# Patient Record
Sex: FEMALE | Race: WHITE
[De-identification: names, ages, dates, MRNs, and addresses within clinical notes are randomized per-mention and may not be internally consistent; named-entity substitution may affect disease eponyms.]

---

## 2017-09-23 ENCOUNTER — HOSPITAL ENCOUNTER (EMERGENCY)
Dept: HOSPITAL 83 - ED | Age: 42
Discharge: HOME | End: 2017-09-23
Payer: COMMERCIAL

## 2017-09-23 VITALS — WEIGHT: 250 LBS | BODY MASS INDEX: 40.18 KG/M2 | HEIGHT: 65.98 IN
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
## 2017-09-23 DIAGNOSIS — Z88.5: ICD-10-CM

## 2017-09-23 DIAGNOSIS — Z88.6: ICD-10-CM
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
## 2017-09-23 DIAGNOSIS — S91.115A: Primary | ICD-10-CM
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
## 2021-10-22 ENCOUNTER — HOSPITAL ENCOUNTER (INPATIENT)
Age: 46
LOS: 46 days | Discharge: HOME HEALTH | DRG: 058 | End: 2021-12-07
Attending: PHYSICAL MEDICINE & REHABILITATION | Admitting: PHYSICAL MEDICINE & REHABILITATION
Payer: MEDICAID

## 2021-10-22 PROBLEM — I63.9 ACUTE CEREBROVASCULAR ACCIDENT (CVA) (HCC): Status: ACTIVE | Noted: 2021-10-22

## 2021-10-22 PROCEDURE — 1280000000 HC REHAB R&B

## 2021-10-22 PROCEDURE — 6370000000 HC RX 637 (ALT 250 FOR IP): Performed by: PHYSICAL MEDICINE & REHABILITATION

## 2021-10-22 PROCEDURE — 6360000002 HC RX W HCPCS: Performed by: PHYSICAL MEDICINE & REHABILITATION

## 2021-10-22 RX ORDER — SENNA PLUS 8.6 MG/1
1 TABLET ORAL NIGHTLY
Status: DISCONTINUED | OUTPATIENT
Start: 2021-10-22 | End: 2021-10-28

## 2021-10-22 RX ORDER — BISACODYL 10 MG
10 SUPPOSITORY, RECTAL RECTAL DAILY PRN
Status: DISCONTINUED | OUTPATIENT
Start: 2021-10-22 | End: 2021-12-07 | Stop reason: HOSPADM

## 2021-10-22 RX ORDER — NICOTINE 21 MG/24HR
1 PATCH, TRANSDERMAL 24 HOURS TRANSDERMAL DAILY
Status: DISCONTINUED | OUTPATIENT
Start: 2021-10-22 | End: 2021-11-04

## 2021-10-22 RX ORDER — LANOLIN ALCOHOL/MO/W.PET/CERES
6 CREAM (GRAM) TOPICAL NIGHTLY
Status: DISCONTINUED | OUTPATIENT
Start: 2021-10-22 | End: 2021-12-07 | Stop reason: HOSPADM

## 2021-10-22 RX ORDER — ACETAMINOPHEN 325 MG/1
650 TABLET ORAL EVERY 4 HOURS PRN
Status: DISCONTINUED | OUTPATIENT
Start: 2021-10-22 | End: 2021-12-07 | Stop reason: HOSPADM

## 2021-10-22 RX ORDER — ASPIRIN 81 MG/1
81 TABLET, CHEWABLE ORAL DAILY
Status: DISCONTINUED | OUTPATIENT
Start: 2021-10-23 | End: 2021-12-07 | Stop reason: HOSPADM

## 2021-10-22 RX ORDER — ERGOCALCIFEROL 1.25 MG/1
50000 CAPSULE ORAL WEEKLY
Status: DISCONTINUED | OUTPATIENT
Start: 2021-10-23 | End: 2021-12-07 | Stop reason: HOSPADM

## 2021-10-22 RX ORDER — DOCUSATE SODIUM 100 MG/1
100 CAPSULE, LIQUID FILLED ORAL 2 TIMES DAILY
Status: DISCONTINUED | OUTPATIENT
Start: 2021-10-22 | End: 2021-12-07 | Stop reason: HOSPADM

## 2021-10-22 RX ORDER — HEPARIN SODIUM 10000 [USP'U]/ML
5000 INJECTION, SOLUTION INTRAVENOUS; SUBCUTANEOUS EVERY 12 HOURS
Status: DISCONTINUED | OUTPATIENT
Start: 2021-10-22 | End: 2021-11-17

## 2021-10-22 RX ORDER — CLOPIDOGREL BISULFATE 75 MG/1
75 TABLET ORAL DAILY
Status: DISCONTINUED | OUTPATIENT
Start: 2021-10-26 | End: 2021-12-07 | Stop reason: HOSPADM

## 2021-10-22 RX ORDER — ATORVASTATIN CALCIUM 40 MG/1
40 TABLET, FILM COATED ORAL NIGHTLY
Status: DISCONTINUED | OUTPATIENT
Start: 2021-10-22 | End: 2021-12-07 | Stop reason: HOSPADM

## 2021-10-22 RX ORDER — LIDOCAINE 4 G/G
1 PATCH TOPICAL DAILY
Status: DISCONTINUED | OUTPATIENT
Start: 2021-10-22 | End: 2021-10-26

## 2021-10-22 RX ORDER — CLOPIDOGREL BISULFATE 75 MG/1
75 TABLET ORAL DAILY
Status: DISCONTINUED | OUTPATIENT
Start: 2021-10-23 | End: 2021-10-22

## 2021-10-22 RX ADMIN — Medication 6 MG: at 20:49

## 2021-10-22 RX ADMIN — ATORVASTATIN CALCIUM 40 MG: 40 TABLET, FILM COATED ORAL at 20:49

## 2021-10-22 RX ADMIN — HEPARIN SODIUM 5000 UNITS: 10000 INJECTION INTRAVENOUS; SUBCUTANEOUS at 20:50

## 2021-10-22 NOTE — LETTER
PORTABLE PATIENT PROFILE  Ladan Damon  5505/5505-B    MEDICAL DIAGNOSIS/CONDITION:   Patient Active Problem List   Diagnosis    Acute cerebrovascular accident (CVA) ([address]    Status post craniectomy    Left spastic hemiplegia (HCC)    Tobacco abuse    Acute pain    Transient alteration of awareness       INSURANCE INFORMATION:  Payor: ANNA /  /  /     ADVANCED DIRECTIVES:      [unfilled]     EMERGENCY CONTACT:       RISK FACTORS:   Social History     Tobacco Use    Smoking status: Former Smoker     Packs/day: 1.00     Years: 20.00     Pack years: 20.00     Types: Cigarettes     Start date: 10/1/2020     Quit date: 10/12/2021     Years since quittin.1    Smokeless tobacco: Not on file   Substance Use Topics    Alcohol use: Not Currently       ALLERGIES:  Allergies   Allergen Reactions    Vicodin [Hydrocodone-Acetaminophen]      Gets severe hypotension       IMMUNIZATIONS:    There is no immunization history on file for this patient. SWALLOWING:   Difficulty Chewing or Swallowing Food: Yes (Comment) (recent CVA)    VISION AND HEARING:   Sensory Problems  Visual impairment: Glasses    PHYSICIANS INVOLVED WITH CARE:    Laura Chadwick MD  No ref. provider found  No primary care provider on file.   Laura Chadwick MD

## 2021-10-23 LAB
ALBUMIN SERPL-MCNC: 3.7 G/DL (ref 3.5–5.2)
ALP BLD-CCNC: 89 U/L (ref 35–104)
ALT SERPL-CCNC: 75 U/L (ref 0–32)
ANION GAP SERPL CALCULATED.3IONS-SCNC: 9 MMOL/L (ref 7–16)
AST SERPL-CCNC: 46 U/L (ref 0–31)
BILIRUB SERPL-MCNC: 0.4 MG/DL (ref 0–1.2)
BUN BLDV-MCNC: 19 MG/DL (ref 6–20)
CALCIUM SERPL-MCNC: 9.7 MG/DL (ref 8.6–10.2)
CHLORIDE BLD-SCNC: 105 MMOL/L (ref 98–107)
CO2: 25 MMOL/L (ref 22–29)
CREAT SERPL-MCNC: 0.7 MG/DL (ref 0.5–1)
GFR AFRICAN AMERICAN: >60
GFR NON-AFRICAN AMERICAN: >60 ML/MIN/1.73
GLUCOSE BLD-MCNC: 107 MG/DL (ref 74–99)
HCT VFR BLD CALC: 40.9 % (ref 34–48)
HEMOGLOBIN: 13.9 G/DL (ref 11.5–15.5)
MCH RBC QN AUTO: 31.1 PG (ref 26–35)
MCHC RBC AUTO-ENTMCNC: 34 % (ref 32–34.5)
MCV RBC AUTO: 91.5 FL (ref 80–99.9)
PDW BLD-RTO: 12.6 FL (ref 11.5–15)
PLATELET # BLD: 496 E9/L (ref 130–450)
PMV BLD AUTO: 10.5 FL (ref 7–12)
POTASSIUM SERPL-SCNC: 4.5 MMOL/L (ref 3.5–5)
RBC # BLD: 4.47 E12/L (ref 3.5–5.5)
SODIUM BLD-SCNC: 139 MMOL/L (ref 132–146)
TOTAL PROTEIN: 7.3 G/DL (ref 6.4–8.3)
WBC # BLD: 10.4 E9/L (ref 4.5–11.5)

## 2021-10-23 PROCEDURE — 99223 1ST HOSP IP/OBS HIGH 75: CPT | Performed by: PHYSICAL MEDICINE & REHABILITATION

## 2021-10-23 PROCEDURE — 6370000000 HC RX 637 (ALT 250 FOR IP): Performed by: PHYSICAL MEDICINE & REHABILITATION

## 2021-10-23 PROCEDURE — 85027 COMPLETE CBC AUTOMATED: CPT

## 2021-10-23 PROCEDURE — 97166 OT EVAL MOD COMPLEX 45 MIN: CPT

## 2021-10-23 PROCEDURE — 6360000002 HC RX W HCPCS: Performed by: PHYSICAL MEDICINE & REHABILITATION

## 2021-10-23 PROCEDURE — 36415 COLL VENOUS BLD VENIPUNCTURE: CPT

## 2021-10-23 PROCEDURE — 97129 THER IVNTJ 1ST 15 MIN: CPT

## 2021-10-23 PROCEDURE — 1280000000 HC REHAB R&B

## 2021-10-23 PROCEDURE — 97535 SELF CARE MNGMENT TRAINING: CPT

## 2021-10-23 PROCEDURE — 97530 THERAPEUTIC ACTIVITIES: CPT

## 2021-10-23 PROCEDURE — 92610 EVALUATE SWALLOWING FUNCTION: CPT

## 2021-10-23 PROCEDURE — 92523 SPEECH SOUND LANG COMPREHEN: CPT

## 2021-10-23 PROCEDURE — 51798 US URINE CAPACITY MEASURE: CPT

## 2021-10-23 PROCEDURE — 80053 COMPREHEN METABOLIC PANEL: CPT

## 2021-10-23 PROCEDURE — 97162 PT EVAL MOD COMPLEX 30 MIN: CPT

## 2021-10-23 RX ORDER — DICLOFENAC SODIUM 1 MG/ML
1 SOLUTION/ DROPS OPHTHALMIC 4 TIMES DAILY
Status: ON HOLD | COMMUNITY
End: 2021-12-07 | Stop reason: HOSPADM

## 2021-10-23 RX ORDER — CLOPIDOGREL BISULFATE 75 MG/1
75 TABLET ORAL DAILY
Status: ON HOLD | COMMUNITY
Start: 2021-10-27 | End: 2021-12-07 | Stop reason: SDUPTHER

## 2021-10-23 RX ORDER — NICOTINE 21 MG/24HR
1 PATCH, TRANSDERMAL 24 HOURS TRANSDERMAL EVERY 24 HOURS
Status: ON HOLD | COMMUNITY
Start: 2021-10-22 | End: 2021-12-07 | Stop reason: HOSPADM

## 2021-10-23 RX ORDER — TAMSULOSIN HYDROCHLORIDE 0.4 MG/1
0.4 CAPSULE ORAL DAILY
Status: DISCONTINUED | OUTPATIENT
Start: 2021-10-23 | End: 2021-12-07 | Stop reason: HOSPADM

## 2021-10-23 RX ORDER — ATORVASTATIN CALCIUM 40 MG/1
40 TABLET, FILM COATED ORAL DAILY
Status: ON HOLD | COMMUNITY
Start: 2021-10-22 | End: 2021-12-07 | Stop reason: SDUPTHER

## 2021-10-23 RX ORDER — POLYETHYLENE GLYCOL 3350 17 G/17G
17 POWDER, FOR SOLUTION ORAL 2 TIMES DAILY
COMMUNITY
Start: 2021-10-22 | End: 2022-07-07

## 2021-10-23 RX ORDER — ACETAMINOPHEN 500 MG
1000 TABLET ORAL EVERY 4 HOURS PRN
COMMUNITY
Start: 2021-10-22

## 2021-10-23 RX ORDER — ASPIRIN 81 MG/1
81 TABLET, CHEWABLE ORAL DAILY
Status: ON HOLD | COMMUNITY
Start: 2021-10-22 | End: 2021-12-07 | Stop reason: SDUPTHER

## 2021-10-23 RX ADMIN — Medication 6 MG: at 20:43

## 2021-10-23 RX ADMIN — HEPARIN SODIUM 5000 UNITS: 10000 INJECTION INTRAVENOUS; SUBCUTANEOUS at 16:53

## 2021-10-23 RX ADMIN — DICLOFENAC SODIUM TOPICAL GEL, 1%, 2 G: 10 GEL TOPICAL at 00:36

## 2021-10-23 RX ADMIN — ATORVASTATIN CALCIUM 40 MG: 40 TABLET, FILM COATED ORAL at 20:43

## 2021-10-23 RX ADMIN — HEPARIN SODIUM 5000 UNITS: 10000 INJECTION INTRAVENOUS; SUBCUTANEOUS at 07:37

## 2021-10-23 RX ADMIN — ACETAMINOPHEN 650 MG: 325 TABLET ORAL at 16:53

## 2021-10-23 RX ADMIN — ERGOCALCIFEROL 50000 UNITS: 1.25 CAPSULE ORAL at 08:15

## 2021-10-23 RX ADMIN — DOCUSATE SODIUM 100 MG: 100 CAPSULE, LIQUID FILLED ORAL at 08:15

## 2021-10-23 RX ADMIN — ACETAMINOPHEN 650 MG: 325 TABLET ORAL at 08:14

## 2021-10-23 RX ADMIN — ACETAMINOPHEN 650 MG: 325 TABLET ORAL at 00:31

## 2021-10-23 RX ADMIN — ASPIRIN 81 MG CHEWABLE TABLET 81 MG: 81 TABLET CHEWABLE at 08:14

## 2021-10-23 RX ADMIN — DICLOFENAC SODIUM TOPICAL GEL, 1%, 2 G: 10 GEL TOPICAL at 20:49

## 2021-10-23 RX ADMIN — TAMSULOSIN HYDROCHLORIDE 0.4 MG: 0.4 CAPSULE ORAL at 16:53

## 2021-10-23 SDOH — ECONOMIC STABILITY: TRANSPORTATION INSECURITY
IN THE PAST 12 MONTHS, HAS LACK OF TRANSPORTATION KEPT YOU FROM MEETINGS, WORK, OR FROM GETTING THINGS NEEDED FOR DAILY LIVING?: NO

## 2021-10-23 SDOH — ECONOMIC STABILITY: FOOD INSECURITY: WITHIN THE PAST 12 MONTHS, YOU WORRIED THAT YOUR FOOD WOULD RUN OUT BEFORE YOU GOT MONEY TO BUY MORE.: NEVER TRUE

## 2021-10-23 SDOH — ECONOMIC STABILITY: INCOME INSECURITY: IN THE LAST 12 MONTHS, WAS THERE A TIME WHEN YOU WERE NOT ABLE TO PAY THE MORTGAGE OR RENT ON TIME?: NO

## 2021-10-23 SDOH — HEALTH STABILITY: PHYSICAL HEALTH: ON AVERAGE, HOW MANY DAYS PER WEEK DO YOU ENGAGE IN MODERATE TO STRENUOUS EXERCISE (LIKE A BRISK WALK)?: 2 DAYS

## 2021-10-23 SDOH — ECONOMIC STABILITY: HOUSING INSECURITY
IN THE LAST 12 MONTHS, WAS THERE A TIME WHEN YOU DID NOT HAVE A STEADY PLACE TO SLEEP OR SLEPT IN A SHELTER (INCLUDING NOW)?: NO

## 2021-10-23 SDOH — HEALTH STABILITY: PHYSICAL HEALTH: ON AVERAGE, HOW MANY MINUTES DO YOU ENGAGE IN EXERCISE AT THIS LEVEL?: 30 MIN

## 2021-10-23 SDOH — ECONOMIC STABILITY: HOUSING INSECURITY: IN THE LAST 12 MONTHS, HOW MANY PLACES HAVE YOU LIVED?: 1

## 2021-10-23 SDOH — ECONOMIC STABILITY: FOOD INSECURITY: WITHIN THE PAST 12 MONTHS, THE FOOD YOU BOUGHT JUST DIDN'T LAST AND YOU DIDN'T HAVE MONEY TO GET MORE.: NEVER TRUE

## 2021-10-23 SDOH — ECONOMIC STABILITY: TRANSPORTATION INSECURITY
IN THE PAST 12 MONTHS, HAS THE LACK OF TRANSPORTATION KEPT YOU FROM MEDICAL APPOINTMENTS OR FROM GETTING MEDICATIONS?: NO

## 2021-10-23 ASSESSMENT — PAIN DESCRIPTION - DESCRIPTORS
DESCRIPTORS: NAGGING;ACHING;DISCOMFORT
DESCRIPTORS: ACHING

## 2021-10-23 ASSESSMENT — PAIN SCALES - GENERAL
PAINLEVEL_OUTOF10: 5
PAINLEVEL_OUTOF10: 5
PAINLEVEL_OUTOF10: 7
PAINLEVEL_OUTOF10: 7
PAINLEVEL_OUTOF10: 5

## 2021-10-23 ASSESSMENT — PAIN DESCRIPTION - PROGRESSION: CLINICAL_PROGRESSION: NOT CHANGED

## 2021-10-23 ASSESSMENT — SOCIAL DETERMINANTS OF HEALTH (SDOH)
WITHIN THE LAST YEAR, HAVE YOU BEEN HUMILIATED OR EMOTIONALLY ABUSED IN OTHER WAYS BY YOUR PARTNER OR EX-PARTNER?: NO
HOW OFTEN DO YOU GET TOGETHER WITH FRIENDS OR RELATIVES?: TWICE A WEEK
DO YOU BELONG TO ANY CLUBS OR ORGANIZATIONS SUCH AS CHURCH GROUPS UNIONS, FRATERNAL OR ATHLETIC GROUPS, OR SCHOOL GROUPS?: NO
HOW HARD IS IT FOR YOU TO PAY FOR THE VERY BASICS LIKE FOOD, HOUSING, MEDICAL CARE, AND HEATING?: NOT HARD AT ALL
WITHIN THE LAST YEAR, HAVE YOU BEEN KICKED, HIT, SLAPPED, OR OTHERWISE PHYSICALLY HURT BY YOUR PARTNER OR EX-PARTNER?: NO
WITHIN THE LAST YEAR, HAVE TO BEEN RAPED OR FORCED TO HAVE ANY KIND OF SEXUAL ACTIVITY BY YOUR PARTNER OR EX-PARTNER?: NO
HOW OFTEN DO YOU ATTEND CHURCH OR RELIGIOUS SERVICES?: NEVER
HOW OFTEN DO YOU ATTENT MEETINGS OF THE CLUB OR ORGANIZATION YOU BELONG TO?: NEVER
ARE YOU MARRIED, WIDOWED, DIVORCED, SEPARATED, NEVER MARRIED, OR LIVING WITH A PARTNER?: PATIENT DECLINED
WITHIN THE LAST YEAR, HAVE YOU BEEN AFRAID OF YOUR PARTNER OR EX-PARTNER?: NO
IN A TYPICAL WEEK, HOW MANY TIMES DO YOU TALK ON THE PHONE WITH FAMILY, FRIENDS, OR NEIGHBORS?: ONCE A WEEK

## 2021-10-23 ASSESSMENT — PATIENT HEALTH QUESTIONNAIRE - PHQ9: DEPRESSION UNABLE TO ASSESS: NO

## 2021-10-23 ASSESSMENT — PAIN - FUNCTIONAL ASSESSMENT: PAIN_FUNCTIONAL_ASSESSMENT: PREVENTS OR INTERFERES SOME ACTIVE ACTIVITIES AND ADLS

## 2021-10-23 ASSESSMENT — PAIN DESCRIPTION - ORIENTATION: ORIENTATION: RIGHT

## 2021-10-23 ASSESSMENT — PAIN DESCRIPTION - LOCATION
LOCATION: NECK
LOCATION: NECK

## 2021-10-23 ASSESSMENT — PAIN DESCRIPTION - ONSET: ONSET: ON-GOING

## 2021-10-23 ASSESSMENT — PAIN DESCRIPTION - PAIN TYPE
TYPE: CHRONIC PAIN
TYPE: CHRONIC PAIN

## 2021-10-23 ASSESSMENT — PAIN DESCRIPTION - FREQUENCY: FREQUENCY: INTERMITTENT

## 2021-10-23 ASSESSMENT — LIFESTYLE VARIABLES: HOW OFTEN DO YOU HAVE A DRINK CONTAINING ALCOHOL: NEVER

## 2021-10-23 NOTE — PROGRESS NOTES
SPEECH/LANGUAGE PATHOLOGY  SPEECH/LANGUAGE/COGNITIVE EVALUATION      PATIENT NAME:  Rose Lloyd  (female)     MRN:  [id number]    :  1975  (39 y.o.)  STATUS:  Inpatient: Room 5505/5505-B    TODAY'S DATE:  10/23/2021  REFERRING PROVIDER:    Dr. Andreas Yusuf: speech language pathology (SLP) eval and treat  Date of order:  10/22/2021  REASON FOR REFERRAL: CVA  EVALUATING THERAPIST:  Clive Altman M.A. [facility]-SLP  ADMITTING DIAGNOSIS: Acute cerebrovascular accident (CVA) [facility]) [I63.9]    VISIT DIAGNOSIS: CVA    SPEECH THERAPY  PLAN OF CARE   The speech therapy  POC is established based on physician order, speech pathology diagnosis and results of clinical assessment     SPEECH PATHOLOGY DIAGNOSIS:    Mild cognitive deficit with left neglect    Speech Pathology intervention is recommended 3-6 times per week for LOS or when goals are met with emphasis on the following:      Conditions Requiring Skilled Therapeutic Intervention for speech, language and/or cognition    Decreased attention with impulsivity  left-neglect    Specific Speech Therapy Interventions to Include: Therapeutic exercises  Address left neglect    Specific instructions for next treatment:      To initiate POC    SHORT/LONG TERM GOALS  Pt will improve orientation to spatial and temporal surroundings with use of external aids and verbal cues  Pt will attend to her left side with decreasing amount of physical/verbal cues  Pt will improve attention and decrease impulsivity    Patient stated goals: Agreed with above    Rehabilitation Potential/Prognosis: good     _______________________________________________________________________  ASSESSMENT:  MOTOR SPEECH       Oral Peripheral Examination   Left labiobuccal weakness    Parameters of Speech Production  Respiration:  Adequate for speech production  Articulation:  Within functional limits  Resonance:  Within functional limits  Quality:   Within functional limits  Pitch: Within functional limits  Intensity: Within functional limits  Fluency:  Intact  Prosody Intact    RECEPTIVE LANGUAGE    Comprehension of Yes/No Questions: Within functional limits    Process  Simple Verbal Commands:   Within functional limits  Process Intermediate Verbal Commands:   Within functional limits  Process Complex Verbal Commands:     Within functional limits    Comprehension of Conversation:      Within functional limits      EXPRESSIVE LANGUAGE     Serials: Functional    Imitation:  Words   Functional   Sentences Functional    Naming:  (Modality used:  Verbal)  Confrontation Naming  Functional  Functional Description  Functional  Response Naming: Functional    Conversation:      Conversation was within functional limits    COGNITION       Attention/Orientation  Attention: Easily Distracted and impulsivity was observed while following directions and answering questions    General  Orientation:   Person:  oriented      Place:  oriented      Situation:  accurately described    Temporal Orientation:  Temporal Orientation: Year: Correct      Temporal Orientation: Month: Accurate within 5 days      Temporal Orientation: Day: Correct    Memory   Long Term Recall:    Address:  recalled without cuing  Birthdate:  recalled without cuing  Age: recalled without cuing  Family: recalled without cuing    Immediate Recall: Repetition of Three Words (First Attempt): 3    Delayed Recall:   Able to recall \"sock: Yes, no cue required     Able to recall \"blue\": Yes, no cue required     Able to recall \"bed\": Yes, no cue required    Organization/Problem Solving/Reasoning   Sequencing:    Verbal: Functional      Motor:  Functional    Problem solving: Verbal: Functional      Motor:  Functional    Mathematics:  Simple:  Functional     Complex:  Functional      CLINICAL OBSERVATIONS NOTED DURING THE EVALUATION  Cueing was required to decrease impulsivity    FIMS SCORES      Swallowing    Current Status  5--Supervision    Short Term Goal 6--Modified Independent    Long Term Goal 7--Independent     Receptive    Current Status  7- Independent    Short Term Goal 7--Independent    Long Term Goal Met     Expressive    Current Status  7--Independent    Short Term Goal 7--Independent    Long Term Goal Met     Social Interaction    Current Status  4--Minimal Assistance    Short Term Goal 5--Supervision    Long Term Goal 6--Modified Independent         Problem Solving    Current Status  6--Modified Independent    Short Term Goal 7--Independent    Long Term Goal Met      Memory    Current Status  7--Independent    Short Term Goal 7--Independent    Long Term Goal Met       EDUCATION    Speech Pathologist (SLP) completed education with the patient and/or family regarding identified cognitive linguistic deficits and subsequent need for speech pathology intervention. Discussed deficit areas to be targeted by formal intervention and established short/long term goals. Reviewed compensatory strategies to improve functional outcome (as appropriate). Encouraged patient and/or family to engage SLP in structured Q&A session relative to identified deficit areas. Patient and/or family indicated understanding of all information provided via satisfactory verbal response. ? Prognosis for improvements is good  This plan will be re-evaluated and revised in 1 week  if warranted. Patient stated goals: Agreed with above  Treatment goals discussed with Patient   The Patient understand(s) the diagnosis, prognosis and plan of care     Shilpa Townsend M.A.  Speech Pathologist  10/23/2021     The admitting diagnosis and active problem list, as listed below have been reviewed prior to initiation of this evaluation.         ACTIVE PROBLEM LIST:   Patient Active Problem List   Diagnosis    Acute cerebrovascular accident (CVA) ([facility] [address]

## 2021-10-23 NOTE — PROGRESS NOTES
Speech Language Pathology  ACUTE REHABILITATION--DAILY PROGRESS NOTE          PATIENT NAME:  Shin Shirley  (female)     :  1975  (46 y.o.)  STATUS:  Inpatient: Room 5505/5505B    TODAY'S DATE:  10/23/2021  REFERRING PROVIDER:    Dr. Alberto Arriaza: speech language pathology (SLP) eval and treat  Date of order:  10/22/2021  REASON FOR REFERRAL: CVA  ADMITTING DIAGNOSIS: Acute cerebrovascular accident (CVA) ([address] [I63.9]    VISIT DIAGNOSIS: CVA    SPEECH THERAPY  PLAN OF CARE   The speech therapy  POC is established based on physician order, speech pathology diagnosis and results of clinical assessment     SPEECH PATHOLOGY DIAGNOSIS:    Mild cognitive deficit with left neglect    Speech Pathology intervention is recommended 3-6 times per week for LOS or when goals are met with emphasis on the following:      Conditions Requiring Skilled Therapeutic Intervention for speech, language and/or cognition    Decreased attention with impulsivity  left-neglect    Specific Speech Therapy Interventions to Include: Therapeutic exercises  Address left neglect    Specific instructions for next treatment: To initiate POC    SHORT/LONG TERM GOALS  Pt will improve orientation to spatial and temporal surroundings with use of external aids and verbal cues  Pt will attend to her left side with decreasing amount of physical/verbal cues  Pt will improve attention and decrease impulsivity    Patient stated goals: Agreed with above    Rehabilitation Potential/Prognosis: good     _______________________________________________________________________  ASSESSMENT:  MOTOR SPEECH       Oral Peripheral Examination   Left labiobuccal weakness    Parameters of Speech Production  Respiration:  Adequate for speech production  Articulation:  Within functional limits  Resonance:  Within functional limits  Quality:   Within functional limits  Pitch:     Within functional limits  Intensity: Within functional limits  Fluency:  Intact  Prosody Intact    RECEPTIVE LANGUAGE    Comprehension of Yes/No Questions: Within functional limits    Process  Simple Verbal Commands:   Within functional limits  Process Intermediate Verbal Commands:   Within functional limits  Process Complex Verbal Commands:     Within functional limits    Comprehension of Conversation:      Within functional limits      EXPRESSIVE LANGUAGE     Serials: Functional    Imitation:  Words   Functional   Sentences Functional    Naming:  (Modality used:  Verbal)  Confrontation Naming  Functional  Functional Description  Functional  Response Naming: Functional    Conversation:      Conversation was within functional limits    COGNITION       Attention/Orientation  Attention: Easily Distracted and impulsivity was observed while following directions and answering questions    General  Orientation:   Person:  oriented      Place:  oriented      Situation:  accurately described    Temporal Orientation:  Temporal Orientation: Year: Correct      Temporal Orientation: Month: Accurate within 5 days      Temporal Orientation: Day: Correct    Memory   Long Term Recall:    Address:  recalled without cuing  Birthdate:  recalled without cuing  Age: recalled without cuing  Family: recalled without cuing    Immediate Recall: Repetition of Three Words (First Attempt): 3    Delayed Recall:   Able to recall \"sock: Yes, no cue required     Able to recall \"blue\": Yes, no cue required     Able to recall \"bed\": Yes, no cue required    Organization/Problem Solving/Reasoning   Sequencing:    Verbal: Functional      Motor:  Functional    Problem solving: Verbal: Functional      Motor:  Functional    Mathematics:  Simple:  Functional     Complex:  Functional      CLINICAL OBSERVATIONS NOTED DURING THE EVALUATION  Cueing was required to decrease impulsivity    FIMS SCORES      Swallowing    Current Status  5--Supervision    Short Term Goal 6--Modified Independent    Long Term Goal 7--Independent     Receptive    Current Status  7- Independent    Short Term Goal 7--Independent    Long Term Goal Met     Expressive    Current Status  7--Independent    Short Term Goal 7--Independent    Long Term Goal Met     Social Interaction    Current Status  4--Minimal Assistance                            Short Term Goal 5--Supervision    Long Term Goal 6--Modified Independent         Problem Solving    Current Status  6--Modified Independent    Short Term Goal 7--Independent    Long Term Goal Met      Memory    Current Status  7--Independent    Short Term Goal 7--Independent    Long Term Goal Met     ? SWALLOWING:      Diet:  Regular consistency solids with thin liquids (IDDSI level 0)    LANGUAGE:     N/A this session     COGNITION:       Pt attended to reading tasks given moderate verbal cues to attend to her left side. She required mild verbal cues to decrease impulsivity as she answers questions very quickly. Safety:  Fair+    SPEECH:     Speech intelligibility was good during structured tasks and conversational speech. SP recommended after discharge:  yes  Supervision recommended at discharge: 24 hour    Will continue SP intervention as per previously established POC. EDUCATION: Speech Pathologist (SLP) completed education with the patient and/or family regarding identified cognitive linguistic deficits and subsequent need for speech pathology intervention. Discussed deficit areas to be targeted by formal intervention and established short/long term goals. Reviewed compensatory strategies to improve functional outcome (as appropriate). Encouraged patient and/or family to engage SLP in structured Q&A session relative to identified deficit areas. Patient and/or family indicated understanding of all information provided via satisfactory verbal response.     Minute Tracking:    Individual therapy:   15 minutes  Concurrent therapy:    0 minutes  Group therapy:     0 minutes  Co-treatment therapy:    0 minutes    Total minutes for 10/23/2021: 15 minutes        Ortiz Harris M.A., [address] [place] [address] Pathologist  10/23/2021     The admitting diagnosis and active problem list, as listed below have been reviewed prior to initiation of this evaluation.         ACTIVE PROBLEM LIST:   Patient Active Problem List   Diagnosis    Acute cerebrovascular accident (CVA) [address] [address]

## 2021-10-23 NOTE — FLOWSHEET NOTE
10/23/21 0031   Cardiac   Cardiac (WDL) WDL   Cardiac Regularity Regular   Cardiac Monitor   Cardiac Event Monitor On patient; Battery charged  (*[employer] cardiac monitor mid-chest,phone charging at bedside.)

## 2021-10-23 NOTE — PROGRESS NOTES
SPEECH/LANGUAGE PATHOLOGY  CLINICAL ASSESSMENT OF SWALLOWING FUNCTION   and PLAN OF CARE    PATIENT NAME:  Suzette Dyer  (female)     MRN:  [id number]    :  1975  (39 y.o.)  STATUS:  Inpatient: Room 5505/5505-B    TODAY'S DATE:  10/23/2021  REFERRING PROVIDER:   Dr. Yasmine Peter: SLP eval and treat Date of order:  10/22/21  REASON FOR REFERRAL: assess swallow function   EVALUATING THERAPIST: PATTI Corea                 RESULTS:    DYSPHAGIA DIAGNOSIS:   Clinical indicators of mild-moderate oral phase dysphagia       DIET RECOMMENDATIONS:  Regular consistency solids (IDDSI level 7) with  thin liquids (IDDSI level 0)     FEEDING RECOMMENDATIONS:     Assistance level:  Set-up is required for all oral intake, Supervision is needed during all oral intake due to L neglect       Compensatory strategies recommended: Small bites/sips and Alternate solids and liquids      Discussed recommendations with nursing and/or faxed report to referring provider: No secondary to no diet/liquid change recommended     SPEECH THERAPY  PLAN OF CARE   The dysphagia POC is established based on physician order, dysphagia diagnosis and results of clinical assessment     Skilled SLP intervention for dysphagia management on acute care 3-5 x per week until goals met, pt plateaus in function and/or discharged from hospital    Conditions Requiring Skilled Therapeutic Intervention for dysphagia:    Patient is performing below functional baseline d/t  current acute condition, Multiple diagnoses, multiple medications, and increased dependency upon caregivers.   Reduced oral control of bolus  Oral motor strength/coordination impairment  Left neglect    Specific dysphagia interventions to include:     Training in positioning for improved integrity of swallow  Compensatory strategy training   Therapeutic exercises    Specific instructions for next treatment:  initiate instruction of therapeutic exercises  and initiate instruction of compensatory strategies  Patient Treatment Goals:    Short Term Goals:  Pt will implement identified compensatory swallowing strategies on 90% of opportunities or greater to improve airway protection and swallow function. Pt will complete oral motor strength/ coordination exercises to improve bolus prep/ control and mastication with  minimal verbal prompts . Long Term Goals:   Pt will maintain adequate nutrition/hydration via PO intake of the least restrictive oral diet with implementation of safe swallow/ compensatory strategies and decrease signs/symptoms of aspiration to less than 1 x/day. Patient/family Goal:    Did not state. Will further assess during treatment. Plan of care discussed with Patient   The Patient understand(s) the diagnosis, prognosis and plan of care     Rehabilitation Potential/Prognosis: good                    ADMITTING DIAGNOSIS: Acute cerebrovascular accident (CVA) ([facility] [address] [I63.9]    VISIT DIAGNOSIS:      PATIENT REPORT/COMPLAINT: patient reported sometimes food/liquid falls out of her mouth due to weakness and body position    meal tray present during evaluation     PRIOR LEVEL OF SWALLOW FUNCTION:    PAST HISTORY OF DYSPHAGIA?: none reported    Diet during hospital admission: Regular consistency solids (IDDSI level 7) with thin liquids (IDDSI level 0)    PROCEDURE:  Consistencies Administered During the Evaluation   Liquids: thin liquid   Solids:  soft solid foods and solid foods      Method of Intake:   straw, spoon  Self fed      Position:   Seated, mostly upright. Patient exhibits a left lean with right head tilt    CLINICAL ASSESSMENT:  Oral Stage:       Inadequate labial seal resulting anterior labial spillage on the left side      Pharyngeal Stage:    No signs of aspiration were noted during this evaluation however, silent aspiration cannot be ruled out at bedside.   If silent aspiration is suspected, a Videofluoroscopic Study of Swallowing (MBS) is recommended and requires a physician order. Cognition:   Within functional limits for this exam    Oral Peripheral Examination   Generalized oral weakness- unable to determine lingual deviations due to limited movement    Current Respiratory Status    room air     Parameters of Speech Production  Respiration:  Adequate for speech production  Quality:   Within functional limits  Intensity: Within functional limits    Volitional Swallow: present     Volitional Cough:   present     Pain: No pain reported for swallowing, however patient reported neck pain    EDUCATION:   The Speech Language Pathologist (SLP) completed education regarding results of evaluation and that intervention is warranted at this time. Learner: Patient  Education: Reviewed results and recommendations of this evaluation  Evaluation of Education:  Osiris Abrams understanding    This plan may be re-evaluated and revised as warranted. Evaluation Time includes thorough review of current medical information, gathering information on past medical history/social history and prior level of function, completion of standardized testing/informal observation of tasks, assessment of data and education on plan of care and goals. [x]The admitting diagnosis and active problem list, have been reviewed prior to initiation of this evaluation. ACTIVE PROBLEM LIST:   Patient Active Problem List   Diagnosis    Acute cerebrovascular accident (CVA) [facility])         CPT code:  73708  bedside swallow eval Rickey Peek D'Amico M. A. Dyane Dollar [id number]  Speech Language Pathologist

## 2021-10-23 NOTE — PROGRESS NOTES
Occupational Therapy  OCCUPATIONAL THERAPY INITIAL EVALUATION      Date:10/23/2021  Patient Name: Tonny Askew  MRN: [id number]  : 1975  Room: [address] [place] [address]    Discharge Recommendations: home with consistent care/supervision and home health  Frequency / Duration: BID 5-7 tx/wk; 6 weeks  Recommended Adaptive Equipment: 3:1 BSC, AE for LB self-care, L UE sling  Referring Provider: Malcolm Kidd DO  Specific Provider Orders/Date: OT eval and treat 10/22/21    Diagnosis: Acute CVA  Surgery: R frontotemporal decompressive hemicraniectomy on 10/13/21  Past Medical History: COVID (9/15), tobacco use  Pt presented to [facility] as Level 2 stroke alert on 10/11/21. L side weakness, neglect. NIH-17  Found to have large R MCA involving R occipital    Precautions: Falls, L hemiplegia, L neglect, bed/chair alarm, helmet when OOB, impulsive, L PRAFO     Home Living: Pt lives daughter in a 2 floor home (+basement) with 3 steps to enter and 1 rail(s); bed/bath on 1st floor. Basement laundry - full flight, 2 handrails  Bathroom setup: tub/shower   Equipment owned: shower chair (does not utilize)    Prior Level of Function: independent with ADLs;  independent with IADLs. Independent, no AD for ambulation. Driving: yes  Occupation: works in [employer]" was taking computer classes to advance    Pain Level:   Reports 7/10 neck pain; reinforced optimal positioning of neck at rest and w/ activity. C-spine flexed/rotated to left    Cognition:   alert, oriented x3   Communication: Able to make needs known, answers simple questions appropriately. Delayed processing speed. Intermittent difficulty responding to complex tasks  Safety: Poor+  Comments: Impulsivity noted during ADL's and functional tasks. Cues provided to slow down for safety.  Problem solving: poor+    Vision/Perception  Hearing: WFL  Vision: L neglect - consistent cues required to cross midline during functional tasks; Glasses: yes [x] no [] reading []  Perception: impaired L UE/LE    UE Assessment:  Hand Dominance: right    Rt UE ROM: Within Functional Limits  Rt UE Strength: 4/5  Rt  strength: Good  2 trials dynamometer (40 lbs, 45 lbs)  Coordination: Fair+  Nine hole peg test: 2 trials (1st: 35.07 sec, 2nd: 27.52 sec) Noted L neglect during task    Lt UE PROM: WFL  No AROM  Lt UE Strength: 0/5   Lt  Strength: Absent  Coordination: Absent    Sensation: severely diminished L UE  Per pt, able to feel pain when passively moving L UE  Tone: Flaccid L UE/LE  Edema: none noted    Functional Assessment:  Bed mobility: Max A  Sit><stand: Max A  Stand pivot transfers: Max A x2  Tub / Shower Transfer: NT d/t safety concerns and s/p hemicraniectomy  Function Mobility: NT secondary to safety concerns    Sit Balance: Max A (heavy lateral lean to L)  Stand Balance: Max A x2  Cervical flexion to left at rest and during all functional tasks - assist/cues required to correct, thorough education provided on importance of optimal positioning. Neck pillow provided at room departure. Activities of Daily Living:  Feeding: Mod A  Grooming: Mod A  Bathing: dependent  UB Dressing: Max A  LB Dressing: dependent  Toileting: dependent  Homemaking: dependent    Endurance: fair- with minimal activity (noted fatigue w/ all functional tasks. Also limited by pain)    Patient / Family Goal:   To increase independence    Rehab potential:   good       OT for functional assessment of  ADL, Functional Transfer/Mobility Training, Equipment Needs, Energy Conservation Techniques, Pt/Family Education, Ther Ex., endurance and balance ex. OT role and POC reviewed. Patient demo fair understanding of education/training.     Long Term Goals: 6 weeks      Time Frame for Long term goals  6 weeks   Long term goal 1 Pt will complete UB self-care tasks w/ Supervision   Long term goal 2 Pt will complete LB self-care tasks w/ Supervision   Long term goal 3 Pt will complete toileting (all aspects) w/ Supervision Long term goal 4 Pt will safely complete toilet transfers w/ Supervision   Long term goal 5 Pt will safely complete tub/shower transfers w/ Supervision   Long term goals 6 Pt will complete basic homemaking task w/ SBA   Long term goal 7 Pt will complete grooming task w/ Supervision   Long term goal 8 Pt will complete feeding task w/ Supervision/setup         Assessment of current deficits   Functional mobility [x]  ROM [x] Strength [x]  Cognition [x]  ADLs [x]   IADLs [x] Safety Awareness [x] Endurance [x]  Fine Motor Coordination [x] Balance [x] Vision/perception [x] Sensation [x]   Gross Motor Coordination [x]      Plan of Care:   ADL retraining [x]   Equipment needs [x]   Neuromuscular re-education [x] Energy Conservation Techniques [x]  Functional Transfer training [x] Patient and/or Family Education [x]  Functional Mobility training [x]  Environmental Modifications [x]  Cognitive re-training [x]   Compensatory techniques for ADLs [x]  Splinting Needs []   Positioning/joint protection [x]  Other: []       Treatment/ Education Provided:   Facilitation of bed mobility (education/cues for body mechanics, protection of L UE/LE, sequencing. Assisted w/ donning helmet upon sitting EOB-reinforced importance), unsupported sitting balance (education/max cues for posture, weight shifting, safety to prep for ADL's. Heavy left lateral lean to L noted - pusher at times. Assist/cues to correct), functional transfers (various surfaces w/ education/cues for safety/hand placement, sequencing. Assist required for L UE/LE placement/positioning during transitions) and standing tolerance tasks (addressing posture, balance and activity tolerance while incorporating light functional reaching impacting ADLs and functional activity. L knee block during all standing tasks. Cues to attend to Left required). Therapist facilitated self-care retraining: UB/LB self-care tasks (donned pants, shoes and socks.  Doffed gown, donned gown and sweatshirt) and seated grooming tasks (including oral care, washed face w/ R UE) while educating/cuing pt on trent-techniques, posture, safety and energy conservation techniques. Due to L neglect, increased cues and assist provided during all functional tasks. Skilled monitoring of HR, O2 sats and pts response to treatment. Individual  Tx: 07:43 to 08:20   Concurrent Tx:   Co-Tx:  Group Tx:   Total Tx Time: 32 minutes   Time in Time out   Evaluation Session 07:18 07:42     Eval Complexity: 55 A. [facility], OTR/L #[id number]

## 2021-10-23 NOTE — FLOWSHEET NOTE
10/23/21 0107   Urine Assessment   Incontinence Yes   Bladder Scan Volume (mL) 41 mL   $ Bladder scan $ Yes

## 2021-10-23 NOTE — H&P
PM&R Admission History & Physical Examination  Patient: Stacey Salinas  Age/sex: 39 y.o. female  Medical Record #: [id number]    Consulting physician: Dr. Flor Lomas DO  Primary care provider: No primary care provider on file. Procedures performed on/related to this admission:  10/13/2021: R hemicraniectomy by Dr. Wiseman [facility])    Chief complaint:   Impairments and acitivities limitations in ADLs, mobility, functional communication, and cognition secondary to CVA    HPI:   Stacey Salinas is a 39 y.o. female with past medical history significant for recent COVID-19 infection (9/15/21) who presented to [facility] on 10/12/21 with acute onset left-sided weakness. Initial NIHSS 17.  Initial CT (OSH) showed R MCA hyperdensity; initial CTA (OSH) showed right ICA/M1 occlusion. On 10/13, her neuro exam worsened and repeat imaging indicated worsening edema, 1 cm of midline shift, effacement of the right lateral ventricle, and increased left lateral ventricle caliber concerning for entrapment. She underwent emergent R hemicraniectomy by Dr. Wiseman [facility]) on 10/13. After being deemed medically appropriate, she was transferred to [place] [address] on 10/22/2021. Present status: Currently has no complaints -- tired. Afebrile. Pain: controlled  PO intake: fair  BM: none since admission  Micturition: TOV   DVT prophylaxis with SQH.    Weight bearing status: AT      Past Medical History:   Diagnosis Date    Cerebral artery occlusion with cerebral infarction ([facility] [address]     Hx of blood clots        Past surgical history:       Family history:  Uncle - aneurysm   Grandmother - diabetes    Allergies   Allergen Reactions    Vicodin [Hydrocodone-Acetaminophen]      Gets severe hypotension       Scheduled Meds:  heparin (porcine), 5,000 Units, Q12H  aspirin, 81 mg, Daily  atorvastatin, 40 mg, Nightly  vitamin D, 50,000 Units, Weekly  docusate sodium, 100 mg, BID  senna, 1 tablet, Nightly  melatonin, 6 mg, Nightly  lidocaine, 1 patch, Daily  nicotine, 1 patch, Daily  [START ON 10/26/2021] clopidogrel, 75 mg, Daily        PRN Meds  acetaminophen, 650 mg, Q4H PRN  magnesium hydroxide, 30 mL, Daily PRN  bisacodyl, 10 mg, Daily PRN  diclofenac sodium, 2 g, 4x Daily PRN        Social History:  Tobacco/EtOh/Illicits:  + cigarette smoking, no, no  Working History:     Functional History:  Home DME: none   Premorbid ADL's: independent   Premorbid Mobility: good   Present: significant decrease in mobility and function and decreased cognition. Physical Therapy (10/23):    Initial Evaluation  Date: 10/23/21 AM     PM    Short Term Goals Long Term Goals    Was pt agreeable to Eval/treatment? yes           Does pt have pain? Pain on the top of her head once the helmet was placed on           Bed Mobility  Rolling: Max A  Supine to sit: Max A   Sit to supine: Max A   Scooting: Max A     Min A Supervision   Transfers Sit to stand: Max A   Stand to sit: Max A  Stand pivot:  Max A x2     5xSTS: Unable to complete     Min A Supervision  5xSTS: TBD   Ambulation    15 feet with R mackey rail with Max A + wc follow     10mWT: TBA  6mWT: TBA     50 feet with AAD with Min A          150 feet with AAD with Supervision     10mWT: TBD  6mWT: TBD   Walking 10 feet on uneven surface NT     10 feet with AAD with Min A 10 feet with AAD with Supervision   Wheel Chair Mobility NT           Car Transfers NT     Min A Supervision   Stair negotiation: ascended and descended NT     4 steps with 1 rail with Min A 12 steps with 1 rail with Supervision   Curb Step:   ascended and descended NT     4 inch step with AAD and Min A 4 inch step with AAD and Supervision   Picking up object off the floor NT     Will  an object with Min assist Will  an object with Supervision assist   BLE ROM WFL: LLE AROM limited by flaccidity            BLE Strength RLE 4+/5  LLE 0/5           Balance  Static Sitting: Mod/Max A     BBS: TBA  FGA: TBA             BBS: TBD  FGA: TBD   Date Family Teach Completed TBA           Is additional Family Teaching Needed? Y or N Y           Hindering Progress L hemiplegia, L neglect           PT recommended ELOS 5 weeks           Team's Discharge Plan             Therapist at Team Meeting               Occupational Therapy (10/23): Bed mobility: Max A  Sit><stand: Max A  Stand pivot transfers: Max A x2  Tub / Shower Transfer: NT d/t safety concerns and s/p hemicraniectomy  Function Mobility: NT secondary to safety concerns     Sit Balance: Max A (heavy lateral lean to L)  Stand Balance: Max A x2  Cervical flexion to left at rest and during all functional tasks - assist/cues required to correct, thorough education provided on importance of optimal positioning. Neck pillow provided at room departure. Review Of Systems:   Constitutional: No Fever, chills  Skin: No rash, ulcers, or other lesions  HEENT: No HA, blurry vision  Respiratory: No Cough, SOB  Cardiovascular: No CP  Gastrointestinal: No nausea, vomiting, diarrhea, constipation, abdominal discomfort; + continent   Genitourinary: No Dysuria, frequency, urgency; + continent   Musculoskeletal: + weakness; rest as per HPI  Neurologic: No numbness or tingling; rest as per HPI  Psychiatric: No depression, No anxiety    Physical Examination:  Vitals:   Vitals:    10/22/21 2054 10/23/21 0031 10/23/21 0130 10/23/21 0814   BP: 121/75  115/73 114/70   Pulse: 98  81 80   Resp: 16  16 18   Temp: 98.9 °F (37.2 °C)  98.8 °F (37.1 °C) 98 °F (36.7 °C)   TempSrc: Temporal  Temporal Temporal   SpO2:  94% 95%    Weight:       Height:           GEN: NAD, conversational   HEENT: R crani with staples, EOMI  RESP: CTAB, no R/R/W   CV: +S1 S2, RR   ABD: soft, NT, ND, normal BS   : no page   EXT: Normal ROM, No clubbing/cyanosis, 2+ pulses   SKIN: No erythema, warm.   R crani C/D/I  PSYCH: Flat affect    Neuro Exam:  Level of alertness: alert  Affect: appropriate  Perceives auditory stimuli: Yes Perceives visual stimuli: Yes  Phonation: intact  Orientation: intact    Sensory:    LT: intact    Motor: Tone was normal    Motor Findings  Strength: Right  Strength: Left    Deltoid  5/5  0/5    Biceps  5/5  0/5    Triceps  5/5  0/5    Wrist Extensors  5/5  0/5    FDP 5/5 0/5   Finger abductors 5/5 0/5   Iliospoas  5/5  0/5    Quadriceps  5/5  0/5    Tibialis anterior  5/5  0/5    EHL 5/5 0/5   Gastroc soleus  5/5  0/5      Balance/Gait:  Gait: NT    Laboratory:    Lab Results   Component Value Date    WBC 10.4 10/23/2021    HGB 13.9 10/23/2021    HCT 40.9 10/23/2021    MCV 91.5 10/23/2021     (H) 10/23/2021     Lab Results   Component Value Date     10/23/2021    K 4.5 10/23/2021     10/23/2021    CO2 25 10/23/2021    BUN 19 10/23/2021    CREATININE 0.7 10/23/2021    GLUCOSE 107 10/23/2021    CALCIUM 9.7 10/23/2021      Lab Results   Component Value Date    ALT 75 (H) 10/23/2021    AST 46 (H) 10/23/2021    ALKPHOS 89 10/23/2021    BILITOT 0.4 10/23/2021     No results found for: VOL, APPEARANCE, COLORU, LABSPEC, LABPH, LEUKBLD, NITRU, GLUCOSEU, KETUA, UROBILINOGEN, KETUA, UROBILINOGEN, BILIRUBINUR, OCBU  No results found for: LABA1C  No results found for: EAG    Microbiology:   None    Pertinent Imaging:  Reviewed reports from OSH    IMPRESSION:  Rose Lloyd is a 39 y.o. right-handed female with PMH significant for recent COVID-19 infection, who presented to [facility] on 10/12/21 for acute onset left-sided weakness. Imaging confirmed R MCA infarct and ICA occlusion. She underwent emergent R crani on 10/13. [facility]. Patient has impairments in:  Demonstrating impaired communication, impaired strength, impaired ROM, impaired balance, impaired safety awareness, decreased endurance.     Patient has activities limitations in self care, mobility, functional communication and cognition, and is admitted to [facility] for acute comprehensive rehabilitation. I. Rehabilitation Necessity/Diagnosis:   Medical impact on function as a result of impaired functional mobility, ambulation, bed mobility, transfers, self-care/ADL's, strength, endurance, range of motion/flexibility, coordination and impaired gait/balance. Treatment plan will include a comprehensive rehabilitation program with intense therapies for 3 hours/day, 5 days/week. 1. Physical therapy to address bed mobidility, car and mat transfer, progressive ambulation with use of least restrictive assistive device, optimization of gait biomechanics, truncal strengthening, lower extremity strengthening, coordination, range of motion/flexibility, wheelchair and cushion evaluation, durable medical equipment evaluation, patient and family instruction and endurance training. 2. Occupational therapy to address feeding, grooming, upper and lower body dressing and bathing, toileting, tub/toilet/bed transfers, durable medical equipment evaluation, upper extremity strengthening, range of motion/flexibility, dexterity, coordination and patient and family instruction. 3. Rehabilitation nursing 24 hours per day to monitor bowel and bladder function, work on bowel routine, voiding trials/page catheter management as per bladder management protocol, assess falls risk upon admission and periodically thereafter, implement and revise falls prevention strategies, maintain skin integrity through initial and daily pressure sore risk assessment (Gio scale), implement and revise pressure sore prevention strategies, educate patient and family members regarding medication administration, ADL's, transfers, and mobility and continue therapy carryover with ADL's, transfers, and mobility  4. Social work and case management consults for discharge planning/disposition issues, as well as coordination and communication of patient progress between family and providers.   5. Rehab psychology - as needed for adjustment, coping  6. Recreational therapy for community reintegration activities. This patient is sufficiently stable at this time of admission to IR[facility] to be able to participate in an intensive rehabilitation program described above and requires physician supervision by a rehabilitation physician as outlined below in Medical Management section. II. Medical Management:  # Neuro - R MCA CVA s/p R crani  - PT/OT/SLP  - secondary stroke prophylaxis with ASA 81 mg daily, Plavix 75 mg daily  - Stroke education  - Helmet AAT  - L PRAFO in bed. L sling. # GI/bowel/FEN -   - Diet: Reg, thin liquids. - Bowel program: Colace 100 BID, senna 1 tabs nightly, MoM PRN  - Given high prevalence of Vitamin D deficiency in [place]: supplement with ergocalciferol 50K units/week x8 weeks. - MVI daily. # Pain control - Tylenol PRN for mild pain    # DVT prophylaxis - SCDs and chemoprophylaxis with SQH    # Skin - R Crani with staples  - staples removal POD 14  - maintain skin integrity. - turns q2H. # Renal/urinary -   - Renal: No evidence of renal failure on last BMP on 10/23  - Bladder: check PVR's times three    # Disposition/social - likely discharge home, will discuss in team rounds. # Code status: Full Code was discussed with patient    III. Estimated length of stay: 5-6 weeks    IV. Goals - Modified independent for mobility and self care    V. Anticipated discharge setting: Home    VI. Prognosis: Good    CMS Required Post-Admission Physician Evaluation Elements  History and Physical, including medical history, functional history and active comorbidities as in above text. Preadmission Screen documentation of Dr. Megan Carey.     Post -Admission Physician Evaluation comparison:  Agree in entirety    Medication Reconciliation CMS Requirements:  Drug Regimen Review:  Upon admission, did a complete drug regimen review identify potential clinically significant medication issues requiring immediate attention by a physician? No    Medication Intervention:   If yes, was the physician notified by midnight of the next calendar day and were recommended actions in response to the issue completed by midnight of the next calendar day? N/A    Maurizio Guerrero DO  Board Certified Physical Medicine and Rehabilitation     Please note that the above documentation was prepared using voice recognition software. Every attempt was made to ensure accuracy but there may be spelling, grammatical, and contextual errors.

## 2021-10-23 NOTE — PROGRESS NOTES
Physical Therapy  Initial Evaluation  Evaluating Therapist: Malini Ca DPT [id number]    NAME: Nisha Galdamez  [facility]  DIAGNOSIS: Acute CVA  PRECAUTIONS: Falls, helmet OOB, L hemiplegia, L neglect, pusher, alarm, must bring phone to therapy (cardiac monitor)   HPI: Pt suffered a R MCA infarct. Did not receive any thrombolytic intervention. Underwent a R [facility]. Social:  Pt lives with daughter in a 2 stroy floor plan 3 steps and 1 rail. Prior to admission: Independent and working, no assistance needed. Initial Evaluation  Date: 10/23/21 AM     PM    Short Term Goals Long Term Goals    Was pt agreeable to Eval/treatment? yes       Does pt have pain? Pain on the top of her head once the helmet was placed on       Bed Mobility  Rolling: Max A  Supine to sit: Max A   Sit to supine: Max A   Scooting: Max A   Min A Supervision   Transfers Sit to stand: Max A   Stand to sit: Max A  Stand pivot:  Max A x2    5xSTS: Unable to complete   Min A Supervision  5xSTS: TBD   Ambulation    15 feet with R mackey rail with Max A + wc follow    10mWT: TBA  6mWT: TBA   48 feet with AAD with Min A       150 feet with AAD with Supervision    10mWT: TBD  6mWT: TBD   Walking 10 feet on uneven surface NT   10 feet with AAD with Min A 10 feet with AAD with Supervision   Wheel Chair Mobility NT       Car Transfers NT   Min A Supervision   Stair negotiation: ascended and descended NT   4 steps with 1 rail with Min A 12 steps with 1 rail with Supervision   Curb Step:   ascended and descended NT   4 inch step with AAD and Min A 4 inch step with AAD and Supervision   Picking up object off the floor NT   Will  an object with Min assist Will  an object with Supervision assist   BLE ROM WFL: LLE AROM limited by flaccidity        BLE Strength RLE 4+/5  LLE 0/5       Balance  Static Sitting: Mod/Max A    BBS: TBA  FGA: TBA        BBS: TBD  FGA: TBD   Date Family Teach Completed TBA Is additional Family Teaching Needed? Y or N Y       Hindering Progress L hemiplegia, L neglect       PT recommended ELOS 5 weeks       Team's Discharge Plan        Therapist at Team Meeting          Pt is alert and Oriented x 3  Sensation: Pt with difficulty follow instructions for formal sensation testing; grossly intact to the LLE  Edema: Unremarkable  Endurance: Fair     ASSESSMENT  Pt displays functional ability as noted in the objective portion of this evaluation. Comments:  Pt presenting with significant L neglect and this PT was unable to orient the pt to midline without her deviating to the L. Pt with inconsistencies following commands during testing and mobility activities. Pt required heavy cueing and assistance to complete bed mobility, transfers, and ambulation. Pt during transfers would lean hard to the L and required near dependent assistance to advance the the LE's. Pt would routinely state that she \"forgot how to step\". Pt required dependent advancement and knee blocking to the LLE with transfers and ambulation. Pt at the end of the eval was skillfully positioned in the chair to eliminate the L lateral lean/push that she was often displaying during the eval. Pt will require several weeks of intensive rehab to begin progression toward goals and improve her mobility/safety. Patient education  Pt educated on midline, role of PT, following cues, attention to the L side, gait sequencing    Patient response to education:   Pt verbalized understanding Pt demonstrated skill Pt requires further education in this area   partially minimally yes     Rehab potential is Good for reaching above PT goals. Pts/ family goals   1. Get better, move better, walk    Patient and or family understand(s) diagnosis, prognosis, and plan of care. yes    PLAN  PT care will be provided in accordance with the objectives noted above.   Whenever appropriate, clear delegation orders will be provided for nursing staff.  Exercises and functional mobility practice will be used as well as appropriate assistive devices or modalities to obtain goals. Patient and family education will also be administered as needed. Frequency of treatments will be 2x/day M-F and 1x/day Sat or Sun x 5 weeks.     Time in: 0745  Time out: [address] Flanagan DPT [id number]

## 2021-10-23 NOTE — FLOWSHEET NOTE
10/23/21 1434   Output (mL)   Urine 150 mL   Urine Assessment   Incontinence No   Urine Color Yellow/straw   Urine Appearance Clear   Urine Odor No odor   $ Bladder scan $ Yes   Post Void Cath Residual (mL) 335

## 2021-10-24 PROCEDURE — 99233 SBSQ HOSP IP/OBS HIGH 50: CPT | Performed by: PHYSICAL MEDICINE & REHABILITATION

## 2021-10-24 PROCEDURE — 6370000000 HC RX 637 (ALT 250 FOR IP): Performed by: PHYSICAL MEDICINE & REHABILITATION

## 2021-10-24 PROCEDURE — 51798 US URINE CAPACITY MEASURE: CPT

## 2021-10-24 PROCEDURE — 97535 SELF CARE MNGMENT TRAINING: CPT

## 2021-10-24 PROCEDURE — 97530 THERAPEUTIC ACTIVITIES: CPT

## 2021-10-24 PROCEDURE — 1280000000 HC REHAB R&B

## 2021-10-24 PROCEDURE — 6360000002 HC RX W HCPCS: Performed by: PHYSICAL MEDICINE & REHABILITATION

## 2021-10-24 RX ORDER — GABAPENTIN 100 MG/1
100 CAPSULE ORAL 3 TIMES DAILY
Status: DISCONTINUED | OUTPATIENT
Start: 2021-10-24 | End: 2021-12-07 | Stop reason: HOSPADM

## 2021-10-24 RX ADMIN — HEPARIN SODIUM 5000 UNITS: 10000 INJECTION INTRAVENOUS; SUBCUTANEOUS at 17:26

## 2021-10-24 RX ADMIN — GABAPENTIN 100 MG: 100 CAPSULE ORAL at 22:35

## 2021-10-24 RX ADMIN — DOCUSATE SODIUM 100 MG: 100 CAPSULE, LIQUID FILLED ORAL at 22:32

## 2021-10-24 RX ADMIN — DICLOFENAC SODIUM TOPICAL GEL, 1%, 2 G: 10 GEL TOPICAL at 17:25

## 2021-10-24 RX ADMIN — SENNOSIDES 8.6 MG: 8.6 TABLET, COATED ORAL at 22:32

## 2021-10-24 RX ADMIN — ATORVASTATIN CALCIUM 40 MG: 40 TABLET, FILM COATED ORAL at 22:32

## 2021-10-24 RX ADMIN — GABAPENTIN 100 MG: 100 CAPSULE ORAL at 14:14

## 2021-10-24 RX ADMIN — TAMSULOSIN HYDROCHLORIDE 0.4 MG: 0.4 CAPSULE ORAL at 09:05

## 2021-10-24 RX ADMIN — HEPARIN SODIUM 5000 UNITS: 10000 INJECTION INTRAVENOUS; SUBCUTANEOUS at 06:30

## 2021-10-24 RX ADMIN — ACETAMINOPHEN 650 MG: 325 TABLET ORAL at 14:17

## 2021-10-24 RX ADMIN — ACETAMINOPHEN 650 MG: 325 TABLET ORAL at 22:31

## 2021-10-24 RX ADMIN — ASPIRIN 81 MG CHEWABLE TABLET 81 MG: 81 TABLET CHEWABLE at 09:05

## 2021-10-24 RX ADMIN — ACETAMINOPHEN 650 MG: 325 TABLET ORAL at 09:05

## 2021-10-24 RX ADMIN — Medication 6 MG: at 22:32

## 2021-10-24 ASSESSMENT — PAIN DESCRIPTION - ORIENTATION
ORIENTATION: RIGHT

## 2021-10-24 ASSESSMENT — PAIN DESCRIPTION - DESCRIPTORS
DESCRIPTORS: ACHING;DISCOMFORT;SORE
DESCRIPTORS: ACHING;DISCOMFORT;SORE
DESCRIPTORS: ACHING;CONSTANT;DISCOMFORT
DESCRIPTORS: ACHING;CONSTANT;DISCOMFORT

## 2021-10-24 ASSESSMENT — PAIN DESCRIPTION - PAIN TYPE
TYPE: CHRONIC PAIN

## 2021-10-24 ASSESSMENT — PAIN DESCRIPTION - FREQUENCY
FREQUENCY: CONTINUOUS

## 2021-10-24 ASSESSMENT — PAIN SCALES - GENERAL
PAINLEVEL_OUTOF10: 4
PAINLEVEL_OUTOF10: 5
PAINLEVEL_OUTOF10: 7
PAINLEVEL_OUTOF10: 7
PAINLEVEL_OUTOF10: 5
PAINLEVEL_OUTOF10: 8
PAINLEVEL_OUTOF10: 0

## 2021-10-24 ASSESSMENT — PAIN DESCRIPTION - LOCATION
LOCATION: NECK;SHOULDER

## 2021-10-24 ASSESSMENT — PAIN DESCRIPTION - ONSET
ONSET: ON-GOING

## 2021-10-24 ASSESSMENT — PAIN DESCRIPTION - PROGRESSION
CLINICAL_PROGRESSION: NOT CHANGED

## 2021-10-24 NOTE — PROGRESS NOTES
OCCUPATIONAL THERAPY DAILY NOTE    Date:10/24/2021  Patient Name: Keyona Goss  MRN: [id number]  : 1975  Room: [address] [place], [place] [address]     Diagnosis:Diagnosis: Acute CVA  Surgery: R frontotemporal decompressive hemicraniectomy on 10/13/21  Past Medical History: COVID (9/15), tobacco use  Pt presented to [facility] as Level 2 stroke alert on 10/11/21. L side weakness, neglect. NIH-17  Found to have large R MCA involving R occipital     Precautions: Falls, L hemiplegia, L neglect, bed/chair alarm, helmet when OOB, impulsive, L PRAFO    Functional Assessment:   Date Status AE  Comments   Feeding 10/24/21 Melisa  Pt seated upright in w/c eating of breakfast meal, assistance to set up meal, apply toast to butter, cueing to completely chew of food, pt able to hold utensil, retrieve food and bring to mouth   Grooming 10/24/21 modA  Pt able to wash face, brush teeth seated upright in w/c at sink, assistance to jesus deodorant.  Attempted to shave with electric razor, with pt requiring assistance, with pt stating of being uncomfortable, requesting to stop, will ask family to bring her own razor   Bathing 10/23/21 maxA/dependent     UB Dressing 10/23/21 maxA     LB Dressing 10/23/21 dependent     Toileting 10/24/21 depdendent  Pt using of bed pan   Homemaking  TBS       Functional Transfers / Balance:   Date Status DME  Comments   Sit Balance 10/24/21 modA  Pt sat EOB and supported upright in w/c, having of heavy L lateral lean, applying of TAPS to assist with upright posture in w.c   Stand Balance 10/24/21 maxAx2  Pt stood during SPT, second person for safety   [] Tub  [] Shower   Transfer  TBA     Commode   Transfer  TBA     Functional   Mobility  TBA     Other:  Bed Mobility          EOB<>W/C   10/24/21          10/24/21   maxAx2          maxAx2    Supine<>EOB with maxAx2, with maxAX1 to roll side to side in bed      SPT to right side with hand held assist     Functional Exercises / Activity:       Sensory / Neuromuscular Re-Education:      Cognitive Skills:   Status Comments   Problem   Solving poor    Memory poor    Sequencing poor    Safety poor      Visual Perception:    Education:  Pt educated on importance of therapy, goals to be reached, OOB activity, and precautions to follow   [] Family teach completed on:    Pain Level: 0/10    Additional Notes:       Patient has made limited progress during treatment sessions toward set goals. Therapy emphasis to obtain goals:    Time Frame for Long term goals  6 weeks   Long term goal 1 Pt will complete UB self-care tasks w/ Supervision   Long term goal 2 Pt will complete LB self-care tasks w/ Supervision   Long term goal 3 Pt will complete toileting (all aspects) w/ Supervision   Long term goal 4 Pt will safely complete toilet transfers w/ Supervision   Long term goal 5 Pt will safely complete tub/shower transfers w/ Supervision   Long term goals 6 Pt will complete basic homemaking task w/ SBA   Long term goal 7 Pt will complete grooming task w/ Supervision   Long term goal 8 Pt will complete feeding task w/ Supervision/setup         [x] Continue with current OT Plan of care.   [] Prepare for Discharge     DISCHARGE RECOMMENDATIONS  Recommended DME:    Post Discharge Care:   []Home Independently  []Home with 24hr Care / Supervision []Home with Partial Supervision []Home with Home Health OT []Home with Out Pt OT []Other: ___   Comments:         Time in Time out Tx Time Breakdown  Variance:   First Session  8:30am 9:00am [x] Individual Tx- 30mins   [] Concurrent Tx -  [] Co-Tx -   [] Group Tx -   [] Time Missed -     Second Session   [] Individual Tx-   [] Concurrent Tx -  [] Co-Tx -   [] Group Tx -   [] Time Missed -     Third Session    [] Individual Tx-   [] Concurrent Tx -  [] Co-Tx -   [] Group Tx -   [] Time Missed -         Total Tx Time- 30mins         Trudy Tate GLOVER/L [id number]

## 2021-10-24 NOTE — PROGRESS NOTES
PM&R Weekend Progress Note  Patient: Kota Madrid  Age/sex: 39 y.o. female  Medical Record #: [id number]  Date: 10/24/2021    Covering for: Dr. Leticia Burkost: Patient seen and examined in her room this morning. No issues overnight. Pain in R leg at night - states it's is d/t \"vericose veins\". Denies abdominal pain, chest pain, shortness of breath. All pertinent labs reviewed. Past Medical History:   Diagnosis Date    Cerebral artery occlusion with cerebral infarction ([address]     Hx of blood clots        Surgical history:   Family history: reviewed from H&P    Objective:  Vitals:    10/23/21 0814 10/23/21 2120 10/24/21 0842 10/24/21 1000   BP: 114/70 117/67 (!) 101/55 (!) 108/54   Pulse: 80 89 113 94   Resp: 18 16 18 18   Temp: 98 °F (36.7 °C) 98.7 °F (37.1 °C) 99.3 °F (37.4 °C) 99.1 °F (37.3 °C)   TempSrc: Temporal Oral Temporal Temporal   SpO2:  96%     Weight:       Height:         10/23 0701 - 10/24 0700  In: -   Out: 1 [Urine:485]    GEN: NAD, conversational   HEENT: R crani, PERRLA, EOMI  RESP: unlabored, no cyanosis  CV: regular  ABD: soft, NT, ND  : no page   EXT: Normal ROM, No clubbing/cyanosis, 2+ pulses   SKIN: No erythema, incision with staples, c/d/i   NEURO: Alert, no new focal deficits. L flaccid    Labs reviewed  CBC:   Recent Labs     10/23/21  0644   WBC 10.4   HGB 13.9   *     BMP:    Recent Labs     10/23/21  0644      K 4.5      CO2 25   BUN 19   CREATININE 0.7   GLUCOSE 107*     Hepatic:   Recent Labs     10/23/21  0644   AST 46*   ALT 75*   BILITOT 0.4   ALKPHOS 89     BNP: No results for input(s): BNP in the last 72 hours. Lipids: No results for input(s): CHOL, HDL in the last 72 hours. Invalid input(s): LDLCALCU  INR: No results for input(s): INR in the last 72 hours. A/P  This is 39 y.o. female with CVA    Rehab: Continue extensive rehabilitation. Continue physical therapy, occupational therapy, and speech-language pathology.     Add gabapentin 100 mg TID for pain. Continue current management. Maurizio Guerrero DO, [facility]  Physical Medicine and Rehabilitation     Please note that the above documentation was prepared using voice recognition software. Every attempt was made to ensure accuracy but there may be spelling, grammatical, and contextual errors.

## 2021-10-24 NOTE — PROGRESS NOTES
Physical Therapy  Treatment Note  Evaluating Therapist: Kori Rivers DPT [id number]    NAME: Kvng Sneed  ROOM: [facility]5/5505B  DIAGNOSIS: Acute CVA  PRECAUTIONS: Falls, helmet OOB, L hemiplegia, L neglect, pusher, alarm, must bring phone to therapy (cardiac monitor)   HPI: Pt suffered a R MCA infarct. Did not receive any thrombolytic intervention. [facility]. Social:  Pt lives with daughter in a 2 stroy floor plan 3 steps and 1 rail. Prior to admission: Independent and working, no assistance needed. Initial Evaluation  Date: 10/23/21 AM         Short Term Goals Long Term Goals    Was pt agreeable to Eval/treatment? yes Yes      Does pt have pain? Pain on the top of her head once the helmet was placed on No c/o pain      Bed Mobility  Rolling: Max A  Supine to sit: Max A   Sit to supine: Max A   Scooting: Max A Rolling: Max A  Supine to sit: Max A  Scooting: Max A  Min A Supervision   Transfers Sit to stand: Max A   Stand to sit: Max A  Stand pivot: Max A x2    5xSTS: Unable to complete Sit<>stand: Max A  Stand pivot:  Max A with no AD  Min A Supervision  5xSTS: TBD   Ambulation    15 feet with R mackey rail with Max A + wc follow    10mWT: TBA  6mWT: TBA 20 feet with R hemicane + L KI + sock on L shoe + WC follow  50 feet with AAD with Min A       150 feet with AAD with Supervision    10mWT: TBD  6mWT: TBD   Walking 10 feet on uneven surface NT NT  10 feet with AAD with Min A 10 feet with AAD with Supervision   Wheel Chair Mobility NT NT      Car Transfers NT NT  Min A Supervision   Stair negotiation: ascended and descended NT NT  4 steps with 1 rail with Min A 12 steps with 1 rail with Supervision   Curb Step:   ascended and descended NT NT  4 inch step with AAD and Min A 4 inch step with AAD and Supervision   Picking up object off the floor NT NT  Will  an object with Min assist Will  an object with Supervision assist   BLE ROM WFL: LLE AROM limited by flaccidity  WFL: LLE AROM limited by flaccidity      BLE Strength RLE 4+/5  LLE 0/5 RLE 4+/5  LLE 0/5 with MMT      Balance  Static Sitting: Mod/Max A    BBS: TBA  FGA: TBA Sitting: Mod A  Standing: Max A       BBS: TBD  FGA: TBD   Date Family Teach Completed TBA Pt's mother present for observation 10/24      Is additional Family Teaching Needed? Y or N Y Yes      Hindering Progress L hemiplegia, L neglect L hemiplegia, L neglect      PT recommended ELOS 5 weeks       Team's Discharge Plan        Therapist at Team Meeting          Therapeutic Exercise:   AM:   Ambulation: 30 feet with R hallway rail + L KI, + sock on L shoe with Max A + WC follow    Additional Comments: The pt was able to take steps with a rail and progressed well to a hemicane. The pt was cued extensively on R lateral weight shifts and was able to advance the LLE slightly possibly due to hip flexor tightness with pelvic rotation during ambulation, required assistance to continue to advance the LLE with each step. The pt's HR was noted to be at 120 bpm after ambulating and further ambulation was held at this time, the pt reported fatigue but had no discomfort and was left eating lunch in common area, discussed pt's HR with nursing. Patient/family education  Pt/family educated on bed mobility with roll, transfer technique, sequencing with cane, weight shift toward the R side. Patient/family response to education:   Pt/family verbalized understanding Pt/family demonstrated skill Pt/family requires further education in this area   Yes Yes Reinforce      AM  Time in: 1100  Time out: 1130    Pt is making good progress toward established Physical Therapy goals. Continue with physical therapy current plan of care. Tatyana Pipes.  Tamara Pickens, [facility]  License Number: [id number]

## 2021-10-25 PROCEDURE — 97112 NEUROMUSCULAR REEDUCATION: CPT

## 2021-10-25 PROCEDURE — 6360000002 HC RX W HCPCS: Performed by: PHYSICAL MEDICINE & REHABILITATION

## 2021-10-25 PROCEDURE — 97530 THERAPEUTIC ACTIVITIES: CPT

## 2021-10-25 PROCEDURE — 99232 SBSQ HOSP IP/OBS MODERATE 35: CPT | Performed by: PHYSICAL MEDICINE & REHABILITATION

## 2021-10-25 PROCEDURE — 97110 THERAPEUTIC EXERCISES: CPT

## 2021-10-25 PROCEDURE — 1280000000 HC REHAB R&B

## 2021-10-25 PROCEDURE — 6370000000 HC RX 637 (ALT 250 FOR IP): Performed by: PHYSICAL MEDICINE & REHABILITATION

## 2021-10-25 PROCEDURE — 92526 ORAL FUNCTION THERAPY: CPT

## 2021-10-25 RX ADMIN — TAMSULOSIN HYDROCHLORIDE 0.4 MG: 0.4 CAPSULE ORAL at 08:55

## 2021-10-25 RX ADMIN — ACETAMINOPHEN 650 MG: 325 TABLET ORAL at 17:18

## 2021-10-25 RX ADMIN — DOCUSATE SODIUM 100 MG: 100 CAPSULE, LIQUID FILLED ORAL at 20:59

## 2021-10-25 RX ADMIN — GABAPENTIN 100 MG: 100 CAPSULE ORAL at 20:59

## 2021-10-25 RX ADMIN — ASPIRIN 81 MG CHEWABLE TABLET 81 MG: 81 TABLET CHEWABLE at 08:55

## 2021-10-25 RX ADMIN — Medication 6 MG: at 20:59

## 2021-10-25 RX ADMIN — DICLOFENAC SODIUM TOPICAL GEL, 1%, 2 G: 10 GEL TOPICAL at 20:59

## 2021-10-25 RX ADMIN — HEPARIN SODIUM 5000 UNITS: 10000 INJECTION INTRAVENOUS; SUBCUTANEOUS at 06:29

## 2021-10-25 RX ADMIN — ACETAMINOPHEN 650 MG: 325 TABLET ORAL at 10:40

## 2021-10-25 RX ADMIN — GABAPENTIN 100 MG: 100 CAPSULE ORAL at 13:38

## 2021-10-25 RX ADMIN — GABAPENTIN 100 MG: 100 CAPSULE ORAL at 08:55

## 2021-10-25 RX ADMIN — HEPARIN SODIUM 5000 UNITS: 10000 INJECTION INTRAVENOUS; SUBCUTANEOUS at 17:18

## 2021-10-25 RX ADMIN — ACETAMINOPHEN 650 MG: 325 TABLET ORAL at 06:37

## 2021-10-25 RX ADMIN — DOCUSATE SODIUM 100 MG: 100 CAPSULE, LIQUID FILLED ORAL at 08:55

## 2021-10-25 RX ADMIN — SENNOSIDES 8.6 MG: 8.6 TABLET, COATED ORAL at 20:59

## 2021-10-25 RX ADMIN — ATORVASTATIN CALCIUM 40 MG: 40 TABLET, FILM COATED ORAL at 20:59

## 2021-10-25 ASSESSMENT — PAIN DESCRIPTION - ORIENTATION
ORIENTATION: RIGHT
ORIENTATION: RIGHT

## 2021-10-25 ASSESSMENT — PAIN DESCRIPTION - PROGRESSION
CLINICAL_PROGRESSION: GRADUALLY IMPROVING
CLINICAL_PROGRESSION: NOT CHANGED

## 2021-10-25 ASSESSMENT — PAIN DESCRIPTION - DESCRIPTORS
DESCRIPTORS: ACHING;CONSTANT;DISCOMFORT
DESCRIPTORS: ACHING;CONSTANT;DISCOMFORT
DESCRIPTORS: ACHING

## 2021-10-25 ASSESSMENT — PAIN DESCRIPTION - ONSET: ONSET: ON-GOING

## 2021-10-25 ASSESSMENT — PAIN DESCRIPTION - LOCATION
LOCATION: NECK;SHOULDER
LOCATION: HEAD
LOCATION: NECK;SHOULDER

## 2021-10-25 ASSESSMENT — PAIN DESCRIPTION - PAIN TYPE
TYPE: CHRONIC PAIN
TYPE: CHRONIC PAIN

## 2021-10-25 ASSESSMENT — PAIN SCALES - GENERAL
PAINLEVEL_OUTOF10: 7
PAINLEVEL_OUTOF10: 8
PAINLEVEL_OUTOF10: 0
PAINLEVEL_OUTOF10: 0
PAINLEVEL_OUTOF10: 3
PAINLEVEL_OUTOF10: 8

## 2021-10-25 ASSESSMENT — PAIN DESCRIPTION - FREQUENCY
FREQUENCY: CONTINUOUS
FREQUENCY: INTERMITTENT

## 2021-10-25 NOTE — PROGRESS NOTES
Speech Language Pathology  ACUTE REHABILITATION--DAILY PROGRESS NOTE            ADMITTING DIAGNOSIS: Acute cerebrovascular accident (CVA) ([facility] [I63.9]    VISIT DIAGNOSIS: CVA                       SWALLOWING:      Diet:  Regular consistency solids with thin liquids (IDDSI level 0)    Patient actively participated in functionally-based mealtime assessment; required mod assistance to set up meal tray but was able to feed self independently given cues. Reviewed need for slow rate of intake and regulated bite size prior to initiation of PO intake. Oral prep/oral-     No oral containment issues were identified. Adequate oral prep and A-P propulsion of bolus were noted with good clearance of oral residuals. Pharyngeal-     Clear vocal quality was maintained throughout. No overt clinical indicators of aspiration were apparent. Assistance with tray set up and supervision by nursing staff is required with all intake d/t left neglect and impulsivity. SP recommended after discharge:  yes  Supervision recommended at discharge: 24 hour    Will continue SP intervention as per previously established POC. EDUCATION:     Speech Pathologist (SLP) completed education with the patient and/or family regarding identified cognitive linguistic deficits and subsequent need for speech pathology intervention. Discussed deficit areas to be targeted by formal intervention and established short/long term goals. Reviewed compensatory strategies to improve functional outcome (as appropriate). Encouraged patient and/or family to engage SLP in structured Q&A session relative to identified deficit areas. Patient and/or family indicated understanding of all information provided via satisfactory verbal response.       Minute Tracking:    Individual therapy:     0 minutes  Concurrent therapy:  15 minutes  Group therapy:     0 minutes  Co-treatment therapy:    0 minutes    Total minutes for 10/23/2021: 15 minutes    Variance -- Patient laid down following OT session and was unable to participate in scheduled speech session due to fatigue. SLP checked on patient ~45 minutes later; patient still sleeping soundly with 2 family members present. The admitting diagnosis and active problem list, as listed below have been reviewed prior to initiation of this evaluation.         ACTIVE PROBLEM LIST:   Patient Active Problem List   Diagnosis    Acute cerebrovascular accident (CVA) ([facility] [address]

## 2021-10-25 NOTE — PROGRESS NOTES
OCCUPATIONAL THERAPY DAILY NOTE    Date:10/25/2021  Patient Name: Pranav Samson  MRN: [id number]  : 1975  Room: [address] [place], [place] [address]     Diagnosis:Diagnosis: Acute CVA  Surgery: R frontotemporal decompressive hemicraniectomy on 10/13/21  Past Medical History: COVID (9/15), tobacco use  [facility] Level 2 stroke alert on 10/11/21. L side weakness, neglect. NIH-17  Found to have large R MCA involving R occipital     Precautions: Falls, L hemiplegia, L neglect, bed/chair alarm, helmet when OOB, impulsive, L PRAFO    Functional Assessment:   Date Status AE  Comments   Feeding 10/24/21 Melisa     Grooming 10/24/21 modA     Bathing 10/23/21 maxA/dependent     UB Dressing 10/23/21 maxA     LB Dressing 10/23/21 dependent     Toileting 10/24/21 depdendent     Homemaking  TBA       Functional Transfers / Balance:   Date Status DME  Comments   Sit Balance 10/24/21 modA     Stand Balance 10/24/21 maxAx2     [] Tub  [] Shower   Transfer  TBA     Commode   Transfer  TBA     Functional   Mobility  TBA       Functional Exercises / Activity:   Pt sitting in chair upon arrival to OT gym in AM. Therapist completed scapular mobilizations, 4x10 reps, to LUE to maintain joint integrity for ease with ADLs. Pt reported no discomfort. Therapist used massager on pt's LUE (bicep, tricep, forearm) to elicit neuromuscular response ~15 mins. Pt appeared to demonstrate F+ results with bicep and wrist in preparation for elbow flex and ext/flex of wrist. Pt reported no irritation or discomfort with massage. Therapist completed tapping technique, 3x40 reps, at pt's LUE (bicep/tricep/forearm) to elicit neuromuscular response and input. Pt demonstrated F+ results with tapping at bicep in preparation for elbow flex. Pt educated and completed SROM, 2x10 reps (elbow/shoulder/wrist/digit flex/ext) of LUE to increase ROM and maintain joint integrity. Max A to reposition pt to back of chair. Pt appeared to have tolerated session fairly.      Pt sitting in chair upon arrival to OT gym in PM. Participated in arm scait, 3x10 reps using LUE to increase ROM for ease with ADLs. Pt completed with hand over hand assist. Attempted use of furniture sliders on tabletop, but pt unable to complete this date. Engaged in therex of towel slides on inclined wedge to increase BUE ROM, 3x15 reps shoulder flex. Pt completed with hand over hand assist due to L hemiplegia. Required 2 person assist for w/c-bed transfer. Pt appeared to have tolerated session fairly but limted by fatigue. Sensory / Neuromuscular Re-Education:      Cognitive Skills:   Status Comments   Problem   Solving poor    Memory poor    Sequencing poor    Safety poor      Visual Perception:    Education:  Pt educated on importance of therapy, goals to be reached, OOB activity, and precautions to follow   10/25/21: pt educated on SROM of LUE and safe positioning of LUE    [] Family teach completed on:    Pain Level: 0/10    Additional Notes:     Patient has made fair progress during treatment sessions toward set goals. Therapy emphasis to obtain goals:    Time Frame for Long term goals  6 weeks   Long term goal 1 Pt will complete UB self-care tasks w/ Supervision   Long term goal 2 Pt will complete LB self-care tasks w/ Supervision   Long term goal 3 Pt will complete toileting (all aspects) w/ Supervision   Long term goal 4 Pt will safely complete toilet transfers w/ Supervision   Long term goal 5 Pt will safely complete tub/shower transfers w/ Supervision   Long term goals 6 Pt will complete basic homemaking task w/ SBA   Long term goal 7 Pt will complete grooming task w/ Supervision   Long term goal 8 Pt will complete feeding task w/ Supervision/setup     [x] Continue with current OT Plan of care.   [] Prepare for Discharge     DISCHARGE RECOMMENDATIONS  Recommended DME:    Post Discharge Care:   []Home Independently  []Home with 24hr Care / Supervision []Home with Partial Supervision []Home with Home Health OT []Home with Out Pt OT []Other: ___   Comments:         Time in Time out Tx Time Breakdown  Variance:   First Session  [id number] [x] Individual Tx- 45    [] Concurrent Tx -  [] Co-Tx -   [] Group Tx -   [] Time Missed -     Second Session [id number] [x] Individual Tx-45   [] Concurrent Tx -  [] Co-Tx -   [] Group Tx -   [] Time Missed -     Third Session    [] Individual Tx-   [] Concurrent Tx -  [] Co-Tx -   [] Group Tx -   [] Time Missed -         Total Tx Time- [address] [facility], OTR/L [id number]

## 2021-10-25 NOTE — PROGRESS NOTES
[id number] [facility] Physical Medicine and Rehabilitation  Comprehensive Progress Note    Subjective:      Tonny Askew is a 39 y.o. female admitted to inpatient rehabilitation for impairments and activities limitations in ADLs and mobility secondary to right MCA CVA. No acute events overnight. No cp, sob, n/v. Pain is well controlled, minimal pain complaints left shoulder and hip. Tolerating therapy evaluations. No issues reported by staff. The patient's medical records have been reviewed. Scheduled Meds:gabapentin, 100 mg, TID  tamsulosin, 0.4 mg, Daily  heparin (porcine), 5,000 Units, Q12H  aspirin, 81 mg, Daily  atorvastatin, 40 mg, Nightly  vitamin D, 50,000 Units, Weekly  docusate sodium, 100 mg, BID  senna, 1 tablet, Nightly  melatonin, 6 mg, Nightly  lidocaine, 1 patch, Daily  nicotine, 1 patch, Daily  [START ON 10/26/2021] clopidogrel, 75 mg, Daily      Continuous Infusions:  PRN Meds:acetaminophen, 650 mg, Q4H PRN  magnesium hydroxide, 30 mL, Daily PRN  bisacodyl, 10 mg, Daily PRN  diclofenac sodium, 2 g, 4x Daily PRN         Objective:      Vitals:    10/23/21 2120 10/24/21 0842 10/24/21 1000 10/25/21 0841   BP: 117/67 (!) 101/55 (!) 108/54 115/75   Pulse: 89 113 94 89   Resp: 16 18 18 18   Temp: 98.7 °F (37.1 °C) 99.3 °F (37.4 °C) 99.1 °F (37.3 °C) 97.9 °F (36.6 °C)   TempSrc: Oral Temporal Temporal Temporal   SpO2: 96%      Weight:       Height:         General appearance: alert, appears stated age and cooperative, NAD  Head: R crani with staples, c/d/i   Eyes: conjunctivae/corneas clear. PERRL, EOM's intact. Lungs: clear to auscultation bilaterally  Heart: regular rate and rhythm, S1, S2 normal  Abdomen: soft, non-tender, normal bowel sounds  Extremities: extremities normal, atraumatic, no cyanosis or edema  Skin: R crani c/d/i   Neurologic: Alert, oriented x4. Left neglect. Speech clear. No aphasia appreciated. Mild L facial droop. Spastic left hemiplegia. MAS 1 in LUE; MAS 2 in LLE.  Developing L plantar flexion contracture. Motor: Tone was normal     Motor Findings  Strength: Right  Strength: Left    Deltoid  5/5  0/5    Biceps  5/5  0/5    Triceps  5/5  0/5    Wrist Extensors  5/5  0/5    FDP 5/5 0/5   Finger abductors 5/5 0/5   Iliospoas  5/5  0/5    Quadriceps  5/5  0/5    Tibialis anterior  5/5  0/5    EHL 5/5 0/5   Gastroc soleus  5/5  0/5          Laboratory:    Lab Results   Component Value Date    WBC 10.4 10/23/2021    HGB 13.9 10/23/2021    HCT 40.9 10/23/2021    MCV 91.5 10/23/2021     (H) 10/23/2021     Lab Results   Component Value Date     10/23/2021    K 4.5 10/23/2021     10/23/2021    CO2 25 10/23/2021    BUN 19 10/23/2021    CREATININE 0.7 10/23/2021    GLUCOSE 107 10/23/2021    CALCIUM 9.7 10/23/2021      Lab Results   Component Value Date    ALT 75 (H) 10/23/2021    AST 46 (H) 10/23/2021    ALKPHOS 89 10/23/2021    BILITOT 0.4 10/23/2021       Functional Status:   Bed mobility: Max A  Transfers: Max A  Ambulation: 40 ft L ankle DF wrap, L knee immobilizer, Max A - Dep, w/c follow  Feeding: SBA  Grooming: Min A  UB dressing: Max A  LB dressing: Dependent       Assessment/Plan:       39 y.o. female admitted to inpatient rehabilitation for impairments and activities limitations in ADLs and mobility secondary to right MCA CVA. -Right MCA CVA: Complicated by cerebral edema requiring emergent right hemicraniectomy (10/13/2021). Dense left spastic hemiplegia, left neglect, mild cognitive impairment. Helmet when out of bed. L PRAFO in bed. LUE sling for gait. L w/c tray. Aspirin, Plavix, Lipitor for secondary prevention. Monitor neuro exam. Continue Acute Rehab program.   -Spasticity LUE/LLE: Monitor. Currently may be helpful to support leg for gait, however if begins to interfere can consider baclofen  -Pain control, MSK pain, neuropathic pain: Tylenol PRN, gabapentin, Lidoderm, Voltaren gel  -Urinary retention: On flomax, voiding well.  Monitor PVRs  -Tobacco abuse: Nicoderm patch  -DVT prophylaxis: heparin SQ, SCDs    Continue Acute Rehab evaluations  Monitor developing spasticity    Electronically signed by Jared Trent MD on 10/25/2021 at 2:28 PM

## 2021-10-25 NOTE — PROGRESS NOTES
Physical Therapy  Treatment Note  Supervising Therapist: Jana Ashby, PT, DPT [id number]    NAME: Debby Flight  [facility]  DIAGNOSIS: Acute CVA  PRECAUTIONS: Falls, helmet at all times when OOB, L hemiplegia, L neglect, pusher, alarm, must bring phone to therapy (cardiac monitor), L PRAFO, SPB <180  HPI: Pt is a 39 y.o. female with past medical history significant for recent COVID-19 infection (9/15/21) who [facility] with acute onset left-sided weakness. Initial NIHSS 17.  Initial CT (OSH) showed R MCA hyperdensity; initial CTA showed right ICA/M1 occlusion. On 10/13, her neuro exam worsened and repeat imaging indicated worsening edema, 1 cm of midline shift. She underwent emergent R hemicraniectomy by Dr. Raquel [facility]) on 10/13. After being deemed medically appropriate, she was transferred to [place] [address] on 10/22/2021    Social:  Pt lives with daughter (15 y/o), will be returning to parent's home upon discharge (1 floor plan, 2 HAIDER with 2 HR). Mother and father healthy, do not work. 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 Initial Evaluation  Date: 10/23/21 AM     PM    Short Term Goals Long Term Goals    Was pt agreeable to Eval/treatment? yes Yes Yes     Does pt have pain? Pain on the top of her head once the helmet was placed on No c/o pain No c/o pain     Bed Mobility  Rolling: Max A  Supine to sit: Max A   Sit to supine: Max A   Scooting: Max A NT NT Min A Supervision   Transfers Sit to stand: Max A   Stand to sit: Max A  Stand pivot: Max A x2    5xSTS: Unable to complete Sit<>stand: Max A  Stand pivot: NT    Sliding board transfer MaxA to R and L from WC<>mat table Sit<>stand:  Max A  Stand pivot: NT    Sliding board transfer MaxA to R and L from WC<>drop arm commode Min A Supervision  5xSTS: TBD   Ambulation    15 feet with R mackey rail with Max A + wc follow    10mWT: TBA  6mWT: TBA 40 feet x 1 rep and 20 feet x 4 reps with R hallway rail, L ankle DF wrap with MaxA   (WC follow, manual LLE advancement/knee block) 45 feet x 2 reps and 25 feet x 1 rep with R hallway rail L ankle DF wrap/knee immobilizer, with MaxA (WC follow) 50 feet with AAD with Min A       500 feet with AAD with Supervision    10mWT: TBD  6mWT: TBD   Walking 10 feet on uneven surface NT NT NT 10 feet with AAD with Min A 10 feet with AAD with Supervision   Wheel Chair Mobility NT NT NT     Car Transfers NT NT NT Min A Supervision   Stair negotiation: ascended and descended NT NT NT 4 steps with 1 rail with Min A 12 steps with 1 rail with Supervision   Curb Step:   ascended and descended NT NT NT 4 inch step with AAD and Min A 4 inch step with AAD and Supervision   Picking up object off the floor NT NT NT Will  an object with Min assist Will  an object with Supervision assist   BLE ROM WFL: LLE AROM limited by flaccidity  WFL: LLE AROM limited by spasticity 2+ on MAS (hamstrings, hip extensors)      BLE Strength RLE 4+/5  LLE 0/5 RLE 4+/5  LLE 0/5 with MMT      Balance  Static Sitting: Mod/Max A    BBS: TBA  FGA: TBA Sitting: Mod A  Standing: Max A       BBS: TBD  FGA: TBD   Date Family Teach Completed TBA Pt's mother present for observation 10/24      Is additional Family Teaching Needed?   Y or N Y Yes      Hindering Progress L hemiplegia, L neglect L hemiplegia, L neglect      PT recommended ELOS 5 weeks       Team's Discharge Plan        Therapist at Team Meeting          HRmax: 178 bpm Time spent at moderate intensity (60-70% HRmax) Time spent at high intensity (70-85% HRmax)    AM: NT AM: NT    PM: 0:15:30 PM: 0:11:44         Therapeutic Exercise:   AM: Static standing 30\" x 2 reps with R hemicane, L knee block at EOM with maxA  PM: NT    Patient education  Pt educated on sequencing and technique for sliding board transfers    Patient response to education:   Pt verbalized understanding Pt demonstrated skill Pt requires further education in this area   yes partial yes     Additional Comments: Pt seated in WC upon AM session room entry, pleasant and agreeable to activity. L trent-neglect present with all functional tasks. /62 following activity. HR monitored intermittently and increased to 140's with basic standing and walking activities, returned to 120's following seated rest break. Pt motivated to progress walking distance and had to be stopped at times to monitor HR. Cueing required to maintain neutral trunk alignment and proper foot placement. Pt is able to follow cues for sequencing and initiate LLE advancement however LLE  Pulls into flexor synergy with initial stand. Pt reports fatigue but denied significant complaints following AM session. RN notified of recommendation for sliding board transfer to pt's R until standing transfers improve. Pt missed initial 10 minutes of PM session, assisted RN staff with transfer to drop arm commode for pt to attempt BM. Knee immobilizer initiated during PM session which significantly reduced degrees of freedom while ambulating allowing pt to focus more on postural control and LLE advancement. Pt was able to attain high intensity HR ranges with basic walking tasks. Plan to progress walking activity with addition of mirror for visual feedback to address pushing behavior. Chair/bed alarm: armed following session    AM  Time in: 0915  Time out: 1000    PM  Time in: 1310  Time out: 1345    Pt is making good progress toward established Physical Therapy goals. Continue with physical therapy current plan of care.     Jaja Sher, PT, DPT  Board Certified Clinical Specialist in Neurologic Physical Therapy  [id number]

## 2021-10-25 NOTE — CARE COORDINATION
Social Work Assessment Summary    PCP: Dr. Sabra Buenrostro    Patient Resides: with her 15year old daughter Bonner Sever. Home Architecture : Mother's home:  Ranch 2 steps in 2 rails. Tub shower combo - curtain. Will you return to your own home? Mom's address  [place], [address]        Primary Caregiver: Mother Cameron Parks - 61 does not work, is healthy and drives, Father Pipe Madden 59 does not work, healthy and drives. Daughter Bonner Sever - 13 7th grader at [employer].       Level of Function PTA : Independent in all . 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 DME Pta  : None    Community Agency Involvement PTA : None    Do they have a medical profile: No    Family Teaching: To be scheduled. Strength: \"I am stubborn, hard headed\"    Preference: \"I want to regain my [place]\"      NAME RELATION HOME # WORK # CELL #   Cameron Parks mother   [phone]   Pipe Madden father   [phone]            Height: 5'6  Weight: 170    Patient mom will bring daughter Bonner Sever in to visit. She is old enough per visitation. Most likely 11:30-1:00 as Nic Busch states she doesn't drive too much at night. [employer] very understanding and will allow her to leave school as needed.     LUCIANO Rene

## 2021-10-25 NOTE — PROGRESS NOTES
Met with patient and mother in room. Disclosure statement signed by patient and copy given to patient and mother. Informed mother that someone from [employer] will be getting in touch with her regarding financial aide/Medicaid pending status. Both patient and her mom were notified that patient's 15 yr. old daughter is allowed to visit as long as it is during unit visiting hours and she wears a mask. Both verbalized understanding.

## 2021-10-26 PROCEDURE — 99232 SBSQ HOSP IP/OBS MODERATE 35: CPT | Performed by: PHYSICAL MEDICINE & REHABILITATION

## 2021-10-26 PROCEDURE — 97130 THER IVNTJ EA ADDL 15 MIN: CPT

## 2021-10-26 PROCEDURE — 6370000000 HC RX 637 (ALT 250 FOR IP): Performed by: PHYSICAL MEDICINE & REHABILITATION

## 2021-10-26 PROCEDURE — 6360000002 HC RX W HCPCS: Performed by: PHYSICAL MEDICINE & REHABILITATION

## 2021-10-26 PROCEDURE — 97530 THERAPEUTIC ACTIVITIES: CPT

## 2021-10-26 PROCEDURE — 97110 THERAPEUTIC EXERCISES: CPT

## 2021-10-26 PROCEDURE — 97535 SELF CARE MNGMENT TRAINING: CPT

## 2021-10-26 PROCEDURE — 97129 THER IVNTJ 1ST 15 MIN: CPT

## 2021-10-26 PROCEDURE — 97112 NEUROMUSCULAR REEDUCATION: CPT

## 2021-10-26 PROCEDURE — 1280000000 HC REHAB R&B

## 2021-10-26 RX ORDER — LIDOCAINE 4 G/G
2 PATCH TOPICAL DAILY
Status: DISCONTINUED | OUTPATIENT
Start: 2021-10-27 | End: 2021-11-10

## 2021-10-26 RX ORDER — TRAMADOL HYDROCHLORIDE 50 MG/1
25 TABLET ORAL EVERY 6 HOURS PRN
Status: DISCONTINUED | OUTPATIENT
Start: 2021-10-26 | End: 2021-10-27

## 2021-10-26 RX ADMIN — ACETAMINOPHEN 650 MG: 325 TABLET ORAL at 21:34

## 2021-10-26 RX ADMIN — HEPARIN SODIUM 5000 UNITS: 10000 INJECTION INTRAVENOUS; SUBCUTANEOUS at 18:52

## 2021-10-26 RX ADMIN — Medication 6 MG: at 21:34

## 2021-10-26 RX ADMIN — HEPARIN SODIUM 5000 UNITS: 10000 INJECTION INTRAVENOUS; SUBCUTANEOUS at 06:38

## 2021-10-26 RX ADMIN — ASPIRIN 81 MG CHEWABLE TABLET 81 MG: 81 TABLET CHEWABLE at 08:13

## 2021-10-26 RX ADMIN — ACETAMINOPHEN 650 MG: 325 TABLET ORAL at 14:25

## 2021-10-26 RX ADMIN — TRAMADOL HYDROCHLORIDE 25 MG: 50 TABLET ORAL at 18:56

## 2021-10-26 RX ADMIN — GABAPENTIN 100 MG: 100 CAPSULE ORAL at 21:33

## 2021-10-26 RX ADMIN — DICLOFENAC SODIUM TOPICAL GEL, 1%, 2 G: 10 GEL TOPICAL at 03:12

## 2021-10-26 RX ADMIN — SENNOSIDES 8.6 MG: 8.6 TABLET, COATED ORAL at 21:34

## 2021-10-26 RX ADMIN — GABAPENTIN 100 MG: 100 CAPSULE ORAL at 08:13

## 2021-10-26 RX ADMIN — ACETAMINOPHEN 650 MG: 325 TABLET ORAL at 03:10

## 2021-10-26 RX ADMIN — GABAPENTIN 100 MG: 100 CAPSULE ORAL at 14:24

## 2021-10-26 RX ADMIN — CLOPIDOGREL 75 MG: 75 TABLET, FILM COATED ORAL at 08:14

## 2021-10-26 RX ADMIN — ATORVASTATIN CALCIUM 40 MG: 40 TABLET, FILM COATED ORAL at 21:34

## 2021-10-26 RX ADMIN — DOCUSATE SODIUM 100 MG: 100 CAPSULE, LIQUID FILLED ORAL at 08:13

## 2021-10-26 RX ADMIN — DOCUSATE SODIUM 100 MG: 100 CAPSULE, LIQUID FILLED ORAL at 21:33

## 2021-10-26 RX ADMIN — TAMSULOSIN HYDROCHLORIDE 0.4 MG: 0.4 CAPSULE ORAL at 08:13

## 2021-10-26 RX ADMIN — ACETAMINOPHEN 650 MG: 325 TABLET ORAL at 08:18

## 2021-10-26 ASSESSMENT — PAIN DESCRIPTION - PROGRESSION
CLINICAL_PROGRESSION: GRADUALLY IMPROVING

## 2021-10-26 ASSESSMENT — PAIN DESCRIPTION - PAIN TYPE
TYPE: CHRONIC PAIN

## 2021-10-26 ASSESSMENT — PAIN DESCRIPTION - ONSET
ONSET: ON-GOING

## 2021-10-26 ASSESSMENT — PAIN DESCRIPTION - DESCRIPTORS
DESCRIPTORS: ACHING;CONSTANT;THROBBING;DISCOMFORT
DESCRIPTORS: ACHING;CONSTANT;DISCOMFORT
DESCRIPTORS: ACHING;CONSTANT;DISCOMFORT
DESCRIPTORS: ACHING;CONSTANT
DESCRIPTORS: ACHING;CONSTANT;DISCOMFORT

## 2021-10-26 ASSESSMENT — PAIN DESCRIPTION - FREQUENCY
FREQUENCY: CONTINUOUS
FREQUENCY: INTERMITTENT

## 2021-10-26 ASSESSMENT — PAIN - FUNCTIONAL ASSESSMENT
PAIN_FUNCTIONAL_ASSESSMENT: PREVENTS OR INTERFERES WITH ALL ACTIVE AND SOME PASSIVE ACTIVITIES
PAIN_FUNCTIONAL_ASSESSMENT: PREVENTS OR INTERFERES SOME ACTIVE ACTIVITIES AND ADLS

## 2021-10-26 ASSESSMENT — PAIN SCALES - GENERAL
PAINLEVEL_OUTOF10: 9
PAINLEVEL_OUTOF10: 9
PAINLEVEL_OUTOF10: 4
PAINLEVEL_OUTOF10: 9
PAINLEVEL_OUTOF10: 0
PAINLEVEL_OUTOF10: 7
PAINLEVEL_OUTOF10: 5
PAINLEVEL_OUTOF10: 0
PAINLEVEL_OUTOF10: 10
PAINLEVEL_OUTOF10: 9

## 2021-10-26 ASSESSMENT — PAIN DESCRIPTION - ORIENTATION
ORIENTATION: RIGHT;LEFT
ORIENTATION: RIGHT;LEFT;ANTERIOR;POSTERIOR
ORIENTATION: RIGHT;LEFT
ORIENTATION: RIGHT;LEFT

## 2021-10-26 ASSESSMENT — PAIN DESCRIPTION - LOCATION
LOCATION: HEAD;NECK;RIB CAGE
LOCATION: HEAD;NECK;RIB CAGE
LOCATION: NECK;SHOULDER
LOCATION: NECK;SHOULDER
LOCATION: HEAD;NECK;RIB CAGE

## 2021-10-26 NOTE — PROGRESS NOTES
Speech Language Pathology  ACUTE REHABILITATION--DAILY PROGRESS NOTE          PATIENT NAME:  Keyona Goss  (female)     :  1975  (46 y.o.)  STATUS:  Inpatient: Room 5505/5505-B    TODAY'S DATE:  10/26/2021  REFERRING PROVIDER:    Dr. Padmini Huston: speech language pathology (SLP) eval and treat  Date of order:  10/22/2021  REASON FOR REFERRAL: CVA  ADMITTING DIAGNOSIS: Acute cerebrovascular accident (CVA) ([facility] [address] [I63.9]    VISIT DIAGNOSIS: CVA    SPEECH THERAPY  PLAN OF CARE   The speech therapy  POC is established based on physician order, speech pathology diagnosis and results of clinical assessment     SPEECH PATHOLOGY DIAGNOSIS:    Mild cognitive deficit with left neglect    Speech Pathology intervention is recommended 3-6 times per week for LOS or when goals are met with emphasis on the following:      Conditions Requiring Skilled Therapeutic Intervention for speech, language and/or cognition    Decreased attention with impulsivity  left-neglect    Specific Speech Therapy Interventions to Include: Therapeutic exercises  Address left neglect    Specific instructions for next treatment:      To initiate POC    SHORT/LONG TERM GOALS  Pt will improve orientation to spatial and temporal surroundings with use of external aids and verbal cues  Pt will attend to her left side with decreasing amount of physical/verbal cues  Pt will improve attention and decrease impulsivity    Patient stated goals: Agreed with above    Rehabilitation Potential/Prognosis: good     _FIMS SCORES      Swallowing    Current Status  6--Modified Independent   Short Term Goal 6--Modified Independent    Long Term Goal 6--Modified Independent     Receptive    Current Status  t   6- Modified Independent    Short Term Goal t   6-ModifiedIndependent    Long Term Goal                  6-Modified Independent    Expressive    Current Status      6- Modified Independent    Short Term Goal     6-Modified Independent    Long Term Goal                  6- Modified Independent    Social Interaction    Current Status  4--Minimal Assistance                            Short Term Goal 5--Supervision    Long Term Goal 6--Modified Independent         Problem Solving    Current Status     2- Maximal       Assistance    Short Term Goal                 3- Moderate     Assistance    Long Term Goal          4-Minimal      Assistance      Memory    Current Status       4- Min A    Short Term Goal t      5-Supervision   Long Term Goal         5-Supervision  ? SWALLOWING:      Diet:  Regular consistency solids with thin liquids (IDDSI level 0)   Assistance with tray set up and supervision required with all po intake d/t  left neglect and impulsivity. LANGUAGE:     Patient was able to express her wants and needs without word finding  Difficulties. She displayed impulsivity when responding to questions. COGNITION:       SLP and all stimulus material positioned/placed on left side in order to cross midline and help increase attention and awareness to left. Pt actively engaged in discussion about safety modifications. Given mod to max v/c, patient identified actions depicted on photo cards that could pose a safety risk. She required max v/c to provide reasonable adaptations to environment/procedure to promote increased safety. reading tasks given moderate verbal cues to attend to her left side. She required moderate verbal cues to decrease impulsivity as she answers questions very quickly. Safety:  Poor    SPEECH:     Speech intelligibility was good during structured tasks and conversational speech. SP recommended after discharge:  yes  Supervision recommended at discharge: 24 hour    Will continue SP intervention as per previously established POC.     EDUCATION: Speech Pathologist (SLP) completed education with the patient and/or family regarding identified cognitive linguistic deficits and subsequent need for speech pathology intervention. Discussed deficit areas to be targeted by formal intervention and established short/long term goals. Reviewed compensatory strategies to improve functional outcome (as appropriate). Encouraged patient and/or family to engage SLP in structured Q&A session relative to identified deficit areas. Patient and/or family indicated understanding of all information provided via satisfactory verbal response. Aarti Ward, ELVIRA, CCC-SLP/L   Speech Language Pathologist  [phone]    Minute Tracking:    Individual therapy:   45 minutes  Concurrent therapy:    0 minutes  Group therapy:     0 minutes  Co-treatment therapy:    0 minutes    Total minutes for 10/23/2021: 45 minutes        Herbert Hernández M.A., [address] [place] [address] Pathologist  10/23/2021     The admitting diagnosis and active problem list, as listed below have been reviewed prior to initiation of this evaluation.         ACTIVE PROBLEM LIST:   Patient Active Problem List   Diagnosis    Acute cerebrovascular accident (CVA) ([facility] [address]

## 2021-10-26 NOTE — CARE COORDINATION
[address] [place] [address] working remotely. Requested patient sign Auth forms. sW had patient sign them and faxed back to [facility].     LUCIANO Olivares

## 2021-10-26 NOTE — PROGRESS NOTES
Physical Therapy  Weekly Team Note  Supervising Therapist: Lexi Quevedo, PT, DPT [id number]    NAME: Salbador Tadeo  [facility]  DIAGNOSIS: Acute CVA  PRECAUTIONS: Falls, helmet at all times when OOB, L hemiplegia, L neglect, contraversive pusher, alarm, must bring phone to therapy (cardiac monitor), L PRAFO, SPB <180  HPI: Pt is a 39 y.o. female with past medical history significant for recent COVID-19 infection (9/15/21) who [facility] with acute onset left-sided weakness. Initial NIHSS 17.  Initial CT (OSH) showed R MCA hyperdensity; initial CTA showed right ICA/M1 occlusion. On 10/13, her neuro exam worsened and repeat imaging indicated worsening edema, 1 cm of midline shift. She underwent emergent R hemicraniectomy by Dr. [facility]) on 10/13. After being deemed medically appropriate, she was transferred to [place] [address] on 10/22/2021    Social:  Pt lives with daughter (15 y/o), will be returning to parent's home upon discharge (1 floor plan, 2 HAIDER with 2 HR). Mother and father healthy, do not work. 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 Initial Evaluation  Date: 10/23/21 10/26/21     Comments Short Term Goals Long Term Goals    Was pt agreeable to Eval/treatment? yes Yes      Does pt have pain? Pain on the top of her head once the helmet was placed on Moderate R rib and L hip pain, limiting ambulation at times      Bed Mobility  Rolling: Max A  Supine to sit: Max A   Sit to supine: Max A   Scooting: Max A Rolling ModA  Supine<>Sit MaxA  Scooting MaxA Cues for sequencing and trunk assistance Min A Supervision   Transfers Sit to stand: Max A   Stand to sit: Max A  Stand pivot: Max A x2    5xSTS: Unable to complete Sit<>stand: Max A  Stand pivot: NT    Sliding board transfer MaxA to R and L from WC<>mat table Recommend nursing staff continue to use sliding board at this time, transferring toward R side.   Min A Supervision  5xSTS: TBD   Ambulation    15 feet with R mackey rail with Max A + wc follow    10mWT: TBA  6mWT: TBA 40 feet with R hallway rail, L ankle DF wrap , L knee immobilizer, inside out sock over L shoe with MaxA    WC follow required, manual assistance to advance LLE. Trunk and L knee collapse with WB due to poor stance control. Pushing behavior beginning to diminish 50 feet with AAD with Min A       500 feet with AAD with Supervision    10mWT: TBD  6mWT: TBD   Walking 10 feet on uneven surface NT NT  10 feet with AAD with Min A 10 feet with AAD with Supervision   Wheel Chair Mobility NT 10 feet MaxA with R extremities      Car Transfers NT NT  Min A Supervision   Stair negotiation: ascended and descended NT NT  4 steps with 1 rail with Min A 12 steps with 1 rail with Supervision   Curb Step:   ascended and descended NT NT  4 inch step with AAD and Min A 4 inch step with AAD and Supervision   Picking up object off the floor NT NT  Will  an object with Min assist Will  an object with Supervision assist   BLE ROM WFL: LLE AROM limited by flaccidity  WFL: LLE AROM limited by spasticity 2+ on MAS (hamstrings, hip extensors) L PF spasticity places pt at risk for contracture, please make sure PRAFO is donned when in bed     BLE Strength RLE 4+/5  LLE 0/5 RLE 4+/5  LLE 0/5 grossly, hip flexors/adductors 1/5      Balance  Static Sitting: Mod/Max A    BBS: TBA  FGA: TBA Sitting: Mod A  Standing: Max A       BBS: TBD  FGA: TBD   Date Family Teach Completed TBA Pt's mother present for observation 10/24      Is additional Family Teaching Needed?   Y or N Y Yes      Hindering Progress L hemiplegia, L neglect L hemiplegia, L neglect      PT recommended ELOS 6 weeks 6 weeks      Team's Discharge Plan  6 weeks      Therapist at Team Meeting  Kristin Miles PT, DPT [id number]          Date:  10/26/21  Supporting factors: Pt is motivated, has good social support  Barriers to discharge:  Dense

## 2021-10-26 NOTE — PROGRESS NOTES
Physical Therapy  Treatment Note  Supervising Therapist: Derian Duggan, PT, DPT [id number]    NAME: Robin García  [facility]  DIAGNOSIS: Acute CVA  PRECAUTIONS: Falls, helmet at all times when OOB, L hemiplegia, L neglect, pusher, alarm, must bring phone to therapy (cardiac monitor), L PRAFO, SPB <180  HPI: Pt is a 39 y.o. female with past medical history significant for recent COVID-19 infection (9/15/21) [facility] with acute onset left-sided weakness. Initial NIHSS 17.  Initial CT (OSH) showed R MCA hyperdensity; initial CTA showed right ICA/M1 occlusion. On 10/13, her neuro exam worsened and repeat imaging indicated worsening edema, 1 cm of midline shift. She underwent emergent R hemicraniectomy by Dr. Andrew [facility]) on 10/13. After being deemed medically appropriate, she was transferred to [place] [address] on 10/22/2021    Social:  Pt lives with daughter (15 y/o), will be returning to parent's home upon discharge (1 floor plan, 2 HAIDER with 2 HR). Mother and father healthy, do not work. 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 Initial Evaluation  Date: 10/23/21 AM     PM    Short Term Goals Long Term Goals    Was pt agreeable to Eval/treatment? yes Yes Yes     Does pt have pain? Pain on the top of her head once the helmet was placed on Moderate c/o L hip and R rib pain Moderate c/o L hip and R rib pain     Bed Mobility  Rolling: Max A  Supine to sit: Max A   Sit to supine: Max A   Scooting: Max A Rolling ModA  Supine<>Sit MaxA  Scooting MaxA NT Min A Supervision   Transfers Sit to stand: Max A   Stand to sit: Max A  Stand pivot: Max A x2    5xSTS: Unable to complete Sit<>stand: Max A  Stand pivot: NT    Sliding board transfer MaxA to R and L from WC<>mat table Sit<>stand:  Max A  Stand pivot: NT    Sliding board transfer MaxA to R and L from hospital bed>WC Min A Supervision  5xSTS: TBD   Ambulation    15 feet with R mackey rail with Max A + wc follow    10mWT: TBA  6mWT: TBA 40 feet x 1 rep, 25 feet x 1 rep, and 20 feet with R hallway rail, L ankle DF wrap , L knee immobilizer, inside out sock over L shoe with MaxA   (WC follow, manual LLE advancement) 40 feet x 3 reps and 25 feet x 1 rep with R hallway rail L ankle DF wrap/knee immobilizer, inside out sock over L shoe, with MaxA (WC follow) 50 feet with AAD with Min A       500 feet with AAD with Supervision    10mWT: TBD  6mWT: TBD   Walking 10 feet on uneven surface NT NT NT 10 feet with AAD with Min A 10 feet with AAD with Supervision   Wheel Chair Mobility NT NT 10 feet MaxA with R extremities     Car Transfers NT NT NT Min A Supervision   Stair negotiation: ascended and descended NT NT NT 4 steps with 1 rail with Min A 12 steps with 1 rail with Supervision   Curb Step:   ascended and descended NT NT NT 4 inch step with AAD and Min A 4 inch step with AAD and Supervision   Picking up object off the floor NT NT NT Will  an object with Min assist Will  an object with Supervision assist   BLE ROM WFL: LLE AROM limited by flaccidity  WFL: LLE AROM limited by spasticity 2+ on MAS (hamstrings, hip extensors)      BLE Strength RLE 4+/5  LLE 0/5 RLE 4+/5  LLE 0/5 with MMT      Balance  Static Sitting: Mod/Max A    BBS: TBA  FGA: TBA Sitting: Mod A  Standing: Max A       BBS: TBD  FGA: TBD   Date Family Teach Completed TBA Pt's mother present for observation 10/24      Is additional Family Teaching Needed?   Y or N Y Yes      Hindering Progress L hemiplegia, L neglect L hemiplegia, L neglect      PT recommended ELOS 5 weeks       Team's Discharge Plan        Therapist at Team Meeting          HRmax: 178 bpm Time spent at moderate intensity (60-70% HRmax) Time spent at high intensity (70-85% HRmax)    AM: 0:13:41 AM: 0:12:37    PM: 0:22:27 PM: 0:07:41       Therapeutic Exercise:   AM: NA  PM:     Patient education  Pt educated on R lateral weight shift implication on LLE

## 2021-10-26 NOTE — PROGRESS NOTES
OCCUPATIONAL THERAPY DAILY NOTE    Date:10/26/2021  Patient Name: Isaías Brenner  MRN: [id number]  : 1975  Room: [address] [place] [address]     Diagnosis:Diagnosis: Acute CVA  Surgery: R frontotemporal decompressive hemicraniectomy on 10/13/21  Past Medical History: COVID (9/15), tobacco use  Pt presented to [facility] as Level 2 stroke alert on 10/11/21. L side weakness, neglect. NIH-17  Found to have large R MCA involving R occipital     Precautions: Falls, L hemiplegia, L neglect, bed/chair alarm, helmet when OOB, impulsive, L PRAFO    Functional Assessment:   Date Status AE  Comments   Feeding 10/26/21 SBA  Pt self fed   Grooming 10/26/21 Min A     Bathing 10/23/21 dependent     UB Dressing 10/26/21 maxA  Decreased carryover over and understanding of trent dressing technique, pt able to thread RUE   LB Dressing 10/23/21 dependent     Toileting 10/24/21 depdendent     Homemaking  TBA       Functional Transfers / Balance:   Date Status DME  Comments   Sit Balance 10/24/21 modA     Stand Balance 10/24/21 maxAx2     [] Tub  [] Shower   Transfer  TBA     Commode   Transfer 10/26/21 Mod x 2 BSC, slideboard 2 person assist when going to L side   Functional   Mobility  TBA       Functional Exercises / Activity:  Pt sitting in chair upon arrival to OT gym in AM. Max A for slideboard w/c-mat. Pt engaged in core strengthing/posture/weightshifting/balance activity seated edge of mat. Pt required Mod A to go down onto R elbow and Max A for L elbow and work to weightbearing and pushing up from each side, 4x5 reps. Therapist completed AROM, shoulder flex of LUE while supine, 3x10 reps. Pt demonstrated no active assist. Pt also worked on unsupported sitting balance/tolerance. Pt continued to require Max A, progressing to Mod A. Pt able to sit 5x5 mins during session with some rest breaks supine. Max A for slideboard mat-w/c. Pt appeared to have tolerated session fairly but limited by rib pain this date.     Pt sitting in chair upon arrival to OT gym in PM. Required increased time for UB dressing and commode transfer. Engaged in therex, seated, using 3# DB, 4x10 reps (elbow/shoulder flex), to increase RUE strength for ease with functional transfers. 1 person assist for slideboard w/c-bed and 2 person to get back into bed. Sensory / Neuromuscular Re-Education:      Cognitive Skills:   Status Comments   Problem   Solving poor    Memory poor    Sequencing poor    Safety poor      Visual Perception:    Education:  Pt educated on importance of therapy, goals to be reached, OOB activity, and precautions to follow   10/25/21: pt educated on SROM of LUE and safe positioning of LUE    [] Family teach completed on:    Pain Level: 0/10    Additional Notes:     Patient has made fair progress during treatment sessions toward set goals. Therapy emphasis to obtain goals:    Time Frame for Long term goals  6 weeks   Long term goal 1 Pt will complete UB self-care tasks w/ Supervision   Long term goal 2 Pt will complete LB self-care tasks w/ Supervision   Long term goal 3 Pt will complete toileting (all aspects) w/ Supervision   Long term goal 4 Pt will safely complete toilet transfers w/ Supervision   Long term goal 5 Pt will safely complete tub/shower transfers w/ Supervision   Long term goals 6 Pt will complete basic homemaking task w/ SBA   Long term goal 7 Pt will complete grooming task w/ Supervision   Long term goal 8 Pt will complete feeding task w/ Supervision/setup     [x] Continue with current OT Plan of care.   [] Prepare for Discharge     DISCHARGE RECOMMENDATIONS  Recommended DME:    Post Discharge Care:   []Home Independently  []Home with 24hr Care / Supervision []Home with Partial Supervision []Home with Home Health OT []Home with Out Pt OT []Other: ___   Comments:         Time in Time out Tx Time Breakdown  Variance:   First Session  0915 1000 [x] Individual Tx- 45    [] Concurrent Tx -  [] Co-Tx -   [] Group Tx -   [] Time Missed -     Second Session [id number] [x] Individual Tx- 45  [] Concurrent Tx -  [] Co-Tx -   [] Group Tx -   [] Time Missed -     Third Session    [] Individual Tx-   [] Concurrent Tx -  [] Co-Tx -   [] Group Tx -   [] Time Missed -         Total Tx Time- [facility] [facility], OTR/L [id number]

## 2021-10-26 NOTE — PROGRESS NOTES
[facility]  Comprehensive Progress Note    Subjective:      Salbador Tadeo is a 39 y.o. female admitted to inpatient rehabilitation for impairments and activities limitations in ADLs and mobility secondary to right MCA CVA. No acute events overnight. No cp, sob, n/v. No cough. Patient reports right sided rib pain and tenderness that started after PT yesterday. No associated cp, palpitation, sob, n/v, radiation, diaphoresis. She reports the pain is made worse by lifting the right arm up and down / moving the right arm around. She reports the area is tender to touch. She denies any fall or injury to the area. VSS. No other complaints. The patient's medical records have been reviewed. Scheduled Meds:gabapentin, 100 mg, TID  tamsulosin, 0.4 mg, Daily  heparin (porcine), 5,000 Units, Q12H  aspirin, 81 mg, Daily  atorvastatin, 40 mg, Nightly  vitamin D, 50,000 Units, Weekly  docusate sodium, 100 mg, BID  senna, 1 tablet, Nightly  melatonin, 6 mg, Nightly  lidocaine, 1 patch, Daily  nicotine, 1 patch, Daily  clopidogrel, 75 mg, Daily      Continuous Infusions:  PRN Meds:acetaminophen, 650 mg, Q4H PRN  magnesium hydroxide, 30 mL, Daily PRN  bisacodyl, 10 mg, Daily PRN  diclofenac sodium, 2 g, 4x Daily PRN         Objective:      Vitals:    10/24/21 0842 10/24/21 1000 10/25/21 0841 10/26/21 0800   BP: (!) 101/55 (!) 108/54 115/75 112/69   Pulse: 113 94 89 107   Resp: 18 18 18 18   Temp: 99.3 °F (37.4 °C) 99.1 °F (37.3 °C) 97.9 °F (36.6 °C) 98.7 °F (37.1 °C)   TempSrc: Temporal Temporal Temporal Temporal   SpO2:    96%   Weight:       Height:         General appearance: alert, appears stated age and cooperative, NAD  Head: R crani with staples, c/d/i   Eyes: conjunctivae/corneas clear. PERRL, EOM's intact.   Lungs: clear to auscultation bilaterally  Heart: regular rate and rhythm, S1, S2 normal  Abdomen: soft, non-tender, normal bowel sounds  Extremities: extremities normal, atraumatic, no cyanosis or edema  MSK: R rib cage with mild tenderness to palpation reproducing symptoms, no deformity. Skin: R crani c/d/i   Neurologic: Alert, oriented x4. Left neglect. Speech clear. No aphasia appreciated. Mild L facial droop. Spastic left hemiplegia. MAS 1 in LUE; MAS 2 in LLE. Developing L plantar flexion contracture.      Motor:  Tone was normal     Motor Findings  Strength: Right  Strength: Left    Deltoid  5/5  0/5    Biceps  5/5  0/5    Triceps  5/5  0/5    Wrist Extensors  5/5  0/5    FDP 5/5 0/5   Finger abductors 5/5 0/5   Iliospoas  5/5  0/5    Quadriceps  5/5  0/5    Tibialis anterior  5/5  0/5    EHL 5/5 0/5   Gastroc soleus  5/5  0/5            Laboratory:    Lab Results   Component Value Date    WBC 10.4 10/23/2021    HGB 13.9 10/23/2021    HCT 40.9 10/23/2021    MCV 91.5 10/23/2021     (H) 10/23/2021     Lab Results   Component Value Date     10/23/2021    K 4.5 10/23/2021     10/23/2021    CO2 25 10/23/2021    BUN 19 10/23/2021    CREATININE 0.7 10/23/2021    GLUCOSE 107 10/23/2021    CALCIUM 9.7 10/23/2021      Lab Results   Component Value Date    ALT 75 (H) 10/23/2021    AST 46 (H) 10/23/2021    ALKPHOS 89 10/23/2021    BILITOT 0.4 10/23/2021         Functional Status:   Bed mobility: Max A  Transfers: Max A  Ambulation: 40 ft L ankle DF wrap, L knee immobilizer, Max A - Dep, w/c follow  Feeding: SBA  Grooming: Min A  UB dressing: Max A  LB dressing: Dependent       Assessment/Plan:       39 y.o. female admitted to inpatient rehabilitation for impairments and activities limitations in ADLs and mobility secondary to right MCA CVA.    -Right MCA CVA: Complicated by cerebral edema requiring emergent right hemicraniectomy (10/13/2021). Dense left spastic hemiplegia, left neglect, mild cognitive impairment. Helmet when out of bed. L PRAFO in bed. LUE sling for gait. L w/c tray. Aspirin, Plavix, Lipitor for secondary prevention.  Monitor neuro exam. Continue Acute Rehab program.   -Spasticity LUE/LLE: Monitor. Currently may be helpful to support leg for gait, however if begins to interfere can consider baclofen  -Pain control, MSK pain, neuropathic pain: Tylenol PRN, gabapentin, Lidoderm, Voltaren gel  -Urinary retention: On flomax, voiding well. Monitor PVRs  -Tobacco abuse: Nicoderm patch  -DVT prophylaxis: heparin SQ, SCDs    Right rib pain is mechanical, musculoskeletal in nature, started after activity in therapy, worse when she moves her arm up and down. Able to reproduce pain with palpation over the area. Will add Lidoderm for rib pain.      Team conference tomorrow       Electronically signed by Jovany Montelongo MD on 10/26/2021 at 10:39 AM

## 2021-10-27 ENCOUNTER — APPOINTMENT (OUTPATIENT)
Dept: GENERAL RADIOLOGY | Age: 46
DRG: 058 | End: 2021-10-27
Attending: PHYSICAL MEDICINE & REHABILITATION
Payer: MEDICAID

## 2021-10-27 PROBLEM — G81.14 LEFT SPASTIC HEMIPLEGIA (HCC): Status: ACTIVE | Noted: 2021-10-27

## 2021-10-27 PROBLEM — Z98.890 STATUS POST CRANIECTOMY: Status: ACTIVE | Noted: 2021-10-27

## 2021-10-27 PROBLEM — R52 ACUTE PAIN: Status: ACTIVE | Noted: 2021-10-27

## 2021-10-27 PROBLEM — Z72.0 TOBACCO ABUSE: Status: ACTIVE | Noted: 2021-10-27

## 2021-10-27 PROCEDURE — 97112 NEUROMUSCULAR REEDUCATION: CPT

## 2021-10-27 PROCEDURE — 97535 SELF CARE MNGMENT TRAINING: CPT

## 2021-10-27 PROCEDURE — 97110 THERAPEUTIC EXERCISES: CPT

## 2021-10-27 PROCEDURE — 97129 THER IVNTJ 1ST 15 MIN: CPT | Performed by: SPEECH-LANGUAGE PATHOLOGIST

## 2021-10-27 PROCEDURE — 1280000000 HC REHAB R&B

## 2021-10-27 PROCEDURE — 99232 SBSQ HOSP IP/OBS MODERATE 35: CPT | Performed by: PHYSICAL MEDICINE & REHABILITATION

## 2021-10-27 PROCEDURE — 6370000000 HC RX 637 (ALT 250 FOR IP): Performed by: PHYSICAL MEDICINE & REHABILITATION

## 2021-10-27 PROCEDURE — 51798 US URINE CAPACITY MEASURE: CPT

## 2021-10-27 PROCEDURE — 71100 X-RAY EXAM RIBS UNI 2 VIEWS: CPT

## 2021-10-27 PROCEDURE — 97530 THERAPEUTIC ACTIVITIES: CPT

## 2021-10-27 PROCEDURE — 6360000002 HC RX W HCPCS: Performed by: PHYSICAL MEDICINE & REHABILITATION

## 2021-10-27 PROCEDURE — 97130 THER IVNTJ EA ADDL 15 MIN: CPT | Performed by: SPEECH-LANGUAGE PATHOLOGIST

## 2021-10-27 RX ORDER — TRAMADOL HYDROCHLORIDE 50 MG/1
25 TABLET ORAL EVERY 6 HOURS PRN
Status: DISCONTINUED | OUTPATIENT
Start: 2021-10-27 | End: 2021-11-03

## 2021-10-27 RX ADMIN — SENNOSIDES 8.6 MG: 8.6 TABLET, COATED ORAL at 21:12

## 2021-10-27 RX ADMIN — TRAMADOL HYDROCHLORIDE 25 MG: 50 TABLET ORAL at 15:24

## 2021-10-27 RX ADMIN — ACETAMINOPHEN 650 MG: 325 TABLET ORAL at 21:10

## 2021-10-27 RX ADMIN — GABAPENTIN 100 MG: 100 CAPSULE ORAL at 15:24

## 2021-10-27 RX ADMIN — CLOPIDOGREL 75 MG: 75 TABLET, FILM COATED ORAL at 07:20

## 2021-10-27 RX ADMIN — Medication 6 MG: at 21:11

## 2021-10-27 RX ADMIN — GABAPENTIN 100 MG: 100 CAPSULE ORAL at 21:10

## 2021-10-27 RX ADMIN — ATORVASTATIN CALCIUM 40 MG: 40 TABLET, FILM COATED ORAL at 21:11

## 2021-10-27 RX ADMIN — ACETAMINOPHEN 650 MG: 325 TABLET ORAL at 17:24

## 2021-10-27 RX ADMIN — HEPARIN SODIUM 5000 UNITS: 10000 INJECTION INTRAVENOUS; SUBCUTANEOUS at 17:23

## 2021-10-27 RX ADMIN — DOCUSATE SODIUM 100 MG: 100 CAPSULE, LIQUID FILLED ORAL at 07:19

## 2021-10-27 RX ADMIN — TRAMADOL HYDROCHLORIDE 25 MG: 50 TABLET ORAL at 07:19

## 2021-10-27 RX ADMIN — HEPARIN SODIUM 5000 UNITS: 10000 INJECTION INTRAVENOUS; SUBCUTANEOUS at 06:35

## 2021-10-27 RX ADMIN — DICLOFENAC SODIUM TOPICAL GEL, 1%, 2 G: 10 GEL TOPICAL at 17:23

## 2021-10-27 RX ADMIN — DOCUSATE SODIUM 100 MG: 100 CAPSULE, LIQUID FILLED ORAL at 21:10

## 2021-10-27 RX ADMIN — ACETAMINOPHEN 650 MG: 325 TABLET ORAL at 12:22

## 2021-10-27 RX ADMIN — ASPIRIN 81 MG CHEWABLE TABLET 81 MG: 81 TABLET CHEWABLE at 07:19

## 2021-10-27 RX ADMIN — ACETAMINOPHEN 650 MG: 325 TABLET ORAL at 07:19

## 2021-10-27 RX ADMIN — TAMSULOSIN HYDROCHLORIDE 0.4 MG: 0.4 CAPSULE ORAL at 07:20

## 2021-10-27 RX ADMIN — GABAPENTIN 100 MG: 100 CAPSULE ORAL at 07:20

## 2021-10-27 ASSESSMENT — PAIN DESCRIPTION - ORIENTATION
ORIENTATION: RIGHT;LEFT

## 2021-10-27 ASSESSMENT — PAIN DESCRIPTION - LOCATION
LOCATION: NECK
LOCATION: NECK;SHOULDER;RIB CAGE
LOCATION: NECK
LOCATION: HEAD;NECK
LOCATION: NECK;RIB CAGE;SHOULDER
LOCATION: NECK

## 2021-10-27 ASSESSMENT — PAIN DESCRIPTION - PAIN TYPE
TYPE: CHRONIC PAIN

## 2021-10-27 ASSESSMENT — PAIN SCALES - GENERAL
PAINLEVEL_OUTOF10: 7
PAINLEVEL_OUTOF10: 7
PAINLEVEL_OUTOF10: 2
PAINLEVEL_OUTOF10: 4
PAINLEVEL_OUTOF10: 3
PAINLEVEL_OUTOF10: 9
PAINLEVEL_OUTOF10: 10
PAINLEVEL_OUTOF10: 10
PAINLEVEL_OUTOF10: 2

## 2021-10-27 ASSESSMENT — PAIN DESCRIPTION - ONSET
ONSET: ON-GOING

## 2021-10-27 ASSESSMENT — PAIN DESCRIPTION - PROGRESSION
CLINICAL_PROGRESSION: GRADUALLY IMPROVING

## 2021-10-27 ASSESSMENT — PAIN DESCRIPTION - DESCRIPTORS
DESCRIPTORS: ACHING;DISCOMFORT
DESCRIPTORS: ACHING;DISCOMFORT
DESCRIPTORS: ACHING;CONSTANT;DISCOMFORT
DESCRIPTORS: ACHING;DISCOMFORT
DESCRIPTORS: ACHING;CONSTANT;DISCOMFORT
DESCRIPTORS: ACHING;DISCOMFORT

## 2021-10-27 ASSESSMENT — PAIN DESCRIPTION - FREQUENCY
FREQUENCY: CONTINUOUS

## 2021-10-27 NOTE — FLOWSHEET NOTE
10/27/21 0149   Output (mL)   Urine 250 mL   Urine Assessment   Incontinence No   Urine Color Yellow/straw   Urine Appearance Clear   Urine Odor No odor   Bladder Scan Volume (mL) 0 mL   $ Bladder scan $ Yes   Unmeasured Output   Urine Occurrence 1

## 2021-10-27 NOTE — PATIENT CARE CONFERENCE
[facility] NOTE/PATIENT PLAN OF CARE    The physician was present and led this team conference    Date: 10/27/2021  Admission date: 10/22/2021  Patient Name: Nisha Galdamez        MRN: [id number]    : 1975  (39 y.o.)  Gender: female   Rehab diagnosis/surgery with date:  Right MCA stroke of 10/12/21/craniectomy 10/13/21  Impairment Group Code:  1.1      MEDICAL/FUNCTIONAL HISTORY/STATUS:  needs to wear helmet when out of bed, left hemiplegia, has cardiac monitor on from [facility], pain complaints, right side rib pain, neurontin and topical treatments ordered    Consultations/Labs/X-rays: 10/23/21 labs ok      MEDICATION UPDATE:  tramadol low dose      NURSING :    Bowel:   Always Continent  [x]   Occasionally incontinent  []   Frequently incontinent  []   Always incontinent  []   Not occurred  []     Bladder:   Always Continent  [x]    Incontinent less than daily[]   Incontinent  daily []   Always incontinent  []   No urine output    []   Indwelling catheter []       Toilet Hygiene:   Current level : dependent  Short term Toilet hygiene goal: max assist  Long term toilet hygiene goal:  standby assist    Skin integrity: intact  Pain: headache, ribs    NUTRITION    Diet  regular  Liquid consistency   thin    SOCIAL INFORMATION:  Lives with: dtr, 15 yr old  Prior community services:  none  Home Architecture:  going to mom's at discharge:  ranch with 2 entry, 2 rails  Prior Level of function:  independent  DME:  none    FAMILY / PATIENT EDUCATION:  safety and self care are ongoing with patient    PHYSICAL THERAPY    Bed mobility:   Current level: mod assist-max assist with cues  Short term bed mobility goal: min assist  Long term bed mobility goal: supervision    Chair/bed transfers:  Current level: max assist, with transfer board  is max assist to right and left  Short term Chair/bed transfers goal: min assist  Long term Chair/bed transfers goal: Current level: Modified independent  Short term comprehension goal: Modified independent  Long term comprehension goal: Modified independent    Expression:   Current level: Modified independent  Short term expression goal: Modified independent  Long term expression goal: Modified independent    Problem solving:   Current level: max assist  Short term problem solving goal: mod assist  Long term problem solving goal: min assist    Memory:  Current level: min assist  Short term memory goal: supervision  Long term memory goal: supervision    Social interaction:  min assist, goal Modified independent    Safety awareness: poor    Patient/family's personal goals: get back home  Factors supporting goal achievement:  prior level of independence  Factors hindering goal achievement:  left trent, left neglect      Discharge Plan   Estimated Length of Stay: 6 weeks            Destination: home  Services at Discharge: to be assessed  Equipment at Discharge: to be assessed      INTERDISCIPLINARY TEAM/PHYSICIAN [address]:  continue working towards goals      I approve the established interdisciplinary plan of care as documented within the medical record of Kvng Naren. Electronically signed by Desirae Desai RN on 10/27/2021 at 8:54 AM  The following interdisciplinary team members were present:  SJ Hall, LSW  Andrews Srivastava, DPT  [place], OTRAMIRO [place], SLP

## 2021-10-27 NOTE — PROGRESS NOTES
Physical Therapy  Treatment Note  Supervising Therapist: Caroline Barajas, PT, DPT [id number]    NAME: Rolo Vyas  [facility]  DIAGNOSIS: Acute CVA  PRECAUTIONS: Falls, helmet at all times when OOB, L hemiplegia, L neglect, pusher, alarm, must bring phone to therapy (cardiac monitor), L PRAFO, SPB <180  HPI: Pt is a 39 y.o. female with past medical history significant for recent COVID-19 infection (9/15/21) who [facility] with acute onset left-sided weakness. Initial NIHSS 17.  Initial CT ([facility]) showed R MCA hyperdensity; initial CTA showed right ICA/M1 occlusion. On 10/13, her neuro exam worsened and repeat imaging indicated worsening edema, 1 cm of midline shift. She underwent emergent R hemicraniectomy by Dr. Amalia Davidson [facility]) on 10/13. After being deemed medically appropriate, she was transferred to [place] [address] on 10/22/2021    Social:  Pt lives with daughter (15 y/o), will be returning to parent's home upon discharge (1 floor plan, 2 HAIDER with 2 HR). Mother and father healthy, do not work. 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 Initial Evaluation  Date: 10/23/21 AM     PM    Short Term Goals Long Term Goals    Was pt agreeable to Eval/treatment? yes Yes Yes     Does pt have pain? Pain on the top of her head once the helmet was placed on Moderate c/o L hip and R rib pain Moderate c/o L hip and R rib pain     Bed Mobility  Rolling: Max A  Supine to sit: Max A   Sit to supine: Max A   Scooting: Max A NT Supine>Sit ModA (hospital bed with rails Min A Supervision   Transfers Sit to stand: Max A   Stand to sit: Max A  Stand pivot: Max A x2    5xSTS: Unable to complete Sit<>stand: Max A  Stand pivot: NT    NT Sit<>stand:  Max A  Stand pivot: NT    Sliding board transfer MaxA to R and L from hospital bed>WC and WC<>mat table Min A Supervision  5xSTS: TBD   Ambulation    15 feet with R mackey rail with Max A + wc follow    10mWT: TBA  6mWT: TBA 40 feet x 2 reps, 25 feet x 2 reps with R hallway rail, L ankle PLS AFO , L knee immobilizer, inside out sock over L shoe with MaxA   (WC follow, trunk assist as well as assistance with manual LLE advancement) NT, see comments 50 feet with AAD with Min A       500 feet with AAD with Supervision    10mWT: TBD  6mWT: TBD   Walking 10 feet on uneven surface NT NT NT 10 feet with AAD with Min A 10 feet with AAD with Supervision   Wheel Chair Mobility NT NT NT     Car Transfers NT NT NT Min A Supervision   Stair negotiation: ascended and descended NT NT NT 4 steps with 1 rail with Min A 12 steps with 1 rail with Supervision   Curb Step:   ascended and descended NT NT NT 4 inch step with AAD and Min A 4 inch step with AAD and Supervision   Picking up object off the floor NT NT NT Will  an object with Min assist Will  an object with Supervision assist   BLE ROM WFL: LLE AROM limited by flaccidity  WFL: LLE AROM limited by spasticity 2+ on MAS (hamstrings, hip extensors)      BLE Strength RLE 4+/5  LLE 0/5 RLE 4+/5  LLE 0/5 with MMT      Balance  Static Sitting: Mod/Max A    BBS: TBA  FGA: TBA Sitting: Mod A  Standing: Max A       BBS: TBD  FGA: TBD   Date Family Teach Completed TBA Pt's mother present for observation 10/24      Is additional Family Teaching Needed?   Y or N Y Yes      Hindering Progress L hemiplegia, L neglect L hemiplegia, L neglect      PT recommended ELOS 5 weeks       Team's Discharge Plan        Therapist at Team Meeting          HRmax: 178 bpm Time spent at moderate intensity (60-70% HRmax) Time spent at high intensity (70-85% HRmax)    AM: 0:21:43 AM: 0:18:17    PM: NA PM: NA       Therapeutic Exercise:   AM: Ambulation with R hemicane, 5' x 1 rep with MaxA (WC follow, L AFO/knee immobilizer, inside out sock over shoe  PM: Sliding board transfers x 3 reps   Seated lateral trunk lean x 10 reps to R (touching elbow to mat), x 10 reps to L touching therapist hand 4\" away, and return to neutral  Diagonal reaching for cone in random planes on R, x 10 reps with RUE, stacking on R side of mat table  Diagonal reaching for cone in random planes on L, x 10 reps with RUE , stacking on L side of mat table  Seated hip adduction 2 x 10 reps BLE    Patient education  Pt educated on timing of weight shifting for optimal LLE advancement     Patient response to education:   Pt verbalized understanding Pt demonstrated skill Pt requires further education in this area   yes partial yes     Additional Comments: Pt reports pain is under better control today, BP monitored before and after AM sesssion activity, 120/73 prior, 121/72 following. HR monitored continuously, pt attains high intensity ranges with short distance basic walking tasks. Pt reports RPE at 15-18 indicating HR response is appropriately correlated with intensity. Pt exhibits improving pushing behavior and consistency of successful RLE advancement. Pt is improving R weight shift to facilitate LLE clearance. Pt still would not be able to advance LLE without reduced friction provided by sock over inside of shoe. Mirror utilized for visual feedback. Pt intermittently exhibits excessive anterior lean which she is able to identify. Initiated ambulation with hemicane however increased cognitive and balance requirements of this device lead to breakdown of technique. Pt supine in bed upon room entry for PM session, initially declining participation due to fatigue. Pt reports pain is better controlled. Pt eventually agreeable to participate following therapist encouragement however she declined ambulation during PM session due to reports of fatigue. Pt educated on and verbalized understanding of continued task specific training to promote neuroplasticity. Seated activities at [facility] completed to address L lateral lean and L neglect. Pt exhibits significantly improved pushing behavior. Plan to resume gait training next session. Chair/bed alarm: armed following session    AM  Time in: 0830  Time out: 0915    PM  Time in: 1300  Time out: 1345    Pt is making good progress toward established Physical Therapy goals. Continue with physical therapy current plan of care.     Varinder Brothers, PT, DPT  Board Certified Clinical Specialist in Neurologic Physical Therapy  [id number]

## 2021-10-27 NOTE — PROGRESS NOTES
[id number] [facility] Physical Medicine and Rehabilitation  Comprehensive Progress Note    Subjective:      Stacey Salinas is a 39 y.o. female admitted to inpatient rehabilitation for impairments and activities limitations in ADLs and mobility secondary to right MCA CVA. No acute events overnight. No cp, sob, n/v. Still having mechanical rib/muscular pain on the right. No other complaints. Tolerating therapy. The patient's medical records have been reviewed. Scheduled Meds:lidocaine, 2 patch, Daily  gabapentin, 100 mg, TID  tamsulosin, 0.4 mg, Daily  heparin (porcine), 5,000 Units, Q12H  aspirin, 81 mg, Daily  atorvastatin, 40 mg, Nightly  vitamin D, 50,000 Units, Weekly  docusate sodium, 100 mg, BID  senna, 1 tablet, Nightly  melatonin, 6 mg, Nightly  nicotine, 1 patch, Daily  clopidogrel, 75 mg, Daily      Continuous Infusions:  PRN Meds:traMADol, 25 mg, Q6H PRN  acetaminophen, 650 mg, Q4H PRN  magnesium hydroxide, 30 mL, Daily PRN  bisacodyl, 10 mg, Daily PRN  diclofenac sodium, 2 g, 4x Daily PRN         Objective:      Vitals:    10/24/21 1000 10/25/21 0841 10/26/21 0800 10/27/21 0700   BP: (!) 108/54 115/75 112/69 122/65   Pulse: 94 89 107 105   Resp: 18 18 18 18   Temp: 99.1 °F (37.3 °C) 97.9 °F (36.6 °C) 98.7 °F (37.1 °C) 98.7 °F (37.1 °C)   TempSrc: Temporal Temporal Temporal Temporal   SpO2:   96% 96%   Weight:       Height:         General appearance: alert, appears stated age and cooperative, NAD  Head: R crani with staples, c/d/i   Eyes: conjunctivae/corneas clear. PERRL, EOM's intact. Lungs: clear to auscultation bilaterally  Heart: regular rate and rhythm, S1, S2 normal  Abdomen: soft, non-tender, normal bowel sounds  Extremities: extremities normal, atraumatic, no cyanosis or edema  MSK: R rib cage and periscapular musculature with mild tenderness to palpation reproducing symptoms, no deformity. Skin: R crani c/d/i   Neurologic: Alert, oriented x4. Left neglect. Speech clear.  No aphasia appreciated. Mild L facial droop. Spastic left hemiplegia. MAS 1 in LUE; MAS 2 in LLE. Developing L plantar flexion contracture.      Motor:  Tone was normal     Motor Findings  Strength: Right  Strength: Left    Deltoid  5/5  0/5    Biceps  5/5  0/5    Triceps  5/5  0/5    Wrist Extensors  5/5  0/5    FDP 5/5 0/5   Finger abductors 5/5 0/5   Iliospoas  5/5  0/5    Quadriceps  5/5  0/5    Tibialis anterior  5/5  0/5    EHL 5/5 0/5   Gastroc soleus  5/5  0/5            Laboratory:    Lab Results   Component Value Date    WBC 10.4 10/23/2021    HGB 13.9 10/23/2021    HCT 40.9 10/23/2021    MCV 91.5 10/23/2021     (H) 10/23/2021     Lab Results   Component Value Date     10/23/2021    K 4.5 10/23/2021     10/23/2021    CO2 25 10/23/2021    BUN 19 10/23/2021    CREATININE 0.7 10/23/2021    GLUCOSE 107 10/23/2021    CALCIUM 9.7 10/23/2021      Lab Results   Component Value Date    ALT 75 (H) 10/23/2021    AST 46 (H) 10/23/2021    ALKPHOS 89 10/23/2021    BILITOT 0.4 10/23/2021         Functional Status:   Bed mobility: Max A  Transfers: Max A  Ambulation: 40 ft L ankle DF wrap, L knee immobilizer, Max A - Dep, w/c follow  Feeding: SBA  Grooming: Min A  UB dressing: Max A  LB dressing: Dependent       Assessment/Plan:       39 y.o. female admitted to inpatient rehabilitation for impairments and activities limitations in ADLs and mobility secondary to right MCA CVA.    -Right MCA CVA: Complicated by cerebral edema requiring emergent right hemicraniectomy (10/13/2021). Dense left spastic hemiplegia, left neglect, mild cognitive impairment. Helmet when out of bed. L PRAFO in bed. LUE sling for gait. L w/c tray. Aspirin, Plavix, Lipitor for secondary prevention. Monitor neuro exam. Continue Acute Rehab program.   -Spasticity LUE/LLE: Monitor.  Currently may be helpful to support leg for gait, however if begins to interfere can consider baclofen  -Pain control, MSK pain, neuropathic pain: Tylenol PRN, Tramadol PRN, gabapentin, Lidoderm, Voltaren gel  -Urinary retention: On flomax, voiding well. Monitor PVRs  -Tobacco abuse: Nicoderm patch  -DVT prophylaxis: heparin SQ, SCDs    Add low dose tramadol PRN for pain   Patient is concerned she may have a rib fracture, no trauma history to indicate fracture. Will check xray to rule out rib fracture, however I feel her pain presents as muscular, likely due to increased activity this week with initiation of acute level therapy in addition to R side having to compensate for hemiparetic left side.        Electronically signed by Edelmira Madera MD on 10/27/2021 at 12:36 PM

## 2021-10-27 NOTE — PROGRESS NOTES
OCCUPATIONAL THERAPY DAILY NOTE    Date:10/27/2021  Patient Name: Pranav Samson  MRN: [id number]  : 1975  Room: [address] [place], [place] [address]     Diagnosis:Diagnosis: Acute CVA  Surgery: R frontotemporal decompressive hemicraniectomy on 10/13/21  Past Medical History: COVID (9/15), tobacco use  Pt presented to [facility] as Level 2 stroke alert on 10/11/21. L side weakness, neglect. NIH-17  Found to have large R MCA involving R occipital     Precautions: Falls, L hemiplegia, L neglect, bed/chair alarm, helmet when OOB, impulsive, L PRAFO    Functional Assessment:   Date Status AE  Comments   Feeding 10/26/21 SBA     Grooming 10/27/21 Min A  Seated for oral hygiene in AM, assist holding toothbrush while pt donned toothpaste   Bathing 10/23/21 dependent     UB Dressing 10/26/21 maxA     LB Dressing 10/23/21 dependent     Toileting 10/27/21 Mod x 2  2 person assist with malvin hygiene and pants management in AM   Homemaking  TBA       Functional Transfers / Balance:   Date Status DME  Comments   Sit Balance 10/27/21 modA     Stand Balance 10/27/21 Max A     [] Tub  [] Shower   Transfer  TBA     Commode   Transfer 10/27/21 Max A BSC, slideboard Mod A to R side and Max A back to L side using slideboard to drop arm in AM   Functional   Mobility  TBA       Functional Exercises / Activity:  Pt sitting in chair upon arrival to  in AM. Completed commode transfer and toileting task with increased time. In OT gym, Participated in armbike exercise, seated using RUE, 2x5 mins, to increase endurance for ease with ADLs. Therapist placed 8# weight on pt's L forearm to increase proprioceptive input. Pt sitting in chair upon arrival to OT gym in PM. Pt engaged in core strengthing/posture/weightshifting/balance activity seated edge of mat. Pt worked on modified sit-ups on inclined wedge to be able to go from supine-sitting, 3x10 reps with Min A for L side. Pt also worked on unsupported sitting balance/tolerance.  Pt able to sit ~35 mins at [facility] during session with some rest breaks supine. Mod A for slideboard w/c-mat and mat-w/c. Pt appeared to have tolerated session well. Therapist completed scapular mobilizations, 4x10 reps, to LUE to maintain joint integrity for ease with ADLs. Pt reported no discomfort. Sensory / Neuromuscular Re-Education:      Cognitive Skills:   Status Comments   Problem   Solving fair    Memory fair    Sequencing fair    Safety fair      Visual Perception:    Education:  Pt educated on importance of therapy, goals to be reached, OOB activity, and precautions to follow   10/25/21: pt educated on SROM of LUE and safe positioning of LUE    [] Family teach completed on:    Pain Level: 0/10    Additional Notes:     Patient has made fair progress during treatment sessions toward set goals. Therapy emphasis to obtain goals:    Time Frame for Long term goals  6 weeks   Long term goal 1 Pt will complete UB self-care tasks w/ Supervision   Long term goal 2 Pt will complete LB self-care tasks w/ Supervision   Long term goal 3 Pt will complete toileting (all aspects) w/ Supervision   Long term goal 4 Pt will safely complete toilet transfers w/ Supervision   Long term goal 5 Pt will safely complete tub/shower transfers w/ Supervision   Long term goals 6 Pt will complete basic homemaking task w/ SBA   Long term goal 7 Pt will complete grooming task w/ Supervision   Long term goal 8 Pt will complete feeding task w/ Supervision/setup     [x] Continue with current OT Plan of care. [] Prepare for Discharge    10/27/21 OT plan of care updated on this date.  Tentative length of stay is 6 weeks Felix Keller OTR/L [id number]     DISCHARGE RECOMMENDATIONS  Recommended DME:    Post Discharge Care:   []Home Independently  []Home with 24hr Care / Supervision []Home with Partial Supervision []Home with Home Health OT []Home with Out Pt OT []Other: ___   Comments:         Time in Time out Tx Time Breakdown  Variance:   First Session  0915 1000 [x] Individual Tx-45     [] Concurrent Tx -  [] Co-Tx -   [] Group Tx -   [] Time Missed -     Second Session [id number] [x] Individual Tx-45   [] Concurrent Tx -  [] Co-Tx -   [] Group Tx -   [] Time Missed -     Third Session    [] Individual Tx-   [] Concurrent Tx -  [] Co-Tx -   [] Group Tx -   [] Time Missed -         Total Tx Time- [address], OTR/L [id number]

## 2021-10-27 NOTE — FLOWSHEET NOTE
10/27/21 8736   Urine Assessment   Incontinence Yes  (lg amt)   Urine Odor No odor   Bladder Scan Volume (mL) 0 mL   $ Bladder scan $ Yes   Unmeasured Output   Urine Occurrence 1

## 2021-10-27 NOTE — PROGRESS NOTES
Removed staples from right head incision site, pt tolerated it well.  No bleeding assessed post removal

## 2021-10-27 NOTE — CARE COORDINATION
Per Team: Pt to be re-evaluated in (6 weeks). Pt was updated as well as Pt's mother. LTG: Supervision to MOD I. Pt is not on Insulin, not on IV,ABX, and not on O2. Pt is displaying poor safety and is a high fall risk. Pt has a left side neglect. DME:Will discuss closer to discharge. Aftercare: Will discuss closer to discharge.      Family Teachin [facility] intern  Archana Farah, [place]

## 2021-10-28 LAB
ANION GAP SERPL CALCULATED.3IONS-SCNC: 11 MMOL/L (ref 7–16)
BASOPHILS ABSOLUTE: 0.13 E9/L (ref 0–0.2)
BASOPHILS RELATIVE PERCENT: 1 % (ref 0–2)
BUN BLDV-MCNC: 11 MG/DL (ref 6–20)
CALCIUM SERPL-MCNC: 9.2 MG/DL (ref 8.6–10.2)
CHLORIDE BLD-SCNC: 101 MMOL/L (ref 98–107)
CO2: 24 MMOL/L (ref 22–29)
CREAT SERPL-MCNC: 0.5 MG/DL (ref 0.5–1)
EOSINOPHILS ABSOLUTE: 0.35 E9/L (ref 0.05–0.5)
EOSINOPHILS RELATIVE PERCENT: 2.7 % (ref 0–6)
GFR AFRICAN AMERICAN: >60
GFR NON-AFRICAN AMERICAN: >60 ML/MIN/1.73
GLUCOSE BLD-MCNC: 105 MG/DL (ref 74–99)
HCT VFR BLD CALC: 34.3 % (ref 34–48)
HEMOGLOBIN: 11.4 G/DL (ref 11.5–15.5)
IMMATURE GRANULOCYTES #: 0.05 E9/L
IMMATURE GRANULOCYTES %: 0.4 % (ref 0–5)
LYMPHOCYTES ABSOLUTE: 1.51 E9/L (ref 1.5–4)
LYMPHOCYTES RELATIVE PERCENT: 11.5 % (ref 20–42)
MCH RBC QN AUTO: 31.4 PG (ref 26–35)
MCHC RBC AUTO-ENTMCNC: 33.2 % (ref 32–34.5)
MCV RBC AUTO: 94.5 FL (ref 80–99.9)
MONOCYTES ABSOLUTE: 0.94 E9/L (ref 0.1–0.95)
MONOCYTES RELATIVE PERCENT: 7.1 % (ref 2–12)
NEUTROPHILS ABSOLUTE: 10.19 E9/L (ref 1.8–7.3)
NEUTROPHILS RELATIVE PERCENT: 77.3 % (ref 43–80)
PDW BLD-RTO: 12.7 FL (ref 11.5–15)
PLATELET # BLD: 478 E9/L (ref 130–450)
PMV BLD AUTO: 10.3 FL (ref 7–12)
POTASSIUM REFLEX MAGNESIUM: 3.8 MMOL/L (ref 3.5–5)
RBC # BLD: 3.63 E12/L (ref 3.5–5.5)
SODIUM BLD-SCNC: 136 MMOL/L (ref 132–146)
WBC # BLD: 13.2 E9/L (ref 4.5–11.5)

## 2021-10-28 PROCEDURE — 97130 THER IVNTJ EA ADDL 15 MIN: CPT

## 2021-10-28 PROCEDURE — 97530 THERAPEUTIC ACTIVITIES: CPT

## 2021-10-28 PROCEDURE — 36415 COLL VENOUS BLD VENIPUNCTURE: CPT

## 2021-10-28 PROCEDURE — 97535 SELF CARE MNGMENT TRAINING: CPT

## 2021-10-28 PROCEDURE — 99232 SBSQ HOSP IP/OBS MODERATE 35: CPT | Performed by: PHYSICAL MEDICINE & REHABILITATION

## 2021-10-28 PROCEDURE — 97110 THERAPEUTIC EXERCISES: CPT

## 2021-10-28 PROCEDURE — 85025 COMPLETE CBC W/AUTO DIFF WBC: CPT

## 2021-10-28 PROCEDURE — 97112 NEUROMUSCULAR REEDUCATION: CPT

## 2021-10-28 PROCEDURE — 6360000002 HC RX W HCPCS: Performed by: PHYSICAL MEDICINE & REHABILITATION

## 2021-10-28 PROCEDURE — 97129 THER IVNTJ 1ST 15 MIN: CPT

## 2021-10-28 PROCEDURE — 6370000000 HC RX 637 (ALT 250 FOR IP): Performed by: PHYSICAL MEDICINE & REHABILITATION

## 2021-10-28 PROCEDURE — 80048 BASIC METABOLIC PNL TOTAL CA: CPT

## 2021-10-28 PROCEDURE — 1280000000 HC REHAB R&B

## 2021-10-28 RX ORDER — LACTULOSE 10 G/15ML
20 SOLUTION ORAL 2 TIMES DAILY
Status: DISCONTINUED | OUTPATIENT
Start: 2021-10-28 | End: 2021-10-29

## 2021-10-28 RX ORDER — GUAIFENESIN 100 MG/5ML
200 SOLUTION ORAL 2 TIMES DAILY PRN
Status: DISCONTINUED | OUTPATIENT
Start: 2021-10-28 | End: 2021-12-07 | Stop reason: HOSPADM

## 2021-10-28 RX ORDER — SENNA PLUS 8.6 MG/1
2 TABLET ORAL NIGHTLY
Status: DISCONTINUED | OUTPATIENT
Start: 2021-10-29 | End: 2021-11-02

## 2021-10-28 RX ORDER — ACETAMINOPHEN 500 MG
1000 TABLET ORAL EVERY 8 HOURS SCHEDULED
Status: DISCONTINUED | OUTPATIENT
Start: 2021-10-28 | End: 2021-11-17

## 2021-10-28 RX ADMIN — TAMSULOSIN HYDROCHLORIDE 0.4 MG: 0.4 CAPSULE ORAL at 10:24

## 2021-10-28 RX ADMIN — DOCUSATE SODIUM 100 MG: 100 CAPSULE, LIQUID FILLED ORAL at 20:53

## 2021-10-28 RX ADMIN — CLOPIDOGREL 75 MG: 75 TABLET, FILM COATED ORAL at 07:26

## 2021-10-28 RX ADMIN — TRAMADOL HYDROCHLORIDE 25 MG: 50 TABLET ORAL at 07:25

## 2021-10-28 RX ADMIN — DOCUSATE SODIUM 100 MG: 100 CAPSULE, LIQUID FILLED ORAL at 07:26

## 2021-10-28 RX ADMIN — Medication 6 MG: at 20:53

## 2021-10-28 RX ADMIN — SENNOSIDES 8.6 MG: 8.6 TABLET, COATED ORAL at 20:53

## 2021-10-28 RX ADMIN — MAGNESIUM HYDROXIDE 30 ML: 400 SUSPENSION ORAL at 06:27

## 2021-10-28 RX ADMIN — HEPARIN SODIUM 5000 UNITS: 10000 INJECTION INTRAVENOUS; SUBCUTANEOUS at 19:17

## 2021-10-28 RX ADMIN — DICLOFENAC SODIUM TOPICAL GEL, 1%, 2 G: 10 GEL TOPICAL at 19:17

## 2021-10-28 RX ADMIN — GABAPENTIN 100 MG: 100 CAPSULE ORAL at 07:25

## 2021-10-28 RX ADMIN — ACETAMINOPHEN 650 MG: 325 TABLET ORAL at 11:04

## 2021-10-28 RX ADMIN — GABAPENTIN 100 MG: 100 CAPSULE ORAL at 14:46

## 2021-10-28 RX ADMIN — ACETAMINOPHEN 1000 MG: 325 TABLET ORAL at 19:16

## 2021-10-28 RX ADMIN — TRAMADOL HYDROCHLORIDE 25 MG: 50 TABLET ORAL at 14:46

## 2021-10-28 RX ADMIN — ASPIRIN 81 MG CHEWABLE TABLET 81 MG: 81 TABLET CHEWABLE at 07:25

## 2021-10-28 RX ADMIN — GABAPENTIN 100 MG: 100 CAPSULE ORAL at 20:53

## 2021-10-28 RX ADMIN — ATORVASTATIN CALCIUM 40 MG: 40 TABLET, FILM COATED ORAL at 20:53

## 2021-10-28 RX ADMIN — ACETAMINOPHEN 650 MG: 325 TABLET ORAL at 14:45

## 2021-10-28 RX ADMIN — HEPARIN SODIUM 5000 UNITS: 10000 INJECTION INTRAVENOUS; SUBCUTANEOUS at 06:09

## 2021-10-28 ASSESSMENT — PAIN DESCRIPTION - LOCATION
LOCATION: NECK

## 2021-10-28 ASSESSMENT — PAIN SCALES - GENERAL
PAINLEVEL_OUTOF10: 4
PAINLEVEL_OUTOF10: 9
PAINLEVEL_OUTOF10: 3
PAINLEVEL_OUTOF10: 7
PAINLEVEL_OUTOF10: 7
PAINLEVEL_OUTOF10: 0
PAINLEVEL_OUTOF10: 7
PAINLEVEL_OUTOF10: 7
PAINLEVEL_OUTOF10: 3
PAINLEVEL_OUTOF10: 0
PAINLEVEL_OUTOF10: 0

## 2021-10-28 ASSESSMENT — PAIN - FUNCTIONAL ASSESSMENT
PAIN_FUNCTIONAL_ASSESSMENT: PREVENTS OR INTERFERES SOME ACTIVE ACTIVITIES AND ADLS
PAIN_FUNCTIONAL_ASSESSMENT: PREVENTS OR INTERFERES SOME ACTIVE ACTIVITIES AND ADLS

## 2021-10-28 ASSESSMENT — PAIN DESCRIPTION - DESCRIPTORS
DESCRIPTORS: ACHING;DISCOMFORT
DESCRIPTORS: ACHING;DISCOMFORT
DESCRIPTORS: ACHING;CONSTANT;DISCOMFORT

## 2021-10-28 ASSESSMENT — PAIN DESCRIPTION - PAIN TYPE
TYPE: CHRONIC PAIN

## 2021-10-28 ASSESSMENT — PAIN DESCRIPTION - PROGRESSION
CLINICAL_PROGRESSION: GRADUALLY IMPROVING
CLINICAL_PROGRESSION: GRADUALLY IMPROVING
CLINICAL_PROGRESSION: NOT CHANGED

## 2021-10-28 ASSESSMENT — PAIN DESCRIPTION - ORIENTATION
ORIENTATION: RIGHT;LEFT

## 2021-10-28 ASSESSMENT — PAIN DESCRIPTION - ONSET
ONSET: ON-GOING

## 2021-10-28 ASSESSMENT — PAIN DESCRIPTION - FREQUENCY
FREQUENCY: CONTINUOUS

## 2021-10-28 NOTE — PROGRESS NOTES
OCCUPATIONAL THERAPY DAILY NOTE    Date:10/28/2021  Patient Name: Rolo Vyas  MRN: [id number]  : 1975  Room: [address] [place], [place] [address]     Diagnosis:Acute CVA( Pt presented to [facility] as Level 2 stroke alert on 10/11/21. L side weakness, neglect. NIH-17  Found to have large R MCA involving R occipital)    Surgery: R frontotemporal decompressive hemicraniectomy on 10/13/21  Past Medical History: COVID (9/15), tobacco use     Precautions: Falls, L hemiplegia, L neglect, bed/chair alarm, helmet when OOB, impulsive, L PRAFO    Functional Assessment:   Date Status AE  Comments   Feeding 10/26/21 SBA     Grooming 10/28/21 Min A  Seated for hand washing at sink in PM   Bathing 10/23/21 dependent     UB Dressing 10/26/21 maxA     LB Dressing 10/23/21 dependent     Toileting 10/28/21 Max A  Assist with pants management in malvin hygiene in PM   Homemaking  TBA       Functional Transfers / Balance:   Date Status DME  Comments   Sit Balance 10/28/21 Min/modA w/c AM: demo'd while  supported up in w/c needing cues to correct Left leaning to neutral positioning using RUE to assist.   Stand Balance 10/28/21 Max A     [] Tub  [] Shower   Transfer  TBA     Commode   Transfer 10/28/21 Mod A BSC, slideboard To/from [facility] in PM   Functional   Mobility  TBA       Functional Exercises / Activity:  AM:  LUE SROM ex's using RUE to assist while wearing 1# wt to increase LUE ROM for shoulder, scapula, elbow, wrist along with RUE strengthening for functional tasks and transfers. Min cues required for proper technique and follow thru with arm exercises & trunk control up in w/c. LUE muscle tapping/facilitation techs to promote muscle input for active use including resistive techs for shld, scapula, elbow and wrist.  BUE ROM ex's conducted during pumpkin decorating task with paint needing assistance with LUE holding onto pumpkin stem to promote ROM & gripping skills.      Pt sitting in chair upon arrival to OT gym in PM. Engaged in standing at table top, with LUE propped on inclined wedge to work to weightbearing through LUE to increase input. Pt able to stand at [facility] A 3x4 mins. Therapist used massager on pt's LUE to elicit neuromuscular response ~15 mins. Pt appeared to demonstrate slight response at bicep  And supinated forearm. Pt reported no irritation or discomfort with massage. Completed commode transfer and toileting task in bathroom. Pt slideboarded from w/c-bed. Sensory / Neuromuscular Re-Education:      Cognitive Skills:   Status Comments   Problem   Solving fair    Memory fair    Sequencing fair    Safety fair      Visual Perception:    Education:  Pt educated about LUE positioning when up in w/c to promote ROM/wt bearing along with joint integrity to increase awareness. Pt educated importance with LUE SROM ex's to increase ROM/muscle input for active use. 10/25/21: pt educated on SROM of LUE and safe positioning of LUE    [] Family teach completed on:    Pain Level: 7/10 neck     Additional Notes:     Patient has made fair progress during treatment sessions toward set goals. Therapy emphasis to obtain goals:    Time Frame for Long term goals  6 weeks   Long term goal 1 Pt will complete UB self-care tasks w/ Supervision   Long term goal 2 Pt will complete LB self-care tasks w/ Supervision   Long term goal 3 Pt will complete toileting (all aspects) w/ Supervision   Long term goal 4 Pt will safely complete toilet transfers w/ Supervision   Long term goal 5 Pt will safely complete tub/shower transfers w/ Supervision   Long term goals 6 Pt will complete basic homemaking task w/ SBA   Long term goal 7 Pt will complete grooming task w/ Supervision   Long term goal 8 Pt will complete feeding task w/ Supervision/setup     [x] Continue with current OT Plan of care. [] Prepare for Discharge    10/27/21 OT plan of care updated on this date.  Tentative length of stay is 6 weeks Richie Mcpherson OTR/L [id number]     DISCHARGE RECOMMENDATIONS  Recommended DME:    Post Discharge Care:   []Home Independently  []Home with 24hr Care / Supervision []Home with Partial Supervision []Home with Home Health OT []Home with Out Pt OT []Other: ___   Comments:         Time in Time out Tx Time Breakdown  Variance:   First Session  0915am 1000am [x] Individual Tx-45     [] Concurrent Tx -  [] Co-Tx -   [] Group Tx -   [] Time Missed -     Second Session [id number] [x] Individual Tx- 45  [] Concurrent Tx -  [] Co-Tx -   [] Group Tx -   [] Time Missed -     Third Session    [] Individual Tx-   [] Concurrent Tx -  [] Co-Tx -   [] Group Tx -   [] Time Missed -         Total Tx Time- 90 mins   Arabella GLOVER/KEITH [facility], [address] [place] [address], OTR/L [id number]

## 2021-10-28 NOTE — PROGRESS NOTES
[address] [facility] Physical Medicine and Rehabilitation  Comprehensive Progress Note    Subjective:      Salbador Tadeo is a 39 y.o. female admitted to inpatient rehabilitation for impairments and activities limitations in ADLs and mobility secondary to right MCA CVA. No acute events overnight. No cp, sob, n/v.  Lidoderm is helping with muscular and rib pain, voltaren gel also beneficial. Xray negative for rib fracture. Tolerating therapy. She is constipated, no BM x 5-6 days. The patient's medical records have been reviewed. Scheduled Meds:lidocaine, 2 patch, Daily  gabapentin, 100 mg, TID  tamsulosin, 0.4 mg, Daily  heparin (porcine), 5,000 Units, Q12H  aspirin, 81 mg, Daily  atorvastatin, 40 mg, Nightly  vitamin D, 50,000 Units, Weekly  docusate sodium, 100 mg, BID  senna, 1 tablet, Nightly  melatonin, 6 mg, Nightly  nicotine, 1 patch, Daily  clopidogrel, 75 mg, Daily      Continuous Infusions:  PRN Meds:guaiFENesin, 200 mg, BID PRN  traMADol, 25 mg, Q6H PRN  acetaminophen, 650 mg, Q4H PRN  magnesium hydroxide, 30 mL, Daily PRN  bisacodyl, 10 mg, Daily PRN  diclofenac sodium, 2 g, 4x Daily PRN         Objective:      Vitals:    10/25/21 0841 10/26/21 0800 10/27/21 0700 10/28/21 0715   BP: 115/75 112/69 122/65 116/70   Pulse: 89 107 105 88   Resp: 18 18 18 18   Temp: 97.9 °F (36.6 °C) 98.7 °F (37.1 °C) 98.7 °F (37.1 °C) 98.7 °F (37.1 °C)   TempSrc: Temporal Temporal Temporal Tympanic   SpO2:  96% 96% 96%   Weight:       Height:         General appearance: alert, appears stated age and cooperative, NAD  Head: R crani site c/d/i   Eyes: conjunctivae/corneas clear. PERRL, EOM's intact. Lungs: clear to auscultation bilaterally  Heart: regular rate and rhythm, S1, S2 normal  Abdomen: soft, non-tender, normal bowel sounds  Extremities: extremities normal, atraumatic, no cyanosis or edema  MSK: no cyanosis, clubbing, edema  Skin: R crani c/d/i   Neurologic: Alert, oriented x4. Left neglect. Speech clear.  No aphasia appreciated. Mild L facial droop. Spastic left hemiplegia. MAS 1 in LUE; MAS 1+-2 in LLE.      Motor:       Motor Findings  Strength: Right  Strength: Left    Deltoid  5/5  0/5    Biceps  5/5  0/5    Triceps  5/5  0/5    Wrist Extensors  5/5  0/5    FDP 5/5 0/5   Finger abductors 5/5 0/5   Iliospoas  5/5  0/5    Quadriceps  5/5  0/5    Tibialis anterior  5/5  0/5    EHL 5/5 0/5   Gastroc soleus  5/5  0/5            Laboratory:    Lab Results   Component Value Date    WBC 13.2 (H) 10/28/2021    HGB 11.4 (L) 10/28/2021    HCT 34.3 10/28/2021    MCV 94.5 10/28/2021     (H) 10/28/2021     Lab Results   Component Value Date     10/28/2021    K 3.8 10/28/2021     10/28/2021    CO2 24 10/28/2021    BUN 11 10/28/2021    CREATININE 0.5 10/28/2021    GLUCOSE 105 10/28/2021    CALCIUM 9.2 10/28/2021      Lab Results   Component Value Date    ALT 75 (H) 10/23/2021    AST 46 (H) 10/23/2021    ALKPHOS 89 10/23/2021    BILITOT 0.4 10/23/2021     Xray right ribs     Impression   No displaced rib fractures or complications related to rib fractures. Atherosclerotic disease. Functional Status:   Bed mobility: Max A  Transfers: Max A  Ambulation: 40 ft L ankle DF wrap, L knee immobilizer, Max A - Dep, w/c follow  Feeding: SBA  Grooming: Min A  UB dressing: Max A  LB dressing: Dependent       Assessment/Plan:       39 y.o. female admitted to inpatient rehabilitation for impairments and activities limitations in ADLs and mobility secondary to right MCA CVA.    -Right MCA CVA: Complicated by cerebral edema requiring emergent right hemicraniectomy (10/13/2021). Dense left spastic hemiplegia, left neglect, mild cognitive impairment. Helmet when out of bed. L PRAFO in bed. LUE sling for gait. L w/c tray. Aspirin, Plavix, Lipitor for secondary prevention. Monitor neuro exam. Continue Acute Rehab program.   -Spasticity LUE/LLE: Monitor.  Tone currently helpful to compensate for weakness with attempted gait in PT. If it worsens and becomes problematic can begin antispasmodic.   -Pain control, MSK pain, neuropathic pain: Tylenol PRN, Tramadol PRN, gabapentin, Lidoderm, Voltaren gel  -Urinary retention: On flomax, now voiding well. Monitor PVRs  -Tobacco abuse: Nicoderm patch  -DVT prophylaxis: heparin SQ, SCDs    Xray negative for rib fracture  Mild elevation of WBC, no other signs/symptoms of infection, afebrile, denies urinary symptoms.  Will recheck labs in am.     Add lactulose for bowels      Electronically signed by Pilo Abraham MD on 10/28/2021 at 2:19 PM

## 2021-10-28 NOTE — PROGRESS NOTES
Independent    Long Term Goal                  6- Modified Independent    Social Interaction    Current Status  4--Minimal Assistance    Short Term Goal 5--Supervision    Long Term Goal 6--Modified Independent         Problem Solving    Current Status     2- Maximal  Assistance    Short Term Goal                 3- Moderate Assistance    Long Term Goal          4-Minimal  Assistance      Memory    Current Status       4- Minimal Assistance    Short Term Goal t      5-Supervision   Long Term Goal         5-Supervision  ? SWALLOWING:      Diet:  Regular consistency solids with thin liquids (IDDSI level 0)       Assistance with tray set up and supervision required with all po intake d/t left neglect and impulsivity. No immediate dysphagia needs at this time. LANGUAGE:     Patient was able to express her wants and needs without word finding difficulties. She displayed impulsivity when responding to questions, requiring repetition and directions to think about answer. COGNITION:    Upon beginning therapy, patient stated she did not wish to participate because her speech is fine and she does not need speech therapy. Discussed with patient that we are addressing cognition and impulsivity. With max encouragement, patient participated in therapy. Fair+ outcome with problem solving task in which patient was  prompted to generate a problem that would result in the given solution. Patient required verbal cues and probing for detailed responses. Patient was able to generate 1-2 problems for each solution. Responses were very quick. Good outcome with verbal reasoning task. Patient was prompted to generate 1-2 deciding factors when making an important decision (ex: buying a car, buying a plane ticket, lending money, etc). Patient benefited from min v/c and was able to generate 3+ factors per decision.          Safety:  Poor    SPEECH:     Speech intelligibility was good during structured tasks and conversational speech. SP recommended after discharge:  yes  Supervision recommended at discharge: 24 hour    Will continue SP intervention as per previously established POC. EDUCATION: Speech Pathologist (SLP) completed education with the patient and/or family regarding identified cognitive linguistic deficits and subsequent need for speech pathology intervention. Discussed deficit areas to be targeted by formal intervention and established short/long term goals. Reviewed compensatory strategies to improve functional outcome (as appropriate). Encouraged patient and/or family to engage SLP in structured Q&A session relative to identified deficit areas. Patient and/or family indicated understanding of all information provided via satisfactory verbal response. Minute Tracking:    Individual therapy:     0 minutes  Concurrent therapy:   45 minutes  Group therapy:     0 minutes  Co-treatment therapy:    0 minutes    Total minutes :45 minutes    The admitting diagnosis and active problem list, as listed below have been reviewed prior to initiation of this evaluation.         ACTIVE PROBLEM LIST:   Patient Active Problem List   Diagnosis    Acute cerebrovascular accident (CVA) ([facility] [address]    Status post craniectomy    Left spastic hemiplegia (HCC)    Tobacco abuse    Acute pain

## 2021-10-29 LAB
BASOPHILS ABSOLUTE: 0.13 E9/L (ref 0–0.2)
BASOPHILS RELATIVE PERCENT: 1.2 % (ref 0–2)
EOSINOPHILS ABSOLUTE: 0.3 E9/L (ref 0.05–0.5)
EOSINOPHILS RELATIVE PERCENT: 2.7 % (ref 0–6)
HCT VFR BLD CALC: 38.6 % (ref 34–48)
HEMOGLOBIN: 12.3 G/DL (ref 11.5–15.5)
IMMATURE GRANULOCYTES #: 0.06 E9/L
IMMATURE GRANULOCYTES %: 0.5 % (ref 0–5)
LYMPHOCYTES ABSOLUTE: 1.58 E9/L (ref 1.5–4)
LYMPHOCYTES RELATIVE PERCENT: 14.4 % (ref 20–42)
MCH RBC QN AUTO: 30.4 PG (ref 26–35)
MCHC RBC AUTO-ENTMCNC: 31.9 % (ref 32–34.5)
MCV RBC AUTO: 95.5 FL (ref 80–99.9)
MONOCYTES ABSOLUTE: 0.58 E9/L (ref 0.1–0.95)
MONOCYTES RELATIVE PERCENT: 5.3 % (ref 2–12)
NEUTROPHILS ABSOLUTE: 8.36 E9/L (ref 1.8–7.3)
NEUTROPHILS RELATIVE PERCENT: 75.9 % (ref 43–80)
PDW BLD-RTO: 12.7 FL (ref 11.5–15)
PLATELET # BLD: 552 E9/L (ref 130–450)
PMV BLD AUTO: 10.5 FL (ref 7–12)
RBC # BLD: 4.04 E12/L (ref 3.5–5.5)
WBC # BLD: 11 E9/L (ref 4.5–11.5)

## 2021-10-29 PROCEDURE — 85025 COMPLETE CBC W/AUTO DIFF WBC: CPT

## 2021-10-29 PROCEDURE — 97129 THER IVNTJ 1ST 15 MIN: CPT | Performed by: SPEECH-LANGUAGE PATHOLOGIST

## 2021-10-29 PROCEDURE — 1280000000 HC REHAB R&B

## 2021-10-29 PROCEDURE — 97130 THER IVNTJ EA ADDL 15 MIN: CPT | Performed by: SPEECH-LANGUAGE PATHOLOGIST

## 2021-10-29 PROCEDURE — 6360000002 HC RX W HCPCS: Performed by: PHYSICAL MEDICINE & REHABILITATION

## 2021-10-29 PROCEDURE — 97116 GAIT TRAINING THERAPY: CPT

## 2021-10-29 PROCEDURE — 97535 SELF CARE MNGMENT TRAINING: CPT

## 2021-10-29 PROCEDURE — 97112 NEUROMUSCULAR REEDUCATION: CPT

## 2021-10-29 PROCEDURE — 36415 COLL VENOUS BLD VENIPUNCTURE: CPT

## 2021-10-29 PROCEDURE — 97530 THERAPEUTIC ACTIVITIES: CPT

## 2021-10-29 PROCEDURE — 6370000000 HC RX 637 (ALT 250 FOR IP): Performed by: PHYSICAL MEDICINE & REHABILITATION

## 2021-10-29 PROCEDURE — 99232 SBSQ HOSP IP/OBS MODERATE 35: CPT | Performed by: PHYSICAL MEDICINE & REHABILITATION

## 2021-10-29 RX ORDER — LACTULOSE 10 G/15ML
20 SOLUTION ORAL 2 TIMES DAILY PRN
Status: DISCONTINUED | OUTPATIENT
Start: 2021-10-29 | End: 2021-12-07 | Stop reason: HOSPADM

## 2021-10-29 RX ADMIN — ATORVASTATIN CALCIUM 40 MG: 40 TABLET, FILM COATED ORAL at 22:27

## 2021-10-29 RX ADMIN — ACETAMINOPHEN 1000 MG: 325 TABLET ORAL at 13:29

## 2021-10-29 RX ADMIN — DICLOFENAC SODIUM TOPICAL GEL, 1%, 2 G: 10 GEL TOPICAL at 08:55

## 2021-10-29 RX ADMIN — ACETAMINOPHEN 1000 MG: 325 TABLET ORAL at 06:20

## 2021-10-29 RX ADMIN — GUAIFENESIN 200 MG: 200 SOLUTION ORAL at 08:55

## 2021-10-29 RX ADMIN — HEPARIN SODIUM 5000 UNITS: 10000 INJECTION INTRAVENOUS; SUBCUTANEOUS at 06:20

## 2021-10-29 RX ADMIN — SENNOSIDES 17.2 MG: 8.6 TABLET, COATED ORAL at 22:28

## 2021-10-29 RX ADMIN — CLOPIDOGREL 75 MG: 75 TABLET, FILM COATED ORAL at 08:56

## 2021-10-29 RX ADMIN — TRAMADOL HYDROCHLORIDE 25 MG: 50 TABLET ORAL at 08:55

## 2021-10-29 RX ADMIN — ACETAMINOPHEN 650 MG: 325 TABLET ORAL at 12:07

## 2021-10-29 RX ADMIN — DOCUSATE SODIUM 100 MG: 100 CAPSULE, LIQUID FILLED ORAL at 22:28

## 2021-10-29 RX ADMIN — GABAPENTIN 100 MG: 100 CAPSULE ORAL at 13:30

## 2021-10-29 RX ADMIN — DOCUSATE SODIUM 100 MG: 100 CAPSULE, LIQUID FILLED ORAL at 08:55

## 2021-10-29 RX ADMIN — TAMSULOSIN HYDROCHLORIDE 0.4 MG: 0.4 CAPSULE ORAL at 08:55

## 2021-10-29 RX ADMIN — HEPARIN SODIUM 5000 UNITS: 10000 INJECTION INTRAVENOUS; SUBCUTANEOUS at 17:49

## 2021-10-29 RX ADMIN — ACETAMINOPHEN 1000 MG: 325 TABLET ORAL at 22:27

## 2021-10-29 RX ADMIN — ASPIRIN 81 MG CHEWABLE TABLET 81 MG: 81 TABLET CHEWABLE at 08:55

## 2021-10-29 RX ADMIN — GABAPENTIN 100 MG: 100 CAPSULE ORAL at 22:28

## 2021-10-29 RX ADMIN — Medication 6 MG: at 22:28

## 2021-10-29 ASSESSMENT — PAIN DESCRIPTION - PROGRESSION
CLINICAL_PROGRESSION: GRADUALLY IMPROVING
CLINICAL_PROGRESSION: NOT CHANGED
CLINICAL_PROGRESSION: GRADUALLY IMPROVING

## 2021-10-29 ASSESSMENT — PAIN DESCRIPTION - LOCATION
LOCATION: NECK
LOCATION: NECK;SHOULDER
LOCATION: NECK
LOCATION: NECK
LOCATION: NECK;SHOULDER
LOCATION: SHOULDER;NECK
LOCATION: NECK

## 2021-10-29 ASSESSMENT — PAIN SCALES - GENERAL
PAINLEVEL_OUTOF10: 2
PAINLEVEL_OUTOF10: 0
PAINLEVEL_OUTOF10: 6
PAINLEVEL_OUTOF10: 8
PAINLEVEL_OUTOF10: 0
PAINLEVEL_OUTOF10: 10
PAINLEVEL_OUTOF10: 6
PAINLEVEL_OUTOF10: 4
PAINLEVEL_OUTOF10: 5
PAINLEVEL_OUTOF10: 8
PAINLEVEL_OUTOF10: 3

## 2021-10-29 ASSESSMENT — PAIN DESCRIPTION - PAIN TYPE
TYPE: CHRONIC PAIN

## 2021-10-29 ASSESSMENT — PAIN DESCRIPTION - ONSET
ONSET: ON-GOING

## 2021-10-29 ASSESSMENT — PAIN - FUNCTIONAL ASSESSMENT
PAIN_FUNCTIONAL_ASSESSMENT: PREVENTS OR INTERFERES SOME ACTIVE ACTIVITIES AND ADLS

## 2021-10-29 ASSESSMENT — PAIN DESCRIPTION - ORIENTATION
ORIENTATION: RIGHT;LEFT
ORIENTATION: RIGHT;LEFT
ORIENTATION: RIGHT
ORIENTATION: RIGHT;LEFT
ORIENTATION: RIGHT;LEFT

## 2021-10-29 ASSESSMENT — PAIN DESCRIPTION - DESCRIPTORS
DESCRIPTORS: ACHING;CONSTANT;DISCOMFORT
DESCRIPTORS: ACHING;DISCOMFORT
DESCRIPTORS: ACHING;DISCOMFORT
DESCRIPTORS: ACHING;CONSTANT;DISCOMFORT
DESCRIPTORS: ACHING;DISCOMFORT
DESCRIPTORS: ACHING;DISCOMFORT

## 2021-10-29 ASSESSMENT — PAIN DESCRIPTION - FREQUENCY
FREQUENCY: CONTINUOUS

## 2021-10-29 NOTE — PROGRESS NOTES
[facility]  Comprehensive Progress Note    Subjective:      Aaliyah Abbott is a 39 y.o. female admitted to inpatient rehabilitation for impairments and activities limitations in ADLs and mobility secondary to right MCA CVA. No acute events overnight. No cp, sob, n/v. Pain improved, now reporting pain is mild. Tolerating therapy, progressing. +BM. No other complaints. VSS. The patient's medical records have been reviewed. Scheduled Meds:lactulose, 20 g, BID  acetaminophen, 1,000 mg, 3 times per day  senna, 2 tablet, Nightly  lidocaine, 2 patch, Daily  gabapentin, 100 mg, TID  tamsulosin, 0.4 mg, Daily  heparin (porcine), 5,000 Units, Q12H  aspirin, 81 mg, Daily  atorvastatin, 40 mg, Nightly  vitamin D, 50,000 Units, Weekly  docusate sodium, 100 mg, BID  melatonin, 6 mg, Nightly  nicotine, 1 patch, Daily  clopidogrel, 75 mg, Daily      Continuous Infusions:  PRN Meds:guaiFENesin, 200 mg, BID PRN  traMADol, 25 mg, Q6H PRN  acetaminophen, 650 mg, Q4H PRN  magnesium hydroxide, 30 mL, Daily PRN  bisacodyl, 10 mg, Daily PRN  diclofenac sodium, 2 g, 4x Daily PRN         Objective:      Vitals:    10/27/21 0700 10/28/21 0715 10/29/21 0730 10/29/21 1147   BP: 122/65 116/70 113/65    Pulse: 105 88 86    Resp: 18 18 18    Temp: 98.7 °F (37.1 °C) 98.7 °F (37.1 °C) 98.6 °F (37 °C)    TempSrc: Temporal Tympanic Tympanic    SpO2: 96% 96% 96%    Weight:       Height:    5' 6\" (1.676 m)     General appearance: alert, appears stated age and cooperative, NAD  Head: R crani site c/d/i   Eyes: conjunctivae/corneas clear. PERRL, EOM's intact. Lungs: clear to auscultation bilaterally  Heart: regular rate and rhythm, S1, S2 normal  Abdomen: soft, non-tender, normal bowel sounds  Extremities: extremities normal, atraumatic, no cyanosis or edema  MSK: no cyanosis, clubbing, edema  Skin: R crani c/d/i   Neurologic: Alert, oriented x4. Left neglect. Speech clear. No aphasia appreciated.  Mild L facial droop. Spastic left hemiplegia. MAS 1 in LUE; MAS 1+-2 in LLE.      Motor:       Motor Findings  Strength: Right  Strength: Left    Deltoid  5/5  0/5    Biceps  5/5  0/5    Triceps  5/5  0/5    Wrist Extensors  5/5  0/5    FDP 5/5 0/5   Finger abductors 5/5 0/5   Iliospoas  5/5  0/5    Quadriceps  5/5  0/5    Tibialis anterior  5/5  0/5    EHL 5/5 0/5   Gastroc soleus  5/5  0/5            Laboratory:    Lab Results   Component Value Date    WBC 11.0 10/29/2021    HGB 12.3 10/29/2021    HCT 38.6 10/29/2021    MCV 95.5 10/29/2021     (H) 10/29/2021     Lab Results   Component Value Date     10/28/2021    K 3.8 10/28/2021     10/28/2021    CO2 24 10/28/2021    BUN 11 10/28/2021    CREATININE 0.5 10/28/2021    GLUCOSE 105 10/28/2021    CALCIUM 9.2 10/28/2021      Lab Results   Component Value Date    ALT 75 (H) 10/23/2021    AST 46 (H) 10/23/2021    ALKPHOS 89 10/23/2021    BILITOT 0.4 10/23/2021       Functional Status:   Bed mobility: Mod A  Transfers: Mod - Max A  Ambulation: 35 ft L ankle PLS AFO, L knee immobilizer, Max A, w/c follow   Feeding: SBA  Grooming: Min A  UB dressing: Max A  LB dressing: Max A       Assessment/Plan:       39 y.o. female admitted to inpatient rehabilitation for impairments and activities limitations in ADLs and mobility secondary to right MCA CVA.    -Right MCA CVA: Complicated by cerebral edema requiring emergent right hemicraniectomy (10/13/2021). Dense left spastic hemiplegia, left neglect, mild cognitive impairment. Helmet when out of bed. L PRAFO in bed. LUE sling for gait. L w/c tray. Aspirin, Plavix, Lipitor for secondary prevention. Monitor neuro exam. Continue Acute Rehab program.   -Spasticity LUE/LLE: Monitor. Tone currently helpful to compensate for weakness with attempted gait in PT.  If it worsens and becomes problematic can begin antispasmodic.   -Pain control, MSK pain, neuropathic pain: Tylenol PRN, Tramadol PRN, gabapentin, Lidoderm, Voltaren gel  -Urinary retention: On flomax, now voiding well, low PVRs  -Tobacco abuse: Nicoderm patch  -DVT prophylaxis: heparin SQ, SCDs      Continue Acute Rehab program. Encourage ambulation.       Electronically signed by Jada Patel MD on 10/29/2021 at 2:54 PM

## 2021-10-29 NOTE — PROGRESS NOTES
Nutrition Assessment     Type and Reason for Visit: Initial    Nutrition Recommendations/Plan: Continue current diet    Nutrition Assessment:  Pt admit to ARU from [facility] s/p CVA s/p crani. Pt passed swallow eval w/ noted good PO intakes on regular diet. Will continue to monitor. Malnutrition Assessment:  Malnutrition Status: No malnutrition    Estimated Daily Nutrient Needs:  Energy (kcal): MSJ 1443 x 1.1 SF = 7974-5900; Weight Used for Energy Requirements:  Current     Protein (g): 75-90; Weight Used for Protein Requirements:  Ideal (1.3-1.5)        Fluid (ml/day): 2779-5435; Weight Used for Fluid Requirements:  1 ml/kcal      Nutrition Related Findings: A&Ox4, hypoactive BS, constipation, no edema, +I/Os      Current Nutrition Therapies:    ADULT DIET; Regular    Anthropometric Measures:  · Height: 5' 6\" (167.6 cm)  · Current Body Wt: 170 lb (77.1 kg) (10/22 no method)   · BMI: 27.5    Nutrition Diagnosis:   No nutrition diagnosis at this time     Nutrition Interventions:   Food and/or Nutrient Delivery:  Continue Current Diet  Nutrition Education/Counseling:  Education not indicated   Coordination of Nutrition Care:  Continue to monitor while inpatient    Goals:  pt to consume >75% meals       Nutrition Monitoring and Evaluation:   Food/Nutrient Intake Outcomes:  Food and Nutrient Intake  Physical Signs/Symptoms Outcomes:  Biochemical Data, Constipation, GI Status, Fluid Status or Edema, Nutrition Focused Physical Findings, Skin, Weight     Discharge Planning:     Too soon to determine     Electronically signed by Marjan Godfrey MS, RD, LD on 10/29/21 at 12:12 PM EDT    Contact: [phone]

## 2021-10-29 NOTE — PROGRESS NOTES
OCCUPATIONAL THERAPY DAILY NOTE    Date:10/29/2021  Patient Name: Guillaume Hernández  MRN: [id number]  : 1975  Room: [address] [place], [place] [address]     Diagnosis:Acute CVA( Pt presented to [address] [place] [address] as Level 2 stroke alert on 10/11/21. L side weakness, neglect. NIH-17  Found to have large R MCA involving R occipital)    Surgery: R frontotemporal decompressive hemicraniectomy on 10/13/21  Past Medical History: COVID (9/15), tobacco use     Precautions: Falls, L hemiplegia, L neglect, L sublux, bed/chair alarm, helmet when OOB, impulsive, L PRAFO    Functional Assessment:   Date Status AE  Comments   Feeding 10/29/21 Stand by Assist   Assist with packages, pt self fed   Grooming 10/29/21 Minimal Assist   Seated for shaving task         Oral Care 10/28/21 Minimal Assist      Bathing 10/29/21 Maximal Assist   Sponge bath seated/standing at sink, assist with RUE, below LLE knee, B feet, and standing balance with buttocks   UB Dressing 10/29/21 Maximal Assist   Max vc's for trent dressing technique, pt appeared to follow commands better compared to previous session, pt continues to demonstrate L sided lean   LB Dressing 10/29/21 Maximal Assist   Assist to thread LLE into brief/shorts, pt able to thread RLE, required assist with standing balance to pull over hips   Footwear 10/29/21 Moderate Assist   Educated on 1 handed dressing technique to jesus R sock/shoe. Required assist with L socks/shoe due to hemiplegia   Toileting 10/29/21 Maximal Assist   Assist with balance for pants and hygiene   Homemaking         Functional Transfers / Balance:   Date Status DME  Comments   Sit Balance 10/29/21 Minimal Assist   Seated in chair due to L sided lean   Stand Balance 10/29/21 Maximal Assist   standing at sink   [] Tub  [] Shower   Transfer       Commode   Transfer 10/29/21 Moderate Assist  BSC, slideboard To/from [facility]   Functional   Mobility        Other:         Functional Exercises / Activity:  Pt supine in bed upon arrival to  during 1st session. Completed supine-sit EOB and slideboard bed-w/c. Completed ADLs seated at sink, see above for assessment. Pt appeared to have tolerated session well but continues to be limited by cognition to increase independence with ADLs. Pt sitting in chair upon arrival to  during 2nd session. Completed commode transfer, toileting task, and shaving task with increased time. In OT gym, participated in arm scait, 3x10 reps using LUE to increase ROM for ease with ADLs. Pt completed with assist from [address]. Therapist completed AROM, 3x15 reps (elbow/shoulder/wrist/digit flex/ext) of LUE to increase ROM and maintain joint integrity. Pt appeared to demonstrated increased sublux and tone with elbow flex. Pt appeared to have tolerated session fairly. Sensory / Neuromuscular Re-Education:      Cognitive Skills:   Status Comments   Problem   Solving fair    Memory fair    Sequencing fair    Safety fair      Visual Perception:    Education:  Pt educated about LUE positioning when up in w/c to promote ROM/wt bearing along with joint integrity to increase awareness. Pt educated importance with LUE SROM ex's to increase ROM/muscle input for active use. 10/25/21: pt educated on SROM of LUE and safe positioning of LUE  10/29/21: educated on 1 handed dressing technique for socks/shoes    [] Family teach completed on:    Pain Level: 6/10 neck    Additional Notes:     Patient has made fair progress during treatment sessions toward set goals.    Therapy emphasis to obtain goals:    Time Frame for Long term goals  6 weeks   Long term goal 1 Pt will complete UB self-care tasks w/ Supervision   Long term goal 2 Pt will complete LB self-care tasks w/ Supervision   Long term goal 3 Pt will complete toileting (all aspects) w/ Supervision   Long term goal 4 Pt will safely complete toilet transfers w/ Supervision   Long term goal 5 Pt will safely complete tub/shower transfers w/ Supervision   Long term goals 6 Pt will complete basic homemaking task w/ SBA   Long term goal 7 Pt will complete grooming task w/ Supervision   Long term goal 8 Pt will complete feeding task w/ Supervision/setup     [x] Continue with current OT Plan of care. [] Prepare for Discharge    10/27/21 OT plan of care updated on this date.  Tentative length of stay is 6 weeks Maria Fernanda Dial OTR/L [id number]     DISCHARGE RECOMMENDATIONS  Recommended DME: drop arm BSC    Post Discharge Care:   []Home Independently  []Home with 24hr Care / Supervision []Home with Partial Supervision []Home with Home Health OT []Home with Out Pt OT []Other: ___   Comments:         Time in Time out Tx Time Breakdown  Variance:   First Session  0730 0815 [x] Individual Tx-  45   [] Concurrent Tx -  [] Co-Tx -   [] Group Tx -   [] Time Missed -     Second Session 0915 1000 [x] Individual Tx- 45  [] Concurrent Tx -  [] Co-Tx -   [] Group Tx -   [] Time Missed -     Third Session    [] Individual Tx-   [] Concurrent Tx -  [] Co-Tx -   [] Group Tx -   [] Time Missed -         Total Tx Time- [address] [facility], OTR/L [id number]

## 2021-10-29 NOTE — PROGRESS NOTES
Physical Therapy  Treatment Note  Supervising Therapist: Mera Dowd, PT, DPT [id number]    NAME: Aaliyah Abbott  [facility]  DIAGNOSIS: Acute CVA  PRECAUTIONS: Falls, helmet at all times when OOB, L hemiplegia, L neglect, pusher, alarm, must bring phone to therapy (cardiac monitor), L PRAFO, SPB <180  HPI: Pt is a 39 y.o. female with past medical history significant for recent COVID-19 infection (9/15/21) who [facility] with acute onset left-sided weakness. Initial NIHSS 17.  Initial CT ([facility]) showed R MCA hyperdensity; initial CTA showed right ICA/M1 occlusion. On 10/13, her neuro exam worsened and repeat imaging indicated worsening edema, 1 cm of midline shift. She underwent emergent R hemicraniectomy by Dr. [facility]) on 10/13. After being deemed medically appropriate, she was transferred to [place] [id number] on 10/22/2021    Social:  Pt lives with daughter (15 y/o), will be returning to parent's home upon discharge (1 floor plan, 2 HAIDER with 2 HR). Mother and father healthy, do not work. 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 Initial Evaluation  Date: 10/23/21 AM     PM    Short Term Goals Long Term Goals    Was pt agreeable to Eval/treatment? yes Yes Yes     Does pt have pain? Pain on the top of her head once the helmet was placed on Mild c/o L hip and R rib pain Mild c/o L hip and R rib pain     Bed Mobility  Rolling: Max A  Supine to sit: Max A   Sit to supine: Max A   Scooting: Max A NT Supine>Sit ModA (hospital bed with rails) Min A Supervision   Transfers Sit to stand: Max A   Stand to sit: Max A  Stand pivot: Max A x2    5xSTS: Unable to complete Sit<>stand: Max A  Stand pivot: NT    NT Sit<>stand:  Max A  Stand pivot: NT    Sliding board transfer [address] to L from hospital bed>WC and  Min A Supervision  5xSTS: TBD   Ambulation    15 feet with R mackey rail with Max A + wc follow    10mWT: TBA  6mWT: TBA 45 feet x 3 reps with R hallway rail with ModA    10 feet x 1 rep and 20 feet x 1 rep feet with R hemicane with MaxA   (L ankle PLS AFO , L knee immobilizer, inside out sock over L shoe, mirror for visual feedback)  (WC follow) 35 feet x 2 reps and 20 feet x 2 reps with R hemicane with MaxA     (L ankle PLS AFO , L knee immobilizer, inside out sock over L shoe, mirror for visual feedback)  (WC follow) 50 feet with AAD with Min A       500 feet with AAD with Supervision    10mWT: TBD  6mWT: TBD   Walking 10 feet on uneven surface NT NT NT 10 feet with AAD with Min A 10 feet with AAD with Supervision   Wheel Chair Mobility NT NT NT     Car Transfers NT NT NT Min A Supervision   Stair negotiation: ascended and descended NT NT NT 4 steps with 1 rail with Min A 12 steps with 1 rail with Supervision   Curb Step:   ascended and descended NT NT NT 4 inch step with AAD and Min A 4 inch step with AAD and Supervision   Picking up object off the floor NT NT NT Will  an object with Min assist Will  an object with Supervision assist   BLE ROM WFL: LLE AROM limited by flaccidity  WFL: LLE AROM limited by spasticity 2+ on MAS (hamstrings, hip extensors)      BLE Strength RLE 4+/5  LLE 0/5 RLE 4+/5  LLE 0/5 with MMT      Balance  Static Sitting: Mod/Max A    BBS: TBA  FGA: TBA Sitting: Mod A  Standing: Max A       BBS: TBD  FGA: TBD   Date Family Teach Completed TBA Pt's mother present for observation 10/24      Is additional Family Teaching Needed?   Y or N Y Yes      Hindering Progress L hemiplegia, L neglect L hemiplegia, L neglect      PT recommended ELOS 5 weeks       Team's Discharge Plan        Therapist at Team Meeting          HRmax: 178 bpm Time spent at moderate intensity (60-70% HRmax) Time spent at high intensity (70-85% HRmax)    AM: 0:20:07 AM: 0:02:52    PM: 0:15:05 PM: 0:03:00       Therapeutic Exercise:   AM: Seated gastroc/soleus stretch LLE 30\" x 2 reps   PM: Seated gastroc/soleus stretch LLE 30\" x 3 reps

## 2021-10-29 NOTE — PROGRESS NOTES
Speech Language Pathology  ACUTE REHABILITATION--DAILY PROGRESS NOTE          PATIENT NAME:  Brenda Knight  (female)     :  1975  (46 y.o.)  STATUS:  Inpatient: Room 5505/5505-B    TODAY'S DATE:  10/29/2021  REFERRING PROVIDER:    Dr. Viki Kilpatrick: speech language pathology (SLP) eval and treat  Date of order:  10/22/2021  REASON FOR REFERRAL: CVA  ADMITTING DIAGNOSIS: Acute cerebrovascular accident (CVA) ([facility] [address] [I63.9]    VISIT DIAGNOSIS: CVA    SPEECH THERAPY  PLAN OF CARE   The speech therapy  POC is established based on physician order, speech pathology diagnosis and results of clinical assessment     SPEECH PATHOLOGY DIAGNOSIS:    Mild cognitive deficit with left neglect    Speech Pathology intervention is recommended 3-6 times per week for LOS or when goals are met with emphasis on the following:      Conditions Requiring Skilled Therapeutic Intervention for speech, language and/or cognition    Decreased attention with impulsivity  left-neglect    Specific Speech Therapy Interventions to Include: Therapeutic exercises  Address left neglect    Specific instructions for next treatment:      To initiate POC    SHORT/LONG TERM GOALS  Pt will improve orientation to spatial and temporal surroundings with use of external aids and verbal cues  Pt will attend to her left side with decreasing amount of physical/verbal cues  Pt will improve attention and decrease impulsivity    Patient stated goals: Agreed with above    Rehabilitation Potential/Prognosis: good     _FIMS SCORES      Swallowing    Current Status  6--Modified Independent   Short Term Goal 6--Modified Independent    Long Term Goal 6--Modified Independent     Receptive    Current Status  t   6- Modified Independent    Short Term Goal t   6-ModifiedIndependent    Long Term Goal                  6-Modified Independent    Expressive    Current Status      6- Modified Independent    Short Term Goal     6-Modified Independent    Long Term Goal                  6- Modified Independent    Social Interaction    Current Status  4--Minimal Assistance    Short Term Goal 5--Supervision    Long Term Goal 6--Modified Independent         Problem Solving    Current Status     2- Maximal  Assistance    Short Term Goal                 3- Moderate Assistance    Long Term Goal          4-Minimal  Assistance      Memory    Current Status       4- Minimal Assistance    Short Term Goal t      5-Supervision   Long Term Goal         5-Supervision  ? SWALLOWING:      Diet:  Regular consistency solids with thin liquids (IDDSI level 0)       Assistance with tray set up and supervision required with all po intake d/t left neglect and impulsivity. No immediate dysphagia needs at this time. LANGUAGE:     Patient was able to express her wants and needs without word finding difficulties. She displayed impulsivity when responding to questions, requiring repetition and directions to think about answer. COGNITION:    10/28: Upon beginning therapy, patient stated she did not wish to participate because her speech is fine and she does not need speech therapy. Discussed with patient that we are addressing cognition and impulsivity. With max encouragement, patient participated in therapy. Pt has also voiced this to other disciplines, however impulsivity, attention, problem solving/ safety/ insight remain impaired. SLP assessed Pt's ability to self monitor and ambulate during PT session this date. Divided attention is impaired, and Pt demonstrates decrease functional mobility/ increased fall risk secondary to easily distracted during ambulation tasks. Pt does benefit from short, simple cues provided by herself to appropriately sequence the steps she needs. Safety:  Poor    SPEECH:     Speech intelligibility was good during structured tasks and conversational speech.     SP recommended after discharge:  yes  Supervision recommended at discharge: 24 hour    Will continue SP intervention as per previously established POC. EDUCATION: Speech Pathologist (SLP) completed education with the patient and/or family regarding identified cognitive linguistic deficits and subsequent need for speech pathology intervention. Discussed deficit areas to be targeted by formal intervention and established short/long term goals. Reviewed compensatory strategies to improve functional outcome (as appropriate). Encouraged patient and/or family to engage SLP in structured Q&A session relative to identified deficit areas. Patient and/or family indicated understanding of all information provided via satisfactory verbal response. Minute Tracking:    Individual therapy:     0 minutes  Concurrent therapy:    0 minutes  Group therapy:     0 minutes  Co-treatment therapy:    45 minutes    Total minutes :45 minutes    The admitting diagnosis and active problem list, as listed below have been reviewed prior to initiation of this evaluation.         ACTIVE PROBLEM LIST:   Patient Active Problem List   Diagnosis    [facility] ([facility]) ([facility] [address]    Status post craniectomy    Left spastic hemiplegia (HCC)    Tobacco abuse    Acute pain

## 2021-10-30 PROCEDURE — 6370000000 HC RX 637 (ALT 250 FOR IP): Performed by: PHYSICAL MEDICINE & REHABILITATION

## 2021-10-30 PROCEDURE — 6360000002 HC RX W HCPCS: Performed by: PHYSICAL MEDICINE & REHABILITATION

## 2021-10-30 PROCEDURE — 1280000000 HC REHAB R&B

## 2021-10-30 PROCEDURE — 97535 SELF CARE MNGMENT TRAINING: CPT

## 2021-10-30 PROCEDURE — 97530 THERAPEUTIC ACTIVITIES: CPT

## 2021-10-30 PROCEDURE — 97110 THERAPEUTIC EXERCISES: CPT

## 2021-10-30 RX ADMIN — ACETAMINOPHEN 650 MG: 325 TABLET ORAL at 18:38

## 2021-10-30 RX ADMIN — HEPARIN SODIUM 5000 UNITS: 10000 INJECTION INTRAVENOUS; SUBCUTANEOUS at 06:28

## 2021-10-30 RX ADMIN — ATORVASTATIN CALCIUM 40 MG: 40 TABLET, FILM COATED ORAL at 21:06

## 2021-10-30 RX ADMIN — ASPIRIN 81 MG CHEWABLE TABLET 81 MG: 81 TABLET CHEWABLE at 09:30

## 2021-10-30 RX ADMIN — DOCUSATE SODIUM 100 MG: 100 CAPSULE, LIQUID FILLED ORAL at 09:29

## 2021-10-30 RX ADMIN — CLOPIDOGREL 75 MG: 75 TABLET, FILM COATED ORAL at 09:31

## 2021-10-30 RX ADMIN — TRAMADOL HYDROCHLORIDE 25 MG: 50 TABLET ORAL at 09:28

## 2021-10-30 RX ADMIN — TAMSULOSIN HYDROCHLORIDE 0.4 MG: 0.4 CAPSULE ORAL at 09:30

## 2021-10-30 RX ADMIN — GABAPENTIN 100 MG: 100 CAPSULE ORAL at 09:30

## 2021-10-30 RX ADMIN — GABAPENTIN 100 MG: 100 CAPSULE ORAL at 14:00

## 2021-10-30 RX ADMIN — ERGOCALCIFEROL 50000 UNITS: 1.25 CAPSULE ORAL at 09:31

## 2021-10-30 RX ADMIN — HEPARIN SODIUM 5000 UNITS: 10000 INJECTION INTRAVENOUS; SUBCUTANEOUS at 18:35

## 2021-10-30 RX ADMIN — Medication 6 MG: at 21:06

## 2021-10-30 RX ADMIN — ACETAMINOPHEN 1000 MG: 325 TABLET ORAL at 06:28

## 2021-10-30 RX ADMIN — GABAPENTIN 100 MG: 100 CAPSULE ORAL at 21:06

## 2021-10-30 RX ADMIN — ACETAMINOPHEN 1000 MG: 325 TABLET ORAL at 14:00

## 2021-10-30 ASSESSMENT — PAIN DESCRIPTION - FREQUENCY
FREQUENCY: CONTINUOUS
FREQUENCY: CONTINUOUS

## 2021-10-30 ASSESSMENT — PAIN DESCRIPTION - PAIN TYPE
TYPE: CHRONIC PAIN
TYPE: CHRONIC PAIN

## 2021-10-30 ASSESSMENT — PAIN SCALES - GENERAL
PAINLEVEL_OUTOF10: 7
PAINLEVEL_OUTOF10: 0
PAINLEVEL_OUTOF10: 0
PAINLEVEL_OUTOF10: 6

## 2021-10-30 ASSESSMENT — PAIN DESCRIPTION - LOCATION
LOCATION: NECK;RIB CAGE;SHOULDER
LOCATION: SHOULDER;NECK

## 2021-10-30 ASSESSMENT — PAIN DESCRIPTION - DESCRIPTORS
DESCRIPTORS: ACHING;CONSTANT;DISCOMFORT
DESCRIPTORS: ACHING;CONSTANT;DISCOMFORT

## 2021-10-30 ASSESSMENT — PAIN SCALES - WONG BAKER
WONGBAKER_NUMERICALRESPONSE: 0

## 2021-10-30 ASSESSMENT — PAIN DESCRIPTION - PROGRESSION
CLINICAL_PROGRESSION: NOT CHANGED

## 2021-10-30 ASSESSMENT — PAIN DESCRIPTION - ORIENTATION
ORIENTATION: RIGHT
ORIENTATION: RIGHT

## 2021-10-30 ASSESSMENT — PAIN DESCRIPTION - ONSET
ONSET: ON-GOING
ONSET: ON-GOING

## 2021-10-30 ASSESSMENT — PAIN - FUNCTIONAL ASSESSMENT: PAIN_FUNCTIONAL_ASSESSMENT: PREVENTS OR INTERFERES SOME ACTIVE ACTIVITIES AND ADLS

## 2021-10-30 NOTE — PLAN OF CARE
Problem: Pain:  Goal: Pain level will decrease  Description: Pain level will decrease  Outcome: Met This Shift  Goal: Control of acute pain  Description: Control of acute pain  Outcome: Met This Shift  Goal: Control of chronic pain  Description: Control of chronic pain  Outcome: Met This Shift  Goal: Patient's pain/discomfort is manageable  Description: Patient's pain/discomfort is manageable  Outcome: Met This Shift     Problem: Falls - Risk of:  Goal: Will remain free from falls  Description: Will remain free from falls  Outcome: Met This Shift  Goal: Absence of physical injury  Description: Absence of physical injury  Outcome: Met This Shift     Problem: Skin Integrity:  Goal: Will show no infection signs and symptoms  Description: Will show no infection signs and symptoms  Outcome: Met This Shift  Goal: Absence of new skin breakdown  Description: Absence of new skin breakdown  Outcome: Met This Shift     Problem: ABCDS Injury Assessment  Goal: Absence of physical injury  Outcome: Met This Shift     Problem: HEMODYNAMIC STATUS  Goal: Patient has stable vital signs and fluid balance  Outcome: Met This Shift     Problem: ACTIVITY INTOLERANCE/IMPAIRED MOBILITY  Goal: Mobility/activity is maintained at optimum level for patient  Outcome: Met This Shift     Problem: COMMUNICATION IMPAIRMENT  Goal: Ability to express needs and understand communication  Outcome: Met This Shift     Problem: Infection:  Goal: Will remain free from infection  Description: Will remain free from infection  Outcome: Met This Shift     Problem: Safety:  Goal: Free from accidental physical injury  Description: Free from accidental physical injury  Outcome: Met This Shift  Goal: Free from intentional harm  Description: Free from intentional harm  Outcome: Met This Shift     Problem: Daily Care:  Goal: Daily care needs are met  Description: Daily care needs are met  Outcome: Met This Shift     Problem: Skin Integrity:  Goal: Skin integrity will stabilize  Description: Skin integrity will stabilize  Outcome: Met This Shift     Problem: Discharge Planning:  Goal: Patients continuum of care needs are met  Description: Patients continuum of care needs are met  Outcome: Met This Shift

## 2021-10-30 NOTE — PROGRESS NOTES
Speech Language Pathology  ACUTE REHABILITATION--DAILY PROGRESS NOTE          PATIENT NAME:  Brandon Luis  (female)     :  1975  (46 y.o.)  STATUS:  Inpatient: Room 5505/5505-B    TODAY'S DATE:  10/30/2021  REFERRING PROVIDER:    Dr. Parsons Failing: speech language pathology (SLP) eval and treat  Date of order:  10/22/2021  REASON FOR REFERRAL: CVA  ADMITTING DIAGNOSIS: Acute cerebrovascular accident (CVA) ([facility] [address] [I63.9]    VISIT DIAGNOSIS: CVA    SPEECH THERAPY  PLAN OF CARE   The speech therapy  POC is established based on physician order, speech pathology diagnosis and results of clinical assessment     SPEECH PATHOLOGY DIAGNOSIS:    Mild cognitive deficit with left neglect    Speech Pathology intervention is recommended 3-6 times per week for LOS or when goals are met with emphasis on the following:      Conditions Requiring Skilled Therapeutic Intervention for speech, language and/or cognition    Decreased attention with impulsivity  left-neglect    Specific Speech Therapy Interventions to Include: Therapeutic exercises  Address left neglect    Specific instructions for next treatment:      To initiate POC    SHORT/LONG TERM GOALS  Pt will improve orientation to spatial and temporal surroundings with use of external aids and verbal cues  Pt will attend to her left side with decreasing amount of physical/verbal cues  Pt will improve attention and decrease impulsivity    Patient stated goals: Agreed with above    Rehabilitation Potential/Prognosis: good     _FIMS SCORES      Swallowing    Current Status  6--Modified Independent   Short Term Goal 6--Modified Independent    Long Term Goal 6--Modified Independent     Receptive    Current Status  t   6- Modified Independent    Short Term Goal t   6-ModifiedIndependent    Long Term Goal                  6-Modified Independent    Expressive    Current Status      6- Modified Independent    Short Term Goal     6-Modified Independent    Long Term Goal                  6- Modified Independent    Social Interaction    Current Status  4--Minimal Assistance    Short Term Goal 5--Supervision    Long Term Goal 6--Modified Independent         Problem Solving    Current Status     2- Maximal  Assistance    Short Term Goal                 3- Moderate Assistance    Long Term Goal          4-Minimal  Assistance      Memory    Current Status       4- Minimal Assistance    Short Term Goal t      5-Supervision   Long Term Goal         5-Supervision  ? SWALLOWING:      Diet:  Regular consistency solids with thin liquids (IDDSI level 0)     Not addressed in this session. LANGUAGE:    Not addressed in this session. COGNITION:    Patient refused therapy today. She became slightly agitated when encouraged to participate. She then refused saying she wanted to return to her room and go to the bathroom. Safety:  Poor    SPEECH:    Not addressed in this session. SP recommended after discharge:  yes  Supervision recommended at discharge: 24 hour    Will continue SP intervention as per previously established POC. EDUCATION: Speech Pathologist (SLP) completed education with the patient regarding identified cognitive linguistic deficits and subsequent need for speech pathology intervention. Discussed deficit areas to be targeted by formal intervention and established short/long term goals. Reviewed compensatory strategies to improve functional outcome (as appropriate). Encouraged patient to engage SLP in structured Q&A session relative to identified deficit areas. Patient indicated understanding of all information provided via satisfactory verbal response.       Minute Tracking:    Individual therapy:     0 minutes  Concurrent therapy:    0 minutes  Group therapy:     0 minutes  Co-treatment therapy:    0 minutes    Total minutes :0 minutes    The admitting diagnosis and active problem list, as listed below have been reviewed prior to initiation of this evaluation. ACTIVE PROBLEM LIST:   Patient Active Problem List   Diagnosis    Acute [facility] ([facility]A) ([facility] [address]    Status post craniectomy    Left spastic hemiplegia (HCC)    Tobacco abuse    Acute pain       Mellisa Villafuerte M.S., CCC-SLP/L  Speech-Language Pathologist

## 2021-10-30 NOTE — PROGRESS NOTES
OCCUPATIONAL THERAPY DAILY NOTE    Date:10/30/2021  Patient Name: Abdifatah Phipps  MRN: [id number]  : 1975  Room: [address] [place] [address]     Diagnosis:Acute CVA( Pt presented to [facility] as Level 2 stroke alert on 10/11/21. L side weakness, neglect. NIH-17  Found to have large R MCA involving R occipital)  Surgery: R frontotemporal decompressive hemicraniectomy on 10/13/21  Past Medical History: COVID (9/15), tobacco use  Precautions: Falls, L hemiplegia, L neglect, L sublux, bed/chair alarm, helmet when OOB, impulsive, L PRAFO    Functional Assessment:   Date Status AE  Comments   Feeding 10/29/21 Stand by Assist      Grooming 10/29/21 Minimal Assist            Oral Care 10/28/21 Minimal Assist      Bathing 10/29/21 Maximal Assist      UB Dressing 10/29/21 Maximal Assist      LB Dressing 10/29/21 Maximal Assist      Footwear 10/29/21 Moderate Assist      Toileting 10/30/21 Maximal Assist  Grab bar Assist with balance for pants and hygiene for safety due to Left side weakness while standing using grab bar with RUE. Homemaking         Functional Transfers / Balance:   Date Status DME  Comments   Sit Balance 10/30/21 Minimal Assist   Demo'd up in w/c, on commode and on EOB due to  L sided lean   Stand Balance 10/30/21 Mod A x2 Grab bar  Bed rail  demo'd during w/c<>commode using SPT method along with w/c> EOB using bed rail. [] Tub  [] Shower   Transfer       Commode   Transfer 10/30/21 Moderate Assist x2 for safety 3:1 commode over preexisting toilet SPT method with w/c<>3:1 commode transfers using grab bar for balance using RUE/RLE. LUE in sling for balance and joint protection. Functional   Mobility        Other:         Functional Exercises / Activity:  Pt completed w/c<>3:1 commode transfers using SPT method onto toilet using RUE on grab bar requiring Mod A x2 for safety and Max A for clothing mgmt due to weakness in LUE/LLE and balance deficits.   Pt completed w/c>EOB using bed rail needing cues for safety and balance using SPT and bed going in on Right side due to LUE/LLE weakness. Pt needed assistance for LLE mgmt up into bed from EOB to supine at Min/mod A for safety. BUE SROM ex's with LUE on  Skate/figure 8 board then using furniture slider  while  wearing 1# wt on RUE for strengthening to increase ADL';s and transfers. Pt tolerated 3-5 reps of 10 ea. LUE muscle facilitation techs for triceps, biceps and forearm to promote muscle input for active movement with fair tolerance. Pt requested to go back to bed due to having contracture pain in LLE and pain in Left hip. Pt taken back and transferred w/c>EOB using SPT and bed rail needing x2 assist for balance and safety. LUE in sling for joint protection/integrity. Sensory / Neuromuscular Re-Education:      Cognitive Skills:   Status Comments   Problem   Solving fair    Memory fair    Sequencing fair    Safety fair      Visual Perception:    Education:  Pt educated about LUE positioning when up in w/c to promote ROM/wt bearing along with joint integrity. Pt educated with safety during SPT transfers from w/c<>3:1 commode using grab bar then w/c> EOB using bed rail to increase awareness. 10/25/21: pt educated on SROM of LUE and safe positioning of LUE  10/29/21: educated on 1 handed dressing technique for socks/shoes    [] Family teach completed on:    Pain Level: 7/10 LLE/Left hip and nurse made aware. Additional Notes:     Patient has made fair + progress during treatment sessions toward set goals.    Therapy emphasis to obtain goals:    Time Frame for Long term goals  6 weeks   Long term goal 1 Pt will complete UB self-care tasks w/ Supervision   Long term goal 2 Pt will complete LB self-care tasks w/ Supervision   Long term goal 3 Pt will complete toileting (all aspects) w/ Supervision   Long term goal 4 Pt will safely complete toilet transfers w/ Supervision   Long term goal 5 Pt will safely complete tub/shower transfers w/ Supervision   Long term goals 6 Pt will complete basic homemaking task w/ SBA   Long term goal 7 Pt will complete grooming task w/ Supervision   Long term goal 8 Pt will complete feeding task w/ Supervision/setup     [x] Continue with current OT Plan of care. [] Prepare for Discharge    10/27/21 OT plan of care updated on this date.  Tentative length of stay is 6 weeks Princemustapha Ruiz OTR/L [id number]     DISCHARGE RECOMMENDATIONS  Recommended DME: drop arm BSC    Post Discharge Care:   []Home Independently  []Home with 24hr Care / Supervision []Home with Partial Supervision []Home with Home Health OT []Home with Out Pt OT []Other: ___   Comments:         Time in Time out Tx Time Breakdown  Variance:   First Session  9:40am 10:25am [x] Individual Tx-  45   [] Concurrent Tx -  [] Co-Tx -   [] Group Tx -   [] Time Missed -     Second Session   [] Individual Tx-   [] Concurrent Tx -  [] Co-Tx -   [] Group Tx -   [] Time Missed -     Third Session    [] Individual Tx-   [] Concurrent Tx -  [] Co-Tx -   [] Group Tx -   [] Time Missed -         Total [facility]x Time- [address] [facility]    I have read & agree with the above status  Sheree Rosario OTR/L [id number]

## 2021-10-30 NOTE — PROGRESS NOTES
Progress Note  Date:10/30/2021       Room:5505/5505-B  Patient Name:Ladan Damon     YOB: 1975     Age:45 y.o. Subjective    Subjective:  Symptoms:  Stable. She reports weakness. Diet:  Adequate intake. Activity level: Impaired due to weakness. Pain:  She reports no pain. Review of Systems   Neurological: Positive for weakness. Objective         Vitals Last 24 Hours:  TEMPERATURE:  Temp  Av.6 °F (36.4 °C)  Min: 97.5 °F (36.4 °C)  Max: 97.6 °F (36.4 °C)  RESPIRATIONS RANGE: Resp  Av  Min: 18  Max: 18  PULSE OXIMETRY RANGE: SpO2  Av %  Min: 96 %  Max: 96 %  PULSE RANGE: Pulse  Av.5  Min: 77  Max: 104  BLOOD PRESSURE RANGE: Systolic (24XIJ), FZH:267 , Min:116 , AIY:534   ; Diastolic (17PEZ), CNA:41, Min:59, Max:71    I/O (24Hr): Intake/Output Summary (Last 24 hours) at 10/30/2021 1045  Last data filed at 10/29/2021 2045  Gross per 24 hour   Intake 120 ml   Output 150 ml   Net -30 ml     Objective:  General Appearance: In no acute distress. Vital signs: (most recent): Blood pressure 116/71, pulse 104, temperature 97.5 °F (36.4 °C), temperature source Tympanic, resp. rate 18, height 5' 6\" (1.676 m), weight 170 lb (77.1 kg), SpO2 96 %. Vital signs are normal.    Output: Producing urine and producing stool. Lungs:  Normal effort and normal respiratory rate. Breath sounds clear to auscultation. Heart: Normal rate. Regular rhythm. S1 normal and S2 normal.    Abdomen: Abdomen is soft. Bowel sounds are normal.   There is no abdominal tenderness. Extremities: Normal range of motion. Neurological: Patient is alert. (Left hemiparesis).       Labs/Imaging/Diagnostics    Labs:  CBC:  Recent Labs     10/28/21  0651 10/29/21  0811   WBC 13.2* 11.0   RBC 3.63 4.04   HGB 11.4* 12.3   HCT 34.3 38.6   MCV 94.5 95.5   RDW 12.7 12.7   * 552*     CHEMISTRIES:  Recent Labs     10/28/21  0651      K 3.8      CO2 24   BUN 11   CREATININE 0.5   GLUCOSE 105*     PT/INR:No results for input(s): PROTIME, INR in the last 72 hours. APTT:No results for input(s): APTT in the last 72 hours. LIVER PROFILE:No results for input(s): AST, ALT, BILIDIR, BILITOT, ALKPHOS in the last 72 hours. Imaging Last 24 Hours:  No results found. Assessment//Plan           Hospital Problems         Last Modified POA    * (Principal) Acute cerebrovascular accident (CVA) ([facility] [address] 10/27/2021 Yes    Status post craniectomy 10/27/2021 Yes    Left spastic hemiplegia ([facility] [address] 10/27/2021 Yes    Tobacco abuse 10/27/2021 Yes    Acute pain 10/27/2021 Yes        Assessment:    Condition: In stable condition. Improving.   (CVA). Plan:   Encourage ambulation. (Tolerating therapy well  Blood pressure and labs are stable  Working on strength and balance).        Electronically signed by Constantino Gaxiola MD on 10/30/21 at 10:45 AM EDT

## 2021-10-31 PROCEDURE — 6360000002 HC RX W HCPCS: Performed by: PHYSICAL MEDICINE & REHABILITATION

## 2021-10-31 PROCEDURE — 6370000000 HC RX 637 (ALT 250 FOR IP): Performed by: PHYSICAL MEDICINE & REHABILITATION

## 2021-10-31 PROCEDURE — 1280000000 HC REHAB R&B

## 2021-10-31 PROCEDURE — 97530 THERAPEUTIC ACTIVITIES: CPT

## 2021-10-31 RX ADMIN — ACETAMINOPHEN 1000 MG: 325 TABLET ORAL at 22:13

## 2021-10-31 RX ADMIN — ACETAMINOPHEN 1000 MG: 325 TABLET ORAL at 13:58

## 2021-10-31 RX ADMIN — GABAPENTIN 100 MG: 100 CAPSULE ORAL at 13:58

## 2021-10-31 RX ADMIN — TAMSULOSIN HYDROCHLORIDE 0.4 MG: 0.4 CAPSULE ORAL at 08:06

## 2021-10-31 RX ADMIN — GABAPENTIN 100 MG: 100 CAPSULE ORAL at 22:13

## 2021-10-31 RX ADMIN — CLOPIDOGREL 75 MG: 75 TABLET, FILM COATED ORAL at 08:06

## 2021-10-31 RX ADMIN — Medication 6 MG: at 22:12

## 2021-10-31 RX ADMIN — HEPARIN SODIUM 5000 UNITS: 10000 INJECTION INTRAVENOUS; SUBCUTANEOUS at 06:37

## 2021-10-31 RX ADMIN — ACETAMINOPHEN 1000 MG: 325 TABLET ORAL at 06:37

## 2021-10-31 RX ADMIN — GABAPENTIN 100 MG: 100 CAPSULE ORAL at 08:06

## 2021-10-31 RX ADMIN — HEPARIN SODIUM 5000 UNITS: 10000 INJECTION INTRAVENOUS; SUBCUTANEOUS at 17:14

## 2021-10-31 RX ADMIN — ATORVASTATIN CALCIUM 40 MG: 40 TABLET, FILM COATED ORAL at 22:12

## 2021-10-31 RX ADMIN — DOCUSATE SODIUM 100 MG: 100 CAPSULE, LIQUID FILLED ORAL at 22:12

## 2021-10-31 RX ADMIN — ASPIRIN 81 MG CHEWABLE TABLET 81 MG: 81 TABLET CHEWABLE at 08:06

## 2021-10-31 ASSESSMENT — PAIN SCALES - GENERAL
PAINLEVEL_OUTOF10: 7
PAINLEVEL_OUTOF10: 8
PAINLEVEL_OUTOF10: 0
PAINLEVEL_OUTOF10: 7

## 2021-10-31 NOTE — PROGRESS NOTES
Physical Therapy  Treatment Note  Supervising Therapist: Mary Ansari, PT, DPT [id number]    NAME: Miguel Reyna  [facility]  DIAGNOSIS: Acute CVA  PRECAUTIONS: Falls, helmet at all times when OOB, L hemiplegia, L neglect, pusher, alarm, must bring phone to therapy (cardiac monitor), L PRAFO, SPB <180  HPI: Pt is a 39 y.o. female with past medical history significant for recent COVID-19 infection (9/15/21) who [facility]21 with acute onset left-sided weakness. Initial NIHSS 17.  Initial CT ([facility]) showed R MCA hyperdensity; initial CTA showed right ICA/M1 occlusion. On 10/13, her neuro exam worsened and repeat imaging indicated worsening edema, 1 cm of midline shift. She underwent emergent R hemicraniectomy by Dr. Phuc Gonzalez [facility]) on 10/13. After being deemed medically appropriate, she was transferred to [place] [id number] on 10/22/2021    Social:  Pt lives with daughter (15 y/o), will be returning to parent's home upon discharge (1 floor plan, 2 HAIDER with 2 HR). Mother and father healthy, do not work. 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 Initial Evaluation  Date: 10/23/21 AM     Short Term Goals Long Term Goals    Was pt agreeable to Eval/treatment? yes Yes     Does pt have pain? Pain on the top of her head once the helmet was placed on Mild c/o L hip and R rib pain     Bed Mobility  Rolling: Max A  Supine to sit: Max A   Sit to supine: Max A   Scooting: Max A Rolling: Noemy  Supine to sit: Noemy   Sit to supine: Mod A   Scooting: Min A    Min A Supervision   Transfers Sit to stand: Max A   Stand to sit: Max A  Stand pivot:  Max A x2    5xSTS: Unable to complete Sit<>stand: ModA  Stand pivot: maxA  Slideboard: modA     Min A Supervision  5xSTS: TBD   Ambulation    15 feet with R mackey rail with Max A + wc follow    10mWT: TBA  6mWT: TBA     30'  with R hemicane with MaxA   (L ankle PLS AFO , L knee immobilizer,L shoe)   50 feet with AAD with Min A       500 feet with AAD with Supervision    10mWT: TBD  6mWT: TBD   Walking 10 feet on uneven surface NT NT 10 feet with AAD with Min A 10 feet with AAD with Supervision   Wheel Chair Mobility NT NT     Car Transfers NT NT Min A Supervision   Stair negotiation: ascended and descended NT NT 4 steps with 1 rail with Min A 12 steps with 1 rail with Supervision   Curb Step:   ascended and descended NT NT 4 inch step with AAD and Min A 4 inch step with AAD and Supervision   Picking up object off the floor NT NT Will  an object with Min assist Will  an object with Supervision assist   BLE ROM WFL: LLE AROM limited by flaccidity  WFL: LLE AROM limited by spasticity 2+ on MAS (hamstrings, hip extensors)     BLE Strength RLE 4+/5  LLE 0/5 RLE 4+/5  LLE 0/5 with MMT     Balance  Static Sitting: Mod/Max A    BBS: TBA  FGA: TBA Sitting: Mod A  Standing: Max A      BBS: TBD  FGA: TBD   Date Family Teach Completed TBA Pt's mother present for observation 10/24     Is additional Family Teaching Needed? Y or N Y Yes     Hindering Progress L hemiplegia, L neglect L hemiplegia, L neglect     PT recommended ELOS 5 weeks      Team's Discharge Plan       Therapist at Team Meeting             Therapeutic Exercise:   AM:  1. Seated gastroc/soleus stretch LLE 30\" x 2 reps  2. AAROM LAQ on LLE- x10 reps, tapping technqiue provided to muscle belly of quadriceps    Patient education  Pt educated on balance, tx, gait, current status and POC, d/c planning    Patient response to education:   Pt verbalized understanding Pt demonstrated skill Pt requires further education in this area   yes partial yes     Additional Comments:   AM: Pt agreeable to therapy session. Completed slideboard tx from bed to w/c, educated pt on head/hips relationship with tx, pt tending to lean retropulsively, requiring verbal and tactile cues to lean anteriorly prior to scooting, completes at Gloria Milch.  Pt agreeable to amb trial, completed per grid above. Pt pushing from R to L and with poor weight shift onto RLE when attempting to advance LLE, so provided weight shift assist and verbal cueing to help with weight shift, pt then able to independently advance LLE for ~ 75% of steps, Melisa for remaining 25% of steps. Due to pt pushing onto LLE, pt's L foot sliding forward often, so sock removed from overtop of L shoe this session. Pt completed amb bout with 2 turns and return to chair, no chair follow provided this session. Pt reporting fatigue and L hip pain at end of bout. Pt assisted back to bed by completing stand pivot with maxAx1, L knee block provided and assist to weight shift also provided. Pt able to advance LLE bwd to step to bed. Pt supine in bed with L PRAFO donned, BLE elevated, LUE elevated on pillow, bed alarm on, 4 SR up, and call light in reach at end of session. Chair/bed alarm: armed following session    AM  Time in: 0900  Time out: 0940    Pt is making good progress toward established Physical Therapy goals. Continue with physical therapy current plan of care.     Estela Tsang., PT, DPT  [id number]

## 2021-11-01 PROCEDURE — 6360000002 HC RX W HCPCS: Performed by: PHYSICAL MEDICINE & REHABILITATION

## 2021-11-01 PROCEDURE — 1280000000 HC REHAB R&B

## 2021-11-01 PROCEDURE — 97129 THER IVNTJ 1ST 15 MIN: CPT | Performed by: SPEECH-LANGUAGE PATHOLOGIST

## 2021-11-01 PROCEDURE — 97535 SELF CARE MNGMENT TRAINING: CPT

## 2021-11-01 PROCEDURE — 97110 THERAPEUTIC EXERCISES: CPT

## 2021-11-01 PROCEDURE — 6370000000 HC RX 637 (ALT 250 FOR IP): Performed by: PHYSICAL MEDICINE & REHABILITATION

## 2021-11-01 PROCEDURE — 99254 IP/OBS CNSLTJ NEW/EST MOD 60: CPT | Performed by: INTERNAL MEDICINE

## 2021-11-01 PROCEDURE — 99233 SBSQ HOSP IP/OBS HIGH 50: CPT | Performed by: PHYSICAL MEDICINE & REHABILITATION

## 2021-11-01 PROCEDURE — 97530 THERAPEUTIC ACTIVITIES: CPT

## 2021-11-01 PROCEDURE — 93005 ELECTROCARDIOGRAM TRACING: CPT | Performed by: PHYSICAL MEDICINE & REHABILITATION

## 2021-11-01 PROCEDURE — 97130 THER IVNTJ EA ADDL 15 MIN: CPT | Performed by: SPEECH-LANGUAGE PATHOLOGIST

## 2021-11-01 PROCEDURE — 6370000000 HC RX 637 (ALT 250 FOR IP): Performed by: PHYSICIAN ASSISTANT

## 2021-11-01 PROCEDURE — 97112 NEUROMUSCULAR REEDUCATION: CPT

## 2021-11-01 PROCEDURE — APPSS60 APP SPLIT SHARED TIME 46-60 MINUTES: Performed by: PHYSICIAN ASSISTANT

## 2021-11-01 RX ADMIN — DOCUSATE SODIUM 100 MG: 100 CAPSULE, LIQUID FILLED ORAL at 08:21

## 2021-11-01 RX ADMIN — ASPIRIN 81 MG CHEWABLE TABLET 81 MG: 81 TABLET CHEWABLE at 08:21

## 2021-11-01 RX ADMIN — GABAPENTIN 100 MG: 100 CAPSULE ORAL at 14:46

## 2021-11-01 RX ADMIN — ACETAMINOPHEN 1000 MG: 325 TABLET ORAL at 14:09

## 2021-11-01 RX ADMIN — ATORVASTATIN CALCIUM 40 MG: 40 TABLET, FILM COATED ORAL at 21:22

## 2021-11-01 RX ADMIN — ACETAMINOPHEN 1000 MG: 325 TABLET ORAL at 21:23

## 2021-11-01 RX ADMIN — HEPARIN SODIUM 5000 UNITS: 10000 INJECTION INTRAVENOUS; SUBCUTANEOUS at 17:30

## 2021-11-01 RX ADMIN — DOCUSATE SODIUM 100 MG: 100 CAPSULE, LIQUID FILLED ORAL at 21:22

## 2021-11-01 RX ADMIN — HEPARIN SODIUM 5000 UNITS: 10000 INJECTION INTRAVENOUS; SUBCUTANEOUS at 06:49

## 2021-11-01 RX ADMIN — GABAPENTIN 100 MG: 100 CAPSULE ORAL at 21:23

## 2021-11-01 RX ADMIN — TRAMADOL HYDROCHLORIDE 25 MG: 50 TABLET ORAL at 17:47

## 2021-11-01 RX ADMIN — ACETAMINOPHEN 1000 MG: 325 TABLET ORAL at 06:49

## 2021-11-01 RX ADMIN — METOPROLOL TARTRATE 12.5 MG: 25 TABLET, FILM COATED ORAL at 14:47

## 2021-11-01 RX ADMIN — METOPROLOL TARTRATE 12.5 MG: 25 TABLET, FILM COATED ORAL at 21:23

## 2021-11-01 RX ADMIN — GABAPENTIN 100 MG: 100 CAPSULE ORAL at 08:21

## 2021-11-01 RX ADMIN — CLOPIDOGREL 75 MG: 75 TABLET, FILM COATED ORAL at 08:21

## 2021-11-01 RX ADMIN — Medication 6 MG: at 21:23

## 2021-11-01 RX ADMIN — TAMSULOSIN HYDROCHLORIDE 0.4 MG: 0.4 CAPSULE ORAL at 08:21

## 2021-11-01 ASSESSMENT — PAIN DESCRIPTION - PROGRESSION
CLINICAL_PROGRESSION: NOT CHANGED
CLINICAL_PROGRESSION: NOT CHANGED

## 2021-11-01 ASSESSMENT — PAIN DESCRIPTION - PAIN TYPE
TYPE: ACUTE PAIN
TYPE: ACUTE PAIN

## 2021-11-01 ASSESSMENT — PAIN DESCRIPTION - ONSET
ONSET: ON-GOING
ONSET: ON-GOING

## 2021-11-01 ASSESSMENT — PAIN DESCRIPTION - ORIENTATION: ORIENTATION: RIGHT

## 2021-11-01 ASSESSMENT — PAIN DESCRIPTION - LOCATION
LOCATION: HEAD
LOCATION: HEAD

## 2021-11-01 ASSESSMENT — PAIN DESCRIPTION - FREQUENCY
FREQUENCY: INTERMITTENT
FREQUENCY: INTERMITTENT

## 2021-11-01 ASSESSMENT — PAIN SCALES - GENERAL
PAINLEVEL_OUTOF10: 7
PAINLEVEL_OUTOF10: 7
PAINLEVEL_OUTOF10: 4
PAINLEVEL_OUTOF10: 6

## 2021-11-01 ASSESSMENT — PAIN - FUNCTIONAL ASSESSMENT: PAIN_FUNCTIONAL_ASSESSMENT: ACTIVITIES ARE NOT PREVENTED

## 2021-11-01 ASSESSMENT — PAIN DESCRIPTION - DESCRIPTORS
DESCRIPTORS: THROBBING;DISCOMFORT
DESCRIPTORS: THROBBING

## 2021-11-01 NOTE — PROGRESS NOTES
OCCUPATIONAL THERAPY DAILY NOTE    Date:2021  Patient Name: Parul Elizalde  MRN: [id number]  : 1975  Room: [address] [place], [place] [address]     Diagnosis:Acute CVA( Pt presented to [facility] as Level 2 stroke alert on 10/11/21. L side weakness, neglect. NIH-17  Found to have large R MCA involving R occipital)  Surgery: R frontotemporal decompressive hemicraniectomy on 10/13/21  Past Medical History: COVID (9/15), tobacco use  Precautions: Falls, L hemiplegia, L neglect, L sublux, bed/chair alarm, helmet when OOB, impulsive, L PRAFO    Functional Assessment:   Date Status AE  Comments   Feeding 10/29/21 Stand by Assist      Grooming  Minimal Assist   Seated for hand washing in PM         Oral Care 10/28/21 Minimal Assist      Bathing 10/29/21 Maximal Assist      UB Dressing 10/29/21 Maximal Assist      LB Dressing 10/29/21 Maximal Assist      Footwear 21 Moderate Assist   Pt able to jesus R sock/shoe. Shoe lace adapted for ease with donning shoe in AM   Toileting 21 Maximal Assist  Grab bar Assist with hygiene and pants management, pt unable to assist with pants on R side due to decreased standing balance in PM   Homemaking         Functional Transfers / Balance:   Date Status DME  Comments   Sit Balance 21 CGA     Stand Balance 21 Max A Grab bar  Bed rail     [] Tub  [] Shower   Transfer       Commode   Transfer 21 Mod A slideboard, drop arm commode To/from drop arm commode in PM   Functional   Mobility        Other:         Functional Exercises / Activity:  Pt supine in bed upon arrival to  in AM. Completed supine-sit EOB and slideboard to w/c. Pt completed LB dressing. In OT gym,   Pt engaged in core strengthing/posture/weightshifting/balance activity seated edge of mat. Pt worked on modified sit-ups on inclined wedge to be able to go from supine-sitting, 3x5 reps with SBA. Pt also worked on unsupported sitting balance/tolerance. Pt continued to require CGA impulsivity.  Pt able to sit ~30 mins during session with some rest breaks supine. Therpaist completed AROM, 3x10 reps (elbow/shoulder/wrist/digit flex/ext) of LUE while supine to increase ROM and maintain joint integrity. Pt demo'ing tone with elbow flex and shoulder flex. Completed slideboard transfers to and from mat. Pt appeared to have tolerated session well. HR was 115 at rest and increased to 125 during activity during session. Pt sitting in chair upon arrival to OT gym in PM. Participated in armbike exercise, seated, 2x5 mins using RUE, to increase endurance for ease with ADLs. Attempted small strips of kineosio tape on pt's hand to assess if pt can tolerate on her shoulder due to report of allergy (cleared by ). Pt completed commode transfer and toileting task in bathroom. Completed slidbeoard w/c-bed. Pt appeared to have tolerated session fairly but continues to be limited by headaches and activity tolerance. Sensory / Neuromuscular Re-Education:      Cognitive Skills:   Status Comments   Problem   Solving fair    Memory fair    Sequencing fair    Safety fair      Visual Perception:    Education:  Pt educated about LUE positioning when up in w/c to promote ROM/wt bearing along with joint integrity. Pt educated with safety during SPT transfers from w/c<>3:1 commode using grab bar then w/c> EOB using bed rail to increase awareness. 10/25/21: pt educated on SROM of LUE and safe positioning of LUE  10/29/21: educated on 1 handed dressing technique for socks/shoes  11/1/21: shoe lace adapted for ease with donning shoe  11/1/21: attempted small strips of kineosio tape on pt's hand to assess if pt can tolerate on her shoulder due to report of allergy (cleared by )    [] Family teach completed on:    Pain Level: at hip    Additional Notes:     Patient has made fair + progress during treatment sessions toward set goals.    Therapy emphasis to obtain goals:    Time Frame for Long term goals  6 weeks   Long term goal 1 Pt will complete UB self-care tasks w/ Supervision   Long term goal 2 Pt will complete LB self-care tasks w/ Supervision   Long term goal 3 Pt will complete toileting (all aspects) w/ Supervision   Long term goal 4 Pt will safely complete toilet transfers w/ Supervision   Long term goal 5 Pt will safely complete tub/shower transfers w/ Supervision   Long term goals 6 Pt will complete basic homemaking task w/ SBA   Long term goal 7 Pt will complete grooming task w/ Supervision   Long term goal 8 Pt will complete feeding task w/ Supervision/setup     [x] Continue with current OT Plan of care. [] Prepare for Discharge    10/27/21 OT plan of care updated on this date.  Tentative length of stay is 6 weeks Richie Mcpherson OTR/L [id number]     DISCHARGE RECOMMENDATIONS  Recommended DME: drop arm BSC    Post Discharge Care:   []Home Independently  []Home with 24hr Care / Supervision []Home with Partial Supervision []Home with Home Health OT []Home with Out Pt OT []Other: ___   Comments:         Time in Time out Tx Time Breakdown  Variance:   First Session  0915 1000 [x] Individual Tx- 45   [] Concurrent Tx -  [] Co-Tx -   [] Group Tx -   [] Time Missed -     Second Session 1816 [id number] [x] Individual Tx- 45  [] Concurrent Tx -  [] Co-Tx -   [] Group Tx -   [] Time Missed -     Third Session    [] Individual Tx-   [] Concurrent Tx -  [] Co-Tx -   [] Group Tx -   [] Time Missed -         Total Tx Time- [address], OTR/L [id number]

## 2021-11-01 NOTE — PROGRESS NOTES
Physical Therapy  Treatment Note  Supervising Therapist: Genny Padilla, PT, DPT [id number]    NAME: Len Willard  [facility]OM: [facility]  DIAGNOSIS: Acute CVA  PRECAUTIONS: Falls, helmet at all times when OOB, L hemiplegia, L neglect, pusher, alarm, must bring phone to therapy (cardiac monitor), L PRAFO, SPB <180  HPI: Pt is a 39 y.o. female with past medical history significant for recent COVID-19 infection (9/15/21) who presented to [address] [place] [address] on 10/12/21 with acute onset left-sided weakness. Initial NIHSS 17.  Initial CT (OSH) showed R MCA hyperdensity; initial CTA showed right ICA/M1 occlusion. On 10/13, her neuro exam worsened and repeat imaging indicated worsening edema, 1 cm of midline shift. She underwent emergent R hemicraniectomy by Dr. Mehta [facility]) on 10/13. After being deemed medically appropriate, she was transferred to [place] [address] on 10/22/2021    Social:  Pt lives with daughter (15 y/o), will be returning to parent's home upon discharge (1 floor plan, 2 HAIDER with 2 HR). Mother and father healthy, do not work. 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 Initial Evaluation  Date: 10/23/21 AM     PM    Short Term Goals Long Term Goals    Was pt agreeable to Eval/treatment? yes Yes Yes     Does pt have pain? Pain on the top of her head once the helmet was placed on Mild c/o L hip and R rib pain Mild c/o L hip and R rib pain     Bed Mobility  Rolling: Max A  Supine to sit: Max A   Sit to supine: Max A   Scooting: Max A Sit>Supine ModA  Scooting MaxA   Supine>Sit ModA (hospital bed with rails) Min A Supervision   Transfers Sit to stand: Max A   Stand to sit: Max A  Stand pivot: Max A x2    5xSTS: Unable to complete Sit<>stand: Max A  Stand pivot: NT    Sliding board transfer [facility] from WC<>commode and WC>Hosptial bed (cues for anterior lean) Sit<>stand:  Max A  Stand pivot: NT    Sliding board transfer [facility] to  from hospital bed>WC and  Min A Supervision  5xSTS: TBD   Ambulation    15 feet with R mackey rail with Max A + wc follow    10mWT: TBA  6mWT: TBA 10 feet x 2 reps with R hemicane with MaxA, see comments   (L ankle PLS AFO , L knee immobilizer, inside out sock over L shoe, mirror for visual feedback)  (WC follow) 30 feet x 2 reps and 25 feet x 2 reps with R hemicane with MaxA     (L ankle PLS AFO , L knee immobilizer, inside out sock over L shoe, mirror for visual feedback)  (WC follow) 50 feet with AAD with Min A       500 feet with AAD with Supervision    10mWT: TBD  6mWT: TBD   Walking 10 feet on uneven surface NT NT NT 10 feet with AAD with Min A 10 feet with AAD with Supervision   Wheel Chair Mobility NT NT NT     Car Transfers NT NT NT Min A Supervision   Stair negotiation: ascended and descended NT NT NT 4 steps with 1 rail with Min A 12 steps with 1 rail with Supervision   Curb Step:   ascended and descended NT NT NT 4 inch step with AAD and Min A 4 inch step with AAD and Supervision   Picking up object off the floor NT NT NT Will  an object with Min assist Will  an object with Supervision assist   BLE ROM WFL: LLE AROM limited by flaccidity  WFL: LLE AROM limited by spasticity 2+ on MAS (hamstrings, hip extensors)      BLE Strength RLE 4+/5  LLE 0/5 RLE 4+/5  LLE 0/5 with MMT      Balance  Static Sitting: Mod/Max A    BBS: TBA  FGA: TBA Sitting: Mod A  Standing: Max A       BBS: TBD  FGA: TBD   Date Family Teach Completed TBA Pt's mother present for observation 10/24      Is additional Family Teaching Needed? Y or N Y Yes      Hindering Progress L hemiplegia, L neglect L hemiplegia, L neglect      PT recommended ELOS 5 weeks       Team's Discharge Plan        Therapist at Team Meeting          HRmax: 178 bpm Time spent at moderate intensity (60-70% HRmax) Time spent at high intensity (70-85% HRmax)   AM: Resting  bpm, increased to 159-162 bpm with standing and very short distance ambulation.  Ambulation stopped due to atypical HR response for this patient. RPE reported as 12, HR does not correlate with visual appraisal or percieved exertion, Physician and RN notified. AM: 0:03:37 AM: 0:08:31    PM: 0:20:41 PM: 0:11:09       Therapeutic Exercise:   AM: seated gastroc soleus stretch 40\" x 2 reps, knee extended  PM: seated gastroc soleus stretch 40\" x 1 rep, knee extended    Patient education  Pt educated on R weight shift implications on LLE clearance while ambulating    Patient response to education:   Pt verbalized understanding Pt demonstrated skill Pt requires further education in this area   yes partial yes     Additional Comments: Pt seated in WC upon room entry, requesting to use restroom prior to session. Assisted RN and patient with sliding board transfer to/from drop arm commode seat. Upon arrival to rehab gym, HR monitor donned and resting HR found to be more elevated than previous sessions (103 bpm). Upon standing and ambulating 5-10 feet pt exhibits increased HR response, increasing to 159-162 bpm (90% HRmax). This is an atypical HR response to activity for this patient when compared to prior sessions. Pt is asymptomatic and appears in no distress, ambulation stopped per therapist judgement. Also, pts RPE usually correlates accurately with HR, but this session pt reporting 12/20 (low difficulty). RN and physician notified of findings, pt returned to room assisted to bed per her request and remained asymptomatic. Further activity deferred this AM. Pt cleared by RN to participate in PM session, HR response more normal with activity and correlates more closely with RPE. Pt able to attain high intensity HR ranges however only following significant ambulation distance and exertion. Physician present and notified of more normal HR during PM session. Gait training progressed however pt continues to exhibit faulty R weight shift leading to difficulty advancing LLE.  LLE adduction during swing phase interferes with continuous gait as well. Plan to continue gait training with rich. Chair/bed alarm: armed following session    AM  Time in: 0830  Time out: 0900    PM  Time in: 1300  Time out: 1345    Pt is making good progress toward established Physical Therapy goals. Continue with physical therapy current plan of care.     Cabrera Maher, PT, DPT  Board Certified Clinical Specialist in Neurologic Physical Therapy  [id number]

## 2021-11-01 NOTE — PROGRESS NOTES
Speech Language Pathology  ACUTE REHABILITATION--DAILY PROGRESS NOTE          PATIENT NAME:  Brandon Luis  (female)     :  1975  (46 y.o.)  STATUS:  Inpatient: Room 5505/5505-B    TODAY'S DATE:  2021  REFERRING PROVIDER:    Dr. Parsons Failing: speech language pathology (SLP) eval and treat  Date of order:  10/22/2021  REASON FOR REFERRAL: CVA  ADMITTING DIAGNOSIS: Acute cerebrovascular accident (CVA) ([facility] [address] [I63.9]    VISIT DIAGNOSIS: CVA    SPEECH THERAPY  PLAN OF CARE   The speech therapy  POC is established based on physician order, speech pathology diagnosis and results of clinical assessment     SPEECH PATHOLOGY DIAGNOSIS:    Mild cognitive deficit with left neglect    Speech Pathology intervention is recommended 3-6 times per week for LOS or when goals are met with emphasis on the following:      Conditions Requiring Skilled Therapeutic Intervention for speech, language and/or cognition    Decreased attention with impulsivity  left-neglect    Specific Speech Therapy Interventions to Include: Therapeutic exercises  Address left neglect    Specific instructions for next treatment:      To initiate POC    SHORT/LONG TERM GOALS  Pt will improve orientation to spatial and temporal surroundings with use of external aids and verbal cues  Pt will attend to her left side with decreasing amount of physical/verbal cues  Pt will improve attention and decrease impulsivity    Patient stated goals: Agreed with above    Rehabilitation Potential/Prognosis: good     _FIMS SCORES      Swallowing    Current Status  6--Modified Independent   Short Term Goal 6--Modified Independent    Long Term Goal 6--Modified Independent     Receptive    Current Status  t   6- Modified Independent    Short Term Goal t   6-ModifiedIndependent    Long Term Goal                  6-Modified Independent    Expressive    Current Status      6- Modified Independent    Short Term Goal     6-Modified Independent    Long Term Goal                  6- Modified Independent    Social Interaction    Current Status  4--Minimal Assistance    Short Term Goal 5--Supervision    Long Term Goal 6--Modified Independent         Problem Solving    Current Status     2- Maximal  Assistance    Short Term Goal                 3- Moderate Assistance    Long Term Goal          4-Minimal  Assistance      Memory    Current Status       4- Minimal Assistance    Short Term Goal t      5-Supervision   Long Term Goal         5-Supervision  ? SWALLOWING:      Diet:  Regular consistency solids with thin liquids (IDDSI level 0)     Not addressed in this session. LANGUAGE:    Not addressed in this session. COGNITION:      Patient seen in her room today due to just going back to bed and refusing to get back up. She agreed to therapy. Patient was fatigued throughout the session and needed mod cues to stay focused on tasks. She engaged easily into conversation with good topic maintenance with min cues when awake/focused. Orientation questions were completed with good performance with min cues. Safety:  Poor    SPEECH:    Not addressed in this session. SP recommended after discharge:  yes  Supervision recommended at discharge: 24 hour    Will continue SP intervention as per previously established POC. EDUCATION: Speech Pathologist (SLP) completed education with the patient regarding identified cognitive linguistic deficits and subsequent need for speech pathology intervention. Discussed deficit areas to be targeted by formal intervention and established short/long term goals. Reviewed compensatory strategies to improve functional outcome (as appropriate). Encouraged patient to engage SLP in structured Q&A session relative to identified deficit areas. Patient indicated understanding of all information provided via satisfactory verbal response.       Minute Tracking:    Individual therapy:     45

## 2021-11-01 NOTE — CONSULTS
Inpatient [address] [place] [address] Cardiology Consultation      Reason for Consult: Tachycardia    Consulting Physician: Dr. Michel Richard    Requesting Physician: Dr. Zee Dave    Date of Consultation: 11/1/2021    HISTORY OF PRESENT ILLNESS:   Patient is a 39year old WF not previously known to [address] [place] [address] Cardiology. She states she has been evaluated by a cardiologist in the past near [facility] for tachycardia in the past.  She denies any prior history of cardiac arrhythmia however. She denies any personal history of coronary artery disease, myocardial infarction, heart failure, valvular heart disease. She is being seen in consultation this hospital admission by Dr. Michel Richard for tachycardia on exertion. Patient has a known past medical history of hyperlipidemia, carotid disease. Former tobacco abuse, remote COVID-19 in September 2021 and acute CVA September 2021 with cerebral edema and entrapment s/p emergent right decompressive hemicraniotomy at [facility] 10/13 with residual left sided weakness. Patient was then discharged on October 22, 2021 to acute rehab floor at [facility]. Since admission, patient has been noted by nursing staff and physical therapy staff becoming tachycardic with minimal exertion. Supposedly yesterday, patient's heart rate went into the 160s with minimal exertion. This was noted by pulse ox monitor. She is not currently on telemetry. She is currently wearing an event monitor placed by [facility] for further evaluation of her tachycardia and CVA etiology. Patient denies any complaints of chest discomfort at rest or on exertion, shortness of breath at rest or on exertion, nausea, emesis, diaphoresis, dizziness, near-syncope or syncope. She denies orthopnea or peripheral edema. Her only complaint at this time is residual left-sided weakness. Family and social history as noted below. No EKG or telemetry strips currently available for review. Unable to access patient's event monitor at this time.          Please note: past medical records were reviewed per electronic medical record (EMR) - see detailed reports under Past Medical/ Surgical History. PAST MEDICAL HISTORY:    1. Hyperlipidemia, on statin therapy. 2. Remote COVID-19 infection September 2021.   3. Admission [facility] 9/2021 with acute left sided weakness --> acute, large right-sided MCA CVA. Repeat imaging indicated worsening edema, 1 cm of midline shift, effacement of the right lateral ventricle, and increased left lateral ventricle caliber concerning for entrapment --> s/p emergent right decompressive hemicraniotomy 10/13. Transferred to [facility] acute rehab floor on 10/22 when she was deemed medically stable. 4. Former tobacco abuse. 5. Carotid artery disease. CARDIAC TESTING    MONALISA [facility], 10/2021)  Normal left ventricular size. The left ventricle has normal systolic function. The estimated left ventricular ejection fraction is 60-65%.  Normal right   ventricle size.  The right ventricle has normal function.  Normal saline   contrast injection without evidence of right to left intracardiac shunt.  No   intracardiac thrombus identified.  As compared to 10/13/2021: The prior study   was a transthoracic echocardiogram. There is no significant change.      CTA Neck (10/2021)  CTA Neck:   1. Thrombus at the right carotid bifurcation. The extracranial   circulation is otherwise patent. CTA Head:   1. Marked narrowing of the right ICA just proximal to the bifurcation   with occlusion of the right MCA arcade. Some narrowing of the right   CHIVO A1 segment noted. The right PCA is a fetal type PCA but is patent   proximally. PAST SURGICAL HISTORY:    No past surgical history on file. HOME MEDICATIONS:  Prior to Admission medications    Medication Sig Start Date End Date Taking?  Authorizing Provider   acetaminophen (TYLENOL) 325 MG tablet Take 650 mg by mouth every 4 hours as needed for Pain 10/22/21  Yes Historical Provider, MD   aspirin 81 MG chewable tablet Take 81 mg by mouth daily 10/22/21  Yes Historical Provider, MD   atorvastatin (LIPITOR) 40 MG tablet Take 40 mg by mouth daily 10/22/21  Yes Historical Provider, MD   diclofenac (VOLTAREN) 0.1 % ophthalmic solution 1 drop 4 times daily   Yes Historical Provider, MD   nicotine (NICODERM CQ) 21 MG/24HR Place 1 patch onto the skin every 24 hours 10/22/21  Yes Historical Provider, MD   polyethylene glycol (GLYCOLAX) 17 g packet Take 17 g by mouth 2 times daily As needed 10/22/21  Yes Historical Provider, MD   clopidogrel (PLAVIX) 75 MG tablet Take 75 mg by mouth daily 10/27/21   Historical Provider, MD       CURRENT MEDICATIONS:      Current Facility-Administered Medications:     lactulose (CHRONULAC) 10 GM/15ML solution 20 g, 20 g, Oral, BID PRN, Litzy Echols MD    guaiFENesin (ROBITUSSIN) 100 MG/5ML oral solution 200 mg, 200 mg, Oral, BID PRN, Litzy Echols MD, 200 mg at 10/29/21 0855    acetaminophen (TYLENOL) tablet 1,000 mg, 1,000 mg, Oral, 3 times per day, Litzy Echols MD, 1,000 mg at 11/01/21 0649    senna (SENOKOT) tablet 17.2 mg, 2 tablet, Oral, Nightly, Litzy Echols MD, 17.2 mg at 10/29/21 2228    traMADol (ULTRAM) tablet 25 mg, 25 mg, Oral, Q6H PRN, Litzy Echols MD, 25 mg at 10/30/21 0928    lidocaine 4 % external patch 2 patch, 2 patch, TransDERmal, Daily, Litzy Echols MD, 2 patch at 11/01/21 0820    gabapentin (NEURONTIN) capsule 100 mg, 100 mg, Oral, TID, [place] L Belcik, DO, 100 mg at 11/01/21 0821    tamsulosin (FLOMAX) capsule 0.4 mg, 0.4 mg, Oral, Daily, [place] L Belcik, DO, 0.4 mg at 11/01/21 0821    acetaminophen (TYLENOL) tablet 650 mg, 650 mg, Oral, Q4H PRN, [place] L Belcik, DO, 650 mg at 10/30/21 1838    heparin (porcine) injection 5,000 Units, 5,000 Units, SubCUTAneous, Q12H, Maurizio Guerrero DO, 5,000 Units at 11/01/21 0649    aspirin chewable tablet 81 mg, 81 mg, Oral, Daily, Maurizio Guerrero DO, 81 mg at 11/01/21 0821    atorvastatin pain, nausea/vomiting, diarrhea and constipation, black/bloody, and tarry stools. · Genitourinary: Denies dysuria and hematuria. · Hematologic: Denies excessive bruising or bleeding. · Endocrine: Denies excessive thirst. Denies intolerance to hot and cold. PHYSICAL EXAM:   /75   Pulse 97   Temp 97.9 °F (36.6 °C) (Oral)   Resp 16   Ht 5' 6\" (1.676 m)   Wt 170 lb (77.1 kg)   SpO2 98%   BMI 27.44 kg/m²   CONST:  Well developed, well nourished WF who appears stated age. Awake, alert, cooperative, no apparent distress. HEENT:   Head- Normocephalic, atraumatic. Eyes- Conjunctivae pink, anicteric. Neck-  No stridor, trachea midline, no apparent jugular venous distention. CHEST: Chest symmetrical and non-tender to palpation. No accessory muscle use or intercostal retractions. RESPIRATORY: Lung sounds - clear throughout fields. No wheezing, rales or rhonchi. CARDIOVASCULAR:     No noted carotid bruit. Heart Ausculation- Regular rate and rhythm, no apparent murmur. PV: No lower extremity edema. No varicosities. Pedal pulses palpable, no clubbing or cyanosis. ABDOMEN: Soft, non-tender to light palpation. Bowel sounds present. MS: Good muscle strength and tone. No atrophy or abnormal movements. SKIN: Warm and dry. NEURO / PSYCH: Oriented to person, place and time. Speech clear and appropriate. Follows all commands. Pleasant affect. +residual left sided weakness. DATA:    Telemetry: Not currently on telemetry    Diagnostic:  All diagnostic testing and lab work thus far this admission reviewed in detail. Right Rip XRAY 10/27/2021:  Impression:  No displaced rib fractures or complications related to rib fractures. Atherosclerotic disease. No intake or output data in the 24 hours ending 11/01/21 1237      ASSESSMENT:  1. Reported tachycardia with exertion. No telemetry strips available for review during these events.   Heart rate obtained through pulse oximeter or arm heart rate monitor. Currently wearing [employer] event monitor placed by [facility]. Denies any symptoms with the episodes of tachycardia. 2. Acute CVA s/p decompressive craniotomy 10/13/2021 at [facility] with residual left sided weakness. 3. Carotid artery disease. 4. Hyperlipidemia, on statin therapy. 5. Tobacco abuse. RECOMMENDATIONS:  1. Unable to obtain remote telemetry per floor staff. Will have office staff attempt to contact [employer] to obtain monitor results. 2. Start Lopressor 12.5 mg BID. 3. Will review EKG. 4. MONALISA reviewed. 5. Further recommendations to follow. The above case and recommendations have been discussed and made in collaboration with Dr. Michel Richard. Further recommendations to follow as per him. [facility], [address] [place] [address], [place] [address] Cardiology    Electronically signed by Slade Squires PA-C on 11/1/2021 at 12:37 PM     Addendum:  Yvette Woodard MD     Reason for consult: Tachycardia     Patient previously not known to [facility] Cardiology.        History of Present illness: Patient is 39 yr old with medical history of hyperlipidemia, carotid disease. Former tobacco abuse, remote COVID-19 in September 2021 and acute  CVA September 2021 with cerebral edema and entrapment s/p emergent right decompressive hemicraniotomy at [facility] 10/13 with residual left sided weakness. Patient was  then discharged on October 22, 2021 to acute rehab floor at [facility]. Since admission, patient has been noted by nursing staff and physical therapy staff becoming tachycardic  with minimal exertion. Supposedly yesterday, patient's heart rate went into the 160s with minimal exertion. This was noted by pulse ox monitor. She is not currently on  telemetry. She is currently wearing an event monitor placed by [facility] for further evaluation of her tachycardia and CVA etiology.   Patient denies any complaints of chest  discomfort at rest or on exertion, shortness of breath at rest or on exertion, nausea, emesis, diaphoresis, dizziness, near-syncope or syncope. She denies orthopnea or  peripheral edema. Her only complaint at this time is residual left-sided weakness. Family and social history as noted above. She states she has been evaluated by a  cardiologist in the past near [place], [place] for tachycardia in the past.  She denies any prior history of cardiac arrhythmia however. She denies any personal history of coronary  artery disease, myocardial infarction, heart failure, valvular heart disease. No EKG or telemetry strips currently available for review. Unable to access patient's event monitor at  this time. She is compliant with her medications.        Review of systems:  Review of 10 systems negative except as mentioned in the HPI     Medical History: Reviewed     Surgical history: Reviewed     FamilyHistory: Reviewed     Allergies:  Reviewed     Social Hx: Reviewed.     Medications: reviewed.     Labs/imaging studies: Reviewed.     MONALISA 10/2021 - Report reviewed     Above SUNNY exam, assessment reviewed and reflect my work. I personally saw, examined, and evaluated the patient today.     I personally reviewed the medications, rhythm strips, pertinent labs and test reports. I directly participated in the medical-decision making, ordering pertinent tests and medication adjustment.     Physical exam:          Vitals:     10/31/21 0806   BP: 121/75   Pulse: 97   Resp: 16   Temp: 97.9 °F (36.6 °C)   SpO2: 98%         In general, this is a well developed, well nourished who appears stated age. awake, alert, cooperative, no apparent distress     HEENT: eyes -conjunctivae pink, Right cranial deformity  Neck-  no stridor, no carotid bruit. no jugular venous distention   RESPIRATORY: Chest symmetrical and non-tender to palpation. No accessory muscles use.    Lung auscultation - few rhonchi  CARDIOVASCULAR:     Heart Palpation - no palpable thrills  Heart Ausculation - Regular rate and rhythm, 1/6 systolic murmur, No s3 or rub. No lower extremity edema, Distal pulses palpable, no cyanosis   ABDOMEN: Soft, nontender,  Bowel sounds present. MS: n/a. : Deferred  Rectal Exam: Deferred  SKIN: warm and dry  NEURO / PSYCH: Left sided hemiparesis           Impression/Recommendations:     Tachycardia-  - Start low dose beta blockers, monitor BP and HR, Continue her Event monitor - Remote tele monitoring can not be done on her floor. Try to get any events from [employer]. She has been evaluated by a Cardiologist in the past for \"tachycardia\". Avoid caffeine.       Acute Right CVA s/p decompressive craniotomy 10/13/2021 at [address] with residual left sided weakness. - Wearing Event monitor. Based on Event monitor findings, may need  Loop recorder implant to r/o PAF. Carotid artery disease. Hyperlipidemia - On statin therapy. Tobacco abuse - Counseled to quit smoking.              Above d/w her and questions answered.   No family at bed side.     Thank you for the consult.           Jahaira Meneses MD  11/1/2021  2:11 PM  [facility]) Cardiology

## 2021-11-01 NOTE — PROGRESS NOTES
[facility]  Comprehensive Progress Note    Subjective:      Salbador Tadeo is a 39 y.o. female admitted to inpatient rehabilitation for impairments and activities limitations in ADLs and mobility secondary to right MCA CVA. No acute events overnight. No cp, sob, n/v. Her HR was increasing to 160 with minimal activity this am in therapy, she was asymptomatic. Last week her HR was only increasing to 120-130s with moderate activity and quickly normalizing with rest, so 160 with very minimal activity was an abnormal response to activity for her. She does have a cardiac monitor on that was placed at outside hospital. EKG showed NSR. She is otherwise progressing well. The patient's medical records have been reviewed. Scheduled Meds:metoprolol tartrate, 12.5 mg, BID  acetaminophen, 1,000 mg, 3 times per day  senna, 2 tablet, Nightly  lidocaine, 2 patch, Daily  gabapentin, 100 mg, TID  tamsulosin, 0.4 mg, Daily  heparin (porcine), 5,000 Units, Q12H  aspirin, 81 mg, Daily  atorvastatin, 40 mg, Nightly  vitamin D, 50,000 Units, Weekly  docusate sodium, 100 mg, BID  melatonin, 6 mg, Nightly  nicotine, 1 patch, Daily  clopidogrel, 75 mg, Daily      Continuous Infusions:  PRN Meds:lactulose, 20 g, BID PRN  guaiFENesin, 200 mg, BID PRN  traMADol, 25 mg, Q6H PRN  acetaminophen, 650 mg, Q4H PRN  magnesium hydroxide, 30 mL, Daily PRN  bisacodyl, 10 mg, Daily PRN  diclofenac sodium, 2 g, 4x Daily PRN         Objective:      Vitals:    10/30/21 0915 10/30/21 2145 10/31/21 0806 11/01/21 1447   BP: 116/71  121/75 122/68   Pulse: 104 101 97 98   Resp: 18  16 17   Temp: 97.5 °F (36.4 °C)  97.9 °F (36.6 °C)    TempSrc: Tympanic  Oral    SpO2: 96% 95% 98%    Weight:       Height:         General appearance: alert, appears stated age and cooperative, NAD  Head: R crani site c/d/i   Eyes: conjunctivae/corneas clear. PERRL, EOM's intact.   Lungs: clear to auscultation bilaterally  Heart: regular rate and rhythm, S1, S2 normal  Abdomen: soft, non-tender, normal bowel sounds  Extremities: extremities normal, atraumatic, no cyanosis or edema  MSK: no cyanosis, clubbing, edema  Skin: R crani c/d/i   Neurologic: Alert, oriented x4. Left neglect. Speech clear. No aphasia appreciated. Mild L facial droop. Spastic left hemiplegia. MAS 1+ in LUE; MAS 2 in LLE.      Motor:       Motor Findings  Strength: Right  Strength: Left    Deltoid  5/5  0/5    Biceps  5/5  0/5    Triceps  5/5  0/5    Wrist Extensors  5/5  0/5    FDP 5/5 0/5   Finger abductors 5/5 0/5   Iliospoas  5/5  0/5    Quadriceps  5/5  0/5    Tibialis anterior  5/5  0/5    EHL 5/5 0/5   Gastroc soleus  5/5  0/5            Laboratory:    Lab Results   Component Value Date    WBC 11.0 10/29/2021    HGB 12.3 10/29/2021    HCT 38.6 10/29/2021    MCV 95.5 10/29/2021     (H) 10/29/2021     Lab Results   Component Value Date     10/28/2021    K 3.8 10/28/2021     10/28/2021    CO2 24 10/28/2021    BUN 11 10/28/2021    CREATININE 0.5 10/28/2021    GLUCOSE 105 10/28/2021    CALCIUM 9.2 10/28/2021      Lab Results   Component Value Date    ALT 75 (H) 10/23/2021    AST 46 (H) 10/23/2021    ALKPHOS 89 10/23/2021    BILITOT 0.4 10/23/2021       Functional Status:   Bed mobility: Min -Mod A  Transfers: Mod - Max A  Ambulation: 30 ft L ankle PLS AFO, L knee immobilizer, R hemicane Max A  Feeding: SBA  Grooming: Min A  UB dressing: Max A  LB dressing: Max A       Assessment/Plan:       39 y.o. female admitted to inpatient rehabilitation for impairments and activities limitations in ADLs and mobility secondary to right MCA CVA.    -Right MCA CVA: Complicated by cerebral edema requiring emergent right hemicraniectomy (10/13/2021). Dense left spastic hemiplegia, left neglect, mild cognitive impairment. Helmet when out of bed. L PRAFO in bed. LUE sling for gait. L w/c tray. Aspirin, Plavix, Lipitor for secondary prevention.  Monitor neuro exam. Continue Acute Rehab program.   -Spasticity LUE/LLE: Monitor. Tone currently helpful to compensate for weakness with attempted gait in PT. If it worsens and becomes problematic can begin antispasmodic.   -Pain control, MSK pain, neuropathic pain: Tylenol PRN, Tramadol PRN, gabapentin, Lidoderm, Voltaren gel  -Urinary retention: On flomax, now voiding well, low PVRs  -Tobacco abuse: Nicoderm patch  -DVT prophylaxis: heparin SQ, SCDs    Significant tachycardia (160s) with very minimal activity in PT this am, which is an abnormal response to activity for her. HR also taking longer than usual to return to normal limits after rest (compared to last week). She was asymptomatic. EKG obtained and showed NSR. Consulted Cardiology for opinion/recommendations. Spasticity on her affected side is mildly increased as compared to last week. Continue to monitor. Will start an antispasmodic if spasticity continues to worsen.        Electronically signed by Karol Chacon MD on 11/1/2021 at 2:51 PM

## 2021-11-02 LAB
EKG ATRIAL RATE: 83 BPM
EKG P AXIS: 59 DEGREES
EKG P-R INTERVAL: 178 MS
EKG Q-T INTERVAL: 356 MS
EKG QRS DURATION: 84 MS
EKG QTC CALCULATION (BAZETT): 418 MS
EKG R AXIS: 39 DEGREES
EKG T AXIS: 51 DEGREES
EKG VENTRICULAR RATE: 83 BPM

## 2021-11-02 PROCEDURE — 97116 GAIT TRAINING THERAPY: CPT

## 2021-11-02 PROCEDURE — 6360000002 HC RX W HCPCS: Performed by: PHYSICAL MEDICINE & REHABILITATION

## 2021-11-02 PROCEDURE — 6370000000 HC RX 637 (ALT 250 FOR IP): Performed by: PHYSICIAN ASSISTANT

## 2021-11-02 PROCEDURE — 97129 THER IVNTJ 1ST 15 MIN: CPT

## 2021-11-02 PROCEDURE — 97112 NEUROMUSCULAR REEDUCATION: CPT

## 2021-11-02 PROCEDURE — 1280000000 HC REHAB R&B

## 2021-11-02 PROCEDURE — 6370000000 HC RX 637 (ALT 250 FOR IP): Performed by: PHYSICAL MEDICINE & REHABILITATION

## 2021-11-02 PROCEDURE — 97530 THERAPEUTIC ACTIVITIES: CPT

## 2021-11-02 PROCEDURE — 97535 SELF CARE MNGMENT TRAINING: CPT

## 2021-11-02 PROCEDURE — 97110 THERAPEUTIC EXERCISES: CPT

## 2021-11-02 PROCEDURE — 99232 SBSQ HOSP IP/OBS MODERATE 35: CPT | Performed by: PHYSICAL MEDICINE & REHABILITATION

## 2021-11-02 PROCEDURE — 99232 SBSQ HOSP IP/OBS MODERATE 35: CPT | Performed by: INTERNAL MEDICINE

## 2021-11-02 RX ORDER — ONDANSETRON 4 MG/1
4 TABLET, ORALLY DISINTEGRATING ORAL EVERY 6 HOURS PRN
Status: DISCONTINUED | OUTPATIENT
Start: 2021-11-02 | End: 2021-12-07 | Stop reason: HOSPADM

## 2021-11-02 RX ORDER — SENNA PLUS 8.6 MG/1
2 TABLET ORAL NIGHTLY PRN
Status: DISCONTINUED | OUTPATIENT
Start: 2021-11-02 | End: 2021-11-03

## 2021-11-02 RX ADMIN — DOCUSATE SODIUM 100 MG: 100 CAPSULE, LIQUID FILLED ORAL at 21:06

## 2021-11-02 RX ADMIN — HEPARIN SODIUM 5000 UNITS: 10000 INJECTION INTRAVENOUS; SUBCUTANEOUS at 18:15

## 2021-11-02 RX ADMIN — METOPROLOL TARTRATE 12.5 MG: 25 TABLET, FILM COATED ORAL at 21:05

## 2021-11-02 RX ADMIN — ONDANSETRON 4 MG: 4 TABLET, ORALLY DISINTEGRATING ORAL at 18:13

## 2021-11-02 RX ADMIN — ACETAMINOPHEN 1000 MG: 325 TABLET ORAL at 21:06

## 2021-11-02 RX ADMIN — ASPIRIN 81 MG CHEWABLE TABLET 81 MG: 81 TABLET CHEWABLE at 07:57

## 2021-11-02 RX ADMIN — ATORVASTATIN CALCIUM 40 MG: 40 TABLET, FILM COATED ORAL at 21:05

## 2021-11-02 RX ADMIN — ONDANSETRON 4 MG: 4 TABLET, ORALLY DISINTEGRATING ORAL at 11:52

## 2021-11-02 RX ADMIN — GABAPENTIN 100 MG: 100 CAPSULE ORAL at 14:05

## 2021-11-02 RX ADMIN — METOPROLOL TARTRATE 12.5 MG: 25 TABLET, FILM COATED ORAL at 07:57

## 2021-11-02 RX ADMIN — Medication 6 MG: at 21:06

## 2021-11-02 RX ADMIN — HEPARIN SODIUM 5000 UNITS: 10000 INJECTION INTRAVENOUS; SUBCUTANEOUS at 05:55

## 2021-11-02 RX ADMIN — GABAPENTIN 100 MG: 100 CAPSULE ORAL at 07:57

## 2021-11-02 RX ADMIN — CLOPIDOGREL 75 MG: 75 TABLET, FILM COATED ORAL at 07:58

## 2021-11-02 RX ADMIN — ACETAMINOPHEN 1000 MG: 325 TABLET ORAL at 14:05

## 2021-11-02 RX ADMIN — DOCUSATE SODIUM 100 MG: 100 CAPSULE, LIQUID FILLED ORAL at 11:52

## 2021-11-02 RX ADMIN — TAMSULOSIN HYDROCHLORIDE 0.4 MG: 0.4 CAPSULE ORAL at 07:57

## 2021-11-02 RX ADMIN — GABAPENTIN 100 MG: 100 CAPSULE ORAL at 21:05

## 2021-11-02 RX ADMIN — ACETAMINOPHEN 1000 MG: 325 TABLET ORAL at 05:55

## 2021-11-02 ASSESSMENT — PAIN SCALES - GENERAL
PAINLEVEL_OUTOF10: 6
PAINLEVEL_OUTOF10: 5
PAINLEVEL_OUTOF10: 0
PAINLEVEL_OUTOF10: 2

## 2021-11-02 ASSESSMENT — PAIN DESCRIPTION - PROGRESSION
CLINICAL_PROGRESSION: NOT CHANGED

## 2021-11-02 ASSESSMENT — PAIN DESCRIPTION - DESCRIPTORS
DESCRIPTORS: THROBBING;DISCOMFORT

## 2021-11-02 ASSESSMENT — PAIN DESCRIPTION - LOCATION
LOCATION: HEAD

## 2021-11-02 ASSESSMENT — PAIN DESCRIPTION - PAIN TYPE
TYPE: ACUTE PAIN

## 2021-11-02 ASSESSMENT — PAIN DESCRIPTION - ONSET
ONSET: ON-GOING
ONSET: ON-GOING

## 2021-11-02 ASSESSMENT — PAIN SCALES - WONG BAKER
WONGBAKER_NUMERICALRESPONSE: 0
WONGBAKER_NUMERICALRESPONSE: 0

## 2021-11-02 ASSESSMENT — PAIN DESCRIPTION - FREQUENCY
FREQUENCY: INTERMITTENT
FREQUENCY: INTERMITTENT

## 2021-11-02 ASSESSMENT — PAIN DESCRIPTION - ORIENTATION
ORIENTATION: RIGHT

## 2021-11-02 NOTE — PROGRESS NOTES
[facility]) Physicians        CARDIOLOGY                 INPATIENT PROGRESS NOTE          PATIENT SEEN IN FOLLOW UP FOR: Tachycardia    Hospital Day: 24[address] Alina Rodas is a 39year old patient not known to [address] [facility] Cardiology. SUBJECTIVE: Denies any CP or SOb, NO palpitations. Had some nausea. ROS: Review of rest of 8 systems negative except as mentioned above    OBJECTIVE: No acute distress. See Assessment     Diagnostics:         Intake/Output Summary (Last 24 hours) at 11/2/2021 1609  Last data filed at 11/2/2021 1403  Gross per 24 hour   Intake 600 ml   Output --   Net 600 ml       Labs:   CBC: No results for input(s): WBC, HGB, HCT, PLT in the last 72 hours. BMP: No results for input(s): NA, K, CO2, BUN, CREATININE, LABGLOM, CALCIUM in the last 72 hours. Invalid input(s): GLU  Mag: No results for input(s): MG in the last 72 hours. Phos: No results for input(s): PHOS in the last 72 hours. TSH: No results for input(s): TSH in the last 72 hours. HgA1c:     BNP: No results for input(s): BNP in the last 72 hours. PT/INR: No results for input(s): PROTIME, INR in the last 72 hours. APTT:No results for input(s): APTT in the last 72 hours. CARDIAC ENZYMES:No results for input(s): CKTOTAL, CKMB, CKMBINDEX, TROPONINI in the last 72 hours. FASTING LIPID PANEL:No results found for: CHOL, HDL, LDLDIRECT, LDLCALC, TRIG  LIVER PROFILE:No results for input(s): AST, ALT, LABALBU in the last 72 hours.     Current Inpatient Medications:   metoprolol tartrate  12.5 mg Oral BID    acetaminophen  1,000 mg Oral 3 times per day    lidocaine  2 patch TransDERmal Daily    gabapentin  100 mg Oral TID    tamsulosin  0.4 mg Oral Daily    heparin (porcine)  5,000 Units SubCUTAneous Q12H    aspirin  81 mg Oral Daily    atorvastatin  40 mg Oral Nightly    vitamin D  50,000 Units Oral Weekly    docusate sodium  100 mg Oral BID    melatonin  6 mg Oral Nightly    nicotine  1 patch TransDERmal Daily    clopidogrel  75 mg Oral Daily       IV Infusions (if any):        PHYSICAL EXAM:     CONSTITUTIONAL:   /76   Pulse 96   Temp 97.6 °F (36.4 °C) (Oral)   Resp 17   Ht 5' 6\" (1.676 m)   Wt 170 lb (77.1 kg)   SpO2 96%   BMI 27.44 kg/m²   Pulse  Av.5  Min: 87  Max: 009  Systolic (86WES), KOJ:366 , Min:92 , ZBW:919    Diastolic (90RSX), MGF:58, Min:54, Max:76      In general, this is a well developed, well nourished who appears stated age. awake, alert, cooperative, no apparent distress     HEENT: eyes - conjunctivae pink, Right cranial deformity  Neck-  no stridor, no carotid bruit. no jugular venous distention   RESPIRATORY: Chest symmetrical. No accessory muscles use.   Lung auscultation - few rhonchi  CARDIOVASCULAR:     Heart Palpation - no palpable thrills  Heart Ausculation - Regular rate and rhythm, 1/6 systolic murmur, No s3 or rub. No lower extremity edema, Distal pulses palpable, no cyanosis   ABDOMEN: Soft, nontender,  Bowel sounds present. MS: n/a. : Deferred  Rectal Exam: Deferred  SKIN: warm and dry  NEURO / PSYCH: Left sided hemiparesis           Impression/Recommendations:     Tachycardia -  Tolerating low dose beta blockers; Monitor BP and HR. Continue her Event monitor - Remote tele monitoring can not be done on her floor. Try to get any events from [employer]. She has been evaluated by a Cardiologist in the past for \"tachycardia\". Avoid caffeine.                  Acute Right CVA s/p decompressive [facility],  with residual left sided weakness. Wearing Event monitor. Based on Event monitor findings, may need              Loop recorder implant to r/o PAF. Carotid artery disease. Hyperlipidemia - On statin therapy. Tobacco abuse - Counseled to quit smoking.               Advised to her think about where she will going for her Cardiology f/u after discharge. Above d/w her and questions answered.   No family at bed side.       Electronically signed by Stephen Arreguin MD on 11/2/2021 at 4:09 PM  [facility]) Cardiology

## 2021-11-02 NOTE — PLAN OF CARE
stabilize  Description: Skin integrity will stabilize  Outcome: Met This Shift     Problem: Discharge Planning:  Goal: Patients continuum of care needs are met  Description: Patients continuum of care needs are met  Outcome: Met This Shift

## 2021-11-02 NOTE — PROGRESS NOTES
Physical Therapy  Treatment Note  Supervising Therapist: Reena Tompkins, PT, DPT [id number]    NAME: Zachery Monterroso  [facility]  DIAGNOSIS: Acute CVA  PRECAUTIONS: Falls, helmet at all times when OOB, L hemiplegia, L neglect, pusher, alarm, must bring phone to therapy (cardiac monitor), L PRAFO, SPB <180  HPI: Pt is a 39 y.o. female with past medical history significant for recent COVID-19 infection (9/15/21) who [facility] with acute onset left-sided weakness. Initial NIHSS 17.  Initial CT ([facility]) showed R MCA hyperdensity; initial CTA showed right ICA/M1 occlusion. On 10/13, her neuro exam worsened and repeat imaging indicated worsening edema, 1 cm of midline shift. She underwent emergent R hemicraniectomy by Dr. Douglas Mooney [facility]) on 10/13. After being deemed medically appropriate, she was transferred to [place] [id number] on 10/22/2021    Social:  Pt lives with daughter (15 y/o), will be returning to parent's home upon discharge (1 floor plan, 2 HAIDER with 2 HR). Mother and father healthy, do not work. 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 Initial Evaluation  Date: 10/23/21 AM     PM    Short Term Goals Long Term Goals    Was pt agreeable to Eval/treatment? yes Yes Yes     Does pt have pain? Pain on the top of her head once the helmet was placed on Mild c/o L hip and R rib pain Mild c/o L hip and R rib pain     Bed Mobility  Rolling: Max A  Supine to sit: Max A   Sit to supine: Max A   Scooting: Max A NT   Supine<>Sit ModA  Scooting/Rolling ModA Min A Supervision   Transfers Sit to stand: Max A   Stand to sit: Max A  Stand pivot: Max A x2    5xSTS: Unable to complete Sit<>stand:  Max A  Stand pivot: NT    Sliding board transfer [address] from WC<>commode (cues for anterior lean) Sit<>stand: ModA (MaxA with L knee immobilizer)  Stand pivot: MaxA with L hemicane    Sliding board transfer [address] to L from bed<>WC and WC<>commode Min A SBA  5xSTS: TBD   Ambulation    15 feet with R mackey rail with Max A + wc follow    10mWT: TBA  6mWT: TBA 60 feet x 1 rep and 30 feet x 2 reps with R hemicane with MaxA  (L ankle PLS AFO , L knee immobilizer, inside out sock over L shoe, mirror for visual feedback) 70 feet x 1 rep and 60 feet x 1 rep with R hemicane with ModA/MaxA     (L ankle PLS AFO , L knee immobilizer, inside out sock over L shoe, mirror for visual feedback)  (WC follow) 50 feet with AAD with Min A       500 feet with AAD with SBA    10mWT: TBD  6mWT: TBD   Walking 10 feet on uneven surface NT NT NT 10 feet with AAD with Min A 10 feet with AAD with SBA   Wheel Chair Mobility NT NT 20 feet with R extremities with MaxA  150 feet with R extremities with SBA   Car Transfers NT NT NT Min A SBA   Stair negotiation: ascended and descended NT NT NT 4 steps with 1 rail with Min A 12 steps with 1 rail with SBA   Curb Step:   ascended and descended NT NT NT 4 inch step with AAD and Min A 4 inch step with AAD and SBA   Picking up object off the floor NT NT NT Will  an object with Min assist Will  an object with SBA   BLE ROM WFL: LLE AROM limited by flaccidity  WFL: LLE AROM limited by spasticity 2+ on MAS (hamstrings, hip extensors)      BLE Strength RLE 4+/5  LLE 0/5 RLE 4+/5  LLE 0/5 with MMT      Balance  Static Sitting: Mod/Max A    BBS: TBA  FGA: TBA Sitting: Mod A  Standing: Max A       BBS: TBD  FGA: TBD   Date Family Teach Completed TBA Pt's mother present for observation 10/24, 11/2      Is additional Family Teaching Needed?   Y or N Y Yes      Hindering Progress L hemiplegia, L neglect L hemiplegia, L neglect      PT recommended ELOS 5 weeks       Team's Discharge Plan        Therapist at Team Meeting          HRmax: (178, -10  bpm for beta blocker) 168 bpm Time spent at moderate intensity (60-70% HRmax) Time spent at high intensity (70-85% HRmax)    AM: 0:08:52 AM: 0:00:00    PM: 0:08:15 PM: 0:15:41       Therapeutic Exercise:   AM: Seated gastroc/soleus stretch with knee extended 40\" x 1 rep  PM: Seated gastroc/soleus stretch with knee extended 40\" x 1 rep    Patient education  Pt educated on upright posture and implications on LLE advancement    Patient response to education:   Pt verbalized understanding Pt demonstrated skill Pt requires further education in this area   yes no yes     Additional Comments: Pt remains impulsive but pleasant and motivated during sessions. Ambulation distance improved however pt still requires assistance for R weight shift to clear LLE. HR response blunted likely due to recent beta-blocker prescription, however pt consistently reports RPE as 15/20 (Hard). LLE advancement remains inconsistent. Pt missed last 15 minutes of AM session due to requested restroom use. Pt's mother present for PM session observation. Educated mother on gastroc/soleus stretch technique as well as anticipated functional prognosis. Pt able to ambulate further distances with improved consistency of LLE advancement, still requies assistance to weight shift and prevent excessive LLE adduction. Pt once again requested to use restroom by end of session but was unable to move bowels as anticipated. Plan to remove inside out sock over shoe next session. Chair/bed alarm: armed following session    AM  Time in: 0830  Time out: 0900    PM  Time in: 1300  Time out: 1340    Pt is making good progress toward established Physical Therapy goals. Continue with physical therapy current plan of care.     Cabrera Maher, PT, DPT  Board Certified Clinical Specialist in Neurologic Physical Therapy  [id number]

## 2021-11-02 NOTE — PROGRESS NOTES
[facility]  Comprehensive Progress Note    Subjective:      Tasha Loya is a 39 y.o. female admitted to inpatient rehabilitation for impairments and activities limitations in ADLs and mobility secondary to right MCA CVA. No acute events overnight. No cp, sob, n/v. Tolerating therapy. No complaints today. The patient's medical records have been reviewed. Scheduled Meds:metoprolol tartrate, 12.5 mg, BID  acetaminophen, 1,000 mg, 3 times per day  senna, 2 tablet, Nightly  lidocaine, 2 patch, Daily  gabapentin, 100 mg, TID  tamsulosin, 0.4 mg, Daily  heparin (porcine), 5,000 Units, Q12H  aspirin, 81 mg, Daily  atorvastatin, 40 mg, Nightly  vitamin D, 50,000 Units, Weekly  docusate sodium, 100 mg, BID  melatonin, 6 mg, Nightly  nicotine, 1 patch, Daily  clopidogrel, 75 mg, Daily      Continuous Infusions:  PRN Meds:lactulose, 20 g, BID PRN  guaiFENesin, 200 mg, BID PRN  traMADol, 25 mg, Q6H PRN  acetaminophen, 650 mg, Q4H PRN  magnesium hydroxide, 30 mL, Daily PRN  bisacodyl, 10 mg, Daily PRN  diclofenac sodium, 2 g, 4x Daily PRN         Objective:      Vitals:    10/31/21 0806 11/01/21 1447 11/01/21 1945 11/02/21 0745   BP: 121/75 122/68 (!) 92/54 116/76   Pulse: 97 98 87 116   Resp: 16 17  17   Temp: 97.9 °F (36.6 °C)   97.6 °F (36.4 °C)   TempSrc: Oral   Oral   SpO2: 98%   96%   Weight:       Height:         General appearance: alert, appears stated age and cooperative, NAD  Head: R crani site c/d/i   Eyes: conjunctivae/corneas clear. PERRL, EOM's intact. Lungs: clear to auscultation bilaterally  Heart: regular rate and rhythm, S1, S2 normal  Abdomen: soft, non-tender, normal bowel sounds  Extremities: extremities normal, atraumatic, no cyanosis or edema  MSK: no cyanosis, clubbing, edema  Skin: R crani c/d/i   Neurologic: Alert, oriented x4. Left neglect. Speech clear. No aphasia appreciated. Mild L facial droop. Spastic left hemiplegia.  MAS 1+ in LUE; MAS 2 in LLE.      Motor:       Motor Findings  Strength: Right  Strength: Left    Deltoid  5/5  0/5    Biceps  5/5  0/5    Triceps  5/5  0/5    Wrist Extensors  5/5  0/5    FDP 5/5 0/5   Finger abductors 5/5 0/5   Iliospoas  5/5  0/5    Quadriceps  5/5  0/5    Tibialis anterior  5/5  0/5    EHL 5/5 0/5   Gastroc soleus  5/5  0/5            Laboratory:    Lab Results   Component Value Date    WBC 11.0 10/29/2021    HGB 12.3 10/29/2021    HCT 38.6 10/29/2021    MCV 95.5 10/29/2021     (H) 10/29/2021     Lab Results   Component Value Date     10/28/2021    K 3.8 10/28/2021     10/28/2021    CO2 24 10/28/2021    BUN 11 10/28/2021    CREATININE 0.5 10/28/2021    GLUCOSE 105 10/28/2021    CALCIUM 9.2 10/28/2021      Lab Results   Component Value Date    ALT 75 (H) 10/23/2021    AST 46 (H) 10/23/2021    ALKPHOS 89 10/23/2021    BILITOT 0.4 10/23/2021       Functional Status:   Bed mobility: Min -Mod A  Transfers: Mod - Max A  Ambulation: 30 ft L ankle PLS AFO, L knee immobilizer, R hemicane Max A  Feeding: SBA  Grooming: Min A  UB dressing: Max A  LB dressing: Max A       Assessment/Plan:       39 y.o. female admitted to inpatient rehabilitation for impairments and activities limitations in ADLs and mobility secondary to right MCA CVA.    -Right MCA CVA: Complicated by cerebral edema requiring emergent right hemicraniectomy (10/13/2021). Dense left spastic hemiplegia, left neglect, mild cognitive impairment. Helmet when out of bed. L PRAFO in bed. LUE sling for gait. L w/c tray. Aspirin, Plavix, Lipitor for secondary prevention. Monitor neuro exam. Continue Acute Rehab program.   -Spasticity LUE/LLE: Monitor. Tone currently helpful to compensate for weakness with attempted gait in PT. If it worsens and becomes problematic can begin antispasmodic.   -Pain control, MSK pain, neuropathic pain: Tylenol PRN, Tramadol PRN, gabapentin, Lidoderm, Voltaren gel  -Urinary retention:  On flomax, now voiding well, low PVRs  -Tachycardia: intermittent. Cardiology consulted. She was started on Lopressor.  Cardiology attempting to access data from her heart monitor   -Tobacco abuse: Nicoderm patch  -DVT prophylaxis: heparin SQ, SCDs    Appreciate cardiology recommendations  Monitor spasticity    Electronically signed by Elisa Kraft MD on 11/2/2021 at 11:03 AM

## 2021-11-02 NOTE — PROGRESS NOTES
Speech Language Pathology  ACUTE REHABILITATION--DAILY PROGRESS NOTE          ADMITTING DIAGNOSIS: Acute cerebrovascular accident (CVA) ([facility] [address] [I63.9]    VISIT DIAGNOSIS: CVA    SPEECH THERAPY  PLAN OF CARE   The speech therapy  POC is established based on physician order, speech pathology diagnosis and results of clinical assessment     SPEECH PATHOLOGY DIAGNOSIS:    Mild cognitive deficit with left neglect    Speech Pathology intervention is recommended 3-6 times per week for LOS or when goals are met with emphasis on the following:      Conditions Requiring Skilled Therapeutic Intervention for speech, language and/or cognition    Decreased attention with impulsivity  left-neglect    Specific Speech Therapy Interventions to Include: Therapeutic exercises  Address left neglect    Specific instructions for next treatment:      To initiate POC    SHORT/LONG TERM GOALS  Pt will improve orientation to spatial and temporal surroundings with use of external aids and verbal cues  Pt will attend to her left side with decreasing amount of physical/verbal cues  Pt will improve attention and decrease impulsivity    Patient stated goals: Agreed with above    Rehabilitation Potential/Prognosis: good     _FIMS SCORES      Swallowing    Current Status  6--Modified Independent   Short Term Goal 6--Modified Independent    Long Term Goal 6--Modified Independent     Receptive    Current Status  t   6--Modified Independent    Short Term Goal t   6--ModifiedIndependent    Long Term Goal                  6--Modified Independent    Expressive    Current Status      6--Modified Independent    Short Term Goal     6--Modified Independent    Long Term Goal                  6--Modified Independent    Social Interaction    Current Status  4--Minimal Assistance    Short Term Goal 5--Supervision    Long Term Goal 6--Modified Independent         Problem Solving    Current Status    3--Moderate Assistance / 2--Maximal  Assistance   Short Term Goal 3--Moderate Assistance     Long Term Goal 4--Minimal Assistance      Memory    Current Status       4--Minimal Assistance    Short Term Goal t     5--Supervision   Long Term Goal        5--Supervision  ? SWALLOWING:      Diet:  Regular consistency solids with thin liquids (IDDSI level 0)     Assistance with tray set up and supervision by nursing staff is required with all intake d/t left neglect and impulsivity. LANGUAGE:    Appropriate responses during discussion with SLP. COGNITION:      Patient seen in her room today due to refusal to be seen in speech therapy room -- reported fatigue. Patient able to recall events of her day without difficulty. Per staff report, patient continues to require cues for sequencing, functional problem solving and safety during all motor tasks. Safety:  Poor. Continued impulsivity. Patient demonstrates poor insight into her deficits and does not seem to realize potential consequences of her poor decisions. SPEECH:    Speech intelligibility was good during structured tasks and conversational speech. SP recommended after discharge:  yes  Supervision recommended at discharge: 24 hour    Will continue SP intervention as per previously established POC. EDUCATION: Speech Pathologist (SLP) completed education with the patient regarding identified cognitive linguistic deficits and subsequent need for speech pathology intervention. Discussed deficit areas to be targeted by formal intervention and established short/long term goals. Reviewed compensatory strategies to improve functional outcome (as appropriate). Encouraged patient to engage SLP in structured Q&A session relative to identified deficit areas. Patient indicated understanding of all information provided via satisfactory verbal response.       Minute Tracking:    Individual therapy:   15 minutes  Concurrent therapy:    0 minutes  Group therapy:     0 minutes  Co-treatment therapy:    0 minutes    Total minutes: 15 minutes

## 2021-11-02 NOTE — FLOWSHEET NOTE
11/02/21 0320   Cardiac   Cardiac (WDL) X  (*Biotel cardiac monitor [email or web address] )   Cardiac Monitor   Cardiac Event Monitor On patient  (ALL Electrodes changed to Bi[employer]el heart monitor.)

## 2021-11-02 NOTE — PROGRESS NOTES
Physical Therapy  Weekly Team Note  Supervising Therapist: Lalo Jolley, PT, DPT [id number]    NAME: Floresita Penfield  ROOM: [facility]5/5505-B  DIAGNOSIS: Acute CVA  PRECAUTIONS: Falls, helmet at all times when OOB, L hemiplegia, L neglect, contraversive pusher, alarm, must bring phone to therapy (cardiac monitor), L PRAFO, SPB <180  HPI: Pt is a 39 y.o. female with past medical history significant for recent COVID-19 infection (9/15/21) who [facility] with acute onset left-sided weakness. Initial NIHSS 17.  Initial CT ([facility]) showed R MCA hyperdensity; initial CTA showed right ICA/M1 occlusion. On 10/13, her neuro exam worsened and repeat imaging indicated worsening edema, 1 cm of midline shift. She underwent emergent R hemicraniectomy by Dr. Charleen Lara [facility]) on 10/13. After being deemed medically appropriate, she was transferred to [place] [address] on 10/22/2021    Social:  Pt lives with daughter (15 y/o), will be returning to parent's home upon discharge (1 floor plan, 2 HAIDER with 2 HR). Mother and father healthy, do not work. 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 Initial Evaluation  Date: 10/23/21 10/26/21     11/2/21 Comments Short Term Goals Long Term Goals    Was pt agreeable to Eval/treatment? yes Yes Yes      Does pt have pain? Pain on the top of her head once the helmet was placed on Moderate R rib and L hip pain, limiting ambulation at times Mild R rib and L hip pain No longer limits ambulation     Bed Mobility  Rolling: Max A  Supine to sit: Max A   Sit to supine: Max A   Scooting: Max A Rolling ModA  Supine<>Sit MaxA  Scooting MaxA Supine<>Sit ModA  Scooting/Rolling ModA Cues for sequencing and trunk assistance Min A Supervision   Transfers Sit to stand: Max A   Stand to sit: Max A  Stand pivot: Max A x2    5xSTS: Unable to complete Sit<>stand:  Max A  Stand pivot: NT    Sliding board transfer MaxA to R and L from WC<>mat table Sit<>stand: ModA (MaxA with L knee immobilizer)  Stand pivot: MaxA with L hemicane     Sliding board transfer [address] to L and R Recommend nursing staff continue to use sliding board at this time, transferring toward R side. Pt remains impulsive and is a high fall risk during stand-pivot transfers, is very unsafe during therapy sessions Min A SBA  5xSTS: TBD   Ambulation    15 feet with R mackey rail with Max A + wc follow    10mWT: TBA  6mWT: TBA 40 feet with R hallway rail, L ankle DF wrap , L knee immobilizer, inside out sock over L shoe with MaxA    60 feet x 1 rep with R hemicane with ModA/MaxA      (L ankle PLS AFO , L knee immobilizer, inside out sock over L shoe, mirror for visual feedback)  (WC follow) Ambulation is progressing well despite time missed for restroom use. Pt still requires manual assistance for R weight shift, and to avoid LLE adduction during advancement.  Will remove inside out sock over shoe as consistency of LLE advancement   50 feet with AAD with Min A       500 feet with AAD with SBA    10mWT: TBD  6mWT: TBD   Walking 10 feet on uneven surface NT NT NT  10 feet with AAD with Min A 10 feet with AAD with SBA   Wheel Chair Mobility NT 10 feet MaxA with R extremities 20 feet with R extremities with MaxA   150 feet with R extremities with Supervision   Car Transfers NT NT NT  Min A SBA   Stair negotiation: ascended and descended NT NT NT  4 steps with 1 rail with Min A 12 steps with 1 rail with SBA   Curb Step:   ascended and descended NT NT NT  4 inch step with AAD and Min A 4 inch step with AAD and SBA   Picking up object off the floor NT NT NT  Will  an object with Min assist Will  an object with SBAn assist   BLE ROM WFL: LLE AROM limited by flaccidity  WFL: LLE AROM limited by spasticity 2+ on MAS (hamstrings, hip extensors) WFL: LLE AROM limited by spasticity 2+ on MAS (hamstrings, hip extensors, plantar flexors) L PF spasticity places pt at risk for contracture, please make sure PRAFO is donned when in bed     BLE Strength RLE 4+/5  LLE 0/5 RLE 4+/5  LLE 0/5 grossly, hip flexors/adductors 1/5 RLE 4+/5  LLE 0/5 grossly, hip flexors/adductors 2-/5      Balance  Static Sitting: Mod/Max A    BBS: TBA  FGA: TBA Sitting: Mod A  Standing: Max A Sitting: Min A  Standing: Max A       BBS: TBD  FGA: TBD   Date Family Teach Completed TBA Pt's mother present for observation 10/24 Pt's mother present for observation 10/24 and 11/2      Is additional Family Teaching Needed? Y or N Y Yes Y      Hindering Progress L hemiplegia, L neglect L hemiplegia, L neglect L hemiplegia, L neglect, impulsivity, poor safety      PT recommended ELOS 6 weeks 6 weeks 5 weeks      Team's Discharge Plan  6 weeks 5 weeks      Therapist at [facility] MASOOD, PT, DPT [id number]   Hannah Berman, PT, DPT [id number]          Date:  11/2/21  Supporting factors: Brianna Kallie age, improving activity tolerance and physical function, good recall of sequencing and technique (pt can verbalize sequence and her deficits)  Barriers to discharge:  Impulsivity, poor safety, and dense hemiplegia. Spasticity is not yet a significant problem during gait however pt remains at risk for PF contracture  Additional comments:  Pt has good social support, mother is considering installing ramp at home entrance (pt's father to build). Pt is consistently improving function. DME:  TBD  After Care:  TBD      Date:  10/26/21  Supporting factors: Pt is motivated, has good social support  Barriers to discharge:  Dense hemiplegia/trent-neglect on L. Spasticity may become a problem if worsened. Poor safety and pushing behavior result in high fall risk  Additional comments:  Pain limiting ambulation at times. Pt has excellent potential for continued progress.    DME:  TBD  After Care:  TBD    Joseph Lopez, PT, DPT  Board Certified Clinical Specialist in Neurologic Physical Therapy  [phone]

## 2021-11-02 NOTE — PROGRESS NOTES
OCCUPATIONAL THERAPY DAILY NOTE    Date:2021  Patient Name: Rolo Vyas  MRN: [id number]  : 1975  Room: [address] [place], [place] [address]     Diagnosis:Acute CVA( [facility] Level 2 stroke alert on 10/11/21. L side weakness, neglect. NIH-17  Found to have large R MCA involving R occipital)  Surgery: R frontotemporal decompressive hemicraniectomy on 10/13/21  Past Medical History: COVID (9/15), tobacco use  Precautions: Falls, L hemiplegia, L neglect, L sublux, bed/chair alarm, helmet when OOB, impulsive, L PRAFO    Functional Assessment:   Date Status AE  Comments   Feeding 21 Stand by Assist      Grooming 21 SBA    completed shaving task seated at sink in AM         Oral Care 21 SBA  Assist to stabilize electric toothbrush while pt donned toothpaste and brushed teeth in AM   Bathing 10/29/21 Maximal Assist      UB Dressing 21 Mod A  Pt demo'ing improved carryover of trent dressing technique with Mod vc's for L side. Pt demonstrates difficulty with doffing shirt in PM   LB Dressing 21 Maximal Assist  reacher Pt educated on adaptive technique to thread LLE into shorts, required increased time for RLE as well.  Pt required assist with standing balance to pull over L side of hip in PM   Footwear 21 Moderate Assist   Pt doffed/jesus R sock/shoes, required assist with L sock/shoe/lace in PM   Toileting 21 Maximal Assist  Grab bar Pt able to complete malvin hygiene and assist with pants management this date but required max A for standing balance in AM   Homemaking         Functional Transfers / Balance:   Date Status DME  Comments   Sit Balance 21 CGA     Stand Balance 21 Max A Grab bar  Bed rail     [] Tub  [] Shower   Transfer       Commode   Transfer 21 Min A slideboard, drop arm commode Min A with slideboard, Mod A with SPT to R side using grab bar in AM   Functional   Mobility        Other:         Functional Exercises / Activity:  Pt sitting in chair upon arrival to rm in AM. Completed commode transfer, toileting task, and grooming task seated at sink. Therapist applied kineosiotape to pt's LUE to stabilize sublux. Will continue to assess issues that arise with adhesive and progression of sublux. Pt sitting in chair upon arrival to OT gym in PM. Completed UB and LB dressing with increased time. Pt completed SROM, 3x10 reps (elbow/shoulder/wrist/digit flex/ext) of LUE to increase ROM and maintain joint integrity. Sensory / Neuromuscular Re-Education:      Cognitive Skills:   Status Comments   Problem   Solving fair    Memory fair    Sequencing fair    Safety fair      Visual Perception:    Education:  Pt educated about LUE positioning when up in w/c to promote ROM/wt bearing along with joint integrity. Pt educated with safety during SPT transfers from w/c<>3:1 commode using grab bar then w/c> EOB using bed rail to increase awareness. 10/25/21: pt educated on SROM of LUE and safe positioning of LUE  10/29/21: educated on 1 handed dressing technique for socks/shoes  11/1/21: shoe lace adapted for ease with donning shoe  11/1/21: attempted small strips of kineosio tape on pt's hand to assess if pt can tolerate on her shoulder due to report of allergy (cleared by )  11/2/21:  Therapist applied kineosiotape to pt's LUE to stabilize sublux. Will continue to assess issues that arise with adhesive and progression of sublux  11/2/21: pt educated on AE for ease with LB dressing    [] Family teach completed on:    Pain Level: at hip    Additional Notes:     Patient has made fair + progress during treatment sessions toward set goals.    Therapy emphasis to obtain goals:    Time Frame for Long term goals  6 weeks   Long term goal 1 Pt will complete UB self-care tasks w/ Supervision   Long term goal 2 Pt will complete LB self-care tasks w/ Supervision   Long term goal 3 Pt will complete toileting (all aspects) w/ Supervision   Long term goal 4 Pt will safely complete toilet transfers w/ Supervision   Long term goal 5 Pt will safely complete tub/shower transfers w/ Supervision   Long term goals 6 Pt will complete basic homemaking task w/ SBA   Long term goal 7 Pt will complete grooming task w/ Supervision   Long term goal 8 Pt will complete feeding task w/ Supervision/setup     [x] Continue with current OT Plan of care. [] Prepare for Discharge    10/27/21 OT plan of care updated on this date.  Tentative length of stay is 6 weeks Sesarshayancarmelatessa Janel OTR/L [id number]     DISCHARGE RECOMMENDATIONS  Recommended DME: drop arm BSC    Post Discharge Care:   []Home Independently  []Home with 24hr Care / Supervision []Home with Partial Supervision []Home with Home Health OT []Home with Out Pt OT []Other: ___   Comments:         Time in Time out Tx Time Breakdown  Variance:   First Session  0915 1000 [x] Individual Tx- 45   [] Concurrent Tx -  [] Co-Tx -   [] Group Tx -   [] Time Missed -     Second Session 0599 [id number] [x] Individual Tx- 45  [] Concurrent Tx -  [] Co-Tx -   [] Group Tx -   [] Time Missed -     Third Session    [] Individual Tx-   [] Concurrent Tx -  [] Co-Tx -   [] Group Tx -   [] Time Missed -         Total Tx Time- [facility] 42, [address] [facility], OTR/L [id number]

## 2021-11-03 PROCEDURE — 97535 SELF CARE MNGMENT TRAINING: CPT

## 2021-11-03 PROCEDURE — 6370000000 HC RX 637 (ALT 250 FOR IP): Performed by: PHYSICAL MEDICINE & REHABILITATION

## 2021-11-03 PROCEDURE — 6360000002 HC RX W HCPCS: Performed by: PHYSICAL MEDICINE & REHABILITATION

## 2021-11-03 PROCEDURE — 1280000000 HC REHAB R&B

## 2021-11-03 PROCEDURE — 97530 THERAPEUTIC ACTIVITIES: CPT

## 2021-11-03 PROCEDURE — 97110 THERAPEUTIC EXERCISES: CPT

## 2021-11-03 PROCEDURE — 6370000000 HC RX 637 (ALT 250 FOR IP): Performed by: PHYSICIAN ASSISTANT

## 2021-11-03 PROCEDURE — 97129 THER IVNTJ 1ST 15 MIN: CPT

## 2021-11-03 PROCEDURE — 97130 THER IVNTJ EA ADDL 15 MIN: CPT

## 2021-11-03 PROCEDURE — 99232 SBSQ HOSP IP/OBS MODERATE 35: CPT | Performed by: PHYSICAL MEDICINE & REHABILITATION

## 2021-11-03 RX ORDER — SENNA PLUS 8.6 MG/1
2 TABLET ORAL NIGHTLY
Status: DISCONTINUED | OUTPATIENT
Start: 2021-11-03 | End: 2021-12-07 | Stop reason: HOSPADM

## 2021-11-03 RX ORDER — TRAMADOL HYDROCHLORIDE 50 MG/1
25 TABLET ORAL 2 TIMES DAILY PRN
Status: DISCONTINUED | OUTPATIENT
Start: 2021-11-03 | End: 2021-11-17

## 2021-11-03 RX ORDER — POLYETHYLENE GLYCOL 3350 17 G/17G
17 POWDER, FOR SOLUTION ORAL DAILY
Status: DISCONTINUED | OUTPATIENT
Start: 2021-11-03 | End: 2021-11-12

## 2021-11-03 RX ADMIN — METOPROLOL TARTRATE 12.5 MG: 25 TABLET, FILM COATED ORAL at 07:52

## 2021-11-03 RX ADMIN — ATORVASTATIN CALCIUM 40 MG: 40 TABLET, FILM COATED ORAL at 21:47

## 2021-11-03 RX ADMIN — HEPARIN SODIUM 5000 UNITS: 10000 INJECTION INTRAVENOUS; SUBCUTANEOUS at 06:11

## 2021-11-03 RX ADMIN — Medication 6 MG: at 21:47

## 2021-11-03 RX ADMIN — ACETAMINOPHEN 1000 MG: 325 TABLET ORAL at 21:48

## 2021-11-03 RX ADMIN — GABAPENTIN 100 MG: 100 CAPSULE ORAL at 14:33

## 2021-11-03 RX ADMIN — ONDANSETRON 4 MG: 4 TABLET, ORALLY DISINTEGRATING ORAL at 06:07

## 2021-11-03 RX ADMIN — TAMSULOSIN HYDROCHLORIDE 0.4 MG: 0.4 CAPSULE ORAL at 07:52

## 2021-11-03 RX ADMIN — ONDANSETRON 4 MG: 4 TABLET, ORALLY DISINTEGRATING ORAL at 11:11

## 2021-11-03 RX ADMIN — CLOPIDOGREL 75 MG: 75 TABLET, FILM COATED ORAL at 07:53

## 2021-11-03 RX ADMIN — DOCUSATE SODIUM 100 MG: 100 CAPSULE, LIQUID FILLED ORAL at 07:59

## 2021-11-03 RX ADMIN — POLYETHYLENE GLYCOL 3350 17 G: 17 POWDER, FOR SOLUTION ORAL at 14:37

## 2021-11-03 RX ADMIN — SENNOSIDES 17.2 MG: 8.6 TABLET, COATED ORAL at 21:47

## 2021-11-03 RX ADMIN — GABAPENTIN 100 MG: 100 CAPSULE ORAL at 07:51

## 2021-11-03 RX ADMIN — GABAPENTIN 100 MG: 100 CAPSULE ORAL at 21:47

## 2021-11-03 RX ADMIN — ACETAMINOPHEN 1000 MG: 325 TABLET ORAL at 14:34

## 2021-11-03 RX ADMIN — ASPIRIN 81 MG CHEWABLE TABLET 81 MG: 81 TABLET CHEWABLE at 07:51

## 2021-11-03 RX ADMIN — METOPROLOL TARTRATE 12.5 MG: 25 TABLET, FILM COATED ORAL at 21:48

## 2021-11-03 RX ADMIN — DOCUSATE SODIUM 100 MG: 100 CAPSULE, LIQUID FILLED ORAL at 21:47

## 2021-11-03 RX ADMIN — LACTULOSE 20 G: 20 SOLUTION ORAL at 21:47

## 2021-11-03 RX ADMIN — HEPARIN SODIUM 5000 UNITS: 10000 INJECTION INTRAVENOUS; SUBCUTANEOUS at 17:34

## 2021-11-03 RX ADMIN — ACETAMINOPHEN 1000 MG: 325 TABLET ORAL at 06:07

## 2021-11-03 ASSESSMENT — PAIN DESCRIPTION - PROGRESSION
CLINICAL_PROGRESSION: NOT CHANGED
CLINICAL_PROGRESSION: NOT CHANGED

## 2021-11-03 ASSESSMENT — PAIN SCALES - GENERAL
PAINLEVEL_OUTOF10: 7
PAINLEVEL_OUTOF10: 7
PAINLEVEL_OUTOF10: 0
PAINLEVEL_OUTOF10: 2
PAINLEVEL_OUTOF10: 5

## 2021-11-03 ASSESSMENT — PAIN DESCRIPTION - ORIENTATION
ORIENTATION: RIGHT
ORIENTATION: RIGHT

## 2021-11-03 ASSESSMENT — PAIN DESCRIPTION - LOCATION
LOCATION: HEAD
LOCATION: HEAD

## 2021-11-03 ASSESSMENT — PAIN DESCRIPTION - FREQUENCY: FREQUENCY: INTERMITTENT

## 2021-11-03 ASSESSMENT — PAIN DESCRIPTION - ONSET: ONSET: ON-GOING

## 2021-11-03 ASSESSMENT — PAIN SCALES - WONG BAKER
WONGBAKER_NUMERICALRESPONSE: 0
WONGBAKER_NUMERICALRESPONSE: 0

## 2021-11-03 ASSESSMENT — PAIN DESCRIPTION - PAIN TYPE
TYPE: ACUTE PAIN
TYPE: ACUTE PAIN

## 2021-11-03 ASSESSMENT — PAIN DESCRIPTION - DESCRIPTORS
DESCRIPTORS: THROBBING;DISCOMFORT
DESCRIPTORS: THROBBING;DISCOMFORT

## 2021-11-03 NOTE — PROGRESS NOTES
Physical Therapy  Treatment Note  Supervising Therapist: Mary Harkins, PT, DPT [id number]    NAME: Sy Hadley  [facility]  DIAGNOSIS: Acute CVA  PRECAUTIONS: Falls, helmet at all times when OOB, L hemiplegia, L neglect, pusher, alarm, must bring phone to therapy (cardiac monitor), L PRAFO, SPB <180  HPI: Pt is a 39 y.o. female with past medical history significant for recent COVID-19 infection (9/15/21) who [facility] with acute onset left-sided weakness. Initial NIHSS 17.  Initial CT ([facility]) showed R MCA hyperdensity; initial CTA showed right ICA/M1 occlusion. On 10/13, her neuro exam worsened and repeat imaging indicated worsening edema, 1 cm of midline shift. She underwent emergent R hemicraniectomy by Dr. Jamila Reyna [facility]) on 10/13. After being deemed medically appropriate, she was transferred to [place] [address] on 10/22/2021    Social:  Pt lives with daughter (15 y/o), will be returning to parent's home upon discharge (1 floor plan, 2 HAIDER with 2 HR). Mother and father healthy, do not work. 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 Initial Evaluation  Date: 10/23/21 AM     PM    Short Term Goals Long Term Goals    Was pt agreeable to Eval/treatment? yes Yes Yes     Does pt have pain? Pain on the top of her head once the helmet was placed on Mild c/o L hip and R rib pain Mild c/o L hip and R rib pain     Bed Mobility  Rolling: Max A  Supine to sit: Max A   Sit to supine: Max A   Scooting: Max A NT   Supine<>Sit ModA  Scooting/Rolling ModA Min A Supervision   Transfers Sit to stand: Max A   Stand to sit: Max A  Stand pivot:  Max A x2    5xSTS: Unable to complete Sit<>stand: ModA (MaxA with L knee immobilizer)  Stand pivot: MaxA with L hemicane    Sliding board transfer [address] from commode>WC Sit<>stand: ModA (MaxA with L knee immobilizer)  Stand pivot: MaxA with L hemicane    Sliding board transfer [address] to L from bed<>WC and WC<>commode Min A SBA  5xSTS: TBD   Ambulation    15 feet with R mackey rail with Max A + wc follow    10mWT: TBA  6mWT: TBA 80 feet x 2 reps and 40 feet x 1 rep with R hemicane with ModA  (L ankle PLS AFO , L knee immobilizer) 60 feet x 2 reps and 50 feet x 1 rep with R hemicane with ModA   (L ankle PLS AFO , L knee immobilizer) 50 feet with AAD with Min A       500 feet with AAD with SBA    10mWT: TBD  6mWT: TBD   Walking 10 feet on uneven surface NT NT NT 10 feet with AAD with Min A 10 feet with AAD with SBA   Wheel Chair Mobility NT NT NT  150 feet with R extremities with SBA   Car Transfers NT NT NT Min A SBA   Stair negotiation: ascended and descended NT NT 4 steps with R HR with MaxA (ascending with RLE, descending with LLE non-reciprocal) 4 steps with 1 rail with Min A 12 steps with 1 rail with SBA   Curb Step:   ascended and descended NT NT NT 4 inch step with AAD and Min A 4 inch step with AAD and SBA   Picking up object off the floor NT NT NT Will  an object with Min assist Will  an object with SBA   BLE ROM WFL: LLE AROM limited by flaccidity  WFL: LLE AROM limited by spasticity 2+ on MAS (hamstrings, hip extensors)      BLE Strength RLE 4+/5  LLE 0/5 RLE 4+/5  LLE 0/5 with MMT      Balance  Static Sitting: Mod/Max A    BBS: TBA  FGA: TBA Sitting: Mod A  Standing: Max A       BBS: TBD  FGA: TBD   Date Family Teach Completed TBA Pt's mother present for observation 10/24, 11/2, 11/3      Is additional Family Teaching Needed?   Y or N Y Yes      Hindering Progress L hemiplegia, L neglect L hemiplegia, L neglect      PT recommended ELOS 5 weeks       Team's Discharge Plan        Therapist at Team Meeting          HRmax: (178, -10  bpm for beta blocker) 168 bpm Time spent at moderate intensity (60-70% HRmax) Time spent at high intensity (70-85% HRmax)    AM: 0:07:56 AM: 0:19:31    PM: 0:14:51 PM: 0:12:16       Therapeutic Exercise:   AM:  Seated gastroc stretch 40\" x 1 rep LLE  PM: NA    Patient education  Pt educated on strategies to improve L step width    Patient response to education:   Pt verbalized understanding Pt demonstrated skill Pt requires further education in this area   yes partial yes     Additional Comments: Pt remains pleasant and motivated during sessions with good recall of sequencing. Pt advancing LLE more consistently, inside out sock removed over L foot. Pt has more difficulty advancing LLE however she is able with cues/manual assistance for R weight shift. Pt cues to step laterally with LLE which results in FW LLE advancement with improved TY. /74 before PM session. Mother present to observe PM session. Stair training initiated with pt exhibiting similar safety impairments as with overground ambulation. Plan to progress gait training intensity next session. Chair/bed alarm: armed following session    AM  Time in: 0830  Time out: 0915    PM  Time in: 1300  Time out: 1345    Pt is making good progress toward established Physical Therapy goals. Continue with physical therapy current plan of care.     Lalo Jolley, PT, DPT  Board Certified Clinical Specialist in Neurologic Physical Therapy  [id number]

## 2021-11-03 NOTE — PROGRESS NOTES
OCCUPATIONAL THERAPY DAILY NOTE    Date:11/3/2021  Patient Name: Abdifatah Phipps  MRN: [id number]  : 1975  Room: [address] [place], [place] [address]     Diagnosis:Acute CVA( [facility] Level 2 stroke [facility] on 10/11/21. L side weakness, neglect. NIH-17  Found to have large R MCA involving R occipital)  Surgery: R frontotemporal decompressive hemicraniectomy on 10/13/21  Past Medical History: COVID (9/15), tobacco use  Precautions: Falls, L hemiplegia, L neglect, L sublux, bed/chair alarm, helmet when OOB, impulsive, L PRAFO    Functional Assessment:   Date Status AE  Comments   Feeding 21 Stand by Assist      Grooming 21 SBA            Oral Care 21 SBA     Bathing 10/29/21 Maximal Assist      UB Dressing 21 Mod A     LB Dressing 21 Maximal Assist  reacher    Footwear 21 Moderate Assist      Toileting 11/3/21 Maximal Assist  Grab bar Pt completed toileting task on raised commode, pt requiring maxA to stand, with second person assist for safety, to complete of hygiene to buttocks and manage of clothes   Homemaking         Functional Transfers / Balance:   Date Status DME  Comments   Sit Balance 11/3/21 CGA  Pt sat supported upright in w/c and on 3in1 bedside commode   Stand Balance 11/3/21 Max A Grab bar  Bed rail  Attempted to stand pt at table top x2, with pt tolerating ~2/3seconds, plopping down in chair stating \"I cant stand. \" Pt then stood for third time with second person assit for safety to reposition pt in chair ~2/3seconds   [] Tub  [] Shower   Transfer 11/3/21 TBA     Commode   Transfer 11/3/21 modA slideboard, drop arm commode Assistance to place/remove slide board, and guide pt   Functional   Mobility 11/3/21 TBA     Other:    W/C<>EOB     11/3/21     [address] to place/remove slide board and guide pt, completing transfer to R side     Functional Exercises / Activity:  -RUE exercises with 2lb weight for 15reps x5 planes, with rest breaks, focusing on increasing of activity treatment sessions toward set goals. Therapy emphasis to obtain goals:    Time Frame for Long term goals  6 weeks   Long term goal 1 Pt will complete UB self-care tasks w/ Supervision   Long term goal 2 Pt will complete LB self-care tasks w/ Supervision   Long term goal 3 Pt will complete toileting (all aspects) w/ Supervision   Long term goal 4 Pt will safely complete toilet transfers w/ Supervision   Long term goal 5 Pt will safely complete tub/shower transfers w/ Supervision   Long term goals 6 Pt will complete basic homemaking task w/ SBA   Long term goal 7 Pt will complete grooming task w/ Supervision   Long term goal 8 Pt will complete feeding task w/ Supervision/setup     [x] Continue with current OT Plan of care. [] Prepare for Discharge    10/27/21 OT plan of care updated on this date. Tentative length of stay is 6 weeks Willis Mala OTR/L [id number]  11/3/21- Pt POC updated on this date.  Pt tentative length of day is 5 weeks to address above problem areas - Essie Cook OTR/L [id number]     DISCHARGE RECOMMENDATIONS  Recommended DME: drop arm BSC    Post Discharge Care:   []Home Independently  []Home with 24hr Care / Supervision []Home with Partial Supervision []Home with Home Health OT []Home with Out Pt OT []Other: ___   Comments:         Time in Time out Tx Time Breakdown  Variance:   First Session  0915 1000 [x] Individual Tx-45mins   [] Concurrent Tx -  [] Co-Tx -   [] Group Tx -   [] Time Missed -     Second Session [id number] [x] Individual Tx- 45mins  [] Concurrent Tx -  [] Co-Tx -   [] Group Tx -   [] Time Missed -     Third Session    [] Individual Tx-   [] Concurrent Tx -  [] Co-Tx -   [] Group Tx -   [] Time Missed -         Total Tx Time-90mins      Trudy Tate GLOVER/L [id number]    I have read & agree with the above status  Essie Cook OTR/L [id number]

## 2021-11-03 NOTE — CARE COORDINATION
Per Team:  Plan re eval (5 wks). Updated the pt. And her momAndrés Lucero. Pt. Will require 24hr supervision post dc. Olegario Szymanski committed to caring for Ladan post dc. Ladan requires cues for trunk control and sequencing. She is impulsive,has dense trent-plegia and is a high fall risk. She has poor judgement. Wrist xray (-). Pt's dad to build RAMP for entry. FT- 11/4, 11/9 & 11/11 AM with Olegario Szymanski.     Roge Ontiveros, LSW  11/3/2021

## 2021-11-03 NOTE — PROGRESS NOTES
Speech Language Pathology  ACUTE REHABILITATION--DAILY PROGRESS NOTE          ADMITTING DIAGNOSIS: Acute cerebrovascular accident (CVA) ([facility] [address] [I63.9]    VISIT DIAGNOSIS: CVA    SPEECH THERAPY  PLAN OF CARE   The speech therapy  POC is established based on physician order, speech pathology diagnosis and results of clinical assessment     SPEECH PATHOLOGY DIAGNOSIS:    Mild cognitive deficit with left neglect    Speech Pathology intervention is recommended 3-6 times per week for LOS or when goals are met with emphasis on the following:      Conditions Requiring Skilled Therapeutic Intervention for speech, language and/or cognition    Decreased attention with impulsivity  left-neglect    Specific Speech Therapy Interventions to Include: Therapeutic exercises  Address left neglect    Specific instructions for next treatment:      To initiate POC    SHORT/LONG TERM GOALS  Pt will improve orientation to spatial and temporal surroundings with use of external aids and verbal cues  Pt will attend to her left side with decreasing amount of physical/verbal cues  Pt will improve attention and decrease impulsivity    Patient stated goals: Agreed with above    Rehabilitation Potential/Prognosis: good     FIMS SCORES      Swallowing    Current Status  6--Modified Independent   Short Term Goal 6--Modified Independent    Long Term Goal 6--Modified Independent     Receptive    Current Status  t   6--Modified Independent    Short Term Goal t   6--ModifiedIndependent    Long Term Goal                  6--Modified Independent    Expressive    Current Status      6--Modified Independent    Short Term Goal     6--Modified Independent    Long Term Goal                  6--Modified Independent    Social Interaction    Current Status  4--Minimal Assistance    Short Term Goal 5--Supervision    Long Term Goal 6--Modified Independent         Problem Solving    Current Status    4--Minimal Assistance / 3--Moderate Assistance (good for verbal but consistent difficulty with execution of motor tasks)  Short Term Goal   4--Minimal Assistance    Long Term Goal    5--Supervision     Memory    Current Status    5--Supervision / 4--Minimal Assistance  Short Term Goal t     5--Supervision   Long Term Goal        5--Supervision  ? SWALLOWING:      Diet:  Regular consistency solids with thin liquids (IDDSI level 0)     Assistance with tray set up and supervision by nursing staff is required with all intake d/t left neglect and impulsivity. LANGUAGE:    Appropriate responses during discussion with SLP. COGNITION:      Patient completes structured verbal cognitive tasks with good ability. Good recall of recent events relevant to self. Stimulus questions were obtained from the RIPA-2  There are 30 possible points for each subtest.   Severity ratings for each subtest were determined as follows:     RAW SCORE  SEVERITY    28-30  Within functional limits    25-27  Mild deficit    22-24  Moderate deficit    19-21  Marked deficit    16-18 Severe deficit   Below 16 Profound deficit       Subtest  Raw Score /Severity    Immediate Memory  30/ Within functional limits   Recent Memory  30/ Within functional limits   Temporal Orientation   (Recent Memory)  30/ Within functional limits   Temporal Orientation   (Remote Memory)  30/ Within functional limits   Spatial Orientation  30/ Within functional limits   Orientation to Environment  30/ Within functional limits   Recall of General Information  28/ Within functional limits   Problem Solving & Abstract Reasoning  30/ Within functional limits   Organization  30/ Within functional limits   Auditory Processing   & Retention  30/ Within functional limits       SLUMS ASSESSMENT:  Assesses cognitive ability in areas of orientation, immediate/ STM recall, divergent naming, functional calculation, abstraction, mental manipulation, clock draw, and story comprehension.  Patient received a score of 28/30, which is indicative of functional cognitive linguistic skills for verbal tasks; however, patient continues to demonstrate poor safety and judgment during execution of motor tasks. Safety:  Poor. Continued impulsivity. Patient demonstrates poor insight into her deficits and does not seem to realize potential consequences of her poor decisions. SLP spoke with patient's physician; will plan to see patient as a daily co-tx with OT to assist patient with ability to identify physical/cognitive/visuospatial impairments, verbalize impact each have on overall level of functional independence, and demonstrate appropriate insight/safety precautions during execution of motor tasks given these limitations with >90% of trials. SPEECH:    Speech intelligibility was good during structured tasks and conversational speech. SP recommended after discharge:  yes  Supervision recommended at discharge: 24 hour    Will continue SP intervention as per previously established POC. EDUCATION: Speech Pathologist (SLP) completed education with the patient regarding identified cognitive linguistic deficits and subsequent need for speech pathology intervention. Discussed deficit areas to be targeted by formal intervention and established short/long term goals. Reviewed compensatory strategies to improve functional outcome (as appropriate). Encouraged patient to engage SLP in structured Q&A session relative to identified deficit areas. Patient indicated understanding of all information provided via satisfactory verbal response.       Minute Tracking:    Individual therapy:   45 minutes  Concurrent therapy:    0 minutes  Group therapy:     0 minutes  Co-treatment therapy:    0 minutes    Total minutes: 45 minutes

## 2021-11-03 NOTE — PROGRESS NOTES
facial droop. Spastic left hemiplegia. MAS 1+ in LUE; MAS 2 in LLE.      Motor:       Motor Findings  Strength: Right  Strength: Left    Deltoid  5/5  0/5    Biceps  5/5  0/5    Triceps  5/5  0/5    Wrist Extensors  5/5  0/5    FDP 5/5 0/5   Finger abductors 5/5 0/5   Iliospoas  5/5  0/5    Quadriceps  5/5  0/5    Tibialis anterior  5/5  0/5    EHL 5/5 0/5   Gastroc soleus  5/5  0/5            Laboratory:    Lab Results   Component Value Date    WBC 11.0 10/29/2021    HGB 12.3 10/29/2021    HCT 38.6 10/29/2021    MCV 95.5 10/29/2021     (H) 10/29/2021     Lab Results   Component Value Date     10/28/2021    K 3.8 10/28/2021     10/28/2021    CO2 24 10/28/2021    BUN 11 10/28/2021    CREATININE 0.5 10/28/2021    GLUCOSE 105 10/28/2021    CALCIUM 9.2 10/28/2021      Lab Results   Component Value Date    ALT 75 (H) 10/23/2021    AST 46 (H) 10/23/2021    ALKPHOS 89 10/23/2021    BILITOT 0.4 10/23/2021       Functional Status:   Bed mobility: Min -Mod A  Transfers: Mod - Max A  Ambulation: 60 ft L ankle PLS AFO, L knee immobilizer, R hemicane Mod A  Feeding: SBA  Grooming: SBA  UB dressing: Mod A  LB dressing: Max A       Assessment/Plan:       39 y.o. female admitted to inpatient rehabilitation for impairments and activities limitations in ADLs and mobility secondary to right MCA CVA.    -Right MCA CVA: Complicated by cerebral edema requiring emergent right hemicraniectomy (10/13/2021). Dense left spastic hemiplegia, left neglect, mild cognitive impairment. Helmet when out of bed. L PRAFO in bed. LUE sling for gait. L w/c tray. Aspirin, Plavix, Lipitor for secondary prevention. Monitor neuro exam. Continue Acute Rehab program.   -Spasticity LUE/LLE: Monitor. Tone currently helpful to compensate for weakness with attempted gait in PT.  If it worsens and becomes problematic can begin antispasmodic.   -Pain control, MSK pain, neuropathic pain: Tylenol PRN, Tramadol PRN, gabapentin, Lidoderm, Voltaren gel  -Urinary retention: On flomax, now voiding well, low PVRs  -Tachycardia: intermittent. Cardiology consulted. She was started on Lopressor. Cardiology attempting to access data from her heart monitor   -Tobacco abuse: Nicoderm patch  -DVT prophylaxis: heparin SQ, SCDs      Schedule senna, add glycolax. PRNs available for bowels.    Team conference today    Electronically signed by Jada Patel MD on 11/3/2021 at 1:07 PM

## 2021-11-03 NOTE — PATIENT CARE CONFERENCE
[address] NOTE/PATIENT PLAN OF CARE    The physician was present and led this team conference    Date: 11/3/2021  Admission date: 10/22/2021  Patient Name: Suzette Dyer        MRN: [id number]    : 1975  (39 y.o.)  Gender: female   Rehab diagnosis/surgery with date:  CVA-right MCA of 10/12/21 with [facility] on 10/13/21  Impairment Group Code:  1.1      MEDICAL/FUNCTIONAL HISTORY/STATUS:  several episodes of tachycardia this week, cardiology saw, started on low dose lopressor    Consultations/Labs/X-rays: cardiology consult done 21 per Dr. Danika Nelson:  Lopressor 12.5 mg twice daily      NURSING :    Bowel:   Always Continent  [x]   Occasionally incontinent  []   Frequently incontinent  []   Always incontinent  []   Not occurred  []     Bladder:   Always Continent  [x]    Incontinent less than daily[]   Incontinent  daily []   Always incontinent  []   No urine output    []   Indwelling catheter []       Toilet Hygiene:   Current level : max assist  Short term Toilet hygiene goal: mod assist  Long term toilet hygiene goal:  standby assist    Skin integrity: intact  Pain: right head and rib pain, on ultram    NUTRITION    Diet  regular  Liquid consistency   thin    SOCIAL INFORMATION:  Lives with: dtr, 15 yr old  Prior community services:  none  Home Architecture:  going to mom's at discharge:  ranch with 2 entry, 2 rails  Prior Level of function:  independent  DME:  none    FAMILY / PATIENT EDUCATION:  safety and self care are ongoing, mom has been in to observe    PHYSICAL THERAPY    Bed mobility:   Current level: mod assist  Short term bed mobility goal: min assist  Long term bed mobility goal: supervision    Chair/bed transfers:  Current level: mod assist, pivots are max assist with a trent cane, with transfer board  is mod assist, remains impulsive  Short term Chair/bed transfers goal: min assist  Long term Chair/bed transfers goal: standby assist      Ambulation:   Current level: 60 ft with trent cane at  mod assist-max assist with wheelchair  follow, trial a left immobilizer and left AFO, doing mirror therapy, dense hemiplegia hinders  Short term ambulation goal: 50 ft at min assist  Long term ambulation goal: 500 ft at standby assist    Wheelchair Mobility:  Current level: 20 ft using right side at max assist  Short term wheelchair goal: met  Long term wheelchair goal: 150 ft at supervision      Car transfers:   Current level: to be assessed    Stairs:   Current level : to be assessed    Lower Extremity Strength Issues:  right lower 4+/5, left mostly a 0/5, hip is about 2-    Other comments: mom here to observe times 2, making progress      OCCUPATIONAL THERAPY:      Tub/shower:   Current level: to be assessed      Feeding:  Current level: standby assist  Short term feeding goal: supervision  Long term feeding goal: supervision    Grooming:   Current level: standby assist-min assist  Short term grooming goal: supervision  Long term grooming goal: supervision    Bathing:  Current level: max assist, ADL today  Short term bathing goal: mod assist  Long term bathing goal: standby assist    Homemaking:   Current level: to be assessed    Upper body dressing:  Current level: mod assist with verbal cues, does not recognize deficits  Short term upper body dressing goal: .min assist  Long term upper body dressing goal: supervision    Lower body dressing:                                                                          Current level: max assist with a reacher             Short term lower body dressing goal: mod assist          Long term lower body dressing goal: standby assist            Footwear  Current level: mod assist  Short term goal: min assist  Long term goal:standby assist      Toilet transfer:   Current level: min assist  Short term toilet transfer goal: Contact guard assist  Long term toilet transfer goal: standby assist      SPEECH THERAPY  Swallowing:  Current level:  Modified independent  Short term swallowing goal: Modified independent  Long term swallowing Goal: Modified independent    Comprehension:   Current level: Modified independent  Short term comprehension goal: Modified independent  Long term comprehension goal: independent    Expression:   Current level: Modified independent  Short term expression goal: Modified independent  Long term expression goal: independent    Problem solving:   Current level: mod assist-max assist  Short term problem solving goal: mod assist  Long term problem solving goal: min assist    Memory:  Current level: min assist  Short term memory goal: supervision  Long term memory goal: supervision    Social interaction:  min assist-mod assist    Safety awareness: poor    Comments: refusing speech therapy alot    Patient/family's personal goals: get back to normal  Factors supporting goal achievement:  prior level of independence  Factors hindering goal achievement:  left hemiplegia, left neglect, impulsive, poor safety      Discharge Plan   Estimated Length of Stay: 5 weeks            Destination: home  Services at Discharge: to be assessed  Equipment at Discharge: to be assessed      INTERDISCIPLINARY TEAM/PHYSICIAN [address]:  continue working towards goals, monitor tachycardia      I approve the established interdisciplinary plan of care as documented within the medical record of Sy Hadley. Electronically signed by Yudy Box RN  on 11/3/2021 at 8:48 AM  The following interdisciplinary team members were present:  SJ James LSW Richard S. Brinton Shi, DPT Lorriane Gammon, OTR Tammi Husband, MA CCC-SLP

## 2021-11-04 PROCEDURE — 97130 THER IVNTJ EA ADDL 15 MIN: CPT

## 2021-11-04 PROCEDURE — 6370000000 HC RX 637 (ALT 250 FOR IP): Performed by: PHYSICAL MEDICINE & REHABILITATION

## 2021-11-04 PROCEDURE — 97129 THER IVNTJ 1ST 15 MIN: CPT

## 2021-11-04 PROCEDURE — 6360000002 HC RX W HCPCS: Performed by: PHYSICAL MEDICINE & REHABILITATION

## 2021-11-04 PROCEDURE — 6370000000 HC RX 637 (ALT 250 FOR IP): Performed by: PHYSICIAN ASSISTANT

## 2021-11-04 PROCEDURE — 97530 THERAPEUTIC ACTIVITIES: CPT

## 2021-11-04 PROCEDURE — 97535 SELF CARE MNGMENT TRAINING: CPT

## 2021-11-04 PROCEDURE — 99232 SBSQ HOSP IP/OBS MODERATE 35: CPT | Performed by: PHYSICAL MEDICINE & REHABILITATION

## 2021-11-04 PROCEDURE — 1280000000 HC REHAB R&B

## 2021-11-04 RX ORDER — NICOTINE 21 MG/24HR
1 PATCH, TRANSDERMAL 24 HOURS TRANSDERMAL DAILY
Status: DISCONTINUED | OUTPATIENT
Start: 2021-11-04 | End: 2021-12-04

## 2021-11-04 RX ADMIN — Medication 6 MG: at 22:12

## 2021-11-04 RX ADMIN — DOCUSATE SODIUM 100 MG: 100 CAPSULE, LIQUID FILLED ORAL at 22:11

## 2021-11-04 RX ADMIN — HEPARIN SODIUM 5000 UNITS: 10000 INJECTION INTRAVENOUS; SUBCUTANEOUS at 17:00

## 2021-11-04 RX ADMIN — LACTULOSE 20 G: 20 SOLUTION ORAL at 09:07

## 2021-11-04 RX ADMIN — GABAPENTIN 100 MG: 100 CAPSULE ORAL at 07:48

## 2021-11-04 RX ADMIN — CLOPIDOGREL 75 MG: 75 TABLET, FILM COATED ORAL at 07:48

## 2021-11-04 RX ADMIN — HEPARIN SODIUM 5000 UNITS: 10000 INJECTION INTRAVENOUS; SUBCUTANEOUS at 05:51

## 2021-11-04 RX ADMIN — DOCUSATE SODIUM 100 MG: 100 CAPSULE, LIQUID FILLED ORAL at 08:00

## 2021-11-04 RX ADMIN — GABAPENTIN 100 MG: 100 CAPSULE ORAL at 15:27

## 2021-11-04 RX ADMIN — ATORVASTATIN CALCIUM 40 MG: 40 TABLET, FILM COATED ORAL at 22:12

## 2021-11-04 RX ADMIN — ONDANSETRON 4 MG: 4 TABLET, ORALLY DISINTEGRATING ORAL at 07:46

## 2021-11-04 RX ADMIN — ACETAMINOPHEN 1000 MG: 325 TABLET ORAL at 22:08

## 2021-11-04 RX ADMIN — TRAMADOL HYDROCHLORIDE 25 MG: 50 TABLET, COATED ORAL at 15:29

## 2021-11-04 RX ADMIN — TAMSULOSIN HYDROCHLORIDE 0.4 MG: 0.4 CAPSULE ORAL at 07:49

## 2021-11-04 RX ADMIN — ASPIRIN 81 MG CHEWABLE TABLET 81 MG: 81 TABLET CHEWABLE at 07:49

## 2021-11-04 RX ADMIN — ACETAMINOPHEN 650 MG: 325 TABLET ORAL at 10:45

## 2021-11-04 RX ADMIN — GABAPENTIN 100 MG: 100 CAPSULE ORAL at 22:07

## 2021-11-04 RX ADMIN — ACETAMINOPHEN 1000 MG: 325 TABLET ORAL at 15:26

## 2021-11-04 RX ADMIN — ACETAMINOPHEN 1000 MG: 325 TABLET ORAL at 05:51

## 2021-11-04 RX ADMIN — METOPROLOL TARTRATE 12.5 MG: 25 TABLET, FILM COATED ORAL at 07:48

## 2021-11-04 RX ADMIN — POLYETHYLENE GLYCOL 3350 17 G: 17 POWDER, FOR SOLUTION ORAL at 08:00

## 2021-11-04 RX ADMIN — SENNOSIDES 17.2 MG: 8.6 TABLET, COATED ORAL at 22:10

## 2021-11-04 ASSESSMENT — PAIN DESCRIPTION - LOCATION
LOCATION: HEAD
LOCATION: HEAD

## 2021-11-04 ASSESSMENT — PAIN DESCRIPTION - ONSET: ONSET: ON-GOING

## 2021-11-04 ASSESSMENT — PAIN SCALES - GENERAL
PAINLEVEL_OUTOF10: 2
PAINLEVEL_OUTOF10: 2
PAINLEVEL_OUTOF10: 10
PAINLEVEL_OUTOF10: 6
PAINLEVEL_OUTOF10: 0
PAINLEVEL_OUTOF10: 2
PAINLEVEL_OUTOF10: 3
PAINLEVEL_OUTOF10: 8
PAINLEVEL_OUTOF10: 6

## 2021-11-04 ASSESSMENT — PAIN DESCRIPTION - DESCRIPTORS
DESCRIPTORS: THROBBING;ACHING
DESCRIPTORS: THROBBING;ACHING

## 2021-11-04 ASSESSMENT — PAIN DESCRIPTION - PAIN TYPE
TYPE: ACUTE PAIN
TYPE: ACUTE PAIN

## 2021-11-04 ASSESSMENT — PAIN DESCRIPTION - PROGRESSION
CLINICAL_PROGRESSION: NOT CHANGED
CLINICAL_PROGRESSION: NOT CHANGED

## 2021-11-04 ASSESSMENT — PAIN SCALES - WONG BAKER: WONGBAKER_NUMERICALRESPONSE: 0

## 2021-11-04 ASSESSMENT — PAIN DESCRIPTION - ORIENTATION
ORIENTATION: RIGHT
ORIENTATION: RIGHT

## 2021-11-04 ASSESSMENT — PAIN DESCRIPTION - FREQUENCY: FREQUENCY: INTERMITTENT

## 2021-11-04 NOTE — PROGRESS NOTES
[id number] [facility] Physical Medicine and Rehabilitation  Comprehensive Progress Note    Subjective:      Sapphire Dia is a 39 y.o. female admitted to inpatient rehabilitation for impairments and activities limitations in ADLs and mobility secondary to right MCA CVA. No acute events overnight. No cp, sob, n/v. +Constipation. Tolerating therapy. No other complaints. The patient's medical records have been reviewed. Scheduled Meds:senna, 2 tablet, Nightly  polyethylene glycol, 17 g, Daily  metoprolol tartrate, 12.5 mg, BID  acetaminophen, 1,000 mg, 3 times per day  lidocaine, 2 patch, Daily  gabapentin, 100 mg, TID  tamsulosin, 0.4 mg, Daily  heparin (porcine), 5,000 Units, Q12H  aspirin, 81 mg, Daily  atorvastatin, 40 mg, Nightly  vitamin D, 50,000 Units, Weekly  docusate sodium, 100 mg, BID  melatonin, 6 mg, Nightly  nicotine, 1 patch, Daily  clopidogrel, 75 mg, Daily      Continuous Infusions:  PRN Meds:traMADol, 25 mg, BID PRN  ondansetron, 4 mg, Q6H PRN  lactulose, 20 g, BID PRN  guaiFENesin, 200 mg, BID PRN  acetaminophen, 650 mg, Q4H PRN  magnesium hydroxide, 30 mL, Daily PRN  bisacodyl, 10 mg, Daily PRN  diclofenac sodium, 2 g, 4x Daily PRN         Objective:      Vitals:    11/02/21 2105 11/03/21 0700 11/03/21 2148 11/04/21 0730   BP: 112/68 (!) 97/58 112/60 124/78   Pulse: 77 78 80 101   Resp:  17  18   Temp:  97.6 °F (36.4 °C)  97.4 °F (36.3 °C)   TempSrc:  Temporal  Tympanic   SpO2:  96%  94%   Weight:       Height:         General appearance: alert, NAD, seen in OT  Head: R crani site c/d/i   Eyes: conjunctivae/corneas clear. PERRL, EOM's intact. Lungs: clear to auscultation bilaterally  Heart: regular rate and rhythm, S1, S2 normal  Abdomen: soft, non-tender, normal bowel sounds  Extremities: extremities normal, atraumatic, no cyanosis or edema  MSK: no cyanosis, clubbing, edema  Skin: R crani c/d/i   Neurologic: Alert, oriented x4. Left neglect. Speech clear. No aphasia appreciated.  Mild L facial droop. Spastic left hemiplegia. MAS 1+ in LUE; MAS 1+-2 in LLE.      Motor:       Motor Findings  Strength: Right  Strength: Left    Deltoid  5/5  0/5    Biceps  5/5  0/5    Triceps  5/5  0/5    Wrist Extensors  5/5  0/5    FDP 5/5 0/5   Finger abductors 5/5 0/5   Iliospoas  5/5  0/5    Quadriceps  5/5  0/5    Tibialis anterior  5/5  0/5    EHL 5/5 0/5   Gastroc soleus  5/5  0/5            Laboratory:    Lab Results   Component Value Date    WBC 11.0 10/29/2021    HGB 12.3 10/29/2021    HCT 38.6 10/29/2021    MCV 95.5 10/29/2021     (H) 10/29/2021     Lab Results   Component Value Date     10/28/2021    K 3.8 10/28/2021     10/28/2021    CO2 24 10/28/2021    BUN 11 10/28/2021    CREATININE 0.5 10/28/2021    GLUCOSE 105 10/28/2021    CALCIUM 9.2 10/28/2021      Lab Results   Component Value Date    ALT 75 (H) 10/23/2021    AST 46 (H) 10/23/2021    ALKPHOS 89 10/23/2021    BILITOT 0.4 10/23/2021       Functional Status:   Bed mobility: Mod A  Transfers: Mod - Max A  Ambulation: 75 ft L ankle PLS AFO, L knee immobilizer, R hemicane Mod A  Feeding: SBA  Grooming: SBA  UB dressing: Mod A  LB dressing: Max A       Assessment/Plan:       39 y.o. female admitted to inpatient rehabilitation for impairments and activities limitations in ADLs and mobility secondary to right MCA CVA.    -Right MCA CVA: Complicated by cerebral edema requiring emergent right hemicraniectomy (10/13/2021). Dense left spastic hemiplegia, left neglect, mild cognitive impairment. Helmet when out of bed. L PRAFO in bed. LUE sling for gait. L w/c tray. Aspirin, Plavix, Lipitor for secondary prevention. Monitor neuro exam. Continue Acute Rehab program.   -Spasticity LUE/LLE: Monitor. Tone currently helpful to compensate for weakness with attempted gait in PT.  If it worsens and becomes problematic can begin antispasmodic.   -Pain control, MSK pain, neuropathic pain: Tylenol PRN, Tramadol PRN, gabapentin, Lidoderm, Voltaren gel  -Urinary retention: On flomax, now voiding well, low PVRs  -Tachycardia: intermittent. Cardiology consulted. She was started on Lopressor. Cardiology attempting to access data from her heart monitor.   -Constipation: colace, senna, glycolax.  PRNs available.   -Tobacco abuse: Nicoderm patch  -DVT prophylaxis: heparin SQ, SCDs    Colace, senna, glycolax , PRNs for bowels   Decrease nicoderm to the 14mg patch      Electronically signed by Devyn Sherwood MD on 11/4/2021 at 12:18 PM

## 2021-11-04 NOTE — PROGRESS NOTES
Speech Language Pathology  ACUTE REHABILITATION--DAILY PROGRESS NOTE          ADMITTING DIAGNOSIS: Acute cerebrovascular accident (CVA) ([facility] [address] [I63.9]    VISIT DIAGNOSIS: CVA    SPEECH THERAPY  PLAN OF CARE   The speech therapy  POC is established based on physician order, speech pathology diagnosis and results of clinical assessment     SPEECH PATHOLOGY DIAGNOSIS:    Mild cognitive deficit with left neglect    Speech Pathology intervention is recommended 3-6 times per week for LOS or when goals are met with emphasis on the following:      Conditions Requiring Skilled Therapeutic Intervention for speech, language and/or cognition    Decreased attention with impulsivity  left-neglect    Specific Speech Therapy Interventions to Include: Therapeutic exercises  Address left neglect    Specific instructions for next treatment:      To initiate POC    SHORT/LONG TERM GOALS  Pt will improve orientation to spatial and temporal surroundings with use of external aids and verbal cues  Pt will attend to her left side with decreasing amount of physical/verbal cues  Pt will improve attention and decrease impulsivity    Patient stated goals: Agreed with above    Rehabilitation Potential/Prognosis: good     FIMS SCORES      Swallowing    Current Status  6--Modified Independent   Short Term Goal 6--Modified Independent    Long Term Goal 6--Modified Independent     Receptive    Current Status  t   6--Modified Independent    Short Term Goal t   6--ModifiedIndependent    Long Term Goal                  6--Modified Independent    Expressive    Current Status      6--Modified Independent    Short Term Goal     6--Modified Independent    Long Term Goal                  6--Modified Independent    Social Interaction    Current Status  4--Minimal Assistance    Short Term Goal 5--Supervision    Long Term Goal 6--Modified Independent         Problem Solving    Current Status    4--Minimal Assistance / 3--Moderate Assistance (good for verbal but consistent difficulty with execution of motor tasks)  Short Term Goal   4--Minimal Assistance    Long Term Goal    5--Supervision     Memory    Current Status    5--Supervision / 4--Minimal Assistance  Short Term Goal t     5--Supervision   Long Term Goal        5--Supervision  ? SWALLOWING:      Diet:  Regular consistency solids with thin liquids (IDDSI level 0)     Assistance with tray set up and supervision by nursing staff is required with all intake d/t left neglect and impulsivity. LANGUAGE:    Appropriate responses during discussion with SLP. COGNITION:      Patient's mother, Penelope Schafer, present for a family teach this date, 11/4. Patient completes structured verbal cognitive tasks with good ability. Good recall of recent events relevant to self. Patient requires min-mod v/c for safe procedure (hand placement, wait for partner, communicate intentions) during execution of familiar transfers.     Testing from 11/3  Stimulus questions were obtained from the RIPA-2  There are 30 possible points for each subtest.   Severity ratings for each subtest were determined as follows:     RAW SCORE  SEVERITY    28-30  Within functional limits    25-27  Mild deficit    22-24  Moderate deficit    19-21  Marked deficit    16-18 Severe deficit   Below 16 Profound deficit       Subtest  Raw Score /Severity    Immediate Memory  30/ Within functional limits   Recent Memory  30/ Within functional limits   Temporal Orientation   (Recent Memory)  30/ Within functional limits   Temporal Orientation   (Remote Memory)  30/ Within functional limits   Spatial Orientation  30/ Within functional limits   Orientation to Environment  30/ Within functional limits   Recall of General Information  28/ Within functional limits   Problem Solving & Abstract Reasoning  30/ Within functional limits   Organization  30/ Within functional limits   Auditory Processing   & Retention  30/ Within functional limits Testing from 11/3:  SLUMS ASSESSMENT:  Assesses cognitive ability in areas of orientation, immediate/ STM recall, divergent naming, functional calculation, abstraction, mental manipulation, clock draw, and story comprehension. Patient received a score of 28/30, which is indicative of functional cognitive linguistic skills for verbal tasks; however, patient continues to demonstrate poor safety and judgment during execution of motor tasks. Safety:  Poor. Continued impulsivity. Patient demonstrates poor insight into her deficits and does not seem to realize potential consequences of her poor decisions. SLP spoke with patient's physician; will plan to see patient as a daily co-tx with OT to assist patient with ability to identify physical/cognitive/visuospatial impairments, verbalize impact each have on overall level of functional independence, and demonstrate appropriate insight/safety precautions during execution of motor tasks given these limitations with >90% of trials. SPEECH:    Speech intelligibility was good during structured tasks and conversational speech. SP recommended after discharge:  yes  Supervision recommended at discharge: 24 hour    Will continue SP intervention as per previously established POC. EDUCATION: Speech Pathologist (SLP) completed education with the patient regarding identified cognitive linguistic deficits and subsequent need for speech pathology intervention. Discussed deficit areas to be targeted by formal intervention and established short/long term goals. Reviewed compensatory strategies to improve functional outcome (as appropriate). Encouraged patient to engage SLP in structured Q&A session relative to identified deficit areas. Patient indicated understanding of all information provided via satisfactory verbal response.       Minute Tracking:    Individual therapy:     0 minutes  Concurrent therapy:    0 minutes  Group therapy:     0 minutes  Co-treatment therapy:  45 minutes    Total minutes: 45 minutes

## 2021-11-04 NOTE — PROGRESS NOTES
OCCUPATIONAL THERAPY DAILY NOTE    Date:2021  Patient Name: Melodie Duff  MRN: [id number]  : 1975  Room: [address] [place] [address]     Diagnosis:Acute CVA( Pt presented to [facility] as Level 2 stroke alert on 10/11/21. L side weakness, neglect. NIH-17  Found to have large R MCA involving R occipital)  Surgery: R frontotemporal decompressive hemicraniectomy on 10/13/21  Past Medical History: COVID (9/15), tobacco use  Precautions: Falls, L hemiplegia, L neglect, L sublux, bed/chair alarm, helmet when OOB, impulsive, L PRAFO    Functional Assessment:   Date Status AE  Comments   Feeding 21 Stand by Assist   Pt able to self feed   Grooming 21 SBA            Oral Care 21 SBA     Bathing 10/4/21 Maximal Assist  Shower chair with wheels and opening Full bathing task seated in shower, assist with LLE, RUE, and balance with malvin areas while seated   UB Dressing 21 Mod A  Assist to doff shirt overhead, pt thread BUEs and over head with min vc's for proper technique   LB Dressing 21 Maximal Assist  reacher Pt thread RLE and increased time for LLE, required assist with standing balance and to pull over L side   Footwear 21 Moderate Assist   Pt donned R sock/shoe, required assist with L   Toileting 21 Maximal Assist  Grab bar Pt is able to complete malvin hygiene with Min A, but requires assist with standing balance to pull pants over hips   Homemaking         Functional Transfers / Balance:   Date Status DME  Comments   Sit Balance 21 CGA     Stand Balance 21 Max A Grab bar  Bed rail     [] Tub  [x] Shower   Transfer 21 Mod A Roll in shower chair, grab bar SPT in/out of shower using grab bar   Commode   Transfer 21 Min A slideboard, drop arm commode To/from commode with pt's mom   Functional   Mobility  TBA       Functional Exercises / Activity:  Pt supine in bed upon arrival to . Completed supine-sit EOB. Completed ADLs, see above for assessment.  Pt appeared to have tolerated session well and has increased independence with ADLs. Pt continues to be limited by safety and L hemiplegia. Completed family teach during 2nd session, see below. Sensory / Neuromuscular Re-Education:      Cognitive Skills:   Status Comments   Problem   Solving fair    Memory fair    Sequencing fair    Safety fair      Visual Perception:    Education:  Pt educated about LUE positioning when up in w/c to promote ROM/wt bearing along with joint integrity. Pt educated with safety during SPT transfers from w/c<>3:1 commode using grab bar then w/c> EOB using bed rail to increase awareness. 10/25/21: pt educated on SROM of LUE and safe positioning of LUE  10/29/21: educated on 1 handed dressing technique for socks/shoes  11/1/21: shoe lace adapted for ease with donning shoe  11/1/21: attempted small strips of kineosio tape on pt's hand to assess if pt can tolerate on her shoulder due to report of allergy (cleared by )  11/2/21:  Therapist applied kineosiotape to pt's LUE to stabilize sublux. Will continue to assess issues that arise with adhesive and progression of sublux  11/2/21: pt educated on AE for ease with LB dressing  11/3/21: Pt very fatigued in AM and PM sessions, requiring max cueing to alert pt back to task and engage in session, with pt asking multiple times to return to room/return to bed. Extended time to complete all tasks  11/4/21: home ROM exercise program placed in pt's folder and pt's mother notified of it    [x] Family teach completed on:  10/4/21: Completed family teach this session. Pt's mother Ranjith Choi present. Educated Ranjith Choi on pt's status with ADLs and vc's for sequencing/initiation of tasks/hand placement with functional transfers. Ranjith Choi able to provide hands on assist with was educated for body mechanics/hand placement/vc's to increase safety with only commode transfer with slideboard and toileting task.  Ranjith Choi educated on PROM (elbow/shoulder/wrist/digit flex/ext) of LUE to increase ROM, maintain joint integrity, and safe management of sublux. Bisi Aguilar educated on how to doff/jesus gait belt and LUE sling. Pt's limiting factor appears to be her impulsivity and safety with ADLs/transfer. Bisi Aguilar demo'ing F understanding/safety of education. Bisi Aguilar appeared motivated and willing to learn. Continue to educate. Pain Level: 5/10 head    Additional Notes:     Patient has made fair + progress during treatment sessions toward set goals. Therapy emphasis to obtain goals:    Time Frame for Long term goals  6 weeks   Long term goal 1 Pt will complete UB self-care tasks w/ Supervision   Long term goal 2 Pt will complete LB self-care tasks w/ Supervision   Long term goal 3 Pt will complete toileting (all aspects) w/ Supervision   Long term goal 4 Pt will safely complete toilet transfers w/ Supervision   Long term goal 5 Pt will safely complete tub/shower transfers w/ Supervision   Long term goals 6 Pt will complete basic homemaking task w/ SBA   Long term goal 7 Pt will complete grooming task w/ Supervision   Long term goal 8 Pt will complete feeding task w/ Supervision/setup     [x] Continue with current OT Plan of care. [] Prepare for Discharge    10/27/21 OT plan of care updated on this date. Tentative length of stay is 6 weeks Anabela Galdamez OTR/L [id number]  11/3/21- Pt POC updated on this date.  Pt tentative length of day is 5 weeks to address above problem areas - Jim Chatterjee OTR/L [id number]     DISCHARGE RECOMMENDATIONS  Recommended DME: drop arm BSC    Post Discharge Care:   []Home Independently  []Home with 24hr Care / Supervision []Home with Partial Supervision []Home with Home Health OT []Home with Out Pt OT []Other: ___   Comments:         Time in Time out Tx Time Breakdown  Variance:   First Session  0645 0745 [x] Individual Tx-60  [] Concurrent Tx -  [] Co-Tx -   [] Group Tx -   [] Time Missed -     Second Session 1000 1030 [x] Individual Tx-30   [] Concurrent Tx -  [] Co-Tx - [] Group Tx -   [] Time Missed -     Third Session    [] Individual Tx-   [] Concurrent Tx -  [] Co-Tx -   [] Group Tx -   [] Time Missed -         Total Tx Time- Cassius [address], J CARLOS, OTR/L [id number]

## 2021-11-04 NOTE — PROGRESS NOTES
Physical Therapy  Treatment Note  Supervising Therapist: Debra Trejo PT, DPT [id number]    NAME: True Mock  ROOM: [facility]/5505-B  DIAGNOSIS: Acute CVA  PRECAUTIONS: Falls, helmet at all times when OOB, L hemiplegia, L neglect, pusher, alarm, must bring phone to therapy (cardiac monitor), L PRAFO, SPB <180  HPI: Pt is a 39 y.o. female with past medical history significant for recent COVID-19 infection (9/15/21) who [facility] with acute onset left-sided weakness. Initial NIHSS 17.  Initial CT ([facility]) showed R MCA hyperdensity; initial CTA showed right ICA/M1 occlusion. On 10/13, her neuro exam worsened and repeat imaging indicated worsening edema, 1 cm of midline shift. She underwent emergent R hemicraniectomy by Dr. Aud[facility]) on 10/13. After being deemed medically appropriate, she was transferred to [place] [id number] on 10/22/2021    Social:  Pt lives with daughter (15 y/o), will be returning to parent's home upon discharge (1 floor plan, 2 HAIDER with 2 HR). Mother and father healthy, do not work. 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 Initial Evaluation  Date: 10/23/21 AM     PM    Short Term Goals Long Term Goals    Was pt agreeable to Eval/treatment? yes Yes Yes     Does pt have pain? Pain on the top of her head once the helmet was placed on Mild c/o L hip and R rib pain Mild c/o L hip and R rib pain     Bed Mobility  Rolling: Max A  Supine to sit: Max A   Sit to supine: Max A   Scooting: Max A NT   Supine>Sit ModA  Scooting/Rolling ModA Min A Supervision   Transfers Sit to stand: Max A   Stand to sit: Max A  Stand pivot:  Max A x2    5xSTS: Unable to complete Sit<>stand: ModA (MaxA with L knee immobilizer)  Stand pivot: ModA/MaxA with L hemicane Sit<>stand: ModA (MaxA with L knee immobilizer)  Stand pivot: ModA/MaxA with L hemicane    Sliding board transfer [address] [place] to  from bed<>WC Min A SBA  5xSTS: TBD   Ambulation    15 feet with R mackey rail with Max A + wc follow    10mWT: TBA  6mWT: TBA 70 feet x 2 reps with R hemicane with ModA  (L ankle PLS AFO , L knee immobilizer) 75 feet x 1 rep with R hemicane with ModA   (L ankle PLS AFO , L knee immobilizer) 50 feet with AAD with Min A       500 feet with AAD with SBA    10mWT: TBD  6mWT: TBD   Walking 10 feet on uneven surface NT NT NT 10 feet with AAD with Min A 10 feet with AAD with SBA   Wheel Chair Mobility NT NT NT  150 feet with R extremities with SBA   Car Transfers NT NT NT Min A SBA   Stair negotiation: ascended and descended NT NT 4 steps with R HR with MaxA x 2 reps (ascending with RLE, descending with LLE non-reciprocal) 4 steps with 1 rail with Min A 12 steps with 1 rail with SBA   Curb Step:   ascended and descended NT NT NT 4 inch step with AAD and Min A 4 inch step with AAD and SBA   Picking up object off the floor NT NT NT Will  an object with Min assist Will  an object with SBA   BLE ROM WFL: LLE AROM limited by flaccidity  WFL: LLE AROM limited by spasticity 2+ on MAS (hamstrings, hip extensors)      BLE Strength RLE 4+/5  LLE 0/5 RLE 4+/5  LLE 0/5 with MMT      Balance  Static Sitting: Mod/Max A    BBS: TBA  FGA: TBA Sitting: Mod A  Standing: Max A       BBS: TBD  FGA: TBD   Date Family Teach Completed TBA Pt's mother present for observation 10/24, 11/2, 11/3, 114      Is additional Family Teaching Needed?   Y or N Y Yes      Hindering Progress L hemiplegia, L neglect L hemiplegia, L neglect      PT recommended ELOS 5 weeks       Team's Discharge Plan        Therapist at Team Meeting          HRmax: (178, -10  bpm for beta blocker) 168 bpm Time spent at moderate intensity (60-70% HRmax) Time spent at high intensity (70-85% HRmax)    AM: 0:31:26 AM: 0:00:30    PM: 0:12:35 PM: 0:07:37     Therapeutic Exercise:   AM: Ambulation with R hemicane, 3lb ankle weight LLE, 5lb ankle weight to posterior gait belt, 70 feet x 2 reps with ModA  Weaving between 3 standing quad canes with R hemicane, 3lb ankle weight LLE, 5lb ankle weight to posterior gait belt x 2 reps with ModA  PM: Ambulation with 3lb ankle weight LLE and R hemicane (L KI and L PLS AFO) 125 feet x 1 rep with Moda    Patient education  Pt educated on proper sequencing with turning    Patient response to education:   Pt verbalized understanding Pt demonstrated skill Pt requires further education in this area   yes partial yes     Additional Comments: Pt remains pleasant and motivated during sessions, is impulsive but can recall and implement sequencing for successful ambulation relatively well. Pt's mother present for FT, observed, will initiate hands on training once pt requires less assistance and cueing as discharge is not yet soon anticipated. Pt exhibits improving consistency of LLE advancement, stance control and limb swing challenged with added resistance as noted above. HR response to activity has been blunted since initiation of beta blockers however pt reports RPE of 13-17/20 consistently with activity. /72 prior to AM session, 114/75 after. PM session included progression of ambulation distance with ankle weight challenging L limb advancement. HR monitored continuously however malfunctioning sensor resulted in likely underestimated time spent at high intensity ranges. Pt consistently reported RPE of 16-17 throughout session. Plan to challenge propulsion next session. Chair/bed alarm: armed following session    AM  Time in: 0915  Time out: 1000    PM  Time in: 1300  Time out: 1345    Pt is making good progress toward established Physical Therapy goals. Continue with physical therapy current plan of care.     Jaja Sher, PT, DPT  Board Certified Clinical Specialist in Neurologic Physical Therapy  [id number]

## 2021-11-05 PROCEDURE — 6370000000 HC RX 637 (ALT 250 FOR IP): Performed by: PHYSICAL MEDICINE & REHABILITATION

## 2021-11-05 PROCEDURE — 97130 THER IVNTJ EA ADDL 15 MIN: CPT

## 2021-11-05 PROCEDURE — 97112 NEUROMUSCULAR REEDUCATION: CPT

## 2021-11-05 PROCEDURE — 97129 THER IVNTJ 1ST 15 MIN: CPT

## 2021-11-05 PROCEDURE — 1280000000 HC REHAB R&B

## 2021-11-05 PROCEDURE — 97110 THERAPEUTIC EXERCISES: CPT

## 2021-11-05 PROCEDURE — 6360000002 HC RX W HCPCS: Performed by: PHYSICAL MEDICINE & REHABILITATION

## 2021-11-05 PROCEDURE — 6370000000 HC RX 637 (ALT 250 FOR IP): Performed by: PHYSICIAN ASSISTANT

## 2021-11-05 PROCEDURE — 99232 SBSQ HOSP IP/OBS MODERATE 35: CPT | Performed by: PHYSICAL MEDICINE & REHABILITATION

## 2021-11-05 PROCEDURE — 97530 THERAPEUTIC ACTIVITIES: CPT

## 2021-11-05 RX ADMIN — GABAPENTIN 100 MG: 100 CAPSULE ORAL at 15:57

## 2021-11-05 RX ADMIN — MAGNESIUM HYDROXIDE 30 ML: 400 SUSPENSION ORAL at 22:47

## 2021-11-05 RX ADMIN — ASPIRIN 81 MG CHEWABLE TABLET 81 MG: 81 TABLET CHEWABLE at 09:31

## 2021-11-05 RX ADMIN — CLOPIDOGREL 75 MG: 75 TABLET, FILM COATED ORAL at 09:30

## 2021-11-05 RX ADMIN — HEPARIN SODIUM 5000 UNITS: 10000 INJECTION INTRAVENOUS; SUBCUTANEOUS at 19:10

## 2021-11-05 RX ADMIN — DOCUSATE SODIUM 100 MG: 100 CAPSULE, LIQUID FILLED ORAL at 09:32

## 2021-11-05 RX ADMIN — SENNOSIDES 17.2 MG: 8.6 TABLET, COATED ORAL at 22:43

## 2021-11-05 RX ADMIN — ACETAMINOPHEN 650 MG: 325 TABLET ORAL at 11:35

## 2021-11-05 RX ADMIN — TAMSULOSIN HYDROCHLORIDE 0.4 MG: 0.4 CAPSULE ORAL at 09:31

## 2021-11-05 RX ADMIN — HEPARIN SODIUM 5000 UNITS: 10000 INJECTION INTRAVENOUS; SUBCUTANEOUS at 06:45

## 2021-11-05 RX ADMIN — ACETAMINOPHEN 1000 MG: 325 TABLET ORAL at 15:57

## 2021-11-05 RX ADMIN — DOCUSATE SODIUM 100 MG: 100 CAPSULE, LIQUID FILLED ORAL at 22:42

## 2021-11-05 RX ADMIN — GABAPENTIN 100 MG: 100 CAPSULE ORAL at 09:32

## 2021-11-05 RX ADMIN — ATORVASTATIN CALCIUM 40 MG: 40 TABLET, FILM COATED ORAL at 22:42

## 2021-11-05 RX ADMIN — ACETAMINOPHEN 1000 MG: 325 TABLET ORAL at 06:45

## 2021-11-05 RX ADMIN — GABAPENTIN 100 MG: 100 CAPSULE ORAL at 22:42

## 2021-11-05 RX ADMIN — ONDANSETRON 4 MG: 4 TABLET, ORALLY DISINTEGRATING ORAL at 06:45

## 2021-11-05 RX ADMIN — Medication 6 MG: at 22:42

## 2021-11-05 RX ADMIN — METOPROLOL TARTRATE 12.5 MG: 25 TABLET, FILM COATED ORAL at 09:31

## 2021-11-05 RX ADMIN — POLYETHYLENE GLYCOL 3350 17 G: 17 POWDER, FOR SOLUTION ORAL at 09:30

## 2021-11-05 ASSESSMENT — PAIN SCALES - GENERAL
PAINLEVEL_OUTOF10: 5
PAINLEVEL_OUTOF10: 1
PAINLEVEL_OUTOF10: 6
PAINLEVEL_OUTOF10: 0
PAINLEVEL_OUTOF10: 5

## 2021-11-05 ASSESSMENT — PAIN - FUNCTIONAL ASSESSMENT: PAIN_FUNCTIONAL_ASSESSMENT: ACTIVITIES ARE NOT PREVENTED

## 2021-11-05 ASSESSMENT — PAIN DESCRIPTION - PAIN TYPE: TYPE: CHRONIC PAIN

## 2021-11-05 ASSESSMENT — PAIN DESCRIPTION - LOCATION: LOCATION: NECK

## 2021-11-05 NOTE — PROGRESS NOTES
Speech Language Pathology  ACUTE REHABILITATION--DAILY PROGRESS NOTE          ADMITTING DIAGNOSIS: Acute cerebrovascular accident (CVA) ([facility], [address] [I63.9]    VISIT DIAGNOSIS: CVA    SPEECH THERAPY  PLAN OF CARE   The speech therapy  POC is established based on physician order, speech pathology diagnosis and results of clinical assessment     SPEECH PATHOLOGY DIAGNOSIS:    Mild cognitive deficit with left neglect    Speech Pathology intervention is recommended 3-6 times per week for LOS or when goals are met with emphasis on the following:      Conditions Requiring Skilled Therapeutic Intervention for speech, language and/or cognition    Decreased attention with impulsivity  left-neglect    Specific Speech Therapy Interventions to Include: Therapeutic exercises  Address left neglect    Specific instructions for next treatment:      To initiate POC    SHORT/LONG TERM GOALS  Pt will improve orientation to spatial and temporal surroundings with use of external aids and verbal cues  Pt will attend to her left side with decreasing amount of physical/verbal cues  Pt will improve attention and decrease impulsivity    Patient stated goals: Agreed with above    Rehabilitation Potential/Prognosis: good     FIMS SCORES      Swallowing    Current Status  6--Modified Independent   Short Term Goal 6--Modified Independent    Long Term Goal 6--Modified Independent     Receptive    Current Status  t   6--Modified Independent    Short Term Goal t   6--ModifiedIndependent    Long Term Goal                  6--Modified Independent    Expressive    Current Status      6--Modified Independent    Short Term Goal     6--Modified Independent    Long Term Goal                  6--Modified Independent    Social Interaction    Current Status  4--Minimal Assistance    Short Term Goal 5--Supervision    Long Term Goal 6--Modified Independent         Problem Solving    Current Status    4--Minimal Assistance (good for verbal but difficulty observed with execution of motor tasks)  Short Term Goal   4--Minimal Assistance    Long Term Goal    5--Supervision     Memory    Current Status    6--Modified Independent / 5--Supervision   Short Term Goal   6--Modified Independent   Long Term Goal   6--Modified Independent  ? SWALLOWING:      Diet:  Regular consistency solids with thin liquids (IDDSI level 0)     Assistance with tray set up and supervision by nursing staff is required with all intake d/t left neglect and impulsivity. LANGUAGE:    Appropriate responses during discussion with SLP. COGNITION:      Patient requires min-mod v/c for safe procedure during execution of familiar transfers (hand placement, proximity of self to wheelchair prior to sitting, wait for partner / communicate intentions). Patient benefited from cues to place entire foot on step, space feet appropriately, and advance arm on handrail.     Testing from 11/3  Stimulus questions were obtained from the RIPA-2  There are 30 possible points for each subtest.   Severity ratings for each subtest were determined as follows:     RAW SCORE  SEVERITY    28-30  Within functional limits    25-27  Mild deficit    22-24  Moderate deficit    19-21  Marked deficit    16-18 Severe deficit   Below 16 Profound deficit       Subtest  Raw Score /Severity    Immediate Memory  30/ Within functional limits   Recent Memory  30/ Within functional limits   Temporal Orientation   (Recent Memory)  30/ Within functional limits   Temporal Orientation   (Remote Memory)  30/ Within functional limits   Spatial Orientation  30/ Within functional limits   Orientation to Environment  30/ Within functional limits   Recall of General Information  28/ Within functional limits   Problem Solving & Abstract Reasoning  30/ Within functional limits   Organization  30/ Within functional limits   Auditory Processing   & Retention  30/ Within functional limits       Testing from 11/3:  SLUMS ASSESSMENT:  Assesses cognitive ability in areas of orientation, immediate/ STM recall, divergent naming, functional calculation, abstraction, mental manipulation, clock draw, and story comprehension. Patient received a score of 28/30, which is indicative of functional cognitive linguistic skills for verbal tasks; however, patient continues to demonstrate poor safety and judgment during execution of motor tasks. Safety:  Fair-/poor given continued (but improving) impulsivity. Patient demonstrates decreased insight into her deficits and does not seem to understand the gravity of her poor decisions. 11/13: SLP spoke with patient's physician; will plan to see patient as a daily co-tx with OT to assist patient with ability to identify physical/cognitive/visuospatial impairments, verbalize impact each have on overall level of functional independence, and demonstrate appropriate insight/safety precautions during execution of motor tasks given these limitations with >90% of trials. SPEECH:    Speech intelligibility was good during structured tasks and conversational speech. SP recommended after discharge:  yes  Supervision recommended at discharge: 24 hour    Will continue SP intervention as per previously established POC. EDUCATION: Speech Pathologist (SLP) completed education with the patient regarding identified cognitive linguistic deficits and subsequent need for speech pathology intervention. Discussed deficit areas to be targeted by formal intervention and established short/long term goals. Reviewed compensatory strategies to improve functional outcome (as appropriate). Encouraged patient to engage SLP in structured Q&A session relative to identified deficit areas. Patient indicated understanding of all information provided via satisfactory verbal response.       Minute Tracking:    Individual therapy:     0 minutes  Concurrent therapy:    0 minutes  Group therapy:     0 minutes  Co-treatment therapy:  45 minutes    Total minutes: 45 minutes

## 2021-11-05 NOTE — PROGRESS NOTES
Physical Therapy  Treatment Note  Supervising Therapist: Jr Garibay, PT, DPT [id number]    NAME: Kota Madrid  [facility]OM: [facility]  DIAGNOSIS: Acute CVA  PRECAUTIONS: Falls, helmet at all times when OOB, L hemiplegia, L homonymous hemianopsia L neglect, alarm, must bring phone to therapy (cardiac monitor), L PRAFO, SPB <180  HPI: Pt is a 39 y.o. female with past medical history significant for recent COVID-19 infection (9/15/21) who [facility] with acute onset left-sided weakness. Initial NIHSS 17.  Initial CT ([facility]) showed R MCA hyperdensity; initial CTA showed right ICA/M1 occlusion. On 10/13, her neuro exam worsened and repeat imaging indicated worsening edema, 1 cm of midline shift. She underwent emergent R hemicraniectomy by Dr. [facility]) on 10/13. After being deemed medically appropriate, she was transferred to [place] [address] on 10/22/2021    Social:  Pt lives with daughter (15 y/o), will be returning to parent's home upon discharge (1 floor plan, 2 HAIDER with 2 HR). Mother and father healthy, do not work. 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 Initial Evaluation  Date: 10/23/21 AM     PM    Short Term Goals Long Term Goals    Was pt agreeable to Eval/treatment? yes Yes Yes     Does pt have pain? Pain on the top of her head once the helmet was placed on Mild c/o L hip and R rib pain Mild c/o L hip and R rib pain     Bed Mobility  Rolling: Max A  Supine to sit: Max A   Sit to supine: Max A   Scooting: Max A NT   Supine>Sit ModA  Scooting/Rolling ModA Min A Supervision   Transfers Sit to stand: Max A   Stand to sit: Max A  Stand pivot:  Max A x2    5xSTS: Unable to complete Sit<>stand: ModA (MaxA with L knee immobilizer)  Stand pivot: ModA with L hemicane Sit<>stand: ModA (MaxA with L knee immobilizer)  Stand pivot: ModA with L hemicane    Sliding board transfer [address] [place] to L from bed<>WC Min A SBA  5xSTS: TBD   Ambulation 15 feet with R mackey rail with Max A + wc follow    10mWT: TBA  6mWT: TBA 90 feet x 2 reps and 70 feet x 2 reps with R hemicane with ModA  (L ankle PLS AFO , L knee immobilizer) 100 feet x 3 reps with R hemicane with ModA   (L ankle PLS AFO , L knee immobilizer)  10mWT: 0.10 m/s  6mWT: 33 m  (R hemicane, L knee immobilizer/AFO, ModA 11/5) 50 feet with AAD with Min A       500 feet with AAD with SBA    10mWT: >0.4 m/s  6mWT: >205 m   Walking 10 feet on uneven surface NT NT NT 10 feet with AAD with Min A 10 feet with AAD with SBA   Wheel Chair Mobility NT NT NT  150 feet with R extremities with SBA   Car Transfers NT NT NT Min A SBA   Stair negotiation: ascended and descended NT NT 4 steps with R HR with MaxA x 2 reps (ascending with RLE, descending with LLE non-reciprocal) 4 steps with 1 rail with Min A 12 steps with 1 rail with SBA   Curb Step:   ascended and descended NT NT NT 4 inch step with AAD and Min A 4 inch step with AAD and SBA   Picking up object off the floor NT NT NT Will  an object with Min assist Will  an object with SBA   BLE ROM WFL: LLE AROM limited by flaccidity  WFL: LLE AROM limited by spasticity 2+ on MAS (hamstrings, hip extensors)      BLE Strength RLE 4+/5  LLE 0/5 RLE 5/5  LLE 0/5 with MMT with exception of hip extensors/adductors 1/5      Balance  Static Sitting: Mod/Max A    BBS: TBA  FGA: TBA Sitting: Mod A  Standing: Max A BBS: 4/56  (11/5/21)      BBS: TBD  FGA: TBD   Date Family Teach Completed TBA Pt's mother present for observation 10/24, 11/2, 11/3, 114      Is additional Family Teaching Needed?   Y or N Y Yes      Hindering Progress L hemiplegia, L neglect L hemiplegia, L neglect      PT recommended ELOS 5 weeks       Team's Discharge Plan        Therapist at Team Meeting          HRmax: (178, -10  bpm for beta blocker) 168 bpm Time spent at moderate intensity (60-70% HRmax) Time spent at high intensity (70-85% HRmax)    AM: 0:07:27 AM: 0:27:38    PM: 0:17:28 PM: 0:00:00     Therapeutic Exercise:   AM: weaving between 4 standing quad canes with R hemicane, L KI/AFO with ModA x 2 reps   FW/BW/lateral stepping around small tile square clockwise with R hemicane, L KI/AFO with ModA x 2 reps   PM: ambulation with R LBQC, L KI/AFO with ModA x 1 rep    Patient education  Pt educated on [employer] strategy for visual scanning due to L visual field deficits    Patient response to education:   Pt verbalized understanding Pt demonstrated skill Pt requires further education in this area   yes partial yes     Additional Comments: Pt remains motivated during session however is impulsive with poor attention to L hemibody and space. Ambulation speed and distance significantly improving with significantly improved consistency of LLE advancement. Pt requires cueing to avoid obstacle collision on L, educated on lighthouse strategy. Poor safety noted with novel tasks due to impulsivity as noted with multidirectional stepping TE. /74 following AM session activity, pt denied complaints. Walking and balance outcome measures completed today due to improved ability for pt to complete these outcomes. BBS, 10mwt, and 6mwt scores significantly low. Pt able to progress to [facility] toward end of session with which she was similarly stable as with hemicane. Plan to progress activity with [facility] next session. Skin was inspected: grossly intact  Chair/bed alarm: armed following session    AM  Time in: 0830  Time out: 0915    PM  Time in: 1300  Time out: 1982    Pt is making good progress toward established Physical Therapy goals. Continue with physical therapy current plan of care.     Raj Lugo, PT, DPT  Board Certified Clinical Specialist in Neurologic Physical Therapy  [id number]

## 2021-11-05 NOTE — PROGRESS NOTES
[facility]  Comprehensive Progress Note    Subjective:      Rose Lloyd is a 39 y.o. female admitted to inpatient rehabilitation for impairments and activities limitations in ADLs and mobility secondary to right MCA CVA. No acute events overnight. No cp, sob, n/v. +BM, patient reports feeling better. Tolerating therapy. No complaints today      The patient's medical records have been reviewed. Scheduled Meds:nicotine, 1 patch, Daily  senna, 2 tablet, Nightly  polyethylene glycol, 17 g, Daily  metoprolol tartrate, 12.5 mg, BID  acetaminophen, 1,000 mg, 3 times per day  lidocaine, 2 patch, Daily  gabapentin, 100 mg, TID  tamsulosin, 0.4 mg, Daily  heparin (porcine), 5,000 Units, Q12H  aspirin, 81 mg, Daily  atorvastatin, 40 mg, Nightly  vitamin D, 50,000 Units, Weekly  docusate sodium, 100 mg, BID  melatonin, 6 mg, Nightly  clopidogrel, 75 mg, Daily      Continuous Infusions:  PRN Meds:traMADol, 25 mg, BID PRN  ondansetron, 4 mg, Q6H PRN  lactulose, 20 g, BID PRN  guaiFENesin, 200 mg, BID PRN  acetaminophen, 650 mg, Q4H PRN  magnesium hydroxide, 30 mL, Daily PRN  bisacodyl, 10 mg, Daily PRN  diclofenac sodium, 2 g, 4x Daily PRN         Objective:      Vitals:    11/03/21 2148 11/04/21 0730 11/04/21 2200 11/05/21 0931   BP: 112/60 124/78 (!) 100/56 105/64   Pulse: 80 101 78    Resp:  18 17    Temp:  97.4 °F (36.3 °C) 98.1 °F (36.7 °C)    TempSrc:  Tympanic Temporal    SpO2:  94% 96%    Weight:       Height:         General appearance: alert, NAD, seen in OT  Head: R crani site c/d/i   Eyes: conjunctivae/corneas clear. PERRL, EOM's intact. Lungs: clear to auscultation bilaterally  Heart: regular rate and rhythm, S1, S2 normal  Abdomen: soft, non-tender, normal bowel sounds  Extremities: extremities normal, atraumatic, no cyanosis or edema  MSK: no cyanosis, clubbing, edema  Skin: R crani c/d/i   Neurologic: Alert, oriented x4. Left neglect. Speech clear.  No aphasia appreciated. Mild L facial droop. Spastic left hemiplegia. MAS 1+ in LUE; MAS 1+-2 in LLE.      Motor:       Motor Findings  Strength: Right  Strength: Left    Deltoid  5/5  0/5    Biceps  5/5  0/5    Triceps  5/5  0/5    Wrist Extensors  5/5  0/5    FDP 5/5 0/5   Finger abductors 5/5 0/5   Iliospoas  5/5  0/5    Quadriceps  5/5  0/5    Tibialis anterior  5/5  0/5    EHL 5/5 0/5   Gastroc soleus  5/5  0/5            Laboratory:    Lab Results   Component Value Date    WBC 11.0 10/29/2021    HGB 12.3 10/29/2021    HCT 38.6 10/29/2021    MCV 95.5 10/29/2021     (H) 10/29/2021     Lab Results   Component Value Date     10/28/2021    K 3.8 10/28/2021     10/28/2021    CO2 24 10/28/2021    BUN 11 10/28/2021    CREATININE 0.5 10/28/2021    GLUCOSE 105 10/28/2021    CALCIUM 9.2 10/28/2021      Lab Results   Component Value Date    ALT 75 (H) 10/23/2021    AST 46 (H) 10/23/2021    ALKPHOS 89 10/23/2021    BILITOT 0.4 10/23/2021       Functional Status:   Bed mobility: Mod A  Transfers: Mod - Max A  Ambulation: 75 ft L ankle PLS AFO, L knee immobilizer, R hemicane Mod A  Feeding: SBA  Grooming: SBA  UB dressing: Mod A  LB dressing: Max A       Assessment/Plan:       39 y.o. female admitted to inpatient rehabilitation for impairments and activities limitations in ADLs and mobility secondary to right MCA CVA.    -Right MCA CVA: Complicated by cerebral edema requiring emergent right hemicraniectomy (10/13/2021). Dense left spastic hemiplegia, left neglect, mild cognitive impairment. Helmet when out of bed. L PRAFO in bed. LUE sling for gait. L w/c tray. Aspirin, Plavix, Lipitor for secondary prevention. Monitor neuro exam. Continue Acute Rehab program.   -Spasticity LUE/LLE: Monitor. Tone currently helpful to compensate for weakness with attempted gait in PT.  If it worsens and becomes problematic can begin antispasmodic.   -Pain control, MSK pain, neuropathic pain: Tylenol PRN, Tramadol PRN, gabapentin, Lidoderm, Voltaren gel  -Urinary retention: On flomax, now voiding well, low PVRs  -Tachycardia: intermittent. Cardiology consulted. She was started on Lopressor. Cardiology attempting to access data from her heart monitor.   -Constipation: colace, senna, glycolax. PRNs available.   -Tobacco abuse: Nicoderm patch  -DVT prophylaxis: heparin SQ, SCDs    Continue current medications. On Lopressor per Cardiology due to intermittent marked tachycardia. HR improved on lopressor. Monitor BP. Continue Acute Rehab program. Encourage ambulation.        Electronically signed by Chelsea Huang MD on 11/5/2021 at 1:18 PM

## 2021-11-05 NOTE — PROGRESS NOTES
OCCUPATIONAL THERAPY DAILY NOTE    Date:2021  Patient Name: Floresita Bradley  MRN: [id number]  : 1975  Room: [address] [place] [address]     Diagnosis:Acute CVA( Pt presented to [facility] as Level 2 stroke alert on 10/11/21. L side weakness, neglect. NIH-17  Found to have large R MCA involving R occipital)  Surgery: R frontotemporal decompressive hemicraniectomy on 10/13/21  Past Medical History: COVID (9/15), tobacco use  Precautions: Falls, L hemiplegia, L neglect, L sublux, bed/chair alarm, helmet when OOB, impulsive, L PRAFO    Functional Assessment:   Date Status AE  Comments   Feeding 21 Stand by Assist      Grooming 21 SBA            Oral Care 21 SBA     Bathing 10/4/21 Maximal Assist  Shower chair with wheels and opening    UB Dressing 21 Mod A     LB Dressing 21 Maximal Assist  reacher    Footwear 21 Moderate Assist      Toileting 21 Maximal Assist  Grab bar    Homemaking         Functional Transfers / Balance:   Date Status DME  Comments   Sit Balance 21 CGA     Stand Balance 21 Max A Grab bar  Bed rail     [] Tub  [x] Shower   Transfer 21 Mod A Roll in shower chair, grab bar    Commode   Transfer 21 Min A slideboard, drop arm commode    Functional   Mobility  TBA       Functional Exercises / Activity:  Pt sitting in chair upon arrival to OT gym in AM. Therapist applied kinesiotape to pt's L shoulder for sublux. Therapist completed scapular mobilizations, 4x10 reps, to LUE to maintain joint integrity for ease with ADLs. Pt reported no discomfort. Engaged in use of furniture sliders on inclined wedge, 5x5 reps, shoulder abduction and elbow flex of LUE. Pt required stabilization of body and appeared to demonstrate slight movement of LUE. Pt sitting in chair upon arrival to OT gym in PM. Completed SPT to mat table. Participated in armbike exercise, seated, 2x6 mins, to increase endurance for ease with ADLs. Pt requried intermittent rest breaks supine.  Therapist completed PROM, 3x15 reps (elbow/shoulder flex/ext) of LUE while supine with gravity eliminated to increase ROM and maintain joint integrity. Pt did not appear to demonstrate any active assist movement. Sensory / Neuromuscular Re-Education:      Cognitive Skills:   Status Comments   Problem   Solving fair    Memory fair    Sequencing fair    Safety fair      Visual Perception:    Education:  Pt educated about LUE positioning when up in w/c to promote ROM/wt bearing along with joint integrity. Pt educated with safety during SPT transfers from w/c<>3:1 commode using grab bar then w/c> EOB using bed rail to increase awareness. 10/25/21: pt educated on SROM of LUE and safe positioning of LUE  10/29/21: educated on 1 handed dressing technique for socks/shoes  11/1/21: shoe lace adapted for ease with donning shoe  11/1/21: attempted small strips of kineosio tape on pt's hand to assess if pt can tolerate on her shoulder due to report of allergy (cleared by )  11/2/21:  Therapist applied kineosiotape to pt's LUE to stabilize sublux. Will continue to assess issues that arise with adhesive and progression of sublux  11/2/21: pt educated on AE for ease with LB dressing  11/3/21: Pt very fatigued in AM and PM sessions, requiring max cueing to alert pt back to task and engage in session, with pt asking multiple times to return to room/return to bed. Extended time to complete all tasks  11/4/21: home ROM exercise program placed in pt's folder and pt's mother notified of it    [x] Family teach completed on:  10/4/21: Completed family teach this session. Pt's mother Nic Busch present. Educated Nic Busch on pt's status with ADLs and vc's for sequencing/initiation of tasks/hand placement with functional transfers. Nic Busch able to provide hands on assist with was educated for body mechanics/hand placement/vc's to increase safety with only commode transfer with slideboard and toileting task.  Nic Busch educated on PROM (elbow/shoulder/wrist/digit flex/ext) of LUE to increase ROM, maintain joint integrity, and safe management of sublux. Julian Mota educated on how to doff/jesus gait belt and LUE sling. Pt's limiting factor appears to be her impulsivity and safety with ADLs/transfer. Julian Mota demo'ing F understanding/safety of education. Julian Mota appeared motivated and willing to learn. Continue to educate. Pain Level: 5/10 head    Additional Notes:     Patient has made fair + progress during treatment sessions toward set goals. Therapy emphasis to obtain goals:    Time Frame for Long term goals  6 weeks   Long term goal 1 Pt will complete UB self-care tasks w/ Supervision   Long term goal 2 Pt will complete LB self-care tasks w/ Supervision   Long term goal 3 Pt will complete toileting (all aspects) w/ Supervision   Long term goal 4 Pt will safely complete toilet transfers w/ Supervision   Long term goal 5 Pt will safely complete tub/shower transfers w/ Supervision   Long term goals 6 Pt will complete basic homemaking task w/ SBA   Long term goal 7 Pt will complete grooming task w/ Supervision   Long term goal 8 Pt will complete feeding task w/ Supervision/setup     [x] Continue with current OT Plan of care. [] Prepare for Discharge    10/27/21 OT plan of care updated on this date. Tentative length of stay is 6 weeks Bell Gustafson OTR/L [id number]  11/3/21- Pt POC updated on this date.  Pt tentative length of day is 5 weeks to address above problem areas - Darrian Anderson OTR/L [id number]     DISCHARGE RECOMMENDATIONS  Recommended DME: drop arm BSC    Post Discharge Care:   []Home Independently  []Home with 24hr Care / Supervision []Home with Partial Supervision []Home with Home Health OT []Home with Out Pt OT []Other: ___   Comments:         Time in Time out Tx Time Breakdown  Variance:   First Session  [id number] [x] Individual Tx-30  [x] Concurrent Tx -15  [] Co-Tx -   [] Group Tx -   [] Time Missed -     Second Session [id number] [x]

## 2021-11-06 PROCEDURE — 6370000000 HC RX 637 (ALT 250 FOR IP): Performed by: PHYSICAL MEDICINE & REHABILITATION

## 2021-11-06 PROCEDURE — 1280000000 HC REHAB R&B

## 2021-11-06 PROCEDURE — 6370000000 HC RX 637 (ALT 250 FOR IP): Performed by: PHYSICIAN ASSISTANT

## 2021-11-06 PROCEDURE — 97530 THERAPEUTIC ACTIVITIES: CPT

## 2021-11-06 PROCEDURE — 97130 THER IVNTJ EA ADDL 15 MIN: CPT

## 2021-11-06 PROCEDURE — 6360000002 HC RX W HCPCS: Performed by: PHYSICAL MEDICINE & REHABILITATION

## 2021-11-06 PROCEDURE — 97129 THER IVNTJ 1ST 15 MIN: CPT

## 2021-11-06 RX ORDER — BACLOFEN 10 MG/1
5 TABLET ORAL NIGHTLY
Status: DISCONTINUED | OUTPATIENT
Start: 2021-11-06 | End: 2021-11-26

## 2021-11-06 RX ADMIN — SENNOSIDES 17.2 MG: 8.6 TABLET, COATED ORAL at 20:49

## 2021-11-06 RX ADMIN — BACLOFEN 5 MG: 10 TABLET ORAL at 20:49

## 2021-11-06 RX ADMIN — ERGOCALCIFEROL 50000 UNITS: 1.25 CAPSULE ORAL at 08:46

## 2021-11-06 RX ADMIN — ACETAMINOPHEN 975 MG: 325 TABLET ORAL at 07:17

## 2021-11-06 RX ADMIN — METOPROLOL TARTRATE 12.5 MG: 25 TABLET, FILM COATED ORAL at 20:49

## 2021-11-06 RX ADMIN — HEPARIN SODIUM 5000 UNITS: 10000 INJECTION INTRAVENOUS; SUBCUTANEOUS at 06:30

## 2021-11-06 RX ADMIN — CLOPIDOGREL 75 MG: 75 TABLET, FILM COATED ORAL at 08:46

## 2021-11-06 RX ADMIN — POLYETHYLENE GLYCOL 3350 17 G: 17 POWDER, FOR SOLUTION ORAL at 08:46

## 2021-11-06 RX ADMIN — ASPIRIN 81 MG CHEWABLE TABLET 81 MG: 81 TABLET CHEWABLE at 08:45

## 2021-11-06 RX ADMIN — GABAPENTIN 100 MG: 100 CAPSULE ORAL at 13:30

## 2021-11-06 RX ADMIN — DOCUSATE SODIUM 100 MG: 100 CAPSULE, LIQUID FILLED ORAL at 08:46

## 2021-11-06 RX ADMIN — ACETAMINOPHEN 1000 MG: 325 TABLET ORAL at 20:50

## 2021-11-06 RX ADMIN — ACETAMINOPHEN 1000 MG: 325 TABLET ORAL at 13:30

## 2021-11-06 RX ADMIN — TAMSULOSIN HYDROCHLORIDE 0.4 MG: 0.4 CAPSULE ORAL at 08:46

## 2021-11-06 RX ADMIN — GABAPENTIN 100 MG: 100 CAPSULE ORAL at 20:49

## 2021-11-06 RX ADMIN — ATORVASTATIN CALCIUM 40 MG: 40 TABLET, FILM COATED ORAL at 20:49

## 2021-11-06 RX ADMIN — HEPARIN SODIUM 5000 UNITS: 10000 INJECTION INTRAVENOUS; SUBCUTANEOUS at 17:43

## 2021-11-06 RX ADMIN — DOCUSATE SODIUM 100 MG: 100 CAPSULE, LIQUID FILLED ORAL at 20:49

## 2021-11-06 RX ADMIN — METOPROLOL TARTRATE 12.5 MG: 25 TABLET, FILM COATED ORAL at 08:46

## 2021-11-06 RX ADMIN — Medication 6 MG: at 20:49

## 2021-11-06 RX ADMIN — GABAPENTIN 100 MG: 100 CAPSULE ORAL at 08:46

## 2021-11-06 RX ADMIN — TRAMADOL HYDROCHLORIDE 25 MG: 50 TABLET, COATED ORAL at 08:45

## 2021-11-06 ASSESSMENT — PAIN DESCRIPTION - DESCRIPTORS: DESCRIPTORS: ACHING;DISCOMFORT;DULL;CONSTANT

## 2021-11-06 ASSESSMENT — PAIN DESCRIPTION - ORIENTATION: ORIENTATION: RIGHT

## 2021-11-06 ASSESSMENT — PAIN DESCRIPTION - PAIN TYPE
TYPE: CHRONIC PAIN
TYPE: CHRONIC PAIN

## 2021-11-06 ASSESSMENT — PAIN SCALES - GENERAL
PAINLEVEL_OUTOF10: 5
PAINLEVEL_OUTOF10: 4
PAINLEVEL_OUTOF10: 5
PAINLEVEL_OUTOF10: 2
PAINLEVEL_OUTOF10: 6
PAINLEVEL_OUTOF10: 4

## 2021-11-06 ASSESSMENT — PAIN DESCRIPTION - FREQUENCY: FREQUENCY: CONTINUOUS

## 2021-11-06 ASSESSMENT — PAIN DESCRIPTION - PROGRESSION: CLINICAL_PROGRESSION: NOT CHANGED

## 2021-11-06 ASSESSMENT — PAIN DESCRIPTION - LOCATION
LOCATION: NECK
LOCATION: BACK;NECK

## 2021-11-06 ASSESSMENT — PAIN DESCRIPTION - ONSET: ONSET: ON-GOING

## 2021-11-06 NOTE — PROGRESS NOTES
Physical Therapy  Treatment Note  Supervising Therapist: Gonzalo Coley, JAYME, DPT [id number]    NAME: Parul Elizalde  ROOM: [facility]  DIAGNOSIS: Acute CVA  PRECAUTIONS: Falls, helmet at all times when OOB, L hemiplegia, L homonymous hemianopsia L neglect, alarm, must bring phone to therapy (cardiac monitor), L PRAFO, SPB <180  HPI: Pt is a 39 y.o. female with past medical history significant for recent COVID-19 infection (9/15/21) who [facility] with acute onset left-sided weakness. Initial NIHSS 17.  Initial CT ([facility]) showed R MCA hyperdensity; initial CTA showed right ICA/M1 occlusion. On 10/13, her neuro exam worsened and repeat imaging indicated worsening edema, 1 cm of midline shift. She underwent emergent R hemicraniectomy by Dr. Anuel Snow [facility]) on 10/13. After being deemed medically appropriate, she was transferred to [place] [id number] on 10/22/2021    Social:  Pt lives with daughter (15 y/o), will be returning to parent's home upon discharge (1 floor plan, 2 HAIDER with 2 HR). Mother and father healthy, do not work. 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 Initial Evaluation  Date: 10/23/21 AM     Short Term Goals Long Term Goals    Was pt agreeable to Eval/treatment? yes Yes     Does pt have pain? Pain on the top of her head once the helmet was placed on Mild c/o L hip and R rib pain     Bed Mobility  Rolling: Max A  Supine to sit: Max A   Sit to supine: Max A   Scooting: Max A Sit to supine: ModA   Min A Supervision   Transfers Sit to stand: Max A   Stand to sit: Max A  Stand pivot:  Max A x2    5xSTS: Unable to complete Sit<>stand: ModA (MaxA with L knee immobilizer)  Stand pivot: ModA with LBQC Min A SBA  5xSTS: TBD   Ambulation    15 feet with R mackey rail with Max A + wc follow    10mWT: TBA  6mWT: TBA 90', 40',  with R LBQC with ModA  (L ankle PLS AFO , L knee immobilizer) 50 feet with AAD with Min A       500 feet with AAD with SBA    10mWT: >0.4 m/s  6mWT: >205 m   Walking 10 feet on uneven surface NT NT 10 feet with AAD with Min A 10 feet with AAD with SBA   Wheel Chair Mobility NT NT  150 feet with R extremities with SBA   Car Transfers NT NT Min A SBA   Stair negotiation: ascended and descended NT NT 4 steps with 1 rail with Min A 12 steps with 1 rail with SBA   Curb Step:   ascended and descended NT NT 4 inch step with AAD and Min A 4 inch step with AAD and SBA   Picking up object off the floor NT NT Will  an object with Min assist Will  an object with SBA   BLE ROM WFL: LLE AROM limited by flaccidity  WFL: LLE AROM limited by spasticity 2+ on MAS (hamstrings, hip extensors)     BLE Strength RLE 4+/5  LLE 0/5 RLE 5/5  LLE 0/5 with MMT with exception of hip extensors/adductors 1/5     Balance  Static Sitting: Mod/Max A    BBS: TBA  FGA: TBA Sitting: Mod A  Standing: Max A      BBS: TBD  FGA: TBD   Date Family Teach Completed TBA Pt's mother present for observation 10/24, 11/2, 11/3, 114     Is additional Family Teaching Needed? Y or N Y Yes     Hindering Progress L hemiplegia, L neglect L hemiplegia, L neglect     PT recommended ELOS 5 weeks      Team's Discharge Plan       Therapist at Team Meeting           Therapeutic Exercise:   AM: Functional mobility      Patient education  Pt educated on lighthouse strategy for visual scanning due to L visual field deficits    Patient response to education:   Pt verbalized understanding Pt demonstrated skill Pt requires further education in this area   yes partial yes     Additional Comments: Pt agreeable to PT. Pt performing functional mobility as above. Performed ambulation with LBQC. Pt demonstrated difficulty occasionally with weight shifting to RLE and advancing LLE. Pt with major LOB when approaching chair, due to poor safety/judgement. Continue with mobilization training to maximize overall independence and return patient to PLOF. Pt highly motivated.       Skin was inspected: grossly intact  Chair/bed alarm: armed following session    AM  Time in: 0951  Time out: 1021      Pt is making good progress toward established Physical Therapy goals. Continue with physical therapy current plan of care.     Maxim Perez, PT, DPT  [id number]

## 2021-11-06 NOTE — PROGRESS NOTES
Speech Language Pathology  ACUTE REHABILITATION--DAILY PROGRESS NOTE          ADMITTING DIAGNOSIS: Acute cerebrovascular accident (CVA) ([facility] [address] [I63.9]    VISIT DIAGNOSIS: CVA    SPEECH THERAPY  PLAN OF CARE   The speech therapy  POC is established based on physician order, speech pathology diagnosis and results of clinical assessment     SPEECH PATHOLOGY DIAGNOSIS:    Mild cognitive deficit with left neglect    Speech Pathology intervention is recommended 3-6 times per week for LOS or when goals are met with emphasis on the following:      Conditions Requiring Skilled Therapeutic Intervention for speech, language and/or cognition    Decreased attention with impulsivity  left-neglect    Specific Speech Therapy Interventions to Include: Therapeutic exercises  Address left neglect    Specific instructions for next treatment:      To initiate POC    SHORT/LONG TERM GOALS  Pt will improve orientation to spatial and temporal surroundings with use of external aids and verbal cues  Pt will attend to her left side with decreasing amount of physical/verbal cues  Pt will improve attention and decrease impulsivity    Patient stated goals: Agreed with above    Rehabilitation Potential/Prognosis: good     FIMS SCORES      Swallowing    Current Status  6--Modified Independent   Short Term Goal 6--Modified Independent    Long Term Goal 6--Modified Independent     Receptive    Current Status  t   6--Modified Independent    Short Term Goal t   6--ModifiedIndependent    Long Term Goal                  6--Modified Independent    Expressive    Current Status      6--Modified Independent    Short Term Goal     6--Modified Independent    Long Term Goal                  6--Modified Independent    Social Interaction    Current Status  4--Minimal Assistance    Short Term Goal 5--Supervision    Long Term Goal 6--Modified Independent         Problem Solving    Current Status    4--Minimal Assistance (good for verbal but difficulty observed ability in areas of orientation, immediate/ STM recall, divergent naming, functional calculation, abstraction, mental manipulation, clock draw, and story comprehension. Patient received a score of 28/30, which is indicative of functional cognitive linguistic skills for verbal tasks; however, patient continues to demonstrate poor safety and judgment during execution of motor tasks. Safety:  Patient exhibited good insight into deficits this date w/ min verbal cues required. Patient stated \" I'm just used to doing things myself and I don't like waiting for help. \" ST educated patient on importance of waiting for assistance until she gets physically stronger and can demonstrate good decision making skills during structured therapy tasks w/ PT/OT and speech therapies. Patient verbalized understanding and agreement. SPEECH:    Speech intelligibility was good during structured tasks and conversational speech. SP recommended after discharge:  yes  Supervision recommended at discharge: 24 hour    Will continue SP intervention as per previously established POC. EDUCATION: Speech Pathologist (SLP) completed education with the patient regarding identified cognitive linguistic deficits and subsequent need for speech pathology intervention. Discussed deficit areas to be targeted by formal intervention and established short/long term goals. Reviewed compensatory strategies to improve functional outcome (as appropriate). Encouraged patient to engage SLP in structured Q&A session relative to identified deficit areas. Patient indicated understanding of all information provided via satisfactory verbal response.       Minute Tracking:    Individual therapy:     30 minutes  Concurrent therapy:    0 minutes  Group therapy:     0 minutes  Co-treatment therapy:   0minutes    Total minutes: 30 minutes

## 2021-11-06 NOTE — PLAN OF CARE
Problem: Pain:  Goal: Pain level will decrease  Description: Pain level will decrease  Outcome: Ongoing  Goal: Control of acute pain  Description: Control of acute pain  Outcome: Ongoing  Goal: Control of chronic pain  Description: Control of chronic pain  Outcome: Ongoing  Goal: Patient's pain/discomfort is manageable  Description: Patient's pain/discomfort is manageable  Outcome: Ongoing     Problem: Falls - Risk of:  Goal: Will remain free from falls  Description: Will remain free from falls  Outcome: Ongoing  Goal: Absence of physical injury  Description: Absence of physical injury  Outcome: Ongoing     Problem: Skin Integrity:  Goal: Will show no infection signs and symptoms  Description: Will show no infection signs and symptoms  Outcome: Ongoing  Goal: Absence of new skin breakdown  Description: Absence of new skin breakdown  Outcome: Ongoing     Problem: ABCDS Injury Assessment  Goal: Absence of physical injury  Outcome: Ongoing     Problem: HEMODYNAMIC STATUS  Goal: Patient has stable vital signs and fluid balance  Outcome: Ongoing     Problem: ACTIVITY INTOLERANCE/IMPAIRED MOBILITY  Goal: Mobility/activity is maintained at optimum level for patient  Outcome: Ongoing     Problem: COMMUNICATION IMPAIRMENT  Goal: Ability to express needs and understand communication  Outcome: Ongoing     Problem: Infection:  Goal: Will remain free from infection  Description: Will remain free from infection  Outcome: Ongoing     Problem: Safety:  Goal: Free from accidental physical injury  Description: Free from accidental physical injury  Outcome: Ongoing  Goal: Free from intentional harm  Description: Free from intentional harm  Outcome: Ongoing     Problem: Daily Care:  Goal: Daily care needs are met  Description: Daily care needs are met  Outcome: Ongoing     Problem: Skin Integrity:  Goal: Skin integrity will stabilize  Description: Skin integrity will stabilize  Outcome: Ongoing     Problem: Discharge Planning:  Goal: Patients continuum of care needs are met  Description: Patients continuum of care needs are met  Outcome: Ongoing

## 2021-11-06 NOTE — PROGRESS NOTES
Progress Note  Date:2021       Room:5505/5505-B  Patient Name:Ladan Damon     YOB: 1975     Age:45 y.o. Subjective    Subjective:  Symptoms:  Stable. She reports weakness. Diet:  Adequate intake. Activity level: Impaired due to weakness. Pain:  She complains of pain that is moderate. Review of Systems   Neurological: Positive for weakness. Objective         Vitals Last 24 Hours:  TEMPERATURE:  Temp  Av.7 °F (35.9 °C)  Min: 96.7 °F (35.9 °C)  Max: 96.7 °F (35.9 °C)  RESPIRATIONS RANGE: Resp  Av  Min: 18  Max: 18  PULSE OXIMETRY RANGE: SpO2  Av %  Min: 94 %  Max: 94 %  PULSE RANGE: Pulse  Av  Min: 72  Max: 72  BLOOD PRESSURE RANGE: Systolic (97TUV), CLL:367 , Min:105 , S   ; Diastolic (67AIX), WFQ:44, Min:64, Max:64    I/O (24Hr): Intake/Output Summary (Last 24 hours) at 2021 0821  Last data filed at 2021 0603  Gross per 24 hour   Intake 660 ml   Output 300 ml   Net 360 ml     Objective:  General Appearance: In no acute distress. Vital signs: (most recent): Blood pressure 105/64, pulse 72, temperature 96.7 °F (35.9 °C), temperature source Temporal, resp. rate 18, height 5' 6\" (1.676 m), weight 170 lb (77.1 kg), SpO2 94 %. Vital signs are normal.    Output: Producing urine and producing stool. Lungs:  Normal effort and normal respiratory rate. Breath sounds clear to auscultation. Heart: Normal rate. Regular rhythm. S1 normal and S2 normal.    Abdomen: Abdomen is soft. Bowel sounds are normal.   There is no abdominal tenderness. Extremities: Normal range of motion. Neurological: Patient is alert. (1/5 left side). Labs/Imaging/Diagnostics    Labs:  CBC:No results for input(s): WBC, RBC, HGB, HCT, MCV, RDW, PLT in the last 72 hours. CHEMISTRIES:No results for input(s): NA, K, CL, CO2, BUN, CREATININE, GLUCOSE, PHOS, MG in the last 72 hours.     Invalid input(s): CA  PT/INR:No results for input(s): PROTIME, INR in the last 72 hours. APTT:No results for input(s): APTT in the last 72 hours. LIVER PROFILE:No results for input(s): AST, ALT, BILIDIR, BILITOT, ALKPHOS in the last 72 hours. Imaging Last 24 Hours:  No results found. Assessment//Plan           Hospital Problems           Last Modified POA    * (Principal) Acute cerebrovascular accident (CVA) ([facility] [address] 10/27/2021 Yes    Status post craniectomy 10/27/2021 Yes    Left spastic hemiplegia ([facility] [address] 10/27/2021 Yes    Tobacco abuse 10/27/2021 Yes    Acute pain 10/27/2021 Yes        Assessment:    Condition: In stable condition.   (CVA). Plan:   Encourage ambulation. (Patient is complaining that the ankle brace is uncomfortable at night and keeps her from sleeping  I think she is having a little bit of spasm but it is positioning her ankle well  I would have put her on a small dose of nighttime baclofen).        Electronically signed by Kelsi Jim MD on 11/6/21 at 8:21 AM EDT

## 2021-11-06 NOTE — PLAN OF CARE
Problem: Pain:  Goal: Pain level will decrease  Description: Pain level will decrease  11/6/2021 0611 by Leonard Tong RN  Outcome: Ongoing  11/6/2021 0549 by Leonard Tong RN  Outcome: Ongoing  Goal: Control of acute pain  Description: Control of acute pain  11/6/2021 0611 by Leonard Tong RN  Outcome: Ongoing  11/6/2021 0549 by Leonard Tong RN  Outcome: Ongoing  Goal: Control of chronic pain  Description: Control of chronic pain  11/6/2021 0611 by Leonard Tong RN  Outcome: Ongoing  11/6/2021 0549 by Leonard Tong RN  Outcome: Ongoing  Goal: Patient's pain/discomfort is manageable  Description: Patient's pain/discomfort is manageable  11/6/2021 0611 by Leonard Tong RN  Outcome: Ongoing  11/6/2021 0549 by Leonard Tong RN  Outcome: Ongoing     Problem: Falls - Risk of:  Goal: Will remain free from falls  Description: Will remain free from falls  11/6/2021 0611 by Leonard Tong RN  Outcome: Ongoing  11/6/2021 0549 by Leonard Tong RN  Outcome: Ongoing  Goal: Absence of physical injury  Description: Absence of physical injury  11/6/2021 0611 by Leonard Tong RN  Outcome: Ongoing  11/6/2021 0549 by Leonard Tong RN  Outcome: Ongoing     Problem: Skin Integrity:  Goal: Will show no infection signs and symptoms  Description: Will show no infection signs and symptoms  11/6/2021 0611 by Leonard Tong RN  Outcome: Ongoing  11/6/2021 0549 by Leonard Tong RN  Outcome: Ongoing  Goal: Absence of new skin breakdown  Description: Absence of new skin breakdown  11/6/2021 0611 by Leonard Tong RN  Outcome: Ongoing  11/6/2021 0549 by Leonard Tong RN  Outcome: Ongoing     Problem: ABCDS Injury Assessment  Goal: Absence of physical injury  11/6/2021 0611 by Leonard Tong RN  Outcome: Ongoing  11/6/2021 0549 by Leonard Tong RN  Outcome: Ongoing     Problem: HEMODYNAMIC STATUS  Goal: Patient has stable vital signs and fluid balance  11/6/2021 0611 by Leonard Tong RN  Outcome: Ongoing  11/6/2021 0549 by Luís Obregon RN  Outcome: Ongoing     Problem: ACTIVITY INTOLERANCE/IMPAIRED MOBILITY  Goal: Mobility/activity is maintained at optimum level for patient  11/6/2021 0611 by Luís Obregon RN  Outcome: Ongoing  11/6/2021 0549 by Luís Obregon RN  Outcome: Ongoing     Problem: COMMUNICATION IMPAIRMENT  Goal: Ability to express needs and understand communication  11/6/2021 0611 by Luís Obregon RN  Outcome: Ongoing  11/6/2021 0549 by Luís Obregon RN  Outcome: Ongoing     Problem: Infection:  Goal: Will remain free from infection  Description: Will remain free from infection  11/6/2021 0611 by Luís Obregon RN  Outcome: Ongoing  11/6/2021 0549 by Luís Obregon RN  Outcome: Ongoing     Problem: Safety:  Goal: Free from accidental physical injury  Description: Free from accidental physical injury  11/6/2021 0611 by Luís Obregon RN  Outcome: Ongoing  11/6/2021 0549 by Luís Obregon RN  Outcome: Ongoing  Goal: Free from intentional harm  Description: Free from intentional harm  11/6/2021 0611 by Luís Obregon RN  Outcome: Ongoing  11/6/2021 0549 by Luís Obregon RN  Outcome: Ongoing     Problem: Daily Care:  Goal: Daily care needs are met  Description: Daily care needs are met  11/6/2021 0611 by Luís Obregon RN  Outcome: Ongoing  11/6/2021 0549 by Luís Obregon RN  Outcome: Ongoing     Problem: Skin Integrity:  Goal: Skin integrity will stabilize  Description: Skin integrity will stabilize  11/6/2021 0611 by Luís Obregon RN  Outcome: Ongoing  11/6/2021 0549 by Luís Obregon RN  Outcome: Ongoing     Problem: Discharge Planning:  Goal: Patients continuum of care needs are met  Description: Patients continuum of care needs are met  11/6/2021 0611 by Luís Obregon RN  Outcome: Ongoing  11/6/2021 0549 by Luís Obregon RN  Outcome: Ongoing

## 2021-11-07 PROCEDURE — 6360000002 HC RX W HCPCS: Performed by: PHYSICAL MEDICINE & REHABILITATION

## 2021-11-07 PROCEDURE — 97530 THERAPEUTIC ACTIVITIES: CPT

## 2021-11-07 PROCEDURE — 6370000000 HC RX 637 (ALT 250 FOR IP): Performed by: PHYSICAL MEDICINE & REHABILITATION

## 2021-11-07 PROCEDURE — 1280000000 HC REHAB R&B

## 2021-11-07 PROCEDURE — 97110 THERAPEUTIC EXERCISES: CPT

## 2021-11-07 PROCEDURE — 6370000000 HC RX 637 (ALT 250 FOR IP): Performed by: PHYSICIAN ASSISTANT

## 2021-11-07 PROCEDURE — 97535 SELF CARE MNGMENT TRAINING: CPT

## 2021-11-07 RX ADMIN — ATORVASTATIN CALCIUM 40 MG: 40 TABLET, FILM COATED ORAL at 21:58

## 2021-11-07 RX ADMIN — GABAPENTIN 100 MG: 100 CAPSULE ORAL at 09:06

## 2021-11-07 RX ADMIN — BACLOFEN 5 MG: 10 TABLET ORAL at 22:01

## 2021-11-07 RX ADMIN — Medication 6 MG: at 21:58

## 2021-11-07 RX ADMIN — ACETAMINOPHEN 1000 MG: 325 TABLET ORAL at 13:56

## 2021-11-07 RX ADMIN — SENNOSIDES 17.2 MG: 8.6 TABLET, COATED ORAL at 21:58

## 2021-11-07 RX ADMIN — HEPARIN SODIUM 5000 UNITS: 10000 INJECTION INTRAVENOUS; SUBCUTANEOUS at 17:27

## 2021-11-07 RX ADMIN — HEPARIN SODIUM 5000 UNITS: 10000 INJECTION INTRAVENOUS; SUBCUTANEOUS at 07:10

## 2021-11-07 RX ADMIN — TAMSULOSIN HYDROCHLORIDE 0.4 MG: 0.4 CAPSULE ORAL at 09:06

## 2021-11-07 RX ADMIN — TRAMADOL HYDROCHLORIDE 25 MG: 50 TABLET, COATED ORAL at 07:09

## 2021-11-07 RX ADMIN — GABAPENTIN 100 MG: 100 CAPSULE ORAL at 21:58

## 2021-11-07 RX ADMIN — METOPROLOL TARTRATE 12.5 MG: 25 TABLET, FILM COATED ORAL at 09:07

## 2021-11-07 RX ADMIN — METOPROLOL TARTRATE 12.5 MG: 25 TABLET, FILM COATED ORAL at 21:58

## 2021-11-07 RX ADMIN — DOCUSATE SODIUM 100 MG: 100 CAPSULE, LIQUID FILLED ORAL at 21:58

## 2021-11-07 RX ADMIN — ASPIRIN 81 MG CHEWABLE TABLET 81 MG: 81 TABLET CHEWABLE at 09:07

## 2021-11-07 RX ADMIN — ACETAMINOPHEN 1000 MG: 325 TABLET ORAL at 07:08

## 2021-11-07 RX ADMIN — DOCUSATE SODIUM 100 MG: 100 CAPSULE, LIQUID FILLED ORAL at 09:07

## 2021-11-07 RX ADMIN — ACETAMINOPHEN 1000 MG: 325 TABLET ORAL at 21:58

## 2021-11-07 RX ADMIN — CLOPIDOGREL 75 MG: 75 TABLET, FILM COATED ORAL at 09:07

## 2021-11-07 RX ADMIN — GABAPENTIN 100 MG: 100 CAPSULE ORAL at 13:56

## 2021-11-07 ASSESSMENT — PAIN SCALES - GENERAL
PAINLEVEL_OUTOF10: 3
PAINLEVEL_OUTOF10: 0
PAINLEVEL_OUTOF10: 1
PAINLEVEL_OUTOF10: 4
PAINLEVEL_OUTOF10: 5
PAINLEVEL_OUTOF10: 0
PAINLEVEL_OUTOF10: 3

## 2021-11-07 ASSESSMENT — PAIN DESCRIPTION - ONSET: ONSET: ON-GOING

## 2021-11-07 ASSESSMENT — PAIN DESCRIPTION - PAIN TYPE: TYPE: CHRONIC PAIN

## 2021-11-07 ASSESSMENT — PAIN DESCRIPTION - LOCATION: LOCATION: NECK

## 2021-11-07 ASSESSMENT — PAIN - FUNCTIONAL ASSESSMENT: PAIN_FUNCTIONAL_ASSESSMENT: ACTIVITIES ARE NOT PREVENTED

## 2021-11-07 NOTE — PLAN OF CARE
Problem: Pain:  Goal: Pain level will decrease  Description: Pain level will decrease  11/7/2021 0148 by Kiran Puentes RN  Outcome: Ongoing  11/6/2021 1938 by Lucian Oneil RN  Outcome: Met This Shift  Goal: Control of acute pain  Description: Control of acute pain  11/7/2021 0148 by Kiran Puentes RN  Outcome: Ongoing  11/6/2021 1938 by Lucian Oneil RN  Outcome: Met This Shift  Goal: Control of chronic pain  Description: Control of chronic pain  11/7/2021 0148 by Kiran Puentes RN  Outcome: Ongoing  11/6/2021 1938 by Lucian Oneil RN  Outcome: Met This Shift  Goal: Patient's pain/discomfort is manageable  Description: Patient's pain/discomfort is manageable  11/7/2021 0148 by Kiran Puentes RN  Outcome: Ongoing  11/6/2021 1938 by Lucian Oneil RN  Outcome: Met This Shift     Problem: Falls - Risk of:  Goal: Will remain free from falls  Description: Will remain free from falls  11/7/2021 0148 by Kiran Puentes RN  Outcome: Ongoing  11/6/2021 1938 by Lucian Oneil RN  Outcome: Met This Shift  Goal: Absence of physical injury  Description: Absence of physical injury  11/7/2021 0148 by Kiran Puentes RN  Outcome: Ongoing  11/6/2021 1938 by Lucian Oneil RN  Outcome: Met This Shift     Problem: Skin Integrity:  Goal: Will show no infection signs and symptoms  Description: Will show no infection signs and symptoms  11/7/2021 0148 by Kiran Puentes RN  Outcome: Ongoing  11/6/2021 1938 by Lucian Oneil RN  Outcome: Met This Shift  Goal: Absence of new skin breakdown  Description: Absence of new skin breakdown  11/7/2021 0148 by Kiran Puentes RN  Outcome: Ongoing  11/6/2021 1938 by Lucian Oneil RN  Outcome: Met This Shift     Problem: ABCDS Injury Assessment  Goal: Absence of physical injury  11/7/2021 0148 by Krian Puentes RN  Outcome: Ongoing  11/6/2021 1938 by Lucian Oneil RN  Outcome: Met This Shift     Problem: HEMODYNAMIC STATUS  Goal: Patient has stable vital signs and fluid balance  11/7/2021 0148 by Kiran Puentes, RN  Outcome: Ongoing  11/6/2021 1938 by Sergio Carmona RN  Outcome: Met This Shift     Problem: ACTIVITY INTOLERANCE/IMPAIRED MOBILITY  Goal: Mobility/activity is maintained at optimum level for patient  11/7/2021 0148 by Angelica Walter RN  Outcome: Ongoing  11/6/2021 1938 by Sergio Carmona RN  Outcome: Met This Shift     Problem: COMMUNICATION IMPAIRMENT  Goal: Ability to express needs and understand communication  11/7/2021 0148 by Angelica Walter RN  Outcome: Ongoing  11/6/2021 1938 by Sergio Carmona RN  Outcome: Met This Shift     Problem: Infection:  Goal: Will remain free from infection  Description: Will remain free from infection  11/7/2021 0148 by Angelica Walter RN  Outcome: Ongoing  11/6/2021 1938 by Sergio Carmona RN  Outcome: Met This Shift     Problem: Safety:  Goal: Free from accidental physical injury  Description: Free from accidental physical injury  11/7/2021 0148 by Angelica Walter RN  Outcome: Ongoing  11/6/2021 1938 by Sergio Carmona RN  Outcome: Met This Shift  Goal: Free from intentional harm  Description: Free from intentional harm  11/7/2021 0148 by Angelica Walter RN  Outcome: Ongoing  11/6/2021 1938 by Sergio Carmona RN  Outcome: Met This Shift     Problem: Daily Care:  Goal: Daily care needs are met  Description: Daily care needs are met  11/7/2021 0148 by Angelica Walter RN  Outcome: Ongoing  11/6/2021 1938 by Sergio Carmona RN  Outcome: Met This Shift     Problem: Skin Integrity:  Goal: Skin integrity will stabilize  Description: Skin integrity will stabilize  11/7/2021 0148 by Angelica Walter RN  Outcome: Ongoing  11/6/2021 1938 by Sergio Carmona RN  Outcome: Met This Shift     Problem: Discharge Planning:  Goal: Patients continuum of care needs are met  Description: Patients continuum of care needs are met  11/7/2021 0148 by Angelica Walter RN  Outcome: Ongoing  11/6/2021 1938 by Sergio Carmona RN  Outcome: Met This Shift

## 2021-11-07 NOTE — PROGRESS NOTES
11/07/21 1413   Attendance   Activity Cards   Participation Active participation   Therapeutic Recreation   Leisure Education Demonstrates knowledge of benefits of leisure involvement  (Ladan identified fun and entertaining as  benefits.)   Leisure Attitude/Participation Participates in 1:1 structured activity   Time Spent With Patient   Minutes 60

## 2021-11-07 NOTE — PROGRESS NOTES
OCCUPATIONAL THERAPY DAILY NOTE    Date:2021  Patient Name: Stacey Salinas  MRN: [id number]  : 1975  Room: [address] [place], [place] [address]     Diagnosis:Acute CVA( Pt presented to [facility] as Level 2 stroke alert on 10/11/21. L side weakness, neglect. NIH-17  Found to have large R MCA involving R occipital)  Surgery: R frontotemporal decompressive hemicraniectomy on 10/13/21  Past Medical History: COVID (9/15), tobacco use  Precautions: Falls, L hemiplegia, L neglect, L sublux, bed/chair alarm, helmet when OOB, impulsive, L PRAFO    Functional Assessment:   Date Status AE  Comments   Feeding 21 Stand by Assist      Grooming 21 SBA  w/c Pt completed simulated face and L hand washing while up in w/c. Discussed placing washcloth on table/sink in order to wash R hand. Oral Care 21 SBA     Bathing 21 Maximal Assist  Shower chair with wheels  Pt demo'd simulated UB bathing using washcloth with LB only to thighs/shin while up in w/c.    UB Dressing 21 Mod A     LB Dressing 21 Maximal Assist  reacher    Footwear 21 Moderate Assist      Toileting 21 Maximal Assist  Grab bar    Homemaking         Functional Transfers / Balance:   Date Status DME  Comments   Sit Balance 21 CGA  Demo up in w/c during arm ex's, simulated bathing and on EOB to increase trunk control/core strength for functional tasks. Stand Balance 21 Max A Grab bar  Bed rail     [] Tub  [x] Shower   Transfer 21 Mod A Roll in shower chair, grab bar    Commode   Transfer 21 Min A slideboard, drop arm commode    Functional   Mobility    W/c to EOB   EOB to supine      21 TBD      Mod A           Sliding board 21 Deferred to PT therapy since using L knee immobilizer, L ankle PLS AFO per 21 note for balance and safety with LBQC    Pt needed cues for hand placement on sliding board during w/c to EOB transfer with LUE in sling for positioning for balance.  Pt unable to advance LLE needing assistance for balance and cues for safety. Functional Exercises / Activity:  BUE SROM ex's while wearing 1# wt on RUE while assisting LUE on furniture slider up/down & circles on incline wedge along with towel glides on table top to increase RUE strength & LUE ROM for scapula, shld, elbow, wrist and hand. Pt tolerated 3 reps of 10-15 ea needing assistance for proper technique and follow thru. Pt demo simulated grooming for face and hands then UB bathing using washcloth and partial LB thighs to shin to increase ADLls. Pt used one handed skills due to LUE flaccid at this time. LUE muscle tapping/facilitation techs for forearm, biceps/triceps and wrist/hand  to increase muscular input for active movement along with tolerating PROM at all joints shoulder and scapula to maintain integrity. Sensory / Neuromuscular Re-Education:      Cognitive Skills:   Status Comments   Problem   Solving fair    Memory fair    Sequencing fair    Safety fair      Visual Perception:    Education:  Pt educated about LUE positioning when up in w/c to promote ROM/wt bearing along with joint integrity. Pt educated with safety during w/c to EOB using sliding board to increase awareness. 10/25/21: pt educated on SROM of LUE and safe positioning of LUE  10/29/21: educated on 1 handed dressing technique for socks/shoes  11/1/21: shoe lace adapted for ease with donning shoe  11/1/21: attempted small strips of kineosio tape on pt's hand to assess if pt can tolerate on her shoulder due to report of allergy (cleared by )  11/2/21:  Therapist applied kineosiotape to pt's LUE to stabilize sublux. Will continue to assess issues that arise with adhesive and progression of sublux  11/2/21: pt educated on AE for ease with LB dressing  11/3/21: Pt very fatigued in AM and PM sessions, requiring max cueing to alert pt back to task and engage in session, with pt asking multiple times to return to room/return to bed.  Extended time to complete all tasks  11/4/21: home ROM exercise program placed in pt's folder and pt's mother notified of it  11/7/21 Pt educated about LUE positioning both up in w/c and in bed to maintain joint integrity. [x] Family teach completed on:  10/4/21: Completed family teach this session. Pt's mother Mony Skinner present. Educated Mony Skinner on pt's status with ADLs and vc's for sequencing/initiation of tasks/hand placement with functional transfers. Mony Skinner able to provide hands on assist with was educated for body mechanics/hand placement/vc's to increase safety with only commode transfer with slideboard and toileting task. Mony Skinner educated on PROM (elbow/shoulder/wrist/digit flex/ext) of LUE to increase ROM, maintain joint integrity, and safe management of sublux. Mony Skinner educated on how to doff/jesus gait belt and LUE sling. Pt's limiting factor appears to be her impulsivity and safety with ADLs/transfer. Mony Skinner demo'ing F understanding/safety of education. Mony Skinner appeared motivated and willing to learn. Continue to educate. Pain Level: 4/10 head    Additional Notes:     Patient has made fair + progress during treatment sessions toward set goals. Therapy emphasis to obtain goals:    Time Frame for Long term goals  6 weeks   Long term goal 1 Pt will complete UB self-care tasks w/ Supervision   Long term goal 2 Pt will complete LB self-care tasks w/ Supervision   Long term goal 3 Pt will complete toileting (all aspects) w/ Supervision   Long term goal 4 Pt will safely complete toilet transfers w/ Supervision   Long term goal 5 Pt will safely complete tub/shower transfers w/ Supervision   Long term goals 6 Pt will complete basic homemaking task w/ SBA   Long term goal 7 Pt will complete grooming task w/ Supervision   Long term goal 8 Pt will complete feeding task w/ Supervision/setup     [x] Continue with current OT Plan of care. [] Prepare for Discharge    10/27/21 OT plan of care updated on this date.  Tentative length of stay is 6 weeks Felix Keller OTR/L [id number]  11/3/21- Pt POC updated on this date.  Pt tentative length of day is 5 weeks to address above problem areas - Jessica Luong OTR/L [id number]     DISCHARGE RECOMMENDATIONS  Recommended DME: drop arm BSC    Post Discharge Care:   []Home Independently  []Home with 24hr Care / Supervision []Home with Partial Supervision []Home with Home Health OT []Home with Out Pt OT []Other: ___   Comments:         Time in Time out Tx Time Breakdown  Variance:   First Session  9:45am 10:30am [x] Individual Tx-45  [] Concurrent Tx -  [] Co-Tx -   [] Group Tx -   [] Time Missed -     Second Session   [] Individual Tx-   [] Concurrent Tx -  [] Co-Tx -   [] Group Tx -   [] Time Missed -     Third Session    [] Individual Tx-   [] Concurrent Tx -  [] Co-Tx -   [] Group Tx -   [] Time Missed -         Total Tx Time- [address] [facility] [place]/L [id number]

## 2021-11-08 LAB
ANION GAP SERPL CALCULATED.3IONS-SCNC: 11 MMOL/L (ref 7–16)
BASOPHILS ABSOLUTE: 0.15 E9/L (ref 0–0.2)
BASOPHILS RELATIVE PERCENT: 1.4 % (ref 0–2)
BUN BLDV-MCNC: 17 MG/DL (ref 6–20)
CALCIUM SERPL-MCNC: 9.2 MG/DL (ref 8.6–10.2)
CHLORIDE BLD-SCNC: 102 MMOL/L (ref 98–107)
CO2: 24 MMOL/L (ref 22–29)
CREAT SERPL-MCNC: 0.8 MG/DL (ref 0.5–1)
EOSINOPHILS ABSOLUTE: 0.29 E9/L (ref 0.05–0.5)
EOSINOPHILS RELATIVE PERCENT: 2.8 % (ref 0–6)
GFR AFRICAN AMERICAN: >60
GFR NON-AFRICAN AMERICAN: >60 ML/MIN/1.73
GLUCOSE BLD-MCNC: 140 MG/DL (ref 74–99)
HCT VFR BLD CALC: 35.6 % (ref 34–48)
HEMOGLOBIN: 11.5 G/DL (ref 11.5–15.5)
IMMATURE GRANULOCYTES #: 0.05 E9/L
IMMATURE GRANULOCYTES %: 0.5 % (ref 0–5)
LYMPHOCYTES ABSOLUTE: 2.21 E9/L (ref 1.5–4)
LYMPHOCYTES RELATIVE PERCENT: 21.1 % (ref 20–42)
MCH RBC QN AUTO: 30.4 PG (ref 26–35)
MCHC RBC AUTO-ENTMCNC: 32.3 % (ref 32–34.5)
MCV RBC AUTO: 94.2 FL (ref 80–99.9)
METER GLUCOSE: 114 MG/DL (ref 74–99)
MONOCYTES ABSOLUTE: 0.36 E9/L (ref 0.1–0.95)
MONOCYTES RELATIVE PERCENT: 3.4 % (ref 2–12)
NEUTROPHILS ABSOLUTE: 7.4 E9/L (ref 1.8–7.3)
NEUTROPHILS RELATIVE PERCENT: 70.8 % (ref 43–80)
PDW BLD-RTO: 13.4 FL (ref 11.5–15)
PLATELET # BLD: 524 E9/L (ref 130–450)
PMV BLD AUTO: 9.6 FL (ref 7–12)
POTASSIUM REFLEX MAGNESIUM: 4.1 MMOL/L (ref 3.5–5)
RBC # BLD: 3.78 E12/L (ref 3.5–5.5)
SODIUM BLD-SCNC: 137 MMOL/L (ref 132–146)
WBC # BLD: 10.5 E9/L (ref 4.5–11.5)

## 2021-11-08 PROCEDURE — APPSS60 APP SPLIT SHARED TIME 46-60 MINUTES: Performed by: PHYSICIAN ASSISTANT

## 2021-11-08 PROCEDURE — 6370000000 HC RX 637 (ALT 250 FOR IP): Performed by: PHYSICAL MEDICINE & REHABILITATION

## 2021-11-08 PROCEDURE — 6360000002 HC RX W HCPCS: Performed by: PHYSICAL MEDICINE & REHABILITATION

## 2021-11-08 PROCEDURE — 99233 SBSQ HOSP IP/OBS HIGH 50: CPT | Performed by: PHYSICAL MEDICINE & REHABILITATION

## 2021-11-08 PROCEDURE — 97129 THER IVNTJ 1ST 15 MIN: CPT

## 2021-11-08 PROCEDURE — 97530 THERAPEUTIC ACTIVITIES: CPT

## 2021-11-08 PROCEDURE — 80048 BASIC METABOLIC PNL TOTAL CA: CPT

## 2021-11-08 PROCEDURE — 93005 ELECTROCARDIOGRAM TRACING: CPT | Performed by: PHYSICAL MEDICINE & REHABILITATION

## 2021-11-08 PROCEDURE — 97130 THER IVNTJ EA ADDL 15 MIN: CPT

## 2021-11-08 PROCEDURE — 97110 THERAPEUTIC EXERCISES: CPT

## 2021-11-08 PROCEDURE — 82962 GLUCOSE BLOOD TEST: CPT

## 2021-11-08 PROCEDURE — 36415 COLL VENOUS BLD VENIPUNCTURE: CPT

## 2021-11-08 PROCEDURE — 97535 SELF CARE MNGMENT TRAINING: CPT

## 2021-11-08 PROCEDURE — 85025 COMPLETE CBC W/AUTO DIFF WBC: CPT

## 2021-11-08 PROCEDURE — 1280000000 HC REHAB R&B

## 2021-11-08 PROCEDURE — 6370000000 HC RX 637 (ALT 250 FOR IP): Performed by: PHYSICIAN ASSISTANT

## 2021-11-08 RX ADMIN — HEPARIN SODIUM 5000 UNITS: 10000 INJECTION INTRAVENOUS; SUBCUTANEOUS at 17:28

## 2021-11-08 RX ADMIN — METOPROLOL TARTRATE 12.5 MG: 25 TABLET, FILM COATED ORAL at 07:44

## 2021-11-08 RX ADMIN — HEPARIN SODIUM 5000 UNITS: 10000 INJECTION INTRAVENOUS; SUBCUTANEOUS at 06:06

## 2021-11-08 RX ADMIN — CLOPIDOGREL 75 MG: 75 TABLET, FILM COATED ORAL at 07:46

## 2021-11-08 RX ADMIN — BACLOFEN 5 MG: 10 TABLET ORAL at 22:02

## 2021-11-08 RX ADMIN — DOCUSATE SODIUM 100 MG: 100 CAPSULE, LIQUID FILLED ORAL at 07:44

## 2021-11-08 RX ADMIN — SENNOSIDES 17.2 MG: 8.6 TABLET, COATED ORAL at 22:14

## 2021-11-08 RX ADMIN — ASPIRIN 81 MG CHEWABLE TABLET 81 MG: 81 TABLET CHEWABLE at 07:44

## 2021-11-08 RX ADMIN — ATORVASTATIN CALCIUM 40 MG: 40 TABLET, FILM COATED ORAL at 22:02

## 2021-11-08 RX ADMIN — Medication 6 MG: at 22:01

## 2021-11-08 RX ADMIN — METOPROLOL TARTRATE 12.5 MG: 25 TABLET, FILM COATED ORAL at 22:02

## 2021-11-08 RX ADMIN — ACETAMINOPHEN 1000 MG: 325 TABLET ORAL at 22:09

## 2021-11-08 RX ADMIN — ACETAMINOPHEN 1000 MG: 325 TABLET ORAL at 06:06

## 2021-11-08 RX ADMIN — DOCUSATE SODIUM 100 MG: 100 CAPSULE, LIQUID FILLED ORAL at 22:02

## 2021-11-08 RX ADMIN — GABAPENTIN 100 MG: 100 CAPSULE ORAL at 13:52

## 2021-11-08 RX ADMIN — TRAMADOL HYDROCHLORIDE 25 MG: 50 TABLET, COATED ORAL at 07:44

## 2021-11-08 RX ADMIN — GABAPENTIN 100 MG: 100 CAPSULE ORAL at 07:44

## 2021-11-08 RX ADMIN — ONDANSETRON 4 MG: 4 TABLET, ORALLY DISINTEGRATING ORAL at 16:03

## 2021-11-08 RX ADMIN — GABAPENTIN 100 MG: 100 CAPSULE ORAL at 22:14

## 2021-11-08 RX ADMIN — ACETAMINOPHEN 1000 MG: 325 TABLET ORAL at 13:52

## 2021-11-08 RX ADMIN — TAMSULOSIN HYDROCHLORIDE 0.4 MG: 0.4 CAPSULE ORAL at 07:44

## 2021-11-08 ASSESSMENT — PAIN DESCRIPTION - LOCATION
LOCATION: GENERALIZED
LOCATION: NECK;SHOULDER
LOCATION: NECK
LOCATION: NECK;SHOULDER
LOCATION: NECK

## 2021-11-08 ASSESSMENT — PAIN SCALES - GENERAL
PAINLEVEL_OUTOF10: 0
PAINLEVEL_OUTOF10: 4
PAINLEVEL_OUTOF10: 6
PAINLEVEL_OUTOF10: 8
PAINLEVEL_OUTOF10: 7
PAINLEVEL_OUTOF10: 0
PAINLEVEL_OUTOF10: 3
PAINLEVEL_OUTOF10: 0
PAINLEVEL_OUTOF10: 4
PAINLEVEL_OUTOF10: 0

## 2021-11-08 ASSESSMENT — PAIN DESCRIPTION - PROGRESSION
CLINICAL_PROGRESSION: NOT CHANGED

## 2021-11-08 ASSESSMENT — PAIN DESCRIPTION - FREQUENCY
FREQUENCY: CONTINUOUS

## 2021-11-08 ASSESSMENT — PAIN DESCRIPTION - ORIENTATION
ORIENTATION: RIGHT

## 2021-11-08 ASSESSMENT — PAIN DESCRIPTION - DESCRIPTORS
DESCRIPTORS: ACHING;CONSTANT;DISCOMFORT
DESCRIPTORS: ACHING;CONSTANT;DISCOMFORT
DESCRIPTORS: ACHING;DISCOMFORT
DESCRIPTORS: ACHING;CONSTANT;DISCOMFORT
DESCRIPTORS: ACHING;DISCOMFORT;CONSTANT

## 2021-11-08 ASSESSMENT — PAIN - FUNCTIONAL ASSESSMENT: PAIN_FUNCTIONAL_ASSESSMENT: ACTIVITIES ARE NOT PREVENTED

## 2021-11-08 ASSESSMENT — PAIN DESCRIPTION - ONSET
ONSET: ON-GOING

## 2021-11-08 ASSESSMENT — PAIN DESCRIPTION - PAIN TYPE
TYPE: CHRONIC PAIN

## 2021-11-08 NOTE — PROGRESS NOTES
[id number] [facility] Physical Medicine and Rehabilitation  Comprehensive Progress Note    Subjective:      Sapphire Dia is a 39 y.o. female admitted to inpatient rehabilitation for impairments and activities limitations in ADLs and mobility secondary to right MCA CVA. No acute events overnight. No cp, sob, n/v. Minutes prior to my seeing patient she had an RRT called. She reportedly had a brief episode of altered mental status that was witness by family who were in the room, but not by staff. Her nurse reported that patient complained of nausea and requested zofran. The episode occurred while nurse was out of the room getting zofran. Per family patient's eyes rolled back, she became less responsive, she kicked her leg out straight and turned toward the left side. This lasted less than a minute. She then returned to baseline for a minute or so and then per family she \"passed out\". This again, lasted 30 seconds or less and she returned to baseline. When staff arrived to the room she was reportedly alert, oriented, and at her baseline. No tongue bite or reported incontinence. Upon my evaluation she is alert and oriented, answering questions appropriately, and appears at her baseline. No new focal neurological deficits. Her blood pressures ranged from /60-91. HR 80. . EKG during the RRT showed normal sinus rhythm. Discussed with RRT team who felt likely vasovagal / orthostatic episode. She states that she is tired from therapy, but otherwise is feeling fine now. She was recently started on lopressor due to episodes of tachycardia and had been tolerating it. 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 She states that she was taken off of antiepileptic medication when she was a teenager and has not had a seizure in over 30 yrs. She completed Keppra prophylaxis at [facility] after her craniectomy prior to transferring to [facility] ([facility].        The patient's medical records have been reviewed. Scheduled Meds:baclofen, 5 mg, Nightly  nicotine, 1 patch, Daily  senna, 2 tablet, Nightly  polyethylene glycol, 17 g, Daily  metoprolol tartrate, 12.5 mg, BID  acetaminophen, 1,000 mg, 3 times per day  lidocaine, 2 patch, Daily  gabapentin, 100 mg, TID  tamsulosin, 0.4 mg, Daily  heparin (porcine), 5,000 Units, Q12H  aspirin, 81 mg, Daily  atorvastatin, 40 mg, Nightly  vitamin D, 50,000 Units, Weekly  docusate sodium, 100 mg, BID  melatonin, 6 mg, Nightly  clopidogrel, 75 mg, Daily      Continuous Infusions:  PRN Meds:traMADol, 25 mg, BID PRN  ondansetron, 4 mg, Q6H PRN  lactulose, 20 g, BID PRN  guaiFENesin, 200 mg, BID PRN  acetaminophen, 650 mg, Q4H PRN  magnesium hydroxide, 30 mL, Daily PRN  bisacodyl, 10 mg, Daily PRN  diclofenac sodium, 2 g, 4x Daily PRN         Objective:      Vitals:    11/06/21 0836 11/07/21 0900 11/07/21 2158 11/08/21 0744   BP: 102/67 (!) 119/56 96/60 (!) 109/59   Pulse: 91 106 78 79   Resp: 18   18   Temp: 98.6 °F (37 °C) 98.6 °F (37 °C)  98 °F (36.7 °C)   TempSrc: Temporal Temporal  Temporal   SpO2:       Weight:       Height:         General appearance: alert, NAD, resting in bed  Head: R crani site c/d/i   Eyes: conjunctivae/corneas clear. PERRL, EOM's intact. Lungs: clear to auscultation bilaterally  Heart: regular rate and rhythm, S1, S2 normal  Abdomen: soft, non-tender, normal bowel sounds  Extremities: extremities normal, atraumatic, no cyanosis or edema  MSK: no cyanosis, clubbing, edema  Skin: R crani c/d/i   Neurologic: Alert, oriented x4. Answering all questions appropriately. Left neglect. Speech clear. No aphasia appreciated. Mild L facial droop. Spastic left hemiplegia. MAS 1+ in LUE; MAS 1+-2 in LLE.  Motor 5/5 RUE and RLE; 0-1/5 LUE and LLE.      Exam stable      Laboratory:    Lab Results   Component Value Date    WBC 11.0 10/29/2021    HGB 12.3 10/29/2021    HCT 38.6 10/29/2021    MCV 95.5 10/29/2021     (H) 10/29/2021 Lab Results   Component Value Date     10/28/2021    K 3.8 10/28/2021     10/28/2021    CO2 24 10/28/2021    BUN 11 10/28/2021    CREATININE 0.5 10/28/2021    GLUCOSE 105 10/28/2021    CALCIUM 9.2 10/28/2021      Lab Results   Component Value Date    ALT 75 (H) 10/23/2021    AST 46 (H) 10/23/2021    ALKPHOS 89 10/23/2021    BILITOT 0.4 10/23/2021       Functional Status:   Bed mobility: Mod A  Transfers: Mod - Max A  Ambulation: 110 ft L ankle PLS AFO, L knee immobilizer, R LBQC Mod A  Feeding: SBA  Grooming: SBA  UB dressing: Min A  LB dressing: Max A       Assessment/Plan:       39 y.o. female admitted to inpatient rehabilitation for impairments and activities limitations in ADLs and mobility secondary to right MCA CVA.    -Right MCA CVA: Complicated by cerebral edema requiring emergent right hemicraniectomy (10/13/2021). Dense left spastic hemiplegia, left neglect, mild cognitive impairment. Helmet when out of bed. L PRAFO in bed. LUE sling for gait. L w/c tray. Aspirin, Plavix, Lipitor for secondary prevention. Monitor neuro exam. Continue Acute Rehab program.   -Spasticity LUE/LLE: Monitor. Tone currently helpful to compensate for weakness with attempted gait in PT. Started on low dose baclofen at bedtime due to occasional painful spasms in the leg at night only. Monitor.   -Pain control, MSK pain, neuropathic pain: Tylenol PRN, Tramadol PRN, gabapentin, Lidoderm, Voltaren gel  -Urinary retention: On flomax, now voiding well, low PVRs  -Tachycardia: intermittent. Cardiology consulted. She was started on Lopressor. Cardiology attempted to access data from her heart monitor unsuccessfully. She will follow up with Cardiology in office to review heart monitor data once available. -Constipation: colace, senna, glycolax. PRNs available.   -Tobacco abuse: Nicoderm patch  -DVT prophylaxis: heparin SQ, SCDs    RRT today, brief episode of altered mental status as above.  Orthostatic episode vs ? seizure given family description of leg kicking out and turning to the left, in the setting of recent stroke and craniectomy, and pt with history of childhood seizures. She is currently back to baseline. Check orthostatic BPs. Check basic labs. Discuss with Cardiology. Neuro checks. Will also consult Neurology for opinion given concern for possible seizure.       Electronically signed by Jose Luis Mast MD on 11/8/2021 at 2:24 PM

## 2021-11-08 NOTE — PLAN OF CARE
Problem: Pain:  Goal: Pain level will decrease  Description: Pain level will decrease  11/8/2021 0216 by Leonard Tong RN  Outcome: Ongoing  11/7/2021 1840 by Ivette Burkett RN  Outcome: Met This Shift  Goal: Control of acute pain  Description: Control of acute pain  11/8/2021 0216 by Leonard Tong RN  Outcome: Ongoing  11/7/2021 1840 by Ivette Burkett RN  Outcome: Met This Shift  Goal: Control of chronic pain  Description: Control of chronic pain  11/8/2021 0216 by Leonard Tong RN  Outcome: Ongoing  11/7/2021 1840 by Ivette Burkett RN  Outcome: Met This Shift  Goal: Patient's pain/discomfort is manageable  Description: Patient's pain/discomfort is manageable  11/8/2021 0216 by Leonard Tong RN  Outcome: Ongoing  11/7/2021 1840 by Ivette Burkett RN  Outcome: Met This Shift     Problem: Falls - Risk of:  Goal: Will remain free from falls  Description: Will remain free from falls  11/8/2021 0216 by Leonard Tong RN  Outcome: Ongoing  11/7/2021 1840 by Ivette Burkett RN  Outcome: Met This Shift  Goal: Absence of physical injury  Description: Absence of physical injury  11/8/2021 0216 by Leonard Tong RN  Outcome: Ongoing  11/7/2021 1840 by Ivette Burkett RN  Outcome: Met This Shift     Problem: Skin Integrity:  Goal: Will show no infection signs and symptoms  Description: Will show no infection signs and symptoms  11/8/2021 0216 by Leonard Tong RN  Outcome: Ongoing  11/7/2021 1840 by Ivette Burkett RN  Outcome: Met This Shift  Goal: Absence of new skin breakdown  Description: Absence of new skin breakdown  11/8/2021 0216 by Leonard Tong RN  Outcome: Ongoing  11/7/2021 1840 by Ivette Burkett RN  Outcome: Met This Shift     Problem: ABCDS Injury Assessment  Goal: Absence of physical injury  11/8/2021 0216 by Leonard Tong RN  Outcome: Ongoing  11/7/2021 1840 by Ivette Burkett RN  Outcome: Met This Shift     Problem: HEMODYNAMIC STATUS  Goal: Patient has stable vital signs and fluid balance  11/8/2021 0216 by Leonard Tong RN  Outcome: Ongoing  11/7/2021 1840 by Laura Jensen RN  Outcome: Met This Shift     Problem: ACTIVITY INTOLERANCE/IMPAIRED MOBILITY  Goal: Mobility/activity is maintained at optimum level for patient  11/8/2021 0216 by Sin Joiner RN  Outcome: Ongoing  11/7/2021 1840 by Laura Jensen RN  Outcome: Met This Shift     Problem: COMMUNICATION IMPAIRMENT  Goal: Ability to express needs and understand communication  11/8/2021 0216 by Sin Joiner RN  Outcome: Ongoing  11/7/2021 1840 by Laura Jensen RN  Outcome: Met This Shift     Problem: Infection:  Goal: Will remain free from infection  Description: Will remain free from infection  11/8/2021 0216 by Sin Joiner RN  Outcome: Ongoing  11/7/2021 1840 by Laura Jensen RN  Outcome: Met This Shift     Problem: Safety:  Goal: Free from accidental physical injury  Description: Free from accidental physical injury  11/8/2021 0216 by Sin Joiner RN  Outcome: Ongoing  11/7/2021 1840 by Laura Jensen RN  Outcome: Met This Shift  Goal: Free from intentional harm  Description: Free from intentional harm  11/8/2021 0216 by Sin Joiner RN  Outcome: Ongoing  11/7/2021 1840 by Laura Jensen RN  Outcome: Met This Shift     Problem: Daily Care:  Goal: Daily care needs are met  Description: Daily care needs are met  11/8/2021 0216 by Sin Joiner RN  Outcome: Ongoing  11/7/2021 1840 by Laura Jensen RN  Outcome: Met This Shift     Problem: Skin Integrity:  Goal: Skin integrity will stabilize  Description: Skin integrity will stabilize  11/8/2021 0216 by Sin Joiner RN  Outcome: Ongoing  11/7/2021 1840 by Laura Jensen RN  Outcome: Met This Shift     Problem: Discharge Planning:  Goal: Patients continuum of care needs are met  Description: Patients continuum of care needs are met  11/8/2021 0216 by Sin Joiner RN  Outcome: Ongoing  11/7/2021 1840 by Laura Jensen RN  Outcome: Met This Shift

## 2021-11-08 NOTE — PROGRESS NOTES
OCCUPATIONAL THERAPY DAILY NOTE    Date:2021  Patient Name: Parul Elizalde  MRN: [id number]  : 1975  Room: [address] [place], [place] [address]     Diagnosis:Acute CVA( Pt presented to [facility] as Level 2 stroke alert on 10/11/21. L side weakness, neglect. NIH-17  Found to have large R MCA involving R occipital)  Surgery: R frontotemporal decompressive hemicraniectomy on 10/13/21  Past Medical History: COVID (9/15), tobacco use  Precautions: Falls, L hemiplegia, L neglect, L sublux, bed/chair alarm, helmet when OOB, impulsive, L PRAFO    Functional Assessment:   Date Status AE  Comments   Feeding 21 Stand by Assist      Grooming 21 SBA  w/c          Oral Care 21 SBA     Bathing 21 Maximal Assist  Shower chair with wheels     UB Dressing 21 MIN A   Pt seated to jesus/doff pull over shirt 2x requiring assist to pull down around back, v/c's for trent technique    LB Dressing 21 Maximal Assist  reacher    Footwear 21 Moderate Assist      Toileting 21 Maximal Assist  Grab bar    Homemaking         Functional Transfers / Balance:   Date Status DME  Comments   Sit Balance 21 CGA     Stand Balance 21 Max A Grab bar  Bed rail     [] Tub  [x] Shower   Transfer 21 Mod A Roll in shower chair, grab bar    Commode   Transfer 21 Min A slideboard, drop arm commode    Functional   Mobility  TBD       Other:     W/c to EOB   EOB to supine        Sit<>stand      21     Mod A          MAX A     Sliding   Board        High/low table                Pt requiring assist to push from surface, control during descent to chair, blocking L LE knee, positioning L LE before standing, v/c's for technique and hand placement.       Functional Exercises / Activity:  Pt engaged in PROM L UE 3 x 10 reps in all planes; SROM 1 x 10 reps shoulder flexion/extension, wrist flexion/extension, digit flexion/extension; ROM ex's completed focusing on AROM, joint protection to increase independence with ADL tasks; Pt engaged in R UE ex's focusing on UE strength/endurance to increase independence with transfers/mobility and ADL tasks;   2# dumb bell 30 reps in all planes; Power gripper 2 x 25 reps; Sensory / Neuromuscular Re-Education:  Pt engaged in standing at high/low table 3 x completed weight bearing through L UE requiring MAX A to maintain standing balance to block L LE knee, position L LE, with pt completing forward flexion at waist 2 x 5 reps 3 x during standing focusing on neuromuscular re-education for L UE to increase independence with transfers/mobility and ADL tasks;     Cognitive Skills:   Status Comments   Problem   Solving fair    Memory fair    Sequencing fair    Safety fair      Visual Perception:    Education:  Pt educated about LUE positioning when up in w/c to promote ROM/wt bearing along with joint integrity. Pt educated with safety during w/c to EOB using sliding board to increase awareness. 10/25/21: pt educated on SROM of LUE and safe positioning of LUE  10/29/21: educated on 1 handed dressing technique for socks/shoes  11/1/21: shoe lace adapted for ease with donning shoe  11/1/21: attempted small strips of kineosio tape on pt's hand to assess if pt can tolerate on her shoulder due to report of allergy (cleared by )  11/2/21:  Therapist applied kineosiotape to pt's LUE to stabilize sublux. Will continue to assess issues that arise with adhesive and progression of sublux  11/2/21: pt educated on AE for ease with LB dressing  11/3/21: Pt very fatigued in AM and PM sessions, requiring max cueing to alert pt back to task and engage in session, with pt asking multiple times to return to room/return to bed. Extended time to complete all tasks  11/4/21: home ROM exercise program placed in pt's folder and pt's mother notified of it  11/7/21 Pt educated about LUE positioning both up in w/c and in bed to maintain joint integrity.      [x] Family teach completed on:  10/4/21: Completed family teach this session. Pt's mother Vola Scale present. Educated Vola Scale on pt's status with ADLs and vc's for sequencing/initiation of tasks/hand placement with functional transfers. Vola Scale able to provide hands on assist with was educated for body mechanics/hand placement/vc's to increase safety with only commode transfer with slideboard and toileting task. Vola Scale educated on PROM (elbow/shoulder/wrist/digit flex/ext) of LUE to increase ROM, maintain joint integrity, and safe management of sublux. Vola Scale educated on how to doff/jesus gait belt and LUE sling. Pt's limiting factor appears to be her impulsivity and safety with ADLs/transfer. Vola Scale demo'ing F understanding/safety of education. Vola Scale appeared motivated and willing to learn. Continue to educate. Pain Level: 4/10 head    Additional Notes:     Patient has made fair + progress during treatment sessions toward set goals. Therapy emphasis to obtain goals:    Time Frame for Long term goals  6 weeks   Long term goal 1 Pt will complete UB self-care tasks w/ Supervision   Long term goal 2 Pt will complete LB self-care tasks w/ Supervision   Long term goal 3 Pt will complete toileting (all aspects) w/ Supervision   Long term goal 4 Pt will safely complete toilet transfers w/ Supervision   Long term goal 5 Pt will safely complete tub/shower transfers w/ Supervision   Long term goals 6 Pt will complete basic homemaking task w/ SBA   Long term goal 7 Pt will complete grooming task w/ Supervision   Long term goal 8 Pt will complete feeding task w/ Supervision/setup     [x] Continue with current OT Plan of care. [] Prepare for Discharge    10/27/21 OT plan of care updated on this date. Tentative length of stay is 6 weeks Geo Carpio OTR/L [id number]  11/3/21- Pt POC updated on this date.  Pt tentative length of day is 5 weeks to address above problem areas Taj Campo OTR/L [id number]     DISCHARGE RECOMMENDATIONS  Recommended DME: drop arm BSC    Post Discharge Care:   []Home Independently  []Home with 24hr Care / Supervision []Home with Partial Supervision []Home with Home Health OT []Home with Out Pt OT []Other: ___   Comments:         Time in Time out Tx Time Breakdown  Variance:   First Session  3092 2054 [x] Individual Tx-30  [] Concurrent Tx -  [] Co-Tx -   [] Group Tx -   [x] Time Missed - 15 Pt late to therapy due to bathroom needs    Second Session 1345 1430 [x] Individual Tx-45 min   [] Concurrent Tx -  [] Co-Tx -   [] Group Tx -   [] Time Missed -     Third Session    [] Individual Tx-   [] Concurrent Tx -  [] Co-Tx -   [] Group Tx -   [] Time Missed -         Total Tx Time- [address] [facility] [address] [address]

## 2021-11-08 NOTE — SIGNIFICANT EVENT
Rapid Response Team Note  Date of event: 11/8/2021   Time of event: 86 [address] Telles 39y.o. year old female   YOB: 1975   Admit date:  10/22/2021   Location: 5505/5505-B   Witnessed? : [x]Yes  [] No  Monitored? : [x]Yes  [] No  Code status: [x] Full  [] DNR-CCA  []DNR-CC  ______________________________________________________________________  Reason for RRT:    [] RR < 8     [] RR > 28   [] SpO2 <90%   [] HR < 40 bpm   [] HR > 130 bpm  [] SBP < 90 mmHg    [] SpO2 <90%   [x] LOC   [] Seizures    [] Significant Bleeding Event    [] Other:     Subjective:   CTSP regarding the above event, patient became unresponsive while sitting. She had a collar on her neck at that time. Patient stated she felt warm and sweaty before she passed out. She became responsive immediately after being moved to the bed from the wheelchair. She was AO X 4 as soon as she woke up. Per family, patient leaned to the left, eyes rolled back and she kicked out her left leg. Objective:   Vital signs: BP: 100/64/RR: 20/HR: 56 /SpO2: 94% on room air.      Initial Condition:  Conscious   [x] Yes  [] No     Breathing [x] Yes  [] No     Pulse  [x] Yes  [] No    Airway:   [x] Open/ Clear     Intervention: [x] None  [] Pooled secretions     [] Suctioned  [] Stridor      [] Intubation    Lungs:   [x] Symmetrical chest rise/ CTABL Intervention: [x] None  [] Use of accessory muscles    [] NIV (CPAP/BiPAP)  [] Cyanosis      [] Nasal Oxygen/Mask  [] Wheezing       [] ABG             [] CXR  [] Other:     Circulation:   Rhythm:  [x] Sinus [] Other:    Intervention: [] None            [] IV Access  [] Peripheral              [] Central            [x] EKG            [] Cardioversion            [] Defibrillation     Capillary Refill:  [] > 2 seconds [x] < 2 seconds    Neurologic:      [x] Pupillary Response:  PERRL   Response to pain:   [x] Yes  [] No  Follow commands:  [x] Yes  [] No  Diagnostic Test:  Blood Sugar:  114 mg/dL  EKG: NSR        Teams Assessment and Plan:  1. Vasovagal syncope 2/2 possible collar in-situ vs Less likely seizure  · Orthostatic (lying and sitting) negative. · EKG showed NSR?  · AO X 4 immediately post episode. No post-ictal phase  · Self aborted   ? ? Disposition:  [x] No transfer   [] Transfer to monitor floor  [] Transfer to: [] MICU [] NICU [] CCU [] SICU    Patients family updated: [x] Yes  [] No   Discussed with:  [] Critical Care Intensivist:         [] Primary Care Provider: No primary care provider on file.       [x] Other: Dr. Kari Tuttle MD?    Jameson Duarte MD, PGY- 3  11/8/2021 6:09 PM  Attending Physician: Rony Escobar MD

## 2021-11-08 NOTE — PROGRESS NOTES
Speech Language Pathology  ACUTE REHABILITATION--DAILY PROGRESS NOTE          ADMITTING DIAGNOSIS: Acute cerebrovascular accident (CVA) ([facility] [address] [I63.9]    VISIT DIAGNOSIS: CVA    SPEECH THERAPY  PLAN OF CARE   The speech therapy  POC is established based on physician order, speech pathology diagnosis and results of clinical assessment     SPEECH PATHOLOGY DIAGNOSIS:    Mild cognitive deficit with left neglect    Speech Pathology intervention is recommended 3-6 times per week for LOS or when goals are met with emphasis on the following:      Conditions Requiring Skilled Therapeutic Intervention for speech, language and/or cognition    Decreased attention with impulsivity  left-neglect    Specific Speech Therapy Interventions to Include: Therapeutic exercises  Address left neglect    Specific instructions for next treatment:      To initiate POC    SHORT/LONG TERM GOALS  Pt will improve orientation to spatial and temporal surroundings with use of external aids and verbal cues  Pt will attend to her left side with decreasing amount of physical/verbal cues  Pt will improve attention and decrease impulsivity    Patient stated goals: Agreed with above    Rehabilitation Potential/Prognosis: good     FIMS SCORES      Swallowing    Current Status  6--Modified Independent   Short Term Goal 6--Modified Independent    Long Term Goal 6--Modified Independent     Receptive    Current Status  t   6--Modified Independent    Short Term Goal t   6--ModifiedIndependent    Long Term Goal                  6--Modified Independent    Expressive    Current Status      6--Modified Independent    Short Term Goal     6--Modified Independent    Long Term Goal                  6--Modified Independent    Social Interaction    Current Status  4--Minimal Assistance    Short Term Goal 5--Supervision    Long Term Goal 6--Modified Independent         Problem Solving    Current Status    4--Minimal Assistance (good for verbal but difficulty observed with execution of motor tasks)  Short Term Goal   4--Minimal Assistance    Long Term Goal    5--Supervision     Memory    Current Status    6--Modified Independent / 5--Supervision   Short Term Goal   6--Modified Independent   Long Term Goal   6--Modified Independent  ? SWALLOWING:      Diet:  Regular consistency solids with thin liquids (IDDSI level 0)     Assistance with tray set up and supervision by nursing staff is required with all intake d/t left neglect and impulsivity. LANGUAGE:    Appropriate responses during discussion with SLP. COGNITION:      Patient completes structured verbal cognitive tasks with good ability. Good recall of education provided during standing task. Good recall of recent events relevant to self. Patient requires min-mod v/c for safe procedure (hand placement, wait for partner, communicate intentions) during execution standing for balance.     Testing from 11/3  Stimulus questions were obtained from the RIPA-2  There are 30 possible points for each subtest.   Severity ratings for each subtest were determined as follows:     RAW SCORE  SEVERITY    28-30  Within functional limits    25-27  Mild deficit    22-24  Moderate deficit    19-21  Marked deficit    16-18 Severe deficit   Below 16 Profound deficit       Subtest  Raw Score /Severity    Immediate Memory  30/ Within functional limits   Recent Memory  30/ Within functional limits   Temporal Orientation   (Recent Memory)  30/ Within functional limits   Temporal Orientation   (Remote Memory)  30/ Within functional limits   Spatial Orientation  30/ Within functional limits   Orientation to Environment  30/ Within functional limits   Recall of General Information  28/ Within functional limits   Problem Solving & Abstract Reasoning  30/ Within functional limits   Organization  30/ Within functional limits   Auditory Processing   & Retention  30/ Within functional limits       Testing from 11/3:  SLUMS ASSESSMENT:  Assesses cognitive ability in areas of orientation, immediate/ STM recall, divergent naming, functional calculation, abstraction, mental manipulation, clock draw, and story comprehension. Patient received a score of 28/30, which is indicative of functional cognitive linguistic skills for verbal tasks; however, patient continues to demonstrate poor safety and judgment during execution of motor tasks. Safety:  Fair- given continued (but improving) impulsivity. Patient demonstrates decreased insight into her deficits and does not seem to understand the gravity of her poor decisions. Fair insight when to wear a sling and why she needs a sling for her arm. Fair insight for hand placement and leg/foot placement when standing for balance. Patient reported she needs to be more aware of her safety. 11/13: SLP spoke with patient's physician; will plan to see patient as a daily co-tx with OT to assist patient with ability to identify physical/cognitive/visuospatial impairments, verbalize impact each have on overall level of functional independence, and demonstrate appropriate insight/safety precautions during execution of motor tasks given these limitations with >90% of trials. SPEECH:    Speech intelligibility was good during structured tasks and conversational speech. SP recommended after discharge:  yes  Supervision recommended at discharge: 24 hour    Will continue SP intervention as per previously established POC. EDUCATION: Speech Pathologist (SLP) completed education with the patient regarding identified cognitive linguistic deficits and subsequent need for speech pathology intervention. Discussed deficit areas to be targeted by formal intervention and established short/long term goals. Reviewed compensatory strategies to improve functional outcome (as appropriate). Encouraged patient to engage SLP in structured Q&A session relative to identified deficit areas.  Patient indicated understanding of all information provided via satisfactory verbal response.       Minute Tracking:    Individual therapy:     0 minutes  Concurrent therapy:    0 minutes  Group therapy:     0 minutes  Co-treatment therapy:  30 minutes    Total minutes: 30 minutes

## 2021-11-08 NOTE — PROGRESS NOTES
Physical Therapy  Treatment Note  Supervising Therapist: Lalo Jolley, PT, DPT [id number]    NAME: Floresita Bradley  ROOM: [facility]5/5505-B  DIAGNOSIS: Acute CVA  PRECAUTIONS: Falls, helmet at all times when OOB, L hemiplegia, L homonymous hemianopsia L neglect, alarm, must bring phone to therapy (cardiac monitor), L PRAFO, SBP <180  HPI: Pt is a 39 y.o. female with past medical history significant for recent COVID-19 infection (9/15/21) who [facility] with acute onset left-sided weakness. Initial NIHSS 17.  Initial CT (OSH) showed R MCA hyperdensity; initial CTA showed right ICA/M1 occlusion. On 10/13, her neuro exam worsened and repeat imaging indicated worsening edema, 1 cm of midline shift. She underwent emergent R hemicraniectomy by Dr. Charleen Lara [facility]) on 10/13. After being deemed medically appropriate, she was transferred to [place] [address] on 10/22/2021    Social:  Pt lives with daughter (15 y/o), will be returning to parent's home upon discharge (1 floor plan, 2 HAIDER with 2 HR). Mother and father healthy, do not work. 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 Initial Evaluation  Date: 10/23/21 AM     PM    Short Term Goals Long Term Goals    Was pt agreeable to Eval/treatment? yes Yes Yes     Does pt have pain? Pain on the top of her head once the helmet was placed on Mild c/o L hip and R rib pain Mild c/o L hip and R rib pain     Bed Mobility  Rolling: Max A  Supine to sit: Max A   Sit to supine: Max A   Scooting: Max A NT   NT Min A Supervision   Transfers Sit to stand: Max A   Stand to sit: Max A  Stand pivot:  Max A x2    5xSTS: Unable to complete Sit<>stand: ModA (MaxA with L knee immobilizer)  Stand pivot: ModA with R LBQC Sit<>stand: ModA   Stand pivot: ModA with R LBQC   Min A SBA  5xSTS: TBD   Ambulation    15 feet with R mackey rail with Max A + wc follow    10mWT: TBA  6mWT:  feet x 1 rep, 75 feet x 1 rep with R LBQC and ModA  (L ankle PLS AFO , L knee immobilizer)    10mWT: 0.10 m/s  6mWT: 33 m  (R hemicane, L knee immobilizer/AFO, ModA 11/5) 110 feet x 1 rep with L PLS AFO, L knee immobilizer, R LBQC with ModA  75 feet x 3 reps with R LBQC with ModA   (L articulating AFO with DF/PF stop)   50 feet with AAD with Min A       500 feet with AAD with SBA    10mWT: >0.4 m/s  6mWT: >205 m   Walking 10 feet on uneven surface NT NT NT 10 feet with AAD with Min A 10 feet with AAD with SBA   Wheel Chair Mobility NT NT NT  150 feet with R extremities with SBA   Car Transfers NT NT NT Min A SBA   Stair negotiation: ascended and descended NT 4 steps with R HR with ModA x 2 reps (ascending with RLE, descending with LLE non-reciprocal)  (L knee immobilizer/PLS AFO) NT 4 steps with 1 rail with Min A 12 steps with 1 rail with SBA   Curb Step:   ascended and descended NT NT NT 4 inch step with AAD and Min A 4 inch step with AAD and SBA   Picking up object off the floor NT NT NT Will  an object with Min assist Will  an object with SBA   BLE ROM WFL: LLE AROM limited by flaccidity  WFL: LLE AROM limited by spasticity 2+ on MAS (hamstrings, hip extensors)      BLE Strength RLE 4+/5  LLE 0/5 RLE 5/5  LLE 0/5 with MMT with exception of hip extensors/adductors 1/5      Balance  Static Sitting: Mod/Max A    BBS: TBA  FGA: TBA Sitting: Min A  Standing: Mod A  BBS: 4/56  (11/5/21)       BBS: TBD  FGA: TBD   Date Family Teach Completed TBA Pt's mother present for observation 10/24, 11/2, 11/3, 114      Is additional Family Teaching Needed?   Y or N Y Yes      Hindering Progress L hemiplegia, L neglect L hemiplegia, L neglect      PT recommended ELOS 5 weeks       Team's Discharge Plan        Therapist at Team Meeting          HRmax: (178, -10  bpm for beta blocker) 168 bpm Time spent at moderate intensity (60-70% HRmax) Time spent at high intensity (70-85% HRmax)    AM: 0:10:19 AM: 0:20:34    PM: 0:15:19 PM: 0:26:51     Therapeutic Exercise:   AM: ambulation with R LBQC, 4lb ankle weight LLE, 7.5 lb ankle weight attached to posterior gait belt, 75 feet x 1 rep with ModA  Weaving between 3 standing quad canes with R LBQC, 4lb ankle weight LLE, 7.5 lb ankle weight attached to posterior gait belt, x 2 reps with ModA  PM: Ambulation with R LBQC, L articulating AFO, cone retrieval based on color and bringing to corresponding color across mackey, promoting visual scanning, x 4 rps with [address]    Patient education  Pt educated on nancy strategy for visual scanning and functional implications to compensate for visual deficits    Patient response to education:   Pt verbalized understanding Pt demonstrated skill Pt requires further education in this area   yes partial yes     Additional Comments: Pt remains pleasant and motivated during sessions. /74 prior to AM session activity, 116/69 after. Pt continues to improve speed and efficiency of walking tasks with fewer LOB, however posterior LOB occasionally occur when cued to scan environment. Pt has transitioned to [facility] without difficulty however L knee immobilizer remains necessary due to significant strength deficits remaining to L quadriceps. Pt compensates well for weakness with brace however. Recurring cueing is required for visual scanning to L in order to avoid obstacle collision and to identify target destination. PM session included trial of articulating AFO. DF/PF stops successful in stabilizing L knee during stance and allowing for more normal amount of knee flexion while ambulating. Plan to continue activity progression with this trial AFO, will determine need for personal AFO as pt continues to progress. Chair/bed alarm: armed following sessions    AM  Time in: 0830  Time out: 0915    PM  Time in: 1300  Time out: 1345    Pt is making good progress toward established Physical Therapy goals. Continue with physical therapy current plan of care.     Raj Lugo, PT, DPT  Board Certified

## 2021-11-08 NOTE — PROGRESS NOTES
· Patient seen and examined briefly today. No cardiac complaints of chest pain, SOB, palpitations, dizziness. No new cardiac complaints. · Unable to obtain events from [address] [place] [address] as of today  -- discussed with [facility] Office Staff. Discussed with patient. Still wearing 30-Day monitor at this time. · No new cardiac symptoms since time of last evaluation per staff. · Patient lives near [facility] and wishes to follow with [facility] [employer] [facility]iology there. APPOINTMENT MADE FOR PATIENT:  December 9, 2021 AT 2:30 PM WITH DR. Riya Soni      _________________________________________________________________________________    CARDIAC TESTING     MONALISA [facility], 10/2021)  Normal left ventricular size. The left ventricle has normal systolic function. The estimated left ventricular ejection fraction is 60-65%.  Normal right   ventricle size.  The right ventricle has normal function.  Normal saline   contrast injection without evidence of right to left intracardiac shunt.  No   intracardiac thrombus identified.  As compared to 10/13/2021: The prior study   was a transthoracic echocardiogram. There is no significant change.       CTA Neck (10/2021)  CTA Neck:   1. Thrombus at the right carotid bifurcation. The extracranial   circulation is otherwise patent. CTA Head:   1. Marked narrowing of the right ICA just proximal to the bifurcation   with occlusion of the right MCA arcade. Some narrowing of the right   CHIVO A1 segment noted. The right PCA is a fetal type PCA but is patent   proximally. Impression/Recommendations:     1. Tachycardia with exertion -  Tolerating low dose beta blockers. Asymptomatic. Patient not on telemetry floor / not able to place on remote telemetry during her admission here at [facility] on her current floor. Unable to obtain event strips (if present) from [address] [place] [address].   [facility] has attempted multiple times to obtain results of Event Monitor thus far without success during her inpatient stay. Advised close follow up outpatient with cardiology to obtain records once event monitor has been completed. Avoid caffeine. 2. Acute Right CVA s/p decompressive right craniotomy 10/13/2021 at [facility] with residual left sided weakness.  Wearing Event monitor. Based on Event monitor findings, may need Loop recorder implant to r/o PAF. 3. Carotid artery disease. 4. Hyperlipidemia - On statin therapy.   5. Tobacco abuse - Counseled to quit smoking. 6. Remote COVID-19 infection September 2021.        · Follow up after discharge with first available appointment at [facility] Cardiology office as noted above. · Continue current cardiac medications the same at this time, including beta blocker therapy. · Cardiology will sign off at this time. Please call with any questions or concerns.    · Further recommendations to follow as per Dr. Annie Christianson, [address] [place] [address] Cardiology      Electronically signed by Malena Sanchez PA-C on 11/8/2021 at 1:26 PM

## 2021-11-09 LAB
ANION GAP SERPL CALCULATED.3IONS-SCNC: 12 MMOL/L (ref 7–16)
BUN BLDV-MCNC: 13 MG/DL (ref 6–20)
CALCIUM SERPL-MCNC: 9.2 MG/DL (ref 8.6–10.2)
CHLORIDE BLD-SCNC: 103 MMOL/L (ref 98–107)
CO2: 23 MMOL/L (ref 22–29)
CREAT SERPL-MCNC: 0.7 MG/DL (ref 0.5–1)
GFR AFRICAN AMERICAN: >60
GFR NON-AFRICAN AMERICAN: >60 ML/MIN/1.73
GLUCOSE BLD-MCNC: 94 MG/DL (ref 74–99)
HCT VFR BLD CALC: 37.4 % (ref 34–48)
HEMOGLOBIN: 12.1 G/DL (ref 11.5–15.5)
MCH RBC QN AUTO: 30.7 PG (ref 26–35)
MCHC RBC AUTO-ENTMCNC: 32.4 % (ref 32–34.5)
MCV RBC AUTO: 94.9 FL (ref 80–99.9)
PDW BLD-RTO: 13.3 FL (ref 11.5–15)
PLATELET # BLD: 542 E9/L (ref 130–450)
PMV BLD AUTO: 9.6 FL (ref 7–12)
POTASSIUM SERPL-SCNC: 4.3 MMOL/L (ref 3.5–5)
RBC # BLD: 3.94 E12/L (ref 3.5–5.5)
SODIUM BLD-SCNC: 138 MMOL/L (ref 132–146)
WBC # BLD: 8.3 E9/L (ref 4.5–11.5)

## 2021-11-09 PROCEDURE — 97535 SELF CARE MNGMENT TRAINING: CPT

## 2021-11-09 PROCEDURE — 97530 THERAPEUTIC ACTIVITIES: CPT

## 2021-11-09 PROCEDURE — 97129 THER IVNTJ 1ST 15 MIN: CPT | Performed by: SPEECH-LANGUAGE PATHOLOGIST

## 2021-11-09 PROCEDURE — 6360000002 HC RX W HCPCS: Performed by: PHYSICAL MEDICINE & REHABILITATION

## 2021-11-09 PROCEDURE — 80048 BASIC METABOLIC PNL TOTAL CA: CPT

## 2021-11-09 PROCEDURE — 99222 1ST HOSP IP/OBS MODERATE 55: CPT | Performed by: PSYCHIATRY & NEUROLOGY

## 2021-11-09 PROCEDURE — 36415 COLL VENOUS BLD VENIPUNCTURE: CPT

## 2021-11-09 PROCEDURE — 6370000000 HC RX 637 (ALT 250 FOR IP): Performed by: PHYSICAL MEDICINE & REHABILITATION

## 2021-11-09 PROCEDURE — 85027 COMPLETE CBC AUTOMATED: CPT

## 2021-11-09 PROCEDURE — 99232 SBSQ HOSP IP/OBS MODERATE 35: CPT | Performed by: PHYSICAL MEDICINE & REHABILITATION

## 2021-11-09 PROCEDURE — 97130 THER IVNTJ EA ADDL 15 MIN: CPT | Performed by: SPEECH-LANGUAGE PATHOLOGIST

## 2021-11-09 PROCEDURE — 1280000000 HC REHAB R&B

## 2021-11-09 PROCEDURE — 6370000000 HC RX 637 (ALT 250 FOR IP): Performed by: PHYSICIAN ASSISTANT

## 2021-11-09 RX ADMIN — ONDANSETRON 4 MG: 4 TABLET, ORALLY DISINTEGRATING ORAL at 16:13

## 2021-11-09 RX ADMIN — TAMSULOSIN HYDROCHLORIDE 0.4 MG: 0.4 CAPSULE ORAL at 08:07

## 2021-11-09 RX ADMIN — TRAMADOL HYDROCHLORIDE 25 MG: 50 TABLET, COATED ORAL at 17:59

## 2021-11-09 RX ADMIN — METOPROLOL TARTRATE 12.5 MG: 25 TABLET, FILM COATED ORAL at 08:09

## 2021-11-09 RX ADMIN — BACLOFEN 5 MG: 10 TABLET ORAL at 20:21

## 2021-11-09 RX ADMIN — CLOPIDOGREL 75 MG: 75 TABLET, FILM COATED ORAL at 08:08

## 2021-11-09 RX ADMIN — TRAMADOL HYDROCHLORIDE 25 MG: 50 TABLET, COATED ORAL at 06:38

## 2021-11-09 RX ADMIN — ONDANSETRON 4 MG: 4 TABLET, ORALLY DISINTEGRATING ORAL at 10:35

## 2021-11-09 RX ADMIN — ACETAMINOPHEN 1000 MG: 325 TABLET ORAL at 20:21

## 2021-11-09 RX ADMIN — METOPROLOL TARTRATE 12.5 MG: 25 TABLET, FILM COATED ORAL at 20:20

## 2021-11-09 RX ADMIN — GABAPENTIN 100 MG: 100 CAPSULE ORAL at 14:00

## 2021-11-09 RX ADMIN — ASPIRIN 81 MG CHEWABLE TABLET 81 MG: 81 TABLET CHEWABLE at 08:08

## 2021-11-09 RX ADMIN — ACETAMINOPHEN 1000 MG: 325 TABLET ORAL at 13:59

## 2021-11-09 RX ADMIN — HEPARIN SODIUM 5000 UNITS: 10000 INJECTION INTRAVENOUS; SUBCUTANEOUS at 06:41

## 2021-11-09 RX ADMIN — GABAPENTIN 100 MG: 100 CAPSULE ORAL at 08:09

## 2021-11-09 RX ADMIN — ATORVASTATIN CALCIUM 40 MG: 40 TABLET, FILM COATED ORAL at 20:21

## 2021-11-09 RX ADMIN — ACETAMINOPHEN 1000 MG: 325 TABLET ORAL at 06:35

## 2021-11-09 RX ADMIN — DOCUSATE SODIUM 100 MG: 100 CAPSULE, LIQUID FILLED ORAL at 20:21

## 2021-11-09 RX ADMIN — HEPARIN SODIUM 5000 UNITS: 10000 INJECTION INTRAVENOUS; SUBCUTANEOUS at 17:57

## 2021-11-09 RX ADMIN — GABAPENTIN 100 MG: 100 CAPSULE ORAL at 20:21

## 2021-11-09 RX ADMIN — Medication 6 MG: at 20:21

## 2021-11-09 RX ADMIN — DOCUSATE SODIUM 100 MG: 100 CAPSULE, LIQUID FILLED ORAL at 08:08

## 2021-11-09 RX ADMIN — SENNOSIDES 17.2 MG: 8.6 TABLET, COATED ORAL at 20:21

## 2021-11-09 ASSESSMENT — PAIN DESCRIPTION - ONSET
ONSET: ON-GOING

## 2021-11-09 ASSESSMENT — PAIN SCALES - GENERAL
PAINLEVEL_OUTOF10: 5
PAINLEVEL_OUTOF10: 3
PAINLEVEL_OUTOF10: 7
PAINLEVEL_OUTOF10: 3
PAINLEVEL_OUTOF10: 0
PAINLEVEL_OUTOF10: 7

## 2021-11-09 ASSESSMENT — PAIN DESCRIPTION - PAIN TYPE
TYPE: CHRONIC PAIN

## 2021-11-09 ASSESSMENT — PAIN DESCRIPTION - DESCRIPTORS
DESCRIPTORS: ACHING;DISCOMFORT;SORE;NAGGING
DESCRIPTORS: ACHING;DISCOMFORT;SORE;TENDER
DESCRIPTORS: ACHING;DISCOMFORT;SORE;NAGGING

## 2021-11-09 ASSESSMENT — PAIN DESCRIPTION - PROGRESSION
CLINICAL_PROGRESSION: NOT CHANGED

## 2021-11-09 ASSESSMENT — PAIN DESCRIPTION - ORIENTATION
ORIENTATION: ANTERIOR
ORIENTATION: ANTERIOR
ORIENTATION: RIGHT

## 2021-11-09 ASSESSMENT — PAIN DESCRIPTION - FREQUENCY
FREQUENCY: INTERMITTENT

## 2021-11-09 ASSESSMENT — PAIN - FUNCTIONAL ASSESSMENT
PAIN_FUNCTIONAL_ASSESSMENT: ACTIVITIES ARE NOT PREVENTED

## 2021-11-09 ASSESSMENT — PAIN DESCRIPTION - LOCATION
LOCATION: HEAD
LOCATION: NECK
LOCATION: HEAD

## 2021-11-09 NOTE — PROGRESS NOTES
Physical Therapy  Weekly Team Note  Supervising Therapist: Raj Lugo, PT, DPT [id number]    NAME: Lisa Lowe  ROOM: [facility]5/[facility]B  DIAGNOSIS: Acute CVA  PRECAUTIONS: Falls, helmet at all times when OOB, L hemiplegia, L neglect, contraversive pusher, alarm, must bring phone to therapy (cardiac monitor), L PRAFO, SPB <180  HPI: Pt is a 39 y.o. female with past medical history significant for recent COVID-19 infection (9/15/21) who [facility] with acute onset left-sided weakness. Initial NIHSS 17.  Initial CT (OSH) showed R MCA hyperdensity; initial CTA showed right ICA/M1 occlusion. On 10/13, her neuro exam worsened and repeat imaging indicated worsening edema, 1 cm of midline shift. She underwent emergent R hemicraniectomy by Dr. Graciela Blanc [facility]) on 10/13. After being deemed medically appropriate, she was transferred to [place] [address] on 10/22/2021    Social:  Pt lives with daughter (15 y/o), will be returning to parent's home upon discharge (1 floor plan, 2 HAIDER with 2 HR). Mother and father healthy, do not work. 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 Initial Evaluation  Date: 10/23/21 10/26/21     11/2/21 119/21 Comments Short Term Goals Long Term Goals    Was pt agreeable to Eval/treatment? yes Yes Yes Yes      Does pt have pain? Pain on the top of her head once the helmet was placed on Moderate R rib and L hip pain, limiting ambulation at times Mild R rib and L hip pain No c/o pain      Bed Mobility  Rolling: Max A  Supine to sit: Max A   Sit to supine: Max A   Scooting: Max A Rolling ModA  Supine<>Sit MaxA  Scooting MaxA Supine<>Sit ModA  Scooting/Rolling ModA Supine<>sit Noemy  Scooting/Rolling Noemy (mat table) Cues for sequencing and trunk assistance Min A Supervision   Transfers Sit to stand: Max A   Stand to sit: Max A  Stand pivot: Max A x2    5xSTS: Unable to complete Sit<>stand:  Max A  Stand pivot: NT    Sliding board transfer MaxA to R and L from WC<>mat table Sit<>stand: ModA (MaxA with L knee immobilizer)  Stand pivot: MaxA with L hemicane     Sliding board transfer [address] to L and R Sit<>stand: ModA   Stand pivot: ModA with R LBQC   (trial L articulating AFO with DF/PF stop)    Sliding board transfer Noemy to R/L    Recommend nursing staff continue to use sliding board at this time, transferring toward R side. Pt's knee is very unstable without AFO.  Also, pt is impulsive Min A SBA  5xSTS: TBD   Ambulation    15 feet with R mackey rail with Max A + wc follow    10mWT: TBA  6mWT: TBA 40 feet with R hallway rail, L ankle DF wrap , L knee immobilizer, inside out sock over L shoe with MaxA    60 feet x 1 rep with R hemicane with ModA/MaxA      (L ankle PLS AFO , L knee immobilizer, inside out sock over L shoe, mirror for visual feedback)  (WC follow) 115 feet x 1 rep with R LBQC and ModA  (trial L articulating AFO with DF/PF stop)    10mWT: 0.10 m/s  6mWT: 33 m  (R hemicane, L knee immobilizer/AFO, ModA 11/5) Gait training progressed from hemicane and from knee immobilizer, pt still has weak quad but DF stop adequately stabilizes knee during stance   50 feet with AAD with Min A       500 feet with AAD with SBA    10mWT: >0.4 m/s  6mWT: >270 m   Wheel Chair Mobility NT 10 feet MaxA with R extremities 20 feet with R extremities with MaxA 50 feet with R extremities with [place] to avoid obstacle collision  150 feet with R extremities with Supervision   Car Transfers NT NT NT ModA  Min A SBA   Stair negotiation: ascended and descended NT NT NT 4 steps with 1 HR with ModA (RUE support, non-reciprocal) Assistance to place LLE 4 steps with 1 rail with Min A 12 steps with 1 rail with SBA   BLE ROM WFL: LLE AROM limited by flaccidity  WFL: LLE AROM limited by spasticity 2+ on MAS (hamstrings, hip extensors) WFL: LLE AROM limited by spasticity 2+ on MAS (hamstrings, hip extensors, plantar flexors) WFL: LLE AROM limited by and dense hemiplegia. Spasticity is not yet a significant problem during gait however pt remains at risk for PF contracture  Additional comments:  Pt has good social support, mother is considering installing ramp at home entrance (pt's father to build). Pt is consistently improving function. DME:  TBD  After Care:  TBD      Date:  10/26/21  Supporting factors: Pt is motivated, has good social support  Barriers to discharge:  Dense hemiplegia/trent-neglect on L. Spasticity may become a problem if worsened. Poor safety and pushing behavior result in high fall risk  Additional comments:  Pain limiting ambulation at times. Pt has excellent potential for continued progress.    DME:  TBD  After Care:  TBD    Brittany Fermin, PT, DPT  Board Certified Clinical Specialist in Neurologic Physical Therapy  [phone]

## 2021-11-09 NOTE — PROGRESS NOTES
[facility]  Comprehensive Progress Note    Subjective:      Jeannette Quinn is a 39 y.o. female admitted to inpatient rehabilitation for impairments and activities limitations in ADLs and mobility secondary to right MCA CVA. No acute events overnight. No cp, sob, n/v. No further episodes of LOC. Doing well in therapy today. No complaints. The patient's medical records have been reviewed. Scheduled Meds:baclofen, 5 mg, Nightly  nicotine, 1 patch, Daily  senna, 2 tablet, Nightly  polyethylene glycol, 17 g, Daily  metoprolol tartrate, 12.5 mg, BID  acetaminophen, 1,000 mg, 3 times per day  lidocaine, 2 patch, Daily  gabapentin, 100 mg, TID  tamsulosin, 0.4 mg, Daily  heparin (porcine), 5,000 Units, Q12H  aspirin, 81 mg, Daily  atorvastatin, 40 mg, Nightly  vitamin D, 50,000 Units, Weekly  docusate sodium, 100 mg, BID  melatonin, 6 mg, Nightly  clopidogrel, 75 mg, Daily      Continuous Infusions:  PRN Meds:traMADol, 25 mg, BID PRN  ondansetron, 4 mg, Q6H PRN  lactulose, 20 g, BID PRN  guaiFENesin, 200 mg, BID PRN  acetaminophen, 650 mg, Q4H PRN  magnesium hydroxide, 30 mL, Daily PRN  bisacodyl, 10 mg, Daily PRN  diclofenac sodium, 2 g, 4x Daily PRN         Objective:      Vitals:    11/08/21 0744 11/08/21 2145 11/09/21 0024 11/09/21 0809   BP: (!) 109/59 (!) 105/55  107/69   Pulse: 79 88  80   Resp: 18 18  16   Temp: 98 °F (36.7 °C) 98 °F (36.7 °C)  98.7 °F (37.1 °C)   TempSrc: Temporal Temporal  Temporal   SpO2:  94% 95% 97%   Weight:       Height:         General appearance: alert, NAD, resting in bed  Head: R crani site c/d/i   Eyes: conjunctivae/corneas clear. PERRL, EOM's intact.   Lungs: clear to auscultation bilaterally  Heart: regular rate and rhythm, S1, S2 normal  Abdomen: soft, non-tender, normal bowel sounds  Extremities: extremities normal, atraumatic, no cyanosis or edema  MSK: no cyanosis, clubbing, edema  Skin: R crani c/d/i   Neurologic: Alert, oriented x4. Answering all questions appropriately. Left neglect. Speech clear. No aphasia appreciated. Mild L facial droop. Spastic left hemiplegia. MAS 1+ in LUE; MAS 1+-2 in LLE. Motor 5/5 RUE and RLE; 0-1/5 LUE and LLE.      Exam stable      Laboratory:    Lab Results   Component Value Date    WBC 8.3 11/09/2021    HGB 12.1 11/09/2021    HCT 37.4 11/09/2021    MCV 94.9 11/09/2021     (H) 11/09/2021     Lab Results   Component Value Date     11/09/2021    K 4.3 11/09/2021    K 4.1 11/08/2021     11/09/2021    CO2 23 11/09/2021    BUN 13 11/09/2021    CREATININE 0.7 11/09/2021    GLUCOSE 94 11/09/2021    CALCIUM 9.2 11/09/2021      Lab Results   Component Value Date    ALT 75 (H) 10/23/2021    AST 46 (H) 10/23/2021    ALKPHOS 89 10/23/2021    BILITOT 0.4 10/23/2021       Functional Status:   Bed mobility: Mod A  Transfers: Mod - Max A  Ambulation: 110 ft L ankle PLS AFO, L knee immobilizer, R LBQC Mod A  Feeding: SBA  Grooming: SBA  UB dressing: Min A  LB dressing: Max A       Assessment/Plan:       39 y.o. female admitted to inpatient rehabilitation for impairments and activities limitations in ADLs and mobility secondary to right MCA CVA.    -Right MCA CVA: Complicated by cerebral edema requiring emergent right hemicraniectomy (10/13/2021). Dense left spastic hemiplegia, left neglect, mild cognitive impairment. Helmet when out of bed. L PRAFO in bed. LUE sling for gait. L w/c tray. Aspirin, Plavix, Lipitor for secondary prevention. Monitor neuro exam. Continue Acute Rehab program.   -Spasticity LUE/LLE: Monitor. Tone currently helpful to compensate for weakness with attempted gait in PT. Started on low dose baclofen at bedtime due to occasional painful spasms in the leg at night only. Monitor.   -Transient LOC 11/8: Probable vasovagal syncope, vs seizure.    -Pain control, MSK pain, neuropathic pain: Tylenol PRN, Tramadol PRN, gabapentin, Lidoderm, Voltaren gel  -Urinary retention:  On flomax, now voiding well, low PVRs  -Tachycardia: intermittent. Cardiology consulted. She was started on Lopressor. Cardiology attempted to access data from her heart monitor unsuccessfully. She will follow up with Cardiology in office to review heart monitor data once available. -Constipation: colace, senna, glycolax. PRNs available.   -Tobacco abuse: Nicoderm patch  -DVT prophylaxis: heparin SQ, SCDs    Transient LOC yesterday. Likely vasovagal syncope vs r/o seizure. Neurology consult pending. No further episodes. Continue rehab.  Team conference tomorrow       Electronically signed by Jada Patel MD on 11/9/2021 at 4:43 PM

## 2021-11-09 NOTE — PROGRESS NOTES
Speech Language Pathology  ACUTE REHABILITATION--DAILY PROGRESS NOTE          ADMITTING DIAGNOSIS: Acute cerebrovascular accident (CVA) ([facility] [address] [I63.9]    VISIT DIAGNOSIS: CVA    SPEECH THERAPY  PLAN OF CARE   The speech therapy  POC is established based on physician order, speech pathology diagnosis and results of clinical assessment     SPEECH PATHOLOGY DIAGNOSIS:    Mild cognitive deficit with left neglect    Speech Pathology intervention is recommended 3-6 times per week for LOS or when goals are met with emphasis on the following:      Conditions Requiring Skilled Therapeutic Intervention for speech, language and/or cognition    Decreased attention with impulsivity  left-neglect    Specific Speech Therapy Interventions to Include: Therapeutic exercises  Address left neglect    Specific instructions for next treatment:      To initiate POC    SHORT/LONG TERM GOALS  Pt will improve orientation to spatial and temporal surroundings with use of external aids and verbal cues  Pt will attend to her left side with decreasing amount of physical/verbal cues  Pt will improve attention and decrease impulsivity    Patient stated goals: Agreed with above    Rehabilitation Potential/Prognosis: good     FIMS SCORES      Swallowing    Current Status  6--Modified Independent   Short Term Goal 6--Modified Independent    Long Term Goal 6--Modified Independent     Receptive    Current Status  t   6--Modified Independent    Short Term Goal t   6--ModifiedIndependent    Long Term Goal                  6--Modified Independent    Expressive    Current Status      6--Modified Independent    Short Term Goal     6--Modified Independent    Long Term Goal                  6--Modified Independent    Social Interaction    Current Status  4--Minimal Assistance    Short Term Goal 5--Supervision    Long Term Goal 6--Modified Independent         Problem Solving    Current Status    4--Minimal Assistance (good for verbal but difficulty observed with execution of motor tasks)  Short Term Goal   4--Minimal Assistance    Long Term Goal    5--Supervision     Memory    Current Status    6--Modified Independent / 5--Supervision   Short Term Goal   6--Modified Independent   Long Term Goal   6--Modified Independent  ? SWALLOWING:      Diet:  Regular consistency solids with thin liquids (IDDSI level 0)     Assistance with tray set up and supervision by nursing staff is required with all intake d/t left neglect and impulsivity. LANGUAGE:    Appropriate responses during discussion with SLP. COGNITION:      Patient completes structured verbal cognitive tasks with good ability. Good recall of education provided during standing task. Good recall of recent events relevant to self. Ongoing intervention completed in setting of functional transfers and ADL tasks to facilitate increased safety/ insight/ judgement and reduced impulsivity. Pt's mother present for session. During transfers, Pt was observed to be verbally sequencing tasks, which she reported was not only for her benefit but her mother's benefit. Additionally, offered good insight/ safety adaption ability during transfer when she reported to her mother that she would in fact require a gait belt at home for shower safety. Overall, safety is improving and impulsivity is declining. Will cont.      Testing from 11/3  Stimulus questions were obtained from the RIPA-2  There are 30 possible points for each subtest.   Severity ratings for each subtest were determined as follows:     RAW SCORE  SEVERITY    28-30  Within functional limits    25-27  Mild deficit    22-24  Moderate deficit    19-21  Marked deficit    16-18 Severe deficit   Below 16 Profound deficit       Subtest  Raw Score /Severity    Immediate Memory  30/ Within functional limits   Recent Memory  30/ Within functional limits   Temporal Orientation   (Recent Memory)  30/ Within functional limits   Temporal Orientation (Remote Memory)  30/ Within functional limits   Spatial Orientation  30/ Within functional limits   Orientation to Environment  30/ Within functional limits   Recall of General Information  28/ Within functional limits   Problem Solving & Abstract Reasoning  30/ Within functional limits   Organization  30/ Within functional limits   Auditory Processing   & Retention  30/ Within functional limits       Testing from 11/3:  SLUMS ASSESSMENT:  Assesses cognitive ability in areas of orientation, immediate/ STM recall, divergent naming, functional calculation, abstraction, mental manipulation, clock draw, and story comprehension. Patient received a score of 28/30, which is indicative of functional cognitive linguistic skills for verbal tasks; however, patient continues to demonstrate poor safety and judgment during execution of motor tasks. Safety:  Fair- given continued (but improving) impulsivity. Patient demonstrates decreased insight into her deficits and does not seem to understand the gravity of her poor decisions. Fair insight when to wear a sling and why she needs a sling for her arm. Fair insight for hand placement and leg/foot placement when standing for balance. Patient reported she needs to be more aware of her safety. 11/13: SLP spoke with patient's physician; will plan to see patient as a daily co-tx with OT to assist patient with ability to identify physical/cognitive/visuospatial impairments, verbalize impact each have on overall level of functional independence, and demonstrate appropriate insight/safety precautions during execution of motor tasks given these limitations with >90% of trials. SPEECH:    Speech intelligibility was good during structured tasks and conversational speech. SP recommended after discharge:  yes  Supervision recommended at discharge: 24 hour    Will continue SP intervention as per previously established POC.     EDUCATION: Speech Pathologist (SLP) completed education

## 2021-11-09 NOTE — PROGRESS NOTES
OCCUPATIONAL THERAPY DAILY NOTE    Date:2021  Patient Name: Len Willard  MRN: [id number]  : 1975  Room: [address] [place], [place] [address]     Diagnosis:Acute CVA( Pt presented to [facility] as Level 2 stroke alert on 10/11/21. L side weakness, neglect. NIH-17  Found to have large R MCA involving R occipital)  Surgery: R frontotemporal decompressive hemicraniectomy on 10/13/21  Past Medical History: COVID (9/15), tobacco use  Precautions: Falls, L hemiplegia, L neglect, L sublux, bed/chair alarm, helmet when OOB, impulsive, L PRAFO    Functional Assessment:   Date Status AE  Comments   Feeding 21 Set-up  Assist with opening difficult packages/bottles, pt able to self feed   Grooming 21 SBA  w/c Seated for shaving task         Oral Care 21 Set-up  Seated for oral care   Bathing 21 Mod A Shower chair with wheels  Pt declined full shower, completed sponge bath seated/standing at sink, assist with standing balance while pt able to complete malvin areas, assist with RUE   UB Dressing 21 MIN A   Pt doffed pullover shirt, min vc's for trent dressing and slight assist over head Due to not having helmet on   LB Dressing 21 Mod reacher Pt able to thread BLEs into pants/brief, required assist with standing balance while pt was able to pull over hips   Footwear 21 Min A  Pt able to jesus B socks and R shoe using 1 handed dressing technique.  Required assist with L shoe due to decreased strength to pull into shoe   Toileting 21 Mod A Grab bar Assist with balance while pt able to complete malvin hygiene and pants management   Homemaking 21 Mod A  See below     Functional Transfers / Balance:   Date Status DME  Comments   Sit Balance 21 SBA     Stand Balance 21 Mod A Grab bar  Bed rail     [] Tub  [x] Shower   Transfer 21 Mod A Roll in shower chair, grab bar, LBQC    Commode   Transfer 21 Min A , drop arm commode, LBQC SPT   Functional   Mobility  TBD         Functional Exercises / Activity:  Pt supine in bed upon arrival to . Completed supine-sit EOB. Completed ADLs, see above for assessment. Pt appeared to have tolerated session well and has increased independence with ADLs. Pt has increased standing balance/tolerance. Pt continues to require min vc's for safety, hand placement, body mechanics. Completed family teach during 2nd session, see below. In OT gym, Engaged in light homemaking task at [facility]. Pt able to retrieve items from fridge, place bread into toaster, spread jam onto bread, and wash dishes at sink at w/c level using 1 handed techniques. Pt required physical assist with difficult packages and steadying items. Pt required Min  vc's for sequencing 1 step commands. Sensory / Neuromuscular Re-Education:      Cognitive Skills:   Status Comments   Problem   Solving fair    Memory fair    Sequencing fair    Safety fair      Visual Perception:    Education:  Pt educated about LUE positioning when up in w/c to promote ROM/wt bearing along with joint integrity. Pt educated with safety during w/c to EOB using sliding board to increase awareness. 10/25/21: pt educated on SROM of LUE and safe positioning of LUE  10/29/21: educated on 1 handed dressing technique for socks/shoes  11/1/21: shoe lace adapted for ease with donning shoe  11/1/21: attempted small strips of kineosio tape on pt's hand to assess if pt can tolerate on her shoulder due to report of allergy (cleared by )  11/2/21:  Therapist applied kineosiotape to pt's LUE to stabilize sublux. Will continue to assess issues that arise with adhesive and progression of sublux  11/2/21: pt educated on AE for ease with LB dressing  11/3/21: Pt very fatigued in AM and PM sessions, requiring max cueing to alert pt back to task and engage in session, with pt asking multiple times to return to room/return to bed.  Extended time to complete all tasks  11/4/21: home ROM exercise program placed in pt's folder and pt's mother notified of it  11/7/21 Pt educated about LUE positioning both up in w/c and in bed to maintain joint integrity. [x] Family teach completed on:  11/4/21: Completed family teach this session. Pt's mother Olegario Szymanski present. Educated Olegario Szymanski on pt's status with ADLs and vc's for sequencing/initiation of tasks/hand placement with functional transfers. Olegario Szymanski able to provide hands on assist with was educated for body mechanics/hand placement/vc's to increase safety with only commode transfer with slideboard and toileting task. Olegario Szymanski educated on PROM (elbow/shoulder/wrist/digit flex/ext) of LUE to increase ROM, maintain joint integrity, and safe management of sublux. Olegario Szymanski educated on how to doff/jesus gait belt and LUE sling. Pt's limiting factor appears to be her impulsivity and safety with ADLs/transfer. Olegario Szymanski demo'ing F understanding/safety of education. Olegario Holtrio appeared motivated and willing to learn. Continue to educate. 11/9/21: Completed family teach this session. Pt's mother Olegario Szymanski present. Educated Olegario Holtrio on pt's increased independence with ADLs/functional transfers. Olegario Szymanski able to jesus pt's sling and gait belt. Olegario Szymanski completed hands on assist with SPT to [facility]. Olegario Szymanski and pt demo'ing improved communication with each other for safe transfer/completion of ADLs. Olegario Szymanski demo'ing F understanding/safety of education. Continue to educate. Pain Level: 4/10 head    Additional Notes:     Patient has made fair + progress during treatment sessions toward set goals.    Therapy emphasis to obtain goals:    Time Frame for Long term goals  6 weeks   Long term goal 1 Pt will complete UB self-care tasks w/ Supervision   Long term goal 2 Pt will complete LB self-care tasks w/ Supervision   Long term goal 3 Pt will complete toileting (all aspects) w/ Supervision   Long term goal 4 Pt will safely complete toilet transfers w/ Supervision   Long term goal 5 Pt will safely complete tub/shower transfers w/ Supervision   Long term goals 6 Pt will complete basic homemaking task w/ SBA   Long term goal 7 Pt will complete grooming task w/ Supervision   Long term goal 8 Pt will complete feeding task w/ Supervision/setup     [x] Continue with current OT Plan of care. [] Prepare for Discharge    10/27/21 OT plan of care updated on this date. Tentative length of stay is 6 weeks Richie Mcpherson OTR/L [id number]  11/3/21- Pt POC updated on this date.  Pt tentative length of day is 5 weeks to address above problem areas - Christiano Victor OTR/L [id number]     DISCHARGE RECOMMENDATIONS  Recommended DME: drop arm BSC    Post Discharge Care:   []Home Independently  []Home with 24hr Care / Supervision []Home with Partial Supervision []Home with Home Health OT []Home with Out Pt OT []Other: ___   Comments:         Time in Time out Tx Time Breakdown  Variance:   First Session  [id number] [x] Individual Tx-45  [] Concurrent Tx -  [] Co-Tx -   [] Group Tx -   [] Time Missed -     Second Session [id number] [x] Individual Tx-45  [] Concurrent Tx -  [] Co-Tx -   [] Group Tx -   [] Time Missed -     Third Session    [] Individual Tx-   [] Concurrent Tx -  [] Co-Tx -   [] Group Tx -   [] Time Missed -         Total Tx Time- [address], OTR/L [id number]

## 2021-11-09 NOTE — CONSULTS
Adel[facility]  Neurology Consult    Date:  11/9/2021  Patient Name:  Kamla Padgett  YOB: 1975  MRN: [id number]     PCP:  No primary care provider on file. Referring:  No ref. provider found      Chief Complaint: Transient loss of consciousness    History obtained from: EMR, the patient    Assessment  Kamla Padgett is a 39 y.o. female with PMH of HLD on statin, COVID-19 infection 9/2021, MCA CVA with cerebral edema s/p right decompressive hemicraniotomy 10/13, admitted to acute rehab. We have been consulted for transient loss of consciousness yesterday for which she was RRT'd.    1.  Vasovagal syncope r/o seizure      Plan  · CTA head, CTA neck  · EEG        History of Present Illness:  Kamla Padgett is a 39 y.o. right handed female presenting for evaluation of transient loss of consciousness. Patient has significant recent history of acute ischemic stroke, MCA territory, with left-sided weakness of upper and lower extremity. She presented to [facility] on 10/12 with the symptoms. Repeat imaging indicated worsening cerebral edema, midline shift, effacement of right lateral ventricle and increased left lateral ventricle caliber concerning for entrapment, hence she underwent emergent right decompressive hemicraniotomy on 10/13/2021. Stroke work-up at [facility] has included: CTA head and neck: Thrombus of the right carotid bifurcation, marked narrowing of the right ICA just proximal to the bifurcation with occlusion of right MCA arcade. Patient has been put on an event monitor. MONALISA done at [facility] did not show any intracardiac shunt or intracardiac thrombus. She is not on any anticoagulation. She was transferred to [facility] acute rehab unit on 10/22/2021. Yesterday as the patient was sitting on the wheelchair, she mentions feeling warm dizzy nauseated, sweating profusely, after which her eyes rolled backwards per onlooker, and she lost consciousness. She does not recall the entire event. She does not recall biting her tongue or losing bowel or bladder function. As she was coming out of the episode, she recalls her head repeatedly banging on the headrest.  She did have a helmet on. She was moved from the wheelchair to the bed after which he regained consciousness completely. She was evaluated by the medical resident during RRT, and she was found to be AAO x4. She does not recall feeling confused. Currently the left-sided weakness is persistent in upper and lower extremity, however she is slowly regaining some motor function in the lower extremity. Currently denies any other complaints. Review of Systems:  Constitutional  · Weight loss: no  · Fever: no    Eyes  · Double Vision: no  · Visions loss: yes - Left sided, temporal fields    Ears, Nose, Mouth, and Throat  · Difficulty swallowing: no    Cardiovascular  · Chest Pain: no    Respiratory  · Shortness of Breath: no    Gastrointestinal  · Abdominal Pain: no    Genitourinary  · Difficulty with Urination: no    Integumentary  · Rash: no    Musculoskeletal  · Back Pain: no    Neurological  · Headaches: no  · Weakness: yes -left-sided upper extremity, lower extremity  · Numbness: no  · Seizures: no  · Difficulty with Memory: no    Psychiatric  · Anxiety: no  · Depression: no  · Other mental health issues: no      Medical History:   Past Medical History:   Diagnosis Date    Cerebral artery occlusion with cerebral infarction ([facility] [address]     Hx of blood clots         Surgical History:   No past surgical history on file. Family History:   No family history on file.       Social History:  Social History     Tobacco Use    Smoking status: Former Smoker     Packs/day: 1.00     Years: 20.00     Pack years: 20.00     Types: Cigarettes     Start date: 10/1/2020     Quit date: 10/12/2021     Years since quittin.0    Smokeless tobacco: Not on file   Vaping Use    Vaping Use: Never used   Substance Use Topics    Alcohol use: Not Currently    Drug use: Never        Current Medications:      Current Facility-Administered Medications   Medication Dose Route Frequency Provider Last Rate Last Admin    baclofen (LIORESAL) tablet 5 mg  5 mg Oral Nightly Philip Ortiz MD   5 mg at 11/08/21 2202    nicotine (NICODERM CQ) 14 MG/24HR 1 patch  1 patch TransDERmal Daily Rony Escobar MD   1 patch at 11/09/21 0810    senna (SENOKOT) tablet 17.2 mg  2 tablet Oral Nightly Rony Escobar MD   17.2 mg at 11/08/21 2214    polyethylene glycol (GLYCOLAX) packet 17 g  17 g Oral Daily Rony Escobar MD   17 g at 11/06/21 0846    traMADol (ULTRAM) tablet 25 mg  25 mg Oral BID PRN Rony Escobar MD   25 mg at 11/09/21 4502    ondansetron (ZOFRAN-ODT) disintegrating tablet 4 mg  4 mg Oral Q6H PRN Rony Escobar MD   4 mg at 11/09/21 1035    metoprolol tartrate (LOPRESSOR) tablet 12.5 mg  12.5 mg Oral BID [facility]   12.5 mg at 11/09/21 0809    lactulose (CHRONULAC) 10 GM/15ML solution 20 g  20 g Oral BID PRN Rony Escobar MD   20 g at 11/04/21 0907    guaiFENesin (ROBITUSSIN) 100 MG/5ML oral solution 200 mg  200 mg Oral BID PRN Rony Escobar MD   200 mg at 10/29/21 0855    acetaminophen (TYLENOL) tablet 1,000 mg  1,000 mg Oral 3 times per day Rony Escobar MD   1,000 mg at 11/09/21 1359    lidocaine 4 % external patch 2 patch  2 patch TransDERmal Daily Rony Escobar MD   1 patch at 11/09/21 0808    gabapentin (NEURONTIN) capsule 100 mg  100 mg Oral TID Cuttyhunk L Belcik, DO   100 mg at 11/09/21 1400    tamsulosin (FLOMAX) capsule 0.4 mg  0.4 mg Oral Daily Cuttyhunk L Belcik, DO   0.4 mg at 11/09/21 0807    acetaminophen (TYLENOL) tablet 650 mg  650 mg Oral Q4H PRN Cuttyhunk L Belcik, DO   650 mg at 11/05/21 1135    heparin (porcine) injection 5,000 Units  5,000 Units SubCUTAneous Q12H Maurizio Guerrero DO   5,000 Units at 11/09/21 0641    aspirin chewable tablet 81 mg  81 mg Oral Daily Maurizio Guerrero DO   81 mg at 11/09/21 0808    atorvastatin (LIPITOR) tablet 40 mg  40 mg Oral Nightly Denver L Belcik, DO   40 mg at 11/08/21 2202    vitamin D (ERGOCALCIFEROL) capsule 50,000 Units  50,000 Units Oral Weekly Denver L Belcik, DO   50,000 Units at 11/06/21 0846    docusate sodium (COLACE) capsule 100 mg  100 mg Oral BID Denver L Belcik, DO   100 mg at 11/09/21 0808    magnesium hydroxide (MILK OF MAGNESIA) 400 MG/5ML suspension 30 mL  30 mL Oral Daily PRN Denver L Belcik, DO   30 mL at 11/05/21 2247    bisacodyl (DULCOLAX) suppository 10 mg  10 mg Rectal Daily PRN Denver L Belcik, DO        melatonin tablet 6 mg  6 mg Oral Nightly Denver L Belcik, DO   6 mg at 11/08/21 2201    diclofenac sodium (VOLTAREN) 1 % gel 2 g  2 g Topical 4x Daily PRN Denver L Belcik, DO   2 g at 10/29/21 0855    clopidogrel (PLAVIX) tablet 75 mg  75 mg Oral Daily Denver L Belcik, DO   75 mg at 11/09/21 8645        Allergies: Allergies   Allergen Reactions    Vicodin [Hydrocodone-Acetaminophen]      Gets severe hypotension        Physical Examination  Vitals   Vitals:    11/08/21 0744 11/08/21 2145 11/09/21 0024 11/09/21 0809   BP: (!) 109/59 (!) 105/55  107/69   Pulse: 79 88  80   Resp: 18 18  16   Temp: 98 °F (36.7 °C) 98 °F (36.7 °C)  98.7 °F (37.1 °C)   TempSrc: Temporal Temporal  Temporal   SpO2:  94% 95% 97%   Weight:       Height:            General: Patient appears in no acute distress  HEENT: Normocephalic, atraumatic  Chest: Clear to auscultation bilaterally  Heart: Regular rate and rhythm, no murmurs appreciated  Extremities: No edema or cyanosis noted    Neurologic Examination    Mental Status  Alert, and oriented to person, place and time with normal speech and language. No evidence of aphasia during conversation. No evidence of memory impairment. Attention and concentration appeared normal.     Cranial Nerves  II. Visual fields: Defects in the left temporal visual field.   III, IV, VI: Pupils equally round and reactive to light, 3 to 2 mm bilaterally. EOMs: full, no nystagmus. V. Facial sensation intact to light touch bilaterally  VII: Facial movements symmetric and strong  VIII: Hearing intact to voice  IX,X: Palate elevates symmetrically. No dysarthria  XI: Sternocleidomastoid and trapezius 5/5 bilaterally   XII: Tongue is midline    Motor     Right Left   Right Left   Deltoid 5 0  Hip Flexion 5 1   Biceps      5  0  Knee Extension 5 1   Triceps 5 0  Knee Flexion 5 1   Handgrip 5 0  Ankle Dorsiflexion 5 0       Ankle Plantarflexion 5 0     Tone: Normal in all four limbs    Bulk: Normal in all four limbs with no evidence of atrophy    Sensation  · Light Touch: Intact distally in all four limbs      Reflexes     Right Left   Biceps 2 3   Brachioradialis 2 3   Triceps 2 3   Patellar 2 3   Achilles 2 3   ankle clonus none none     Toes down going bilaterally. Coordination  Rapid alternating movements normal in bilateral upper extremities  Finger to nose testing normal bilaterally  Heel to shin testing normal bilaterally    Gait  Normal heel, toe on the right. Unable to evaluate left       Labs  BMP: Na: 138, K: 4.3, CHL: 103, bicarb: 23, BUN: 13, creatinine: 0.7  CBC: WBC: 8.3, hemoglobin: 12.1, platelet: 560    Imaging  Pending CTA head and neck    [facility] from [facility]:    Ultrasound carotid duplex: 10/20/2021  Impression:   Carotid duplex ultrasound performed and shows mobile mass of mixed echogenicity within the right carotid bulb. This may represent thrombus. There is no significant calcification noted at the carotid bulb. The ICA/CCA ratio is < 1 which is suggestive of a   < 50% stenosis. Vertebral artery flow is antegrade. MRI brain without contrast 10/14/2021  1.  Postoperative changes from right decompressive hemicraniectomy. 2.  Evolving acute to subacute right MCA infarction with stable mass   effect and midline shift. Blood products within the basal ganglia.    3.  Mild low positioning of the cerebellar tonsils, partially empty   sella, and prominence of the CSF spaces significant nerve sheaths are   nonspecific findings that can be seen in the setting of increased   intracranial pressure for which clinical correlation is recommended. CT head without contrast 10/14/2021  1.  Postoperative changes related to right decompressive   hemicraniectomy. Blood products along the craniectomy defect are   slightly more conspicuous. Attention on follow-up. 2.  Evolving large acute to subacute right MCA infarction with stable   mass effect and blood products. CT angio head with contrast  Head CT:   1. Large right MCA infarct. No hemorrhagic conversion, aspect 0. There is 2 mm right-to-left midline shift. CTA Neck:   1. Thrombus at the right carotid bifurcation. The extracranial   circulation is otherwise patent. CTA Head:   1. Marked narrowing of the right ICA just proximal to the bifurcation   with occlusion of the right MCA arcade. Some narrowing of the right   CHIVO A1 segment noted. The right PCA is a fetal type PCA but is patent   proximally. CT Perfusion:   1. Marked large perfusion defect involving the right MCA which   demonstrates decreased cerebral blood volume, decreased cerebral   blood flow and predominantly elevation in mean transit time. Some   extension to involve the right PCA distribution/right occipital lobe   is also noted. MR will better delineate any area of right PCA   distribution infarct.             Electronically signed by: Yolanda Lizama MD, 11/9/2021 3:18 PM

## 2021-11-09 NOTE — PROGRESS NOTES
Physical Therapy  Treatment Note  Supervising Therapist: Jana Ashby, PT, DPT [id number]    NAME: Debby Flight  ROOM: [facility]  DIAGNOSIS: Acute CVA  PRECAUTIONS: Falls, helmet at all times when OOB, L hemiplegia, L homonymous hemianopsia L neglect, alarm, must bring phone to therapy (cardiac monitor), L PRAFO, SBP <180  HPI: Pt is a 39 y.o. female with past medical history significant for recent COVID-19 infection (9/15/21) who presented to [facility] on [facility]21 with acute onset left-sided weakness. Initial NIHSS 17.  Initial CT ([facility]) showed R MCA hyperdensity; initial CTA showed right ICA/M1 occlusion. On 10/13, her neuro exam worsened and repeat imaging indicated worsening edema, 1 cm of midline shift. She underwent emergent R hemicraniectomy by Dr. Raquel Larose [facility]) on 10/13. After being deemed medically appropriate, she was transferred to [place] [id number] on 10/22/2021    Social:  Pt lives with daughter (15 y/o), will be returning to parent's home upon discharge (1 floor plan, 2 HAIDER with 2 HR). Mother and father healthy, do not work. 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 Initial Evaluation  Date: 10/23/21 AM     PM    Short Term Goals Long Term Goals    Was pt agreeable to Eval/treatment? yes Yes Yes     Does pt have pain? Pain on the top of her head once the helmet was placed on Mild c/o L hip and R rib pain Mild c/o L hip and R rib pain     Bed Mobility  Rolling: Max A  Supine to sit: Max A   Sit to supine: Max A   Scooting: Max A Supine<>Sit Noemy  Scooting/Rolling Noemy (mat table)   NT Min A Supervision   Transfers Sit to stand: Max A   Stand to sit: Max A  Stand pivot:  Max A x2    5xSTS: Unable to complete Sit<>stand: ModA  Stand pivot: ModA with R LBQC Sit<>stand: ModA   Stand pivot: ModA with R LBQC   Min A SBA  5xSTS: TBD   Ambulation    15 feet with R mackey rail with Max A + wc follow    10mWT: TBA  6mWT: TBA 75 feet x 2 reps and 50 feet x 1 rep with R LBQC and ModA  (trial L articulating AFO with DF/PF stop)    10mWT: 0.10 m/s  6mWT: 33 m  (R hemicane, L knee immobilizer/AFO, ModA 11/5) 115 feet x 1 rep with R LBQC and ModA  (trial L articulating AFO with DF/PF stop)   50 feet with AAD with Min A       500 feet with AAD with SBA    10mWT: >0.4 m/s  6mWT: >205 m   Walking 10 feet on uneven surface NT NT NT 10 feet with AAD with Min A 10 feet with AAD with SBA   Wheel Chair Mobility NT NT 50 feet with R extremities with ModA  150 feet with R extremities with SBA   Car Transfers NT NT ModA Min A SBA   Stair negotiation: ascended and descended NT 4 steps with R HR with ModA x 1 rep (ascending with RLE, descending with LLE non-reciprocal)  (L articulating AFO with DF/PF stop) 8 steps with 1 HR (RUE) with ModA (non-reciprocal) 4 steps with 1 rail with Min A 12 steps with 1 rail with SBA   Curb Step:   ascended and descended NT NT NT 4 inch step with AAD and Min A 4 inch step with AAD and SBA   Picking up object off the floor NT NT NT Will  an object with Min assist Will  an object with SBA   BLE ROM WFL: LLE AROM limited by flaccidity  WFL: LLE AROM limited by spasticity 2+ on MAS (hamstrings, hip extensors)      BLE Strength RLE 4+/5  LLE 0/5 RLE 5/5  LLE hip flexion 2+/5  Knee extension 2-/5  Ankle DF/PF 0/5      Balance  Static Sitting: Mod/Max A    BBS: TBA  FGA: TBA Sitting: Min A  Standing: Mod A  BBS: 4/56  (11/5/21)       BBS: TBD  FGA: TBD   Date Family Teach Completed TBA Pt's mother present for observation 10/24, 11/2, 11/3, 114, hands on 11/9      Is additional Family Teaching Needed?   Y or N Y Yes      Hindering Progress L hemiplegia, L neglect L hemiplegia, L neglect      PT recommended ELOS 5 weeks       Team's Discharge Plan        Therapist at Team Meeting          HRmax: (178, -10  bpm for beta blocker) 168 bpm Time spent at moderate intensity (60-70% HRmax) Time spent at high intensity (70-85% HRmax)    AM: NA (FT) AM: NA (FT)    PM: 0:16:14 PM: 0:02:26     Therapeutic Exercise:   AM: Stand-pivot transfer x 6 reps to R from WC<>mat table    Patient education  Pt educated on optimal sequencing for safe transfer when turning to sit in chair. Patient response to education:   Pt verbalized understanding Pt demonstrated skill Pt requires further education in this area   yes partial yes     Additional Comments: Pt seated in WC upon room entry, alert and pleasant during session. Pt exhibits improved motor control of LLE with MMT, scores updated as listed above. Pt's mother present for AM session FT, hands on training initiated. Began with review of deficits and donning/doffing of gait belt, sling, helmet, and trial AFO. Reviewed stand-pivot transfers initially with emphasis on safe guarding technique. Mother was initially anxious but became more comfortable with repetition. Pt requires frequent cueing to attend to L side, particularly to ensure safe technique when turning to sit in chair. Recommend further hands on FT with pt's mother. Pt was 15 minutes late to AM session due to restroom use, /65 before, 110/30 after, low diastolic BP following activity relayed to nursing staff. PM session characterized by multiple turns toward hemiparetic side with approach to destination. Stepwise cueing required with turning to maintain safety. Plan to continue to challenge L limb advancement with gait training next session. Chair/bed alarm: armed following session    AM  Time in: 0830  Time out: 0915    PM  Time in: 1315  Time out: 1345    Pt is making good progress toward established Physical Therapy goals. Continue with physical therapy current plan of care.     Joseph Lopez, PT, DPT  Board Certified Clinical Specialist in Neurologic Physical Therapy  [id number]

## 2021-11-10 ENCOUNTER — APPOINTMENT (OUTPATIENT)
Dept: CT IMAGING | Age: 46
DRG: 058 | End: 2021-11-10
Attending: PHYSICAL MEDICINE & REHABILITATION
Payer: MEDICAID

## 2021-11-10 LAB
EKG ATRIAL RATE: 80 BPM
EKG P AXIS: 44 DEGREES
EKG P-R INTERVAL: 184 MS
EKG Q-T INTERVAL: 380 MS
EKG QRS DURATION: 76 MS
EKG QTC CALCULATION (BAZETT): 438 MS
EKG R AXIS: 20 DEGREES
EKG T AXIS: 45 DEGREES
EKG VENTRICULAR RATE: 80 BPM

## 2021-11-10 PROCEDURE — 95816 EEG AWAKE AND DROWSY: CPT

## 2021-11-10 PROCEDURE — 99232 SBSQ HOSP IP/OBS MODERATE 35: CPT | Performed by: PHYSICAL MEDICINE & REHABILITATION

## 2021-11-10 PROCEDURE — 6360000002 HC RX W HCPCS: Performed by: PHYSICAL MEDICINE & REHABILITATION

## 2021-11-10 PROCEDURE — 1280000000 HC REHAB R&B

## 2021-11-10 PROCEDURE — 70496 CT ANGIOGRAPHY HEAD: CPT

## 2021-11-10 PROCEDURE — 6370000000 HC RX 637 (ALT 250 FOR IP): Performed by: PHYSICIAN ASSISTANT

## 2021-11-10 PROCEDURE — 93010 ELECTROCARDIOGRAM REPORT: CPT | Performed by: INTERNAL MEDICINE

## 2021-11-10 PROCEDURE — 6370000000 HC RX 637 (ALT 250 FOR IP): Performed by: PHYSICAL MEDICINE & REHABILITATION

## 2021-11-10 PROCEDURE — 97112 NEUROMUSCULAR REEDUCATION: CPT

## 2021-11-10 PROCEDURE — 2580000003 HC RX 258: Performed by: RADIOLOGY

## 2021-11-10 PROCEDURE — 6360000004 HC RX CONTRAST MEDICATION: Performed by: RADIOLOGY

## 2021-11-10 PROCEDURE — 70450 CT HEAD/BRAIN W/O DYE: CPT

## 2021-11-10 PROCEDURE — 95816 EEG AWAKE AND DROWSY: CPT | Performed by: PSYCHIATRY & NEUROLOGY

## 2021-11-10 PROCEDURE — 70498 CT ANGIOGRAPHY NECK: CPT

## 2021-11-10 PROCEDURE — 97110 THERAPEUTIC EXERCISES: CPT

## 2021-11-10 PROCEDURE — 97530 THERAPEUTIC ACTIVITIES: CPT

## 2021-11-10 RX ORDER — LIDOCAINE 4 G/G
1 PATCH TOPICAL DAILY
Status: DISCONTINUED | OUTPATIENT
Start: 2021-11-11 | End: 2021-12-07 | Stop reason: HOSPADM

## 2021-11-10 RX ORDER — SODIUM CHLORIDE 0.9 % (FLUSH) 0.9 %
10 SYRINGE (ML) INJECTION ONCE
Status: COMPLETED | OUTPATIENT
Start: 2021-11-10 | End: 2021-11-10

## 2021-11-10 RX ADMIN — GABAPENTIN 100 MG: 100 CAPSULE ORAL at 07:36

## 2021-11-10 RX ADMIN — Medication 6 MG: at 20:45

## 2021-11-10 RX ADMIN — DOCUSATE SODIUM 100 MG: 100 CAPSULE, LIQUID FILLED ORAL at 20:45

## 2021-11-10 RX ADMIN — METOPROLOL TARTRATE 12.5 MG: 25 TABLET, FILM COATED ORAL at 07:35

## 2021-11-10 RX ADMIN — ACETAMINOPHEN 1000 MG: 325 TABLET ORAL at 20:45

## 2021-11-10 RX ADMIN — ACETAMINOPHEN 1000 MG: 325 TABLET ORAL at 06:27

## 2021-11-10 RX ADMIN — TAMSULOSIN HYDROCHLORIDE 0.4 MG: 0.4 CAPSULE ORAL at 07:38

## 2021-11-10 RX ADMIN — ATORVASTATIN CALCIUM 40 MG: 40 TABLET, FILM COATED ORAL at 20:45

## 2021-11-10 RX ADMIN — BACLOFEN 5 MG: 10 TABLET ORAL at 20:45

## 2021-11-10 RX ADMIN — ASPIRIN 81 MG CHEWABLE TABLET 81 MG: 81 TABLET CHEWABLE at 07:36

## 2021-11-10 RX ADMIN — TRAMADOL HYDROCHLORIDE 25 MG: 50 TABLET, COATED ORAL at 16:35

## 2021-11-10 RX ADMIN — TRAMADOL HYDROCHLORIDE 25 MG: 50 TABLET, COATED ORAL at 06:30

## 2021-11-10 RX ADMIN — METOPROLOL TARTRATE 12.5 MG: 25 TABLET, FILM COATED ORAL at 20:45

## 2021-11-10 RX ADMIN — HEPARIN SODIUM 5000 UNITS: 10000 INJECTION INTRAVENOUS; SUBCUTANEOUS at 18:20

## 2021-11-10 RX ADMIN — SENNOSIDES 17.2 MG: 8.6 TABLET, COATED ORAL at 20:44

## 2021-11-10 RX ADMIN — ACETAMINOPHEN 1000 MG: 325 TABLET ORAL at 13:30

## 2021-11-10 RX ADMIN — Medication 10 ML: at 14:06

## 2021-11-10 RX ADMIN — CLOPIDOGREL 75 MG: 75 TABLET, FILM COATED ORAL at 07:36

## 2021-11-10 RX ADMIN — DOCUSATE SODIUM 100 MG: 100 CAPSULE, LIQUID FILLED ORAL at 07:35

## 2021-11-10 RX ADMIN — POLYETHYLENE GLYCOL 3350 17 G: 17 POWDER, FOR SOLUTION ORAL at 07:38

## 2021-11-10 RX ADMIN — IOPAMIDOL 65 ML: 755 INJECTION, SOLUTION INTRAVENOUS at 13:01

## 2021-11-10 RX ADMIN — GABAPENTIN 100 MG: 100 CAPSULE ORAL at 20:45

## 2021-11-10 RX ADMIN — HEPARIN SODIUM 5000 UNITS: 10000 INJECTION INTRAVENOUS; SUBCUTANEOUS at 06:27

## 2021-11-10 RX ADMIN — GABAPENTIN 100 MG: 100 CAPSULE ORAL at 13:30

## 2021-11-10 ASSESSMENT — PAIN DESCRIPTION - PROGRESSION
CLINICAL_PROGRESSION: GRADUALLY IMPROVING
CLINICAL_PROGRESSION: NOT CHANGED

## 2021-11-10 ASSESSMENT — PAIN DESCRIPTION - DESCRIPTORS
DESCRIPTORS: ACHING;DISCOMFORT;HEADACHE
DESCRIPTORS: ACHING;CONSTANT;DISCOMFORT
DESCRIPTORS: ACHING;DISCOMFORT;SORE;HEADACHE
DESCRIPTORS: ACHING;CONSTANT;DISCOMFORT

## 2021-11-10 ASSESSMENT — PAIN DESCRIPTION - PAIN TYPE
TYPE: CHRONIC PAIN

## 2021-11-10 ASSESSMENT — PAIN DESCRIPTION - ORIENTATION
ORIENTATION: ANTERIOR

## 2021-11-10 ASSESSMENT — PAIN DESCRIPTION - LOCATION
LOCATION: HEAD

## 2021-11-10 ASSESSMENT — PAIN SCALES - GENERAL
PAINLEVEL_OUTOF10: 0
PAINLEVEL_OUTOF10: 8
PAINLEVEL_OUTOF10: 8
PAINLEVEL_OUTOF10: 6
PAINLEVEL_OUTOF10: 4
PAINLEVEL_OUTOF10: 0
PAINLEVEL_OUTOF10: 0
PAINLEVEL_OUTOF10: 4
PAINLEVEL_OUTOF10: 8

## 2021-11-10 ASSESSMENT — PAIN - FUNCTIONAL ASSESSMENT
PAIN_FUNCTIONAL_ASSESSMENT: ACTIVITIES ARE NOT PREVENTED

## 2021-11-10 ASSESSMENT — PAIN DESCRIPTION - FREQUENCY
FREQUENCY: INTERMITTENT

## 2021-11-10 ASSESSMENT — PAIN DESCRIPTION - ONSET
ONSET: ON-GOING

## 2021-11-10 NOTE — PROGRESS NOTES
OCCUPATIONAL THERAPY DAILY NOTE    Date:11/10/2021  Patient Name: Sy Hadley  MRN: [id number]  : 1975  Room: [address] [place] [address]     Diagnosis:Acute CVA( Pt presented to [facility] as Level 2 stroke alert on 10/11/21. L side weakness, neglect. NIH-17  Found to have large R MCA involving R occipital)  Surgery: R frontotemporal decompressive hemicraniectomy on 10/13/21  Past Medical History: COVID (9/15), tobacco use  Precautions: Falls, L hemiplegia, L neglect, L sublux, bed/chair alarm, helmet when OOB, impulsive, L PRAFO    Functional Assessment:   Date Status AE  Comments   Feeding 21 Set-up     Grooming 21 SBA  w/c          Oral Care 21 Set-up     Bathing 21 Mod A Shower chair with wheels     UB Dressing 21 MIN A      LB Dressing 21 Mod reacher    Footwear 21 Min A     Toileting 21 Mod A Grab bar    Homemaking 21 Mod A       Functional Transfers / Balance:   Date Status DME  Comments   Sit Balance 11/10/21 SBA     Stand Balance 11/10/21 Min A Grab bar  Bed rail     [] Tub  [x] Shower   Transfer 21 Mod A Roll in shower chair, grab bar, LBQC    Commode   Transfer 21 Min A , drop arm commode, LBQC SPT   Functional   Mobility 11/10/21 Min A LBQC   <household distance in PM     Functional Exercises / Activity:    Pt sitting in  chair upon arrival to OT gym in AM. Participated in arm scait, 3x10 reps using LUE to increase ROM for ease with ADLs. Pt appeared to slightly able to move without assistance but continues to require hand over hand assist to fully complete task. Therapist completed PROM, 3x15 reps (elbow/shoulder/wrist/digit flex/ext) of LUE to increase ROM and maintain joint integrity. Pt appeared to demonstrate slight tone at digits. Engaged in standing at table and weightbearing through LUE to icnrease proprioceptive input and strength. Pt able to stand at [address], 3x5 mins.       Pt sitting in chair upon arrival to  in PM. Pt taken outside for therapy session. Therapist completed scapular mobilizations, 4x10 reps, to LUE to maintain joint integrity for ease with ADLs. Pt reported no discomfort. Engaged in therex, seated, using 3# DB, 3x15 reps (elbow/shoulder flex), to increase RUE strength for ease with functional transfers. Pt appeared to be able to hold up L shoulder with mobility, despite not being able to demonstrate active movement. Pt appeared to have tolerated session well. Sensory / Neuromuscular Re-Education:      Cognitive Skills:   Status Comments   Problem   Solving fair    Memory fair    Sequencing fair    Safety fair      Visual Perception:    Education:  Pt educated about LUE positioning when up in w/c to promote ROM/wt bearing along with joint integrity. Pt educated with safety during w/c to EOB using sliding board to increase awareness. 10/25/21: pt educated on SROM of LUE and safe positioning of LUE  10/29/21: educated on 1 handed dressing technique for socks/shoes  11/1/21: shoe lace adapted for ease with donning shoe  11/1/21: attempted small strips of kineosio tape on pt's hand to assess if pt can tolerate on her shoulder due to report of allergy (cleared by )  11/2/21:  Therapist applied kineosiotape to pt's LUE to stabilize sublux. Will continue to assess issues that arise with adhesive and progression of sublux  11/2/21: pt educated on AE for ease with LB dressing  11/3/21: Pt very fatigued in AM and PM sessions, requiring max cueing to alert pt back to task and engage in session, with pt asking multiple times to return to room/return to bed. Extended time to complete all tasks  11/4/21: home ROM exercise program placed in pt's folder and pt's mother notified of it  11/7/21 Pt educated about LUE positioning both up in w/c and in bed to maintain joint integrity. [x] Family teach completed on:  11/4/21: Completed family teach this session. Pt's mother Ayesha Felipe present.  Educated Ayesha Felipe on pt's status with ADLs and vc's for sequencing/initiation of tasks/hand placement with functional transfers. Bisi Aguilar able to provide hands on assist with was educated for body mechanics/hand placement/vc's to increase safety with only commode transfer with slideboard and toileting task. Bisi Aguilar educated on PROM (elbow/shoulder/wrist/digit flex/ext) of LUE to increase ROM, maintain joint integrity, and safe management of sublux. Bisi Aguilar educated on how to doff/jesus gait belt and LUE sling. Pt's limiting factor appears to be her impulsivity and safety with ADLs/transfer. Bisi Aguilar demo'ing F understanding/safety of education. Bisi Aguilar appeared motivated and willing to learn. Continue to educate. 11/9/21: Completed family teach this session. Pt's mother Bisi Aguilar present. Educated Bisi Aguilar on pt's increased independence with ADLs/functional transfers. Bisi Aguilar able to jesus pt's sling and gait belt. Bisi Aguilar completed hands on assist with SPT to [facility]. Bisi Aguilar and pt demo'ing improved communication with each other for safe transfer/completion of ADLs. Bisi Aguilar demo'ing F understanding/safety of education. Continue to educate. Pain Level:0/10    Additional Notes:     Patient has made fair + progress during treatment sessions toward set goals. Therapy emphasis to obtain goals:    Time Frame for Long term goals  6 weeks   Long term goal 1 Pt will complete UB self-care tasks w/ Supervision   Long term goal 2 Pt will complete LB self-care tasks w/ Supervision   Long term goal 3 Pt will complete toileting (all aspects) w/ Supervision   Long term goal 4 Pt will safely complete toilet transfers w/ Supervision   Long term goal 5 Pt will safely complete tub/shower transfers w/ Supervision   Long term goals 6 Pt will complete basic homemaking task w/ SBA   Long term goal 7 Pt will complete grooming task w/ Supervision   Long term goal 8 Pt will complete feeding task w/ Supervision/setup     [x] Continue with current OT Plan of care.   [] Prepare for Discharge    10/27/21 OT plan of care updated on this date. Tentative length of stay is 6 weeks Slime Montoya OTR/L [id number]  11/3/21- Pt POC updated on this date. Pt tentative length of day is 5 weeks to address above problem areas Geraldo Willard OTR/L [id number]  11/10/21- Pt POC updated on this date.  Pt tentative length of day is 4 weeks to address above problem areas -Slime Montoya OTR/L [id number]   DISCHARGE RECOMMENDATIONS  Recommended DME: drop arm BSC    Post Discharge Care:   []Home Independently  []Home with 24hr Care / Supervision []Home with Partial Supervision []Home with Home Health OT []Home with Out Pt OT []Other: ___   Comments:         Time in Time out Tx Time Breakdown  Variance:   First Session  [id number] [x] Individual Tx-45  [] Concurrent Tx -  [] Co-Tx -   [] Group Tx -   [] Time Missed -     Second Session [id number] [x] Individual Tx-45  [] Concurrent Tx -  [] Co-Tx -   [] Group Tx -   [] Time Missed -     Third Session    [] Individual Tx-   [] Concurrent Tx -  [] Co-Tx -   [] Group Tx -   [] Time Missed -         Total Tx Time- Cassius [address], [facility], OTR/L [id number]

## 2021-11-10 NOTE — PROGRESS NOTES
Physical Therapy  Treatment Note  Supervising Therapist: Mera Dowd, PT, DPT [id number]    NAME: Aaliyah Abbott  ROOM: [address] [place] [address]  DIAGNOSIS: Acute CVA  PRECAUTIONS: Falls, helmet at all times when OOB, L hemiplegia, L homonymous hemianopsia L neglect, alarm, must bring phone to therapy (cardiac monitor), L PRAFO, SBP <180  HPI: Pt is a 39 y.o. female with past medical history significant for recent COVID-19 infection (9/15/21) who [facility] on 10/12/21 with acute onset left-sided weakness. Initial NIHSS 17.  Initial CT ([facility]) showed R MCA hyperdensity; initial CTA showed right ICA/M1 occlusion. On 10/13, her neuro exam worsened and repeat imaging indicated worsening edema, 1 cm of midline shift. She underwent emergent R hemicraniectomy by Dr. [facility]) on 10/13. After being deemed medically appropriate, she was transferred to [place] [address] on 10/22/2021    Social:  Pt lives with daughter (15 y/o), will be returning to parent's home upon discharge (1 floor plan, 2 HAIDER with 2 HR). Mother and father healthy, do not work. 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 Initial Evaluation  Date: 10/23/21 AM     PM    Short Term Goals Long Term Goals    Was pt agreeable to Eval/treatment? yes Yes Yes     Does pt have pain? Pain on the top of her head once the helmet was placed on Mild c/o L hip and R rib pain Mild c/o L hip and R rib pain     Bed Mobility  Rolling: Max A  Supine to sit: Max A   Sit to supine: Max A   Scooting: Max A NT   NT Min A Supervision   Transfers Sit to stand: Max A   Stand to sit: Max A  Stand pivot:  Max A x2    5xSTS: Unable to complete Sit<>stand: ModA  Stand pivot: ModA with R LBQC Sit<>stand: ModA   Stand pivot: ModA with R LBQC   Min A SBA  5xSTS: TBD   Ambulation    15 feet with R mackey rail with Max A + wc follow    10mWT: TBA  6mWT:  feet x 1 rep with R LBQC and ModA  (trial L articulating AFO with DF/PF 0:00:00    PM: 0:09:12 PM: 0:15:29     Therapeutic Exercise:   AM: Ambulation with R LBQC, L trial AFO, 5lb ankle weight attached to LLE, 7.5lb ankle weight attached to posterior gait belt, 100 feet x 2 reps with ModA  FW non-reciprocal stepping over 4 SPC on floor with R LBQC, L trial AFO, 5lb ankle weight attached to LLE, 7.5lb ankle weight attached to posterior gait belt, x 2 reps with ModA  PM: FW non-reciprocal stepping over 4 SPC on floor with R LBQC, L trial AFO with Noemy x 2 reps    Patient education  Pt educated on TY relation to stability when turning to sit in chair    Patient response to education:   Pt verbalized understanding Pt demonstrated skill Pt requires further education in this area   yes partial yes     Additional Comments: Pt remains pleasant and motivated during session, /78 before, 108/68 after. HR continuously monitored, pt exhibits blunted HR response compared to previous sessions, HR did not reflect RPE of 15-18. Ankle weight added to LLE and posterior gait belt to challenge limb advancement and stance control. Pt encouraged to increase walking speed during ambulation trials. Cueing is still required for L visual field scanning, optimal sit<>stand transfer technique, and safe turning technique with approach to chair. Pt remains impulsive and attempts to sit early with approach to chair. HR response more typical of patients previous sessions during PM. /73 after PM activity. Plan to progress intensity of gait training addressing L stance control next session. AM  Time in: 0830  Time out: 0915    PM  Time in: 1430  Time out: 1515    Pt is making good progress toward established Physical Therapy goals. Continue with physical therapy current plan of care.     Gonzalo Coley, PT, DPT  Board Certified Clinical Specialist in Neurologic Physical Therapy  [id number]

## 2021-11-10 NOTE — PLAN OF CARE
Attempted to see patient. Off floor for testing at this time. Waiting for EEG, CT head, CTA head/neck to be completed. Will follow up after testing.

## 2021-11-10 NOTE — PLAN OF CARE
Problem: Pain:  Goal: Pain level will decrease  Description: Pain level will decrease  Outcome: Met This Shift  Goal: Control of acute pain  Description: Control of acute pain  Outcome: Met This Shift  Goal: Control of chronic pain  Description: Control of chronic pain  Outcome: Met This Shift  Goal: Patient's pain/discomfort is manageable  Description: Patient's pain/discomfort is manageable  Outcome: Met This Shift     Problem: Falls - Risk of:  Goal: Will remain free from falls  Description: Will remain free from falls  Outcome: Met This Shift  Goal: Absence of physical injury  Description: Absence of physical injury  Outcome: Met This Shift     Problem: Skin Integrity:  Goal: Will show no infection signs and symptoms  Description: Will show no infection signs and symptoms  Outcome: Met This Shift  Goal: Absence of new skin breakdown  Description: Absence of new skin breakdown  Outcome: Met This Shift     Problem: ABCDS Injury Assessment  Goal: Absence of physical injury  Outcome: Met This Shift     Problem: HEMODYNAMIC STATUS  Goal: Patient has stable vital signs and fluid balance  Outcome: Met This Shift     Problem: ACTIVITY INTOLERANCE/IMPAIRED MOBILITY  Goal: Mobility/activity is maintained at optimum level for patient  Outcome: Met This Shift     Problem: COMMUNICATION IMPAIRMENT  Goal: Ability to express needs and understand communication  Outcome: Met This Shift     Problem: Infection:  Goal: Will remain free from infection  Description: Will remain free from infection  Outcome: Met This Shift     Problem: Safety:  Goal: Free from accidental physical injury  Description: Free from accidental physical injury  Outcome: Met This Shift  Goal: Free from intentional harm  Description: Free from intentional harm  Outcome: Met This Shift     Problem: Daily Care:  Goal: Daily care needs are met  Description: Daily care needs are met  Outcome: Met This Shift     Problem: Skin Integrity:  Goal: Skin integrity will

## 2021-11-10 NOTE — PROGRESS NOTES
Received call from Dr. Collins Regan nurse, Veda Smyth. Updated her on patient status and estimated length of stay around 4 weeks. Patient has appointment for imaging on 12/21/21 at [facility]. Will notify Veda Smyth of discharge date when available. Patient's dad in room, spoke with him and informed him and patient that Dr. Collins Regan office has been updated.

## 2021-11-10 NOTE — PATIENT CARE CONFERENCE
[address] NOTE/PATIENT PLAN OF CARE    The physician was present and led this team conference    Date: 11/10/2021  Admission date: 10/22/2021  Patient Name: Isaías Brenner        MRN: [id number]    : 1975  (39 y.o.)  Gender: female   Rehab diagnosis/surgery with date:  CVA-right MCA of 10/12/21  Craniectomy  10/13/21 at [facility] Code:  1.1      MEDICAL/FUNCTIONAL HISTORY/STATUS:  RRT on Monday, probable vaso-vagal episode vs seizure,  cardiology following for tachycardia, has a cardiac monitor on. Neurology consulted.    continues to wear helmet, will need surgery after discharge for flap to be put back    Consultations/Labs/X-rays: 21 labs ok      MEDICATION UPDATE:  low dose baclofen at bedtime started for muscle spasms      NURSING :    Bowel:   Always Continent  [x]   Occasionally incontinent  []   Frequently incontinent  []   Always incontinent  []   Not occurred  []     Bladder:   Always Continent  []    Incontinent less than daily[x]   Incontinent  daily []   Always incontinent  []   No urine output    []   Indwelling catheter []       Toilet Hygiene:   Current level : mod assist  Short term Toilet hygiene goal: min assist  Long term toilet hygiene goal:  standby assist    Skin integrity: intact, mepalex prophylactic to sacrum  Pain: headache    NUTRITION    Diet  regular  Liquid consistency   thin    SOCIAL INFORMATION:  Lives with: dtr, 15 yr old  Prior community services:  none  Home Architecture:  going to mom's at discharge:  ranch with 2 entry, 2 rails  Prior Level of function:  independent  DME:  none    FAMILY / PATIENT EDUCATION:  mom coming in twice weekly for hands on education    PHYSICAL THERAPY    Bed mobility:   Current level: min assist  Short term bed mobility goal: min assist  Long term bed mobility goal: supervision    Chair/bed transfers:  Current level: pivots are mod assist with large based quad cane, trial a left AFO, nursing doing transfer board transfers, is min assist, knee is unstable and she is impulsive  Short term Chair/bed transfers goal: min assist  Long term Chair/bed transfers goal: standby assist      Ambulation:   Current level: 115 ft with large based quad cane  at mod assist, trial left AFO, took knee immobilizer off and using trial AFO  Short term ambulation goal: 50 ft at min assist  Long term ambulation goal: 500 ft at standby assist    Wheelchair Mobility:  Current level: 50 ft at mod assist, help to keep from crashing into objects  Short term wheelchair goal: met  Long term wheelchair goal: 150 ft at supervision      Car transfers:   Current level: to be assessed    Stairs:   Current level : 4 with 1 rail  at mod assist  Short term stairs goal: 4 at min assist  Long term stairs goal: 12 at standby assist    Lower Extremity Strength Issues:    RLE 5/5  LLE hip flexion 2+/5  Knee extension 2-/5  Ankle DF/PF 0/5    OCCUPATIONAL THERAPY:      Tub/shower:   Current level: mod assist with transfer board to extended tub bench  Short term tub/shower goal: min assist  Long term tub/shower goal: supervision      Feeding:  Current level: supervision  Short term feeding goal: supervision  Long term feeding goal: supervision    Grooming:   Current level: supervision  Short term grooming goal: supervision  Long term grooming goal: supervision    Bathing:  Current level: mod assist  Short term bathing goal: min assist  Long term bathing goal: standby assist    Homemaking:   Current level: wheelchair  level, mod assist  Short term homemaking goal: min assist  Long term homemaking goal: standby assist    Upper body dressing:  Current level: min assist  Short term upper body dressing goal: supervision  Long term upper body dressing goal: supervision    Lower body dressing:                                                                          Current level: mod assist with adaptive equipment Short term lower body dressing goal: min assist          Long term lower body dressing goal: supervision            Footwear  Current level: min assist-mod assist  Short term goal: min assist  Long term goal:supervision      Toilet transfer:   Current level: with transfer board  is min assist, stand pivots are mod assist  Short term toilet transfer goal: min assist  Long term toilet transfer goal: supervision      SPEECH THERAPY  Swallowing:  Current level:  Modified independent  Short term swallowing goal: Modified independent  Long term swallowing Goal: Modified independent    Comprehension:   Current level: independent  Short term comprehension goal: independent  Long term comprehension goal: independent    Expression:   Current level: independent  Short term expression goal: independent  Long term expression goal: independent    Problem solving:   Current level: supervision-min assist  Short term problem solving goal: supervision  Long term problem solving goal: supervision    Memory:  Current level: Modified independent-supervision  Short term memory goal: Modified independent  Long term memory goal: Modified independent    Social interaction:  supervision    Safety awareness: fair    Patient/family's personal goals: \"get back home\"  Factors supporting goal achievement:  motivated, continues to make progress  Factors hindering goal achievement: left hemiplegia and neglect      Discharge Plan   Estimated Length of Stay: 4 weeks            Destination: home  Services at Discharge: to be assessed  Equipment at Discharge: to be assessed      INTERDISCIPLINARY TEAM/PHYSICIAN RECOMMENDATION AND/OR REVISIONS OF PLAN OF CARE:  continue twice weekly education with mom      I approve the established interdisciplinary plan of care as documented within the medical record of Rose Lloyd.       Electronically signed by Miguel Angel Crews RN  on 11/10/2021 at 8:42 AM  The following interdisciplinary team members were present:  Paul Huntley, SJ Sepulveda, LSW  Andrews Hernandez, DPT  [employer], OTR  Maribel Drew , [place] CCC-SLP

## 2021-11-10 NOTE — PROGRESS NOTES
Wound care: Consulted to assess sacrum for potential breakdown. Patient assessed from pivoting from toilet to W/C. Mepilex lifted and skin assessed. No redness, no pressure injuries. Patient has comfort glide to bed. Patient will need continued preventative care. Will follow as needed.  Kaylyn Dillard RN

## 2021-11-10 NOTE — CARE COORDINATION
Per Team:Re-eval (ELOS: 4 weeks). Will re-evaluate next team meeting. LTG: Stand-By Assistance to MOD I/Independent. Recommended Supervision: 24 HR. Pt and Mother Ranjith Choi have been updated. Experiencing Uncomfortable spasms in her leg at night. Pt is making consistent gains. No O2, IV-ABX or insulin use. Mom stated she is able to provide 24HR supervision. PT is having pt trial a left AFO. Ambulating with a large quad cane. Is able to propel wheel chair 50 but requires assistance to not run into things. Able to do 4 steps with 1 rail. Pt needs assistance with Left lower extremity when ambulating. Ramp may not be needed at home.      DME: TBD    AfterCare: TBD    FT: 11/11 AM- and every T & Th (11/16, 11/18, 11/23 & 11/26)    LUCIANO Fox  11/10/2021

## 2021-11-11 PROCEDURE — 6370000000 HC RX 637 (ALT 250 FOR IP): Performed by: PHYSICIAN ASSISTANT

## 2021-11-11 PROCEDURE — 6370000000 HC RX 637 (ALT 250 FOR IP): Performed by: PHYSICAL MEDICINE & REHABILITATION

## 2021-11-11 PROCEDURE — 97110 THERAPEUTIC EXERCISES: CPT

## 2021-11-11 PROCEDURE — 97530 THERAPEUTIC ACTIVITIES: CPT

## 2021-11-11 PROCEDURE — 97535 SELF CARE MNGMENT TRAINING: CPT

## 2021-11-11 PROCEDURE — 97129 THER IVNTJ 1ST 15 MIN: CPT

## 2021-11-11 PROCEDURE — 97130 THER IVNTJ EA ADDL 15 MIN: CPT

## 2021-11-11 PROCEDURE — 99232 SBSQ HOSP IP/OBS MODERATE 35: CPT | Performed by: PHYSICAL MEDICINE & REHABILITATION

## 2021-11-11 PROCEDURE — 1280000000 HC REHAB R&B

## 2021-11-11 PROCEDURE — 99231 SBSQ HOSP IP/OBS SF/LOW 25: CPT | Performed by: PHYSICIAN ASSISTANT

## 2021-11-11 PROCEDURE — 6360000002 HC RX W HCPCS: Performed by: PHYSICAL MEDICINE & REHABILITATION

## 2021-11-11 RX ORDER — LAMOTRIGINE 25 MG/1
25 TABLET ORAL DAILY
Status: DISCONTINUED | OUTPATIENT
Start: 2021-11-11 | End: 2021-11-24

## 2021-11-11 RX ADMIN — GABAPENTIN 100 MG: 100 CAPSULE ORAL at 21:12

## 2021-11-11 RX ADMIN — POLYETHYLENE GLYCOL 3350 17 G: 17 POWDER, FOR SOLUTION ORAL at 08:08

## 2021-11-11 RX ADMIN — GABAPENTIN 100 MG: 100 CAPSULE ORAL at 14:48

## 2021-11-11 RX ADMIN — ACETAMINOPHEN 1000 MG: 325 TABLET ORAL at 14:48

## 2021-11-11 RX ADMIN — LAMOTRIGINE 25 MG: 25 TABLET ORAL at 14:48

## 2021-11-11 RX ADMIN — HEPARIN SODIUM 5000 UNITS: 10000 INJECTION INTRAVENOUS; SUBCUTANEOUS at 18:13

## 2021-11-11 RX ADMIN — ACETAMINOPHEN 1000 MG: 325 TABLET ORAL at 06:37

## 2021-11-11 RX ADMIN — CLOPIDOGREL 75 MG: 75 TABLET, FILM COATED ORAL at 08:09

## 2021-11-11 RX ADMIN — ASPIRIN 81 MG CHEWABLE TABLET 81 MG: 81 TABLET CHEWABLE at 08:07

## 2021-11-11 RX ADMIN — GABAPENTIN 100 MG: 100 CAPSULE ORAL at 08:07

## 2021-11-11 RX ADMIN — ACETAMINOPHEN 1000 MG: 325 TABLET ORAL at 21:12

## 2021-11-11 RX ADMIN — MAGNESIUM HYDROXIDE 30 ML: 400 SUSPENSION ORAL at 18:13

## 2021-11-11 RX ADMIN — TAMSULOSIN HYDROCHLORIDE 0.4 MG: 0.4 CAPSULE ORAL at 08:07

## 2021-11-11 RX ADMIN — SENNOSIDES 17.2 MG: 8.6 TABLET, COATED ORAL at 21:12

## 2021-11-11 RX ADMIN — DOCUSATE SODIUM 100 MG: 100 CAPSULE, LIQUID FILLED ORAL at 21:12

## 2021-11-11 RX ADMIN — TRAMADOL HYDROCHLORIDE 25 MG: 50 TABLET, COATED ORAL at 06:51

## 2021-11-11 RX ADMIN — ATORVASTATIN CALCIUM 40 MG: 40 TABLET, FILM COATED ORAL at 21:12

## 2021-11-11 RX ADMIN — Medication 6 MG: at 21:12

## 2021-11-11 RX ADMIN — HEPARIN SODIUM 5000 UNITS: 10000 INJECTION INTRAVENOUS; SUBCUTANEOUS at 06:37

## 2021-11-11 RX ADMIN — BACLOFEN 5 MG: 10 TABLET ORAL at 21:12

## 2021-11-11 RX ADMIN — METOPROLOL TARTRATE 12.5 MG: 25 TABLET, FILM COATED ORAL at 21:12

## 2021-11-11 RX ADMIN — DOCUSATE SODIUM 100 MG: 100 CAPSULE, LIQUID FILLED ORAL at 08:07

## 2021-11-11 ASSESSMENT — PAIN DESCRIPTION - ORIENTATION
ORIENTATION: ANTERIOR

## 2021-11-11 ASSESSMENT — PAIN DESCRIPTION - DESCRIPTORS
DESCRIPTORS: ACHING;CONSTANT;DISCOMFORT
DESCRIPTORS: ACHING;DISCOMFORT
DESCRIPTORS: ACHING;CONSTANT;DISCOMFORT
DESCRIPTORS: ACHING;CONSTANT;DISCOMFORT

## 2021-11-11 ASSESSMENT — PAIN DESCRIPTION - PROGRESSION: CLINICAL_PROGRESSION: NOT CHANGED

## 2021-11-11 ASSESSMENT — PAIN DESCRIPTION - LOCATION
LOCATION: HEAD

## 2021-11-11 ASSESSMENT — PAIN DESCRIPTION - PAIN TYPE
TYPE: CHRONIC PAIN

## 2021-11-11 ASSESSMENT — PAIN SCALES - GENERAL
PAINLEVEL_OUTOF10: 0
PAINLEVEL_OUTOF10: 3
PAINLEVEL_OUTOF10: 5
PAINLEVEL_OUTOF10: 7
PAINLEVEL_OUTOF10: 8
PAINLEVEL_OUTOF10: 8

## 2021-11-11 ASSESSMENT — PAIN DESCRIPTION - ONSET
ONSET: ON-GOING
ONSET: ON-GOING

## 2021-11-11 ASSESSMENT — PAIN DESCRIPTION - FREQUENCY
FREQUENCY: INTERMITTENT
FREQUENCY: INTERMITTENT

## 2021-11-11 ASSESSMENT — PAIN - FUNCTIONAL ASSESSMENT: PAIN_FUNCTIONAL_ASSESSMENT: ACTIVITIES ARE NOT PREVENTED

## 2021-11-11 NOTE — PROCEDURES
[address] Report    MRN: [id number]   PATIENT NAME: Aaliyah Abbott   DATE OF REPORT: 11/10/2021   DATE OF SERVICE: 11/10/2021    PHYSICIAN NAME: Magdy Prado DO  Referring Physician: Magdy Prado DO       Patient's : 1975   Patient's Age: 39 y.o. Gender: female     PROCEDURE: Routine EEG with video      Clinical Interpretation: This abnormal study showed evidence of:    1. An increased potential for focal seizures from the right frontotemporal region  2. Moderate to severe nonspecific cerebral dysfunction of the right frontotemporal region    Structural abnormalities should be considered for the findings above and appropriate imaging obtained if clinically indicated. No seizures were noted during this study. ____________________________  Electronically signed by: Magdy Prado DO, 11/10/2021 7:13 PM      Patient Clinical Information   Reason for Study: Patient undergoing evaluation for spell of altered consciousness  Patient State: Awake  Primary neurological diagnosis: Spell of uncertain etiology   Primary indication for monitoring: Characterization of spells    Pertinent Medications and Treatments    tramadol     Gabapentin    Sedatives administered: No  Intubated: No  Pharmacological paralytic: No    Reporting Period  Start of Study: 1154, 11/10/2021   End of Study:  1219, 11/10/2021       EEG Description  Digital video and scalp EEG monitoring was performed using the standard protocol for this laboratory. Scalp electrodes were applied in the international 10/20 system. Multiple digital montage arrangements were utilized for evaluation. EKG and video were recorded.      Background:      Occipital rhythm (posterior dominant rhythm or PDR): Present more prominently over left hemisphere than right  Frequency: 10 Hz  Voltage: Medium   Organization: fair   Reactivity to eye opening/closure: fair    Drowsiness: Present - normal  Sleep: Absent    Technical and Activation Procedures:  Hyperventilation: Not done        Photic stimulation: Not done        Reactivity to stimulation: Yes    Abnormalities:    I. Seizures? No    II. Rhythmic or Periodic Patterns? Rare lateralized rhythmic delta activity noted at F8, T4>Fp2, F4 at a rate of 1-1.5 Hz with no significant evolution noted    III. Other Abnormalities?         Frequent irregular delta activity noted at F8, T4>F4, Fp2, T6

## 2021-11-11 NOTE — PROGRESS NOTES
OCCUPATIONAL THERAPY DAILY NOTE    Date:2021  Patient Name: Floresita Bradley  MRN: [id number]  : 1975  Room: [address] [place] [address]     Diagnosis:Acute CVA( Pt presented to [facility] as Level 2 stroke alert on 10/11/21. L side weakness, neglect. NIH-17  Found to have large R MCA involving R occipital)  Surgery: R frontotemporal decompressive hemicraniectomy on 10/13/21  Past Medical History: COVID (9/15), tobacco use  Precautions: Falls, L hemiplegia, L neglect, L sublux, bed/chair alarm, helmet when OOB, impulsive, L PRAFO    Functional Assessment:   Date Status AE  Comments   Feeding 21 Set-up     Grooming 21 SBA  w/c          Oral Care 21 Set-up     Bathing 21 Mod A Shower chair with wheels     UB Dressing 21 MIN A      LB Dressing 21 Mod reacher    Footwear 21 Min A  Dep- L AFO Lh shoe horn Dep  To jesus LLE  AFO and shoe using LH shoe horn needing assistance and cues for follow thru due to tight fit with current shoe. Pt stated \"gettting new shoes tomorrow. \"   Toileting 21 Mod A Grab bar   Pt required x2 reps for toilet needs am and x1 rep in pm from w/c<>commode using grab bar with one handed skills. LUE in sling for joint protection and balance. Pt completed malvin care while seated on commode needing assist for clothing mgmt for balance and safety. Homemaking 21 Mod A       Functional Transfers / Balance:   Date Status DME  Comments   Sit Balance 21 SBA  Demo'd up in w/c, on commode and ext tub bench to increase overall strength and endurance for functional tasks & ADL's. Stand Balance 21 Min A Grab bar  Quad cane  Standing balance performed during SPT from w/c <>commode, ext tub bench and w/c<>quad cane needing cues for safety and balance.     [x] Tub  [x] Shower   Transfer 21 Mod A      Mod A Roll in shower chair, grab bar, LBQC Pt engaged in ext tub bench transfers at quad cane level needing cues for hand/trunk placement for safety and balance. Pt required assist with BLE mgmt in/out tub with parent present to observe skills. Commode   Transfer 11/11/21 Min A , drop arm commode, LBQC SPT from wc<>commode using grab bar needing cues for safety and balance. Pt improved during pm session with transfer wearing LLE AFO   Functional   Mobility 11/10/21 Min A LBQC        Functional Exercises / Activity:  BUE ex's in pm using furniture slider on table top then up on incline wedge with LUE using RUE to assist wearing 1# wt to increase strength and endurance and LUE ROM at all joints. Pt tolerated 5-6 reps of 10 ea while up in w/c. LUE PROM ex;s conducted at all joints to improve active movement & ROM  Tolerating 3-4 reps of 10 ea. Sensory / Neuromuscular Re-Education:      Cognitive Skills:   Status Comments   Problem   Solving fair    Memory fair    Sequencing fair    Safety fair      Visual Perception:    Education:  Pt educated about LUE positioning when up in w/c to promote ROM/wt bearing along with joint integrity. Pt/Mom educated with safety during w/c <>commode transfers using grab bar and SPT method. Pt /mom educated with safety during ext tub bench at quad cane level due to hand/trunk placement for balance. 10/25/21: pt educated on SROM of LUE and safe positioning of LUE  10/29/21: educated on 1 handed dressing technique for socks/shoes  11/1/21: shoe lace adapted for ease with donning shoe  11/1/21: attempted small strips of kineosio tape on pt's hand to assess if pt can tolerate on her shoulder due to report of allergy (cleared by )  11/2/21:  Therapist applied kineosiotape to pt's LUE to stabilize sublux.  Will continue to assess issues that arise with adhesive and progression of sublux  11/2/21: pt educated on AE for ease with LB dressing  11/3/21: Pt very fatigued in AM and PM sessions, requiring max cueing to alert pt back to task and engage in session, with pt asking multiple times to return to room/return to bed. Extended time to complete all tasks  11/4/21: home ROM exercise program placed in pt's folder and pt's mother notified of it  11/7/21 Pt educated about LUE positioning both up in w/c and in bed to maintain joint integrity. [x] Family teach completed on:  11/4/21: Completed family teach this session. Pt's mother Olegario Szymanski present. Educated Olegario Szymanski on pt's status with ADLs and vc's for sequencing/initiation of tasks/hand placement with functional transfers. Olegario Szymanski able to provide hands on assist with was educated for body mechanics/hand placement/vc's to increase safety with only commode transfer with slideboard and toileting task. Olegario Nessa educated on PROM (elbow/shoulder/wrist/digit flex/ext) of LUE to increase ROM, maintain joint integrity, and safe management of sublux. Olegario Szymanski educated on how to doff/jesus gait belt and LUE sling. Pt's limiting factor appears to be her impulsivity and safety with ADLs/transfer. Olegario Szymanski demo'ing F understanding/safety of education. Olegario Szymanski appeared motivated and willing to learn. Continue to educate. 11/9/21: Completed family teach this session. Pt's mother Olegariosusie Szymanski present. Educated Olegario Holtrio on pt's increased independence with ADLs/functional transfers. Olegario Szymanski able to jesus pt's sling and gait belt. Olegario Szymanski completed hands on assist with SPT to [facility]. Olegario Szymanski and pt demo'ing improved communication with each other for safe transfer/completion of ADLs. Olegario Szymanski demo'ing F understanding/safety of education. Continue to educate. 11/11/21- Mother present and educated along with assisting with w/c<>commode transfers using grab bar for balance and one handed skills. LUE placed in sling for balance and joint protection. Pt/mom educated with ext tub bench transfers to simulate for home environment with parent participating with transfer. Pt needed cues for hand/trunk placement on bench along with proper follow thru for safety.   Pt/mom educated SPT using quad cane from w/c <>ext tub bench with patient needing cues for technique with cane & taking few steps. Pain Level:0/10    Additional Notes:     Patient has made fair + progress during treatment sessions toward set goals. Therapy emphasis to obtain goals:    Time Frame for Long term goals  6 weeks   Long term goal 1 Pt will complete UB self-care tasks w/ Supervision   Long term goal 2 Pt will complete LB self-care tasks w/ Supervision   Long term goal 3 Pt will complete toileting (all aspects) w/ Supervision   Long term goal 4 Pt will safely complete toilet transfers w/ Supervision   Long term goal 5 Pt will safely complete tub/shower transfers w/ Supervision   Long term goals 6 Pt will complete basic homemaking task w/ SBA   Long term goal 7 Pt will complete grooming task w/ Supervision   Long term goal 8 Pt will complete feeding task w/ Supervision/setup     [x] Continue with current OT Plan of care. [] Prepare for Discharge    10/27/21 OT plan of care updated on this date. Tentative length of stay is 6 weeks Indio Velez OTR/L [id number]  11/3/21- Pt POC updated on this date. Pt tentative length of day is 5 weeks to address above problem areas Nazabimael Mosss OTR/L [id number]  11/10/21- Pt POC updated on this date.  Pt tentative length of day is 4 weeks to address above problem areas -Indio Velez OTR/L [id number]   DISCHARGE RECOMMENDATIONS  Recommended DME: drop arm BSC    Post Discharge Care:   []Home Independently  []Home with 24hr Care / Supervision []Home with Partial Supervision []Home with Home Health OT []Home with Out Pt OT []Other: ___   Comments:         Time in Time out Tx Time Breakdown  Variance:   First Session  [id number] [x] Individual Tx-45  [] Concurrent Tx -  [] Co-Tx -   [] Group Tx -   [] Time Missed -     Second Session [id number] [x] Individual Tx-45  [] Concurrent Tx -  [] Co-Tx -   [] Group Tx -   [] Time Missed -     Third Session    [] Individual Tx-   [] Concurrent Tx -  [] Co-Tx -   [] Group Tx -   [] Time Missed -

## 2021-11-11 NOTE — PROGRESS NOTES
Spoke to patient and patient's mother at bedside. Patient eating lunch at this time. Feeling well and has no complaints. She does tell me she has a history of seizures as a child and was previously on Depakote. Discussed CT head, CTA results as well as EEG. 1. Large subacute to old right MCA infarct.  Extensive adjacent craniectomy  changes are identified in the right side. 2. Moderate diffuse smooth narrowing involving the distal M1 segment of the  right MCA.  There is diffuse mildly limited perfusion of the right MCA  compared to the contralateral side. 3. Unremarkable CT angiogram of the neck. EEG  This abnormal study showed evidence of:     1. An increased potential for focal seizures from the right frontotemporal region  2. Moderate to severe nonspecific cerebral dysfunction of the right frontotemporal region     Structural abnormalities should be considered for the findings above and appropriate imaging obtained if clinically indicated. No seizures were noted during this study.      Plan  We will start lamotrigine 25 mg daily x 2 weeks, then 50 mg daily x 2 weeks, then 100 mg daily x 2 weeks, then 50 mg qAM and 100 mg qHS x 2 weeks, then 100 mg BID for maintenance     She will f/u with [facility] as planned in regards to her stroke     Neuro will sign off, please call with questions or concerns     Kandace Richards PA-C

## 2021-11-11 NOTE — PROGRESS NOTES
[facility] Physical Medicine and Rehabilitation  Comprehensive Progress Note    Subjective:      Parul Elizalde is a 39 y.o. female admitted to inpatient rehabilitation for impairments and activities limitations in ADLs and mobility secondary to right MCA CVA. No acute events overnight. No cp, sob, n/v. Tolerating therapy. No complaints. The patient's medical records have been reviewed. Scheduled Meds:[START ON 11/11/2021] lidocaine, 1 patch, Daily  baclofen, 5 mg, Nightly  nicotine, 1 patch, Daily  senna, 2 tablet, Nightly  polyethylene glycol, 17 g, Daily  metoprolol tartrate, 12.5 mg, BID  acetaminophen, 1,000 mg, 3 times per day  gabapentin, 100 mg, TID  tamsulosin, 0.4 mg, Daily  heparin (porcine), 5,000 Units, Q12H  aspirin, 81 mg, Daily  atorvastatin, 40 mg, Nightly  vitamin D, 50,000 Units, Weekly  docusate sodium, 100 mg, BID  melatonin, 6 mg, Nightly  clopidogrel, 75 mg, Daily      Continuous Infusions:  PRN Meds:traMADol, 25 mg, BID PRN  ondansetron, 4 mg, Q6H PRN  lactulose, 20 g, BID PRN  guaiFENesin, 200 mg, BID PRN  acetaminophen, 650 mg, Q4H PRN  magnesium hydroxide, 30 mL, Daily PRN  bisacodyl, 10 mg, Daily PRN  diclofenac sodium, 2 g, 4x Daily PRN         Objective:      Vitals:    11/09/21 0809 11/09/21 2020 11/10/21 0735 11/10/21 2045   BP: 107/69 (!) 99/56 116/60 (!) 97/51   Pulse: 80 73 79 91   Resp: 16  16    Temp: 98.7 °F (37.1 °C)  98.9 °F (37.2 °C)    TempSrc: Temporal  Temporal    SpO2: 97%  100%    Weight:       Height:         General appearance: alert, NAD, resting in bed  Head: R crani site c/d/i   Eyes: conjunctivae/corneas clear. PERRL, EOM's intact. Lungs: clear to auscultation bilaterally  Heart: regular rate and rhythm, S1, S2 normal  Abdomen: soft, non-tender, normal bowel sounds  Extremities: extremities normal, atraumatic, no cyanosis or edema  MSK: no cyanosis, clubbing, edema  Skin: R crani c/d/i   Neurologic: Alert, oriented x4.  Answering all questions appropriately. Left neglect. Speech clear. No aphasia appreciated. Mild L facial droop. Spastic left hemiplegia. MAS 1+ in LUE; MAS 1+-2 in LLE. Motor 5/5 RUE and RLE; 0-1/5 LUE and LLE.      Exam stable      Laboratory:    Lab Results   Component Value Date    WBC 8.3 11/09/2021    HGB 12.1 11/09/2021    HCT 37.4 11/09/2021    MCV 94.9 11/09/2021     (H) 11/09/2021     Lab Results   Component Value Date     11/09/2021    K 4.3 11/09/2021    K 4.1 11/08/2021     11/09/2021    CO2 23 11/09/2021    BUN 13 11/09/2021    CREATININE 0.7 11/09/2021    GLUCOSE 94 11/09/2021    CALCIUM 9.2 11/09/2021      Lab Results   Component Value Date    ALT 75 (H) 10/23/2021    AST 46 (H) 10/23/2021    ALKPHOS 89 10/23/2021    BILITOT 0.4 10/23/2021       Functional Status:   Bed mobility: Mod A  Transfers: Mod A  Ambulation: 125 ft with trial L articulating AFO and R LBQC Mod A  Feeding: Set up  Grooming: SBA  UB dressing: Min A  LB dressing: Mod A       Assessment/Plan:       39 y.o. female admitted to inpatient rehabilitation for impairments and activities limitations in ADLs and mobility secondary to right MCA CVA.    -Right MCA CVA: Complicated by cerebral edema requiring emergent right hemicraniectomy (10/13/2021). Dense left spastic hemiplegia, left neglect, mild cognitive impairment. Helmet when out of bed. L PRAFO in bed. LUE sling for gait. L w/c tray. Aspirin, Plavix, Lipitor for secondary prevention. Monitor neuro exam. Continue Acute Rehab program.   -Spasticity LUE/LLE: Monitor. Tone currently helpful to compensate for weakness with attempted gait in PT. Started on low dose baclofen at bedtime due to occasional painful spasms in the leg at night only. Monitor.   -Transient LOC 11/8: Probable vasovagal syncope, vs seizure. Neurology consulted.  EEG and CTA head/neck pending.   -Pain control, MSK pain, neuropathic pain: Tylenol PRN, Tramadol PRN, gabapentin, Lidoderm, Voltaren gel  -Urinary retention: On flomax, now voiding well, low PVRs  -Tachycardia: intermittent. Cardiology consulted. She was started on Lopressor. Cardiology attempted to access data from her heart monitor unsuccessfully. She will follow up with Cardiology in office to review heart monitor data once available. -Constipation: colace, senna, glycolax. PRNs available.   -Tobacco abuse: Nicoderm patch  -DVT prophylaxis: heparin SQ, SCDs    Transient LOC Monday. Likely vasovagal syncope vs r/o seizure. Appreciate Neurology recommendations. EEG, head CT, CTA head/neck pending. Team conference today.

## 2021-11-11 NOTE — PROGRESS NOTES
Speech Language Pathology  ACUTE REHABILITATION--DAILY PROGRESS NOTE          ADMITTING DIAGNOSIS: Acute cerebrovascular accident (CVA) ([facility] [address] [I63.9]    VISIT DIAGNOSIS: CVA    SPEECH THERAPY  PLAN OF CARE   The speech therapy  POC is established based on physician order, speech pathology diagnosis and results of clinical assessment     SPEECH PATHOLOGY DIAGNOSIS:    Mild cognitive deficit with left neglect    Speech Pathology intervention is recommended 3-6 times per week for LOS or when goals are met with emphasis on the following:      Conditions Requiring Skilled Therapeutic Intervention for speech, language and/or cognition    Decreased attention with impulsivity  left-neglect    Specific Speech Therapy Interventions to Include: Therapeutic exercises  Address left neglect    Specific instructions for next treatment:      To initiate POC    SHORT/LONG TERM GOALS  Pt will improve orientation to spatial and temporal surroundings with use of external aids and verbal cues  Pt will attend to her left side with decreasing amount of physical/verbal cues  Pt will improve attention and decrease impulsivity    Patient stated goals: Agreed with above    Rehabilitation Potential/Prognosis: good     FIMS SCORES      Swallowing    Current Status  6--Modified Independent   Short Term Goal 6--Modified Independent    Long Term Goal 6--Modified Independent     Receptive    Current Status    7--Independent    Short Term Goal   7--Independent    Long Term Goal   7--Independent    Expressive    Current Status    7--Independent    Short Term Goal   7--Independent  Long Term Goal   7--Independent    Social Interaction    Current Status     5--Supervision    Short Term Goal   6--Modified Independent   Long Term Goal 6--Modified Independent         Problem Solving    Current Status   5--Supervision  Short Term Goal    5--Supervision   Long Term Goal    5--Supervision     Memory    Current Status    6--Modified Independent / 5--Supervision   Short Term Goal   6--Modified Independent   Long Term Goal   6--Modified Independent  ? SWALLOWING:      Diet:  Regular consistency solids with thin liquids (IDDSI level 0)     Assistance with tray set up and supervision by nursing staff is required with all intake d/t left neglect and impulsivity. LANGUAGE:    Appropriate responses during discussion with SLP. COGNITION:      Patient seen for co tx with OT this date. Patient's mother present for a family teach session. Patient benefited from occasional cues for safety, specifically regarding  placement of feet / body prior to standing. Patient verbalizing good insight into deficits and is more readily communicating intentions with assisting partner.     Testing from 11/3  Stimulus questions were obtained from the RIPA-2  There are 30 possible points for each subtest.   Severity ratings for each subtest were determined as follows:     RAW SCORE  SEVERITY    28-30  Within functional limits    25-27  Mild deficit    22-24  Moderate deficit    19-21  Marked deficit    16-18 Severe deficit   Below 16 Profound deficit       Subtest  Raw Score /Severity    Immediate Memory  30/ Within functional limits   Recent Memory  30/ Within functional limits   Temporal Orientation   (Recent Memory)  30/ Within functional limits   Temporal Orientation   (Remote Memory)  30/ Within functional limits   Spatial Orientation  30/ Within functional limits   Orientation to Environment  30/ Within functional limits   Recall of General Information  28/ Within functional limits   Problem Solving & Abstract Reasoning  30/ Within functional limits   Organization  30/ Within functional limits   Auditory Processing   & Retention  30/ Within functional limits       Testing from 11/3:  SLUMS ASSESSMENT:  Assesses cognitive ability in areas of orientation, immediate/ STM recall, divergent naming, functional calculation, abstraction, mental manipulation, clock draw, and story comprehension. Patient received a score of 28/30, which is indicative of functional cognitive linguistic skills for verbal tasks; however, patient continues to demonstrate poor safety and judgment during execution of motor tasks. Safety:  Fair given continued (but improving) impulsivity. Patient demonstrates improved insight into her deficits    11/13: SLP spoke with patient's physician; will plan to see patient as a daily co-tx with OT to assist patient with ability to identify physical/cognitive/visuospatial impairments, verbalize impact each have on overall level of functional independence, and demonstrate appropriate insight/safety precautions during execution of motor tasks given these limitations with >90% of trials. SPEECH:    Speech intelligibility was good during structured tasks and conversational speech. EDUCATION:    Speech Pathologist (SLP) completed education with the patient regarding identified cognitive linguistic deficits and subsequent need for speech pathology intervention. Discussed deficit areas to be targeted by formal intervention and established short/long term goals. Reviewed compensatory strategies to improve functional outcome (as appropriate). Encouraged patient to engage SLP in structured Q&A session relative to identified deficit areas. Patient indicated understanding of all information provided via satisfactory verbal response.       Minute Tracking:    Individual therapy:                0 minutes  Concurrent therapy:    0 minutes  Group therapy:     0 minutes  Co-treatment therapy:  45 minutes    Total minutes:  45 minutes    SP recommended after discharge:  no  Supervision recommended at discharge: 24 hour given safety / physical needs

## 2021-11-11 NOTE — PROGRESS NOTES
Speech Language Pathology  ACUTE REHABILITATION--DAILY PROGRESS NOTE          ADMITTING DIAGNOSIS: Acute cerebrovascular accident (CVA) ([facility] [address] [I63.9]    VISIT DIAGNOSIS: CVA    SPEECH THERAPY  PLAN OF CARE   The speech therapy  POC is established based on physician order, speech pathology diagnosis and results of clinical assessment     SPEECH PATHOLOGY DIAGNOSIS:    Mild cognitive deficit with left neglect    Speech Pathology intervention is recommended 3-6 times per week for LOS or when goals are met with emphasis on the following:      Conditions Requiring Skilled Therapeutic Intervention for speech, language and/or cognition    Decreased attention with impulsivity  left-neglect    Specific Speech Therapy Interventions to Include: Therapeutic exercises  Address left neglect    Specific instructions for next treatment:      To initiate POC    SHORT/LONG TERM GOALS  Pt will improve orientation to spatial and temporal surroundings with use of external aids and verbal cues  Pt will attend to her left side with decreasing amount of physical/verbal cues  Pt will improve attention and decrease impulsivity    Patient stated goals: Agreed with above    Rehabilitation Potential/Prognosis: good     FIMS SCORES      Swallowing    Current Status  6--Modified Independent   Short Term Goal 6--Modified Independent    Long Term Goal 6--Modified Independent     Receptive    Current Status    7--Independent    Short Term Goal   7--Independent    Long Term Goal   7--Independent    Expressive    Current Status    7--Independent    Short Term Goal   7--Independent  Long Term Goal   7--Independent    Social Interaction    Current Status     5--Supervision    Short Term Goal   6--Modified Independent   Long Term Goal 6--Modified Independent         Problem Solving    Current Status    5--Supervision/ 4--Minimal Assistance (good for verbal but some difficulty observed with execution of motor tasks)  Short Term Goal 5--Supervision   Long Term Goal    5--Supervision     Memory    Current Status    6--Modified Independent / 5--Supervision   Short Term Goal   6--Modified Independent   Long Term Goal   6--Modified Independent  ? SWALLOWING:      Diet:  Regular consistency solids with thin liquids (IDDSI level 0)     Assistance with tray set up and supervision by nursing staff is required with all intake d/t left neglect and impulsivity. LANGUAGE:    Appropriate responses during discussion with SLP. COGNITION:      Per staff report, patient's safety has been consistently improving. Patient reported this date that she is more mindful of impulsive tendencies and has been making better choices. SLP planned to see patient as a co-tx with PT in afternoon; however, patient was still off unit for testing.     Testing from 11/3  Stimulus questions were obtained from the RIPA-2  There are 30 possible points for each subtest.   Severity ratings for each subtest were determined as follows:     RAW SCORE  SEVERITY    28-30  Within functional limits    25-27  Mild deficit    22-24  Moderate deficit    19-21  Marked deficit    16-18 Severe deficit   Below 16 Profound deficit       Subtest  Raw Score /Severity    Immediate Memory  30/ Within functional limits   Recent Memory  30/ Within functional limits   Temporal Orientation   (Recent Memory)  30/ Within functional limits   Temporal Orientation   (Remote Memory)  30/ Within functional limits   Spatial Orientation  30/ Within functional limits   Orientation to Environment  30/ Within functional limits   Recall of General Information  28/ Within functional limits   Problem Solving & Abstract Reasoning  30/ Within functional limits   Organization  30/ Within functional limits   Auditory Processing   & Retention  30/ Within functional limits       Testing from 11/3:  SLUMS ASSESSMENT:  Assesses cognitive ability in areas of orientation, immediate/ STM recall, divergent naming, functional calculation, abstraction, mental manipulation, clock draw, and story comprehension. Patient received a score of 28/30, which is indicative of functional cognitive linguistic skills for verbal tasks; however, patient continues to demonstrate poor safety and judgment during execution of motor tasks. Safety:  Fair given continued (but improving) impulsivity. Patient demonstrates improved insight into her deficits    11/13: SLP spoke with patient's physician; will plan to see patient as a daily co-tx with OT to assist patient with ability to identify physical/cognitive/visuospatial impairments, verbalize impact each have on overall level of functional independence, and demonstrate appropriate insight/safety precautions during execution of motor tasks given these limitations with >90% of trials. SPEECH:    Speech intelligibility was good during structured tasks and conversational speech. SP recommended after discharge:  yes  Supervision recommended at discharge: 24 hour    Will continue SP intervention as per previously established POC. EDUCATION: Speech Pathologist (SLP) completed education with the patient regarding identified cognitive linguistic deficits and subsequent need for speech pathology intervention. Discussed deficit areas to be targeted by formal intervention and established short/long term goals. Reviewed compensatory strategies to improve functional outcome (as appropriate). Encouraged patient to engage SLP in structured Q&A session relative to identified deficit areas. Patient indicated understanding of all information provided via satisfactory verbal response.       Minute Tracking:    Individual therapy:                ~5 minutes, no charge  Concurrent therapy:    0 minutes  Group therapy:     0 minutes  Co-treatment therapy:    0 minutes    Total minutes:  ~5 minutes, no charge  Variance: Patient testing

## 2021-11-11 NOTE — PROGRESS NOTES
Physical Therapy  Treatment Note  Supervising Therapist: Maria M Bartlett, PT, DPT [id number]    NAME: Pranav Samson  [facility]  DIAGNOSIS: Acute CVA  PRECAUTIONS: Falls, helmet at all times when OOB, L hemiplegia, L homonymous hemianopsia L neglect, alarm, must bring phone to therapy (cardiac monitor), L PRAFO, SBP <180  HPI: Pt is a 39 y.o. female with past medical history significant for recent COVID-19 infection (9/15/21) who [facility] with acute onset left-sided weakness. Initial NIHSS 17.  Initial CT ([facility]) showed R MCA hyperdensity; initial CTA showed right ICA/M1 occlusion. On 10/13, her neuro exam worsened and repeat imaging indicated worsening edema, 1 cm of midline shift. She underwent emergent R hemicraniectomy by Dr. Reinier Kingsley [facility]) on 10/13. After being deemed medically appropriate, she was transferred to [place] [address] on 10/22/2021    Social:  Pt lives with daughter (15 y/o), will be returning to parent's home upon discharge (1 floor plan, 2 HAIDER with 2 HR). Mother and father healthy, do not work. 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 Initial Evaluation  Date: 10/23/21 AM     PM    Short Term Goals Long Term Goals    Was pt agreeable to Eval/treatment? yes Yes Yes     Does pt have pain? Pain on the top of her head once the helmet was placed on Mild c/o L hip and R rib pain Mild c/o L hip and R rib pain     Bed Mobility  Rolling: Max A  Supine to sit: Max A   Sit to supine: Max A   Scooting: Max A Supine<>Sit Noemy  Scooting/Rolling Noemy (twin bed) NT Min A Supervision   Transfers Sit to stand: Max A   Stand to sit: Max A  Stand pivot:  Max A x2    5xSTS: Unable to complete Sit<>stand: ModA  Stand pivot: ModA with R LBQC Sit<>stand: ModA   Stand pivot: ModA with R LBQC   Min A SBA  5xSTS: TBD   Ambulation    15 feet with R mackey rail with Max A + wc follow    10mWT: TBA  6mWT: TBA 75 feet x 4 reps with R LBQC and Noemy/ModA  (trial L articulating AFO with DF/PF stop)    10mWT: 0.10 m/s  6mWT: 33 m  (R hemicane, L knee immobilizer/AFO, ModA 11/5) 75 feet x 1 rep with R LBQC and Noemy (5lb ankle weight LLE)    50 feet x 3 reps with R LBQC with Noemy  (trial L articulating AFO with DF/PF stop)   50 feet with AAD with Min A       500 feet with AAD with SBA    10mWT: >0.4 m/s  6mWT: >205 m   Walking 10 feet on uneven surface NT NT NT 10 feet with AAD with Min A 10 feet with AAD with SBA   Wheel Chair Mobility NT NT NT  150 feet with R extremities with SBA   Car Transfers NT ModA NT Min A SBA   Stair negotiation: ascended and descended NT 4 steps with R HR with ModA x 2 reps (ascending with RLE, descending with LLE non-reciprocal)  (L articulating AFO with DF/PF stop) 8 steps with 1 HR (RUE) x 3 reps with ModA (non-reciprocal, ascending/descending leading with LLE) 4 steps with 1 rail with Min A 12 steps with 1 rail with SBA   Curb Step:   ascended and descended NT NT NT 4 inch step with AAD and Min A 4 inch step with AAD and SBA   Picking up object off the floor NT NT NT Will  an object with Min assist Will  an object with SBA   BLE ROM WFL: LLE AROM limited by flaccidity  WFL: LLE AROM limited by spasticity 2+ on MAS (hamstrings, hip extensors)      BLE Strength RLE 4+/5  LLE 0/5 RLE 5/5  LLE hip flexion 2+/5  Knee extension 2-/5  Ankle DF/PF 0/5      Balance  Static Sitting: Mod/Max A    BBS: TBA  FGA: TBA Sitting: Min A  Standing: Mod A  BBS: 4/56  (11/5/21)       BBS: TBD  FGA: TBD   Date Family Teach Completed TBA Pt's mother present for observation 10/24, 11/2, 11/3, 114, hands on 11/9, 11/11      Is additional Family Teaching Needed?   Y or N Y Yes      Hindering Progress L hemiplegia, L neglect L hemiplegia, L neglect      PT recommended ELOS 5 weeks       Team's Discharge Plan        Therapist at Team Meeting          HRmax: (178, -10  bpm for beta blocker) 168 bpm Time spent at moderate intensity (60-70% HRmax) Time spent at high intensity (70-85% HRmax)    AM: NA due to family training AM: NA due to family training    PM: 0:04:07 PM: 0:24:31     Therapeutic Exercise:   AM: Sit<>Stand transfer x 5 reps from [facility], emphasis on safe technique  PM: NA    Patient education  Pt educated on adequate anterior lean for successful sit<>Stand transfer    Patient response to education:   Pt verbalized understanding Pt demonstrated skill Pt requires further education in this area   yes partial yes     Additional Comments: Pt's mother present for AM session FT. Hands on training resumed beginning with donning of AFO, gait belt, and sling. Reviewed need for helmet at all times when OOB. Mother is more comfortable and exhibits improved proficiency assisting patient with basic transfer and ambulation tasks. Pt requires frequent cueing for safe transfer technique including attending to LLE and for adequate anterior lean. Mother completed hands on training assisting patient with more complex tasks such as stair negotiation and car transfer. Pt remains impulsive and requires frequent cueing for safe transfer technique with approach to destination. Recommend mother continues to attend bi-weekly FT. Mother to bring in larger pair of shoes. PM session included progression of stair training with ascent using LLE. Pt able to place LLE on step ~25% of time without assistance. Pt continues to require directional cueing for optimal path due to L neglect and visual field deficits. Plan to challenge propulsion tomorrow. Chair/bed alarm: armed following sessions    AM  Time in: 0845  Time out: 0930    PM  Time in: 1300  Time out: 1345    Pt is making good progress toward established Physical Therapy goals. Continue with physical therapy current plan of care.     Clarence Candelaria, PT, DPT  Board Certified Clinical Specialist in Neurologic Physical Therapy  [phone]

## 2021-11-11 NOTE — PROGRESS NOTES
[facility] Physical Medicine and Rehabilitation  Comprehensive Progress Note    Subjective:      Jeannette Quinn is a 39 y.o. female admitted to inpatient rehabilitation for impairments and activities limitations in ADLs and mobility secondary to right MCA CVA. No acute events overnight. No cp, sob, n/v. Tolerating therapy. No complaints. The patient's medical records have been reviewed. Scheduled Meds:lamoTRIgine, 25 mg, Daily  lidocaine, 1 patch, Daily  baclofen, 5 mg, Nightly  nicotine, 1 patch, Daily  senna, 2 tablet, Nightly  polyethylene glycol, 17 g, Daily  metoprolol tartrate, 12.5 mg, BID  acetaminophen, 1,000 mg, 3 times per day  gabapentin, 100 mg, TID  tamsulosin, 0.4 mg, Daily  heparin (porcine), 5,000 Units, Q12H  aspirin, 81 mg, Daily  atorvastatin, 40 mg, Nightly  vitamin D, 50,000 Units, Weekly  docusate sodium, 100 mg, BID  melatonin, 6 mg, Nightly  clopidogrel, 75 mg, Daily      Continuous Infusions:  PRN Meds:traMADol, 25 mg, BID PRN  ondansetron, 4 mg, Q6H PRN  lactulose, 20 g, BID PRN  guaiFENesin, 200 mg, BID PRN  acetaminophen, 650 mg, Q4H PRN  magnesium hydroxide, 30 mL, Daily PRN  bisacodyl, 10 mg, Daily PRN  diclofenac sodium, 2 g, 4x Daily PRN         Objective:      Vitals:    11/09/21 2020 11/10/21 0735 11/10/21 2045 11/11/21 0700   BP: (!) 99/56 116/60 (!) 97/51 (!) 101/50   Pulse: 73 79 91 73   Resp:  16  16   Temp:  98.9 °F (37.2 °C)  98.8 °F (37.1 °C)   TempSrc:  Temporal  Tympanic   SpO2:  100%  100%   Weight:       Height:         General appearance: alert, NAD, resting in bed  Head: R crani site c/d/i   Eyes: conjunctivae/corneas clear. PERRL, EOM's intact. Lungs: clear to auscultation bilaterally  Heart: regular rate and rhythm, S1, S2 normal  Abdomen: soft, non-tender, normal bowel sounds  Extremities: extremities normal, atraumatic, no cyanosis or edema  MSK: no cyanosis, clubbing, edema  Skin: R crani c/d/i   Neurologic: Alert, oriented x4.  Answering all questions appropriately. Left neglect. Speech clear. No aphasia appreciated. Mild L facial droop. Spastic left hemiplegia. MAS 1+ in LUE; MAS 1+-2 in LLE. Motor 5/5 RUE and RLE; 0-1/5 LUE and LLE.      Exam stable      Laboratory:    Lab Results   Component Value Date    WBC 8.3 11/09/2021    HGB 12.1 11/09/2021    HCT 37.4 11/09/2021    MCV 94.9 11/09/2021     (H) 11/09/2021     Lab Results   Component Value Date     11/09/2021    K 4.3 11/09/2021    K 4.1 11/08/2021     11/09/2021    CO2 23 11/09/2021    BUN 13 11/09/2021    CREATININE 0.7 11/09/2021    GLUCOSE 94 11/09/2021    CALCIUM 9.2 11/09/2021      Lab Results   Component Value Date    ALT 75 (H) 10/23/2021    AST 46 (H) 10/23/2021    ALKPHOS 89 10/23/2021    BILITOT 0.4 10/23/2021       Functional Status:   Bed mobility: Mod A  Transfers: Mod A  Ambulation: 125 ft with trial L articulating AFO and R LBQC Mod A  Feeding: Set up  Grooming: SBA  UB dressing: Min A  LB dressing: Mod A       Assessment/Plan:       39 y.o. female admitted to inpatient rehabilitation for impairments and activities limitations in ADLs and mobility secondary to right MCA CVA.    -Right MCA CVA: Complicated by cerebral edema requiring emergent right hemicraniectomy (10/13/2021). Dense left spastic hemiplegia, left neglect, mild cognitive impairment. Helmet when out of bed. L PRAFO in bed. LUE sling for gait. L w/c tray. Aspirin, Plavix, Lipitor for secondary prevention. Monitor neuro exam. Continue Acute Rehab program.   -Spasticity LUE/LLE: Monitor. Tone currently helpful to compensate for weakness with attempted gait in PT. Started on low dose baclofen at bedtime due to occasional painful spasms in the leg at night only. Monitor.   -Transient LOC 11/8: Probable vasovagal syncope, vs seizure. Neurology consulted.  EEG and CTA head/neck pending.   -Pain control, MSK pain, neuropathic pain: Tylenol PRN, Tramadol PRN, gabapentin, Lidoderm, Voltaren gel  -Urinary retention: On flomax, now voiding well, low PVRs  -Tachycardia: intermittent. Cardiology consulted. She was started on Lopressor. Cardiology attempted to access data from her heart monitor unsuccessfully. She will follow up with Cardiology in office to review heart monitor data once available. -Constipation: colace, senna, glycolax. PRNs available.   -Tobacco abuse: Nicoderm patch  -DVT prophylaxis: heparin SQ, SCDs      CT head, CTA head/neck, EEG reviewed. Started on Lamictal per Neurology recommendation - 25 mg daily x 2 weeks, then 50 mg daily x 2 weeks, then 100 mg daily x 2 weeks, then 50 mg qAM and 100 mg qHS x 2 weeks, then 100 mg BID for maintenance. Appreciate Neurology recommendations.        Electronically signed by Karol Chacon MD on 11/11/2021 at 12:23 PM

## 2021-11-12 PROCEDURE — 97530 THERAPEUTIC ACTIVITIES: CPT

## 2021-11-12 PROCEDURE — 97110 THERAPEUTIC EXERCISES: CPT

## 2021-11-12 PROCEDURE — 6370000000 HC RX 637 (ALT 250 FOR IP): Performed by: PHYSICAL MEDICINE & REHABILITATION

## 2021-11-12 PROCEDURE — 6360000002 HC RX W HCPCS: Performed by: PHYSICAL MEDICINE & REHABILITATION

## 2021-11-12 PROCEDURE — 6370000000 HC RX 637 (ALT 250 FOR IP): Performed by: PHYSICIAN ASSISTANT

## 2021-11-12 PROCEDURE — 97116 GAIT TRAINING THERAPY: CPT

## 2021-11-12 PROCEDURE — 1280000000 HC REHAB R&B

## 2021-11-12 PROCEDURE — 99232 SBSQ HOSP IP/OBS MODERATE 35: CPT | Performed by: PHYSICAL MEDICINE & REHABILITATION

## 2021-11-12 RX ORDER — POLYETHYLENE GLYCOL 3350 17 G/17G
17 POWDER, FOR SOLUTION ORAL 2 TIMES DAILY
Status: DISCONTINUED | OUTPATIENT
Start: 2021-11-12 | End: 2021-12-07 | Stop reason: HOSPADM

## 2021-11-12 RX ADMIN — BACLOFEN 5 MG: 10 TABLET ORAL at 23:42

## 2021-11-12 RX ADMIN — METOPROLOL TARTRATE 12.5 MG: 25 TABLET, FILM COATED ORAL at 23:43

## 2021-11-12 RX ADMIN — DOCUSATE SODIUM 100 MG: 100 CAPSULE, LIQUID FILLED ORAL at 08:18

## 2021-11-12 RX ADMIN — CLOPIDOGREL 75 MG: 75 TABLET, FILM COATED ORAL at 08:19

## 2021-11-12 RX ADMIN — HEPARIN SODIUM 5000 UNITS: 10000 INJECTION INTRAVENOUS; SUBCUTANEOUS at 17:29

## 2021-11-12 RX ADMIN — GABAPENTIN 100 MG: 100 CAPSULE ORAL at 14:07

## 2021-11-12 RX ADMIN — ACETAMINOPHEN 1000 MG: 325 TABLET ORAL at 06:17

## 2021-11-12 RX ADMIN — Medication 6 MG: at 23:43

## 2021-11-12 RX ADMIN — GABAPENTIN 100 MG: 100 CAPSULE ORAL at 08:18

## 2021-11-12 RX ADMIN — ASPIRIN 81 MG CHEWABLE TABLET 81 MG: 81 TABLET CHEWABLE at 08:18

## 2021-11-12 RX ADMIN — TRAMADOL HYDROCHLORIDE 25 MG: 50 TABLET, COATED ORAL at 01:40

## 2021-11-12 RX ADMIN — GABAPENTIN 100 MG: 100 CAPSULE ORAL at 23:44

## 2021-11-12 RX ADMIN — ACETAMINOPHEN 1000 MG: 325 TABLET ORAL at 23:44

## 2021-11-12 RX ADMIN — SENNOSIDES 17.2 MG: 8.6 TABLET, COATED ORAL at 23:44

## 2021-11-12 RX ADMIN — HEPARIN SODIUM 5000 UNITS: 10000 INJECTION INTRAVENOUS; SUBCUTANEOUS at 06:17

## 2021-11-12 RX ADMIN — METOPROLOL TARTRATE 12.5 MG: 25 TABLET, FILM COATED ORAL at 08:18

## 2021-11-12 RX ADMIN — TRAMADOL HYDROCHLORIDE 25 MG: 50 TABLET, COATED ORAL at 17:29

## 2021-11-12 RX ADMIN — ATORVASTATIN CALCIUM 40 MG: 40 TABLET, FILM COATED ORAL at 23:42

## 2021-11-12 RX ADMIN — DOCUSATE SODIUM 100 MG: 100 CAPSULE, LIQUID FILLED ORAL at 23:43

## 2021-11-12 RX ADMIN — TRAMADOL HYDROCHLORIDE 25 MG: 50 TABLET, COATED ORAL at 23:55

## 2021-11-12 RX ADMIN — ACETAMINOPHEN 1000 MG: 325 TABLET ORAL at 14:07

## 2021-11-12 RX ADMIN — TAMSULOSIN HYDROCHLORIDE 0.4 MG: 0.4 CAPSULE ORAL at 08:18

## 2021-11-12 RX ADMIN — LAMOTRIGINE 25 MG: 25 TABLET ORAL at 08:19

## 2021-11-12 ASSESSMENT — PAIN DESCRIPTION - ONSET
ONSET: ON-GOING

## 2021-11-12 ASSESSMENT — PAIN DESCRIPTION - ORIENTATION
ORIENTATION: ANTERIOR

## 2021-11-12 ASSESSMENT — PAIN DESCRIPTION - DESCRIPTORS
DESCRIPTORS: ACHING;CONSTANT;DISCOMFORT

## 2021-11-12 ASSESSMENT — PAIN SCALES - GENERAL
PAINLEVEL_OUTOF10: 2
PAINLEVEL_OUTOF10: 0
PAINLEVEL_OUTOF10: 3
PAINLEVEL_OUTOF10: 8
PAINLEVEL_OUTOF10: 7
PAINLEVEL_OUTOF10: 3
PAINLEVEL_OUTOF10: 6
PAINLEVEL_OUTOF10: 9
PAINLEVEL_OUTOF10: 1

## 2021-11-12 ASSESSMENT — PAIN DESCRIPTION - PAIN TYPE
TYPE: CHRONIC PAIN

## 2021-11-12 ASSESSMENT — PAIN DESCRIPTION - PROGRESSION
CLINICAL_PROGRESSION: GRADUALLY IMPROVING
CLINICAL_PROGRESSION: NOT CHANGED

## 2021-11-12 ASSESSMENT — PAIN DESCRIPTION - FREQUENCY
FREQUENCY: INTERMITTENT

## 2021-11-12 ASSESSMENT — PAIN - FUNCTIONAL ASSESSMENT: PAIN_FUNCTIONAL_ASSESSMENT: ACTIVITIES ARE NOT PREVENTED

## 2021-11-12 ASSESSMENT — PAIN DESCRIPTION - LOCATION
LOCATION: HEAD
LOCATION: HEAD;BACK

## 2021-11-12 NOTE — PROGRESS NOTES
Speech Language Pathology  ACUTE REHABILITATION  DISCHARGE SUMMARY          ADMITTING DIAGNOSIS: Acute cerebrovascular accident (CVA) ([facility] [address] [I63.9]    FIMS SCORES      Swallowing    Current Status  6--Modified Independent   Short Term Goal 6--Modified Independent    Long Term Goal 6--Modified Independent     Receptive    Current Status  7--Independent    Short Term Goal 7--Independent    Long Term Goal 7--Independent    Expressive    Current Status  7--Independent    Short Term Goal 7--Independent    Long Term Goal 7--Independent    Social Interaction    Current Status  6--Modified Independent   Short Term Goal 6--Modified Independent    Long Term Goal 6--Modified Independent         Problem Solving    Current Status  5--Supervision  Short Term Goal 5--Supervision   Long Term Goal 5--Supervision  *Supervision given left neglect and continued minimal cues for safety during execution of motor tasks. Patient's problem solving with verbal tasks is within functional limits.      Memory    Current Status  6--Modified Independent   Short Term Goal 6--Modified Independent    Long Term Goal 6--Modified Independent                       Testing from 11/3  Stimulus questions were obtained from the RIPA-2  There are 30 possible points for each subtest.   Severity ratings for each subtest were determined as follows:     RAW SCORE  SEVERITY    28-30  Within functional limits    25-27  Mild deficit    22-24  Moderate deficit    19-21  Marked deficit    16-18 Severe deficit   Below 16 Profound deficit       Subtest  Raw Score /Severity    Immediate Memory  30/ Within functional limits   Recent Memory  30/ Within functional limits   Temporal Orientation   (Recent Memory)  30/ Within functional limits   Temporal Orientation   (Remote Memory)  30/ Within functional limits   Spatial Orientation  30/ Within functional limits   Orientation to Environment  30/ Within functional limits   Recall of General Information  28/ Within functional limits   Problem Solving & Abstract Reasoning  30/ Within functional limits   Organization  30/ Within functional limits   Auditory Processing   & Retention  30/ Within functional limits       Testing from 11/3:  SLUMS ASSESSMENT:  Assesses cognitive ability in areas of orientation, immediate/ STM recall, divergent naming, functional calculation, abstraction, mental manipulation, clock draw, and story comprehension. Patient received a score of 28/30, which is indicative of functional cognitive linguistic skills for verbal tasks; however, SLP continued to see patient for additional therapy with focus on improving patient's ability to identify physical/cognitive/visuospatial impairments, verbalize impact each have on overall level of functional independence, and demonstrate appropriate insight/safety precautions during execution of motor tasks given these limitations with >90% of trials. EDUCATION:    Speech Pathologist (SLP) completed education with the patient and her mother. Reviewed compensatory strategies to improve functional outcome. Encouraged patient and her mother to engage SLP in structured Q&A session relative to identified deficit areas. Patient and her mother indicated understanding of all information provided via satisfactory verbal response. OUTCOME:    Per staff report, patient's safety has been consistently improving. Patient reported that she is more mindful of impulsive tendencies and has been making better choices. SLP recommends PT/OT continue to reinforce safety strategies with functionally based motor tasks as needed within their respective venues. Additional SP intervention is not recommended. Patient and patient's mother in agreement.        SP recommended after discharge:  No  Supervision recommended at discharge: 24 hour given safety / physical needs

## 2021-11-12 NOTE — PROGRESS NOTES
OCCUPATIONAL THERAPY DAILY NOTE    Date:2021  Patient Name: Keyona Goss  MRN: [id number]  : 1975  Room: [address] [place], [place] [address]     Diagnosis:Acute CVA( Pt presented to [facility] as Level 2 stroke alert on 10/11/21. L side weakness, neglect.  NIH-17  Found to have large R MCA involving R occipital)  Surgery: R frontotemporal decompressive hemicraniectomy on 10/13/21  Past Medical History: COVID (9/15), tobacco use  Precautions: Falls, L hemiplegia, L neglect, L sublux, bed/chair alarm, helmet when OOB, impulsive, L PRAFO    Functional Assessment:   Date Status AE  Comments   Feeding 21 Set-up     Grooming 21 SBA  w/c          Oral Care 21 Set-up     Bathing 21 Mod A Shower chair with wheels     UB Dressing 21 MIN A      LB Dressing 21 Mod reacher    Footwear 21 Min A  Dep- L AFO Lh shoe horn     Toileting 21 Mod A Grab bar      Homemaking 21 Mod A       Functional Transfers / Balance:   Date Status DME  Comments   Sit Balance 21 SBA   Demo'd sitting EOB, tub bench    Stand Balance 21 Min A SBQC demo'd during transfers/mobility    [x] Tub        [x] Shower   Transfer 21 MIN A         Mod A Extended tub bench, SBQC      Roll in shower chair, grab bar, LBQC Pt completed requiring assist to bring L LE in/out of tub v/c's for technique and hand placement    Commode   Transfer 21 Min A , drop arm commode, SBQC Pt requiring v/c's for hand placement and safety    Functional   Mobility 21 Min A SBQC Short house hold distances v/c's for technique during turns with foot placement      Other:     W/c>EOB       Sit>supine      21     MIN A       MIN A      HHA      V/c's for hand placement and safety       Pt requiring assist to bring L LE into bed v/c's for technique      Functional Exercises / Activity:  Pt engaged in L UE PROM 2 x 10 reps elbow flexion/extension, shoulder flexion/extension, wrist/digits flexion/extension;   Pt engaged in towel glides with L UE at table level 2 x 25 reps forward/backward, side/side; inclined towel glides 2 x 25 reps;  PROM ex's completed focusing on joint protection/AROM, to increase independence with ADL tasks; Pt completed R UE ex's focusing on UE strength/endurance to increase independence with transfers/mobility and ADL tasks;   2# dub bell in all planes 2 x 10 reps; Power gripper 2 x 25 reps; Sensory / Neuromuscular Re-Education:      Cognitive Skills:   Status Comments   Problem   Solving fair    Memory fair    Sequencing fair    Safety fair      Visual Perception:    Education:  Pt educated about LUE positioning when up in w/c to promote ROM/wt bearing along with joint integrity. Pt/Mom educated with safety during w/c <>commode transfers using grab bar and SPT method. Pt /mom educated with safety during ext tub bench at quad cane level due to hand/trunk placement for balance. 10/25/21: pt educated on SROM of LUE and safe positioning of LUE  10/29/21: educated on 1 handed dressing technique for socks/shoes  11/1/21: shoe lace adapted for ease with donning shoe  11/1/21: attempted small strips of kineosio tape on pt's hand to assess if pt can tolerate on her shoulder due to report of allergy (cleared by )  11/2/21:  Therapist applied kineosiotape to pt's LUE to stabilize sublux. Will continue to assess issues that arise with adhesive and progression of sublux  11/2/21: pt educated on AE for ease with LB dressing  11/3/21: Pt very fatigued in AM and PM sessions, requiring max cueing to alert pt back to task and engage in session, with pt asking multiple times to return to room/return to bed. Extended time to complete all tasks  11/4/21: home ROM exercise program placed in pt's folder and pt's mother notified of it  11/7/21 Pt educated about LUE positioning both up in w/c and in bed to maintain joint integrity.      [x] Family teach completed on:  11/4/21: Completed family teach this session. Pt's mother Kathleen Wei present. Educated Kathleen Gomezheribertoshayan on pt's status with ADLs and vc's for sequencing/initiation of tasks/hand placement with functional transfers. Kathleen Wei able to provide hands on assist with was educated for body mechanics/hand placement/vc's to increase safety with only commode transfer with slideboard and toileting task. Kathleen Phoenixshayan educated on PROM (elbow/shoulder/wrist/digit flex/ext) of LUE to increase ROM, maintain joint integrity, and safe management of sublux. Kathleen Phoenixa educated on how to doff/jesus gait belt and LUE sling. Pt's limiting factor appears to be her impulsivity and safety with ADLs/transfer. Kathleen Wie demo'ing F understanding/safety of education. Kathleen Wei appeared motivated and willing to learn. Continue to educate. 11/9/21: Completed family teach this session. Pt's mother Coldfoot Piheribertoshayan present. Educated Kathleen Wei on pt's increased independence with ADLs/functional transfers. [place] Piheribertoshayan able to jesus pt's sling and gait belt. Kathleen Jasonkika completed hands on assist with SPT to [facility]. Kathleen Wei and pt demo'ing improved communication with each other for safe transfer/completion of ADLs. Kathleen Wei demo'ing F understanding/safety of education. Continue to educate. 11/11/21- Mother present and educated along with assisting with w/c<>commode transfers using grab bar for balance and one handed skills. LUE placed in sling for balance and joint protection. Pt/mom educated with ext tub bench transfers to simulate for home environment with parent participating with transfer. Pt needed cues for hand/trunk placement on bench along with proper follow thru for safety. Pt/mom educated SPT using quad cane from w/c <>ext tub bench with patient needing cues for technique with cane & taking few steps. Pain Level:0/10    Additional Notes:     Patient has made fair + progress during treatment sessions toward set goals.    Therapy emphasis to obtain goals:    Time Frame for Long term goals  6 weeks   Long term goal 1 Pt will complete UB self-care tasks w/ Supervision   Long term goal 2 Pt will complete LB self-care tasks w/ Supervision   Long term goal 3 Pt will complete toileting (all aspects) w/ Supervision   Long term goal 4 Pt will safely complete toilet transfers w/ Supervision   Long term goal 5 Pt will safely complete tub/shower transfers w/ Supervision   Long term goals 6 Pt will complete basic homemaking task w/ SBA   Long term goal 7 Pt will complete grooming task w/ Supervision   Long term goal 8 Pt will complete feeding task w/ Supervision/setup     [x] Continue with current OT Plan of care. [] Prepare for Discharge    10/27/21 OT plan of care updated on this date. Tentative length of stay is 6 weeks Roselyn Mortimer OTR/L [id number]  11/3/21- Pt POC updated on this date. Pt tentative length of day is 5 weeks to address above problem areas Rojas Barthwell OTR/L [id number]  11/10/21- Pt POC updated on this date.  Pt tentative length of day is 4 weeks to address above problem areas -Roselyn Mortimer OTR/L [id number]   DISCHARGE RECOMMENDATIONS  Recommended DME: drop arm BSC    Post Discharge Care:   []Home Independently  []Home with 24hr Care / Supervision []Home with Partial Supervision []Home with Home Health OT []Home with Out Pt OT []Other: ___   Comments:         Time in Time out Tx Time Breakdown  Variance:   First Session  [id number] [x] Individual Tx-45  [] Concurrent Tx -  [] Co-Tx -   [] Group Tx -   [] Time Missed -     Second Session [id number] [] Individual Tx-   [x] Concurrent Tx -45 min  [] Co-Tx -   [] Group Tx -   [] Time Missed -     Third Session    [] Individual Tx-   [] Concurrent Tx -  [] Co-Tx -   [] Group Tx -   [] Time Missed -         Total Tx Time- Albert [phone]

## 2021-11-12 NOTE — PROGRESS NOTES
Physical Therapy  Treatment Note  Supervising Therapist: Jana Ashby, PT, DPT [id number]    NAME: Debby Flight  ROOM: [address] [place] [address]  DIAGNOSIS: Acute CVA  PRECAUTIONS: Falls, helmet at all times when OOB, L hemiplegia, L homonymous hemianopsia L neglect, alarm, must bring phone to therapy (cardiac monitor), L PRAFO, SBP <180  HPI: Pt is a 39 y.o. female with past medical history significant for recent COVID-19 infection (9/15/21) who [facility] with acute onset left-sided weakness. Initial NIHSS 17.  Initial CT ([facility]) showed R MCA hyperdensity; initial CTA showed right ICA/M1 occlusion. On 10/13, her neuro exam worsened and repeat imaging indicated worsening edema, 1 cm of midline shift. She underwent emergent R hemicraniectomy by Dr. Raquel Larose [facility]) on 10/13. After being deemed medically appropriate, she was transferred to [place] [id number] on 10/22/2021    Social:  Pt lives with daughter (15 y/o), will be returning to parent's home upon discharge (1 floor plan, 2 HAIDER with 2 HR). Mother and father healthy, do not work. 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 Initial Evaluation  Date: 10/23/21 AM     PM    Short Term Goals Long Term Goals    Was pt agreeable to Eval/treatment? yes Yes Yes     Does pt have pain? Pain on the top of her head once the helmet was placed on Mild c/o L hip and R rib pain Mild c/o L hip and R rib pain     Bed Mobility  Rolling: Max A  Supine to sit: Max A   Sit to supine: Max A   Scooting: Max A Supine<>Sit Noemy  Scooting/Rolling Noemy (twin bed) NT Min A Supervision   Transfers Sit to stand: Max A   Stand to sit: Max A  Stand pivot:  Max A x2    5xSTS: Unable to complete Sit<>stand: Noemy  Stand pivot: Noemy/ModA with R SBQC Sit<>stand: Noemy  Stand pivot: Noemy/ModA with R SBQC   Min A SBA  5xSTS: TBD   Ambulation    15 feet with R mackey rail with Max A + wc follow    10mWT: TBA  6mWT:  feet x 1 rep with R SBQC with Noemy  (trial L articulating AFO with DF/PF stop)    10mWT: 0.10 m/s  6mWT: 33 m  (R hemicane, L knee immobilizer/AFO, ModA 11/5) 140 feet x 1 rep with R SBQC and Noemy   (trial L articulating AFO with DF/PF stop)   50 feet with AAD with Min A       500 feet with AAD with SBA    10mWT: >0.4 m/s  6mWT: >205 m   Walking 10 feet on uneven surface NT NT NT 10 feet with AAD with Min A 10 feet with AAD with SBA   Wheel Chair Mobility NT NT NT  150 feet with R extremities with SBA   Car Transfers NT NT NT Min A SBA   Stair negotiation: ascended and descended NT NT 8 steps x 1 rep and 4 steps x 1 rep with 1 HR (RUE)  with ModA (non-reciprocal, ascending/descending leading with LLE) 4 steps with 1 rail with Min A 12 steps with 1 rail with SBA   Curb Step:   ascended and descended NT 2\" step x 4 reps and 4\" step x 4 reps with R SBQC, L trial AFO with Noemy (cues to lead with LLE) NT 4 inch step with AAD and Min A 4 inch step with AAD and SBA   Picking up object off the floor NT NT NT Will  an object with Min assist Will  an object with SBA   BLE ROM WFL: LLE AROM limited by flaccidity  WFL: LLE AROM limited by spasticity 2+ on MAS (hamstrings, hip extensors)      BLE Strength RLE 4+/5  LLE 0/5 RLE 5/5  LLE hip flexion 2+/5  Knee extension 2-/5  Ankle DF/PF 0/5      Balance  Static Sitting: Mod/Max A    BBS: TBA  FGA: TBA Sitting: Min A  Standing: Mod A  BBS: 4/56  (11/5/21)       BBS: TBD  FGA: TBD   Date Family Teach Completed TBA Pt's mother present for observation 10/24, 11/2, 11/3, 114, hands on 11/9, 11/11      Is additional Family Teaching Needed?   Y or N Y Yes      Hindering Progress L hemiplegia, L neglect L hemiplegia, L neglect      PT recommended ELOS 5 weeks       Team's Discharge Plan        Therapist at Team Meeting          HRmax: (178, -10  bpm for beta blocker) 168 bpm Time spent at moderate intensity (60-70% HRmax) Time spent at high intensity (70-85% HRmax)    AM: 0:20:42 AM: 0:28:08    PM: 0:26:13 PM: 0:02:13     Therapeutic Exercise:   AM: Ambulation with R LBQC, L trial  feet x 1 rep with Noemy  Short distance ambulation with L toe off AFO trial (R LBQC), 30 feet x 1 rep with ModA  Clockwise box step around small tile square with R SBQC, L trial AFO x 2 reps with ModA (5lb ankle weight LLE)  BW walking with R SBQC, L articulating AFO, 40 feet x 1 rep with Noemy  PM: Reciprocal ambulation with R HHA, L articulating AFO, 250 feet x 3 reps with [employer]    Patient education  Pt educated on visual scanning to promote optimal path selection    Patient response to education:   Pt verbalized understanding Pt demonstrated skill Pt requires further education in this area   yes partial yes     Additional Comments: Pt remains pleasant and motivated during session. /70 prior to activity. L toe off AFO trial completed, pt exhibits L knee hyperextension with this brace which disrupts gait pattern. Pt acknowledges deficit, articulating AFO remains most appropriate at this time however pt has potential for toe off AFO with continued quad strength improvement. Pt able to progress to [employer] during AM session without significant detriment to stability, will continue mobility training with this device henceforth. L visual field deficits result in frequent directional requirements during ambulation repetitions. PM session included progression of gait training to reciprocal pattern to improve speed and eventually progress toward this technique as a primary method of ambulation. Pt remains impulsive with approach to chair, particularly without device. Plan to continue reciprocal gait training next session. AM  Time in: 0830  Time out: 0915    PM  Time in: 1300  Time out: 6940    Pt is making good progress toward established Physical Therapy goals. Continue with physical therapy current plan of care.     Malinda Pizarro, PT, DPT  Board Certified Clinical Specialist in Neurologic Physical Therapy  [phone]

## 2021-11-12 NOTE — PROGRESS NOTES
[facility] Physical Medicine and Rehabilitation  Comprehensive Progress Note    Subjective:      Nora Bloom is a 39 y.o. female admitted to inpatient rehabilitation for impairments and activities limitations in ADLs and mobility secondary to right MCA CVA. No acute events overnight. No cp, sob, n/v. Tolerating therapy, progressing. Patient complains of constipation. No other complaints. The patient's medical records have been reviewed. Scheduled Meds:lamoTRIgine, 25 mg, Daily  lidocaine, 1 patch, Daily  baclofen, 5 mg, Nightly  nicotine, 1 patch, Daily  senna, 2 tablet, Nightly  polyethylene glycol, 17 g, Daily  metoprolol tartrate, 12.5 mg, BID  acetaminophen, 1,000 mg, 3 times per day  gabapentin, 100 mg, TID  tamsulosin, 0.4 mg, Daily  heparin (porcine), 5,000 Units, Q12H  aspirin, 81 mg, Daily  atorvastatin, 40 mg, Nightly  vitamin D, 50,000 Units, Weekly  docusate sodium, 100 mg, BID  melatonin, 6 mg, Nightly  clopidogrel, 75 mg, Daily      Continuous Infusions:  PRN Meds:traMADol, 25 mg, BID PRN  ondansetron, 4 mg, Q6H PRN  lactulose, 20 g, BID PRN  guaiFENesin, 200 mg, BID PRN  acetaminophen, 650 mg, Q4H PRN  magnesium hydroxide, 30 mL, Daily PRN  bisacodyl, 10 mg, Daily PRN  diclofenac sodium, 2 g, 4x Daily PRN         Objective:      Vitals:    11/11/21 0700 11/11/21 1930 11/12/21 0043 11/12/21 0715   BP: (!) 101/50 (!) 90/52  108/70   Pulse: 73 94  83   Resp: 16   18   Temp: 98.8 °F (37.1 °C)   98.9 °F (37.2 °C)   TempSrc: Tympanic   Temporal   SpO2: 100%  98%    Weight:       Height:         General appearance: alert, NAD, up in chair   Head: R crani site c/d/i   Eyes: conjunctivae/corneas clear. PERRL, EOM's intact.   Lungs: clear to auscultation bilaterally  Heart: regular rate and rhythm, S1, S2 normal  Abdomen: soft, non-tender, normal bowel sounds  Extremities: extremities normal, atraumatic, no cyanosis or edema  MSK: no cyanosis, clubbing, edema  Skin: R crani c/d/i   Neurologic: Alert, oriented x4. Answering all questions appropriately. Left neglect. Speech clear. No aphasia appreciated. Mild L facial droop. Spastic left hemiplegia. MAS 1+ in LUE; MAS 1+-2 in LLE. Motor 5/5 RUE and RLE; 0/5 LUE and LLE.      Exam stable      Laboratory:    Lab Results   Component Value Date    WBC 8.3 11/09/2021    HGB 12.1 11/09/2021    HCT 37.4 11/09/2021    MCV 94.9 11/09/2021     (H) 11/09/2021     Lab Results   Component Value Date     11/09/2021    K 4.3 11/09/2021    K 4.1 11/08/2021     11/09/2021    CO2 23 11/09/2021    BUN 13 11/09/2021    CREATININE 0.7 11/09/2021    GLUCOSE 94 11/09/2021    CALCIUM 9.2 11/09/2021      Lab Results   Component Value Date    ALT 75 (H) 10/23/2021    AST 46 (H) 10/23/2021    ALKPHOS 89 10/23/2021    BILITOT 0.4 10/23/2021       Functional Status:   Bed mobility: Min A  Transfers: Min-Mod A  Ambulation: 125 ft with trial L articulating AFO and R SBQC Min A  Feeding: Set up  Grooming: SBA  UB dressing: Min A  LB dressing: Mod A       Assessment/Plan:       39 y.o. female admitted to inpatient rehabilitation for impairments and activities limitations in ADLs and mobility secondary to right MCA CVA.    -Right MCA CVA: Complicated by cerebral edema requiring emergent right hemicraniectomy (10/13/2021). Dense left spastic hemiplegia, left neglect, mild cognitive impairment. Helmet when out of bed. L PRAFO in bed. LUE sling for gait. L w/c tray. Aspirin, Plavix, Lipitor for secondary prevention. Monitor neuro exam. Continue Acute Rehab program.   -Spasticity LUE/LLE: Monitor. Tone currently helpful to compensate for weakness with attempted gait in PT. Started on low dose baclofen at bedtime due to occasional painful spasms in the leg at night only. Monitor.   -Possible seizure:  EEG with no seizure activity, but showed an increased potential for focal seizures from the right frontotemporal region. Evaluated by Neurology.  Started on Lamictal - 25 mg daily x 2 weeks (start11/11), then 50 mg daily x 2 weeks (start 11/25), then 100 mg daily x 2 weeks (start 12/9), then 50 mg qAM and 100 mg qHS x 2 weeks (start 12/23), then 100 mg BID for maintenance (start1/6/22). -Pain control, MSK pain, neuropathic pain: Tylenol PRN, Tramadol PRN, gabapentin, Lidoderm, Voltaren gel  -Urinary retention: On flomax, now voiding well, low PVRs  -Tachycardia: intermittent. Cardiology consulted. She was started on Lopressor. Cardiology attempted to access data from her heart monitor unsuccessfully. She will follow up with Cardiology in office to review heart monitor data once available. -Constipation: colace, senna, glycolax. PRNs available.   -Tobacco abuse: Nicoderm patch  -DVT prophylaxis: heparin SQ, SCDs      BP on the low side at times, asymptomatic. On lopressor for episodes of marked tachycardia, no other BP medications.      Continue Lamictal titration per Neurology instructions as above    Increase glycolax to BID    Continue Acute Rehab program      Electronically signed by Chelsea Huang MD on 11/12/2021 at 11:57 AM

## 2021-11-13 PROCEDURE — 1280000000 HC REHAB R&B

## 2021-11-13 PROCEDURE — 6370000000 HC RX 637 (ALT 250 FOR IP): Performed by: PHYSICAL MEDICINE & REHABILITATION

## 2021-11-13 PROCEDURE — 6360000002 HC RX W HCPCS: Performed by: PHYSICAL MEDICINE & REHABILITATION

## 2021-11-13 PROCEDURE — 6370000000 HC RX 637 (ALT 250 FOR IP): Performed by: PHYSICIAN ASSISTANT

## 2021-11-13 PROCEDURE — 99232 SBSQ HOSP IP/OBS MODERATE 35: CPT | Performed by: PHYSICAL MEDICINE & REHABILITATION

## 2021-11-13 PROCEDURE — 97530 THERAPEUTIC ACTIVITIES: CPT

## 2021-11-13 RX ORDER — DIPHENHYDRAMINE HCL 25 MG
25 TABLET ORAL EVERY 6 HOURS PRN
Status: DISCONTINUED | OUTPATIENT
Start: 2021-11-13 | End: 2021-11-17

## 2021-11-13 RX ADMIN — METOPROLOL TARTRATE 12.5 MG: 25 TABLET, FILM COATED ORAL at 20:11

## 2021-11-13 RX ADMIN — TAMSULOSIN HYDROCHLORIDE 0.4 MG: 0.4 CAPSULE ORAL at 09:30

## 2021-11-13 RX ADMIN — METOPROLOL TARTRATE 12.5 MG: 25 TABLET, FILM COATED ORAL at 09:30

## 2021-11-13 RX ADMIN — ATORVASTATIN CALCIUM 40 MG: 40 TABLET, FILM COATED ORAL at 20:10

## 2021-11-13 RX ADMIN — GABAPENTIN 100 MG: 100 CAPSULE ORAL at 14:30

## 2021-11-13 RX ADMIN — ASPIRIN 81 MG CHEWABLE TABLET 81 MG: 81 TABLET CHEWABLE at 09:30

## 2021-11-13 RX ADMIN — GABAPENTIN 100 MG: 100 CAPSULE ORAL at 20:10

## 2021-11-13 RX ADMIN — DIPHENHYDRAMINE HYDROCHLORIDE AND ZINC ACETATE: 10; 1 CREAM TOPICAL at 21:21

## 2021-11-13 RX ADMIN — DOCUSATE SODIUM 100 MG: 100 CAPSULE, LIQUID FILLED ORAL at 09:30

## 2021-11-13 RX ADMIN — ERGOCALCIFEROL 50000 UNITS: 1.25 CAPSULE ORAL at 09:30

## 2021-11-13 RX ADMIN — HEPARIN SODIUM 5000 UNITS: 10000 INJECTION INTRAVENOUS; SUBCUTANEOUS at 06:09

## 2021-11-13 RX ADMIN — POLYETHYLENE GLYCOL 3350 17 G: 17 POWDER, FOR SOLUTION ORAL at 09:30

## 2021-11-13 RX ADMIN — HEPARIN SODIUM 5000 UNITS: 10000 INJECTION INTRAVENOUS; SUBCUTANEOUS at 18:18

## 2021-11-13 RX ADMIN — SENNOSIDES 17.2 MG: 8.6 TABLET, COATED ORAL at 20:11

## 2021-11-13 RX ADMIN — ACETAMINOPHEN 1000 MG: 325 TABLET ORAL at 06:08

## 2021-11-13 RX ADMIN — POLYETHYLENE GLYCOL 3350 17 G: 17 POWDER, FOR SOLUTION ORAL at 20:11

## 2021-11-13 RX ADMIN — ACETAMINOPHEN 1000 MG: 325 TABLET ORAL at 20:10

## 2021-11-13 RX ADMIN — LAMOTRIGINE 25 MG: 25 TABLET ORAL at 09:30

## 2021-11-13 RX ADMIN — CLOPIDOGREL 75 MG: 75 TABLET, FILM COATED ORAL at 09:30

## 2021-11-13 RX ADMIN — Medication 6 MG: at 20:10

## 2021-11-13 RX ADMIN — BACLOFEN 5 MG: 10 TABLET ORAL at 21:21

## 2021-11-13 RX ADMIN — ACETAMINOPHEN 1000 MG: 325 TABLET ORAL at 14:30

## 2021-11-13 RX ADMIN — DOCUSATE SODIUM 100 MG: 100 CAPSULE, LIQUID FILLED ORAL at 20:11

## 2021-11-13 RX ADMIN — GABAPENTIN 100 MG: 100 CAPSULE ORAL at 09:30

## 2021-11-13 ASSESSMENT — PAIN DESCRIPTION - PROGRESSION
CLINICAL_PROGRESSION: GRADUALLY IMPROVING
CLINICAL_PROGRESSION: GRADUALLY IMPROVING

## 2021-11-13 ASSESSMENT — PAIN SCALES - GENERAL
PAINLEVEL_OUTOF10: 6
PAINLEVEL_OUTOF10: 6
PAINLEVEL_OUTOF10: 0
PAINLEVEL_OUTOF10: 0

## 2021-11-13 ASSESSMENT — PAIN SCALES - WONG BAKER
WONGBAKER_NUMERICALRESPONSE: 0

## 2021-11-13 NOTE — PROGRESS NOTES
[facility]  Comprehensive Progress Note    Subjective:      Kota Madrid is a 39 y.o. female admitted to inpatient rehabilitation for impairments and activities limitations in ADLs and mobility secondary to right MCA CVA. No acute events overnight. No cp, sob, n/v. Tolerating therapy, progressing. No complaints. The patient's medical records have been reviewed. Scheduled Meds:polyethylene glycol, 17 g, BID  lamoTRIgine, 25 mg, Daily  lidocaine, 1 patch, Daily  baclofen, 5 mg, Nightly  nicotine, 1 patch, Daily  senna, 2 tablet, Nightly  metoprolol tartrate, 12.5 mg, BID  acetaminophen, 1,000 mg, 3 times per day  gabapentin, 100 mg, TID  tamsulosin, 0.4 mg, Daily  heparin (porcine), 5,000 Units, Q12H  aspirin, 81 mg, Daily  atorvastatin, 40 mg, Nightly  vitamin D, 50,000 Units, Weekly  docusate sodium, 100 mg, BID  melatonin, 6 mg, Nightly  clopidogrel, 75 mg, Daily      Continuous Infusions:  PRN Meds:traMADol, 25 mg, BID PRN  ondansetron, 4 mg, Q6H PRN  lactulose, 20 g, BID PRN  guaiFENesin, 200 mg, BID PRN  acetaminophen, 650 mg, Q4H PRN  magnesium hydroxide, 30 mL, Daily PRN  bisacodyl, 10 mg, Daily PRN  diclofenac sodium, 2 g, 4x Daily PRN         Objective:      Vitals:    11/11/21 1930 11/12/21 0043 11/12/21 0715 11/13/21 0930   BP: (!) 90/52  108/70 (!) 100/59   Pulse: 94  83 76   Resp:   18 18   Temp:   98.9 °F (37.2 °C) 98 °F (36.7 °C)   TempSrc:   Temporal Oral   SpO2:  98%  98%   Weight:       Height:         General appearance: alert, NAD, up in chair   Head: R crani site c/d/i   Eyes: conjunctivae/corneas clear. PERRL, EOM's intact. Lungs: clear to auscultation bilaterally  Heart: regular rate and rhythm, S1, S2 normal  Abdomen: soft, non-tender, normal bowel sounds  Extremities: extremities normal, atraumatic, no cyanosis or edema  MSK: no cyanosis, clubbing, edema  Skin: R crani c/d/i   Neurologic: Alert, oriented x4.  Answering all questions appropriately. Left neglect. Speech clear. No aphasia appreciated. Mild L facial droop. Spastic left hemiplegia. MAS 1+ in LUE; MAS 1+-2 in LLE. Motor 5/5 RUE and RLE; 0/5 LUE and LLE.          Laboratory:    Lab Results   Component Value Date    WBC 8.3 11/09/2021    HGB 12.1 11/09/2021    HCT 37.4 11/09/2021    MCV 94.9 11/09/2021     (H) 11/09/2021     Lab Results   Component Value Date     11/09/2021    K 4.3 11/09/2021    K 4.1 11/08/2021     11/09/2021    CO2 23 11/09/2021    BUN 13 11/09/2021    CREATININE 0.7 11/09/2021    GLUCOSE 94 11/09/2021    CALCIUM 9.2 11/09/2021      Lab Results   Component Value Date    ALT 75 (H) 10/23/2021    AST 46 (H) 10/23/2021    ALKPHOS 89 10/23/2021    BILITOT 0.4 10/23/2021       Functional Status:   Bed mobility: Min A  Transfers: Min-Mod A  Ambulation: 125 ft with trial L articulating AFO and R SBQC Min A  Feeding: Set up  Grooming: SBA  UB dressing: Min A  LB dressing: Mod A       Assessment/Plan:       39 y.o. female admitted to inpatient rehabilitation for impairments and activities limitations in ADLs and mobility secondary to right MCA CVA.    -Right MCA CVA: Complicated by cerebral edema requiring emergent right hemicraniectomy (10/13/2021). Dense left spastic hemiplegia, left neglect, mild cognitive impairment. Helmet when out of bed. L PRAFO in bed. LUE sling for gait. L w/c tray. Aspirin, Plavix, Lipitor for secondary prevention. Monitor neuro exam. Continue Acute Rehab program.   -Spasticity LUE/LLE: Monitor. Tone currently helpful to compensate for weakness with attempted gait in PT. Started on low dose baclofen at bedtime due to occasional painful spasms in the leg at night only. Monitor.   -Possible seizure:  EEG with no seizure activity, but showed an increased potential for focal seizures from the right frontotemporal region. Evaluated by Neurology.  Started on Lamictal - 25 mg daily x 2 weeks (start11/11), then 50 mg daily x 2 weeks

## 2021-11-13 NOTE — PROGRESS NOTES
Physical Therapy  Treatment Note  Supervising Therapist: Paz Ealr, PT, DPT [id number]    NAME: Tasha Loya  [facility]  DIAGNOSIS: Acute CVA  PRECAUTIONS: Falls, helmet at all times when OOB, L hemiplegia, L homonymous hemianopsia L neglect, alarm, must bring phone to therapy (cardiac monitor), L PRAFO, SBP <180  HPI: Pt is a 39 y.o. female with past medical history significant for recent COVID-19 infection (9/15/21) who [facility] with acute onset left-sided weakness. Initial NIHSS 17.  Initial CT (OSH) showed R MCA hyperdensity; initial CTA showed right ICA/M1 occlusion. On 10/13, her neuro exam worsened and repeat imaging indicated worsening edema, 1 cm of midline shift. She underwent emergent R hemicraniectomy by Dr. Jersey Leal [facility]) on 10/13. After being deemed medically appropriate, she was transferred to [place] [id number] on 10/22/2021    Social:  Pt lives with daughter (15 y/o), will be returning to parent's home upon discharge (1 floor plan, 2 HAIDER with 2 HR). Mother and father healthy, do not work. 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 Initial Evaluation  Date: 10/23/21 AM         Short Term Goals Long Term Goals    Was pt agreeable to Eval/treatment? yes Yes      Does pt have pain? Pain on the top of her head once the helmet was placed on No c/o pain      Bed Mobility  Rolling: Max A  Supine to sit: Max A   Sit to supine: Max A   Scooting: Max A Rolling: Noemy  Supine to sit: Noemy  Sit to supine: Noemy  Scooting: SBA  Min A Supervision   Transfers Sit to stand: Max A   Stand to sit: Max A  Stand pivot:  Max A x2    5xSTS: Unable to complete Sit<>stand: Noemy  Stand pivot: Noemy/ModA with R SBQC  Min A SBA  5xSTS: TBD   Ambulation    15 feet with R mackey rail with Max A + wc follow    10mWT: TBA  6mWT:  feet with R SBQC with Noemy  (trial L articulating AFO with DF/PF stop)  10mWT: 0.10 m/s  6mWT: 33 m  50 response to education:   Pt/family verbalized understanding Pt/family demonstrated skill Pt/family requires further education in this area   Yes Yes Reinforce     AM  Time in: 0830  Time out: 0900    Pt is making good progress toward established Physical Therapy goals. Continue with physical therapy current plan of care. Tatyana Hancock.  Tamara Pickens, [address] [place] [address]  License Number: [id number]

## 2021-11-14 PROCEDURE — 6370000000 HC RX 637 (ALT 250 FOR IP): Performed by: PHYSICAL MEDICINE & REHABILITATION

## 2021-11-14 PROCEDURE — 97530 THERAPEUTIC ACTIVITIES: CPT

## 2021-11-14 PROCEDURE — 6360000002 HC RX W HCPCS: Performed by: PHYSICAL MEDICINE & REHABILITATION

## 2021-11-14 PROCEDURE — 6370000000 HC RX 637 (ALT 250 FOR IP): Performed by: PHYSICIAN ASSISTANT

## 2021-11-14 PROCEDURE — 1280000000 HC REHAB R&B

## 2021-11-14 PROCEDURE — 97535 SELF CARE MNGMENT TRAINING: CPT

## 2021-11-14 RX ADMIN — SENNOSIDES 17.2 MG: 8.6 TABLET, COATED ORAL at 20:56

## 2021-11-14 RX ADMIN — GABAPENTIN 100 MG: 100 CAPSULE ORAL at 07:46

## 2021-11-14 RX ADMIN — DOCUSATE SODIUM 100 MG: 100 CAPSULE, LIQUID FILLED ORAL at 07:45

## 2021-11-14 RX ADMIN — BACLOFEN 5 MG: 10 TABLET ORAL at 20:56

## 2021-11-14 RX ADMIN — CLOPIDOGREL 75 MG: 75 TABLET, FILM COATED ORAL at 07:47

## 2021-11-14 RX ADMIN — ASPIRIN 81 MG CHEWABLE TABLET 81 MG: 81 TABLET CHEWABLE at 07:45

## 2021-11-14 RX ADMIN — DIPHENHYDRAMINE HYDROCHLORIDE AND ZINC ACETATE: 10; 1 CREAM TOPICAL at 04:21

## 2021-11-14 RX ADMIN — ACETAMINOPHEN 1000 MG: 325 TABLET ORAL at 20:56

## 2021-11-14 RX ADMIN — ACETAMINOPHEN 1000 MG: 325 TABLET ORAL at 06:38

## 2021-11-14 RX ADMIN — LAMOTRIGINE 25 MG: 25 TABLET ORAL at 07:46

## 2021-11-14 RX ADMIN — HEPARIN SODIUM 5000 UNITS: 10000 INJECTION INTRAVENOUS; SUBCUTANEOUS at 06:38

## 2021-11-14 RX ADMIN — ATORVASTATIN CALCIUM 40 MG: 40 TABLET, FILM COATED ORAL at 20:56

## 2021-11-14 RX ADMIN — GABAPENTIN 100 MG: 100 CAPSULE ORAL at 20:56

## 2021-11-14 RX ADMIN — Medication 6 MG: at 20:56

## 2021-11-14 RX ADMIN — GABAPENTIN 100 MG: 100 CAPSULE ORAL at 13:42

## 2021-11-14 RX ADMIN — METOPROLOL TARTRATE 12.5 MG: 25 TABLET, FILM COATED ORAL at 20:56

## 2021-11-14 RX ADMIN — DOCUSATE SODIUM 100 MG: 100 CAPSULE, LIQUID FILLED ORAL at 20:55

## 2021-11-14 RX ADMIN — ACETAMINOPHEN 1000 MG: 325 TABLET ORAL at 13:42

## 2021-11-14 RX ADMIN — POLYETHYLENE GLYCOL 3350 17 G: 17 POWDER, FOR SOLUTION ORAL at 07:59

## 2021-11-14 RX ADMIN — TRAMADOL HYDROCHLORIDE 25 MG: 50 TABLET, COATED ORAL at 07:45

## 2021-11-14 RX ADMIN — TAMSULOSIN HYDROCHLORIDE 0.4 MG: 0.4 CAPSULE ORAL at 07:46

## 2021-11-14 RX ADMIN — HEPARIN SODIUM 5000 UNITS: 10000 INJECTION INTRAVENOUS; SUBCUTANEOUS at 17:49

## 2021-11-14 RX ADMIN — POLYETHYLENE GLYCOL 3350 17 G: 17 POWDER, FOR SOLUTION ORAL at 20:56

## 2021-11-14 ASSESSMENT — PAIN DESCRIPTION - PAIN TYPE
TYPE: CHRONIC PAIN

## 2021-11-14 ASSESSMENT — PAIN DESCRIPTION - ONSET
ONSET: ON-GOING

## 2021-11-14 ASSESSMENT — PAIN DESCRIPTION - ORIENTATION
ORIENTATION: LEFT

## 2021-11-14 ASSESSMENT — PAIN - FUNCTIONAL ASSESSMENT
PAIN_FUNCTIONAL_ASSESSMENT: ACTIVITIES ARE NOT PREVENTED

## 2021-11-14 ASSESSMENT — PAIN DESCRIPTION - LOCATION
LOCATION: HIP

## 2021-11-14 ASSESSMENT — PAIN SCALES - GENERAL
PAINLEVEL_OUTOF10: 3
PAINLEVEL_OUTOF10: 7
PAINLEVEL_OUTOF10: 7
PAINLEVEL_OUTOF10: 8
PAINLEVEL_OUTOF10: 7

## 2021-11-14 ASSESSMENT — PAIN SCALES - WONG BAKER
WONGBAKER_NUMERICALRESPONSE: 0

## 2021-11-14 ASSESSMENT — PAIN DESCRIPTION - PROGRESSION
CLINICAL_PROGRESSION: GRADUALLY IMPROVING

## 2021-11-14 ASSESSMENT — PAIN DESCRIPTION - FREQUENCY
FREQUENCY: INTERMITTENT

## 2021-11-14 ASSESSMENT — PAIN DESCRIPTION - DESCRIPTORS
DESCRIPTORS: ACHING;DISCOMFORT

## 2021-11-14 NOTE — PROGRESS NOTES
OCCUPATIONAL THERAPY DAILY NOTE    Date:2021  Patient Name: Romario Campo  MRN: [id number]  : 1975  Room: [address] [place], [place] [address]     Diagnosis:Acute CVA( [facility] Level 2 stroke alert on 10/11/21. L side weakness, neglect.  NIH-17  Found to have large R MCA involving R occipital)  Surgery: R frontotemporal decompressive hemicraniectomy on 10/13/21  Past Medical History: COVID (9/15), tobacco use  Precautions: Falls, L hemiplegia, L neglect, L sublux, bed/chair alarm, helmet when OOB, impulsive, L PRAFO    Functional Assessment:   Date Status AE  Comments   Feeding 21 Set-up     Grooming 21 SBA  w/c          Oral Care 21 Set-up     Bathing 21 Mod A Shower chair with wheels  Pt completed full shower requiring assist to bathe R UE, B LE below knees, and back v/c's for technique    UB Dressing 21 MIN A   To jesus/doff pull over shirt requiring assist to thread L UE v/c's for technique    LB Dressing 21 Mod reacher To jesus/doff pants, underwear requiring assist to pull up around waist, maintain standing balance    Footwear 21 Min A  Dep- L AFO Lh shoe horn To jesus/doff socks/shoes    Toileting 21 Mod A Grab bar      Homemaking 21 Mod A       Functional Transfers / Balance:   Date Status DME  Comments   Sit Balance 21 SBA     Stand Balance 21 Min A SBQC    [x] Tub        [x] Shower   Transfer 21 MIN A         Mod A Extended tub bench, SBQC      Roll in shower chair, grab bar, LBQC           Pt requiring assist to maintain standing balance to complete transfer maintaining integrity of L LE knee from buckling. V/c's for hand placement and safety    Commode   Transfer 21 Min A , drop arm commode, SBQC    Functional   Mobility 21 Min A SBQC      Other:     W/c>EOB       Sit>supine      11/12/21      11/12/21     MIN A       MIN A      HHA           Functional Exercises / Activity:              Sensory / Neuromuscular Re-Education:      Cognitive Skills:   Status Comments   Problem   Solving fair    Memory fair    Sequencing fair    Safety fair      Visual Perception:    Education:  Pt educated about LUE positioning when up in w/c to promote ROM/wt bearing along with joint integrity. Pt/Mom educated with safety during w/c <>commode transfers using grab bar and SPT method. Pt /mom educated with safety during ext tub bench at quad cane level due to hand/trunk placement for balance. 10/25/21: pt educated on SROM of LUE and safe positioning of LUE  10/29/21: educated on 1 handed dressing technique for socks/shoes  11/1/21: shoe lace adapted for ease with donning shoe  11/1/21: attempted small strips of kineosio tape on pt's hand to assess if pt can tolerate on her shoulder due to report of allergy (cleared by )  11/2/21:  Therapist applied kineosiotape to pt's LUE to stabilize sublux. Will continue to assess issues that arise with adhesive and progression of sublux  11/2/21: pt educated on AE for ease with LB dressing  11/3/21: Pt very fatigued in AM and PM sessions, requiring max cueing to alert pt back to task and engage in session, with pt asking multiple times to return to room/return to bed. Extended time to complete all tasks  11/4/21: home ROM exercise program placed in pt's folder and pt's mother notified of it  11/7/21 Pt educated about LUE positioning both up in w/c and in bed to maintain joint integrity. [x] Family teach completed on:  11/4/21: Completed family teach this session. Pt's mother Ben Hurley present. Educated Ben Hurley on pt's status with ADLs and vc's for sequencing/initiation of tasks/hand placement with functional transfers. Ben Hurley able to provide hands on assist with was educated for body mechanics/hand placement/vc's to increase safety with only commode transfer with slideboard and toileting task.  Ben Hurley educated on PROM (elbow/shoulder/wrist/digit flex/ext) of LUE to increase ROM, maintain joint integrity, and safe management of sublux. Pedro Pablo Stevens educated on how to doff/jesus gait belt and LUE sling. Pt's limiting factor appears to be her impulsivity and safety with ADLs/transfer. Pedro Pablo Stevens demo'ing F understanding/safety of education. Pedro Pablo Stevens appeared motivated and willing to learn. Continue to educate. 11/9/21: Completed family teach this session. Pt's mother Pedro Pablo Stevens present. Educated Pedro Pablo Stevens on pt's increased independence with ADLs/functional transfers. Pedro Pablo Stevens able to jesus pt's sling and gait belt. Pedro Pablo Stevens completed hands on assist with SPT to [facility]. Pedro Pablo Stevens and pt demo'ing improved communication with each other for safe transfer/completion of ADLs. Pedro Pablo Stevens demo'ing F understanding/safety of education. Continue to educate. 11/11/21- Mother present and educated along with assisting with w/c<>commode transfers using grab bar for balance and one handed skills. LUE placed in sling for balance and joint protection. Pt/mom educated with ext tub bench transfers to simulate for home environment with parent participating with transfer. Pt needed cues for hand/trunk placement on bench along with proper follow thru for safety. Pt/mom educated SPT using quad cane from w/c <>ext tub bench with patient needing cues for technique with cane & taking few steps. Pain Level:0/10    Additional Notes:     Patient has made fair + progress during treatment sessions toward set goals.    Therapy emphasis to obtain goals:    Time Frame for Long term goals  6 weeks   Long term goal 1 Pt will complete UB self-care tasks w/ Supervision   Long term goal 2 Pt will complete LB self-care tasks w/ Supervision   Long term goal 3 Pt will complete toileting (all aspects) w/ Supervision   Long term goal 4 Pt will safely complete toilet transfers w/ Supervision   Long term goal 5 Pt will safely complete tub/shower transfers w/ Supervision   Long term goals 6 Pt will complete basic homemaking task w/ SBA   Long term goal 7 Pt will complete grooming task w/ Supervision   Long term goal 8 Pt will complete feeding task w/ Supervision/setup     [x] Continue with current OT Plan of care. [] Prepare for Discharge    10/27/21 OT plan of care updated on this date. Tentative length of stay is 6 weeks Rose Oneal OTR/L [id number]  11/3/21- Pt POC updated on this date. Pt tentative length of day is 5 weeks to address above problem areas Canary Lilliam OTR/L [id number]  11/10/21- Pt POC updated on this date.  Pt tentative length of day is 4 weeks to address above problem areas -Rose Oneal OTR/L [id number]   DISCHARGE RECOMMENDATIONS  Recommended DME: drop arm BSC    Post Discharge Care:   []Home Independently  []Home with 24hr Care / Supervision []Home with Partial Supervision []Home with Home Health OT []Home with Out Pt OT []Other: ___   Comments:         Time in Time out Tx Time Breakdown  Variance:   First Session  [id number] [x] Individual Tx-45  [] Concurrent Tx -  [] Co-Tx -   [] Group Tx -   [] Time Missed -     Second Session   [] Individual Tx-   [] Concurrent Tx -   [] Co-Tx -   [] Group Tx -   [] Time Missed -     Third Session    [] Individual Tx-   [] Concurrent Tx -  [] Co-Tx -   [] Group Tx -   [] Time Missed -         Total Tx Time- 45 min     Daija Cordova GLOVER/L [id number]

## 2021-11-15 PROCEDURE — 97530 THERAPEUTIC ACTIVITIES: CPT

## 2021-11-15 PROCEDURE — 6360000002 HC RX W HCPCS: Performed by: PHYSICAL MEDICINE & REHABILITATION

## 2021-11-15 PROCEDURE — 6370000000 HC RX 637 (ALT 250 FOR IP): Performed by: PHYSICIAN ASSISTANT

## 2021-11-15 PROCEDURE — 6370000000 HC RX 637 (ALT 250 FOR IP): Performed by: PHYSICAL MEDICINE & REHABILITATION

## 2021-11-15 PROCEDURE — 99232 SBSQ HOSP IP/OBS MODERATE 35: CPT | Performed by: PHYSICAL MEDICINE & REHABILITATION

## 2021-11-15 PROCEDURE — 97110 THERAPEUTIC EXERCISES: CPT

## 2021-11-15 PROCEDURE — 1280000000 HC REHAB R&B

## 2021-11-15 RX ADMIN — TRAMADOL HYDROCHLORIDE 25 MG: 50 TABLET, COATED ORAL at 11:49

## 2021-11-15 RX ADMIN — ACETAMINOPHEN 1000 MG: 325 TABLET ORAL at 21:19

## 2021-11-15 RX ADMIN — ACETAMINOPHEN 1000 MG: 325 TABLET ORAL at 14:10

## 2021-11-15 RX ADMIN — DOCUSATE SODIUM 100 MG: 100 CAPSULE, LIQUID FILLED ORAL at 21:22

## 2021-11-15 RX ADMIN — GABAPENTIN 100 MG: 100 CAPSULE ORAL at 09:50

## 2021-11-15 RX ADMIN — METOPROLOL TARTRATE 12.5 MG: 25 TABLET, FILM COATED ORAL at 21:19

## 2021-11-15 RX ADMIN — ATORVASTATIN CALCIUM 40 MG: 40 TABLET, FILM COATED ORAL at 21:19

## 2021-11-15 RX ADMIN — SENNOSIDES 17.2 MG: 8.6 TABLET, COATED ORAL at 21:19

## 2021-11-15 RX ADMIN — LAMOTRIGINE 25 MG: 25 TABLET ORAL at 09:51

## 2021-11-15 RX ADMIN — ACETAMINOPHEN 1000 MG: 325 TABLET ORAL at 06:20

## 2021-11-15 RX ADMIN — ASPIRIN 81 MG CHEWABLE TABLET 81 MG: 81 TABLET CHEWABLE at 09:50

## 2021-11-15 RX ADMIN — GABAPENTIN 100 MG: 100 CAPSULE ORAL at 14:10

## 2021-11-15 RX ADMIN — Medication 6 MG: at 21:19

## 2021-11-15 RX ADMIN — HEPARIN SODIUM 5000 UNITS: 10000 INJECTION INTRAVENOUS; SUBCUTANEOUS at 17:37

## 2021-11-15 RX ADMIN — CLOPIDOGREL 75 MG: 75 TABLET, FILM COATED ORAL at 09:50

## 2021-11-15 RX ADMIN — POLYETHYLENE GLYCOL 3350 17 G: 17 POWDER, FOR SOLUTION ORAL at 09:51

## 2021-11-15 RX ADMIN — DIPHENHYDRAMINE HYDROCHLORIDE 25 MG: 25 TABLET ORAL at 21:20

## 2021-11-15 RX ADMIN — HEPARIN SODIUM 5000 UNITS: 10000 INJECTION INTRAVENOUS; SUBCUTANEOUS at 06:21

## 2021-11-15 RX ADMIN — TAMSULOSIN HYDROCHLORIDE 0.4 MG: 0.4 CAPSULE ORAL at 09:50

## 2021-11-15 RX ADMIN — GABAPENTIN 100 MG: 100 CAPSULE ORAL at 21:19

## 2021-11-15 RX ADMIN — DOCUSATE SODIUM 100 MG: 100 CAPSULE, LIQUID FILLED ORAL at 09:51

## 2021-11-15 RX ADMIN — METOPROLOL TARTRATE 12.5 MG: 25 TABLET, FILM COATED ORAL at 09:50

## 2021-11-15 RX ADMIN — POLYETHYLENE GLYCOL 3350 17 G: 17 POWDER, FOR SOLUTION ORAL at 21:18

## 2021-11-15 RX ADMIN — BACLOFEN 5 MG: 10 TABLET ORAL at 21:19

## 2021-11-15 ASSESSMENT — PAIN SCALES - GENERAL
PAINLEVEL_OUTOF10: 7
PAINLEVEL_OUTOF10: 6
PAINLEVEL_OUTOF10: 7
PAINLEVEL_OUTOF10: 5
PAINLEVEL_OUTOF10: 3
PAINLEVEL_OUTOF10: 3

## 2021-11-15 ASSESSMENT — PAIN DESCRIPTION - ORIENTATION
ORIENTATION: LEFT

## 2021-11-15 ASSESSMENT — PAIN DESCRIPTION - FREQUENCY
FREQUENCY: INTERMITTENT
FREQUENCY: INTERMITTENT

## 2021-11-15 ASSESSMENT — PAIN DESCRIPTION - PAIN TYPE
TYPE: ACUTE PAIN

## 2021-11-15 ASSESSMENT — PAIN DESCRIPTION - LOCATION
LOCATION: KNEE

## 2021-11-15 ASSESSMENT — PAIN DESCRIPTION - PROGRESSION
CLINICAL_PROGRESSION: NOT CHANGED
CLINICAL_PROGRESSION: NOT CHANGED

## 2021-11-15 ASSESSMENT — PAIN DESCRIPTION - DESCRIPTORS
DESCRIPTORS: ACHING;DISCOMFORT;SORE
DESCRIPTORS: ACHING;CONSTANT;DISCOMFORT
DESCRIPTORS: ACHING;CONSTANT;DISCOMFORT

## 2021-11-15 ASSESSMENT — PAIN DESCRIPTION - ONSET
ONSET: ON-GOING
ONSET: ON-GOING

## 2021-11-15 NOTE — PROGRESS NOTES
OCCUPATIONAL THERAPY DAILY NOTE    Date:11/15/2021  Patient Name: Cristiana May  MRN: [id number]  : 1975  Room: [address] [place] [address]     Diagnosis:Acute CVA( [facility] as Level 2 stroke alert on 10/11/21. L side weakness, neglect.  NIH-17  Found to have large R MCA involving R occipital)  Surgery: R frontotemporal decompressive hemicraniectomy on 10/13/21  Past Medical History: COVID (9/15), tobacco use  Precautions: Falls, L hemiplegia, L neglect, L sublux, bed/chair alarm, helmet when OOB, impulsive, L PRAFO    Functional Assessment:   Date Status AE  Comments   Feeding 21 Set-up     Grooming 21 SBA  w/c          Oral Care 21 Set-up     Bathing 21 Mod A Shower chair with wheels      UB Dressing 21 MIN A      LB Dressing 21 Mod reacher    Footwear 21 Min A  Dep- L AFO Lh shoe horn    Toileting 21 Mod A Grab bar      Homemaking 21 Mod A       Functional Transfers / Balance:   Date Status DME  Comments   Sit Balance 21 SBA     Stand Balance 21 Min A SBQC    [x] Tub        [x] Shower   Transfer 21 MIN A         Mod A Extended tub bench, SBQC      Roll in shower chair, grab bar, LBQC               Commode   Transfer 21 Min A , drop arm commode, SBQC    Functional   Mobility 11/15/21 Min A SBQC Short house hold distances in living environment on various surfaces v/c's for technique, foot placement, pacing and safety    Other:     W/c>EOB       Sit>supine       Couch, recliner, kitchen without armrest      11/12/21      11/12/21      11/15/21     MIN A       MIN A       MOD A      HHA             SBQC                 Pt completed from various surfaces MOD A initially from couch progressing to MIN A, MOD A from others v/c's for technique, and safety      Functional Exercises / Activity:  Pt seated engaged in L UE PROM 3 x 10 reps elbow flexion/extension, shoulder flexion/extension, wrist/digits flexion/extension focusing on joint protection/AROM to increase independence with ADL tasks; Pt engaged in R UE ex's focusing on strength/endurance to increase independence with transfers/mobility and ADL tasks;   2# dumb bell 3 x 10 reps elbow flexion/extension, shoulder flexion/extension, scapula protraction/retraction; Power gripper 2 x 25 reps; Sensory / Neuromuscular Re-Education:      Cognitive Skills:   Status Comments   Problem   Solving fair    Memory fair    Sequencing fair    Safety fair      Visual Perception:    Education:  Pt educated about LUE positioning when up in w/c to promote ROM/wt bearing along with joint integrity. Pt/Mom educated with safety during w/c <>commode transfers using grab bar and SPT method. Pt /mom educated with safety during ext tub bench at quad cane level due to hand/trunk placement for balance. Pt educated with regards to transfer safety in living environment and functional mobility    10/25/21: pt educated on SROM of LUE and safe positioning of LUE  10/29/21: educated on 1 handed dressing technique for socks/shoes  11/1/21: shoe lace adapted for ease with donning shoe  11/1/21: attempted small strips of kineosio tape on pt's hand to assess if pt can tolerate on her shoulder due to report of allergy (cleared by )  11/2/21:  Therapist applied kineosiotape to pt's LUE to stabilize sublux. Will continue to assess issues that arise with adhesive and progression of sublux  11/2/21: pt educated on AE for ease with LB dressing  11/3/21: Pt very fatigued in AM and PM sessions, requiring max cueing to alert pt back to task and engage in session, with pt asking multiple times to return to room/return to bed. Extended time to complete all tasks  11/4/21: home ROM exercise program placed in pt's folder and pt's mother notified of it  11/7/21 Pt educated about LUE positioning both up in w/c and in bed to maintain joint integrity.      [x] Family teach completed on:  11/4/21: Completed family teach this session. Pt's mother Twin Mcmanus present. Educated Twin Mcmanus on pt's status with ADLs and vc's for sequencing/initiation of tasks/hand placement with functional transfers. Twin Mcmanus able to provide hands on assist with was educated for body mechanics/hand placement/vc's to increase safety with only commode transfer with slideboard and toileting task. Arabellaluisana Mcmanus educated on PROM (elbow/shoulder/wrist/digit flex/ext) of LUE to increase ROM, maintain joint integrity, and safe management of sublux. Arabellaluisana Yonathan educated on how to doff/jesus gait belt and LUE sling. Pt's limiting factor appears to be her impulsivity and safety with ADLs/transfer. Twin Mcmanus demo'ing F understanding/safety of education. Twin Mcmanus appeared motivated and willing to learn. Continue to educate. 11/9/21: Completed family teach this session. Pt's mother Twin Mcmanus present. Educated Twin Nietoza on pt's increased independence with ADLs/functional transfers. Twin Mcmanus able to jesus pt's sling and gait belt. Twin Mcmanus completed hands on assist with SPT to [facility]. Twin Mcmanus and pt demo'ing improved communication with each other for safe transfer/completion of ADLs. Twin Mcmanus demo'ing F understanding/safety of education. Continue to educate. 11/11/21- Mother present and educated along with assisting with w/c<>commode transfers using grab bar for balance and one handed skills. LUE placed in sling for balance and joint protection. Pt/mom educated with ext tub bench transfers to simulate for home environment with parent participating with transfer. Pt needed cues for hand/trunk placement on bench along with proper follow thru for safety. Pt/mom educated SPT using quad cane from w/c <>ext tub bench with patient needing cues for technique with cane & taking few steps. Pain Level:0/10    Additional Notes:     Patient has made fair + progress during treatment sessions toward set goals.    Therapy emphasis to obtain goals:    Time Frame for Long term goals  6 weeks   Long term goal 1 Pt will complete UB self-care tasks w/ Supervision   Long term goal 2 Pt will complete LB self-care tasks w/ Supervision   Long term goal 3 Pt will complete toileting (all aspects) w/ Supervision   Long term goal 4 Pt will safely complete toilet transfers w/ Supervision   Long term goal 5 Pt will safely complete tub/shower transfers w/ Supervision   Long term goals 6 Pt will complete basic homemaking task w/ SBA   Long term goal 7 Pt will complete grooming task w/ Supervision   Long term goal 8 Pt will complete feeding task w/ Supervision/setup     [x] Continue with current OT Plan of care. [] Prepare for Discharge    10/27/21 OT plan of care updated on this date. Tentative length of stay is 6 weeks Soto Colon OTR/L [id number]  11/3/21- Pt POC updated on this date. Pt tentative length of day is 5 weeks to address above problem areas Nate Samon OTR/L [id number]  11/10/21- Pt POC updated on this date. Pt tentative length of day is 4 weeks to address above problem areas -Soto Colon OTR/L [id number]   DISCHARGE RECOMMENDATIONS  Recommended DME: drop arm BSC    Post Discharge Care:   []Home Independently  []Home with 24hr Care / Supervision []Home with Partial Supervision []Home with Home Health OT []Home with Out Pt OT []Other: ___   Comments:         Time in Time out Tx Time Breakdown  Variance:   First Session  1045 1171 [x] Individual Tx-45  [] Concurrent Tx -  [] Co-Tx -   [] Group Tx -   [] Time Missed -     Second Session [id number] [x] Individual Tx- 45 min  [] Concurrent Tx -   [] Co-Tx -   [] Group Tx -   [] Time Missed -     Third Session    [] Individual Tx-   [] Concurrent Tx -  [] Co-Tx -   [] Group Tx -   [] Time Missed -         Total Tx Time- 90 min     Baljeet Gonzalez GLOVER/L [id number]  I have read the above note and agree with the documentation.   Soto Colon OTR/L [id number]

## 2021-11-15 NOTE — PROGRESS NOTES
clubbing, edema  Skin: R crani c/d/i   Neurologic: Alert, oriented x4. Answering all questions appropriately. Left neglect. Speech clear. No aphasia appreciated. Mild L facial droop. Spastic left hemiplegia. MAS 1+ in LUE; MAS 1+-2 in LLE. Motor 5/5 RUE and RLE; 0/5 LUE and LLE.          Laboratory:    Lab Results   Component Value Date    WBC 8.3 11/09/2021    HGB 12.1 11/09/2021    HCT 37.4 11/09/2021    MCV 94.9 11/09/2021     (H) 11/09/2021     Lab Results   Component Value Date     11/09/2021    K 4.3 11/09/2021    K 4.1 11/08/2021     11/09/2021    CO2 23 11/09/2021    BUN 13 11/09/2021    CREATININE 0.7 11/09/2021    GLUCOSE 94 11/09/2021    CALCIUM 9.2 11/09/2021      Lab Results   Component Value Date    ALT 75 (H) 10/23/2021    AST 46 (H) 10/23/2021    ALKPHOS 89 10/23/2021    BILITOT 0.4 10/23/2021       Functional Status:   Bed mobility: Min A  Transfers: Min-Mod A  Ambulation: 125 ft with trial L articulating AFO and R SBQC Min A  Feeding: Set up  Grooming: SBA  UB dressing: Min A  LB dressing: Mod A       Assessment/Plan:       39 y.o. female admitted to inpatient rehabilitation for impairments and activities limitations in ADLs and mobility secondary to right MCA CVA.      -Right MCA CVA: Complicated by cerebral edema requiring emergent right hemicraniectomy (10/13/2021). Dense left spastic hemiplegia, left neglect, mild cognitive impairment. Helmet when out of bed. L PRAFO in bed. LUE sling for gait. L w/c tray. Aspirin, Plavix, Lipitor for secondary prevention. Monitor neuro exam. Continue Acute Rehab program.   -Spasticity LUE/LLE: Monitor. Tone currently helpful to compensate for weakness with attempted gait in PT. Started on low dose baclofen at bedtime due to occasional painful spasms in the leg at night only. Monitor.   -Possible seizure:  EEG with no seizure activity, but showed an increased potential for focal seizures from the right frontotemporal region.  Evaluated by Neurology. Started on Lamictal - 25 mg daily x 2 weeks (start11/11), then 50 mg daily x 2 weeks (start 11/25), then 100 mg daily x 2 weeks (start 12/9), then 50 mg qAM and 100 mg qHS x 2 weeks (start 12/23), then 100 mg BID for maintenance (start1/6/22). -Pain control, MSK pain, neuropathic pain: Tylenol PRN, Tramadol PRN, gabapentin, Lidoderm, Voltaren gel  -Urinary retention: On flomax, now voiding well, low PVRs  -Tachycardia: intermittent. Cardiology consulted. She was started on Lopressor. Cardiology attempted to access data from her heart monitor unsuccessfully. She will follow up with Cardiology in office to review heart monitor data once available. -Constipation: colace, senna, glycolax. PRNs available.   -Tobacco abuse: Nicoderm patch  -DVT prophylaxis: heparin SQ, SCDs      Continue Acute rehab program. Progressing. Benadryl PRN. ?  Medication reaction      Electronically signed by Litzy Echols MD on 11/15/2021 at 3:30 PM

## 2021-11-15 NOTE — PROGRESS NOTES
Nutrition Assessment     Type and Reason for Visit: Reassess    Nutrition Recommendations/Plan: Continue current diet and ONS    Nutrition Assessment:  Pt stable from a nutritional standpoint w/ ongoing PO intakes >75% noted. Admit to ARU s/p CVA s/p crani. Will continue to monitor. Malnutrition Assessment:  Malnutrition Status: No malnutrition    Estimated Daily Nutrient Needs:  Energy (kcal): MSJ 1443 x 1.1 SF = 0396-9763; Weight Used for Energy Requirements:  Current     Protein (g): 75-90; Weight Used for Protein Requirements:  Ideal (1.3-1.5)        Fluid (ml/day): 0456-8963; Weight Used for Fluid Requirements:  1 ml/kcal      Nutrition Related Findings: A&Ox4, active BS, soft abd, constipation, trace edema, +I/Os      Current Nutrition Therapies:    ADULT DIET; Regular  ADULT ORAL NUTRITION SUPPLEMENT; Lunch, Dinner; Frozen Oral Supplement    Anthropometric Measures:  · Height: 5' 6\" (167.6 cm)  · Current Body Wt: 170 lb (77.1 kg) (10/22 no method)   · BMI: 27.5    Nutrition Diagnosis:   No nutrition diagnosis at this time     Nutrition Interventions:   Food and/or Nutrient Delivery:  Continue Current Diet, Continue Oral Nutrition Supplement  Nutrition Education/Counseling:  Education not indicated   Coordination of Nutrition Care:  Continue to monitor while inpatient    Goals:  pt to consume >75% meals/ONS       Nutrition Monitoring and Evaluation:    Food/Nutrient Intake Outcomes:  Food and Nutrient Intake, Supplement Intake  Physical Signs/Symptoms Outcomes:  Biochemical Data, Constipation, GI Status, Fluid Status or Edema, Nutrition Focused Physical Findings, Skin, Weight     Discharge Planning:     Too soon to determine     Electronically signed by Felton Franklin, MS, RD, LD on 11/15/21 at 2:01 PM EST    Contact: [phone]

## 2021-11-15 NOTE — PROGRESS NOTES
Physical Therapy  Treatment Note  Supervising Therapist: Lance Hauser, JAYME, DPT [id number]    NAME: Guillaume Hernández  [facility]  DIAGNOSIS: Acute CVA  PRECAUTIONS: Falls, helmet at all times when OOB, L hemiplegia, L homonymous hemianopsia L neglect, alarm, must bring phone to therapy (cardiac monitor), L PRAFO, SBP <180  HPI: Pt is a 39 y.o. female with past medical history significant for recent COVID-19 infection (9/15/21) who [facility] with acute onset left-sided weakness. Initial NIHSS 17.  Initial CT (OSH) showed R MCA hyperdensity; initial CTA showed right ICA/M1 occlusion. On 10/13, her neuro exam worsened and repeat imaging indicated worsening edema, 1 cm of midline shift. She underwent emergent R hemicraniectomy by Dr. Reynoso [facility]) on 10/13. After being deemed medically appropriate, she was transferred to [place] [id number] on 10/22/2021    Social:  Pt lives with daughter (15 y/o), will be returning to parent's home upon discharge (1 floor plan, 2 HAIDER with 2 HR). Mother and father healthy, do not work. 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 Initial Evaluation  Date: 10/23/21 AM     PM    Short Term Goals Long Term Goals    Was pt agreeable to Eval/treatment? yes Yes Yes     Does pt have pain? Pain on the top of her head once the helmet was placed on Mild c/o L hip and R rib pain Mild c/o moderate L hip/knee pain with activity     Bed Mobility  Rolling: Max A  Supine to sit: Max A   Sit to supine: Max A   Scooting: Max A Supine<>Sit Noemy  Scooting/Rolling Noemy (twin bed) NT Min A Supervision   Transfers Sit to stand: Max A   Stand to sit: Max A  Stand pivot:  Max A x2    5xSTS: Unable to complete Sit<>stand: Noemy  Stand pivot: Noemy/ModA with R SBQC Sit<>stand: Noemy  Stand pivot: Noemy/ModA with R SBQC   Min A SBA  5xSTS: TBD   Ambulation    15 feet with R mackey rail with Max A + wc follow    10mWT: TBA  6mWT: TBA 100 feet x 2 reps with R SBQC with Noemy  (trial L articulating AFO with DF/PF stop)    10mWT: 0.10 m/s  6mWT: 33 m  (R hemicane, L knee immobilizer/AFO, ModA 11/5) 140 feet x 2 reps and 100 feet x 1 rep with R SBQC and Noemy   (trial L articulating AFO with DF/PF stop)   50 feet with AAD with Min A       500 feet with AAD with SBA    10mWT: >0.4 m/s  6mWT: >205 m   Walking 10 feet on uneven surface NT NT NT 10 feet with AAD with Min A 10 feet with AAD with SBA   Wheel Chair Mobility NT NT NT  150 feet with R extremities with SBA   Car Transfers NT NT NT Min A SBA   Stair negotiation: ascended and descended NT 8 steps x 1 rep and 4 steps x 1 rep with 1 HR (RUE)  with Noemy (non-reciprocal, ascending/descending leading with LLE)  4 steps with 1 rail with Min A 12 steps with 1 rail with SBA   Curb Step:   ascended and descended NT 2\" step x 4 reps and 4\" step x 4 reps with R SBQC, L trial AFO with Noemy (cues to lead with LLE) NT 4 inch step with AAD and Min A 4 inch step with AAD and SBA   Picking up object off the floor NT NT NT Will  an object with Min assist Will  an object with SBA   BLE ROM WFL: LLE AROM limited by flaccidity  WFL: LLE AROM limited by spasticity 2+ on MAS (hamstrings, hip extensors)      BLE Strength RLE 4+/5  LLE 0/5 RLE 5/5  LLE hip flexion 2+/5  Knee extension 2-/5  Ankle DF/PF 0/5      Balance  Static Sitting: Mod/Max A    BBS: TBA  FGA: TBA Sitting: Min A  Standing: Mod A  BBS: 4/56  (11/5/21)       BBS: TBD  FGA: TBD   Date Family Teach Completed TBA Pt's mother present for observation 10/24, 11/2, 11/3, 114, hands on 11/9, 11/11      Is additional Family Teaching Needed?   Y or N Y Yes      Hindering Progress L hemiplegia, L neglect L hemiplegia, L neglect      PT recommended ELOS 5 weeks       Team's Discharge Plan        Therapist at Team Meeting          HRmax: (178, -10  bpm for beta blocker) 168 bpm Time spent at moderate intensity (60-70% HRmax) Time spent at high intensity (70-85% HRmax)    AM: 0:14:56 AM: 0:30:28    PM: 0:20:51 PM: 0:07:28     Therapeutic Exercise:   AM: FW non-reciprocal stepping over 3 SPC + ascending/descending 2\" step and 4\" step, R SBQC, L trial AFO, x 2 reps with Noemy  FW reciprocal ambulation with R HHA progressing to no UE support, 200 feet x 8 reps with ModA/MaxA ( L trial AFO)  PM: FW reciprocal ambulation with no UE support, 200 feet x 3 reps and 75 feet x 3 reps with ModA/MaxA ( L trial AFO)  Side stepping without UE support, 25 feet x 1 rep L and R with ModA/MaxA (L trial AFO)    Patient education  Pt educated on posterior lean relation to dynamic standing balance without device    Patient response to education:   Pt verbalized understanding Pt demonstrated skill Pt requires further education in this area   yes partial yes     Additional Comments: Pt remains motivated during sessions. Pt continues to require extensive safety cueing during transfers to avoid retropulsion and to maintain safe TY. Reciprocal gait training progressed from HHA to no UE support however assistance provided to wait to prevent LOB. Pt tends to intermittently exhibits strong posterior lean while ambulating reciprocally due to fear of falling FW. Pt remains impulsive with frequent reaching for support when AD is withheld. /56 prior to AM session, 113/62 after. PM session characterized by continued gait training repetitions without device to challenge propulsion and postural stability. /72 following PM session activity. Plan to review mobility basics next session. Chair/bed alarm: armed following sessions    AM  Time in: 0830  Time out: 0915    PM  Time in: 1300  Time out: 1345    Pt is making good progress toward established Physical Therapy goals. Continue with physical therapy current plan of care.     Clarence Candelaria, PT, DPT  Board Certified Clinical Specialist in Neurologic Physical Therapy  [id number]

## 2021-11-16 PROCEDURE — 97110 THERAPEUTIC EXERCISES: CPT

## 2021-11-16 PROCEDURE — 97530 THERAPEUTIC ACTIVITIES: CPT

## 2021-11-16 PROCEDURE — 6360000002 HC RX W HCPCS: Performed by: PHYSICAL MEDICINE & REHABILITATION

## 2021-11-16 PROCEDURE — 6370000000 HC RX 637 (ALT 250 FOR IP): Performed by: PHYSICAL MEDICINE & REHABILITATION

## 2021-11-16 PROCEDURE — 97116 GAIT TRAINING THERAPY: CPT

## 2021-11-16 PROCEDURE — 99232 SBSQ HOSP IP/OBS MODERATE 35: CPT | Performed by: PHYSICAL MEDICINE & REHABILITATION

## 2021-11-16 PROCEDURE — 97535 SELF CARE MNGMENT TRAINING: CPT

## 2021-11-16 PROCEDURE — 6370000000 HC RX 637 (ALT 250 FOR IP): Performed by: PHYSICIAN ASSISTANT

## 2021-11-16 PROCEDURE — 1280000000 HC REHAB R&B

## 2021-11-16 RX ADMIN — POLYETHYLENE GLYCOL 3350 17 G: 17 POWDER, FOR SOLUTION ORAL at 08:32

## 2021-11-16 RX ADMIN — BACLOFEN 5 MG: 10 TABLET ORAL at 20:57

## 2021-11-16 RX ADMIN — POLYETHYLENE GLYCOL 3350 17 G: 17 POWDER, FOR SOLUTION ORAL at 20:57

## 2021-11-16 RX ADMIN — ACETAMINOPHEN 1000 MG: 325 TABLET ORAL at 06:40

## 2021-11-16 RX ADMIN — GABAPENTIN 100 MG: 100 CAPSULE ORAL at 08:31

## 2021-11-16 RX ADMIN — HEPARIN SODIUM 5000 UNITS: 10000 INJECTION INTRAVENOUS; SUBCUTANEOUS at 06:41

## 2021-11-16 RX ADMIN — HEPARIN SODIUM 5000 UNITS: 10000 INJECTION INTRAVENOUS; SUBCUTANEOUS at 18:09

## 2021-11-16 RX ADMIN — LAMOTRIGINE 25 MG: 25 TABLET ORAL at 08:32

## 2021-11-16 RX ADMIN — ASPIRIN 81 MG CHEWABLE TABLET 81 MG: 81 TABLET CHEWABLE at 08:31

## 2021-11-16 RX ADMIN — TAMSULOSIN HYDROCHLORIDE 0.4 MG: 0.4 CAPSULE ORAL at 08:31

## 2021-11-16 RX ADMIN — ACETAMINOPHEN 1000 MG: 325 TABLET ORAL at 14:15

## 2021-11-16 RX ADMIN — METOPROLOL TARTRATE 12.5 MG: 25 TABLET, FILM COATED ORAL at 08:32

## 2021-11-16 RX ADMIN — GABAPENTIN 100 MG: 100 CAPSULE ORAL at 20:57

## 2021-11-16 RX ADMIN — GABAPENTIN 100 MG: 100 CAPSULE ORAL at 14:16

## 2021-11-16 RX ADMIN — ATORVASTATIN CALCIUM 40 MG: 40 TABLET, FILM COATED ORAL at 20:58

## 2021-11-16 RX ADMIN — SENNOSIDES 17.2 MG: 8.6 TABLET, COATED ORAL at 20:58

## 2021-11-16 RX ADMIN — DIPHENHYDRAMINE HYDROCHLORIDE 25 MG: 25 TABLET ORAL at 20:58

## 2021-11-16 RX ADMIN — CLOPIDOGREL 75 MG: 75 TABLET, FILM COATED ORAL at 08:33

## 2021-11-16 RX ADMIN — ACETAMINOPHEN 1000 MG: 325 TABLET ORAL at 20:58

## 2021-11-16 RX ADMIN — DOCUSATE SODIUM 100 MG: 100 CAPSULE, LIQUID FILLED ORAL at 20:58

## 2021-11-16 RX ADMIN — DOCUSATE SODIUM 100 MG: 100 CAPSULE, LIQUID FILLED ORAL at 08:31

## 2021-11-16 RX ADMIN — Medication 6 MG: at 20:58

## 2021-11-16 ASSESSMENT — PAIN SCALES - GENERAL
PAINLEVEL_OUTOF10: 3
PAINLEVEL_OUTOF10: 6
PAINLEVEL_OUTOF10: 5
PAINLEVEL_OUTOF10: 3
PAINLEVEL_OUTOF10: 0
PAINLEVEL_OUTOF10: 0
PAINLEVEL_OUTOF10: 6

## 2021-11-16 ASSESSMENT — PAIN DESCRIPTION - ORIENTATION
ORIENTATION: LEFT

## 2021-11-16 ASSESSMENT — PAIN DESCRIPTION - PAIN TYPE
TYPE: ACUTE PAIN

## 2021-11-16 ASSESSMENT — PAIN DESCRIPTION - LOCATION
LOCATION: KNEE;ANKLE
LOCATION: KNEE
LOCATION: KNEE;ANKLE
LOCATION: KNEE

## 2021-11-16 ASSESSMENT — PAIN DESCRIPTION - DESCRIPTORS
DESCRIPTORS: ACHING;DISCOMFORT;SORE
DESCRIPTORS: ACHING;SORE;DISCOMFORT
DESCRIPTORS: ACHING;CONSTANT;DISCOMFORT
DESCRIPTORS: ACHING;CONSTANT;DISCOMFORT

## 2021-11-16 ASSESSMENT — PAIN SCALES - WONG BAKER
WONGBAKER_NUMERICALRESPONSE: 0

## 2021-11-16 ASSESSMENT — PAIN DESCRIPTION - FREQUENCY
FREQUENCY: INTERMITTENT

## 2021-11-16 ASSESSMENT — PAIN DESCRIPTION - PROGRESSION
CLINICAL_PROGRESSION: NOT CHANGED

## 2021-11-16 ASSESSMENT — PAIN DESCRIPTION - ONSET
ONSET: ON-GOING
ONSET: ON-GOING

## 2021-11-16 NOTE — PROGRESS NOTES
Physical Therapy  Treatment Note  Supervising Therapist: Jr Garibay, PT, DPT [id number]    NAME: Kota Madrid  [facility]  DIAGNOSIS: Acute CVA  PRECAUTIONS: Falls, helmet at all times when OOB, L hemiplegia, L homonymous hemianopsia L neglect, alarm, must bring phone to therapy (cardiac monitor), L PRAFO, SBP <180  HPI: Pt is a 39 y.o. female with past medical history significant for recent COVID-19 infection (9/15/21) [facility] with acute onset left-sided weakness. Initial NIHSS 17.  Initial CT (OSH) showed R MCA hyperdensity; initial CTA showed right ICA/M1 occlusion. On 10/13, her neuro exam worsened and repeat imaging indicated worsening edema, 1 cm of midline shift. She underwent emergent R hemicraniectomy by Dr. [facility]) on 10/13. After being deemed medically appropriate, she was transferred to [place] [address] on 10/22/2021    Social:  Pt lives with daughter (15 y/o), will be returning to parent's home upon discharge (1 floor plan, 2 HAIDER with 2 HR). Mother and father healthy, do not work. 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 Initial Evaluation  Date: 10/23/21 AM     PM    Short Term Goals Long Term Goals    Was pt agreeable to Eval/treatment? yes Yes Yes     Does pt have pain? Pain on the top of her head once the helmet was placed on Mild c/o L hip and R rib pain Mild c/o moderate L hip/knee pain with activity     Bed Mobility  Rolling: Max A  Supine to sit: Max A   Sit to supine: Max A   Scooting: Max A Supine<>Sit Noemy  Scooting/Rolling Noemy (twin bed) NT Min A Supervision   Transfers Sit to stand: Max A   Stand to sit: Max A  Stand pivot:  Max A x2    5xSTS: Unable to complete Sit<>stand: Noemy  Stand pivot: Noemy with R SBQC Sit<>stand: Noemy  Stand pivot: Noemy with R SBQC   Min A SBA  5xSTS: TBD   Ambulation    15 feet with R mackey rail with Max A + wc follow    10mWT: TBA  6mWT:  feet x 2 reps with R SBQC with Noemy  (trial L articulating AFO with DF/PF stop)     140 feet x 1 rep and 160  feet x 1 rep with R SBQC and Noemy   (trial L articulating AFO with DF/PF stop)  10mWT: 0.15 m/s  6mWT: 52 m  (R SBQC, L Trial AFO, Noemy 11/16)   50 feet with AAD with Min A       500 feet with AAD with SBA    10mWT: >0.4 m/s  6mWT: >205 m   Walking 10 feet on uneven surface NT NT NT 10 feet with AAD with Min A 10 feet with AAD with SBA   Wheel Chair Mobility NT NT NT  150 feet with R extremities with SBA   Car Transfers NT ModA x 2 reps NT Min A SBA   Stair negotiation: ascended and descended NT 8 steps x 1 rep and 4 steps x 2 reps with 1 HR (RUE)  with Noemy/ModA (non-reciprocal, ascending/descending leading with LLE)  4 steps with 1 rail with Min A 12 steps with 1 rail with SBA   Curb Step:   ascended and descended NT NT NT 4 inch step with AAD and Min A 4 inch step with AAD and SBA   Picking up object off the floor NT NT NT Will  an object with Min assist Will  an object with SBA   BLE ROM WFL: LLE AROM limited by flaccidity  WFL: LLE AROM limited by spasticity 2+ on MAS (hamstrings, hip extensors, PF)  L ankle DF lacking ~5 degrees from neutral      BLE Strength RLE 4+/5  LLE 0/5 RLE 5/5  LLE hip flexion 2+/5  Knee extension 2-/5  Ankle DF/PF 0/5      Balance  Static Sitting: Mod/Max A    BBS: TBA  FGA: TBA   Standing: Noemy     BBS: 11/56  (11/5/21)      BBS: >20/56  FGA: TBD   Date Family Teach Completed TBA Pt's mother present for observation 10/24, 11/2, 11/3, 114, hands on 11/9, 11/11, 11/16      Is additional Family Teaching Needed?   Y or N Y Yes      Hindering Progress L hemiplegia, L neglect L hemiplegia, L neglect      PT recommended ELOS 5 weeks       Team's Discharge Plan        Therapist at Team Meeting          HRmax: (178, -10  bpm for beta blocker) 168 bpm Time spent at moderate intensity (60-70% HRmax) Time spent at high intensity (70-85% HRmax)    AM: NA due to FT AM: NA    PM: 0:25:37 PM: 0:09:19     Therapeutic Exercise:   AM: NA  PM: Ambulation without AD, reciprocal pattern 200 feet x 2 reps with ModA  Weaving between 3 standing quad canes without AD, x 4 reps with ModA/maxA    Patient education  Pt educated on safe hand placement and turning technique with approach to car    Patient response to education:   Pt verbalized understanding Pt demonstrated skill Pt requires further education in this area   yes partial yes     Additional Comments: Pt remains motivated and pleasant during sessions however poor safety and impulsivity/L neglect pose significant concerns for safe mobility at this time. Pt's mother present for AM session FT, resumed hands on training. Mother assisted pt with donning sling/gait belt. Mother continued hands on training assisting patient with basic standing mobility using SBQC. Mother is becoming more comfortable and proficient with basic standing and walking tasks, still requires therapist input for stair negotiation and more complex transfers including car transfers. Pt's impulsivity remains the most evident barrier to recovery. Pt is performing well with articulated AFO, recommend orthotist consult for custom AFO at this time. RN notified. Pt's /62 prior to AM session, OM repeated with non-significant improvement in walking OM. This is due to pt's current walking strategy of non-reciprocal pattern for safety which significantly limits speed. Pt however has progressed device, bracing, and level of assistance compared to previous testing administration. Pt reached [facility] for acute stroke patients on repeat of BBS. Plan to progress reciprocal gait training next session. AM  Time in: 0830  Time out: 0915    PM  Time in: 1300  Time out: 7915    Pt is making good progress toward established Physical Therapy goals. Continue with physical therapy current plan of care.     Mera Dowd, PT, DPT  Board Certified Clinical Specialist in Neurologic Physical Therapy  [id number]

## 2021-11-16 NOTE — PROGRESS NOTES
OCCUPATIONAL THERAPY DAILY NOTE    Date:2021  Patient Name: Cristiana May  MRN: [id number]  : 1975  Room: [address] [place], [place] [address]     Diagnosis:Acute CVA( [facility] Level 2 [facility] on [facility]. L side weakness, neglect. NIH-17  Found to have large R MCA involving R occipital)  Surgery: R frontotemporal decompressive hemicraniectomy on 10/13/21  Past Medical History: COVID (9/15), tobacco use  Precautions: Falls, L hemiplegia, L neglect, L sublux, bed/chair alarm, helmet when OOB, impulsive, L PRAFO    Functional Assessment:   Date Status AE  Comments   Feeding 21 Set-up     Grooming 21 SBA  w/c          Oral Care 21 Set-up     Bathing 21 Mod A Shower chair with wheels      UB Dressing 21 MIN A      LB Dressing 21 Mod reacher    Footwear 21 Min A  Dep- L AFO Lh shoe horn    Toileting 21 Mod A Grab bar   Pt performed toilet hygiene seated on commode in pm session. Pt needs assist for clothing mgmt for safety due to one handed skills and LUE/LLE weakness. Homemaking 21 Mod A       Functional Transfers / Balance:   Date Status DME  Comments   Sit Balance 21 SBA  AM: demo'd on firm recliner, sofa, and up in w/c to increase overall endurance and strength. PM; Demo'd on EOB and 3:1 commode in room along with up in w/c during arm ex's for strength & endurance for functional tasks. Stand Balance 21 Min A SBQC  Grab bar  Bed rail   Am: demo'd during functional transfer training using quad cane along with mobility over carpeted area similar to home per parent. PM:  Demo'd during clothing mgmt and w/c>EOB transfer using grab bar and bed rail for balance. [x] Tub        [x] Shower   Transfer 21 min/mod A          Mod A Extended tub bench, SBQC      Roll in shower chair, grab bar, LBQC Am; Pt needed cues for trunk and hand placement during ext tub bench transfers for safety and balance.   Pt required assist with LLE mgmt in/out of tub c/o left hip pain   Mom present to observe and assist.               Commode   Transfer 11/16/21 Min A , 3:1 commode SBQC  Grab bar  PM:w/c<>commode transfers using grab bar for balance with SPT method along with w/c to EOB using bed rail with one handed skills LUE in sling for balance & joint protection. Functional   Mobility 11/16/21 Min A SBQC Pt used quad cane over carpet in OT apt along with in bathroom needing cues for  for technique, foot placement, pacing and safety  Mom present to observe and assist.    Other:     W/c>EOB       Sit>supine       Couch,  firm  recliner      11/16/21 11/16/21 11/16/21     MIN A       MIN A       Min/ MOD A      Bed rail             SBQC     PM: Min cues for LLE placement prior to  Standing during w/c to EOB using bed rail to increase balance and safety. PM: Pt needed assist with BLE mgmt for safety and balance. AM: Min A on/off firm recliner using quad cane for balance and safety. Mod A with sofa transfer due to low surface needing cues pushing off R side since LUE in sling. Functional Exercises / Activity:  PM: Pt engaged in BUE SROM ex's on skate & figure 8 board to increase L UE ROM 5 x 10 reps to increase ROM at all joint sto increase independence with ADL's, and functional tasks. LUE ex's performed on skate and furniture slider while using vibrator massager input for bicep, triceps, forearm and wrist to increase muscular input for active use. Pt tolerated ex's well with cues for LUE positioning including when seated up in w/c to for joint stability decrease subluxation. Sensory / Neuromuscular Re-Education:      Cognitive Skills:   Status Comments   Problem   Solving fair    Memory fair    Sequencing fair    Safety fair      Visual Perception:    Education:  Pt/mom educated about LUE positioning when up in w/c to promote ROM/wt bearing along with joint integrity.   Pt/mom educated with safety during fxl mobility in OT apt using quad cane especially over carpeted area since has carpet at home. Pt /mom educated with safety during ext tub bench at quad cane level due to hand/trunk placement for balance. 10/25/21: pt educated on SROM of LUE and safe positioning of LUE  10/29/21: educated on 1 handed dressing technique for socks/shoes  11/1/21: shoe lace adapted for ease with donning shoe  11/1/21: attempted small strips of kineosio tape on pt's hand to assess if pt can tolerate on her shoulder due to report of allergy (cleared by )  11/2/21:  Therapist applied kineosiotape to pt's LUE to stabilize sublux. Will continue to assess issues that arise with adhesive and progression of sublux  11/2/21: pt educated on AE for ease with LB dressing  11/3/21: Pt very fatigued in AM and PM sessions, requiring max cueing to alert pt back to task and engage in session, with pt asking multiple times to return to room/return to bed. Extended time to complete all tasks  11/4/21: home ROM exercise program placed in pt's folder and pt's mother notified of it  11/7/21 Pt educated about LUE positioning both up in w/c and in bed to maintain joint integrity. [x] Family teach completed on:  11/4/21: Completed family teach this session. Pt's mother Penelope Schafer present. Educated Penelope Schafer on pt's status with ADLs and vc's for sequencing/initiation of tasks/hand placement with functional transfers. Penelope Schafer able to provide hands on assist with was educated for body mechanics/hand placement/vc's to increase safety with only commode transfer with slideboard and toileting task. Penelope Schafer educated on PROM (elbow/shoulder/wrist/digit flex/ext) of LUE to increase ROM, maintain joint integrity, and safe management of sublux. Penelope Schafer educated on how to doff/jesus gait belt and LUE sling. Pt's limiting factor appears to be her impulsivity and safety with ADLs/transfer. Penelope Schafer demo'ing F understanding/safety of education.  Penelope Schafer appeared motivated and willing to learn. Continue to educate. 11/9/21: Completed family teach this session. Pt's mother Vola Scale present. Educated Vola Scale on pt's increased independence with ADLs/functional transfers. Vola Scale able to jesus pt's sling and gait belt. Vola Scale completed hands on assist with SPT to [facility]. Vola Scale and pt demo'ing improved communication with each other for safe transfer/completion of ADLs. Vola Scale demo'ing F understanding/safety of education. Continue to educate. 11/11/21- Mother present and educated along with assisting with w/c<>commode transfers using grab bar for balance and one handed skills. LUE placed in sling for balance and joint protection. Pt/mom educated with ext tub bench transfers to simulate for home environment with parent participating with transfer. Pt needed cues for hand/trunk placement on bench along with proper follow thru for safety. Pt/mom educated SPT using quad cane from w/c <>ext tub bench with patient needing cues for technique with cane & taking few steps. 11/16/21 Mom present and educated with transfers on/off firm recliner, sofa and ext tub bench transfers at quad cane level along with standing balance and positioning for LUE seated up in w/c. Pain Level:  Left hip at times with specific movements . Additional Notes:     Patient has made fair + progress during treatment sessions toward set goals.    Therapy emphasis to obtain goals:    Time Frame for Long term goals  6 weeks   Long term goal 1 Pt will complete UB self-care tasks w/ Supervision   Long term goal 2 Pt will complete LB self-care tasks w/ Supervision   Long term goal 3 Pt will complete toileting (all aspects) w/ Supervision   Long term goal 4 Pt will safely complete toilet transfers w/ Supervision   Long term goal 5 Pt will safely complete tub/shower transfers w/ Supervision   Long term goals 6 Pt will complete basic homemaking task w/ SBA   Long term goal 7 Pt will complete grooming task w/ Supervision Long term goal 8 Pt will complete feeding task w/ Supervision/setup     [x] Continue with current OT Plan of care. [] Prepare for Discharge    10/27/21 OT plan of care updated on this date. Tentative length of stay is 6 weeks Richie Mcpherson OTR/L [id number]  11/3/21- Pt POC updated on this date. Pt tentative length of day is 5 weeks to address above problem areas Ann Ward OTR/L [id number]  11/10/21- Pt POC updated on this date. Pt tentative length of day is 4 weeks to address above problem areas -Richie Mcpherson OTR/L [id number]   DISCHARGE RECOMMENDATIONS  Recommended DME: drop arm BSC    Post Discharge Care:   []Home Independently  []Home with 24hr Care / Supervision []Home with Partial Supervision []Home with Home Health OT []Home with Out Pt OT []Other: ___   Comments:         Time in Time out Tx Time Breakdown  Variance:   First Session  [id number] [x] Individual Tx-45  [] Concurrent Tx -  [] Co-Tx -   [] Group Tx -   [] Time Missed -     Second Session 1430 15:30pm [x] Individual Tx- 60 min  [] Concurrent Tx -   [] Co-Tx -   [] Group Tx -   [] Time Missed -     Third Session    [] Individual Tx-   [] Concurrent Tx -  [] Co-Tx -   [] Group Tx -   [] Time Missed -         Total Tx Time- 105 min   Marlon Villavicencio [facility]  I have read the above note and agree with the documentation.   Richie Mcpherson OTR/L [id number]

## 2021-11-16 NOTE — PROGRESS NOTES
Physical Therapy  Weekly Team Note  Supervising Therapist: Jr Garibay, PT, DPT [id number]    NAME: Kota Madrid  [facility]  DIAGNOSIS: Acute CVA  PRECAUTIONS: Falls, helmet at all times when OOB, L hemiplegia, L neglect, contraversive pusher, alarm, must bring phone to therapy (cardiac monitor), L PRAFO, SPB <180  HPI: Pt is a 39 y.o. female with past medical history significant for recent COVID-19 infection (9/15/21) who [facility] with acute onset left-sided weakness. Initial NIHSS 17.  Initial CT ([facility]) showed R MCA hyperdensity; initial CTA showed right ICA/M1 occlusion. On 10/13, her neuro exam worsened and repeat imaging indicated worsening edema, 1 cm of midline shift. She underwent emergent R hemicraniectomy by Dr. Knight [facility]) on 10/13. After being deemed medically appropriate, she was transferred to [place] [id number] on 10/22/2021    Social:  Pt lives with daughter (15 y/o), will be returning to parent's home upon discharge (1 floor plan, 2 HAIDER with 2 HR). Mother and father healthy, do not work. 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 Initial Evaluation  Date: 10/23/21 10/26/21     11/2/21 11/9/21 11/16/21 Comments Short Term Goals Long Term Goals    Was pt agreeable to Eval/treatment? yes Yes Yes Yes Yes      Does pt have pain? Pain on the top of her head once the helmet was placed on Moderate R rib and L hip pain, limiting ambulation at times Mild R rib and L hip pain No c/o pain No c/o pain      Bed Mobility  Rolling: Max A  Supine to sit: Max A   Sit to supine: Max A   Scooting: Max A Rolling ModA  Supine<>Sit MaxA  Scooting MaxA Supine<>Sit ModA  Scooting/Rolling ModA Supine<>sit Noemy  Scooting/Rolling Noemy (mat table) Supine<>sit Noemy  Scooting/Rolling Noemy (twin bed) Cues for sequencing and trunk assistance Min A Supervision   Transfers Sit to stand: Max A   Stand to sit: Max A  Stand pivot:  Max A x2    5xSTS: Unable to complete Sit<>stand: Max A  Stand pivot: NT    Sliding board transfer MaxA to R and L from WC<>mat table Sit<>stand: ModA (MaxA with L knee immobilizer)  Stand pivot: MaxA with L hemicane     Sliding board transfer [address] to L and R Sit<>stand: ModA   Stand pivot: ModA with R LBQC   (trial L articulating AFO with DF/PF stop)    Sliding board transfer Noemy to R/L    Sit<>stand: Noemy   Stand pivot: Noemy with R LBQC   (trial L articulating AFO with DF/PF stop) Pt is significantly improving transfer ability. Impulsivity and L neglect remain most evident barriers to safe function Min A SBA  5xSTS: TBD   Ambulation    15 feet with R mackey rail with Max A + wc follow    10mWT: TBA  6mWT: TBA 40 feet with R hallway rail, L ankle DF wrap , L knee immobilizer, inside out sock over L shoe with MaxA    60 feet x 1 rep with R hemicane with ModA/MaxA      (L ankle PLS AFO , L knee immobilizer, inside out sock over L shoe, mirror for visual feedback)  (WC follow) 115 feet x 1 rep with R LBQC and ModA  (trial L articulating AFO with DF/PF stop)    10mWT: 0.10 m/s  6mWT: 33 m  (R hemicane, L knee immobilizer/AFO, ModA 11/5) 140 feet x 1 rep with R SBQC and Noemy  (trial L articulating AFO with DF/PF stop)    10mWT: 0.15 m/s  6mWT: 52 m  (R SBQC, L Trial AFO, Noemy 11/16) Pt progressed to [facility], requires frequent directional cues and safety cues due to L neglect. Pt instructed on visual scanning technique.     (Recommend orthotist consult for articulated AFO)   50 feet with AAD with Min A       500 feet with AAD with SBA    10mWT: >0.4 m/s  6mWT: >270 m   Wheel Chair Mobility NT 10 feet MaxA with R extremities 20 feet with R extremities with MaxA 50 feet with R extremities with ModA 100 feet with R extremities with [place] to avoid obstacle collision  150 feet with R extremities with Supervision   Car Transfers NT NT NT ModA ModA Extensive cueing for safety Min A SBA   Stair negotiation: ascended and descended NT NT NT 4 steps with 1 HR with ModA (RUE support, non-reciprocal) 8 steps with 1 HR with Noemy/ModA Assistance to place LLE 4 steps with 1 rail with Min A 12 steps with 1 rail with SBA   BLE ROM WFL: LLE AROM limited by flaccidity  WFL: LLE AROM limited by spasticity 2+ on MAS (hamstrings, hip extensors) WFL: LLE AROM limited by spasticity 2+ on MAS (hamstrings, hip extensors, plantar flexors) WFL: LLE AROM limited by spasticity 2+ on MAS (hamstrings, hip extensors, plantar flexors) WFL: LLE AROM limited by spasticity 2+ on MAS (hamstrings, hip extensors, PF)  L ankle DF lacking ~5 degrees from neutral L PF spasticity places pt at risk for contracture, please make sure PRAFO is donned when in bed     BLE Strength RLE 4+/5  LLE 0/5 RLE 4+/5  LLE 0/5 grossly, hip flexors/adductors 1/5 RLE 4+/5  LLE 0/5 grossly, hip flexors/adductors 2-/5 RLE 5/5  LLE hip flexion 2+/5  Knee extension 2-/5  Ankle DF/PF 0/5 RLE 5/5  LLE hip flexion 2+/5  Knee extension 2-/5  Ankle DF/PF 0/5 MMT scores significantly improved     Balance  Static Sitting: Mod/Max A    BBS: TBA  FGA: TBA Sitting: Mod A  Standing: Max A Sitting: Min A  Standing: Max A Sitting: CGA  Standing: ModA  BBS: 4/56  (11/5/21) Static and dynamic standing balance Noemy with R SBQC  BBS: 11/56  (11/16/21) Pt has met Zoltan Miller [facility] for acute stroke patients on BBS (7 pts)      BBS: >20/56  FGA: TBD   Date Family Teach Completed TBA Pt's mother present for observation 10/24 Pt's mother present for observation 10/24 and 11/2 Pt's mother present for observation 10/24, 11/2, 11/3, 114, hands on 11/9 Pt's mother present for observation 10/24, 11/2, 11/3, 114, hands on 11/9, 11/11, 11/16 Mother is somewhat anxious but becoming more comfortable     Is additional Family Teaching Needed?   Y or N Y Yes Y Y Y      Hindering Progress L hemiplegia, L neglect L hemiplegia, L neglect L hemiplegia, L neglect, impulsivity, poor safety L hemiplegia, L neglect, impulsivity, poor safety L hemiplegia, L neglect, impulsivity, poor safety      PT recommended ELOS 6 weeks 6 weeks 5 weeks 4 weeks 3 weeks      Team's Discharge Plan  6 weeks 5 weeks 4 weeks 3 weeks      Therapist at [address] [facility] [address], PT, DPT [id number]   Evelin Greenfield, PT, DPT [id number]   Evelin Greenfield, PT, DPT [id number]   Evelin Greenfield, PT, DPT [id number]          Date:  11/16/21  Supporting factors:  Pt remains motivated with good social support, exhibits continually improving function  Barriers to discharge:  Pt's L neglect, visual field deficits, and impulsivity remain most evident barriers to safe function. Additional comments:  Pt is participating well in HIGT, reciprocal gait training begun  DME:  TBD (anticipate SBQC, WC)  After Care:  HHPT        Date:  11/9/21  Supporting factors:  Pt is highly motivated with supportive family, is continually improving  Barriers to discharge:  Remaining hemiparesis, visual deficits, impulsivity with standing tasks  Additional comments:  Recommend holding on AFO consult at this time, pt has potential to progress to toe off AFO  DME:  TBD  After Care:  TBD      Date:  11/2/21  Supporting factors: [employer] age, improving activity tolerance and physical function, good recall of sequencing and technique (pt can verbalize sequence and her deficits)  Barriers to discharge:  Impulsivity, poor safety, and dense hemiplegia. Spasticity is not yet a significant problem during gait however pt remains at risk for PF contracture  Additional comments:  Pt has good social support, mother is considering installing ramp at home entrance (pt's father to build). Pt is consistently improving function. DME:  TBD  After Care:  TBD      Date:  10/26/21  Supporting factors: Pt is motivated, has good social support  Barriers to discharge:  Dense hemiplegia/trent-neglect on L. Spasticity may become a problem if worsened.  Poor safety and pushing behavior result in high fall risk  Additional comments:  Pain limiting ambulation at times. Pt has excellent potential for continued progress.    DME:  TBD  After Care:  TBD    Melissa Bello, PT, DPT  Board Certified Clinical Specialist in Neurologic Physical Therapy  [id number]

## 2021-11-17 PROCEDURE — 97535 SELF CARE MNGMENT TRAINING: CPT

## 2021-11-17 PROCEDURE — 99232 SBSQ HOSP IP/OBS MODERATE 35: CPT | Performed by: PHYSICAL MEDICINE & REHABILITATION

## 2021-11-17 PROCEDURE — 6370000000 HC RX 637 (ALT 250 FOR IP): Performed by: PHYSICIAN ASSISTANT

## 2021-11-17 PROCEDURE — 6370000000 HC RX 637 (ALT 250 FOR IP): Performed by: PHYSICAL MEDICINE & REHABILITATION

## 2021-11-17 PROCEDURE — 6360000002 HC RX W HCPCS: Performed by: PHYSICAL MEDICINE & REHABILITATION

## 2021-11-17 PROCEDURE — 1280000000 HC REHAB R&B

## 2021-11-17 PROCEDURE — 97530 THERAPEUTIC ACTIVITIES: CPT

## 2021-11-17 PROCEDURE — 97112 NEUROMUSCULAR REEDUCATION: CPT

## 2021-11-17 RX ADMIN — HEPARIN SODIUM 5000 UNITS: 10000 INJECTION INTRAVENOUS; SUBCUTANEOUS at 06:21

## 2021-11-17 RX ADMIN — ASPIRIN 81 MG CHEWABLE TABLET 81 MG: 81 TABLET CHEWABLE at 07:51

## 2021-11-17 RX ADMIN — GABAPENTIN 100 MG: 100 CAPSULE ORAL at 13:02

## 2021-11-17 RX ADMIN — GABAPENTIN 100 MG: 100 CAPSULE ORAL at 07:52

## 2021-11-17 RX ADMIN — TAMSULOSIN HYDROCHLORIDE 0.4 MG: 0.4 CAPSULE ORAL at 07:52

## 2021-11-17 RX ADMIN — DOCUSATE SODIUM 100 MG: 100 CAPSULE, LIQUID FILLED ORAL at 21:13

## 2021-11-17 RX ADMIN — BACLOFEN 5 MG: 10 TABLET ORAL at 21:12

## 2021-11-17 RX ADMIN — TRAMADOL HYDROCHLORIDE 25 MG: 50 TABLET, COATED ORAL at 07:51

## 2021-11-17 RX ADMIN — GABAPENTIN 100 MG: 100 CAPSULE ORAL at 21:13

## 2021-11-17 RX ADMIN — SENNOSIDES 17.2 MG: 8.6 TABLET, COATED ORAL at 21:13

## 2021-11-17 RX ADMIN — ACETAMINOPHEN 1000 MG: 325 TABLET ORAL at 06:20

## 2021-11-17 RX ADMIN — METOPROLOL TARTRATE 12.5 MG: 25 TABLET, FILM COATED ORAL at 07:52

## 2021-11-17 RX ADMIN — ATORVASTATIN CALCIUM 40 MG: 40 TABLET, FILM COATED ORAL at 21:13

## 2021-11-17 RX ADMIN — DOCUSATE SODIUM 100 MG: 100 CAPSULE, LIQUID FILLED ORAL at 07:51

## 2021-11-17 RX ADMIN — POLYETHYLENE GLYCOL 3350 17 G: 17 POWDER, FOR SOLUTION ORAL at 21:13

## 2021-11-17 RX ADMIN — CLOPIDOGREL 75 MG: 75 TABLET, FILM COATED ORAL at 07:53

## 2021-11-17 RX ADMIN — ACETAMINOPHEN 650 MG: 325 TABLET ORAL at 13:00

## 2021-11-17 RX ADMIN — LAMOTRIGINE 25 MG: 25 TABLET ORAL at 07:52

## 2021-11-17 RX ADMIN — Medication 6 MG: at 21:13

## 2021-11-17 RX ADMIN — POLYETHYLENE GLYCOL 3350 17 G: 17 POWDER, FOR SOLUTION ORAL at 08:25

## 2021-11-17 ASSESSMENT — PAIN DESCRIPTION - PAIN TYPE
TYPE: CHRONIC PAIN
TYPE: ACUTE PAIN
TYPE: ACUTE PAIN;CHRONIC PAIN
TYPE: ACUTE PAIN

## 2021-11-17 ASSESSMENT — PAIN DESCRIPTION - FREQUENCY
FREQUENCY: INTERMITTENT

## 2021-11-17 ASSESSMENT — PAIN SCALES - GENERAL
PAINLEVEL_OUTOF10: 6
PAINLEVEL_OUTOF10: 2
PAINLEVEL_OUTOF10: 0
PAINLEVEL_OUTOF10: 7
PAINLEVEL_OUTOF10: 3
PAINLEVEL_OUTOF10: 7
PAINLEVEL_OUTOF10: 0

## 2021-11-17 ASSESSMENT — PAIN DESCRIPTION - LOCATION
LOCATION: HIP
LOCATION: KNEE;ANKLE
LOCATION: HIP
LOCATION: HIP

## 2021-11-17 ASSESSMENT — PAIN DESCRIPTION - ONSET
ONSET: ON-GOING

## 2021-11-17 ASSESSMENT — PAIN DESCRIPTION - DESCRIPTORS
DESCRIPTORS: ACHING;DISCOMFORT
DESCRIPTORS: ACHING;DISCOMFORT;SORE
DESCRIPTORS: ACHING;DISCOMFORT

## 2021-11-17 ASSESSMENT — PAIN DESCRIPTION - PROGRESSION
CLINICAL_PROGRESSION: GRADUALLY IMPROVING
CLINICAL_PROGRESSION: GRADUALLY IMPROVING
CLINICAL_PROGRESSION: NOT CHANGED
CLINICAL_PROGRESSION: GRADUALLY IMPROVING
CLINICAL_PROGRESSION: NOT CHANGED
CLINICAL_PROGRESSION: GRADUALLY IMPROVING
CLINICAL_PROGRESSION: GRADUALLY IMPROVING

## 2021-11-17 ASSESSMENT — PAIN DESCRIPTION - ORIENTATION
ORIENTATION: LEFT

## 2021-11-17 ASSESSMENT — PAIN SCALES - WONG BAKER
WONGBAKER_NUMERICALRESPONSE: 0

## 2021-11-17 ASSESSMENT — PAIN - FUNCTIONAL ASSESSMENT
PAIN_FUNCTIONAL_ASSESSMENT: ACTIVITIES ARE NOT PREVENTED

## 2021-11-17 NOTE — PROGRESS NOTES
[id number] [facility] Physical Medicine and Rehabilitation  Comprehensive Progress Note    Subjective:      Stacey Salinas is a 39 y.o. female admitted to inpatient rehabilitation for impairments and activities limitations in ADLs and mobility secondary to right MCA CVA. No acute events overnight. No cp, sob, n/v. Tolerating therapy, progressing. She states the itching has resolved. The patient's medical records have been reviewed. Scheduled Meds:polyethylene glycol, 17 g, BID  lamoTRIgine, 25 mg, Daily  lidocaine, 1 patch, Daily  baclofen, 5 mg, Nightly  nicotine, 1 patch, Daily  senna, 2 tablet, Nightly  metoprolol tartrate, 12.5 mg, BID  acetaminophen, 1,000 mg, 3 times per day  gabapentin, 100 mg, TID  tamsulosin, 0.4 mg, Daily  heparin (porcine), 5,000 Units, Q12H  aspirin, 81 mg, Daily  atorvastatin, 40 mg, Nightly  vitamin D, 50,000 Units, Weekly  docusate sodium, 100 mg, BID  melatonin, 6 mg, Nightly  clopidogrel, 75 mg, Daily      Continuous Infusions:  PRN Meds:diphenhydrAMINE, 25 mg, Q6H PRN  diphenhydrAMINE-zinc acetate, , Q6H PRN  traMADol, 25 mg, BID PRN  ondansetron, 4 mg, Q6H PRN  lactulose, 20 g, BID PRN  guaiFENesin, 200 mg, BID PRN  acetaminophen, 650 mg, Q4H PRN  magnesium hydroxide, 30 mL, Daily PRN  bisacodyl, 10 mg, Daily PRN  diclofenac sodium, 2 g, 4x Daily PRN         Objective:      Vitals:    11/15/21 1359 11/15/21 2119 11/16/21 0815 11/16/21 2101   BP:  (!) 102/57 113/64 (!) 80/46   Pulse:  85 95 78   Resp:   18    Temp:   97.8 °F (36.6 °C)    TempSrc:   Oral    SpO2:   96%    Weight:       Height: 5' 6\" (1.676 m)        General appearance: alert, NAD, up in chair   Head: R crani site c/d/i   Eyes: conjunctivae/corneas clear. PERRL, EOM's intact.   Lungs: clear to auscultation bilaterally  Heart: regular rate and rhythm, S1, S2 normal  Abdomen: soft, non-tender, normal bowel sounds  Extremities: extremities normal, atraumatic, no cyanosis or edema  MSK: no cyanosis, clubbing, Started on Lamictal - 25 mg daily x 2 weeks (start11/11), then 50 mg daily x 2 weeks (start 11/25), then 100 mg daily x 2 weeks (start 12/9), then 50 mg qAM and 100 mg qHS x 2 weeks (start 12/23), then 100 mg BID for maintenance (start1/6/22). -Pain control, MSK pain, neuropathic pain: Tylenol PRN, Tramadol PRN, gabapentin, Lidoderm, Voltaren gel  -Urinary retention: On flomax, now voiding well, low PVRs  -Tachycardia: intermittent. Cardiology consulted. She was started on Lopressor. Cardiology attempted to access data from her heart monitor unsuccessfully. She will follow up with Cardiology in office to review heart monitor data once available. -Constipation: colace, senna, glycolax. PRNs available.   -Tobacco abuse: Nicoderm patch  -DVT prophylaxis: heparin SQ, SCDs      Continue Acute rehab program. Progressing.    Team conference tomorrow

## 2021-11-17 NOTE — PROGRESS NOTES
OCCUPATIONAL THERAPY DAILY NOTE    Date:2021  Patient Name: Cristiana May  MRN: [id number]  : 1975  Room: [address] [place], [place] [address]     Diagnosis:Acute CVA( [facility] as Level 2 stroke alert on 10/11/21. L side weakness, neglect. NIH-17  Found to have large R MCA involving R occipital)  Surgery: R frontotemporal decompressive hemicraniectomy on 10/13/21  Past Medical History: COVID (9/15), tobacco use  Precautions: Falls, L hemiplegia, L neglect, L sublux, bed/chair alarm, helmet when OOB, impulsive, L PRAFO    Functional Assessment:   Date Status AE  Comments   Feeding 21 Set-up  Pt self fed breakfast   Grooming 21 SBA  w/c          Oral Care 21 Set-up     Bathing 21 Min A Shower chair with wheels   full bathing task seated/stnading in shower, assist with balance when standing and slightly at RUE in AM   UB Dressing 21 CGA   Pt demo'ing F trent dressing technique, slight assist at shoulder and vc's in AM   LB Dressing 21 Mod reacher Assist with LLE this date due to hip paui, pt able to thread RLE and required assist with standing balance while pt pulled over hips in AM   Footwear 21 Mod A Lh shoe horn Pt able to jesus R sock and shoe.  Required assist with L sock/shoe/AFO due to hip pain in AM   Toileting 21 Min A Grab bar   Pt able to complete malvin hygiene and required assist with standing balance for pants in AM   Homemaking 21 Mod A       Functional Transfers / Balance:   Date Status DME  Comments   Sit Balance 21 SBA     Stand Balance 21 Min A SBQC  Grab bar  Bed rail      [x] Tub        [x] Shower   Transfer 21 min/mod A          Min A Extended tub bench, SBQC      Roll in shower chair, grab bar, LBQC         Step in method in/out of walk in shower in AM   Commode   Transfer 21 Min A , 3:1 commode SBQC  Grab bar     Functional   Mobility 21 Min A SBQC To/from bathroom in AM     Functional Exercises / Activity:   Pt sitting in chair upon arrival to  in AM. Completed ADLs, see above for assessment. Pt's aunt present. Aunt observed as well as assisted pt intermittently with ADLs/functional transfers. Pt appeared to have tolerated session well but continues to be limited by L hemiparesis/hip pain. Pt sitting in teri upon arrival to OT gym. Therapist completed PROM, 3x15 reps (elbow/shoulder/wrist/digit flex/ext) of LUE to increase ROM and maintain joint integrity. Pt was supine on mat table. Therapist completed scapular mobilizations, 4x10 reps, to LUE to maintain joint integrity for ease with ADLs. Pt reported no discomfort. Pt did not appear to demonstarte any active movement this date of LUE. Pt fitted for resting hand splint and was applied to pt's L arm. Sensory / Neuromuscular Re-Education:      Cognitive Skills:   Status Comments   Problem   Solving fair    Memory fair    Sequencing fair    Safety fair      Visual Perception:    Education:  Pt/mom educated about LUE positioning when up in w/c to promote ROM/wt bearing along with joint integrity. Pt/mom educated with safety during fxl mobility in OT apt using quad cane especially over carpeted area since has carpet at home. Pt /mom educated with safety during ext tub bench at quad cane level due to hand/trunk placement for balance. 10/25/21: pt educated on SROM of LUE and safe positioning of LUE  10/29/21: educated on 1 handed dressing technique for socks/shoes  11/1/21: shoe lace adapted for ease with donning shoe  11/1/21: attempted small strips of kineosio tape on pt's hand to assess if pt can tolerate on her shoulder due to report of allergy (cleared by )  11/2/21:  Therapist applied kineosiotape to pt's LUE to stabilize sublux.  Will continue to assess issues that arise with adhesive and progression of sublux  11/2/21: pt educated on AE for ease with LB dressing  11/3/21: Pt very fatigued in AM and PM sessions, requiring max educated with ext tub bench transfers to simulate for home environment with parent participating with transfer. Pt needed cues for hand/trunk placement on bench along with proper follow thru for safety. Pt/mom educated SPT using quad cane from w/c <>ext tub bench with patient needing cues for technique with cane & taking few steps. 11/16/21 Mom present and educated with transfers on/off firm recliner, sofa and ext tub bench transfers at quad cane level along with standing balance and positioning for LUE seated up in w/c.    11/17/21: Pt's aunt present. She observed as well as assisted pt intermittently with ADLs. Pain Level: L hip    Additional Notes:     Patient has made fair + progress during treatment sessions toward set goals. Therapy emphasis to obtain goals:    Time Frame for Long term goals  6 weeks   Long term goal 1 Pt will complete UB self-care tasks w/ Supervision   Long term goal 2 Pt will complete LB self-care tasks w/ Supervision   Long term goal 3 Pt will complete toileting (all aspects) w/ Supervision   Long term goal 4 Pt will safely complete toilet transfers w/ Supervision   Long term goal 5 Pt will safely complete tub/shower transfers w/ Supervision   Long term goals 6 Pt will complete basic homemaking task w/ SBA   Long term goal 7 Pt will complete grooming task w/ Supervision   Long term goal 8 Pt will complete feeding task w/ Supervision/setup     [x] Continue with current OT Plan of care. [] Prepare for Discharge    10/27/21 OT plan of care updated on this date. Tentative length of stay is 6 weeks Bell Gustafson OTR/L [id number]  11/3/21- Pt POC updated on this date. Pt tentative length of day is 5 weeks to address above problem areas Rebekahdell Main OTR/L [id number]  11/10/21- Pt POC updated on this date. Pt tentative length of day is 4 weeks to address above problem areas Bell Gustafson OTR/L [id number]  11/17/21- Pt POC updated on this date.  Pt tentative length of day is 3 weeks to address above problem areas -Katina Goode OTR/L [id number]   DISCHARGE RECOMMENDATIONS  Recommended DME: drop arm BSC    Post Discharge Care:   []Home Independently  []Home with 24hr Care / Supervision []Home with Partial Supervision []Home with Home Health OT []Home with Out Pt OT []Other: ___   Comments:         Time in Time out Tx Time Breakdown  Variance:   First Session  [id number] [x] Individual Tx-45  [] Concurrent Tx -  [] Co-Tx -   [] Group Tx -   [] Time Missed -     Second Session [id number] [x] Individual Tx- 45  [] Concurrent Tx -   [] Co-Tx -   [] Group Tx -   [] Time Missed -     Third Session    [] Individual Tx-   [] Concurrent Tx -  [] Co-Tx -   [] Group Tx -   [] Time Missed -         Total Tx Time: [address], [facility] O[facility]/L [id number]

## 2021-11-17 NOTE — PATIENT CARE CONFERENCE
[address] NOTE/PATIENT PLAN OF CARE    The physician was present and led this team conference    Date: 2021  Admission date: 10/22/2021  Patient Name: Cristiana May        MRN: [id number]    : 1975  (39 y.o.)  Gender: female   Rehab diagnosis/surgery with date:  CVA of 10/12/21 with right hemicraniectomy 10/13/21 at [place] [address] Code:  1.1      MEDICAL/FUNCTIONAL HISTORY/STATUS:  EEG showed abnormal electrical activity, \"high risk for seizure\" started on Lamictal per neurology    Consultations/Labs/X-rays: as above, EEG done 11/10/21      MEDICATION UPDATE:  Lamictal 25 mg daily, will be titrated over next few weeks      NURSING :    Bowel:   Always Continent  [x]   Occasionally incontinent  []   Frequently incontinent  []   Always incontinent  []   Not occurred  []     Bladder:   Always Continent  [x]    Incontinent less than daily[]   Incontinent  daily []   Always incontinent  []   No urine output    []   Indwelling catheter []       Toilet Hygiene:   Current level : min assist  Short term Toilet hygiene goal: supervision  Long term toilet hygiene goal:  supervision    Skin integrity: right scalp incsion open to air  Pain: knee and headache pain, ultram and  tyelnol effective    NUTRITION    Diet  regular  Liquid consistency   thin    SOCIAL INFORMATION:  Lives with: dtr, 15 yr old  Prior [address] to [employer] discharge:  ranch with 2 entry, 2 rails  Prior Level of function:  independent  DME:  none    FAMILY / PATIENT EDUCATION:  mom continues twice weekly family education    PHYSICAL THERAPY    Bed mobility:   Current level: min assist with verbal cues  for sequence  Short term bed mobility goal: met  Long term bed mobility goal: supervision    Chair/bed transfers:  Current level: min assist with small based quad cane, fit for custom AFO on left lower   Short term Chair/bed transfers goal: met  Long term Chair/bed transfers goal: standby assist      Ambulation:   Current level: 140 ft with a small based quad cane  at min assist, left trial AFO  Short term ambulation goal: met  Long term ambulation goal: 500 ft at standby assist    Wheelchair Mobility:  Current level: 100 ft using right side at mod assist  Short term wheelchair goal: met  Long term wheelchair goal: 150 at supervision      Car transfers:   Current level: mod assist, needs lots of cues  Short term car transfers goal: min assist  Long term car transfers goal:standby assist    Stairs:   Current level : 8 with 1 rail at min assist-mod assist  Short term stairs goal: 4 at min assist  Long term stairs goal: 12 at standby assist    Lower Extremity Strength Issues:    RLE 5/5  LLE hip flexion 2+/5  Knee extension 2-/5  Ankle DF/PF 0/5  Other comments: family ed ongoing with mom, she is doing well      OCCUPATIONAL THERAPY:      Tub/shower:   Current level: mod assist  Short term tub/shower goal: min assist  Long term tub/shower goal: supervision      Feeding:  Current level: supervision  Short term feeding goal: supervision  Long term feeding goal: supervision    Grooming:   Current level: Contact guard assist-supervision  Short term grooming goal: supervision  Long term grooming goal: supervision    Bathing:  Current level: shower level mod assist  Short term bathing goal: min assist  Long term bathing goal: supervision    Homemaking:   Current level: mod assist  Short term homemaking goal: min assist  Long term homemaking goal: standby assist    Upper body dressing:  Current level: min assist  Short term upper body dressing goal: supervision  Long term upper body dressing goal: supervision    Lower body dressing:                                                                          Current level: mod assist             Short term lower body dressing goal: min assist          Long term lower body dressing goal: supervision            Footwear  Current level: mod assist-max assist  Short term goal: min assist  Long term goal:supervision      Toilet transfer:   Current level: min assist for stand pivot  Short term toilet transfer goal: Contact guard assist  Long term toilet transfer goal: supervision    Upper body strength issues: left upper remains weak      SPEECH THERAPY-discharged from speech therapy as goals met  Swallowing:  Current level:  independent    Comprehension:   Current level: independent    Expression:   Current level: independent    Problem solving:   Current level: supervision  Short term problem solving goal: supervision  Long term problem solving goal: supervision    Memory:  Current level: Modified independent  Short term memory goal: Modified independent  Long term memory goal: Modified independent    Safety awareness: fair      Patient/family's personal goals: \"go [employer] shopping\"  Factors supporting goal achievement:  making gains, supportive family  Factors hindering goal achievement:  left hemiplegia, impulsiveness      Discharge Plan   Estimated Length of Stay: 3 weeks            Destination: home  Services at Discharge: to be assessed  Equipment at Discharge: to be assessed      INTERDISCIPLINARY TEAM/PHYSICIAN RECOMMENDATION AND/OR REVISIONS OF PLAN OF CARE:  await custom AFO, continue education      I approve the established interdisciplinary plan of care as documented within the medical record of Aaliyah Abbott. Electronically signed by Coral Schaeffer RN  on 11/17/2021 at 8:49 AM  The following interdisciplinary team members were present:  SJ Porter DPT  [employer], OTR  Xiomara Shell CCC-SLP

## 2021-11-17 NOTE — CARE COORDINATION
Per Team: ELOS: Will be re-evaluated next week(3 weeks). Updated patient. Recommended Supervision: 24HR. Voice message left for [facility]. No Insulin, IV-ABX, or Oxygen use. Pt requires verbal cues for sequencing. Custom AFO fittings. Speech therapy states pt is functional. Overall, pt is making good progress. Patient asked SW to leave on a several day pass to [place] shop, take a shower in her own home etc.  SW explained that was NOT possible per insurance. DME: TBD    Aftercare: TBD    FT: Q T,TH . Except 11/26 Friday.     PETER Mcclain Intern  [place], [place]

## 2021-11-17 NOTE — PROGRESS NOTES
[facility]  Comprehensive Progress Note    Subjective:      Abdifatah Phipps is a 39 y.o. female admitted to inpatient rehabilitation for impairments and activities limitations in ADLs and mobility secondary to right MCA CVA. No acute events overnight. No cp, sob, n/v. Tolerating therapy, progressing. The patient's medical records have been reviewed. Scheduled Meds:polyethylene glycol, 17 g, BID  lamoTRIgine, 25 mg, Daily  lidocaine, 1 patch, Daily  baclofen, 5 mg, Nightly  nicotine, 1 patch, Daily  senna, 2 tablet, Nightly  metoprolol tartrate, 12.5 mg, BID  gabapentin, 100 mg, TID  tamsulosin, 0.4 mg, Daily  heparin (porcine), 5,000 Units, Q12H  aspirin, 81 mg, Daily  atorvastatin, 40 mg, Nightly  vitamin D, 50,000 Units, Weekly  docusate sodium, 100 mg, BID  melatonin, 6 mg, Nightly  clopidogrel, 75 mg, Daily      Continuous Infusions:  PRN Meds:diphenhydrAMINE-zinc acetate, , Q6H PRN  ondansetron, 4 mg, Q6H PRN  lactulose, 20 g, BID PRN  guaiFENesin, 200 mg, BID PRN  acetaminophen, 650 mg, Q4H PRN  magnesium hydroxide, 30 mL, Daily PRN  bisacodyl, 10 mg, Daily PRN  diclofenac sodium, 2 g, 4x Daily PRN         Objective:      Vitals:    11/15/21 2119 11/16/21 0815 11/16/21 2101 11/17/21 0700   BP: (!) 102/57 113/64 (!) 80/46 118/69   Pulse: 85 95 78 78   Resp:  18  18   Temp:  97.8 °F (36.6 °C)  97.7 °F (36.5 °C)   TempSrc:  Oral  Tympanic   SpO2:  96%  96%   Weight:       Height:         General appearance: alert, NAD, seen ambulating with PT   Head: R crani site c/d/i   Eyes: conjunctivae/corneas clear. PERRL, EOM's intact. Lungs: clear to auscultation bilaterally  Heart: regular rate and rhythm, S1, S2 normal  Abdomen: soft, non-tender, normal bowel sounds  Extremities: extremities normal, atraumatic, no cyanosis or edema  MSK: no cyanosis, clubbing, edema  Skin: R crani c/d/i   Neurologic: Alert, oriented x4. Answering all questions appropriately.  Left weeks (start 11/25), then 100 mg daily x 2 weeks (start 12/9), then 50 mg qAM and 100 mg qHS x 2 weeks (start 12/23), then 100 mg BID for maintenance (start1/6/22). -Pain control, MSK pain, neuropathic pain: Tylenol PRN, gabapentin, Lidoderm, Voltaren gel  -Urinary retention: On flomax, now voiding well, low PVRs  -Tachycardia: intermittent. Cardiology consulted. She was started on Lopressor. Cardiology attempted to access data from her heart monitor unsuccessfully. She will follow up with Cardiology in office to review heart monitor data once available. -Constipation: colace, senna, glycolax. PRNs available.   -Tobacco abuse: Nicoderm patch  -DVT prophylaxis: lovenox SQ, SCDs    Change heparin to lovenox per patient request   Continue Acute rehab program. Progressing.    DC tramadol, continue Tylenol PRN     Team conference today      Electronically signed by Elisa Kraft MD on 11/17/2021 at 12:56 PM

## 2021-11-17 NOTE — PROGRESS NOTES
Physical Therapy  Treatment Note  Supervising Therapist: Lance Hauser, JAYME, DPT [id number]    NAME: Guillaume Hernández  [facility]  DIAGNOSIS: Acute CVA  PRECAUTIONS: Falls, helmet at all times when OOB, L hemiplegia, L homonymous hemianopsia L neglect, alarm, must bring phone to therapy (cardiac monitor), L PRAFO, SBP <180  HPI: Pt is a 39 y.o. female with past medical history significant for recent COVID-19 infection (9/15/21) who [facility] with acute onset left-sided weakness. Initial NIHSS 17.  Initial CT ([facility]) showed R MCA hyperdensity; initial CTA showed right ICA/M1 occlusion. On 10/13, her neuro exam worsened and repeat imaging indicated worsening edema, 1 cm of midline shift. She underwent emergent R hemicraniectomy by Dr. Reynoso Overall [facility]) on 10/13. After being deemed medically appropriate, she was transferred to [place] [id number] on 10/22/2021    Social:  Pt lives with daughter (15 y/o), will be returning to parent's home upon discharge (1 floor plan, 2 HAIDER with 2 HR). Mother and father healthy, do not work. 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 Initial Evaluation  Date: 10/23/21 AM     PM    Short Term Goals Long Term Goals    Was pt agreeable to Eval/treatment? yes Yes Yes     Does pt have pain? Pain on the top of her head once the helmet was placed on Mild c/o L hip and R rib pain Mild c/o moderate L hip/knee pain with activity     Bed Mobility  Rolling: Max A  Supine to sit: Max A   Sit to supine: Max A   Scooting: Max A Supine<>Sit Noemy  Scooting/Rolling Noemy (twin bed) NT Min A Supervision   Transfers Sit to stand: Max A   Stand to sit: Max A  Stand pivot:  Max A x2    5xSTS: Unable to complete Sit<>stand: Noemy  Stand pivot: Noemy with R SBQC Sit<>stand: Noemy  Stand pivot: Noemy with R SBQC   Min A SBA  5xSTS: TBD   Ambulation    15 feet with R mackey rail with Max A + wc follow    10mWT: TBA  6mWT:  feet x 1 rep with R SBQC with Noemy  (trial L articulating AFO with DF/PF stop)    10mWT: 0.15 m/s  6mWT: 52 m  (R SBQC, L Trial AFO, Noemy 11/16)     140 feet x 1 rep and 120  feet x 1 rep with R SBQC and Noemy   (trial L articulating AFO with DF/PF stop)   50 feet with AAD with Min A       500 feet with AAD with SBA    10mWT: >0.4 m/s  6mWT: >205 m   Walking 10 feet on uneven surface NT NT NT 10 feet with AAD with Min A 10 feet with AAD with SBA   Wheel Chair Mobility NT NT NT  150 feet with R extremities with SBA   Car Transfers NT NT NT Min A SBA   Stair negotiation: ascended and descended NT 12 steps x 1 rep with 1 HR (RUE)  with Noemy/ModA (non-reciprocal, ascending/descending leading with LLE)  4 steps with 1 rail with Min A 12 steps with 1 rail with SBA   Curb Step:   ascended and descended NT NT NT 4 inch step with AAD and Min A 4 inch step with AAD and SBA   Picking up object off the floor NT NT NT Will  an object with Min assist Will  an object with SBA   BLE ROM WFL: LLE AROM limited by flaccidity  WFL: LLE AROM limited by spasticity 2+ on MAS (hamstrings, hip extensors, PF)  L ankle DF lacking ~5 degrees from neutral      BLE Strength RLE 4+/5  LLE 0/5 RLE 5/5  LLE hip flexion 2+/5  Knee extension 2-/5  Ankle DF/PF 0/5      Balance  Static Sitting: Mod/Max A    BBS: TBA  FGA: TBA   Standing: Noemy  BBS: 11/56  (11/16/21)           BBS: >20/56  FGA: TBD   Date Family Teach Completed TBA Pt's mother present for observation 10/24, 11/2, 11/3, 114, hands on 11/9, 11/11, 11/16, aunt present to observe 11/17      Is additional Family Teaching Needed?   Y or N Y Yes      Hindering Progress L hemiplegia, L neglect L hemiplegia, L neglect      PT recommended ELOS 5 weeks       Team's Discharge Plan        Therapist at Team Meeting          General:        HRmax: 178  bpm       Beta blockers (Y/N): Y      Adjusted HRmax: 168 bpm   Blood pressure (mmHg): AM:  - Prior to Tx: 111/87  - Post Tx: 108/69 PM:  - Prior to Tx: 106/74  - Post Tx: 116/67   Time spent in HR ranges: Moderate intensity (60-70% HRmax): AM: 0:28:44 AM: 0:00:00   High intensity (70-85% HRmax): PM: 0:28:15 PM: 0:00:03       Therapeutic Exercise:   AM: Ambulation without  feet x 4 reps with ModA/MaxA (L trial AFO)  Sit<>Stand transfer x 12 reps from [facility] emphasis on optimal technique  PM:  Ambulation with R SPC and L AFO, 75 feet x 1 rep with ModA  Ambulation with R SBQC and L AFO, 5lb ankle weight RLE, 7.5 lb ankle weight attached to posterior gait belt, 120 feet x 1 rep with Noemy  FW non-reciprocal stepping over 5 SPC leading with LLE, followed by ascent/descent of 2\" step + 4\" step, using R SBQC and L AFO, 5lb ankle weight RLE, 11.5 lb ankle weight attached to posterior gait belt, x 3 reps with Noemy    Patient education  Pt educated on neutral trunk alignment and implications on FW ambulation    Patient response to education:   Pt verbalized understanding Pt demonstrated skill Pt requires further education in this area   yes partial yes     Additional Comments: Pt remains pleasant and agreeable to activity but is still highly impulsive leading to frequent motor errors. Family brought larger pair of shoes and aunt present to observe AM session. Pt exhibiting improved safety and stability when using SBQC. Attempt at reciprocal ambulation characterized by frequent attempt to reach for support due to impulsivity. Attempted to control L knee hyperextension that occurs during stance with reciprocal ambulation by adding 3/8\" heel wedge under L AFO. This was semi-successful. HR response somewhat limited as pt did not attain high intensity target ranges however pt reports significant exertion. Pt's aunt once again present for PM session observation. Gait training intensity increased by adding weight to L ankle and posterior gait belt as well as addition of obstacles to challenge all biomechanical subcomponents of gait.  Pt tolerated activity with consisted report of 16-17 on karina RPE scale. Plan to challenge propulsion next session. AM  Time in: 0830  Time out: 0915    PM  Time in: 1300  Time out: 0418    Pt is making good progress toward established Physical Therapy goals. Continue with physical therapy current plan of care.     Jose Cabrera, PT, DPT  Board Certified Clinical Specialist in Neurologic Physical Therapy  [id number]

## 2021-11-18 LAB
ANION GAP SERPL CALCULATED.3IONS-SCNC: 8 MMOL/L (ref 7–16)
BUN BLDV-MCNC: 14 MG/DL (ref 6–20)
CALCIUM SERPL-MCNC: 9.2 MG/DL (ref 8.6–10.2)
CHLORIDE BLD-SCNC: 102 MMOL/L (ref 98–107)
CO2: 25 MMOL/L (ref 22–29)
CREAT SERPL-MCNC: 0.7 MG/DL (ref 0.5–1)
GFR AFRICAN AMERICAN: >60
GFR NON-AFRICAN AMERICAN: >60 ML/MIN/1.73
GLUCOSE BLD-MCNC: 89 MG/DL (ref 74–99)
HCT VFR BLD CALC: 36.3 % (ref 34–48)
HEMOGLOBIN: 11.8 G/DL (ref 11.5–15.5)
MCH RBC QN AUTO: 30.4 PG (ref 26–35)
MCHC RBC AUTO-ENTMCNC: 32.5 % (ref 32–34.5)
MCV RBC AUTO: 93.6 FL (ref 80–99.9)
PDW BLD-RTO: 14.1 FL (ref 11.5–15)
PLATELET # BLD: 410 E9/L (ref 130–450)
PMV BLD AUTO: 9.7 FL (ref 7–12)
POTASSIUM REFLEX MAGNESIUM: 4.6 MMOL/L (ref 3.5–5)
RBC # BLD: 3.88 E12/L (ref 3.5–5.5)
SODIUM BLD-SCNC: 135 MMOL/L (ref 132–146)
WBC # BLD: 7.3 E9/L (ref 4.5–11.5)

## 2021-11-18 PROCEDURE — 36415 COLL VENOUS BLD VENIPUNCTURE: CPT

## 2021-11-18 PROCEDURE — 97116 GAIT TRAINING THERAPY: CPT

## 2021-11-18 PROCEDURE — 6370000000 HC RX 637 (ALT 250 FOR IP): Performed by: PHYSICAL MEDICINE & REHABILITATION

## 2021-11-18 PROCEDURE — 97110 THERAPEUTIC EXERCISES: CPT

## 2021-11-18 PROCEDURE — 6370000000 HC RX 637 (ALT 250 FOR IP): Performed by: PHYSICIAN ASSISTANT

## 2021-11-18 PROCEDURE — 99232 SBSQ HOSP IP/OBS MODERATE 35: CPT | Performed by: PHYSICAL MEDICINE & REHABILITATION

## 2021-11-18 PROCEDURE — 97530 THERAPEUTIC ACTIVITIES: CPT

## 2021-11-18 PROCEDURE — 85027 COMPLETE CBC AUTOMATED: CPT

## 2021-11-18 PROCEDURE — 97112 NEUROMUSCULAR REEDUCATION: CPT

## 2021-11-18 PROCEDURE — 1280000000 HC REHAB R&B

## 2021-11-18 PROCEDURE — 6360000002 HC RX W HCPCS: Performed by: PHYSICAL MEDICINE & REHABILITATION

## 2021-11-18 PROCEDURE — 80048 BASIC METABOLIC PNL TOTAL CA: CPT

## 2021-11-18 RX ADMIN — BACLOFEN 5 MG: 10 TABLET ORAL at 20:59

## 2021-11-18 RX ADMIN — LAMOTRIGINE 25 MG: 25 TABLET ORAL at 09:51

## 2021-11-18 RX ADMIN — GABAPENTIN 100 MG: 100 CAPSULE ORAL at 09:48

## 2021-11-18 RX ADMIN — DOCUSATE SODIUM 100 MG: 100 CAPSULE, LIQUID FILLED ORAL at 20:59

## 2021-11-18 RX ADMIN — SENNOSIDES 17.2 MG: 8.6 TABLET, COATED ORAL at 20:59

## 2021-11-18 RX ADMIN — DOCUSATE SODIUM 100 MG: 100 CAPSULE, LIQUID FILLED ORAL at 09:48

## 2021-11-18 RX ADMIN — GABAPENTIN 100 MG: 100 CAPSULE ORAL at 14:56

## 2021-11-18 RX ADMIN — GABAPENTIN 100 MG: 100 CAPSULE ORAL at 20:59

## 2021-11-18 RX ADMIN — ENOXAPARIN SODIUM 40 MG: 100 INJECTION SUBCUTANEOUS at 15:50

## 2021-11-18 RX ADMIN — TAMSULOSIN HYDROCHLORIDE 0.4 MG: 0.4 CAPSULE ORAL at 09:49

## 2021-11-18 RX ADMIN — ACETAMINOPHEN 650 MG: 325 TABLET ORAL at 09:48

## 2021-11-18 RX ADMIN — CLOPIDOGREL 75 MG: 75 TABLET, FILM COATED ORAL at 09:50

## 2021-11-18 RX ADMIN — ASPIRIN 81 MG CHEWABLE TABLET 81 MG: 81 TABLET CHEWABLE at 09:49

## 2021-11-18 RX ADMIN — Medication 6 MG: at 21:00

## 2021-11-18 RX ADMIN — POLYETHYLENE GLYCOL 3350 17 G: 17 POWDER, FOR SOLUTION ORAL at 21:01

## 2021-11-18 RX ADMIN — ATORVASTATIN CALCIUM 40 MG: 40 TABLET, FILM COATED ORAL at 20:59

## 2021-11-18 RX ADMIN — METOPROLOL TARTRATE 12.5 MG: 25 TABLET, FILM COATED ORAL at 09:48

## 2021-11-18 ASSESSMENT — PAIN SCALES - WONG BAKER: WONGBAKER_NUMERICALRESPONSE: 0

## 2021-11-18 ASSESSMENT — PAIN SCALES - GENERAL
PAINLEVEL_OUTOF10: 0
PAINLEVEL_OUTOF10: 8
PAINLEVEL_OUTOF10: 0
PAINLEVEL_OUTOF10: 0

## 2021-11-18 ASSESSMENT — PAIN DESCRIPTION - PAIN TYPE: TYPE: CHRONIC PAIN

## 2021-11-18 ASSESSMENT — PAIN DESCRIPTION - ORIENTATION: ORIENTATION: LEFT

## 2021-11-18 ASSESSMENT — PAIN DESCRIPTION - LOCATION: LOCATION: HIP

## 2021-11-18 ASSESSMENT — PAIN DESCRIPTION - DESCRIPTORS: DESCRIPTORS: ACHING;DISCOMFORT

## 2021-11-18 NOTE — PROGRESS NOTES
Physical Therapy  Treatment Note  Supervising Therapist: Lance Hauser, JAYME, DPT [id number]    NAME: Guillaume Hernández  [facility]  DIAGNOSIS: Acute CVA  PRECAUTIONS: Falls, helmet at all times when OOB, L hemiplegia, L homonymous hemianopsia L neglect, alarm, must bring phone to therapy (cardiac monitor), L PRAFO, SBP <180  HPI: Pt is a 39 y.o. female with past medical history significant for recent COVID-19 infection (9/15/21) who [facility] with acute onset left-sided weakness. Initial NIHSS 17.  Initial CT ([facility]) showed R MCA hyperdensity; initial CTA showed right ICA/M1 occlusion. On 10/13, her neuro exam worsened and repeat imaging indicated worsening edema, 1 cm of midline shift. She underwent emergent R hemicraniectomy by Dr. Reynoso Overall [facility]) on 10/13. After being deemed medically appropriate, she was transferred to [place] [address] on 10/22/2021    Social:  Pt lives with daughter (15 y/o), will be returning to parent's home upon discharge (1 floor plan, 2 HAIDER with 2 HR). Mother and father healthy, do not work. 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 Initial Evaluation  Date: 10/23/21 AM     PM    Short Term Goals Long Term Goals    Was pt agreeable to Eval/treatment? yes Yes Yes     Does pt have pain? Pain on the top of her head once the helmet was placed on Mild c/o L hip and R rib pain Mild c/o moderate L hip/knee pain with activity     Bed Mobility  Rolling: Max A  Supine to sit: Max A   Sit to supine: Max A   Scooting: Max A NT NT Min A Supervision   Transfers Sit to stand: Max A   Stand to sit: Max A  Stand pivot:  Max A x2    5xSTS: Unable to complete Sit<>stand: Noemy  Stand pivot: Noemy with R SBQC Sit<>stand: Noemy  Stand pivot: Noemy with R SBQC   Min A SBA  5xSTS: TBD   Ambulation    15 feet with R mackey rail with Max A + wc follow    10mWT: TBA  6mWT:  feet x 1 rep and 75 feet x 2 reps with R SBQC with Noemy  ( L articulating AFO with DF/PF stop)    10mWT: 0.15 m/s  6mWT: 52 m  (R SBQC, L Trial AFO, Noemy 11/16)     125 feet x 2 reps with R SBQC and Noemy   (L articulating AFO)   50 feet with AAD with Min A       500 feet with AAD with SBA    10mWT: >0.4 m/s  6mWT: >205 m   Walking 10 feet on uneven surface NT NT NT 10 feet with AAD with Min A 10 feet with AAD with SBA   Wheel Chair Mobility NT NT NT  150 feet with R extremities with SBA   Car Transfers NT Noemy/ModA NT Min A SBA   Stair negotiation: ascended and descended NT 12 steps x 1 rep with 1 HR (RUE)  with Noemy (non-reciprocal, ascending/descending leading with LLE)  4 steps with 1 rail with Min A 12 steps with 1 rail with SBA   Curb Step:   ascended and descended NT NT NT 4 inch step with AAD and Min A 4 inch step with AAD and SBA   Picking up object off the floor NT NT NT Will  an object with Min assist Will  an object with SBA   BLE ROM WFL: LLE AROM limited by flaccidity  WFL: LLE AROM limited by spasticity 2+ on MAS (hamstrings, hip extensors, PF)  L ankle DF lacking ~5 degrees from neutral      BLE Strength RLE 4+/5  LLE 0/5 RLE 5/5  LLE hip flexion 2+/5  Knee extension 2-/5  Ankle DF/PF 0/5      Balance  Static Sitting: Mod/Max A    BBS: TBA  FGA: TBA   Standing: Noemy  BBS: 11/56  (11/16/21)           BBS: >20/56  FGA: TBD   Date Family Teach Completed TBA Pt's mother present for observation 10/24, 11/2, 11/3, 114, hands on 11/9, 11/11, 11/16, 11/18, aunt present to observe 11/17      Is additional Family Teaching Needed? Y or N Y Yes      Hindering Progress L hemiplegia, L neglect L hemiplegia, L neglect      PT recommended ELOS 5 weeks       Team's Discharge Plan        Therapist at Team Meeting          General:        HRmax: 178  bpm       Beta blockers (Y/N): Y      Adjusted HRmax: 168 bpm   Blood pressure (mmHg): AM:  NA PM:  - Prior to Tx: 100/64  - Post Tx: 118/78   Time spent in HR ranges:    Moderate intensity (60-70% HRmax): AM: NA due to FT PM: 0:17:01   High intensity (70-85% HRmax): AM: NA due to FT PM: 0:12:03     Therapeutic Exercise:   AM: Sit<>Stand transfers x 10 reps from [facility] emphasis on safe technique  PM: Ambulation without  feet x 4 reps with ModA/MaxA    Patient education  Pt educated on AFO principles of maintaining skin integrity    Patient response to education:   Pt verbalized understanding Pt demonstrated skill Pt requires further education in this area   yes partial yes     Additional Comments: Pt remains pleasant and motivated during sessions, received custom L AFO yesterday. 3/8\" heel wedge added under brace to control L genu recurvatum present due to PF contracture. Mother exhibits improving proficiency guarding/assiting patient with mobility, will practice in functional living environment next FT. Pt still has difficulty sequencing appropriate task parts with sit<>Stand and stand-pivot transfer, partially due to impulsivity. AFO PF stop pin adjusted during PM session to place brace in more DF, which successfully resolved L genu recurvatum during stance. Reviewed skin integrity principles and staff notified to doff AFO when not mobilizing patient. Reciprocal ambulation without device limited by pt's impulsivity and frequent attempt to reach for rails. When pt remains in center of the hallway she tends to rotate trunk/hips to L to facilitate LLE clearance. Overall pt's reciprocal ambulation ability is improving. Pt's transfer safety limited by L neglect and impulsivity. Plan to continue reciprocal gait training next session. AM  Time in: 0830  Time out: 0915    PM  Time in: 1300  Time out: 8721    Pt is making good progress toward established Physical Therapy goals. Continue with physical therapy current plan of care.     Raj Lugo, PT, DPT  Board Certified Clinical Specialist in Neurologic Physical Therapy  [id number]

## 2021-11-18 NOTE — PROGRESS NOTES
[facility]  Comprehensive Progress Note    Subjective:      Pranav Samson is a 39 y.o. female admitted to inpatient rehabilitation for impairments and activities limitations in ADLs and mobility secondary to right MCA CVA. No acute events overnight. No cp, sob, n/v. Tolerating therapy, progressing. She denies complaints today. The patient's medical records have been reviewed. Scheduled Meds:enoxaparin, 40 mg, Daily  polyethylene glycol, 17 g, BID  lamoTRIgine, 25 mg, Daily  lidocaine, 1 patch, Daily  baclofen, 5 mg, Nightly  nicotine, 1 patch, Daily  senna, 2 tablet, Nightly  metoprolol tartrate, 12.5 mg, BID  gabapentin, 100 mg, TID  tamsulosin, 0.4 mg, Daily  aspirin, 81 mg, Daily  atorvastatin, 40 mg, Nightly  vitamin D, 50,000 Units, Weekly  docusate sodium, 100 mg, BID  melatonin, 6 mg, Nightly  clopidogrel, 75 mg, Daily      Continuous Infusions:  PRN Meds:diphenhydrAMINE-zinc acetate, , Q6H PRN  ondansetron, 4 mg, Q6H PRN  lactulose, 20 g, BID PRN  guaiFENesin, 200 mg, BID PRN  acetaminophen, 650 mg, Q4H PRN  magnesium hydroxide, 30 mL, Daily PRN  bisacodyl, 10 mg, Daily PRN  diclofenac sodium, 2 g, 4x Daily PRN         Objective:      Vitals:    11/16/21 2101 11/17/21 0700 11/17/21 2114 11/18/21 0930   BP: (!) 80/46 118/69 (!) 96/59 114/72   Pulse: 78 78 73 101   Resp:  18  18   Temp:  97.7 °F (36.5 °C)  97.6 °F (36.4 °C)   TempSrc:  Tympanic  Oral   SpO2:  96%     Weight:       Height:         General appearance: alert, NAD, seen in PT  Head: R crani site c/d/i   Eyes: conjunctivae/corneas clear. PERRL, EOM's intact. Lungs: clear to auscultation bilaterally  Heart: regular rate and rhythm, S1, S2 normal  Abdomen: soft, non-tender, normal bowel sounds  Extremities: extremities normal, atraumatic, no cyanosis or edema  MSK: no cyanosis, clubbing, edema  Skin: R crani c/d/i   Neurologic: Alert, oriented x4. Answering all questions appropriately.  Left neglect. Speech clear. No aphasia appreciated. Mild L facial droop. Spastic left hemiplegia. MAS 1+ in LUE; MAS 1+-2 in LLE. Motor 5/5 RUE and RLE; 0/5 LUE. LLE is 2/5 HF;  1/5 KE;  0/5 DF and PF          Laboratory:    Lab Results   Component Value Date    WBC 7.3 11/18/2021    HGB 11.8 11/18/2021    HCT 36.3 11/18/2021    MCV 93.6 11/18/2021     11/18/2021     Lab Results   Component Value Date     11/18/2021    K 4.6 11/18/2021     11/18/2021    CO2 25 11/18/2021    BUN 14 11/18/2021    CREATININE 0.7 11/18/2021    GLUCOSE 89 11/18/2021    CALCIUM 9.2 11/18/2021      Lab Results   Component Value Date    ALT 75 (H) 10/23/2021    AST 46 (H) 10/23/2021    ALKPHOS 89 10/23/2021    BILITOT 0.4 10/23/2021       Functional Status:   Bed mobility: Min A  Transfers: Min A  Ambulation: 140 ft with trial L articulating AFO and R SBQC Min A  Feeding: Set up  Grooming: SBA  UB dressing: CGA  LB dressing: Mod A       Assessment/Plan:       39 y.o. female admitted to inpatient rehabilitation for impairments and activities limitations in ADLs and mobility secondary to right MCA CVA.      -Right MCA CVA: Complicated by cerebral edema requiring emergent right hemicraniectomy (10/13/2021). Dense left spastic hemiplegia, left neglect, mild cognitive impairment. Helmet when out of bed. L PRAFO in bed. LUE sling for gait. L w/c tray. Aspirin, Plavix, Lipitor for secondary prevention. Monitor neuro exam. Continue Acute Rehab program.   -Spasticity LUE/LLE: Monitor. Tone currently helpful to compensate for weakness with attempted gait in PT. Started on low dose baclofen at bedtime due to occasional painful spasms in the leg at night only. Monitor.   -Possible seizure:  EEG with no seizure activity, but showed an increased potential for focal seizures from the right frontotemporal region. Evaluated by Neurology.  Started on Lamictal - 25 mg daily x 2 weeks (start11/11), then 50 mg daily x 2 weeks (start 11/25), then 100 mg daily x 2 weeks (start 12/9), then 50 mg qAM and 100 mg qHS x 2 weeks (start 12/23), then 100 mg BID for maintenance (start1/6/22). -Pain control, MSK pain, neuropathic pain: Tylenol PRN, gabapentin, Lidoderm, Voltaren gel  -Urinary retention: On flomax, now voiding well, low PVRs  -Tachycardia: intermittent. Cardiology consulted. She was started on Lopressor. Cardiology attempted to access data from her heart monitor unsuccessfully. She will follow up with Cardiology in office to review heart monitor data once available. -Constipation: colace, senna, glycolax. PRNs available.   -Tobacco abuse: Nicoderm patch  -DVT prophylaxis: lovenox SQ, SCDs    Occasional low BPs, asymptomatic. Will put parameters on the metoprolol and monitor.        Electronically signed by Sandy Arias MD on 11/18/2021 at 12:59 PM

## 2021-11-18 NOTE — PROGRESS NOTES
OCCUPATIONAL THERAPY DAILY NOTE    Date:2021  Patient Name: Brandon Luis  MRN: [id number]  : 1975  Room: [address] [place], [place] [address]     Diagnosis:Acute CVA( Pt presented to [facility] as Level 2 stroke alert on 10/11/21. L side weakness, neglect. NIH-17  Found to have large R MCA involving R occipital)  Surgery: R frontotemporal decompressive hemicraniectomy on 10/13/21  Past Medical History: COVID (9/15), tobacco use  Precautions: Falls, L hemiplegia, L neglect, L sublux, bed/chair alarm, helmet when OOB, impulsive, L PRAFO    Functional Assessment:   Date Status AE  Comments   Feeding 21 Set-up  Pt self fed breakfast   Grooming 21 SBA  w/c          Oral Care 21 Set-up     Bathing 21 Min A Shower chair with wheels   full bathing task seated/stnading in shower, assist with balance when standing and slightly at RUE in AM   UB Dressing 21 CGA   Pt demo'ing F trent dressing technique, slight assist at shoulder and vc's in AM   LB Dressing 21 Mod reacher Assist with LLE this date due to hip paui, pt able to thread RLE and required assist with standing balance while pt pulled over hips in AM   Footwear 21 Mod A Lh shoe horn Pt able to jesus R sock and shoe.  Required assist with L sock/shoe/AFO due to hip pain in AM   Toileting 21 Min A Grab bar   Pt able to complete malvin hygiene and required assist with standing balance for pants in AM   Homemaking 21 Mod A       Functional Transfers / Balance:   Date Status DME  Comments   Sit Balance 21 SBA     Stand Balance 21 Min A SBQC  Grab bar  Bed rail      [x] Tub        [x] Shower   Transfer 21 min/mod A          Min A Extended tub bench, SBQC      Roll in shower chair, grab bar, LBQC         Step in method in/out of walk in shower in AM   Commode   Transfer 21 Min A , 3:1 commode SBQC  Grab bar     Functional   Mobility 21 Min A SBQC To/from bathroom in AM     Functional Exercises / Activity:  -5lb emanuel box activity seated at tabletop, for ~[id number], in 4planes, with rest breaks, with LUE supported with ace bandage, focusing on increasing of activity tolerance, strength and ROM of BUE's, core strengthening, sitting balance. Pt requiring Melisa to advance of LUE. -Arm bike with LUE supported with ace bandages, with pt tolerating ~5mins, with Melisa to assist with movement of LUE, focusing on increasing of strength and ROM of BUE's, activity tolerance.   -Pt assisted back to bed in PM session, with pt completing of SPT from W/C<>EOB with melisa. Pt then able to doff R shoe using cross over technique seated EOB, with assistance to doff L shoe and brace. Pt then able to bring LE's onto bed and lay supine, adjusting HOB, and removing of helmet. Sensory / Neuromuscular Re-Education:  Pt assessed using Inmotion robotic arm. Pt sat in chair with arm straps for positioning, LUE ace wrapped for positioning purposes. Pt tolerated 10cm [place], assessment, pre & post testing & random protocol. Pt demo the following during the assessment: smoothness 0.232, reach error 0.102, path error 0.044, initiation time 0.556 sec, [place] size 0.010, [place] independence 0.436, hold deviation 0.076 & displacement 0.018. Pt demo the following gains in his pre & post test after using random protocol: smoothness improved from path error decreased from 0.057 to 0.048. All InMotion exercises are performed to increase function of L UE to assist with ADL's and IADL's at home. Pt appreciates the immediate feedback that is provided using the InMotion arm. Cognitive Skills:   Status Comments   Problem   Solving fair    Memory fair    Sequencing fair    Safety fair      Visual Perception:    Education:  Pt/mom educated about LUE positioning when up in w/c to promote ROM/wt bearing along with joint integrity.   Pt/mom educated with safety during fxl mobility in OT apt using quad cane especially over carpeted area since has carpet demo'ing F understanding/safety of education. Ranjith Choi appeared motivated and willing to learn. Continue to educate. 11/9/21: Completed family teach this session. Pt's mother Ranjith Choi present. Educated Ranjith Choi on pt's increased independence with ADLs/functional transfers. Ranjith Choi able to jesus pt's sling and gait belt. Ranjith Choi [facility] Ranjith Choi and pt demo'ing improved communication with each other for safe transfer/completion of ADLs. Ranjith Choi demo'ing F understanding/safety of education. Continue to educate. 11/11/21- Mother present and educated along with assisting with w/c<>commode transfers using grab bar for balance and one handed skills. LUE placed in sling for balance and joint protection. Pt/mom educated with ext tub bench transfers to simulate for home environment with parent participating with transfer. Pt needed cues for hand/trunk placement on bench along with proper follow thru for safety. Pt/mom educated SPT using quad cane from w/c <>ext tub bench with patient needing cues for technique with cane & taking few steps. 11/16/21 Mom present and educated with transfers on/off firm recliner, sofa and ext tub bench transfers at quad cane level along with standing balance and positioning for LUE seated up in w/c.    11/17/21: Pt's aunt present. She observed as well as assisted pt intermittently with ADLs. 11/18/21- Pt mom & observed am OT treatment while pt evaluated using InMotion arm using pt LUE     Pain Level: L hip    Additional Notes:     Patient has made fair + progress during treatment sessions toward set goals.    Therapy emphasis to obtain goals:    Time Frame for Long term goals  6 weeks   Long term goal 1 Pt will complete UB self-care tasks w/ Supervision   Long term goal 2 Pt will complete LB self-care tasks w/ Supervision   Long term goal 3 Pt will complete toileting (all aspects) w/ Supervision   Long term goal 4 Pt will safely complete toilet transfers w/ Supervision   Long term goal 5 Pt will safely complete tub/shower transfers w/ Supervision   Long term goals 6 Pt will complete basic homemaking task w/ SBA   Long term goal 7 Pt will complete grooming task w/ Supervision   Long term goal 8 Pt will complete feeding task w/ Supervision/setup     [x] Continue with current OT Plan of care. [] Prepare for Discharge    10/27/21 OT plan of care updated on this date. Tentative length of stay is 6 weeks Slime Montoya OTR/L [id number]  11/3/21- Pt POC updated on this date. Pt tentative length of day is 5 weeks to address above problem areas Geraldo Willard OTR/L [id number]  11/10/21- Pt POC updated on this date. Pt tentative length of day is 4 weeks to address above problem areas Slime Montoya OTR/L [id number]  11/17/21- Pt POC updated on this date.  Pt tentative length of day is 3 weeks to address above problem areas -Slime Montoya OTR/L [id number]   DISCHARGE RECOMMENDATIONS  Recommended DME: drop arm BSC    Post Discharge Care:   []Home Independently  []Home with 24hr Care / Supervision []Home with Partial Supervision []Home with Home Health OT []Home with Out Pt OT []Other: ___   Comments:         Time in Time out Tx Time Breakdown  Variance:   First Session  [id number] [x] Individual Tx-45  [] Concurrent Tx -  [] Co-Tx -   [] Group Tx -   [] Time Missed -     Second Session [id number] [x] Individual Tx- 10mins  [] Concurrent Tx - 35mins  [] Co-Tx -   [] Group Tx -   [] Time Missed -     Third Session    [] Individual Tx-   [] Concurrent Tx -  [] Co-Tx -   [] Group Tx -   [] Time Missed -         Total Tx Time: 90mins     Gwenetta Meckel OTR/L [id number]  Trudy Tate GLOVER/L [id number]    I have read & agree with the above status  Gwenetta Meckel OTR/L [id number]

## 2021-11-19 PROCEDURE — 97110 THERAPEUTIC EXERCISES: CPT

## 2021-11-19 PROCEDURE — 1280000000 HC REHAB R&B

## 2021-11-19 PROCEDURE — 6370000000 HC RX 637 (ALT 250 FOR IP): Performed by: PHYSICIAN ASSISTANT

## 2021-11-19 PROCEDURE — 97535 SELF CARE MNGMENT TRAINING: CPT

## 2021-11-19 PROCEDURE — 6370000000 HC RX 637 (ALT 250 FOR IP): Performed by: PHYSICAL MEDICINE & REHABILITATION

## 2021-11-19 PROCEDURE — 97530 THERAPEUTIC ACTIVITIES: CPT

## 2021-11-19 PROCEDURE — 99232 SBSQ HOSP IP/OBS MODERATE 35: CPT | Performed by: PHYSICAL MEDICINE & REHABILITATION

## 2021-11-19 PROCEDURE — 6360000002 HC RX W HCPCS: Performed by: PHYSICAL MEDICINE & REHABILITATION

## 2021-11-19 RX ADMIN — METOPROLOL TARTRATE 12.5 MG: 25 TABLET, FILM COATED ORAL at 09:23

## 2021-11-19 RX ADMIN — BACLOFEN 5 MG: 10 TABLET ORAL at 22:21

## 2021-11-19 RX ADMIN — ATORVASTATIN CALCIUM 40 MG: 40 TABLET, FILM COATED ORAL at 22:22

## 2021-11-19 RX ADMIN — ENOXAPARIN SODIUM 40 MG: 100 INJECTION SUBCUTANEOUS at 22:22

## 2021-11-19 RX ADMIN — ACETAMINOPHEN 650 MG: 325 TABLET ORAL at 09:27

## 2021-11-19 RX ADMIN — SENNOSIDES 17.2 MG: 8.6 TABLET, COATED ORAL at 22:36

## 2021-11-19 RX ADMIN — ASPIRIN 81 MG CHEWABLE TABLET 81 MG: 81 TABLET CHEWABLE at 09:23

## 2021-11-19 RX ADMIN — TAMSULOSIN HYDROCHLORIDE 0.4 MG: 0.4 CAPSULE ORAL at 09:23

## 2021-11-19 RX ADMIN — CLOPIDOGREL 75 MG: 75 TABLET, FILM COATED ORAL at 09:23

## 2021-11-19 RX ADMIN — GABAPENTIN 100 MG: 100 CAPSULE ORAL at 22:23

## 2021-11-19 RX ADMIN — POLYETHYLENE GLYCOL 3350 17 G: 17 POWDER, FOR SOLUTION ORAL at 22:34

## 2021-11-19 RX ADMIN — DOCUSATE SODIUM 100 MG: 100 CAPSULE, LIQUID FILLED ORAL at 22:32

## 2021-11-19 RX ADMIN — ONDANSETRON 4 MG: 4 TABLET, ORALLY DISINTEGRATING ORAL at 12:06

## 2021-11-19 RX ADMIN — POLYETHYLENE GLYCOL 3350 17 G: 17 POWDER, FOR SOLUTION ORAL at 09:23

## 2021-11-19 RX ADMIN — LAMOTRIGINE 25 MG: 25 TABLET ORAL at 09:23

## 2021-11-19 RX ADMIN — DOCUSATE SODIUM 100 MG: 100 CAPSULE, LIQUID FILLED ORAL at 09:23

## 2021-11-19 RX ADMIN — GABAPENTIN 100 MG: 100 CAPSULE ORAL at 15:09

## 2021-11-19 RX ADMIN — GABAPENTIN 100 MG: 100 CAPSULE ORAL at 09:24

## 2021-11-19 ASSESSMENT — PAIN SCALES - GENERAL
PAINLEVEL_OUTOF10: 3
PAINLEVEL_OUTOF10: 0
PAINLEVEL_OUTOF10: 0
PAINLEVEL_OUTOF10: 6

## 2021-11-19 ASSESSMENT — PAIN DESCRIPTION - LOCATION: LOCATION: LEG

## 2021-11-19 ASSESSMENT — PAIN SCALES - WONG BAKER: WONGBAKER_NUMERICALRESPONSE: 0

## 2021-11-19 ASSESSMENT — PAIN DESCRIPTION - ONSET: ONSET: GRADUAL

## 2021-11-19 ASSESSMENT — PAIN DESCRIPTION - ORIENTATION: ORIENTATION: LEFT

## 2021-11-19 ASSESSMENT — PAIN DESCRIPTION - FREQUENCY: FREQUENCY: INTERMITTENT

## 2021-11-19 NOTE — PROGRESS NOTES
[facility] Physical Medicine and Rehabilitation  Comprehensive Progress Note    Subjective:      Len Willard is a 39 y.o. female admitted to inpatient rehabilitation for impairments and activities limitations in ADLs and mobility secondary to right MCA CVA. No acute events overnight. No cp, sob, n/v. Tolerating therapy, progressing. She denies complaints. The patient's medical records have been reviewed. Scheduled Meds:enoxaparin, 40 mg, Daily  polyethylene glycol, 17 g, BID  lamoTRIgine, 25 mg, Daily  lidocaine, 1 patch, Daily  baclofen, 5 mg, Nightly  nicotine, 1 patch, Daily  senna, 2 tablet, Nightly  metoprolol tartrate, 12.5 mg, BID  gabapentin, 100 mg, TID  tamsulosin, 0.4 mg, Daily  aspirin, 81 mg, Daily  atorvastatin, 40 mg, Nightly  vitamin D, 50,000 Units, Weekly  docusate sodium, 100 mg, BID  melatonin, 6 mg, Nightly  clopidogrel, 75 mg, Daily      Continuous Infusions:  PRN Meds:diphenhydrAMINE-zinc acetate, , Q6H PRN  ondansetron, 4 mg, Q6H PRN  lactulose, 20 g, BID PRN  guaiFENesin, 200 mg, BID PRN  acetaminophen, 650 mg, Q4H PRN  magnesium hydroxide, 30 mL, Daily PRN  bisacodyl, 10 mg, Daily PRN  diclofenac sodium, 2 g, 4x Daily PRN         Objective:      Vitals:    11/17/21 2114 11/18/21 0930 11/18/21 2100 11/19/21 0845   BP: (!) 96/59 114/72 105/62 129/78   Pulse: 73 101 94 100   Resp:  18  18   Temp:  97.6 °F (36.4 °C)  97.8 °F (36.6 °C)   TempSrc:  Oral  Temporal   SpO2:       Weight:       Height:         General appearance: alert, NAD, seen in PT  Head: R crani site c/d/i   Eyes: conjunctivae/corneas clear. PERRL, EOM's intact. Lungs: clear to auscultation bilaterally  Heart: regular rate and rhythm, S1, S2 normal  Abdomen: soft, non-tender, normal bowel sounds  Extremities: extremities normal, atraumatic, no cyanosis or edema  MSK: no cyanosis, clubbing, edema  Skin: R crani c/d/i   Neurologic: Alert, oriented x4. Answering all questions appropriately. Left neglect.  Speech clear. No aphasia appreciated. Mild L facial droop. Spastic left hemiplegia. MAS 1+ in LUE; MAS 1+-2 in LLE. Motor 5/5 RUE and RLE; 0/5 LUE. LLE is 2/5 HF;  1/5 KE;  0/5 DF and PF          Laboratory:    Lab Results   Component Value Date    WBC 7.3 11/18/2021    HGB 11.8 11/18/2021    HCT 36.3 11/18/2021    MCV 93.6 11/18/2021     11/18/2021     Lab Results   Component Value Date     11/18/2021    K 4.6 11/18/2021     11/18/2021    CO2 25 11/18/2021    BUN 14 11/18/2021    CREATININE 0.7 11/18/2021    GLUCOSE 89 11/18/2021    CALCIUM 9.2 11/18/2021      Lab Results   Component Value Date    ALT 75 (H) 10/23/2021    AST 46 (H) 10/23/2021    ALKPHOS 89 10/23/2021    BILITOT 0.4 10/23/2021       Functional Status:   Bed mobility: Min A  Transfers: Min A  Ambulation: 140 ft with trial L articulating AFO and R SBQC Min A  Feeding: Set up  Grooming: SBA  UB dressing: CGA  LB dressing: Mod A       Assessment/Plan:       39 y.o. female admitted to inpatient rehabilitation for impairments and activities limitations in ADLs and mobility secondary to right MCA CVA.      -Right MCA CVA: Complicated by cerebral edema requiring emergent right hemicraniectomy (10/13/2021). Dense left spastic hemiplegia, left neglect, mild cognitive impairment. Helmet when out of bed. L PRAFO in bed. LUE sling for gait. L w/c tray. Aspirin, Plavix, Lipitor for secondary prevention. Monitor neuro exam. Continue Acute Rehab program.   -Spasticity LUE/LLE: Monitor. Tone currently helpful to compensate for weakness with attempted gait in PT. Started on low dose baclofen at bedtime due to occasional painful spasms in the leg at night only. Monitor.   -Possible seizure:  EEG with no seizure activity, but showed an increased potential for focal seizures from the right frontotemporal region. Evaluated by Neurology.  Started on Lamictal - 25 mg daily x 2 weeks (start11/11), then 50 mg daily x 2 weeks (start 11/25), then 100 mg daily x 2 weeks (start 12/9), then 50 mg qAM and 100 mg qHS x 2 weeks (start 12/23), then 100 mg BID for maintenance (start1/6/22). -Pain control, MSK pain, neuropathic pain: Tylenol PRN, gabapentin, Lidoderm, Voltaren gel  -Urinary retention: On flomax, now voiding well, low PVRs  -Tachycardia: intermittent. Cardiology consulted. She was started on Lopressor. Cardiology attempted to access data from her heart monitor unsuccessfully. She will follow up with Cardiology in office to review heart monitor data once available. -Constipation: colace, senna, glycolax. PRNs available.   -Tobacco abuse: Nicoderm patch  -DVT prophylaxis: lovenox SQ, SCDs    Continue therapy program, progressing.  Encourage ambulation      Electronically signed by Marvel Bradley MD on 11/19/2021 at 11:21 AM

## 2021-11-19 NOTE — PROGRESS NOTES
Physical Therapy  Treatment Note  Supervising Therapist: Lalo Jolley PT, DPT ID.[id number]    NAME: Floresita Rogers  ROOM: [facility]5/5505-B  DIAGNOSIS: Acute CVA  PRECAUTIONS: Falls, helmet at all times when OOB, L hemiplegia, L homonymous hemianopsia L neglect, alarm, must bring phone to therapy (cardiac monitor), L PRAFO, SBP <180  HPI: Pt is a 39 y.o. female with past medical history significant for recent COVID-19 infection (9/15/21) who [facility] with acute onset left-sided weakness. Initial NIHSS 17.  Initial CT ([facility]) showed R MCA hyperdensity; initial CTA showed right ICA/M1 occlusion. On 10/13, her neuro exam worsened and repeat imaging indicated worsening edema, 1 cm of midline shift. She underwent emergent R hemicraniectomy by Dr. Charleen Lara [facility]) on 10/13. After being deemed medically appropriate, she was transferred to [place] [address] on 10/22/2021    Social:  Pt lives with daughter (15 y/o), will be returning to parent's home upon discharge (1 floor plan, 2 HAIDER with 2 HR). Mother and father healthy, do not work. 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 Initial Evaluation  Date: 10/23/21 AM     PM    Short Term Goals Long Term Goals    Was pt agreeable to Eval/treatment? yes yes yes     Does pt have pain? Pain on the top of her head once the helmet was placed on L hip pain L hip pain     Bed Mobility  Rolling: Max A  Supine to sit: Max A   Sit to supine: Max A   Scooting: Max A NT NT Min A Supervision   Transfers Sit to stand: Max A   Stand to sit: Max A  Stand pivot:  Max A x2    5xSTS: Unable to complete Sit<>stand: Min A  Stand pivot: Min A with R SBQC Sit<>stand: Noemy  Stand pivot: Noemy with R SBQC   Min A SBA  5xSTS: TBD   Ambulation    15 feet with R mackey rail with Max A + wc follow    10mWT: TBA  6mWT:  feet  X 3 reps with R SBQC with Min A  ( L articulating AFO with DF/PF stop)    10mWT: 0.15 m/s  6mWT: 52 m  (R SBQC, L Trial AFO, Noemy 11/16)     125 feet  X 2 reps with R SBQC with Min A  (L articulating AFO)   50 feet with AAD with Min A       500 feet with AAD with SBA    10mWT: >0.4 m/s  6mWT: >205 m   Walking 10 feet on uneven surface NT NT NT 10 feet with AAD with Min A 10 feet with AAD with SBA   Wheel Chair Mobility NT NT NT  150 feet with R extremities with SBA   Car Transfers NT Noemy/ModA NT Min A SBA   Stair negotiation: ascended and descended NT 12 steps x 1 rep with 1 HR (RUE)  with Noemy (non-reciprocal, ascending/descending leading with LLE) 12 steps x 1 rep with 1 HR (RUE)  with Noemy (non-reciprocal, ascending/descending leading with LLE) 4 steps with 1 rail with Min A 12 steps with 1 rail with SBA   Curb Step:   ascended and descended NT 4 inch step with R SBQC Min A NT 4 inch step with AAD and Min A 4 inch step with AAD and SBA   Picking up object off the floor NT NT NT Will  an object with Min assist Will  an object with SBA   BLE ROM WFL: LLE AROM limited by flaccidity  WFL: LLE AROM limited by spasticity 2+ on MAS (hamstrings, hip extensors, PF)  L ankle DF lacking ~5 degrees from neutral      BLE Strength RLE 4+/5  LLE 0/5 RLE 5/5  LLE hip flexion 2+/5  Knee extension 2-/5  Ankle DF/PF 0/5      Balance  Static Sitting: Mod/Max A    BBS: TBA  FGA: TBA   Standing: Noemy  BBS: 11/56  (11/16/21)           BBS: >20/56  FGA: TBD   Date Family Teach Completed TBA Pt's mother present for observation 10/24, 11/2, 11/3, 114, hands on 11/9, 11/11, 11/16, 11/18, aunt present to observe 11/17      Is additional Family Teaching Needed?   Y or N Y Yes      Hindering Progress L hemiplegia, L neglect L hemiplegia, L neglect      PT recommended ELOS 5 weeks       Team's Discharge Plan        Therapist at Team Meeting          Therapeutic Exercise:   AM:   - Functional mobility as noted above in chart  - Folded up uneven surface mat step up and overs with SBQC  - Reciprocal walking with [facility]  PM:   - Functional mobility as noted above in chart  - Side stepping with no AD  - 150 feet ambulation with no AD    Additional Comments: Reviewed all mobility as noted above. Pt tolerated sessions well today despite complaints of L hip pain throughout. Pt anxious above incorporating activity with no AD to challenge her balance. Incorporated dynamic standing balance activities per pt tolerance. Vitals stable throughout session. Pt anxious with activity when no AD is used. Patient/family education  Pt/family educated on safety with functional mobility     Patient/family response to education:   Pt/family verbalized understanding Pt/family demonstrated skill Pt/family requires further education in this area   yes yes yes     AM  Time in: 0830  Time out: 0915  PM  Time in: 1300  Time out: 1345    Pt is making good progress toward established Physical Therapy goals. Continue with physical therapy current plan of care.     Zandra Boogie, ELSAT [id number]

## 2021-11-19 NOTE — PROGRESS NOTES
OCCUPATIONAL THERAPY DAILY NOTE    Date:2021  Patient Name: Floresita Bradley  MRN: [id number]  : 1975  Room: [address] [place] [address]     Diagnosis:Acute CVA( Pt presented to [facility] as Level 2 stroke alert on 10/11/21. L side weakness, neglect. NIH-17  Found to have large R MCA involving R occipital)  Surgery: R frontotemporal decompressive hemicraniectomy on 10/13/21  Past Medical History: COVID (9/15), tobacco use  Precautions: Falls, L hemiplegia, L neglect, L sublux, bed/chair alarm, helmet when OOB, impulsive, L PRAFO    Functional Assessment:   Date Status AE  Comments   Feeding 21 Set-up  Pt self fed breakfast   Grooming 21 Set-up  w/c Seated for shaving task         Oral Care 21 Set-up  Pt able to jesus toothpaste and brush teeth   Bathing 21 Min A Shower chair with wheels  Pt declined full shower. Completed sponge bath seated/standing at sink, assist with standing balance for malvin areas   UB Dressing 21 SBA   Continued vc's to bring shirt over L shoulder prior to donning over head   LB Dressing 21 Min A reacher increased time to thread BLEs into brief/shorts. Assist with standing balance to pull over hips   Footwear 21 Mod A Lh shoe horn Pt donned R sock/shoe. Required assist with L AFO, to hold up leg while pt donned L sock, and to jesus into shoe   Toileting 21 Min A Grab bar   Pt able to complete malvin hygiene.  Required assist with standing balance to pull pants over hips   Homemaking 21 Mod A       Functional Transfers / Balance:   Date Status DME  Comments   Sit Balance 21 SBA     Stand Balance 21 Min A SBQC  Grab bar  Bed rail      [x] Tub        [x] Shower   Transfer 21 min/mod A          Min A Extended tub bench, SBQC      Roll in shower chair, grab bar, LBQC    Commode   Transfer 21 CGA , 3:1 commode SBQC  Grab bar     Functional   Mobility 11/19/21 CGA SBQC To/from bathroom      Functional Exercises / Activity:  Pt supine in bed upon arrival to . Completed supine-sit EOB and functional mobility to bathroom. Completed ADLs, see above for assessment. Pt appeared to have tolerated session well and has increased independence with ADLs. Completed shaving and oral hygiene during 2nd session. Participated in armbike exercise, standing at [address] A due to L knee buckling, 2x5 mins, to increase endurance and standing balance for ease with ADLs. Sensory / Neuromuscular Re-Education:      Cognitive Skills:   Status Comments   Problem   Solving fair    Memory fair    Sequencing fair    Safety fair      Visual Perception:    Education:  Pt/mom educated about LUE positioning when up in w/c to promote ROM/wt bearing along with joint integrity. Pt/mom educated with safety during fxl mobility in OT apt using quad cane especially over carpeted area since has carpet at home. Pt /mom educated with safety during ext tub bench at quad cane level due to hand/trunk placement for balance. 10/25/21: pt educated on SROM of LUE and safe positioning of LUE  10/29/21: educated on 1 handed dressing technique for socks/shoes  11/1/21: shoe lace adapted for ease with donning shoe  11/1/21: attempted small strips of kineosio tape on pt's hand to assess if pt can tolerate on her shoulder due to report of allergy (cleared by )  11/2/21:  Therapist applied kineosiotape to pt's LUE to stabilize sublux. Will continue to assess issues that arise with adhesive and progression of sublux  11/2/21: pt educated on AE for ease with LB dressing  11/3/21: Pt very fatigued in AM and PM sessions, requiring max cueing to alert pt back to task and engage in session, with pt asking multiple times to return to room/return to bed. Extended time to complete all tasks  11/4/21: home ROM exercise program placed in pt's folder and pt's mother notified of it  11/7/21 Pt educated about LUE positioning both up in w/c and in bed to maintain joint integrity. <>ext tub bench with patient needing cues for technique with cane & taking few steps. 11/16/21 Mom present and educated with transfers on/off firm recliner, sofa and ext tub bench transfers at quad cane level along with standing balance and positioning for LUE seated up in w/c.    11/17/21: Pt's aunt present. She observed as well as assisted pt intermittently with ADLs. 11/18/21- Pt mom & observed am OT treatment while pt evaluated using InMotion arm using pt LUE     Pain Level: L hip    Additional Notes:     Patient has made fair + progress during treatment sessions toward set goals. Therapy emphasis to obtain goals:    Time Frame for Long term goals  6 weeks   Long term goal 1 Pt will complete UB self-care tasks w/ Supervision   Long term goal 2 Pt will complete LB self-care tasks w/ Supervision   Long term goal 3 Pt will complete toileting (all aspects) w/ Supervision   Long term goal 4 Pt will safely complete toilet transfers w/ Supervision   Long term goal 5 Pt will safely complete tub/shower transfers w/ Supervision   Long term goals 6 Pt will complete basic homemaking task w/ SBA   Long term goal 7 Pt will complete grooming task w/ Supervision   Long term goal 8 Pt will complete feeding task w/ Supervision/setup     [x] Continue with current OT Plan of care. [] Prepare for Discharge    10/27/21 OT plan of care updated on this date. Tentative length of stay is 6 weeks Geo Carpio OTR/L [id number]  11/3/21- Pt POC updated on this date. Pt tentative length of day is 5 weeks to address above problem areas Taj Campo OTR/L [id number]  11/10/21- Pt POC updated on this date. Pt tentative length of day is 4 weeks to address above problem areas Geo Carpio OTR/L [id number]  11/17/21- Pt POC updated on this date.  Pt tentative length of day is 3 weeks to address above problem areas Geo Carpio OTR/L [id number]     DISCHARGE RECOMMENDATIONS  Recommended DME: drop [facility]    Post Discharge Care:   []Home Independently  []Home with 24hr Care / Supervision []Home with Partial Supervision []Home with Home Health OT []Home with Out Pt OT []Other: ___   Comments:         Time in Time out Tx Time Breakdown  Variance:   First Session  0700 0800 [x] Individual Tx-60  [] Concurrent Tx -  [] Co-Tx -   [] Group Tx -   [] Time Missed -     Second Session 1000 1030 [x] Individual Tx- 30  [] Concurrent Tx -   [] Co-Tx -   [] Group Tx -   [] Time Missed -     Third Session    [] Individual Tx-   [] Concurrent Tx -  [] Co-Tx -   [] Group Tx -   [] Time Missed -         Total Tx Time: [facility], [address] [facility], OTR/L [id number]

## 2021-11-20 PROCEDURE — 6370000000 HC RX 637 (ALT 250 FOR IP): Performed by: PHYSICAL MEDICINE & REHABILITATION

## 2021-11-20 PROCEDURE — 6370000000 HC RX 637 (ALT 250 FOR IP): Performed by: PHYSICIAN ASSISTANT

## 2021-11-20 PROCEDURE — 97530 THERAPEUTIC ACTIVITIES: CPT

## 2021-11-20 PROCEDURE — 1280000000 HC REHAB R&B

## 2021-11-20 PROCEDURE — 6360000002 HC RX W HCPCS: Performed by: PHYSICAL MEDICINE & REHABILITATION

## 2021-11-20 RX ADMIN — METOPROLOL TARTRATE 12.5 MG: 25 TABLET, FILM COATED ORAL at 21:22

## 2021-11-20 RX ADMIN — ATORVASTATIN CALCIUM 40 MG: 40 TABLET, FILM COATED ORAL at 21:22

## 2021-11-20 RX ADMIN — GABAPENTIN 100 MG: 100 CAPSULE ORAL at 14:11

## 2021-11-20 RX ADMIN — GABAPENTIN 100 MG: 100 CAPSULE ORAL at 09:10

## 2021-11-20 RX ADMIN — DOCUSATE SODIUM 100 MG: 100 CAPSULE, LIQUID FILLED ORAL at 09:10

## 2021-11-20 RX ADMIN — SENNOSIDES 17.2 MG: 8.6 TABLET, COATED ORAL at 21:20

## 2021-11-20 RX ADMIN — LAMOTRIGINE 25 MG: 25 TABLET ORAL at 09:11

## 2021-11-20 RX ADMIN — CLOPIDOGREL 75 MG: 75 TABLET, FILM COATED ORAL at 09:11

## 2021-11-20 RX ADMIN — ASPIRIN 81 MG CHEWABLE TABLET 81 MG: 81 TABLET CHEWABLE at 09:10

## 2021-11-20 RX ADMIN — ERGOCALCIFEROL 50000 UNITS: 1.25 CAPSULE ORAL at 09:11

## 2021-11-20 RX ADMIN — ACETAMINOPHEN 650 MG: 325 TABLET ORAL at 09:18

## 2021-11-20 RX ADMIN — ENOXAPARIN SODIUM 40 MG: 100 INJECTION SUBCUTANEOUS at 21:25

## 2021-11-20 RX ADMIN — Medication 6 MG: at 21:23

## 2021-11-20 RX ADMIN — POLYETHYLENE GLYCOL 3350 17 G: 17 POWDER, FOR SOLUTION ORAL at 21:25

## 2021-11-20 RX ADMIN — TAMSULOSIN HYDROCHLORIDE 0.4 MG: 0.4 CAPSULE ORAL at 09:10

## 2021-11-20 RX ADMIN — ACETAMINOPHEN 650 MG: 325 TABLET ORAL at 21:24

## 2021-11-20 RX ADMIN — GABAPENTIN 100 MG: 100 CAPSULE ORAL at 21:23

## 2021-11-20 RX ADMIN — DOCUSATE SODIUM 100 MG: 100 CAPSULE, LIQUID FILLED ORAL at 21:21

## 2021-11-20 RX ADMIN — METOPROLOL TARTRATE 12.5 MG: 25 TABLET, FILM COATED ORAL at 09:10

## 2021-11-20 ASSESSMENT — PAIN SCALES - GENERAL
PAINLEVEL_OUTOF10: 6
PAINLEVEL_OUTOF10: 7
PAINLEVEL_OUTOF10: 1
PAINLEVEL_OUTOF10: 7
PAINLEVEL_OUTOF10: 7
PAINLEVEL_OUTOF10: 3

## 2021-11-20 ASSESSMENT — PAIN DESCRIPTION - LOCATION: LOCATION: HIP;HEAD

## 2021-11-20 ASSESSMENT — PAIN DESCRIPTION - ORIENTATION: ORIENTATION: LEFT

## 2021-11-20 ASSESSMENT — PAIN DESCRIPTION - PROGRESSION
CLINICAL_PROGRESSION: NOT CHANGED

## 2021-11-20 ASSESSMENT — PAIN SCALES - WONG BAKER
WONGBAKER_NUMERICALRESPONSE: 0
WONGBAKER_NUMERICALRESPONSE: 0

## 2021-11-20 NOTE — PROGRESS NOTES
eyes up looking fwd. AM  Time in:1030   Time out: 1100        Pt is making good progress toward established Physical Therapy goals. Continue with physical therapy current plan of care.     Lesly Dunbar., PT, DPT  [id number]

## 2021-11-20 NOTE — PROGRESS NOTES
Progress Note - covering Dr. Hoda Celestin    Subjective/   39y.o. year old female on the rehab unit for acute stroke. She denies new complaints. She is tolerating therapy. Persistent left-sided weakness. She has some paperwork that needs completed. Objective/   VITALS:  /69   Pulse 113   Temp 98 °F (36.7 °C) (Temporal)   Resp 18   Ht 5' 6\" (1.676 m)   Wt 170 lb (77.1 kg)   SpO2 96%   BMI 27.44 kg/m²   24HR INTAKE/OUTPUT:    Intake/Output Summary (Last 24 hours) at 11/20/2021 1655  Last data filed at 11/20/2021 0410  Gross per 24 hour   Intake 420 ml   Output --   Net 420 ml     Constitutional:  Alert, awake, no apparent distress   Cardiovascular:  S1, S2 without m/r/g   Respiratory:  CTA B without w/r/r   Abdomen: Positive bowel sounds  Ext: No pitting LE edema, no calf tenderness or cords. No pain with range of motion  Neuro: Left weakness. Left neglect. Fair sitting balance. Functional Level        Date   Status   AE    Comments     Feeding   11/19/21   Set-up       Pt self fed breakfast     Grooming   11/19/21   Set-up    w/c   Seated for shaving task           Oral Care   11/19/21   Set-up       Pt able to jesus toothpaste and brush teeth     Bathing   11/19/21   Min A   Shower chair with wheels    Pt declined full shower. Completed sponge bath seated/standing at sink, assist with standing balance for malvin areas     UB Dressing   11/19/21   SBA        Continued vc's to bring shirt over L shoulder prior to donning over head     LB Dressing   11/19/21   Min A   reacher   increased time to thread BLEs into brief/shorts. Assist with standing balance to pull over hips     Footwear   11/19/21   Mod A Lh shoe horn   Pt donned R sock/shoe. Required assist with L AFO, to hold up leg while pt donned L sock, and to jesus into shoe     Toileting   11/19/21   Min A   Grab bar      Pt able to complete malvin hygiene.  Required assist with standing balance to pull pants over hips     Homemaking 11/9/21   Mod A                  Functional Transfers / Balance:      Date Status DME  Comments   Sit Balance 11/19/21   SBA       Stand Balance 11/19/21   Min A SBQC  Grab bar  Bed rail       [x]? Tub           [x]? Shower   Transfer 11/16/21 11/17/21 min/mod A             Min A Extended tub bench, SBQC        Roll in shower chair, grab bar, LBQC     Commode   Transfer 11/19/21   CGA , 3:1 commode SBQC  Grab bar      Functional   Mobility 11/19/21 CGA SBQC To/from bathroom       Functional Exercises / Activity:  Pt supine in bed upon arrival to . Completed supine-sit EOB and functional mobility to bathroom. Completed ADLs, see above for assessment. Pt appeared to have tolerated session well and has increased independence with ADLs.    Completed shaving and oral hygiene during 2nd session. Participated in armbike exercise, standing at Gem A due to L knee buckling, 2x5 mins, to increase endurance and standing balance for ease with ADLs.    Sensory / Neuromuscular Re-Education:        Cognitive Skills:    Status Comments   Problem   Solving fair     Memory fair     Sequencing fair     Safety fair           Scheduled Meds:   enoxaparin  40 mg SubCUTAneous Daily    polyethylene glycol  17 g Oral BID    lamoTRIgine  25 mg Oral Daily    lidocaine  1 patch TransDERmal Daily    baclofen  5 mg Oral Nightly    nicotine  1 patch TransDERmal Daily    senna  2 tablet Oral Nightly    metoprolol tartrate  12.5 mg Oral BID    gabapentin  100 mg Oral TID    tamsulosin  0.4 mg Oral Daily    aspirin  81 mg Oral Daily    atorvastatin  40 mg Oral Nightly    vitamin D  50,000 Units Oral Weekly    docusate sodium  100 mg Oral BID    melatonin  6 mg Oral Nightly    clopidogrel  75 mg Oral Daily     Continuous Infusions:  PRN Meds:diclofenac sodium, diphenhydrAMINE-zinc acetate, ondansetron, lactulose, guaiFENesin, acetaminophen, magnesium hydroxide, bisacodyl  I/O last 3 completed shifts:   In: 5 [P.O.:420]  Out: -   No intake/output data recorded. Labs reviewed  CBC:   Recent Labs     11/18/21 0436   WBC 7.3   HGB 11.8        BMP:    Recent Labs     11/18/21 0436      K 4.6      CO2 25   BUN 14   CREATININE 0.7   GLUCOSE 89     Hepatic: No results for input(s): AST, ALT, ALB, BILITOT, ALKPHOS in the last 72 hours. BNP: No results for input(s): BNP in the last 72 hours. Lipids: No results for input(s): CHOL, HDL in the last 72 hours. Invalid input(s): LDLCALCU  INR: No results for input(s): INR in the last 72 hours. Assessment/  Patient Active Problem List:     Acute cerebrovascular accident (CVA) ([facility] [address]     Status post craniectomy     Left spastic hemiplegia (HCC)     Tobacco abuse     Acute pain     Transient alteration of awareness      Plan/  1. Continue rehab program  2. Continue focus on improved strength, validity balance  3. Continue precautions  4. Will place paperwork for employer on Dr. Dayne Osler clip board  5. Continue DVT prophylaxis  6. Continue mobilize as able.           Sonido Latham MD

## 2021-11-21 PROCEDURE — 6370000000 HC RX 637 (ALT 250 FOR IP): Performed by: PHYSICIAN ASSISTANT

## 2021-11-21 PROCEDURE — 97112 NEUROMUSCULAR REEDUCATION: CPT

## 2021-11-21 PROCEDURE — 6360000002 HC RX W HCPCS: Performed by: PHYSICAL MEDICINE & REHABILITATION

## 2021-11-21 PROCEDURE — 6370000000 HC RX 637 (ALT 250 FOR IP): Performed by: PHYSICAL MEDICINE & REHABILITATION

## 2021-11-21 PROCEDURE — 1280000000 HC REHAB R&B

## 2021-11-21 RX ADMIN — ENOXAPARIN SODIUM 40 MG: 100 INJECTION SUBCUTANEOUS at 21:26

## 2021-11-21 RX ADMIN — DOCUSATE SODIUM 100 MG: 100 CAPSULE, LIQUID FILLED ORAL at 21:17

## 2021-11-21 RX ADMIN — SENNOSIDES 17.2 MG: 8.6 TABLET, COATED ORAL at 21:17

## 2021-11-21 RX ADMIN — GABAPENTIN 100 MG: 100 CAPSULE ORAL at 08:02

## 2021-11-21 RX ADMIN — CLOPIDOGREL 75 MG: 75 TABLET, FILM COATED ORAL at 08:04

## 2021-11-21 RX ADMIN — ASPIRIN 81 MG CHEWABLE TABLET 81 MG: 81 TABLET CHEWABLE at 08:02

## 2021-11-21 RX ADMIN — METOPROLOL TARTRATE 12.5 MG: 25 TABLET, FILM COATED ORAL at 21:19

## 2021-11-21 RX ADMIN — TAMSULOSIN HYDROCHLORIDE 0.4 MG: 0.4 CAPSULE ORAL at 08:02

## 2021-11-21 RX ADMIN — POLYETHYLENE GLYCOL 3350 17 G: 17 POWDER, FOR SOLUTION ORAL at 21:19

## 2021-11-21 RX ADMIN — ACETAMINOPHEN 650 MG: 325 TABLET ORAL at 08:01

## 2021-11-21 RX ADMIN — DICLOFENAC SODIUM 4 G: 10 GEL TOPICAL at 21:19

## 2021-11-21 RX ADMIN — Medication 6 MG: at 21:16

## 2021-11-21 RX ADMIN — POLYETHYLENE GLYCOL 3350 17 G: 17 POWDER, FOR SOLUTION ORAL at 08:02

## 2021-11-21 RX ADMIN — GABAPENTIN 100 MG: 100 CAPSULE ORAL at 21:15

## 2021-11-21 RX ADMIN — ATORVASTATIN CALCIUM 40 MG: 40 TABLET, FILM COATED ORAL at 21:17

## 2021-11-21 RX ADMIN — DOCUSATE SODIUM 100 MG: 100 CAPSULE, LIQUID FILLED ORAL at 08:02

## 2021-11-21 RX ADMIN — LAMOTRIGINE 25 MG: 25 TABLET ORAL at 08:04

## 2021-11-21 RX ADMIN — GABAPENTIN 100 MG: 100 CAPSULE ORAL at 13:28

## 2021-11-21 RX ADMIN — ACETAMINOPHEN 650 MG: 325 TABLET ORAL at 21:18

## 2021-11-21 ASSESSMENT — PAIN DESCRIPTION - FREQUENCY: FREQUENCY: CONTINUOUS

## 2021-11-21 ASSESSMENT — PAIN SCALES - GENERAL
PAINLEVEL_OUTOF10: 5
PAINLEVEL_OUTOF10: 7
PAINLEVEL_OUTOF10: 3
PAINLEVEL_OUTOF10: 4

## 2021-11-21 ASSESSMENT — PAIN DESCRIPTION - ONSET: ONSET: ON-GOING

## 2021-11-21 ASSESSMENT — PAIN DESCRIPTION - LOCATION
LOCATION: HIP

## 2021-11-21 ASSESSMENT — PAIN DESCRIPTION - PROGRESSION: CLINICAL_PROGRESSION: NOT CHANGED

## 2021-11-21 ASSESSMENT — PAIN DESCRIPTION - ORIENTATION: ORIENTATION: LEFT

## 2021-11-21 ASSESSMENT — PAIN DESCRIPTION - PAIN TYPE: TYPE: CHRONIC PAIN

## 2021-11-21 ASSESSMENT — PAIN DESCRIPTION - DESCRIPTORS: DESCRIPTORS: ACHING;DISCOMFORT;DULL;CONSTANT

## 2021-11-21 NOTE — PROGRESS NOTES
Therapeutic Recreation Assessment     LEISURE INTEREST SURVEY        Attendance  Activity: Cards  Participation: Active participation      Key: P = Past Interest C = Current Interest F = Future Interest     Arts/Crafts:  ___Mechanical  ___Woodworking    ___ Painting   ___Floral arranging ___ Ceramics         _ __ Knitting                                                     ___ Crocheting        ___Other: ___________      Cooking:  _ _Baking ___Cooking    ___   Other: _______   Comments:       Games:  _C__Cards  ___Darts  _C__Board games    ___Word games  ___Crossword puzzles    ___Seek-n-find/jumble                   ___Other: _________      Horticulture:  _C__Vegetables  _C__Flowers  ___House plants                                                     ___Other: ___________      House/Yard Care:  ___Ironing  ___Laundry  ___Cleaning                                                      ___Repairs              ___Lawn care                                                     ___Other: ___________      Music Listening/Playing:  ___Classical       ___Big Band       ___Rock-n-Roll                                                     ___Country          ___R&B             ___Gospel/Hymns                                                     ___Other: ___________      Outdoor Activities:  ___Hunting          ___Fishing          ___Camping                                                     ___Other: ___________      Pets/Animals:  X 2___Dogs             ___Cats                ___Fish                                                     ___Birds             ___Livestock         ___Other: _________    Reading:   ___Newspapers  ___Magazines ___Other:stories                                                     ___Other: ___________      Sikhism Activities:  Presybeterian: ___________   Carry Phalen Activities: ___________      Shopping:   ___Grocery  ___Clothing  ___Flea market     _C__Other: [employer] Shopping_________      Socialization: _C__Family  _C__Friends  ___Neighbors       ___Church  ___Volunteer ___Club/Organization                                                     ___Other: ___________    Sports/Exercises:  ___Walking  ___Football  ___Basketball     ___Golf  ___Baseball  ___Tennis       ___Bowling  ___Other: ___________      Traveling:   _C__Global  _C__Stateside  _C__Local       ___Other: ___________      TV/Radio:   ___Mysteries  ___Comedies ___Romance                                                     _C__Game show       ___Sports       ___News                                                      ___Talk show          _C__Other: __Drama_________      Other: Mars Mckeon identified feelings of being sad. Personal Leisure Profile:   Question Response   Attributes Identify a positive quality: []Ambitious  []Appreciative  []Caring  []Courteous  []Considerate  []Compassionate  []Creative  []Dependable   []Generous  []Honest  []Hard working  [x]Helpful  []Kind  []Providence   []Mesa  []Mannerly  []Patient  []Polite  []Respectful  []Sociable  []Sense of humor  []Thoughtful  []Other: ___________    You are very good at Cooking   Awareness Why do you participate in your leisure interest: []Competition  []Creativity  []Concentration  []Exercise  []Enjoyment  []Fun  []Hand/eye Coordination  []Increase confidence  []Learning a new skill  [x]Relaxation  []Social Interaction  []Supporting one another  []Thinking  []Other: ___________    Are your leisure activities limited at this time? [x]Yes  []No  Comments: _________    How often do you participate in your leisure interest? 1 x a week   Resources Name a restaurant, store or business you frequent? [employer]   Personal When are you most happy?  Spending time with family     Barriers to Leisure Participation:  []Visual   [][place]  []Cognition/Communication  []Motivation  []Financial  []Transportation  []Time Constraints  [x]Physical Ability  []Leisure Awareness  []Drug/Alcohol  []Other: ___________    Recommendations:  []Group Session  [x]Individual Session  []Client Refused Services  []No Therapy  []Other: ___________    Objectives:  [x]Establish adaptations for leisure involvement   [x]Increase Self worth/self esteem  []Community reintegration training   [x]Social interaction  [x]Leisure participation  []Other: ___________    Goals for Therapeutic Recreation Services:   Social Interaction:   Admission Functional [place] Measure: ___5________  Discharge Functional [place] Measure: ___6________   Other:________________________________              Socialization/Social Interaction: To increase social interaction skills by introducing self 1 x per week at the beginning of TR session. Adaptations: To increase knowledge of adaptations to leisure involvement by participating in 1 modified leisure activities by dc. Leisure Activity Tolerance: To increase leisure tolerance by attending 1 leisure activities per week for 25 minutes with active participation. Self Expression: To participate in 1 leisure activity(s) of choice, identifying 1 benefits to leisure participation. Leisure Skills: To increase leisure skills by displaying the ability to participate in 1 new leisure activities by discharge. Self esteem/confidence: To complete 1 leisure activities with success by discharge. Madiha Lew

## 2021-11-21 NOTE — PROGRESS NOTES
OCCUPATIONAL THERAPY DAILY NOTE    Date:2021  Patient Name: Keyona Goss  MRN: [id number]  : 1975  Room: [address] [place] [address]     Diagnosis:Acute CVA( Pt presented to [facility] as Level 2 stroke alert on 10/11/21. L side weakness, neglect. NIH-17  Found to have large R MCA involving R occipital)  Surgery: R frontotemporal decompressive hemicraniectomy on 10/13/21  Past Medical History: COVID (9/15), tobacco use  Precautions: Falls, L hemiplegia, L neglect, L sublux, bed/chair alarm, helmet when OOB, impulsive, L PRAFO    Functional Assessment:   Date Status AE  Comments   Feeding 21 Set-up     Grooming 21 Set-up  w/c          Oral Care 21 Set-up     Bathing 21 Min A Shower chair with wheels     UB Dressing 21 SBA      LB Dressing 21 Min A reacher    Footwear 21 Mod A Lh shoe horn    Toileting 21 Min A Grab bar      Homemaking 21 Mod A       Functional Transfers / Balance:   Date Status DME  Comments   Sit Balance 21 SBA     Stand Balance 21 Min A SBQC  Grab bar  Bed rail      [x] Tub        [x] Shower   Transfer 21 min/mod A          Min A Extended tub bench, SBQC      Roll in shower chair, grab bar, LBQC    Commode   Transfer 21 CGA , 3:1 commode SBQC  Grab bar     Functional   Mobility 21 CGA SBQC To/from bathroom      Functional Exercises / Activity:      Sensory / Neuromuscular Re-Education:  Pt completed [employer] Robotic Arm for L UE. L UE was ace wrapped and chair harness was used for positioning purposes. 10 cm [place] was used. Pt required Mod cues from therapist d/t decrease compensatory movements. Pt completed active assistive protocol for 272 reps including pre and post testing. Pt demo'd improvements in RI from 27 to 23, DFT from 15 to 11, MJ from 102 to 98 and DFSL from 11 to 8.   Pt demo'd improvements from pre to post testing in smoothness from 0.202 to 0.247, reach error from 0.150 to 0.102, mean velocity Álvaro Momin on pt's status with ADLs and vc's for sequencing/initiation of tasks/hand placement with functional transfers. Álvaro Momin able to provide hands on assist with was educated for body mechanics/hand placement/vc's to increase safety with only commode transfer with slideboard and toileting task. Álvaro Merrill educated on PROM (elbow/shoulder/wrist/digit flex/ext) of LUE to increase ROM, maintain joint integrity, and safe management of sublux. Pinedafloyd Momin educated on how to doff/jesus gait belt and LUE sling. Pt's limiting factor appears to be her impulsivity and safety with ADLs/transfer. Álvaro Momin demo'ing F understanding/safety of education. Álvaro Momin appeared motivated and willing to learn. Continue to educate. 11/9/21: Completed family teach this session. Pt's mother Álvaro Momin present. Educated Álvaro Momin on pt's increased independence with ADLs/functional transfers. Pinedafloyd Momin able to jesus pt's sling and gait belt. Álvaro Momin completed hands on assist with SPT to [facility]. Álvaro Momin and pt demo'ing improved communication with each other for safe transfer/completion of ADLs. Álvaro Momin demo'ing F understanding/safety of education. Continue to educate. 11/11/21- Mother present and educated along with assisting with w/c<>commode transfers using grab bar for balance and one handed skills. LUE placed in sling for balance and joint protection. Pt/mom educated with ext tub bench transfers to simulate for home environment with parent participating with transfer. Pt needed cues for hand/trunk placement on bench along with proper follow thru for safety. Pt/mom educated SPT using quad cane from w/c <>ext tub bench with patient needing cues for technique with cane & taking few steps. 11/16/21 Mom present and educated with transfers on/off firm recliner, sofa and ext tub bench transfers at quad cane level along with standing balance and positioning for LUE seated up in w/c.    11/17/21: Pt's aunt present.  She observed as well as assisted pt Second Session   [] Individual Tx-   [] Concurrent Tx -   [] Co-Tx -   [] Group Tx -   [] Time Missed -     Third Session    [] Individual Tx-   [] Concurrent Tx -  [] Co-Tx -   [] Group Tx -   [] Time Missed -         Total Tx Time: 30 Mins     Maricarmen GLOVER/KEITH [id number]

## 2021-11-21 NOTE — PLAN OF CARE
Problem: Falls - Risk of:  Goal: Will remain free from falls  Description: Will remain free from falls  11/20/2021 2322 by William Krishnamurthy RN  Outcome: Met This Shift  Note: Reminded of call light use and placed within reach. Bed alarm maintained. Bed in lowest position and locked. Rounds performed by staff. Problem: Skin Integrity:  Goal: Absence of new skin breakdown  Description: Absence of new skin breakdown  11/20/2021 2322 by William Krishnamurthy RN  Outcome: Met This Shift  Note: Turned and repositioned every two hours as tolerated.

## 2021-11-21 NOTE — PROGRESS NOTES
11/21/21 1331   Attendance   Activity Cards   Participation Active participation   Therapeutic Recreation   Leisure Education Demonstrates knowledge of benefits of leisure involvement  (Ladan stated\"Paying attention and fun\" as benefits.)   Leisure Attitude/Participation Participates in 1:1 structured activity   Time Spent With Patient   Minutes 40

## 2021-11-22 PROCEDURE — 6360000002 HC RX W HCPCS: Performed by: PHYSICAL MEDICINE & REHABILITATION

## 2021-11-22 PROCEDURE — 6370000000 HC RX 637 (ALT 250 FOR IP): Performed by: PHYSICAL MEDICINE & REHABILITATION

## 2021-11-22 PROCEDURE — L1970 AFO PLASTIC MOLDED W/ANKLE J: HCPCS

## 2021-11-22 PROCEDURE — 97530 THERAPEUTIC ACTIVITIES: CPT

## 2021-11-22 PROCEDURE — L2210 DORSIFLEXION ASSIST EACH JOI: HCPCS

## 2021-11-22 PROCEDURE — 97110 THERAPEUTIC EXERCISES: CPT

## 2021-11-22 PROCEDURE — 97535 SELF CARE MNGMENT TRAINING: CPT

## 2021-11-22 PROCEDURE — 99232 SBSQ HOSP IP/OBS MODERATE 35: CPT | Performed by: PHYSICAL MEDICINE & REHABILITATION

## 2021-11-22 PROCEDURE — 1280000000 HC REHAB R&B

## 2021-11-22 PROCEDURE — L2200 LIMITED ANKLE MOTION EA JNT: HCPCS

## 2021-11-22 PROCEDURE — 6370000000 HC RX 637 (ALT 250 FOR IP): Performed by: PHYSICIAN ASSISTANT

## 2021-11-22 RX ADMIN — BACLOFEN 5 MG: 10 TABLET ORAL at 00:21

## 2021-11-22 RX ADMIN — TAMSULOSIN HYDROCHLORIDE 0.4 MG: 0.4 CAPSULE ORAL at 08:16

## 2021-11-22 RX ADMIN — Medication 6 MG: at 20:59

## 2021-11-22 RX ADMIN — ATORVASTATIN CALCIUM 40 MG: 40 TABLET, FILM COATED ORAL at 20:59

## 2021-11-22 RX ADMIN — CLOPIDOGREL 75 MG: 75 TABLET, FILM COATED ORAL at 08:12

## 2021-11-22 RX ADMIN — METOPROLOL TARTRATE 12.5 MG: 25 TABLET, FILM COATED ORAL at 08:12

## 2021-11-22 RX ADMIN — ACETAMINOPHEN 650 MG: 325 TABLET ORAL at 12:48

## 2021-11-22 RX ADMIN — LAMOTRIGINE 25 MG: 25 TABLET ORAL at 08:12

## 2021-11-22 RX ADMIN — DOCUSATE SODIUM 100 MG: 100 CAPSULE, LIQUID FILLED ORAL at 20:57

## 2021-11-22 RX ADMIN — GABAPENTIN 100 MG: 100 CAPSULE ORAL at 12:48

## 2021-11-22 RX ADMIN — DICLOFENAC SODIUM 4 G: 10 GEL TOPICAL at 00:22

## 2021-11-22 RX ADMIN — GABAPENTIN 100 MG: 100 CAPSULE ORAL at 20:59

## 2021-11-22 RX ADMIN — ASPIRIN 81 MG CHEWABLE TABLET 81 MG: 81 TABLET CHEWABLE at 08:12

## 2021-11-22 RX ADMIN — ENOXAPARIN SODIUM 40 MG: 100 INJECTION SUBCUTANEOUS at 20:59

## 2021-11-22 RX ADMIN — POLYETHYLENE GLYCOL 3350 17 G: 17 POWDER, FOR SOLUTION ORAL at 20:57

## 2021-11-22 RX ADMIN — GABAPENTIN 100 MG: 100 CAPSULE ORAL at 08:12

## 2021-11-22 RX ADMIN — DOCUSATE SODIUM 100 MG: 100 CAPSULE, LIQUID FILLED ORAL at 08:12

## 2021-11-22 RX ADMIN — DICLOFENAC SODIUM 4 G: 10 GEL TOPICAL at 08:30

## 2021-11-22 RX ADMIN — SENNOSIDES 17.2 MG: 8.6 TABLET, COATED ORAL at 20:59

## 2021-11-22 RX ADMIN — BACLOFEN 5 MG: 10 TABLET ORAL at 20:57

## 2021-11-22 ASSESSMENT — PAIN DESCRIPTION - DESCRIPTORS: DESCRIPTORS: ACHING

## 2021-11-22 ASSESSMENT — PAIN SCALES - GENERAL
PAINLEVEL_OUTOF10: 0
PAINLEVEL_OUTOF10: 8
PAINLEVEL_OUTOF10: 0
PAINLEVEL_OUTOF10: 0

## 2021-11-22 ASSESSMENT — PAIN DESCRIPTION - FREQUENCY: FREQUENCY: INTERMITTENT

## 2021-11-22 ASSESSMENT — PAIN DESCRIPTION - PROGRESSION: CLINICAL_PROGRESSION: GRADUALLY IMPROVING

## 2021-11-22 ASSESSMENT — PAIN DESCRIPTION - PAIN TYPE: TYPE: ACUTE PAIN

## 2021-11-22 ASSESSMENT — PAIN DESCRIPTION - LOCATION: LOCATION: HEAD

## 2021-11-22 NOTE — PLAN OF CARE
Problem: Pain:  Goal: Pain level will decrease  Description: Pain level will decrease  11/22/2021 0128 by Gonzalo Howell RN  Outcome: Met This Shift  Note: Provided PRN pain interventions as needed during shift. Encouraged repositioning and rest.      Problem: Falls - Risk of:  Goal: Will remain free from falls  Description: Will remain free from falls  11/22/2021 0128 by Gonzalo Howell RN  Outcome: Met This Shift  Note: Fall interventions maintained. Reminded of call light use and placed within reach. Bed in lowest position with wheels locked. Bed alarm maintained. Rounds performed by staff. Problem: COMMUNICATION IMPAIRMENT  Goal: Ability to express needs and understand communication  11/22/2021 0128 by Gonzalo Howell RN  Outcome: Met This Shift  Note: Able to effectively communicate needs at this time.

## 2021-11-22 NOTE — PROGRESS NOTES
Physical Therapy  Treatment Note  Supervising Therapist: Gonzalo Coley, PT, DPT [id number]    NAME: Parul Elizalde  [facility]OM[facility]  DIAGNOSIS: Acute CVA  PRECAUTIONS: Falls, helmet at all times when OOB, L hemiplegia, L homonymous hemianopsia L neglect, alarm, must bring phone to therapy (cardiac monitor), L PRAFO, SBP <180  HPI: Pt is a 39 y.o. female with past medical history significant for recent COVID-19 infection (9/15/21) who [facility] with acute onset left-sided weakness. Initial NIHSS 17.  Initial CT ([facility]) showed R MCA hyperdensity; initial CTA showed right ICA/M1 occlusion. On 10/13, her neuro exam worsened and repeat imaging indicated worsening edema, 1 cm of midline shift. She underwent emergent R hemicraniectomy by Dr. Anuel Snow [facility]) on 10/13. After being deemed medically appropriate, she was transferred to [place] [id number] on 10/22/2021    Social:  Pt lives with daughter (15 y/o), will be returning to parent's home upon discharge (1 floor plan, 2 HAIDER with 2 HR). Mother and father healthy, do not work. 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 Initial Evaluation  Date: 10/23/21 AM     PM    Short Term Goals Long Term Goals    Was pt agreeable to Eval/treatment? yes Yes Yes     Does pt have pain? Pain on the top of her head once the helmet was placed on Mild c/o L hip and R rib pain Mild c/o moderate L hip/knee pain with activity     Bed Mobility  Rolling: Max A  Supine to sit: Max A   Sit to supine: Max A   Scooting: Max A NT NT Min A Supervision   Transfers Sit to stand: Max A   Stand to sit: Max A  Stand pivot:  Max A x2    5xSTS: Unable to complete Sit<>stand: Noemy  Stand pivot: Noemy with R SBQC Sit<>stand: Noemy  Stand pivot: Noemy with R SBQC   Min A SBA  5xSTS: TBD   Ambulation    15 feet with R mackey rail with Max A + wc follow    10mWT: TBA  6mWT:  feet with R SBQC with Noemy  (L articulating AFO with DF/PF stop)    10mWT: 0.15 m/s  6mWT: 52 m  (R SBQC, L Trial AFO, Noemy 11/16)     135 feet x 1 rep with R SBQC and Noemy   (L articulating AFO)   50 feet with AAD with Min A       500 feet with AAD with SBA    10mWT: >0.4 m/s  6mWT: >205 m   Walking 10 feet on uneven surface NT NT NT 10 feet with AAD with Min A 10 feet with AAD with SBA   Wheel Chair Mobility NT NT NT  150 feet with R extremities with SBA   Car Transfers NT NT Noemy Min A SBA   Stair negotiation: ascended and descended NT NT 8 steps with R HR and Noemy (non-reciprocal, leading with RLE ascent, LLE descent) 4 steps with 1 rail with Min A 12 steps with 1 rail with SBA   Curb Step:   ascended and descended NT NT NT 4 inch step with AAD and Min A 4 inch step with AAD and SBA   Picking up object off the floor NT NT NT Will  an object with Min assist Will  an object with SBA   BLE ROM WFL: LLE AROM limited by flaccidity  WFL: LLE AROM limited by spasticity 2+ on MAS (hamstrings, hip extensors, PF)  L ankle DF lacking ~5 degrees from neutral      BLE Strength RLE 4+/5  LLE 0/5 RLE 5/5  LLE hip flexion 2+/5  Knee extension 2-/5  Ankle DF/PF 0/5      Balance  Static Sitting: Mod/Max A    BBS: TBA  FGA: TBA   Standing: Noemy  BBS: 11/56  (11/16/21)           BBS: >20/56  FGA: TBD   Date Family Teach Completed TBA Pt's mother present for observation 10/24, 11/2, 11/3, 114, hands on 11/9, 11/11, 11/16, 11/18, aunt present to observe 11/17  Dad present 11/22 for hands on training      Is additional Family Teaching Needed? Y or N Y Yes      Hindering Progress L hemiplegia, L neglect L hemiplegia, L neglect      PT recommended ELOS 5 weeks       Team's Discharge Plan        Therapist at Team Meeting          General:        HRmax: 178  bpm       Beta blockers (Y/N): Y      Adjusted HRmax: 168 bpm   Blood pressure (mmHg): AM:  - Prior to Tx: 120/65  - Post Tx: 116/73 PM:  NA   Time spent in HR ranges:    Moderate intensity (60-70% HRmax): AM: 0:19:37 PM: NT due to FT   High intensity (70-85% HRmax): AM: 0:10:32 PM: NT due to FT     Therapeutic Exercise:   AM: Ambulation without  feet x 2 reps with ModA  FW NR stepping over 8 SPC on floor with R SPC, 7.5lb ankle weight LLE x 2 reps with ModA  Side stepping to L with R SPC, 7.5lb ankle weight LLE x 2 reps with ModA  Ambulation with R SPC, 7.5 lb ankle weight LLE, 100 feet x 1 rep with Noemy  PM: FW/BW/lateral stepping around small tile square without AD counter clockwise x 3 reps with ModA  Ambulation without  feet x 3 reps with ModA (L swedish knee cage)   Ascending/descending 4 steps with L HR    Patient education  Pt educated on strategies for safe turning technique with approach to chair    Patient response to education:   Pt verbalized understanding Pt demonstrated skill Pt requires further education in this area   yes partial yes     Additional Comments: Pt remains pleasant and motivated during session, attending to L side more during transfers resulting in mildly improved transfer safety. Impulsivity and L neglect remain most evident barriers to recovery. Pt maintains deficits in LLE advancement, challenged by obstacle negotiation and with ankle weight. Gross mobility limited by L neglect as pt requires frequent directional cueing. Pt's father present for hands on training during PM session, exhibits proficiency with guarding/assisting patient during basic mobility tasks. Swedish knee cage used intermittently due to hyperextension present while pt is ambulating at increased speed without device. Plan to continue high intensity gait training tomorrow following AM session FT. AM  Time in: 0830  Time out: 0915    PM  Time in: 1300  Time out: 7822    Pt is making good progress toward established Physical Therapy goals. Continue with physical therapy current plan of care.     Varinder Brothers, PT, DPT  Board Certified Clinical Specialist in Neurologic Physical Therapy  [id number]

## 2021-11-22 NOTE — PROGRESS NOTES
OCCUPATIONAL THERAPY DAILY NOTE    Date:2021  Patient Name: Kvng Sneed  MRN: [id number]  : 1975  Room: [address] [place], [place] [address]     Diagnosis:Acute CVA( Pt presented to [facility] as Level 2 stroke alert on 10/11/21. L side weakness, neglect.  NIH-17  Found to have large R MCA involving R occipital)  Surgery: R frontotemporal decompressive hemicraniectomy on 10/13/21  Past Medical History: COVID (9/15), tobacco use  Precautions: Falls, L hemiplegia, L neglect, L sublux, bed/chair alarm, helmet when OOB, impulsive, L PRAFO    Functional Assessment:   Date Status AE  Comments   Feeding 21 Set-up     Grooming 21 Set-up  w/c          Oral Care 21 Set-up     Bathing 21 Min A Shower chair with wheels     UB Dressing 21 SBA      LB Dressing 21 Min A reacher    Footwear 21 Mod A Lh shoe horn    Toileting 21 Min A Grab bar   Pt completed  toileting task on 3in1 commode with use of grab bar, cueing for hand placement, with pt able to complete of hygiene to malvin area, with assistance to stand and manage of clothes   Homemaking 21 Mod A       Functional Transfers / Balance:   Date Status DME  Comments   Sit Balance 21 SBA  Pt sat supported upright in w/c   Stand Balance 21 Min A SBQC  Grab bar  Bed rail   Pt stood during transfers   [x] Tub        [x] Shower   Transfer 21 min/mod A          Min A Extended tub bench, SBQC      Roll in shower chair, grab bar, LBQC Extended tub bench transfer with pt requiring assistance to bring LLE in/out of tub   Commode   Transfer 21 CGA , 3:1 commode SBQC  Grab bar  Pt compelted commode transfer from 3in1 commode, cueing for hand placement   Functional   Mobility 21 CGA SBQC Pt ambulated short household distance between transfers with [employer] [place]     Functional Exercises / Activity:  -PROM to LUE for multiple reps in all planes, with rest breaks, per pt's request, focusing on increasing of ROM and prevention of contractures. -Arm skate activity seated at table top, focusing on increasing of ROM of BUE's, activity tolerance, and use of LUE. -Arm bike with LUE supported by ace bandage, completing activity forward x5mins, and backwards x1, with rest break, focusing on increasing of ROM of BUE's, activity tolerance. Sensory / Neuromuscular Re-Education:        Cognitive Skills:   Status Comments   Problem   Solving fair    Memory fair    Sequencing fair    Safety fair      Visual Perception:    Education:  Pt educated on safety and hand placement with transfers      10/25/21: pt educated on SROM of LUE and safe positioning of LUE  10/29/21: educated on 1 handed dressing technique for socks/shoes  11/1/21: shoe lace adapted for ease with donning shoe  11/1/21: attempted small strips of kineosio tape on pt's hand to assess if pt can tolerate on her shoulder due to report of allergy (cleared by )  11/2/21:  Therapist applied kineosiotape to pt's LUE to stabilize sublux. Will continue to assess issues that arise with adhesive and progression of sublux  11/2/21: pt educated on AE for ease with LB dressing  11/3/21: Pt very fatigued in AM and PM sessions, requiring max cueing to alert pt back to task and engage in session, with pt asking multiple times to return to room/return to bed. Extended time to complete all tasks  11/4/21: home ROM exercise program placed in pt's folder and pt's mother notified of it  11/7/21 Pt educated about LUE positioning both up in w/c and in bed to maintain joint integrity. 11/17/21: resting hand applied to pt's L hand due to report of tone at digits. This is to be worn at night time and will be monitored for further tone. 11/19/21: pt appears to be tolerating resting hand splint and skin integrity look well    [x] Family teach completed on:  11/4/21: Completed family teach this session. Pt's mother Ivon Favors present.  Educated Ivon Favors on pt's status with ADLs and vc's for sequencing/initiation of tasks/hand placement with functional transfers. Nic Busch able to provide hands on assist with was educated for body mechanics/hand placement/vc's to increase safety with only commode transfer with slideboard and toileting task. Nic Busch educated on PROM (elbow/shoulder/wrist/digit flex/ext) of LUE to increase ROM, maintain joint integrity, and safe management of sublux. Nic Busch educated on how to doff/jesus gait belt and LUE sling. Pt's limiting factor appears to be her impulsivity and safety with ADLs/transfer. Nic Busch demo'ing F understanding/safety of education. Nic Busch appeared motivated and willing to learn. Continue to educate. 11/9/21: Completed family teach this session. Pt's mother Nic Busch present. Educated Nic Busch on pt's increased independence with ADLs/functional transfers. Nic Busch able to jesus pt's sling and gait belt. Nic Busch completed hands on assist with SPT to [facility]. Nic Busch and pt demo'ing improved communication with each other for safe transfer/completion of ADLs. Nic Busch demo'ing F understanding/safety of education. Continue to educate. 11/11/21- Mother present and educated along with assisting with w/c<>commode transfers using grab bar for balance and one handed skills. LUE placed in sling for balance and joint protection. Pt/mom educated with ext tub bench transfers to simulate for home environment with parent participating with transfer. Pt needed cues for hand/trunk placement on bench along with proper follow thru for safety. Pt/mom educated SPT using quad cane from w/c <>ext tub bench with patient needing cues for technique with cane & taking few steps. 11/16/21 Mom present and educated with transfers on/off firm recliner, sofa and ext tub bench transfers at quad cane level along with standing balance and positioning for LUE seated up in w/c.    11/17/21: Pt's aunt present. She observed as well as assisted pt intermittently with ADLs.    11/18/21- Pt mom & observed am OT treatment while pt evaluated using [employer] arm using pt LUE     Pain Level: No c/o pain this date. Additional Notes:     Patient has made fair + progress during treatment sessions toward set goals. Therapy emphasis to obtain goals:    Time Frame for Long term goals  6 weeks   Long term goal 1 Pt will complete UB self-care tasks w/ Supervision   Long term goal 2 Pt will complete LB self-care tasks w/ Supervision   Long term goal 3 Pt will complete toileting (all aspects) w/ Supervision   Long term goal 4 Pt will safely complete toilet transfers w/ Supervision   Long term goal 5 Pt will safely complete tub/shower transfers w/ Supervision   Long term goals 6 Pt will complete basic homemaking task w/ SBA   Long term goal 7 Pt will complete grooming task w/ Supervision   Long term goal 8 Pt will complete feeding task w/ Supervision/setup     [x] Continue with current OT Plan of care. [] Prepare for Discharge    10/27/21 OT plan of care updated on this date. Tentative length of stay is 6 weeks Humberto Grout OTR/L [id number]  11/3/21- Pt POC updated on this date. Pt tentative length of day is 5 weeks to address above problem areas Brooklynn Quivers OTR/L [id number]  11/10/21- Pt POC updated on this date. Pt tentative length of day is 4 weeks to address above problem areas Humberto Grout OTR/L [id number]  11/17/21- Pt POC updated on this date.  Pt tentative length of day is 3 weeks to address above problem areas Humberto Grout OTR/L [id number]     DISCHARGE RECOMMENDATIONS  Recommended DME: drop arm BSC    Post Discharge Care:   []Home Independently  []Home with 24hr Care / Supervision []Home with Partial Supervision []Home with Home Health OT []Home with Out Pt OT []Other: ___   Comments:         Time in Time out Tx Time Breakdown  Variance:   First Session  [id number] [x] Individual Tx-45 Mins  [] Concurrent Tx -  [] Co-Tx -   [] Group Tx -   [] Time Missed -     Second Session [id number] [x] Individual Tx- 45mins  [] Concurrent Tx -   [] Co-Tx -   [] Group Tx -   [] Time Missed -     Third Session    [] Individual Tx-   [] Concurrent Tx -  [] Co-Tx -   [] Group Tx -   [] Time Missed -         Total Tx Time: 90 Mins     Trudy GLOVER/KEITH [id number]

## 2021-11-22 NOTE — PROGRESS NOTES
[facility]  Comprehensive Progress Note    Subjective:      Shin Shirley is a 39 y.o. female admitted to inpatient rehabilitation for impairments and activities limitations in ADLs and mobility secondary to right MCA CVA. No acute events overnight. No cp, sob, n/v. Tolerating therapy, progressing. No complaints. The patient's medical records have been reviewed. Scheduled Meds:enoxaparin, 40 mg, Daily  polyethylene glycol, 17 g, BID  lamoTRIgine, 25 mg, Daily  lidocaine, 1 patch, Daily  baclofen, 5 mg, Nightly  nicotine, 1 patch, Daily  senna, 2 tablet, Nightly  metoprolol tartrate, 12.5 mg, BID  gabapentin, 100 mg, TID  tamsulosin, 0.4 mg, Daily  aspirin, 81 mg, Daily  atorvastatin, 40 mg, Nightly  vitamin D, 50,000 Units, Weekly  docusate sodium, 100 mg, BID  melatonin, 6 mg, Nightly  clopidogrel, 75 mg, Daily      Continuous Infusions:  PRN Meds:diclofenac sodium, 4 g, 4x Daily PRN  diphenhydrAMINE-zinc acetate, , Q6H PRN  ondansetron, 4 mg, Q6H PRN  lactulose, 20 g, BID PRN  guaiFENesin, 200 mg, BID PRN  acetaminophen, 650 mg, Q4H PRN  magnesium hydroxide, 30 mL, Daily PRN  bisacodyl, 10 mg, Daily PRN         Objective:      Vitals:    11/20/21 2115 11/21/21 0758 11/21/21 2000 11/22/21 0812   BP: 116/69 105/66 (!) 110/55 120/60   Pulse: 83 80 74 67   Resp: 17 18 18 18   Temp: 98.3 °F (36.8 °C) 98.6 °F (37 °C) 97.6 °F (36.4 °C) 98.4 °F (36.9 °C)   TempSrc: Temporal Temporal Temporal Oral   SpO2: 96%  98%    Weight:       Height:         General appearance: alert, NAD, seen in PT  Head: R crani site c/d/i   Eyes: conjunctivae/corneas clear. PERRL, EOM's intact. Lungs: clear to auscultation bilaterally  Heart: regular rate and rhythm, S1, S2 normal  Abdomen: soft, non-tender, normal bowel sounds  Extremities: extremities normal, atraumatic, no cyanosis or edema  MSK: no cyanosis, clubbing, edema  Skin: R crani c/d/i   Neurologic: Alert, oriented x4.  Answering all questions appropriately. Left neglect. Speech clear. No aphasia appreciated. Mild L facial droop. Spastic left hemiplegia. MAS 1+ in LUE; MAS 1+-2 in LLE. Motor 5/5 RUE and RLE; 0/5 LUE. LLE is 2/5 HF;  1/5 KE;  0/5 DF and PF          Laboratory:    Lab Results   Component Value Date    WBC 7.3 11/18/2021    HGB 11.8 11/18/2021    HCT 36.3 11/18/2021    MCV 93.6 11/18/2021     11/18/2021     Lab Results   Component Value Date     11/18/2021    K 4.6 11/18/2021     11/18/2021    CO2 25 11/18/2021    BUN 14 11/18/2021    CREATININE 0.7 11/18/2021    GLUCOSE 89 11/18/2021    CALCIUM 9.2 11/18/2021      Lab Results   Component Value Date    ALT 75 (H) 10/23/2021    AST 46 (H) 10/23/2021    ALKPHOS 89 10/23/2021    BILITOT 0.4 10/23/2021       Functional Status:   Bed mobility: Min A  Transfers: Min A  Ambulation: 140 ft with trial L articulating AFO and R SBQC Min A  Feeding: Set up  Grooming: SBA  UB dressing: CGA  LB dressing: Mod A       Assessment/Plan:       39 y.o. female admitted to inpatient rehabilitation for impairments and activities limitations in ADLs and mobility secondary to right MCA CVA.      -Right MCA CVA: Complicated by cerebral edema requiring emergent right hemicraniectomy (10/13/2021). Dense left spastic hemiplegia, left neglect, mild cognitive impairment. Helmet when out of bed. L PRAFO in bed. LUE sling for gait. L w/c tray. Aspirin, Plavix, Lipitor for secondary prevention. Monitor neuro exam. Continue Acute Rehab program.   -Spasticity LUE/LLE: Monitor. Tone currently helpful to compensate for weakness with attempted gait in PT. Started on low dose baclofen at bedtime due to occasional painful spasms in the leg at night only. Monitor.   -Possible seizure:  EEG with no seizure activity, but showed an increased potential for focal seizures from the right frontotemporal region. Evaluated by Neurology.  Started on Lamictal - 25 mg daily x 2 weeks (start11/11), then 50 mg daily x 2 weeks (start 11/25), then 100 mg daily x 2 weeks (start 12/9), then 50 mg qAM and 100 mg qHS x 2 weeks (start 12/23), then 100 mg BID for maintenance (start1/6/22). -Pain control, MSK pain, neuropathic pain: Tylenol PRN, gabapentin, Lidoderm, Voltaren gel  -Urinary retention: On flomax, now voiding well, low PVRs  -Tachycardia: intermittent. Cardiology consulted. She was started on Lopressor. Cardiology attempted to access data from her heart monitor unsuccessfully. She will follow up with Cardiology in office to review heart monitor data once available. -Constipation: colace, senna, glycolax.  PRNs available.   -Tobacco abuse: Nicoderm patch  -DVT prophylaxis: lovenox SQ, SCDs    Lamictal dose to increase on Thursday per titration schedule    Completed 30 day cardiac monitor, will see about getting it sent in for analysis, nursing to assist      Electronically signed by Zak Stratton MD on 11/22/2021 at 4:51 PM

## 2021-11-23 PROCEDURE — 97116 GAIT TRAINING THERAPY: CPT

## 2021-11-23 PROCEDURE — 6370000000 HC RX 637 (ALT 250 FOR IP): Performed by: PHYSICAL MEDICINE & REHABILITATION

## 2021-11-23 PROCEDURE — 97110 THERAPEUTIC EXERCISES: CPT

## 2021-11-23 PROCEDURE — 97535 SELF CARE MNGMENT TRAINING: CPT

## 2021-11-23 PROCEDURE — 97530 THERAPEUTIC ACTIVITIES: CPT

## 2021-11-23 PROCEDURE — 99232 SBSQ HOSP IP/OBS MODERATE 35: CPT | Performed by: PHYSICAL MEDICINE & REHABILITATION

## 2021-11-23 PROCEDURE — 6370000000 HC RX 637 (ALT 250 FOR IP): Performed by: PHYSICIAN ASSISTANT

## 2021-11-23 PROCEDURE — 1280000000 HC REHAB R&B

## 2021-11-23 PROCEDURE — 6360000002 HC RX W HCPCS: Performed by: PHYSICAL MEDICINE & REHABILITATION

## 2021-11-23 RX ADMIN — DOCUSATE SODIUM 100 MG: 100 CAPSULE, LIQUID FILLED ORAL at 21:38

## 2021-11-23 RX ADMIN — ASPIRIN 81 MG CHEWABLE TABLET 81 MG: 81 TABLET CHEWABLE at 09:38

## 2021-11-23 RX ADMIN — ACETAMINOPHEN 650 MG: 325 TABLET ORAL at 21:37

## 2021-11-23 RX ADMIN — BACLOFEN 5 MG: 10 TABLET ORAL at 22:06

## 2021-11-23 RX ADMIN — GABAPENTIN 100 MG: 100 CAPSULE ORAL at 14:30

## 2021-11-23 RX ADMIN — LAMOTRIGINE 25 MG: 25 TABLET ORAL at 09:38

## 2021-11-23 RX ADMIN — METOPROLOL TARTRATE 12.5 MG: 25 TABLET, FILM COATED ORAL at 09:38

## 2021-11-23 RX ADMIN — DOCUSATE SODIUM 100 MG: 100 CAPSULE, LIQUID FILLED ORAL at 09:37

## 2021-11-23 RX ADMIN — CLOPIDOGREL 75 MG: 75 TABLET, FILM COATED ORAL at 09:39

## 2021-11-23 RX ADMIN — DICLOFENAC SODIUM 4 G: 10 GEL TOPICAL at 09:37

## 2021-11-23 RX ADMIN — ENOXAPARIN SODIUM 40 MG: 100 INJECTION SUBCUTANEOUS at 21:44

## 2021-11-23 RX ADMIN — Medication 6 MG: at 21:38

## 2021-11-23 RX ADMIN — GABAPENTIN 100 MG: 100 CAPSULE ORAL at 09:37

## 2021-11-23 RX ADMIN — GABAPENTIN 100 MG: 100 CAPSULE ORAL at 21:36

## 2021-11-23 RX ADMIN — DICLOFENAC SODIUM 4 G: 10 GEL TOPICAL at 12:45

## 2021-11-23 RX ADMIN — TAMSULOSIN HYDROCHLORIDE 0.4 MG: 0.4 CAPSULE ORAL at 09:38

## 2021-11-23 RX ADMIN — POLYETHYLENE GLYCOL 3350 17 G: 17 POWDER, FOR SOLUTION ORAL at 09:37

## 2021-11-23 RX ADMIN — ATORVASTATIN CALCIUM 40 MG: 40 TABLET, FILM COATED ORAL at 21:37

## 2021-11-23 RX ADMIN — DICLOFENAC SODIUM 4 G: 10 GEL TOPICAL at 21:40

## 2021-11-23 RX ADMIN — SENNOSIDES 17.2 MG: 8.6 TABLET, COATED ORAL at 21:39

## 2021-11-23 ASSESSMENT — PAIN SCALES - GENERAL
PAINLEVEL_OUTOF10: 0
PAINLEVEL_OUTOF10: 6
PAINLEVEL_OUTOF10: 0
PAINLEVEL_OUTOF10: 0

## 2021-11-23 ASSESSMENT — PAIN DESCRIPTION - PROGRESSION
CLINICAL_PROGRESSION: NOT CHANGED

## 2021-11-23 ASSESSMENT — PAIN DESCRIPTION - LOCATION: LOCATION: HIP

## 2021-11-23 ASSESSMENT — PAIN DESCRIPTION - ORIENTATION: ORIENTATION: LEFT

## 2021-11-23 NOTE — PROGRESS NOTES
Physical Therapy  Weekly Team Note  Supervising Therapist: Topher Fontanez, JAYME, DPT [id number]    NAME: Abdifatah Phipps  [facility]  DIAGNOSIS: Acute CVA  PRECAUTIONS: Falls, helmet at all times when OOB, L hemiplegia, L neglect, alarm,, L PRAFO, SPB <180, Orange dot with mother  HPI: Pt is a 39 y.o. female with past medical history significant for recent COVID-19 infection (9/15/21) who [facility] with acute onset left-sided weakness. Initial NIHSS 17.  Initial CT ([facility]) showed R MCA hyperdensity; initial CTA showed right ICA/M1 occlusion. On 10/13, her neuro exam worsened and repeat imaging indicated worsening edema, 1 cm of midline shift. She underwent emergent R hemicraniectomy by Dr. Gladis Arias [facility]) on 10/13. After being deemed medically appropriate, she was transferred to [place] [id number] on 10/22/2021    Social:  Pt lives with daughter (15 y/o), will be returning to parent's home upon discharge (1 floor plan, 2 HAIDER with 2 HR). Mother and father healthy, do not work. 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 Initial Evaluation  Date: 10/23/21 10/26/21     11/2/21 11/9/21 11/16/21 11/23/21 Comments Short Term Goals Long Term Goals    Was pt agreeable to Eval/treatment? yes Yes Yes Yes Yes yes      Does pt have pain? Pain on the top of her head once the helmet was placed on Moderate R rib and L hip pain, limiting ambulation at times Mild R rib and L hip pain No c/o pain No c/o pain Mild c/o L hip pain      Bed Mobility  Rolling: Max A  Supine to sit: Max A   Sit to supine: Max A   Scooting: Max A Rolling ModA  Supine<>Sit MaxA  Scooting MaxA Supine<>Sit ModA  Scooting/Rolling ModA Supine<>sit Noemy  Scooting/Rolling Noemy (mat table) Supine<>sit Noemy  Scooting/Rolling Noemy (twin bed) Sit>Supine Noemy  Supine>Sit SBA  Scooting/Rolling SBA (twin bed)  LLE assistance with sit>supine Min A Supervision   Transfers Sit to stand:  Max A Stand to sit: Max A  Stand pivot: Max A x2    5xSTS: Unable to complete Sit<>stand: Max A  Stand pivot: NT    Sliding board transfer MaxA to R and L from WC<>mat table Sit<>stand: ModA (MaxA with L knee immobilizer)  Stand pivot: MaxA with L hemicane     Sliding board transfer [address] [facility] to L and R Sit<>stand: ModA   Stand pivot: ModA with R LBQC   (trial L articulating AFO with DF/PF stop)    Sliding board transfer Noemy to R/L    Sit<>stand: Noemy   Stand pivot: Noemy with R LBQC   (trial L articulating AFO with DF/PF stop) Sit<>stand: CGA/Noemy  Stand pivot: CGA/Noemy with R SBQC Pt is significantly improving transfer ability. Impulsivity and L neglect remain most evident barriers to safe function Min A SBA  5xSTS: TBD   Ambulation    15 feet with R mackey rail with Max A + wc follow    10mWT: TBA  6mWT: TBA 40 feet with R hallway rail, L ankle DF wrap , L knee immobilizer, inside out sock over L shoe with MaxA    60 feet x 1 rep with R hemicane with ModA/MaxA      (L ankle PLS AFO , L knee immobilizer, inside out sock over L shoe, mirror for visual feedback)  (WC follow) 115 feet x 1 rep with R LBQC and ModA  (trial L articulating AFO with DF/PF stop)    10mWT: 0.10 m/s  6mWT: 33 m  (R hemicane, L knee immobilizer/AFO, ModA 11/5) 140 feet x 1 rep with R SBQC and Noemy  (trial L articulating AFO with DF/PF stop)    10mWT: 0.15 m/s  6mWT: 52 m  (R SBQC, L Trial AFO, Noemy 11/16) 185 feet x 1 rep with R SBQC and CGA  (L articulating AFO) Pt received personal AFO, is working well, PF spasticity and contracture results in mild L knee hyperextension occasionally.  Cueing required for direction due to L neglect   50 feet with AAD with Min A       500 feet with AAD with SBA    10mWT: >0.4 m/s  6mWT: >270 m   Wheel Chair Mobility NT 10 feet MaxA with R extremities 20 feet with R extremities with MaxA 50 feet with R extremities with ModA 100 feet with R extremities with ModA 100 feet with R extremities with Noemy Assistance to avoid obstacle collision  150 feet with R extremities with Supervision   Car Transfers NT NT NT ModA ModA Noemy Extensive cueing for safety Min A SBA   Stair negotiation: ascended and descended NT NT NT 4 steps with 1 HR with ModA (RUE support, non-reciprocal) 8 steps with 1 HR with Noemy/ModA 12 steps with 1 HR with Noemy Safety concerns 4 steps with 1 rail with Min A 12 steps with 1 rail with SBA   BLE ROM WFL: LLE AROM limited by flaccidity  WFL: LLE AROM limited by spasticity 2+ on MAS (hamstrings, hip extensors) WFL: LLE AROM limited by spasticity 2+ on MAS (hamstrings, hip extensors, plantar flexors) WFL: LLE AROM limited by spasticity 2+ on MAS (hamstrings, hip extensors, plantar flexors) WFL: LLE AROM limited by spasticity 2+ on MAS (hamstrings, hip extensors, PF)  L ankle DF lacking ~5 degrees from neutral WFL: LLE AROM limited by spasticity 2+ on MAS (hamstrings, hip extensors, PF)  L ankle DF lacking ~5 degrees from neutral L PF spasticity places pt at risk for contracture, please make sure PRAFO is donned when in bed     BLE Strength RLE 4+/5  LLE 0/5 RLE 4+/5  LLE 0/5 grossly, hip flexors/adductors 1/5 RLE 4+/5  LLE 0/5 grossly, hip flexors/adductors 2-/5 RLE 5/5  LLE hip flexion 2+/5  Knee extension 2-/5  Ankle DF/PF 0/5 RLE 5/5  LLE hip flexion 2+/5  Knee extension 2-/5  Ankle DF/PF 0/5 RLE 5/5  LLE hip flexion 2+/5  Knee extension 2-/5  Ankle DF/PF 0/5 MMT scores significantly improved     Balance  Static Sitting: Mod/Max A    BBS: TBA  FGA: TBA Sitting: Mod A  Standing: Max A Sitting: Min A  Standing:  Max A Sitting: CGA  Standing: ModA  BBS: 4/56  (11/5/21) Static and dynamic standing balance Noemy with R SBQC  BBS: 11/56  (11/16/21) Static and dynamic standing balance CGA with R SBQC       BBS: >20/56  FGA: TBD   Date Family Teach Completed TBA Pt's mother present for observation 10/24 Pt's mother present for observation 10/24 and 11/2 Pt's mother present for observation 10/24, 11/2, 11/3, 114, hands on 11/9 Pt's mother present for observation 10/24, 11/2, 11/3, 114, hands on 11/9, 11/11, 11/16 Pt's mother present for observation 10/24, 11/2, 11/3, 114, hands on 11/9, 11/11, 11/16, 11/18, 11/23, aunt present to observe 11/17  Dad present 11/22 for hands on training      Is additional Family Teaching Needed? Y or N Y Yes Y Y Y Y With additional caregivers     Hindering Progress L hemiplegia, L neglect L hemiplegia, L neglect L hemiplegia, L neglect, impulsivity, poor safety L hemiplegia, L neglect, impulsivity, poor safety L hemiplegia, L neglect, impulsivity, poor safety L hemiplegia, L neglect, impulsivity, poor safety      PT recommended ELOS 6 weeks 6 weeks 5 weeks 4 weeks 3 weeks 2 weeks      Team's Discharge Plan  6 weeks 5 weeks 4 weeks 3 weeks 2 weeks      Therapist at [place], [place], DPT [id number]   Nadeem Chung, PT, DPT [id number]   Nadeem Chung, PT, DPT [id number]   Nadeem Chung, PT, DPT [id number]   Nadeem Chung, PT, DPT [id number]          Date:  11/23/21  Supporting factors:  Pt continues to exhibit improving physical function, remains motivated, family is very supportive, pt exhibits good recall of training  Barriers to discharge:  Pt's impulsivity continues to result in poor safety in novel situations. L neglect results in frequent directional requirements which limits independent function  Additional comments: Mother has received orange dot. Recommend continued time on unit to meet goals of SBA  DME:  SBQC, WC (18\", non-elevating legrests, foam cushion, desk length removable armrests, anti tippers, L brake lever extender)  After Care:  HHPT      Date:  11/16/21  Supporting factors:  Pt remains motivated with good social support, exhibits continually improving function  Barriers to discharge:  Pt's L neglect, visual field deficits, and impulsivity remain most evident barriers to safe function.   Additional comments:  Pt is participating well in HIGT, reciprocal gait training begun  DME:  TBD (anticipate SBQC, WC)  After Care:  HHPT      Date:  11/9/21  Supporting factors:  Pt is highly motivated with supportive family, is continually improving  Barriers to discharge:  Remaining hemiparesis, visual deficits, impulsivity with standing tasks  Additional comments:  Recommend holding on AFO consult at this time, pt has potential to progress to toe off AFO  DME:  TBD  After Care:  TBD      Date:  11/2/21  Supporting factors: Lyle Agarwal age, improving activity tolerance and physical function, good recall of sequencing and technique (pt can verbalize sequence and her deficits)  Barriers to discharge:  Impulsivity, poor safety, and dense hemiplegia. Spasticity is not yet a significant problem during gait however pt remains at risk for PF contracture  Additional comments:  Pt has good social support, mother is considering installing ramp at home entrance (pt's father to build). Pt is consistently improving function. DME:  TBD  After Care:  TBD      Date:  10/26/21  Supporting factors: Pt is motivated, has good social support  Barriers to discharge:  Dense hemiplegia/trent-neglect on L. Spasticity may become a problem if worsened. Poor safety and pushing behavior result in high fall risk  Additional comments:  Pain limiting ambulation at times. Pt has excellent potential for continued progress.    DME:  TBD  After Care:  TBD    Gonzalo Coley PT, DPT  Board Certified Clinical Specialist in Neurologic Physical Therapy  [id number]

## 2021-11-23 NOTE — PROGRESS NOTES
Nutrition Assessment     Type and Reason for Visit: Reassess    Nutrition Recommendations/Plan: Continue current diet and ONS    Nutrition Assessment:  Pt remains stable from a nutritional standpoint w/ continued intakes >75% noted. Pt admit to ARU s/p CVA s/p crani. Will continue ONS and monitor. Malnutrition Assessment:  Malnutrition Status: No malnutrition    Estimated Daily Nutrient Needs:  Energy (kcal): MSJ 1443 x 1.1 SF = 1688-7820; Weight Used for Energy Requirements:  Current     Protein (g): 75-90; Weight Used for Protein Requirements:  Ideal (1.3-1.5)        Fluid (ml/day): 6856-7343; Weight Used for Fluid Requirements:  1 ml/kcal      Nutrition Related Findings: A&Ox4, active BS, soft abd, no edema, +I/Os      Current Nutrition Therapies:    ADULT DIET; Regular  ADULT ORAL NUTRITION SUPPLEMENT; Lunch, Dinner; Frozen Oral Supplement    Anthropometric Measures:  · Height: 5' 6\" (167.6 cm)  · Current Body Wt: 170 lb (77.1 kg) (10/22 no method)   · BMI: 27.5    Nutrition Diagnosis:   No nutrition diagnosis at this time     Nutrition Interventions:   Food and/or Nutrient Delivery:  Continue Current Diet, Continue Oral Nutrition Supplement  Nutrition Education/Counseling:  Education not indicated   Coordination of Nutrition Care:  Continue to monitor while inpatient    Goals:  pt to consume >75% meals/ONS       Nutrition Monitoring and Evaluation:   Food/Nutrient Intake Outcomes:  Food and Nutrient Intake, Supplement Intake  Physical Signs/Symptoms Outcomes:  Biochemical Data, GI Status, Fluid Status or Edema, Nutrition Focused Physical Findings, Skin, Weight     Discharge Planning:     Too soon to determine     Electronically signed by Kiki Sue, MS, RD, LD on 11/23/21 at 1:05 PM EST    Contact: [phone]

## 2021-11-23 NOTE — PROGRESS NOTES
Physical Therapy  Treatment Note  Supervising Therapist: Suzie Alatorre, PT, DPT [id number]    NAME: Kamla Padgett  ROOM: [address] [place] [address]  DIAGNOSIS: Acute CVA  PRECAUTIONS: Falls, helmet at all times when OOB, L hemiplegia, L homonymous hemianopsia L neglect, alarm,  L PRAFO, SBP <180, orange dot with mother. HPI: Pt is a 39 y.o. female with past medical history significant for recent COVID-19 infection (9/15/21) who [facility] with acute onset left-sided weakness. Initial NIHSS 17.  Initial CT ([facility]) showed R MCA hyperdensity; initial CTA showed right ICA/M1 occlusion. On 10/13, her neuro exam worsened and repeat imaging indicated worsening edema, 1 cm of midline shift. She underwent emergent R hemicraniectomy by Dr. Jazzmine [facility]) on 10/13. After being deemed medically appropriate, she was transferred to [place] [id number] on 10/22/2021    Social:  Pt lives with daughter (15 y/o), will be returning to parent's home upon discharge (1 floor plan, 2 HAIDER with 2 HR). Mother and father healthy, do not work. 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 Initial Evaluation  Date: 10/23/21 AM     PM    Short Term Goals Long Term Goals    Was pt agreeable to Eval/treatment? yes Yes Yes     Does pt have pain? Pain on the top of her head once the helmet was placed on Mild c/o L hip and R rib pain Mild c/o moderate L hip/knee pain with activity     Bed Mobility  Rolling: Max A  Supine to sit: Max A   Sit to supine: Max A   Scooting: Max A NT Sit>Supine Noemy  Supine>Sit SBA  Scooting/Rolling SBA (twin bed) Min A Supervision   Transfers Sit to stand: Max A   Stand to sit: Max A  Stand pivot:  Max A x2    5xSTS: Unable to complete Sit<>stand: CGA/Noemy  Stand pivot: CGA/Noemy with R SBQC Sit<>stand: CGA/Noemy  Stand pivot: CGA/Noemy with R SBQC   Min A SBA  5xSTS: TBD   Ambulation    15 feet with R mackey rail with Max A + wc follow    10mWT: TBA  6mWT: TBA Short distances (~30 feet) around functional living environment with R SBQC with Noemy  (L articulating AFO with DF/PF stop)    10mWT: 0.15 m/s  6mWT: 52 m  (R SBQC, L Trial AFO, Noemy 11/16)     185 feet x 1 rep with R SBQC and CGA  (L articulating AFO)   50 feet with AAD with Min A       500 feet with AAD with SBA    10mWT: >0.4 m/s  6mWT: >205 m   Walking 10 feet on uneven surface NT NT NT 10 feet with AAD with Min A 10 feet with AAD with SBA   Wheel Chair Mobility NT  feet with R extremities with Noemy  150 feet with R extremities with SBA   Car Transfers NT Noemy Noemy Min A SBA   Stair negotiation: ascended and descended NT 12 steps with 1 HR with CGA (non-reciprocal, leading with RLE ascent, LLE descent) 12 steps with R HR and Noemy (non-reciprocal, leading with LLE ascent, LLE descent) 4 steps with 1 rail with Min A 12 steps with 1 rail with SBA   Curb Step:   ascended and descended NT NT NT 4 inch step with AAD and Min A 4 inch step with AAD and SBA   Picking up object off the floor NT NT NT Will  an object with Min assist Will  an object with SBA   BLE ROM WFL: LLE AROM limited by flaccidity  WFL: LLE AROM limited by spasticity 2+ on MAS (hamstrings, hip extensors, PF)  L ankle DF lacking ~5 degrees from neutral      BLE Strength RLE 4+/5  LLE 0/5 RLE 5/5  LLE hip flexion 2+/5  Knee extension 2-/5  Ankle DF/PF 0/5      Balance  Static Sitting: Mod/Max A    BBS: TBA  FGA: TBA   Standing: Noemy  BBS: 11/56  (11/16/21)           BBS: >20/56  FGA: TBD   Date Family Teach Completed TBA Pt's mother present for observation 10/24, 11/2, 11/3, 114, hands on 11/9, 11/11, 11/16, 11/18, 11/23, aunt present to observe 11/17  Dad present 11/22 for hands on training      Is additional Family Teaching Needed?   Y or N Y Yes      Hindering Progress L hemiplegia, L neglect L hemiplegia, L neglect      PT recommended ELOS 5 weeks       Team's Discharge Plan        Therapist at Team Meeting          General: HRmax: 178  bpm       Beta blockers (Y/N): Y      Adjusted HRmax: 168 bpm   Blood pressure (mmHg): AM:  - Prior to Tx: NA  - Post Tx: NA PM:  - Prior to Tx: 106/64  - Post Tx: 122/75   Time spent in HR ranges: Moderate intensity (60-70% HRmax): AM: NA due to FT PM: 0:15:05   High intensity (70-85% HRmax): AM: NA due to FT PM: 0:28:18     Therapeutic Exercise:   AM: Short distance ambulation around functional living environment transferring to various surfaces including couch, recliner, kitchen chair at table, with R SBQC and L AFO x 8 reps  PM: Ambulation without  feet x 4 reps with Noemy/ModA (L swedish knee cage)    Patient education  Pt educated on optimal cane and LLE placement during transfers to maintain balance    Patient response to education:   Pt verbalized understanding Pt demonstrated skill Pt requires further education in this area   yes partial yes     Additional Comments: Pt remains pleasant and motivated during sessions. Pt's mother present for AM session FT, hands on training progressed to include mobility around functional living environment. Mother exhibiting proficiency with guarding/assiting patient with all mobility tasks. Orange dot administered via PT/OT collaboration for in room mobility and toilieting. Gait training progressed during PM session with pt exhibiting improving postural control compared to previous sessions. Pt tends to exhibit R lateral path deviation to ambulate near R wall when not using device, requires frequent cueing to maintain direct path. Swedish knee cage utilized as pt tends to exhibit some mild hyperextension with increased speed, resulting from PF contracture. Plan to resume challenge to L limb advancement tomorrow with ankle weight and obstacle negotiation. AM  Time in: 0830  Time out: 0915    PM  Time in: 1300  Time out: 6793    Pt is making good progress toward established Physical Therapy goals.   Continue with physical therapy current plan of care.    Lalo Jolley, PT, DPT  Board Certified Clinical Specialist in Neurologic Physical Therapy  [id number]

## 2021-11-23 NOTE — PROGRESS NOTES
OCCUPATIONAL THERAPY DAILY NOTE    Date:2021  Patient Name: Miguel Reyna  MRN: [id number]  : 1975  Room: [address] [place], [place] [address]     Diagnosis:Acute CVA( Pt [facility] as Level 2 stroke alert on 10/11/21. L side weakness, neglect. NIH-17  Found to have large R MCA involving R occipital)  Surgery: R frontotemporal decompressive hemicraniectomy on 10/13/21  Past Medical History: COVID (9/15), tobacco use  Precautions: Falls, L hemiplegia, L neglect, L sublux, bed/chair alarm, helmet when OOB, impulsive, L PRAFO    Functional Assessment:   Date Status AE  Comments   Feeding 21 Set-up     Grooming 21 Set-up  w/c Pt washed face, applied deodorant seated upright in w/c at sink         Oral Care 21 Set-up     Bathing 21 Min A Shower chair with wheels  Pt completed showering task seated on shower chair with wheels, with pt able to wash of UB, malvin area and buttocks, assistance with LE's and to wash of R arm   UB Dressing 21 Melisa  Pt donned front zip bra, with pt requiring assistance to bring bra around back, and zip. Pt able to jesus shirt using trent technique seated   LB Dressing 21 Min A reacher Pt donned underwear and pants using cross over technique, assistance to cross and steady of LLE while pt threaded, with assistance to stand and pull pants over hips   Footwear 21 Mod A Lh shoe horn Pt able to jesus socks using cross over technique, with assistance to cross LLE and steady.  Pt able to jesus shoe to R foot, with assistance to jesus of L AFO and shoe   Toileting 21 Min A Grab bar   Pt completed  toileting task on 3in1 commode with use of grab bar, cueing for hand placement, with pt able to complete of hygiene to malvin area, assistance with hygiene to buttocks and to stand and manage clothes   Homemaking 21 Mod A       Functional Transfers / Balance:   Date Status DME  Comments   Sit Balance 21 SBA  Pt sat supported upright in w/c and on shower chair   Stand Balance 11/23/21 Min A SBQC  Grab bar  Bed rail   Pt stood during transfers and ADL tasks   [x] Tub        [x] Shower   Transfer 11/23/21 11/17/21 min/mod A          Min A Extended tub bench, SBQC      Roll in shower chair, grab bar, LBQC Extended tub bench transfer with pt requiring assistance to bring LLE in/out of tub   Commode   Transfer 11/23/21 CGA , 3:1 commode SBQC  Grab bar  Pt compelted commode transfer from 3in1 commode, cueing for hand placement   Functional   Mobility 11/23/21 CGA SBQC Pt ambulated short household distance between [facility] and within room     Functional Exercises / Activity:  -Arm skate activity seated at table top, per pt's request, focusing on increasing of ROM of BUE's, prevention of contractures, activity tolerance, and use of LUE.  -PROM and retrograde massage to hands/digits, focusing on increasing of ROM and prevention of contractures, decreasing edema, with pt having of flexed digits, with pt reporting of wearing splint at night. Attempted to have pt squeeze yellow foam block, with pt having of poor  strength, requiring hand over hand assist.       Sensory / Neuromuscular Re-Education:        Cognitive Skills:   Status Comments   Problem   Solving fair    Memory fair    Sequencing fair    Safety fair      Visual Perception:    Education:  Pt educated on safety and hand placement with transfers, trent dressing techniques      10/25/21: pt educated on SROM of LUE and safe positioning of LUE  10/29/21: educated on 1 handed dressing technique for socks/shoes  11/1/21: shoe lace adapted for ease with donning shoe  11/1/21: attempted small strips of kineosio tape on pt's hand to assess if pt can tolerate on her shoulder due to report of allergy (cleared by )  11/2/21:  Therapist applied kineosiotape to pt's LUE to stabilize sublux.  Will continue to assess issues that arise with adhesive and progression of sublux  11/2/21: pt educated on AE for ease with LB dressing  11/3/21: Pt very fatigued in AM and PM sessions, requiring max cueing to alert pt back to task and engage in session, with pt asking multiple times to return to room/return to bed. Extended time to complete all tasks  11/4/21: home ROM exercise program placed in pt's folder and pt's mother notified of it  11/7/21 Pt educated about LUE positioning both up in w/c and in bed to maintain joint integrity. 11/17/21: resting hand applied to pt's L hand due to report of tone at digits. This is to be worn at night time and will be monitored for further tone. 11/19/21: pt appears to be tolerating resting hand splint and skin integrity look well    [x] Family teach completed on:  11/4/21: Completed family teach this session. Pt's mother Bisi Aguilar present. Educated Bisi Aguilar on pt's status with ADLs and vc's for sequencing/initiation of tasks/hand placement with functional transfers. Bisi Aguilar able to provide hands on assist with was educated for body mechanics/hand placement/vc's to increase safety with only commode transfer with slideboard and toileting task. Bisi Aguilar educated on PROM (elbow/shoulder/wrist/digit flex/ext) of LUE to increase ROM, maintain joint integrity, and safe management of sublux. Bisi Aguilar educated on how to doff/jesus gait belt and LUE sling. Pt's limiting factor appears to be her impulsivity and safety with ADLs/transfer. Bisi Aguilar demo'ing F understanding/safety of education. Bisi Aguilar appeared motivated and willing to learn. Continue to educate. 11/9/21: Completed family teach this session. Pt's mother Bisi Aguilar present. Educated Bisi Aguilar on pt's increased independence with ADLs/functional transfers. Bisi Aguilar able to jesus pt's sling and gait belt. Bisi Aguilar completed hands on assist with SPT to [facility]. Bisi Aguilar and pt demo'ing improved communication with each other for safe transfer/completion of ADLs. Bisi Aguilar demo'ing F understanding/safety of education. Continue to educate.      11/11/21- Mother present and educated along with assisting with w/c<>commode transfers using grab bar for balance and one handed skills. LUE placed in sling for balance and joint protection. Pt/mom educated with ext tub bench transfers to simulate for home environment with parent participating with transfer. Pt needed cues for hand/trunk placement on bench along with proper follow thru for safety. Pt/mom educated SPT using quad cane from w/c <>ext tub bench with patient needing cues for technique with cane & taking few steps. 11/16/21 Mom present and educated with transfers on/off firm recliner, sofa and ext tub bench transfers at quad cane level along with standing balance and positioning for LUE seated up in w/c.    11/17/21: Pt's aunt present. She observed as well as assisted pt intermittently with ADLs. 11/18/21- Pt mom & observed am OT treatment while pt evaluated using [employer] arm using pt LUE   11/23/21- Mom present during morning session, assisting pt with toileting task and transfer and extended tub bench transfer. Mom stating of feeling comfortable with ADL tasks. Pain Level: No c/o pain this date. Additional Notes:     Patient has made fair + progress during treatment sessions toward set goals. Therapy emphasis to obtain goals:    Time Frame for Long term goals  6 weeks   Long term goal 1 Pt will complete UB self-care tasks w/ Supervision   Long term goal 2 Pt will complete LB self-care tasks w/ Supervision   Long term goal 3 Pt will complete toileting (all aspects) w/ Supervision   Long term goal 4 Pt will safely complete toilet transfers w/ Supervision   Long term goal 5 Pt will safely complete tub/shower transfers w/ Supervision   Long term goals 6 Pt will complete basic homemaking task w/ SBA   Long term goal 7 Pt will complete grooming task w/ Supervision   Long term goal 8 Pt will complete feeding task w/ Supervision/setup     [x] Continue with current OT Plan of care.   [] Prepare for Discharge    10/27/21 OT plan of care updated on this date. Tentative length of stay is 6 weeks Tanya Shankar OTR/L [id number]  11/3/21- Pt POC updated on this date. Pt tentative length of day is 5 weeks to address above problem areas Rebecca Howe OTR/L [id number]  11/10/21- Pt POC updated on this date. Pt tentative length of day is 4 weeks to address above problem areas Tanya Shankar OTR/L [id number]  11/17/21- Pt POC updated on this date.  Pt tentative length of day is 3 weeks to address above problem areas Tanya Shankar OTR/L [id number]     DISCHARGE RECOMMENDATIONS  Recommended DME: drop arm BSC    Post Discharge Care:   []Home Independently  []Home with 24hr Care / Supervision []Home with Partial Supervision []Home with Home Health OT []Home with Out Pt OT []Other: ___   Comments:         Time in Time out Tx Time Breakdown  Variance:   First Session  7:35am 8:30am [x] Individual Tx-55 Mins  [] Concurrent Tx -  [] Co-Tx -   [] Group Tx -   [] Time Missed -     Second Session 1000 1045 [x] Individual Tx- 45mins  [] Concurrent Tx -   [] Co-Tx -   [] Group Tx -   [] Time Missed -     Third Session    [] Individual Tx-   [] Concurrent Tx -  [] Co-Tx -   [] Group Tx -   [] Time Missed -         Total Tx Time: 100 Mins     Trudy Tate GLOVER/L [id number]

## 2021-11-24 PROCEDURE — 1280000000 HC REHAB R&B

## 2021-11-24 PROCEDURE — 6360000002 HC RX W HCPCS: Performed by: PHYSICAL MEDICINE & REHABILITATION

## 2021-11-24 PROCEDURE — 6370000000 HC RX 637 (ALT 250 FOR IP): Performed by: PHYSICAL MEDICINE & REHABILITATION

## 2021-11-24 PROCEDURE — 6370000000 HC RX 637 (ALT 250 FOR IP): Performed by: PHYSICIAN ASSISTANT

## 2021-11-24 PROCEDURE — 97530 THERAPEUTIC ACTIVITIES: CPT

## 2021-11-24 PROCEDURE — 97112 NEUROMUSCULAR REEDUCATION: CPT

## 2021-11-24 PROCEDURE — 99232 SBSQ HOSP IP/OBS MODERATE 35: CPT | Performed by: PHYSICAL MEDICINE & REHABILITATION

## 2021-11-24 RX ORDER — LAMOTRIGINE 25 MG/1
50 TABLET ORAL DAILY
Status: DISCONTINUED | OUTPATIENT
Start: 2021-11-25 | End: 2021-12-07 | Stop reason: HOSPADM

## 2021-11-24 RX ADMIN — DOCUSATE SODIUM 100 MG: 100 CAPSULE, LIQUID FILLED ORAL at 20:56

## 2021-11-24 RX ADMIN — TAMSULOSIN HYDROCHLORIDE 0.4 MG: 0.4 CAPSULE ORAL at 08:28

## 2021-11-24 RX ADMIN — ACETAMINOPHEN 650 MG: 325 TABLET ORAL at 13:54

## 2021-11-24 RX ADMIN — CLOPIDOGREL 75 MG: 75 TABLET, FILM COATED ORAL at 08:30

## 2021-11-24 RX ADMIN — Medication 6 MG: at 20:56

## 2021-11-24 RX ADMIN — SENNOSIDES 17.2 MG: 8.6 TABLET, COATED ORAL at 20:55

## 2021-11-24 RX ADMIN — DICLOFENAC SODIUM 4 G: 10 GEL TOPICAL at 11:44

## 2021-11-24 RX ADMIN — GABAPENTIN 100 MG: 100 CAPSULE ORAL at 08:28

## 2021-11-24 RX ADMIN — LAMOTRIGINE 25 MG: 25 TABLET ORAL at 08:30

## 2021-11-24 RX ADMIN — ENOXAPARIN SODIUM 40 MG: 100 INJECTION SUBCUTANEOUS at 20:55

## 2021-11-24 RX ADMIN — METOPROLOL TARTRATE 12.5 MG: 25 TABLET, FILM COATED ORAL at 08:29

## 2021-11-24 RX ADMIN — DOCUSATE SODIUM 100 MG: 100 CAPSULE, LIQUID FILLED ORAL at 08:28

## 2021-11-24 RX ADMIN — GABAPENTIN 100 MG: 100 CAPSULE ORAL at 13:53

## 2021-11-24 RX ADMIN — ASPIRIN 81 MG CHEWABLE TABLET 81 MG: 81 TABLET CHEWABLE at 08:28

## 2021-11-24 RX ADMIN — GABAPENTIN 100 MG: 100 CAPSULE ORAL at 20:56

## 2021-11-24 RX ADMIN — DICLOFENAC SODIUM 4 G: 10 GEL TOPICAL at 08:30

## 2021-11-24 RX ADMIN — BACLOFEN 5 MG: 10 TABLET ORAL at 20:56

## 2021-11-24 RX ADMIN — ATORVASTATIN CALCIUM 40 MG: 40 TABLET, FILM COATED ORAL at 20:56

## 2021-11-24 ASSESSMENT — PAIN DESCRIPTION - ONSET
ONSET: ON-GOING

## 2021-11-24 ASSESSMENT — PAIN SCALES - WONG BAKER
WONGBAKER_NUMERICALRESPONSE: 0

## 2021-11-24 ASSESSMENT — PAIN SCALES - GENERAL
PAINLEVEL_OUTOF10: 7
PAINLEVEL_OUTOF10: 0
PAINLEVEL_OUTOF10: 3
PAINLEVEL_OUTOF10: 6

## 2021-11-24 ASSESSMENT — PAIN DESCRIPTION - FREQUENCY
FREQUENCY: INTERMITTENT

## 2021-11-24 ASSESSMENT — PAIN DESCRIPTION - PAIN TYPE
TYPE: CHRONIC PAIN

## 2021-11-24 ASSESSMENT — PAIN DESCRIPTION - DESCRIPTORS
DESCRIPTORS: ACHING;DISCOMFORT

## 2021-11-24 ASSESSMENT — PAIN DESCRIPTION - ORIENTATION
ORIENTATION: LEFT
ORIENTATION: LEFT

## 2021-11-24 ASSESSMENT — PAIN DESCRIPTION - LOCATION
LOCATION: GENERALIZED
LOCATION: GENERALIZED
LOCATION: GENERALIZED;HIP

## 2021-11-24 ASSESSMENT — PAIN DESCRIPTION - PROGRESSION
CLINICAL_PROGRESSION: OTHER (COMMENT)
CLINICAL_PROGRESSION: GRADUALLY IMPROVING
CLINICAL_PROGRESSION: OTHER (COMMENT)

## 2021-11-24 ASSESSMENT — PAIN - FUNCTIONAL ASSESSMENT
PAIN_FUNCTIONAL_ASSESSMENT: ACTIVITIES ARE NOT PREVENTED
PAIN_FUNCTIONAL_ASSESSMENT: ACTIVITIES ARE NOT PREVENTED

## 2021-11-24 NOTE — PROGRESS NOTES
[id number] [facility] Physical Medicine and Rehabilitation  Comprehensive Progress Note    Subjective:      Jamie Pollock is a 39 y.o. female admitted to inpatient rehabilitation for impairments and activities limitations in ADLs and mobility secondary to right MCA CVA. No acute events overnight. No cp, sob, n/v. Tolerating therapy. No new issues reported. No complaints. The patient's medical records have been reviewed. Scheduled Meds:enoxaparin, 40 mg, Daily  polyethylene glycol, 17 g, BID  lamoTRIgine, 25 mg, Daily  lidocaine, 1 patch, Daily  baclofen, 5 mg, Nightly  nicotine, 1 patch, Daily  senna, 2 tablet, Nightly  metoprolol tartrate, 12.5 mg, BID  gabapentin, 100 mg, TID  tamsulosin, 0.4 mg, Daily  aspirin, 81 mg, Daily  atorvastatin, 40 mg, Nightly  vitamin D, 50,000 Units, Weekly  docusate sodium, 100 mg, BID  melatonin, 6 mg, Nightly  clopidogrel, 75 mg, Daily      Continuous Infusions:  PRN Meds:diclofenac sodium, 4 g, 4x Daily PRN  diphenhydrAMINE-zinc acetate, , Q6H PRN  ondansetron, 4 mg, Q6H PRN  lactulose, 20 g, BID PRN  guaiFENesin, 200 mg, BID PRN  acetaminophen, 650 mg, Q4H PRN  magnesium hydroxide, 30 mL, Daily PRN  bisacodyl, 10 mg, Daily PRN         Objective:      Vitals:    11/23/21 1615 11/23/21 1945 11/23/21 2030 11/24/21 0715   BP: 103/60 (!) 98/52 (!) 93/53 118/74   Pulse: 76 86 75 99   Resp: 16 16  16   Temp: 97.4 °F (36.3 °C) 98.4 °F (36.9 °C)  98.4 °F (36.9 °C)   TempSrc: Oral Temporal  Tympanic   SpO2: 100% 96%  96%   Weight:       Height:           General appearance: alert, NAD, seen in PT  Head: R crani site c/d/i   Eyes: conjunctivae/corneas clear. PERRL, EOM's intact. Lungs: clear to auscultation bilaterally  Heart: regular rate and rhythm, S1, S2 normal  Abdomen: soft, non-tender, normal bowel sounds  Extremities: extremities normal, atraumatic, no cyanosis or edema  MSK: no cyanosis, clubbing, edema  Skin: R crani c/d/i   Neurologic: Alert, oriented x4.  Answering all questions appropriately. Left neglect. Speech clear. No aphasia appreciated. Mild L facial droop. Spastic left hemiplegia. MAS 1+ in LUE; MAS 1+-2 in LLE. Motor 5/5 RUE and RLE; 0/5 LUE. LLE is 2/5 HF;  1/5 KE;  0/5 DF and PF      Exam stable     Laboratory:    Lab Results   Component Value Date    WBC 7.3 11/18/2021    HGB 11.8 11/18/2021    HCT 36.3 11/18/2021    MCV 93.6 11/18/2021     11/18/2021     Lab Results   Component Value Date     11/18/2021    K 4.6 11/18/2021     11/18/2021    CO2 25 11/18/2021    BUN 14 11/18/2021    CREATININE 0.7 11/18/2021    GLUCOSE 89 11/18/2021    CALCIUM 9.2 11/18/2021      Lab Results   Component Value Date    ALT 75 (H) 10/23/2021    AST 46 (H) 10/23/2021    ALKPHOS 89 10/23/2021    BILITOT 0.4 10/23/2021       Functional Status:   Bed mobility: Min A  Transfers: Min A  Ambulation: 140 ft with trial L articulating AFO and R SBQC Min A  Feeding: Set up  Grooming: SBA  UB dressing: CGA  LB dressing: Mod A       Assessment/Plan:       39 y.o. female admitted to inpatient rehabilitation for impairments and activities limitations in ADLs and mobility secondary to right MCA CVA.      -Right MCA CVA: Complicated by cerebral edema requiring emergent right hemicraniectomy (10/13/2021). Dense left spastic hemiplegia, left neglect, mild cognitive impairment. Helmet when out of bed. L PRAFO in bed. LUE sling for gait. L w/c tray. Obtained left AFO. Aspirin, Plavix, Lipitor for secondary prevention. Monitor neuro exam. Continue Acute Rehab program.   -Spasticity LUE/LLE: Monitor. Tone currently helpful to compensate for weakness with attempted gait in PT. Started on low dose baclofen at bedtime due to occasional painful spasms in the leg at night only. Monitor.   -Possible seizure:  EEG with no seizure activity, but showed an increased potential for focal seizures from the right frontotemporal region. Evaluated by Neurology.  Started on Lamictal - 25 mg daily x 2 weeks (start11/11), then 50 mg daily x 2 weeks (start 11/25), then 100 mg daily x 2 weeks (start 12/9), then 50 mg qAM and 100 mg qHS x 2 weeks (start 12/23), then 100 mg BID for maintenance (start1/6/22). -Pain control, MSK pain, neuropathic pain: Tylenol PRN, gabapentin, Lidoderm, Voltaren gel  -Urinary retention: On flomax, now voiding well, low PVRs  -Tachycardia: intermittent. Cardiology consulted. She was started on Lopressor. Cardiology attempted to access data from her heart monitor unsuccessfully. She will follow up with Cardiology in office to review heart monitor data once available. 30 day cardiac monitoring completed and monitor mailed in for analysis per instructions on device.   -Constipation: colace, senna, glycolax.  PRNs available.   -Tobacco abuse: Nicoderm patch  -DVT prophylaxis: lovenox SQ, SCDs      Team conference today     Lamictal dose to increase to 50mg daily tomorrow per titration schedule, orders placed       Electronically signed by Stefan Agrawal MD on 11/24/2021 at 11:48 AM

## 2021-11-24 NOTE — CARE COORDINATION
Per Team: D/C: to be re-evaluated next week (2 weeks). Pt and family member have been updated. Pt is making nice gains. Able to ambulate with a small based Q cane. L AFO has been ordered. There is a L side neglect. In motion arm has started. Pt is making great gains. Pt is impulsive but improving. DME: 3-in-1 commode, extended tub transfer. Family member stated she ordered a slide-in bench. The pt would sit on it and then it would move into the shower. SW Intern advised them to speak with OT regarding this for safety. They will see if it works first at home. Aftercare: TBD    FT: Mom, aunt, dad attending weekly: T & TH.     PETER Ramirez Intern

## 2021-11-24 NOTE — PROGRESS NOTES
OCCUPATIONAL THERAPY DAILY NOTE    Date:2021  Patient Name: Debyb Anderson  MRN: [id number]  : 1975  Room: [address] [place] [address]     Diagnosis:Acute CVA( Pt presented to [facility] as Level 2 stroke alert on 10/11/21. L side weakness, neglect. NIH-17  Found to have large R MCA involving R occipital)  Surgery: R frontotemporal decompressive hemicraniectomy on 10/13/21  Past Medical History: COVID (9/15), tobacco use  Precautions: Falls, L hemiplegia, L neglect, L sublux, bed/chair alarm, helmet when OOB, impulsive, L PRAFO & orange dot with mom    Functional Assessment:   Date Status AE  Comments   Feeding 21 Set-up     Grooming 21 Set-up  w/c Pt washed face, applied deodorant seated upright in w/c at sink         Oral Care 21 Set-up     Bathing 21 Min A Shower chair with wheels  Pt completed showering task seated on shower chair with wheels, with pt able to wash of UB, malvin area and buttocks, assistance with LE's and to wash of R arm   UB Dressing 21 Melisa  Pt donned front zip bra, with pt requiring assistance to bring bra around back, and zip. Pt able to jesus shirt using trent technique seated   LB Dressing 21 Min A reacher Pt donned underwear and pants using cross over technique, assistance to cross and steady of LLE while pt threaded, with assistance to stand and pull pants over hips   Footwear 21 Mod A Lh shoe horn Pt able to jesus socks using cross over technique, with assistance to cross LLE and steady.  Pt able to jesus shoe to R foot, with assistance to jesus of L AFO and shoe   Toileting 21 Min A Grab bar   Pt completed  toileting task on 3in1 commode with use of grab bar, cueing for hand placement, with pt able to complete of hygiene to malvin area, assistance with hygiene to buttocks and to stand and manage clothes   Homemaking 21 Mod A       Functional Transfers / Balance:   Date Status DME  Comments   Sit Balance 21 SBA  Pt sat supported upright in w/c and on shower chair   Stand Balance 11/24/21 Min A SBQC  Grab bar  Bed rail   Pt stood during transfers and ADL tasks   [x] Tub        [x] Shower   Transfer 11/23/21 11/17/21 min/mod A          Min A Extended tub bench, SBQC      Roll in shower chair, grab bar, LBQC Extended tub bench transfer with pt requiring assistance to bring LLE in/out of tub   Commode   Transfer 11/23/21 CGA , 3:1 commode SBQC  Grab bar  Pt compelted commode transfer from 3in1 commode, cueing for hand placement   Functional   Mobility 11/23/21 CGA SBQC Pt ambulated short household distance between transfers with [facility] and within room     Functional Exercises / Activity:  Pt demo CGA sit<>stand. Pt demo Min A stand pivot wc<>chair without armrests. Sensory / Neuromuscular Re-Education:  Pt completed InMotion Robotic Arm for L UE. L UE was ace wrapped and chair harness was used for positioning purposes & shoulders & head held in midline to ensure pt does not use alternative muscles to facilitate movement of her LUE. 10 cm [place]-ni' was used. Pt required Mod cues & assist from therapist d/t decrease compensatory movements. Pt educated on proper body alignment & only using her LUE for optimal outcomes & neurological recovery of her LUE. Pt verbalize understanding education. Pt demo the following gains in between sets of active assisted protocol using 10 cm [place]-ni':  Initiation improved from 57/80 to 60/80 initiations, robot power in Crews decreased from 121 to 101, motion jerk decreased from 108 to 104 & distance from a straight line decreased from 8 to 6. Pt demo the following gains in her pre & post test after using active assisted protocol: smoothness improved from 0.235 to 0.241, path error decreased from 0.107to 0.074, path error decreased from 0.064 to 0.48. Pt tolerated a total of 624 total movement using her LUE. All InMotion exercises are performed to increase function of L UE to assist with ADL's and IADL's at home.  Pt appreciates the immediate feedback that is provided using the InMotion arm. All InMotion exercises are performed to increase function of L UE to assist with ADL's and IADL's. Pt appreciates the immediate feedback that is provided using the InMotion arm. Cognitive Skills:   Status Comments   Problem   Solving fair    Memory fair    Sequencing fair    Safety fair      Visual Perception:    Education:  Pt educated on adjusting to home & home modifications to make @ home. Pt also educated that if she is aware that she is impulsive she has to don something about it     10/25/21: pt educated on SROM of LUE and safe positioning of LUE  10/29/21: educated on 1 handed dressing technique for socks/shoes  11/1/21: shoe lace adapted for ease with donning shoe  11/1/21: attempted small strips of kineosio tape on pt's hand to assess if pt can tolerate on her shoulder due to report of allergy (cleared by )  11/2/21:  Therapist applied kineosiotape to pt's LUE to stabilize sublux. Will continue to assess issues that arise with adhesive and progression of sublux  11/2/21: pt educated on AE for ease with LB dressing  11/3/21: Pt very fatigued in AM and PM sessions, requiring max cueing to alert pt back to task and engage in session, with pt asking multiple times to return to room/return to bed. Extended time to complete all tasks  11/4/21: home ROM exercise program placed in pt's folder and pt's mother notified of it  11/7/21 Pt educated about LUE positioning both up in w/c and in bed to maintain joint integrity. 11/17/21: resting hand applied to pt's L hand due to report of tone at digits. This is to be worn at night time and will be monitored for further tone. 11/19/21: pt appears to be tolerating resting hand splint and skin integrity look well    [x] Family teach completed on:  11/4/21: Completed family teach this session. Pt's mother Kin Whitewater present.  Educated Kin Whitewater on pt's status with ADLs and vc's for sequencing/initiation of tasks/hand placement with functional transfers. Cornelius Shove able to provide hands on assist with was educated for body mechanics/hand placement/vc's to increase safety with only commode transfer with slideboard and toileting task. Cornelius Shove educated on PROM (elbow/shoulder/wrist/digit flex/ext) of LUE to increase ROM, maintain joint integrity, and safe management of sublux. Cornelius Shove educated on how to doff/jesus gait belt and LUE sling. Pt's limiting factor appears to be her impulsivity and safety with ADLs/transfer. Cornelius Shove demo'ing F understanding/safety of education. Cornelius Shove appeared motivated and willing to learn. Continue to educate. 11/9/21: Completed family teach this session. Pt's mother Cornelius Shove present. Educated Cornelius Shove on pt's increased independence with ADLs/functional transfers. Cornelius Shove able to jesus pt's sling and gait belt. Cornelius Shove completed hands on assist with SPT to [facility]. Cornelius Shove and pt demo'ing improved communication with each other for safe transfer/completion of ADLs. Cornelius Shove demo'ing F understanding/safety of education. Continue to educate. 11/11/21- Mother present and educated along with assisting with w/c<>commode transfers using grab bar for balance and one handed skills. LUE placed in sling for balance and joint protection. Pt/mom educated with ext tub bench transfers to simulate for home environment with parent participating with transfer. Pt needed cues for hand/trunk placement on bench along with proper follow thru for safety. Pt/mom educated SPT using quad cane from w/c <>ext tub bench with patient needing cues for technique with cane & taking few steps. 11/16/21 Mom present and educated with transfers on/off firm recliner, sofa and ext tub bench transfers at quad cane level along with standing balance and positioning for LUE seated up in w/c.    11/17/21: Pt's aunt present. She observed as well as assisted pt intermittently with ADLs.    11/18/21- Pt mom & observed am OT treatment while pt evaluated using InMotion arm using pt LUIS   11/23/21- Mom present during morning session, assisting pt with toileting task and transfer and extended tub bench transfer. Mom stating of feeling comfortable with ADL tasks. Pain Level: No c/o pain this date. Additional Notes:     Patient has made good progress during treatment sessions toward set goals. Therapy emphasis to obtain goals:    Time Frame for Long term goals  6 weeks   Long term goal 1 Pt will complete UB self-care tasks w/ Supervision   Long term goal 2 Pt will complete LB self-care tasks w/ Sup underwear, pants & socks but Min A to don AFO   Long term goal 3 Pt will complete toileting (all aspects) w/ Supervision   Long term goal 4 Pt will safely complete toilet transfers w/ Supervision   Long term goal 5 Pt will safely complete tub/shower transfers w/ Supervision   Long term goals 6 Pt will complete basic homemaking task w/ SBA   Long term goal 7 Pt will complete grooming task w/ Supervision   Long term goal 8 Pt will complete feeding task w/ Supervision/setup     [x] Continue with current OT Plan of care. [] Prepare for Discharge    10/27/21 OT plan of care updated on this date. Tentative length of stay is 6 weeks Rosedeonna Oneal OTR/L [id number]  11/3/21- Pt POC updated on this date. Pt tentative length of day is 5 weeks to address above problem areas Canary Lilliam OTR/L [id number]  11/10/21- Pt POC updated on this date. Pt tentative length of day is 4 weeks to address above problem areas Rosedeonna Martinhard OTR/L [id number]  11/17/21- Pt POC updated on this date. Pt tentative length of day is 3 weeks to address above problem areas Rosedeonna Oneal OTR/L [id number]  11/24/21 OT plan of care updated on this date. Tentative length of stay is 2 weeks .  Wojciech Agustin OTR/L [id number]   DISCHARGE RECOMMENDATIONS  Recommended DME: drop arm BSC    Post Discharge Care:   []Home Independently  []Home with 24hr Care / Supervision []Home with Partial Supervision []Home with Home Health OT []Home with Out Pt OT []Other: ___   Comments:         Time in Time out Tx Time Breakdown  Variance:   First Session  1000 1130 [x] Individual Tx-90 Mins  [] Concurrent Tx -  [] Co-Tx -   [] Group Tx -   [] Time Missed -     Second Session   [] Individual Tx- mins  [] Concurrent Tx -   [] Co-Tx -   [] Group Tx -   [] Time Missed -     Third Session    [] Individual Tx-   [] Concurrent Tx -  [] Co-Tx -   [] Group Tx -   [] Time Missed -         Total Tx Time: 90 Mins     Catrina Karimi OTR/L [id number]

## 2021-11-24 NOTE — PATIENT CARE CONFERENCE
[address] [place], [place] [address] [facility] ACUTE REHABILITATION  TEAM CONFERENCE NOTE/PATIENT PLAN OF CARE    The physician was present and led this team conference    Date: 2021  Admission date: 10/22/2021  Patient Name: Parul Elizalde        MRN: [id number]    : 1975  (39 y.o.)  Gender: female   Rehab diagnosis/surgery with date:  CVA of 10/12/21 with right hemicraniectomy 10/13/21 at [place] [address] Code:  1.1    MEDICAL/FUNCTIONAL HISTORY/STATUS:  Cardiac monitor completed and sent back to cardiology at [facility].     Consultations/Labs/X-rays: No recent diagnostic testing      MEDICATION UPDATE:  Lamictal titration every two weeks      NURSING :    Bowel:   Always Continent  [x]   Occasionally incontinent  []   Frequently incontinent  []   Always incontinent  []   Not occurred  []     Bladder:   Always Continent  [x]    Incontinent less than daily[]   Incontinent  daily []   Always incontinent  []   No urine output    []   Indwelling catheter []       Toilet Hygiene:   Current level : min assist   Short term Toilet hygiene goal: met  Long term toilet hygiene goal:  standby assist     Skin integrity: Left calf skin tear  Pain: none    NUTRITION    Diet  Regular adequate intake  Liquid consistency   Thin    SOCIAL INFORMATION:  Lives with: dtr, 15 yr old  Prior community [address] [place] [address] to [employer] discharge:  ranch with 2 entry, 2 rails  Prior Level of function:  independent  DME:  none    FAMILY / PATIENT EDUCATION:  Family teach twice a week with mom dad aunt    PHYSICAL THERAPY    Bed mobility:   Current level: standby assist min assist   Short term bed mobility goal: met  Long term bed mobility goal: supervision     Chair/bed transfers:  Current level: Contact guard assist min assist sbqc impulsive left neglect  Short term Chair/bed transfers goal: met  Long term Chair/bed transfers goal: standby assist       Ambulation:   Current level: 185ft small base quad cane left AFO Contact guard assist   Short term ambulation goal: met  Long term ambulation goal: 500ft standby assist     Wheelchair Mobility:  Current level: 100ft right arm leg min assist   Short term wheelchair goal: met  Long term wheelchair goal: 150ft supervision       Car transfers:   Current level: min assist verbal cues for safety  Short term car transfers goal: met  Long term car transfers goal:standby assist     Stairs:   Current level : 12 steps 1 rail min assist   Short term stairs goal: met   Long term stairs goal: 12 steps standby assist     Lower Extremity Strength Issues:  Right 5/5 left hip 2+ knee 2- 0 at ankle    Other comments: Multiple family teach sessions      OCCUPATIONAL THERAPY:      Tub/shower:   Current level: min assist/ mod assist extended tub bench small base quad cane  Short term tub/shower goal: min assist   Long term tub/shower goal: supervision       Feeding:  Current level: set up   Short term feeding goal: independent   Long term feeding goal: independent     Grooming:   Current level: set up   Short term grooming goal: Modified independent   Long term grooming goal: Modified independent seated    Bathing:  Current level: min assist seated shower verbal cues safety and insight  Short term bathing goal: Contact guard assist   Long term bathing goal: supervision     Homemaking:   Current level: mod assist seated  Short term homemaking goal: met  Long term homemaking goal: min assist     Upper body dressing:  Current level: min assist   Short term upper body dressing goal: supervision   Long term upper body dressing goal: supervision     Lower body dressing:                                                                          Current level: min assist adaptive equipment          Short term lower body dressing goal: Contact guard assist        Long term lower body dressing goal: standby assist             Footwear  Current level: mod assist verbal cues   Short term goal: min assist   Long term goal:min assist due to AFO      Toilet transfer:   Current level: Contact guard assist verbal cues for safety and insight  Short term toilet transfer goal: standby assist   Long term toilet transfer goal: supervision     Other comments: Impulsive and unaware of deficits at times. Safety awareness improving. Patient/family's personal goals: [employer] [place] shopping\"  Factors supporting goal achievement:  making gains, supportive family  Factors hindering goal achievement:  left hemiplegia, impulsiveness, lack of insight into deficits        Discharge Plan   Estimated Length of Stay: 2 weeks            Destination: home  Services at Discharge: to be assessed   Equipment at Discharge: to be assessed       INTERDISCIPLINARY TEAM/PHYSICIAN RECOMMENDATION AND/OR REVISIONS OF PLAN OF CARE:  Ongoing education with patient and family regarding plan of care and discharge planning. I approve the established interdisciplinary plan of care as documented within the medical record of Kody Henson. Electronically signed by Tyler Fields RN on 11/24/2021 at 9:26 AM  The following interdisciplinary team members were present:  SJ Lares RN Mary Katy Stank.  Javed, PT, DPT  Jennifer Art OTR

## 2021-11-24 NOTE — PROGRESS NOTES
[facility] Physical Medicine and Rehabilitation  Comprehensive Progress Note    Subjective:      Kody Henson is a 39 y.o. female admitted to inpatient rehabilitation for impairments and activities limitations in ADLs and mobility secondary to right MCA CVA. No acute events overnight. No cp, sob, n/v. Tolerating therapy. No new issues reported. The patient's medical records have been reviewed. Scheduled Meds:enoxaparin, 40 mg, Daily  polyethylene glycol, 17 g, BID  lamoTRIgine, 25 mg, Daily  lidocaine, 1 patch, Daily  baclofen, 5 mg, Nightly  nicotine, 1 patch, Daily  senna, 2 tablet, Nightly  metoprolol tartrate, 12.5 mg, BID  gabapentin, 100 mg, TID  tamsulosin, 0.4 mg, Daily  aspirin, 81 mg, Daily  atorvastatin, 40 mg, Nightly  vitamin D, 50,000 Units, Weekly  docusate sodium, 100 mg, BID  melatonin, 6 mg, Nightly  clopidogrel, 75 mg, Daily      Continuous Infusions:  PRN Meds:diclofenac sodium, 4 g, 4x Daily PRN  diphenhydrAMINE-zinc acetate, , Q6H PRN  ondansetron, 4 mg, Q6H PRN  lactulose, 20 g, BID PRN  guaiFENesin, 200 mg, BID PRN  acetaminophen, 650 mg, Q4H PRN  magnesium hydroxide, 30 mL, Daily PRN  bisacodyl, 10 mg, Daily PRN         Objective:       11/23/21 1615 11/23/21 1945 11/23/21 2030   BP: 103/60 (!) 98/52 (!) 93/53   Pulse: 76 86 75   Resp: 16 16    Temp: 97.4 °F (36.3 °C) 98.4 °F (36.9 °C)    TempSrc: Oral Temporal    SpO2: 100% 96%    Weight:      Height:        General appearance: alert, NAD, seen in PT  Head: R crani site c/d/i   Eyes: conjunctivae/corneas clear. PERRL, EOM's intact. Lungs: clear to auscultation bilaterally  Heart: regular rate and rhythm, S1, S2 normal  Abdomen: soft, non-tender, normal bowel sounds  Extremities: extremities normal, atraumatic, no cyanosis or edema  MSK: no cyanosis, clubbing, edema  Skin: R crani c/d/i   Neurologic: Alert, oriented x4. Answering all questions appropriately. Left neglect. Speech clear. No aphasia appreciated.  Mild L facial mg qHS x 2 weeks (start 12/23), then 100 mg BID for maintenance (start1/6/22). -Pain control, MSK pain, neuropathic pain: Tylenol PRN, gabapentin, Lidoderm, Voltaren gel  -Urinary retention: On flomax, now voiding well, low PVRs  -Tachycardia: intermittent. Cardiology consulted. She was started on Lopressor. Cardiology attempted to access data from her heart monitor unsuccessfully. She will follow up with Cardiology in office to review heart monitor data once available. -Constipation: colace, senna, glycolax.  PRNs available.   -Tobacco abuse: Nicoderm patch  -DVT prophylaxis: lovenox SQ, SCDs      Cardiac monitor was sent in for analysis  Team conference tomorrow

## 2021-11-24 NOTE — PROGRESS NOTES
Physical Therapy  Treatment Note  Supervising Therapist: Michael Lopez PT, DPT [id number]    NAME: Duke Wright  [facility]  DIAGNOSIS: Acute CVA  PRECAUTIONS: Falls, helmet at all times when OOB, L hemiplegia, L homonymous hemianopsia L neglect, alarm,  L PRAFO, SBP <180, orange dot with mother. HPI: Pt is a 39 y.o. female with past medical history significant for recent COVID-19 infection (9/15/21) who [facility] on 10/12/21 with acute onset left-sided weakness. Initial NIHSS 17.  Initial CT (OSH) showed R MCA hyperdensity; initial CTA showed right ICA/M1 occlusion. On 10/13, her neuro exam worsened and repeat imaging indicated worsening edema, 1 cm of midline shift. She underwent emergent R hemicraniectomy by Dr. Isidoro Pascual [facility]) on 10/13. After being deemed medically appropriate, she was transferred to [place] [id number] on 10/22/2021    Social:  Pt lives with daughter (15 y/o), will be returning to parent's home upon discharge (1 floor plan, 2 HAIDER with 2 HR). Mother and father healthy, do not work. 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 Initial Evaluation  Date: 10/23/21 AM     PM    Short Term Goals Long Term Goals    Was pt agreeable to Eval/treatment? yes Yes Yes     Does pt have pain? Pain on the top of her head once the helmet was placed on Mild c/o L hip and R rib pain Mild c/o moderate L hip/knee pain with activity     Bed Mobility  Rolling: Max A  Supine to sit: Max A   Sit to supine: Max A   Scooting: Max A NT NT Min A Supervision   Transfers Sit to stand: Max A   Stand to sit: Max A  Stand pivot:  Max A x2    5xSTS: Unable to complete Sit<>stand: CGA/Noemy  Stand pivot: CGA/Noemy with R SBQC Sit<>stand: CGA/Noemy  Stand pivot: CGA with R SBQC   Min A SBA  5xSTS: TBD   Ambulation    15 feet with R mackey rail with Max A + wc follow    10mWT: TBA  6mWT:  feet x 1 rep with R SBQC and CGA (7.5 lb ankle weight LLE)  (L articulating AFO)  (L articulating AFO with DF/PF stop)    10mWT: 0.15 m/s  6mWT: 52 m  (R SBQC, L Trial AFO, Noemy 11/16)     150 feet x 1 rep and 100 feet x 2 reps with R SBQC and CGA  (L articulating AFO)   50 feet with AAD with Min A       500 feet with AAD with SBA    10mWT: >0.4 m/s  6mWT: >205 m   Walking 10 feet on uneven surface NT NT NT 10 feet with AAD with Min A 10 feet with AAD with SBA   Wheel Chair Mobility NT NT NT  150 feet with R extremities with SBA   Car Transfers NT NT Noemy x 2 reps Min A SBA   Stair negotiation: ascended and descended NT NT 8 steps with R HR and Noemy (non-reciprocal, leading with RLE ascent, LLE descent) 4 steps with 1 rail with Min A 12 steps with 1 rail with SBA   Curb Step:   ascended and descended NT 4\" step x 2 reps and 2\" step x 2 reps with R SBQC leading with LLE (7.5 lb ankle weight LLE) with Noemy NT 4 inch step with AAD and Min A 4 inch step with AAD and SBA   Picking up object off the floor NT NT NT Will  an object with Min assist Will  an object with SBA   BLE ROM WFL: LLE AROM limited by flaccidity  WFL: LLE AROM limited by spasticity 2+ on MAS (hamstrings, hip extensors, PF)  L ankle DF lacking ~5 degrees from neutral      BLE Strength RLE 4+/5  LLE 0/5 RLE 5/5  LLE hip flexion 2+/5  Knee extension 2-/5  Ankle DF/PF 0/5      Balance  Static Sitting: Mod/Max A    BBS: TBA  FGA: TBA   Standing: Noemy  BBS: 11/56  (11/16/21)           BBS: >20/56  FGA: TBD   Date Family Teach Completed TBA Pt's mother present for observation 10/24, 11/2, 11/3, 114, hands on 11/9, 11/11, 11/16, 11/18, 11/23, aunt present to observe 11/17  Dad present 11/22, 11/24 for hands on training      Is additional Family Teaching Needed?   Y or N Y Yes      Hindering Progress L hemiplegia, L neglect L hemiplegia, L neglect      PT recommended ELOS 5 weeks       Team's Discharge Plan        Therapist at Team Meeting          General:        HRmax: 178  bpm       Beta blockers (Y/N): Y Adjusted HRmax: 168 bpm   Blood pressure (mmHg): AM:  - Prior to Tx: 118/74  - Post Tx: 115/77 PM:  - Prior to Tx: NA  - Post Tx: NA   Time spent in HR ranges: Moderate intensity (60-70% HRmax): AM: 0:22:22 PM: NT due to FT   High intensity (70-85% HRmax): AM: 0:11:20 PM: NT due to FT     Therapeutic Exercise:   AM: Box step around large tile square clockwise x 2 reps with R SBQC (7.5lb ankle weight LLE)  FW non-reciprocal stepping over 8 SPC with R SBQC leading with LLE (7.5lb ankle weight LLE)  Ambulation without  feet x 2 reps with ModA   PM: Ambulation with R  feet x 1 rep with CGA  Ambulation without  feet x 1 rep with Noemy/ModA    Patient education  Pt educated on optimal sequencing with transfer to chair/WC    Patient response to education:   Pt verbalized understanding Pt demonstrated skill Pt requires further education in this area   yes partial yes     Additional Comments: Pt remains motivated but impulsive during sessions. L neglect persists with directional cueing required. Ankle weight added to challenge limb swing advancement. Pt continues to exhibit significant L hemiparesis which limits effective mobility without device, frequent L lateral LOB occurs with ambulation without device. Father present for PM session FT, completed repeated hands on training  Assisting patient with mobility tasks using SBQC. Father reports feeling more comfortable assisting patient after this session but may return for further training. Father exhibits proficiency assisting patient at this time. Plan to progress gait training next session. AM  Time in: 0830  Time out: 0915    PM  Time in: 1300  Time out: 9264    Pt is making good progress toward established Physical Therapy goals. Continue with physical therapy current plan of care.     Jessica Goldman, PT, DPT  Board Certified Clinical Specialist in Neurologic Physical Therapy  [id number]

## 2021-11-25 PROCEDURE — 97535 SELF CARE MNGMENT TRAINING: CPT

## 2021-11-25 PROCEDURE — 6370000000 HC RX 637 (ALT 250 FOR IP): Performed by: PHYSICAL MEDICINE & REHABILITATION

## 2021-11-25 PROCEDURE — 6360000002 HC RX W HCPCS: Performed by: PHYSICAL MEDICINE & REHABILITATION

## 2021-11-25 PROCEDURE — 1280000000 HC REHAB R&B

## 2021-11-25 RX ADMIN — LAMOTRIGINE 50 MG: 25 TABLET ORAL at 09:15

## 2021-11-25 RX ADMIN — DOCUSATE SODIUM 100 MG: 100 CAPSULE, LIQUID FILLED ORAL at 09:16

## 2021-11-25 RX ADMIN — CLOPIDOGREL 75 MG: 75 TABLET, FILM COATED ORAL at 09:16

## 2021-11-25 RX ADMIN — TAMSULOSIN HYDROCHLORIDE 0.4 MG: 0.4 CAPSULE ORAL at 09:16

## 2021-11-25 RX ADMIN — ACETAMINOPHEN 650 MG: 325 TABLET ORAL at 09:15

## 2021-11-25 RX ADMIN — ENOXAPARIN SODIUM 40 MG: 100 INJECTION SUBCUTANEOUS at 21:56

## 2021-11-25 RX ADMIN — ATORVASTATIN CALCIUM 40 MG: 40 TABLET, FILM COATED ORAL at 21:56

## 2021-11-25 RX ADMIN — GABAPENTIN 100 MG: 100 CAPSULE ORAL at 09:16

## 2021-11-25 RX ADMIN — SENNOSIDES 17.2 MG: 8.6 TABLET, COATED ORAL at 21:56

## 2021-11-25 RX ADMIN — GABAPENTIN 100 MG: 100 CAPSULE ORAL at 21:56

## 2021-11-25 RX ADMIN — ASPIRIN 81 MG CHEWABLE TABLET 81 MG: 81 TABLET CHEWABLE at 09:16

## 2021-11-25 RX ADMIN — Medication 6 MG: at 21:55

## 2021-11-25 RX ADMIN — DOCUSATE SODIUM 100 MG: 100 CAPSULE, LIQUID FILLED ORAL at 21:56

## 2021-11-25 RX ADMIN — BACLOFEN 5 MG: 10 TABLET ORAL at 21:55

## 2021-11-25 RX ADMIN — GABAPENTIN 100 MG: 100 CAPSULE ORAL at 15:16

## 2021-11-25 RX ADMIN — ACETAMINOPHEN 650 MG: 325 TABLET ORAL at 21:55

## 2021-11-25 ASSESSMENT — PAIN SCALES - WONG BAKER
WONGBAKER_NUMERICALRESPONSE: 0
WONGBAKER_NUMERICALRESPONSE: 0

## 2021-11-25 ASSESSMENT — PAIN SCALES - GENERAL
PAINLEVEL_OUTOF10: 0
PAINLEVEL_OUTOF10: 8
PAINLEVEL_OUTOF10: 5

## 2021-11-25 ASSESSMENT — PAIN DESCRIPTION - PROGRESSION
CLINICAL_PROGRESSION: OTHER (COMMENT)

## 2021-11-25 NOTE — PLAN OF CARE
Problem: Pain:  Goal: Pain level will decrease  Description: Pain level will decrease  11/25/2021 0146 by Susana Waterman RN  Outcome: Ongoing  11/24/2021 2111 by Pavel Barcenas RN  Outcome: Ongoing  Goal: Control of acute pain  Description: Control of acute pain  11/25/2021 0146 by Susana Waterman RN  Outcome: Ongoing  11/24/2021 2111 by Pavel Barcenas RN  Outcome: Ongoing  Goal: Control of chronic pain  Description: Control of chronic pain  11/25/2021 0146 by Susana Waterman RN  Outcome: Ongoing  11/24/2021 2111 by Pavel Barcenas RN  Outcome: Ongoing  Goal: Patient's pain/discomfort is manageable  Description: Patient's pain/discomfort is manageable  11/25/2021 0146 by Susana Waterman RN  Outcome: Ongoing  11/24/2021 2111 by Pavel Barcenas RN  Outcome: Ongoing     Problem: Falls - Risk of:  Goal: Will remain free from falls  Description: Will remain free from falls  11/25/2021 0146 by Susana Waterman RN  Outcome: Ongoing  11/24/2021 2111 by Pvael Barcenas RN  Outcome: Ongoing  Goal: Absence of physical injury  Description: Absence of physical injury  11/25/2021 0146 by Susana Waterman RN  Outcome: Ongoing  11/24/2021 2111 by Pavel Barcenas RN  Outcome: Ongoing     Problem: Skin Integrity:  Goal: Will show no infection signs and symptoms  Description: Will show no infection signs and symptoms  11/25/2021 0146 by Susana Waterman RN  Outcome: Ongoing  11/24/2021 2111 by Pavel Barcenas RN  Outcome: Ongoing  Goal: Absence of new skin breakdown  Description: Absence of new skin breakdown  11/25/2021 0146 by Susana Waterman RN  Outcome: Ongoing  11/24/2021 2111 by Pavel Barcenas RN  Outcome: Ongoing     Problem: ABCDS Injury Assessment  Goal: Absence of physical injury  11/25/2021 0146 by Susana Waterman RN  Outcome: Ongoing  11/24/2021 2111 by Pavel Barcenas RN  Outcome: Ongoing     Problem: HEMODYNAMIC STATUS  Goal: Patient has stable vital signs and fluid balance  11/25/2021 0146 by Susana Waterman RN  Outcome: Ongoing  11/24/2021 2111 by Pierre Duncan RN  Outcome: Ongoing     Problem: ACTIVITY INTOLERANCE/IMPAIRED MOBILITY  Goal: Mobility/activity is maintained at optimum level for patient  11/25/2021 0146 by Luís Obregon RN  Outcome: Ongoing  11/24/2021 2111 by Pierre Duncan RN  Outcome: Ongoing     Problem: COMMUNICATION IMPAIRMENT  Goal: Ability to express needs and understand communication  11/25/2021 0146 by Luís Obregon RN  Outcome: Ongoing  11/24/2021 2111 by Pierre Duncan RN  Outcome: Ongoing     Problem: Infection:  Goal: Will remain free from infection  Description: Will remain free from infection  11/25/2021 0146 by Luís Obregon RN  Outcome: Ongoing  11/24/2021 2111 by Pierre Duncan RN  Outcome: Ongoing     Problem: Safety:  Goal: Free from accidental physical injury  Description: Free from accidental physical injury  11/25/2021 0146 by Luís Obregon RN  Outcome: Ongoing  11/24/2021 2111 by Pierre Duncan RN  Outcome: Ongoing  Goal: Free from intentional harm  Description: Free from intentional harm  11/25/2021 0146 by Luís Obregon RN  Outcome: Ongoing  11/24/2021 2111 by Pierre Duncan RN  Outcome: Ongoing     Problem: Daily Care:  Goal: Daily care needs are met  Description: Daily care needs are met  11/25/2021 0146 by Luís Obregon RN  Outcome: Ongoing  11/24/2021 2111 by Pierre Duncan RN  Outcome: Ongoing     Problem: Skin Integrity:  Goal: Skin integrity will stabilize  Description: Skin integrity will stabilize  11/25/2021 0146 by Luís Obregon RN  Outcome: Ongoing  11/24/2021 2111 by Pierre Duncan RN  Outcome: Ongoing     Problem: Discharge Planning:  Goal: Patients continuum of care needs are met  Description: Patients continuum of care needs are met  11/25/2021 0146 by Luís Obregon RN  Outcome: Ongoing  11/24/2021 2111 by Pierre Duncan RN  Outcome: Ongoing

## 2021-11-25 NOTE — PROGRESS NOTES
OCCUPATIONAL THERAPY DAILY NOTE    Date:2021  Patient Name: Debby Anderson  MRN: [id number]  : 1975  Room: [address] [place] [address]     Diagnosis:Acute CVA( Pt presented to [facility] as Level 2 stroke alert on 10/11/21. L side weakness, neglect. NIH-17  Found to have large R MCA involving R occipital)  Surgery: R frontotemporal decompressive hemicraniectomy on 10/13/21  Past Medical History: COVID (9/15), tobacco use  Precautions: Falls, L hemiplegia, L neglect, L sublux, bed/chair alarm, helmet when OOB, impulsive, L PRAFO & orange dot with mom    Functional Assessment:   Date Status AE  Comments   Feeding 21 Set-up  Pt seated upright in w/c eating of breakfast meal   Grooming 21 Set-up  w/c Pt washed face, applied deodorant and lotion, shaved facial hair, seated upright in w/c at sink         Oral Care 21 SBA  Pt brushed teeth, assistance to apply paste to brush, with pt then able to complete task   Bathing 21 Min A Shower chair with wheels  Pt completed showering task seated on shower chair with wheels, with pt able to wash of UB, malvin area and LB, with assistance to wash of R arm, hold LUE up to wash armpit, and stand to wash of buttocks with use of hand rail, per pt's request. Pt able to shave of thighs   UB Dressing 21 CGA  Pt donned standard bra, assistance to thread LUE and fasten in back. Pt donned long sleeve pullover shirt, with pt requiring assistance to thread LUE and over L shoulder, with pt then able to pull over head and down back. Pt stating of having more difficulty and requiring extended time with long sleeve shirts   LB Dressing 21 Min A reacher Pt donned underwear and pants using cross over technique, assistance to cross and steady of LLE while pt threaded, with assistance to stand and pull pants over hips   Footwear 21 Mod A Lh shoe horn Pt able to jesus socks using cross over technique, with assistance to cross LLE and steady.  Pt able to jesus shoe to R foot, with assistance to jesus of L AFO and shoe   Toileting 11/23/21 Min A Grab bar      Homemaking 11/9/21 Mod A       Functional Transfers / Balance:   Date Status DME  Comments   Sit Balance 11/25/21 SBA  Pt sat supported upright in w/c and on shower chair   Stand Balance 11/25/21 Min A SBQC  Grab bar  Bed rail   Pt stood during transfers and ADL tasks   [x] Tub        [x] Shower   Transfer 11/23/21 11/25/21 min/mod A          CGA Extended tub bench, SBQC      Roll in shower chair, grab bar, LBQC         Walk in shower transfer with use of SBQC to enter, then using of grab bar to turn and sit, sitting on shower chair with wheels   Commode   Transfer 11/23/21 CGA , 3:1 commode SBQC  Grab bar     Functional   Mobility 11/25/21 CGA SBQC Pt ambulated short household distance EOB<>Bathroom with [place]     Functional Exercises / Activity:      Sensory / Neuromuscular Re-Education:      Cognitive Skills:   Status Comments   Problem   Solving fair    Memory fair    Sequencing fair    Safety fair      Visual Perception:    Education:  Pt educated on safety and hand placement with transfers, trent dressing techniques      10/25/21: pt educated on SROM of LUE and safe positioning of LUE  10/29/21: educated on 1 handed dressing technique for socks/shoes  11/1/21: shoe lace adapted for ease with donning shoe  11/1/21: attempted small strips of kineosio tape on pt's hand to assess if pt can tolerate on her shoulder due to report of allergy (cleared by )  11/2/21:  Therapist applied kineosiotape to pt's LUE to stabilize sublux. Will continue to assess issues that arise with adhesive and progression of sublux  11/2/21: pt educated on AE for ease with LB dressing  11/3/21: Pt very fatigued in AM and PM sessions, requiring max cueing to alert pt back to task and engage in session, with pt asking multiple times to return to room/return to bed.  Extended time to complete all tasks  11/4/21: home ROM exercise program placed in pt's folder and pt's mother notified of it  11/7/21 Pt educated about LUE positioning both up in w/c and in bed to maintain joint integrity. 11/17/21: resting hand applied to pt's L hand due to report of tone at digits. This is to be worn at night time and will be monitored for further tone. 11/19/21: pt appears to be tolerating resting hand splint and skin integrity look well    [x] Family teach completed on:  11/4/21: Completed family teach this session. Pt's mother Simone Parson present. Educated Simone Parson on pt's status with ADLs and vc's for sequencing/initiation of tasks/hand placement with functional transfers. Simone Parson able to provide hands on assist with was educated for body mechanics/hand placement/vc's to increase safety with only commode transfer with slideboard and toileting task. Simone Parson educated on PROM (elbow/shoulder/wrist/digit flex/ext) of LUE to increase ROM, maintain joint integrity, and safe management of sublux. Simone Parson educated on how to doff/jesus gait belt and LUE sling. Pt's limiting factor appears to be her impulsivity and safety with ADLs/transfer. Simone Parson demo'ing F understanding/safety of education. Simone Parson appeared motivated and willing to learn. Continue to educate. 11/9/21: Completed family teach this session. Pt's mother Simone Parson present. Educated Simone Parson on pt's increased independence with ADLs/functional transfers. Simone Parson able to jesus pt's sling and gait belt. Simone Parson completed hands on assist with SPT to [facility]. Simone Parson and pt demo'ing improved communication with each other for safe transfer/completion of ADLs. Simone Parson demo'ing F understanding/safety of education. Continue to educate. 11/11/21- Mother present and educated along with assisting with w/c<>commode transfers using grab bar for balance and one handed skills. LUE placed in sling for balance and joint protection. Pt/mom educated with ext tub bench transfers to simulate for home environment with parent participating with transfer. Pt needed cues for hand/trunk placement on bench along with proper follow thru for safety. Pt/mom educated SPT using quad cane from w/c <>ext tub bench with patient needing cues for technique with cane & taking few steps. 11/16/21 Mom present and educated with transfers on/off firm recliner, sofa and ext tub bench transfers at quad cane level along with standing balance and positioning for LUE seated up in w/c.    11/17/21: Pt's aunt present. She observed as well as assisted pt intermittently with ADLs. 11/18/21- Pt mom & observed am OT treatment while pt evaluated using [employer] arm using pt LUE   11/23/21- Mom present during morning session, assisting pt with toileting task and transfer and extended tub bench transfer. Mom stating of feeling comfortable with ADL tasks. Pain Level: No c/o pain this date. Additional Notes:     Patient has made good progress during treatment sessions toward set goals. Therapy emphasis to obtain goals:    Time Frame for Long term goals  6 weeks   Long term goal 1 Pt will complete UB self-care tasks w/ Supervision   Long term goal 2 Pt will complete LB self-care tasks w/ Sup underwear, pants & socks but Min A to don AFO   Long term goal 3 Pt will complete toileting (all aspects) w/ Supervision   Long term goal 4 Pt will safely complete toilet transfers w/ Supervision   Long term goal 5 Pt will safely complete tub/shower transfers w/ Supervision   Long term goals 6 Pt will complete basic homemaking task w/ SBA   Long term goal 7 Pt will complete grooming task w/ Supervision   Long term goal 8 Pt will complete feeding task w/ Supervision/setup     [x] Continue with current OT Plan of care. [] Prepare for Discharge    10/27/21 OT plan of care updated on this date. Tentative length of stay is 6 weeks Pacheco Ruiz OTR/L [id number]  11/3/21- Pt POC updated on this date.  Pt tentative length of day is 5 weeks to address above problem areas Clemetine Claude OTR/L [id number]  11/10/21- Pt POC updated on this date. Pt tentative length of day is 4 weeks to address above problem areas Emelia Luna OTR/L [id number]  11/17/21- Pt POC updated on this date. Pt tentative length of day is 3 weeks to address above problem areas Emelia Luna OTR/L [id number]  11/24/21 OT plan of care updated on this date. Tentative length of stay is 2 weeks .  Jessica Luong OTR/L [id number]       DISCHARGE RECOMMENDATIONS  Recommended DME: drop arm BSC    Post Discharge Care:   []Home Independently  []Home with 24hr Care / Supervision []Home with Partial Supervision []Home with Home Health OT []Home with Out Pt OT []Other: ___   Comments:         Time in Time out Tx Time Breakdown  Variance:   First Session  7:15am 8:05am [x] Individual Tx-50 Mins  [] Concurrent Tx -  [] Co-Tx -   [] Group Tx -   [] Time Missed -     Second Session   [] Individual Tx- mins  [] Concurrent Tx -   [] Co-Tx -   [] Group Tx -   [] Time Missed -     Third Session    [] Individual Tx-   [] Concurrent Tx -  [] Co-Tx -   [] Group Tx -   [] Time Missed -         Total Tx Time: 50 Mins     Trudy Bebeto GLOVER/L [address] [facility], [address] [facility], OTR/L [id number]

## 2021-11-26 PROCEDURE — 6360000002 HC RX W HCPCS: Performed by: PHYSICAL MEDICINE & REHABILITATION

## 2021-11-26 PROCEDURE — 6370000000 HC RX 637 (ALT 250 FOR IP): Performed by: PHYSICAL MEDICINE & REHABILITATION

## 2021-11-26 PROCEDURE — 97530 THERAPEUTIC ACTIVITIES: CPT

## 2021-11-26 PROCEDURE — 97116 GAIT TRAINING THERAPY: CPT

## 2021-11-26 PROCEDURE — 97110 THERAPEUTIC EXERCISES: CPT

## 2021-11-26 PROCEDURE — 97112 NEUROMUSCULAR REEDUCATION: CPT

## 2021-11-26 PROCEDURE — 1280000000 HC REHAB R&B

## 2021-11-26 PROCEDURE — 99232 SBSQ HOSP IP/OBS MODERATE 35: CPT | Performed by: PHYSICAL MEDICINE & REHABILITATION

## 2021-11-26 RX ORDER — BACLOFEN 10 MG/1
5 TABLET ORAL 3 TIMES DAILY
Status: DISCONTINUED | OUTPATIENT
Start: 2021-11-26 | End: 2021-12-07 | Stop reason: HOSPADM

## 2021-11-26 RX ADMIN — GABAPENTIN 100 MG: 100 CAPSULE ORAL at 14:12

## 2021-11-26 RX ADMIN — CLOPIDOGREL 75 MG: 75 TABLET, FILM COATED ORAL at 10:26

## 2021-11-26 RX ADMIN — DOCUSATE SODIUM 100 MG: 100 CAPSULE, LIQUID FILLED ORAL at 10:25

## 2021-11-26 RX ADMIN — GABAPENTIN 100 MG: 100 CAPSULE ORAL at 10:26

## 2021-11-26 RX ADMIN — ASPIRIN 81 MG CHEWABLE TABLET 81 MG: 81 TABLET CHEWABLE at 10:26

## 2021-11-26 RX ADMIN — ATORVASTATIN CALCIUM 40 MG: 40 TABLET, FILM COATED ORAL at 20:23

## 2021-11-26 RX ADMIN — GABAPENTIN 100 MG: 100 CAPSULE ORAL at 20:23

## 2021-11-26 RX ADMIN — DOCUSATE SODIUM 100 MG: 100 CAPSULE, LIQUID FILLED ORAL at 20:23

## 2021-11-26 RX ADMIN — TAMSULOSIN HYDROCHLORIDE 0.4 MG: 0.4 CAPSULE ORAL at 10:26

## 2021-11-26 RX ADMIN — ENOXAPARIN SODIUM 40 MG: 100 INJECTION SUBCUTANEOUS at 20:22

## 2021-11-26 RX ADMIN — LAMOTRIGINE 50 MG: 25 TABLET ORAL at 10:26

## 2021-11-26 RX ADMIN — Medication 6 MG: at 20:22

## 2021-11-26 RX ADMIN — METOPROLOL TARTRATE 12.5 MG: 25 TABLET, FILM COATED ORAL at 10:25

## 2021-11-26 RX ADMIN — SENNOSIDES 17.2 MG: 8.6 TABLET, COATED ORAL at 20:22

## 2021-11-26 RX ADMIN — BACLOFEN 5 MG: 10 TABLET ORAL at 20:22

## 2021-11-26 RX ADMIN — METOPROLOL TARTRATE 12.5 MG: 25 TABLET, FILM COATED ORAL at 20:23

## 2021-11-26 RX ADMIN — BACLOFEN 5 MG: 10 TABLET ORAL at 14:12

## 2021-11-26 ASSESSMENT — PAIN DESCRIPTION - DESCRIPTORS: DESCRIPTORS: ACHING;DISCOMFORT

## 2021-11-26 ASSESSMENT — PAIN DESCRIPTION - ORIENTATION: ORIENTATION: LEFT

## 2021-11-26 ASSESSMENT — PAIN SCALES - GENERAL
PAINLEVEL_OUTOF10: 8
PAINLEVEL_OUTOF10: 0
PAINLEVEL_OUTOF10: 0

## 2021-11-26 ASSESSMENT — PAIN DESCRIPTION - ONSET: ONSET: ON-GOING

## 2021-11-26 ASSESSMENT — PAIN DESCRIPTION - FREQUENCY: FREQUENCY: INTERMITTENT

## 2021-11-26 ASSESSMENT — PAIN DESCRIPTION - LOCATION: LOCATION: GENERALIZED

## 2021-11-26 ASSESSMENT — PAIN - FUNCTIONAL ASSESSMENT: PAIN_FUNCTIONAL_ASSESSMENT: ACTIVITIES ARE NOT PREVENTED

## 2021-11-26 ASSESSMENT — PAIN SCALES - WONG BAKER: WONGBAKER_NUMERICALRESPONSE: 0

## 2021-11-26 ASSESSMENT — PAIN DESCRIPTION - PROGRESSION
CLINICAL_PROGRESSION: GRADUALLY IMPROVING
CLINICAL_PROGRESSION: OTHER (COMMENT)

## 2021-11-26 ASSESSMENT — PAIN DESCRIPTION - PAIN TYPE: TYPE: CHRONIC PAIN

## 2021-11-26 NOTE — PLAN OF CARE
Problem: Pain:  Goal: Pain level will decrease  Description: Pain level will decrease  11/26/2021 1052 by Monse Enrique RN  Outcome: Ongoing  11/26/2021 0211 by Rafaela Mancini RN  Outcome: Ongoing  Goal: Control of acute pain  Description: Control of acute pain  11/26/2021 1052 by Monse Enrique RN  Outcome: Ongoing  11/26/2021 0211 by Rafaela Mancini RN  Outcome: Ongoing  Goal: Control of chronic pain  Description: Control of chronic pain  11/26/2021 1052 by Monse Enrique RN  Outcome: Ongoing  11/26/2021 0211 by Rafaela Mancini RN  Outcome: Ongoing  Goal: Patient's pain/discomfort is manageable  Description: Patient's pain/discomfort is manageable  11/26/2021 1052 by Monse Enrique RN  Outcome: Ongoing  11/26/2021 0211 by Rafaela Mancini RN  Outcome: Ongoing     Problem: Falls - Risk of:  Goal: Will remain free from falls  Description: Will remain free from falls  11/26/2021 1052 by Monse Enrique RN  Outcome: Ongoing  11/26/2021 0211 by Rafaela Mancini RN  Outcome: Ongoing  Goal: Absence of physical injury  Description: Absence of physical injury  11/26/2021 1052 by Monse Enrique RN  Outcome: Ongoing  11/26/2021 0211 by Rafaela Mancini RN  Outcome: Ongoing     Problem: Skin Integrity:  Goal: Will show no infection signs and symptoms  Description: Will show no infection signs and symptoms  11/26/2021 1052 by Monse Enrique RN  Outcome: Ongoing  11/26/2021 0211 by Rafaela Mancini RN  Outcome: Ongoing  Goal: Absence of new skin breakdown  Description: Absence of new skin breakdown  11/26/2021 1052 by Monse Enrique RN  Outcome: Ongoing  11/26/2021 0211 by Rafaela Mancini RN  Outcome: Ongoing     Problem: ABCDS Injury Assessment  Goal: Absence of physical injury  11/26/2021 1052 by Monse Enrique RN  Outcome: Ongoing  11/26/2021 0211 by Rafaela Mancini RN  Outcome: Ongoing     Problem: HEMODYNAMIC STATUS  Goal: Patient has stable vital signs and fluid balance  11/26/2021 1052 by Monse Enrique RN  Outcome: Ongoing  11/26/2021 0211 by Luiz Ribeiro Roxie Parekh RN  Outcome: Ongoing     Problem: ACTIVITY INTOLERANCE/IMPAIRED MOBILITY  Goal: Mobility/activity is maintained at optimum level for patient  11/26/2021 1052 by Lorie Galdamez RN  Outcome: Ongoing  11/26/2021 0211 by Tarsha Willingham RN  Outcome: Ongoing     Problem: COMMUNICATION IMPAIRMENT  Goal: Ability to express needs and understand communication  11/26/2021 1052 by Lorie Galdamez RN  Outcome: Ongoing  11/26/2021 0211 by Tarsha Willingham RN  Outcome: Ongoing     Problem: Infection:  Goal: Will remain free from infection  Description: Will remain free from infection  11/26/2021 1052 by Lorie Galdamez RN  Outcome: Ongoing  11/26/2021 0211 by Tarsha Willingham RN  Outcome: Ongoing     Problem: Safety:  Goal: Free from accidental physical injury  Description: Free from accidental physical injury  11/26/2021 1052 by Lorie Galdamez RN  Outcome: Ongoing  11/26/2021 0211 by Tarsha Willingham RN  Outcome: Ongoing  Goal: Free from intentional harm  Description: Free from intentional harm  11/26/2021 1052 by Lorie Galdamez RN  Outcome: Ongoing  11/26/2021 0211 by Tarsha Willingham RN  Outcome: Ongoing     Problem: Daily Care:  Goal: Daily care needs are met  Description: Daily care needs are met  11/26/2021 1052 by Lorie Galdamez RN  Outcome: Ongoing  11/26/2021 0211 by Tarsha Willingham RN  Outcome: Ongoing     Problem: Skin Integrity:  Goal: Skin integrity will stabilize  Description: Skin integrity will stabilize  11/26/2021 1052 by Lorie Galdamez RN  Outcome: Ongoing  11/26/2021 0211 by Tarsha Willingham RN  Outcome: Ongoing     Problem: Discharge Planning:  Goal: Patients continuum of care needs are met  Description: Patients continuum of care needs are met  11/26/2021 1052 by Lorie Galdamez RN  Outcome: Ongoing  11/26/2021 0211 by Tarsha Willingham RN  Outcome: Ongoing

## 2021-11-26 NOTE — PROGRESS NOTES
[facility] Physical Medicine and Rehabilitation  Comprehensive Progress Note    Subjective:      Len Willard is a 39 y.o. female admitted to inpatient rehabilitation for impairments and activities limitations in ADLs and mobility secondary to right MCA CVA. No acute events overnight. No cp, sob, n/v. Tolerating therapy. Reports painful spasm of the left upper extremity. No other complaints. The patient's medical records have been reviewed. Scheduled Meds:lamoTRIgine, 50 mg, Daily  enoxaparin, 40 mg, Daily  polyethylene glycol, 17 g, BID  lidocaine, 1 patch, Daily  baclofen, 5 mg, Nightly  nicotine, 1 patch, Daily  senna, 2 tablet, Nightly  metoprolol tartrate, 12.5 mg, BID  gabapentin, 100 mg, TID  tamsulosin, 0.4 mg, Daily  aspirin, 81 mg, Daily  atorvastatin, 40 mg, Nightly  vitamin D, 50,000 Units, Weekly  docusate sodium, 100 mg, BID  melatonin, 6 mg, Nightly  clopidogrel, 75 mg, Daily      Continuous Infusions:  PRN Meds:diclofenac sodium, 4 g, 4x Daily PRN  diphenhydrAMINE-zinc acetate, , Q6H PRN  ondansetron, 4 mg, Q6H PRN  lactulose, 20 g, BID PRN  guaiFENesin, 200 mg, BID PRN  acetaminophen, 650 mg, Q4H PRN  magnesium hydroxide, 30 mL, Daily PRN  bisacodyl, 10 mg, Daily PRN         Objective:      Vitals:    11/23/21 2030 11/24/21 0715 11/24/21 2058 11/25/21 0900   BP: (!) 93/53 118/74 (!) 90/55 129/70   Pulse: 75 99 77 101   Resp:  16  16   Temp:  98.4 °F (36.9 °C)  99 °F (37.2 °C)   TempSrc:  Tympanic  Temporal   SpO2:  96%     Weight:       Height:           General appearance: alert, NAD, up in w/c   Head: R crani site c/d/i   Eyes: conjunctivae/corneas clear. PERRL, EOM's intact. Lungs: clear to auscultation bilaterally  Heart: regular rate and rhythm, S1, S2 normal  Abdomen: soft, non-tender, normal bowel sounds  Extremities: extremities normal, atraumatic, no cyanosis or edema  MSK: no cyanosis, clubbing, edema  Skin: R crani c/d/i   Neurologic: Alert, oriented x4.  Answering all questions appropriately. Left neglect. Speech clear. No aphasia appreciated. Mild L facial droop. Spastic left hemiplegia. MAS 1+ in LUE; MAS 1+-2 in LLE. Motor 5/5 RUE and RLE; 0/5 LUE. LLE is 2/5 HF;  1/5 KE;  0/5 DF and PF      Exam stable     Laboratory:    Lab Results   Component Value Date    WBC 7.3 11/18/2021    HGB 11.8 11/18/2021    HCT 36.3 11/18/2021    MCV 93.6 11/18/2021     11/18/2021     Lab Results   Component Value Date     11/18/2021    K 4.6 11/18/2021     11/18/2021    CO2 25 11/18/2021    BUN 14 11/18/2021    CREATININE 0.7 11/18/2021    GLUCOSE 89 11/18/2021    CALCIUM 9.2 11/18/2021      Lab Results   Component Value Date    ALT 75 (H) 10/23/2021    AST 46 (H) 10/23/2021    ALKPHOS 89 10/23/2021    BILITOT 0.4 10/23/2021       Functional Status:   Bed mobility: Min A  Transfers: Min A  Ambulation: 140 ft with trial L articulating AFO and R SBQC Min A  Feeding: Set up  Grooming: SBA  UB dressing: CGA  LB dressing: Mod A       Assessment/Plan:       39 y.o. female admitted to inpatient rehabilitation for impairments and activities limitations in ADLs and mobility secondary to right MCA CVA.      -Right MCA CVA: Complicated by cerebral edema requiring emergent right hemicraniectomy (10/13/2021). Dense left spastic hemiplegia, left neglect, mild cognitive impairment. Helmet when out of bed. L PRAFO in bed. LUE sling for gait. L w/c tray. Obtained left AFO. Aspirin, Plavix, Lipitor for secondary prevention. Monitor neuro exam. Continue Acute Rehab program.   -Spasticity LUE/LLE: Monitor. Some degree of tone currently helpful to compensate for weakness with attempted gait in PT. Started on low dose baclofen due to worsening painful spasticity.    -Possible seizure:  EEG with no seizure activity, but showed an increased potential for focal seizures from the right frontotemporal region. Evaluated by Neurology.  Started on Lamictal - 25 mg daily x 2 weeks (start11/11), then 50 mg daily x 2 weeks (start 11/25), then 100 mg daily x 2 weeks (start 12/9), then 50 mg qAM and 100 mg qHS x 2 weeks (start 12/23), then 100 mg BID for maintenance (start1/6/22). -Pain control, MSK pain, neuropathic pain: Tylenol PRN, gabapentin, Lidoderm, Voltaren gel  -Urinary retention: On flomax, now voiding well, low PVRs  -Tachycardia: intermittent. Cardiology consulted. She was started on Lopressor. Cardiology attempted to access data from her heart monitor unsuccessfully. She will follow up with Cardiology in office to review heart monitor data once available. 30 day cardiac monitoring completed and monitor mailed in for analysis per instructions on device.   -Constipation: colace, senna, glycolax. PRNs available.   -Tobacco abuse: Nicoderm patch  -DVT prophylaxis: lovenox SQ, SCDs    Left upper extremity spasticity is worsening and becoming painful. Will increase the baclofen to 5mg TID. Monitor and can further titrate if needed.        Electronically signed by Serena Garcia MD on 11/26/2021 at 11:32 AM

## 2021-11-26 NOTE — PROGRESS NOTES
Physical Therapy  Treatment Note  Supervising Therapist: Jose Cabrera, PT, DPT [id number]    NAME: Nisha Galdamez  [facility]  DIAGNOSIS: Acute CVA  PRECAUTIONS: Falls, helmet at all times when OOB, L hemiplegia, L homonymous hemianopsia L neglect, alarm,  L PRAFO, SBP <180, orange dot with mother. HPI: Pt is a 39 y.o. female with past medical history significant for recent COVID-19 infection (9/15/21) who [facility] with acute onset left-sided weakness. Initial NIHSS 17.  Initial CT ([facility]) showed R MCA hyperdensity; initial CTA showed right ICA/M1 occlusion. On 10/13, her neuro exam worsened and repeat imaging indicated worsening edema, 1 cm of midline shift. She underwent emergent R hemicraniectomy by Dr. Lopez [facility]) on 10/13. After being deemed medically appropriate, she was transferred to [place] [id number] on 10/22/2021    Social:  Pt lives with daughter (15 y/o), will be returning to parent's home upon discharge (1 floor plan, 2 HAIDER with 2 HR). Mother and father healthy, do not work. 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 Initial Evaluation  Date: 10/23/21 AM     PM    Short Term Goals Long Term Goals    Was pt agreeable to Eval/treatment? yes Yes Yes     Does pt have pain? Pain on the top of her head once the helmet was placed on Mild c/o L hip and R rib pain Mild c/o moderate L hip/knee pain with activity     Bed Mobility  Rolling: Max A  Supine to sit: Max A   Sit to supine: Max A   Scooting: Max A Supine>Sit SBA  Sit>Supine Noemy  Scooting Noemy  Rolling SBA NT Min A Supervision   Transfers Sit to stand: Max A   Stand to sit: Max A  Stand pivot:  Max A x2    5xSTS: Unable to complete Sit<>stand: CGA  Stand pivot: CGA with R SBQC Sit<>stand: CGA  Stand pivot: CGA with R SBQC   Min A SBA  5xSTS: TBD   Ambulation    15 feet with R mackey rail with Max A + wc follow    10mWT: TBA  6mWT:  feet x 1 rep and 75 feet x 3  Reps with R SBQC and CGA  (L articulating AFO with DF/PF stop)    10mWT: 0.15 m/s  6mWT: 52 m  (R SBQC, L Trial AFO, Noemy 11/16)     175 feet x 1 rep with R SBQC and CGA  (L articulating AFO)   50 feet with AAD with Min A       500 feet with AAD with SBA    10mWT: >0.4 m/s  6mWT: >205 m   Walking 10 feet on uneven surface NT NT NT 10 feet with AAD with Min A 10 feet with AAD with SBA   Wheel Chair Mobility NT NT NT  150 feet with R extremities with SBA   Car Transfers NT Noemy NT Min A SBA   Stair negotiation: ascended and descended NT 12 steps with RUE HR use x 1 rep with Noemy (Non-reciprocal, leading with RLE ascent, LLE descent) NT 4 steps with 1 rail with Min A 12 steps with 1 rail with SBA   Curb Step:   ascended and descended NT NT 2\" step x 2 reps and 4\" step x 2 reps with R SBQC with CGA/Noemy (9lb ankle weight LLE, 12lb weight attached to posteriorr gait belt) 4 inch step with AAD and Min A 4 inch step with AAD and SBA   Picking up object off the floor NT NT NT Will  an object with Min assist Will  an object with SBA   BLE ROM WFL: LLE AROM limited by flaccidity  WFL: LLE AROM limited by spasticity 2+ on MAS (hamstrings, hip extensors, PF)  L ankle DF lacking ~5 degrees from neutral      BLE Strength RLE 4+/5  LLE 0/5 RLE 5/5  LLE hip flexion 2+/5  Knee extension 2-/5  Ankle DF/PF 0/5      Balance  Static Sitting: Mod/Max A    BBS: TBA  FGA: TBA   Standing: Noemy  BBS: 11/56  (11/16/21)           BBS: >20/56  FGA: TBD   Date Family Teach Completed TBA Pt's mother present for observation 10/24, 11/2, 11/3, 114, hands on 11/9, 11/11, 11/16, 11/18, 11/23, aunt present to observe 11/17  Dad present 11/22, 11/24 for hands on training  Step father and daughter present 11/26 for hands on training      Is additional Family Teaching Needed?   Y or N Y Yes      Hindering Progress L hemiplegia, L neglect L hemiplegia, L neglect      PT recommended ELOS 5 weeks       Team's Discharge Plan Therapist at [facility]:        HRmax: 178  bpm       Beta blockers (Y/N): Y      Adjusted HRmax: 168 bpm   Blood pressure (mmHg): AM:  - Prior to Tx: NT  - Post Tx: NT PM:  - Prior to Tx: 106/56  - Post Tx: 119/76   Time spent in HR ranges: Moderate intensity (60-70% HRmax): AM: NA due to FT PM: 0:18:37   High intensity (70-85% HRmax): AM: NA due to FT PM: 0:00:00     Therapeutic Exercise:   AM: Ascending/descendign 4 steps leading with LLE, x 1 rep with Noemy  PM: Weaving between 6 standing quad canes x 2 reps with R SBQC, L AFO with CGA (9lb ankle weight LLE, 12lb weight attached to posteriorr gait belt)  Box step around large tile square with R SBQC x 2 reps clockwise with Noemy (9lb ankle weight LLE, 12lb weight attached to posteriorr gait belt)  FW non-reciprocal stepping over 4 SPC on floor with R SBQC with CGA (9lb ankle weight LLE, 12lb weight attached to posteriorr gait belt)  Ambulation with R SPC and L  feet x 1 rep with Noemy (assistance with FW propulsion)    Patient education  Pt educated on strategies for optimal standing technique from low surface    Patient response to education:   Pt verbalized understanding Pt demonstrated skill Pt requires further education in this area   yes partial yes     Additional Comments: Pt remains pleasant and motivated during session. Stepfather and daughter present for AM session FT, educated on AFO/gait belt/sling principles and principles of guarding/assisting patient. Daughter and stepfather exhibit proficiency with assisting patient with all mobility tasks per grid. Pt remains limited by L neglect and moderate impulsivity placing her at risk for falls. PM session included progression of gait training intensity with added weight to challenge L limb advancement and stance control. Pt consistently reported RPE of 17 (high intensity) despite HR ranging in moderate intensity ranges. Plan to progress gait training intensity next session. AM  Time in: 0830  Time out: 0915    PM  Time in: 1300  Time out: 2056    Pt is making good progress toward established Physical Therapy goals. Continue with physical therapy current plan of care.     Mary Ansari, PT, DPT  Board Certified Clinical Specialist in Neurologic Physical Therapy  [phone]

## 2021-11-26 NOTE — PROGRESS NOTES
OCCUPATIONAL THERAPY DAILY NOTE    Date:2021  Patient Name: Robin García  MRN: [id number]  : 1975  Room: [address] [place], [place] [address]     Diagnosis:Acute CVA( [facility] as Level 2 stroke alert on 10/11/21. L side weakness, neglect. NIH-17  Found to have large R MCA involving R occipital)  Surgery: R frontotemporal decompressive hemicraniectomy on 10/13/21  Past Medical History: COVID (9/15), tobacco use  Precautions: Falls, L hemiplegia, L neglect, L sublux, bed/chair alarm, helmet when OOB, impulsive, L PRAFO & orange dot with mom    Functional Assessment:   Date Status AE  Comments   Feeding 21 Set-up     Grooming 21 Set-up  w/c          Oral Care 21 SBA     Bathing 21 Min A Shower chair with wheels     UB Dressing 21 CGA     LB Dressing 21 Min A reacher    Footwear 21 Mod A Lh shoe horn    Toileting 21 Min A Grab bar      Homemaking 21 Mod A  21 Discussed hmkg/kitchen mobility at home with pt & family members during FT session. Pt stated \"I will use w/c mobility to do things in the kitchen as appropriate for safety. \"     Functional Transfers / Balance:   Date Status DME  Comments   Sit Balance 21 SBA  demo'd up in w/c, on sofa and dining chair with arms and on ext tub bench to increase overall endurance & strength for functional tasks. Stand Balance 21 Min A SBQC  Gait belt   Grab bar  Bed rail   demo'd during fxl mobility using quad cane along with functional transfer training in apt during FT session. [x] Tub        [x] Shower   Transfer 21 Min A          CGA Extended tub bench, SBQC      Roll in shower chair, grab bar, LBQC Pt engaged in partial ext tub bench transfer at quad cane level needing cues for body mechanics with LUE in sling for joint protection. Discussed with family patient will require assist for BLE mgmt and all parties educated during FT session.     Commode   Transfer 21 CGA  3:1 commode SBQC  Grab bar  11/26/21  Pt/family declined observing 3:1 commode transfers since has been trained already. Functional   Mobility 11/26/21 CGA SBQC  Gait belt. Demo'd during FT session using quad cane including over carpeted area. Functional Exercises / Activity:  Ergometer 2 X 5 Mins with ace wrap to secure L hand to handle to increase endurance and BUE coordination and strength for independence with functional tasks and transfers. LUE PROM 1 X 10 on all planes including digits 1-5 to decrease tone and increase ROM. Pt c/o pain but it decreases as more reps increase PROM. Scapular mobility provided to LUE scapula with good tolerance. BUE SROM ex's wearing 1# wt on RUE to increase strength & endurance for ADL;s, transfers and mobility skills. Pt tolerated ex's on skate/figure 8 board 3-5 reps of 10 ea needing cues for proper technique and FT conducted during am session to address HEP for home. Pt completed ROM ex's with LUE on furniture slider using RUE to assist with 1#  wt attached  Performing on incline wedge to promote scapula, shld, elbow, wrist and hand ROM. Pt tolerated 5-6 reps of 10 ea needing cues to decrease over compensating rocking back & forth in order to facilitate full shoulder stretches. LUE elbow extension/flexion both PROM/AAROM using RUE to assist needing cues for follow thru. Pt tolerated 3-reps of 10 ea. LUE muscle tapping/facilitation techs for biceps/triceps and forearm to increase muscle input & ROM. Left hand and finger stretches with assist along with discussing positioning with extremity when conducting arm exercises at home. Sensory / Neuromuscular Re-Education:      Cognitive Skills:   Status Comments   Problem   Solving fair    Memory fair    Sequencing fair    Safety fair      Visual Perception:    Education:  Pt/family educated with functional transfer training on/off dining chair with arms and ext tub bench along with fxl mobility using quad cane. 10/25/21: pt educated on SROM of LUE and safe positioning of LUE  10/29/21: educated on 1 handed dressing technique for socks/shoes  11/1/21: shoe lace adapted for ease with donning shoe  11/1/21: attempted small strips of kineosio tape on pt's hand to assess if pt can tolerate on her shoulder due to report of allergy (cleared by )  11/2/21:  Therapist applied kineosiotape to pt's LUE to stabilize sublux. Will continue to assess issues that arise with adhesive and progression of sublux  11/2/21: pt educated on AE for ease with LB dressing  11/3/21: Pt very fatigued in AM and PM sessions, requiring max cueing to alert pt back to task and engage in session, with pt asking multiple times to return to room/return to bed. Extended time to complete all tasks  11/4/21: home ROM exercise program placed in pt's folder and pt's mother notified of it  11/7/21 Pt educated about LUE positioning both up in w/c and in bed to maintain joint integrity. 11/17/21: resting hand applied to pt's L hand due to report of tone at digits. This is to be worn at night time and will be monitored for further tone. 11/19/21: pt appears to be tolerating resting hand splint and skin integrity look well    [x] Family teach completed on:  11/4/21: Completed family teach this session. Pt's mother Brian Trevino present. Educated Brian Trevino on pt's status with ADLs and vc's for sequencing/initiation of tasks/hand placement with functional transfers. Brian Trevino able to provide hands on assist with was educated for body mechanics/hand placement/vc's to increase safety with only commode transfer with slideboard and toileting task. Brian Trevino educated on PROM (elbow/shoulder/wrist/digit flex/ext) of LUE to increase ROM, maintain joint integrity, and safe management of sublux. Brian Trevino educated on how to doff/jesus gait belt and LUE sling. Pt's limiting factor appears to be her impulsivity and safety with ADLs/transfer.  Brian Trevino demo'ing F understanding/safety of education. Belkis Diallo appeared motivated and willing to learn. Continue to educate. 11/9/21: Completed family teach this session. Pt's mother Belkis Diallo present. Educated Belkis Diallo on pt's increased independence with ADLs/functional transfers. Belkis Diallo able to jesus pt's sling and gait belt. Belkis Diallo completed hands on assist with SPT to [facility]. Belkis Diallo and pt demo'ing improved communication with each other for safe transfer/completion of ADLs. Belkis Diallo demo'ing F understanding/safety of education. Continue to educate. 11/11/21- Mother present and educated along with assisting with w/c<>commode transfers using grab bar for balance and one handed skills. LUE placed in sling for balance and joint protection. Pt/mom educated with ext tub bench transfers to simulate for home environment with parent participating with transfer. Pt needed cues for hand/trunk placement on bench along with proper follow thru for safety. Pt/mom educated SPT using quad cane from w/c <>ext tub bench with patient needing cues for technique with cane & taking few steps. 11/16/21 Mom present and educated with transfers on/off firm recliner, sofa and ext tub bench transfers at quad cane level along with standing balance and positioning for LUE seated up in w/c.    11/17/21: Pt's aunt present. She observed as well as assisted pt intermittently with ADLs. 11/18/21- Pt mom & observed am OT treatment while pt evaluated using [employer] arm using pt LUE   11/23/21- Mom present during morning session, assisting pt with toileting task and transfer and extended tub bench transfer. Mom stating of feeling comfortable with ADL tasks. 11/26/21- Mom, Dad and Daughter present for FT and educated with functional transfer training, fxl mobility using quad cane including over carpet along with LUE ROM ex's for HEP. Pain Level: No c/o pain this date. Additional Notes:   Pt request sling to be donned at end of session, able to educate on donning.   Patient has made good progress during treatment sessions toward set goals. Therapy emphasis to obtain goals:    Time Frame for Long term goals  6 weeks   Long term goal 1 Pt will complete UB self-care tasks w/ Supervision   Long term goal 2 Pt will complete LB self-care tasks w/ Sup underwear, pants & socks but Min A to don AFO   Long term goal 3 Pt will complete toileting (all aspects) w/ Supervision   Long term goal 4 Pt will safely complete toilet transfers w/ Supervision   Long term goal 5 Pt will safely complete tub/shower transfers w/ Supervision   Long term goals 6 Pt will complete basic homemaking task w/ SBA   Long term goal 7 Pt will complete grooming task w/ Supervision   Long term goal 8 Pt will complete feeding task w/ Supervision/setup     [x] Continue with current OT Plan of care. [] Prepare for Discharge    10/27/21 OT plan of care updated on this date. Tentative length of stay is 6 weeks Clarisse Marenisco OTR/L [id number]  11/3/21- Pt POC updated on this date. Pt tentative length of day is 5 weeks to address above problem areas Gerhardt Coles OTR/L [id number]  11/10/21- Pt POC updated on this date. Pt tentative length of day is 4 weeks to address above problem areas Clarisse Marenisco OTR/L [id number]  11/17/21- Pt POC updated on this date. Pt tentative length of day is 3 weeks to address above problem areas Clarisse Marenisco OTR/L [id number]  11/24/21 OT plan of care updated on this date. Tentative length of stay is 2 weeks . Jadyn Vera OTR/L [id number]       DISCHARGE RECOMMENDATIONS  Recommended DME:  3:1 commode, ext tub bench.      Post Discharge Care:   []Home Independently  []Home with 24hr Care / Supervision []Home with Partial Supervision []Home with Home Health OT []Home with Out Pt OT []Other: ___   Comments:         Time in Time out Tx Time Breakdown  Variance:   First Session  9:25am 10:40am [x] Individual Tx-75  [] Concurrent Tx -  [] Co-Tx -   [] Group Tx -   [] Time Missed -     Second Session 2:30 3:15 [x] Individual Tx-45 mins  [] Concurrent Tx -   [] Co-Tx -   [] Group Tx -   [] Time Missed -     Third Session    [] Individual Tx-   [] Concurrent Tx -  [] Co-Tx -   [] Group Tx -   [] Time Missed -         Total Tx Time: 120 Mins     Mayi GLOVER/KEITH [id number]

## 2021-11-27 PROCEDURE — 6370000000 HC RX 637 (ALT 250 FOR IP): Performed by: PHYSICAL MEDICINE & REHABILITATION

## 2021-11-27 PROCEDURE — 97530 THERAPEUTIC ACTIVITIES: CPT

## 2021-11-27 PROCEDURE — 1280000000 HC REHAB R&B

## 2021-11-27 PROCEDURE — 99233 SBSQ HOSP IP/OBS HIGH 50: CPT | Performed by: PHYSICAL MEDICINE & REHABILITATION

## 2021-11-27 PROCEDURE — 6360000002 HC RX W HCPCS: Performed by: PHYSICAL MEDICINE & REHABILITATION

## 2021-11-27 RX ADMIN — CLOPIDOGREL 75 MG: 75 TABLET, FILM COATED ORAL at 08:14

## 2021-11-27 RX ADMIN — TAMSULOSIN HYDROCHLORIDE 0.4 MG: 0.4 CAPSULE ORAL at 08:12

## 2021-11-27 RX ADMIN — SENNOSIDES 17.2 MG: 8.6 TABLET, COATED ORAL at 19:52

## 2021-11-27 RX ADMIN — DOCUSATE SODIUM 100 MG: 100 CAPSULE, LIQUID FILLED ORAL at 19:54

## 2021-11-27 RX ADMIN — ENOXAPARIN SODIUM 40 MG: 100 INJECTION SUBCUTANEOUS at 19:53

## 2021-11-27 RX ADMIN — DOCUSATE SODIUM 100 MG: 100 CAPSULE, LIQUID FILLED ORAL at 08:12

## 2021-11-27 RX ADMIN — GABAPENTIN 100 MG: 100 CAPSULE ORAL at 13:29

## 2021-11-27 RX ADMIN — Medication 6 MG: at 19:54

## 2021-11-27 RX ADMIN — ATORVASTATIN CALCIUM 40 MG: 40 TABLET, FILM COATED ORAL at 19:55

## 2021-11-27 RX ADMIN — LAMOTRIGINE 50 MG: 25 TABLET ORAL at 08:13

## 2021-11-27 RX ADMIN — BACLOFEN 5 MG: 10 TABLET ORAL at 13:29

## 2021-11-27 RX ADMIN — BACLOFEN 5 MG: 10 TABLET ORAL at 08:13

## 2021-11-27 RX ADMIN — GABAPENTIN 100 MG: 100 CAPSULE ORAL at 08:13

## 2021-11-27 RX ADMIN — ACETAMINOPHEN 650 MG: 325 TABLET ORAL at 21:35

## 2021-11-27 RX ADMIN — GABAPENTIN 100 MG: 100 CAPSULE ORAL at 19:52

## 2021-11-27 RX ADMIN — DICLOFENAC SODIUM 4 G: 10 GEL TOPICAL at 21:26

## 2021-11-27 RX ADMIN — ASPIRIN 81 MG CHEWABLE TABLET 81 MG: 81 TABLET CHEWABLE at 08:12

## 2021-11-27 RX ADMIN — METOPROLOL TARTRATE 12.5 MG: 25 TABLET, FILM COATED ORAL at 19:52

## 2021-11-27 RX ADMIN — BACLOFEN 5 MG: 10 TABLET ORAL at 19:55

## 2021-11-27 RX ADMIN — ERGOCALCIFEROL 50000 UNITS: 1.25 CAPSULE ORAL at 08:14

## 2021-11-27 ASSESSMENT — PAIN SCALES - GENERAL
PAINLEVEL_OUTOF10: 0
PAINLEVEL_OUTOF10: 0
PAINLEVEL_OUTOF10: 6

## 2021-11-27 NOTE — PROGRESS NOTES
PM&R Weekend Progress Note  Patient: Romario Campo  Age/sex: 39 y.o. female  Medical Record #: [id number]  Date: 2021    Covering for: Dr. Donna Sr: Patient seen and examined in her room this morning. No issues overnight. Complaints of itching this AM (R arm and R leg) -- has benadryl cream ordered. Denies abdominal pain, chest pain, shortness of breath. All pertinent labs reviewed. Past Medical History:   Diagnosis Date    Cerebral artery occlusion with cerebral infarction ([address]     Hx of blood clots        Surgical history:   Family history: reviewed from H&P    Objective:  Vitals:    21 0900 21 1015 21 0750   BP: 129/70 112/70 126/60 96/69   Pulse: 101 89 77 86   Resp: 16 18  14   Temp: 99 °F (37.2 °C) 97.8 °F (36.6 °C)  99.1 °F (37.3 °C)   TempSrc: Temporal Temporal  Temporal   SpO2:       Weight:       Height:          0701 -  0700  In: 240 [P.O.:240]  Out: -     GEN: NAD, conversational   HEENT: R crani, helmeted, PERRLA, EOMI  RESP: unlabored, no cyanosis  CV: regular  ABD: soft, NT, ND  : no page   EXT: Normal ROM, No clubbing/cyanosis, 2+ pulses   SKIN: No erythema, incision open to air, c/d/i. No rash on R arm or leg. NEURO: Alert, no new focal deficits. L flaccid    Labs reviewed  CBC:   No results for input(s): WBC, HGB, PLT in the last 72 hours. BMP:    No results for input(s): NA, K, CL, CO2, BUN, CREATININE, GLUCOSE in the last 72 hours. Hepatic:   No results for input(s): AST, ALT, ALB, BILITOT, ALKPHOS in the last 72 hours. BNP: No results for input(s): BNP in the last 72 hours. Lipids: No results for input(s): CHOL, HDL in the last 72 hours. Invalid input(s): LDLCALCU  INR: No results for input(s): INR in the last 72 hours. A/P  This is 39 y.o. female with CVA    Rehab: Continue extensive rehabilitation. Continue physical therapy, occupational therapy, and speech-language pathology.     No changes in medications -- continue Benadryl cream for itching. Continue current management. Maurizio Guerrero DO, [facility]  Physical Medicine and Rehabilitation     Please note that the above documentation was prepared using voice recognition software. Every attempt was made to ensure accuracy but there may be spelling, grammatical, and contextual errors.

## 2021-11-27 NOTE — PROGRESS NOTES
feet with AAD with Min A       500 feet with AAD with SBA    10mWT: >0.4 m/s  6mWT: >205 m   Walking 10 feet on uneven surface NT NT  10 feet with AAD with Min A 10 feet with AAD with SBA   Wheel Chair Mobility NT NT   150 feet with R extremities with SBA   Car Transfers NT NT  Min A SBA   Stair negotiation: ascended and descended NT 12 steps with RUE HR use x 1 rep with Noemy (Non-reciprocal, leading with RLE ascent, LLE descent)  4 steps with 1 rail with Min A 12 steps with 1 rail with SBA   Curb Step:   ascended and descended NT NT  4 inch step with AAD and Min A 4 inch step with AAD and SBA   Picking up object off the floor NT NT  Will  an object with Min assist Will  an object with SBA   BLE ROM WFL: LLE AROM limited by flaccidity  WFL: LLE AROM limited by spasticity 2+ on MAS (hamstrings, hip extensors, PF)  L ankle DF lacking ~5 degrees from neutral      BLE Strength RLE 4+/5  LLE 0/5 RLE 5/5  LLE hip flexion 2+/5  Knee extension 2-/5  Ankle DF/PF 0/5      Balance  Static Sitting: Mod/Max A    BBS: TBA  FGA: TBA   Standing: Noemy  BBS: 11/56  (11/16/21)           BBS: >20/56  FGA: TBD   Date Family Teach Completed TBA Pt's mother present for observation 10/24, 11/2, 11/3, 114, hands on 11/9, 11/11, 11/16, 11/18, 11/23, aunt present to observe 11/17  Dad present 11/22, 11/24 for hands on training  Step father and daughter present 11/26 for hands on training      Is additional Family Teaching Needed? Y or N Y Yes      Hindering Progress L hemiplegia, L neglect L hemiplegia, L neglect      PT recommended ELOS 5 weeks       Team's Discharge Plan        Therapist at Team Meeting          Therapeutic Exercise:   AM:   - Functional mobility as noted above in chart  - Sit<>stand x5 reps  - 150 feet with SBQC with 4lb ankle weight on LLE  - 150 feet no AD    Additional Comments: Reviewed mobility as noted above. Pt still reporting consistent L hip pain. Pt anxious to perform activity without an AD.  Pt did have one minor LOB while turning with no AD. Pt with good recall of techniques for functional mobility. Pt seems eager to advance her activity as she attempted to ambulate reciprocally however the pt does get nervous which tends to limit the progression of activities with LRAD. Pt at the end of the session was taken back to her room with alarm on and call light in reach, all needs met. Patient/family education  Pt/family educated on advancement of activity with PTs assisting    Patient/family response to education:   Pt/family verbalized understanding Pt/family demonstrated skill Pt/family requires further education in this area   yes yes yes     AM  Time in: [address]  Time out: 0935    Pt is making good progress toward established Physical Therapy goals. Continue with physical therapy current plan of care.     Damian Dalton DPT  [id number]

## 2021-11-28 PROCEDURE — 97535 SELF CARE MNGMENT TRAINING: CPT

## 2021-11-28 PROCEDURE — 97110 THERAPEUTIC EXERCISES: CPT

## 2021-11-28 PROCEDURE — 6370000000 HC RX 637 (ALT 250 FOR IP): Performed by: PHYSICAL MEDICINE & REHABILITATION

## 2021-11-28 PROCEDURE — 6360000002 HC RX W HCPCS: Performed by: PHYSICAL MEDICINE & REHABILITATION

## 2021-11-28 PROCEDURE — 1280000000 HC REHAB R&B

## 2021-11-28 RX ADMIN — CLOPIDOGREL 75 MG: 75 TABLET, FILM COATED ORAL at 08:21

## 2021-11-28 RX ADMIN — ASPIRIN 81 MG CHEWABLE TABLET 81 MG: 81 TABLET CHEWABLE at 08:19

## 2021-11-28 RX ADMIN — GABAPENTIN 100 MG: 100 CAPSULE ORAL at 20:50

## 2021-11-28 RX ADMIN — BACLOFEN 5 MG: 10 TABLET ORAL at 20:49

## 2021-11-28 RX ADMIN — BACLOFEN 5 MG: 10 TABLET ORAL at 08:21

## 2021-11-28 RX ADMIN — DOCUSATE SODIUM 100 MG: 100 CAPSULE, LIQUID FILLED ORAL at 08:19

## 2021-11-28 RX ADMIN — METOPROLOL TARTRATE 12.5 MG: 25 TABLET, FILM COATED ORAL at 08:20

## 2021-11-28 RX ADMIN — LAMOTRIGINE 50 MG: 25 TABLET ORAL at 08:21

## 2021-11-28 RX ADMIN — GABAPENTIN 100 MG: 100 CAPSULE ORAL at 13:06

## 2021-11-28 RX ADMIN — ATORVASTATIN CALCIUM 40 MG: 40 TABLET, FILM COATED ORAL at 20:48

## 2021-11-28 RX ADMIN — ACETAMINOPHEN 650 MG: 325 TABLET ORAL at 08:20

## 2021-11-28 RX ADMIN — METOPROLOL TARTRATE 12.5 MG: 25 TABLET, FILM COATED ORAL at 20:51

## 2021-11-28 RX ADMIN — TAMSULOSIN HYDROCHLORIDE 0.4 MG: 0.4 CAPSULE ORAL at 08:21

## 2021-11-28 RX ADMIN — DICLOFENAC SODIUM 4 G: 10 GEL TOPICAL at 08:25

## 2021-11-28 RX ADMIN — ENOXAPARIN SODIUM 40 MG: 100 INJECTION SUBCUTANEOUS at 20:50

## 2021-11-28 RX ADMIN — GABAPENTIN 100 MG: 100 CAPSULE ORAL at 08:19

## 2021-11-28 RX ADMIN — Medication 6 MG: at 20:51

## 2021-11-28 RX ADMIN — SENNOSIDES 17.2 MG: 8.6 TABLET, COATED ORAL at 20:52

## 2021-11-28 RX ADMIN — BACLOFEN 5 MG: 10 TABLET ORAL at 13:06

## 2021-11-28 RX ADMIN — DOCUSATE SODIUM 100 MG: 100 CAPSULE, LIQUID FILLED ORAL at 20:50

## 2021-11-28 ASSESSMENT — PAIN DESCRIPTION - FREQUENCY
FREQUENCY: INTERMITTENT

## 2021-11-28 ASSESSMENT — PAIN DESCRIPTION - ONSET
ONSET: ON-GOING

## 2021-11-28 ASSESSMENT — PAIN DESCRIPTION - PAIN TYPE
TYPE: CHRONIC PAIN

## 2021-11-28 ASSESSMENT — PAIN DESCRIPTION - ORIENTATION
ORIENTATION: LEFT

## 2021-11-28 ASSESSMENT — PAIN SCALES - WONG BAKER
WONGBAKER_NUMERICALRESPONSE: 0

## 2021-11-28 ASSESSMENT — PAIN DESCRIPTION - LOCATION
LOCATION: GENERALIZED

## 2021-11-28 ASSESSMENT — PAIN - FUNCTIONAL ASSESSMENT
PAIN_FUNCTIONAL_ASSESSMENT: ACTIVITIES ARE NOT PREVENTED

## 2021-11-28 ASSESSMENT — PAIN SCALES - GENERAL
PAINLEVEL_OUTOF10: 0
PAINLEVEL_OUTOF10: 6
PAINLEVEL_OUTOF10: 2
PAINLEVEL_OUTOF10: 0
PAINLEVEL_OUTOF10: 2

## 2021-11-28 ASSESSMENT — PAIN DESCRIPTION - DESCRIPTORS
DESCRIPTORS: ACHING;DISCOMFORT

## 2021-11-28 ASSESSMENT — PAIN DESCRIPTION - PROGRESSION
CLINICAL_PROGRESSION: OTHER (COMMENT)
CLINICAL_PROGRESSION: GRADUALLY IMPROVING

## 2021-11-28 NOTE — PROGRESS NOTES
OCCUPATIONAL THERAPY DAILY NOTE    Date:2021  Patient Name: Kota Madrid  MRN: [id number]  : 1975  Room: [address] [place], [place] [address]     Diagnosis:Acute CVA( Pt presented to [facility] as Level 2 stroke alert on 10/11/21. L side weakness, neglect. NIH-17  Found to have large R MCA involving R occipital)  Surgery: R frontotemporal decompressive hemicraniectomy on 10/13/21  Past Medical History: COVID (9/15), tobacco use  Precautions: Falls, L hemiplegia, L neglect, L sublux, bed/chair alarm, helmet when OOB, impulsive, L PRAFO & orange dot with mom    Functional Assessment:   Date Status AE  Comments   Feeding 21 Set-up     Grooming 21 Set-up  w/c          Oral Care 21 SBA     Bathing 21 Min A Shower chair with wheels     UB Dressing 21 CGA     LB Dressing 21 Min A reacher    Footwear 21 CGA- Left sock  Mod A-Left shoe  Lh shoe horn Pt crossed over LLE to jesus own sock on foot using one handed skills while up in w/c. Pt used [address] shoe horn to jesus Left shoe over AFO with assist.    Toileting 21 Min A Grab bar      Homemaking 21 Mod A       Functional Transfers / Balance:   Date Status DME  Comments   Sit Balance 21 SBA  demo'd up in w/c during foot wear and arm ex's to increase trunk control and core strength for functional activities. Stand Balance 21 Min A SBQC  Gait belt   Grab bar  Bed rail      [x] Tub        [x] Shower   Transfer 21 Min A          CGA Extended tub bench, SBQC      Roll in shower chair, grab bar, LBQC    Commode   Transfer 21 CGA  3:1 commode SBQC  Grab bar  . Functional   Mobility 21 CGA SBQC  Gait belt. Functional Exercises / Activity:  BUE SROM/AAROM ex's completed seated at table while wearing 1# wt on RUE and 1/2 # on LUE during towel glides forwards/backwards and side to side to increase LUE scapula/shoulder ROM along with muscle input for active movement.    Pt needed cues for proper technique with BUE SROM for elbow curls with LUE and left wrist and finger extension to increase joint ROM for functional use. Pt tolerated 5-6 reps of 10ea. Sensory / Neuromuscular Re-Education:      Cognitive Skills:   Status Comments   Problem   Solving fair    Memory fair    Sequencing fair    Safety fair      Visual Perception:    Education:  Pt educated with BUE SROM ex's with towel glides along with decreasing over compensating rocking back & forth posture in order to target LUE extremity only. 10/25/21: pt educated on SROM of LUE and safe positioning of LUE  10/29/21: educated on 1 handed dressing technique for socks/shoes  11/1/21: shoe lace adapted for ease with donning shoe  11/1/21: attempted small strips of kineosio tape on pt's hand to assess if pt can tolerate on her shoulder due to report of allergy (cleared by )  11/2/21:  Therapist applied kineosiotape to pt's LUE to stabilize sublux. Will continue to assess issues that arise with adhesive and progression of sublux  11/2/21: pt educated on AE for ease with LB dressing  11/3/21: Pt very fatigued in AM and PM sessions, requiring max cueing to alert pt back to task and engage in session, with pt asking multiple times to return to room/return to bed. Extended time to complete all tasks  11/4/21: home ROM exercise program placed in pt's folder and pt's mother notified of it  11/7/21 Pt educated about LUE positioning both up in w/c and in bed to maintain joint integrity. 11/17/21: resting hand applied to pt's L hand due to report of tone at digits. This is to be worn at night time and will be monitored for further tone. 11/19/21: pt appears to be tolerating resting hand splint and skin integrity look well    [x] Family teach completed on:  11/4/21: Completed family teach this session. Pt's mother Candice Zuluaga present.  Educated Candice Zuluaga on pt's status with ADLs and vc's for sequencing/initiation of tasks/hand placement with functional transfers. Mony Skinner able to provide hands on assist with was educated for body mechanics/hand placement/vc's to increase safety with only commode transfer with slideboard and toileting task. Mony Skinner educated on PROM (elbow/shoulder/wrist/digit flex/ext) of LUE to increase ROM, maintain joint integrity, and safe management of sublux. Mony Begumseth educated on how to doff/jesus gait belt and LUE sling. Pt's limiting factor appears to be her impulsivity and safety with ADLs/transfer. Mony Skinner demo'ing F understanding/safety of education. Mony Skinner appeared motivated and willing to learn. Continue to educate. 11/9/21: Completed family teach this session. Pt's mother Mony Skinner present. Educated Mony Skinner on pt's increased independence with ADLs/functional transfers. Moyn Skinner able to jesus pt's sling and gait belt. Mony Skinner completed hands on assist with SPT to [facility]. Mony Skinner and pt demo'ing improved communication with each other for safe transfer/completion of ADLs. Mony Skinner demo'ing F understanding/safety of education. Continue to educate. 11/11/21- Mother present and educated along with assisting with w/c<>commode transfers using grab bar for balance and one handed skills. LUE placed in sling for balance and joint protection. Pt/mom educated with ext tub bench transfers to simulate for home environment with parent participating with transfer. Pt needed cues for hand/trunk placement on bench along with proper follow thru for safety. Pt/mom educated SPT using quad cane from w/c <>ext tub bench with patient needing cues for technique with cane & taking few steps. 11/16/21 Mom present and educated with transfers on/off firm recliner, sofa and ext tub bench transfers at quad cane level along with standing balance and positioning for LUE seated up in w/c.    11/17/21: Pt's aunt present. She observed as well as assisted pt intermittently with ADLs.    11/18/21- Pt mom & observed am OT treatment while pt evaluated using [employer] arm using pt LUIS   11/23/21- Mom present during morning session, assisting pt with toileting task and transfer and extended tub bench transfer. Mom stating of feeling comfortable with ADL tasks. 11/26/21- Mom, Dad and Daughter present for FT and educated with functional transfer training, fxl mobility using quad cane including over carpet along with LUIS ROM ex's for HEP. Pain Level: No c/o pain this date. Additional Notes:     Patient has made good progress during treatment sessions toward set goals. Therapy emphasis to obtain goals:    Time Frame for Long term goals  6 weeks   Long term goal 1 Pt will complete UB self-care tasks w/ Supervision   Long term goal 2 Pt will complete LB self-care tasks w/ Sup underwear, pants & socks but Min A to don AFO   Long term goal 3 Pt will complete toileting (all aspects) w/ Supervision   Long term goal 4 Pt will safely complete toilet transfers w/ Supervision   Long term goal 5 Pt will safely complete tub/shower transfers w/ Supervision   Long term goals 6 Pt will complete basic homemaking task w/ SBA   Long term goal 7 Pt will complete grooming task w/ Supervision   Long term goal 8 Pt will complete feeding task w/ Supervision/setup     [x] Continue with current OT Plan of care. [] Prepare for Discharge    10/27/21 OT plan of care updated on this date. Tentative length of stay is 6 weeks Jm Kincaid OTR/L [id number]  11/3/21- Pt POC updated on this date. Pt tentative length of day is 5 weeks to address above problem areas Ortiz Brooks OTR/L [id number]  11/10/21- Pt POC updated on this date. Pt tentative length of day is 4 weeks to address above problem areas Jm Kincaid OTR/L [id number]  11/17/21- Pt POC updated on this date. Pt tentative length of day is 3 weeks to address above problem areas Jm Kincaid OTR/L [id number]  11/24/21 OT plan of care updated on this date. Tentative length of stay is 2 weeks .  Jennifer Art OTR/L [id number]       DISCHARGE RECOMMENDATIONS  Recommended DME:  3:1 commode, ext tub bench.      Post Discharge Care:   []Home Independently  []Home with 24hr Care / Supervision []Home with Partial Supervision []Home with Home Health OT []Home with Out Pt OT []Other: ___   Comments:         Time in Time out Tx Time Breakdown  Variance:   First Session  10:15am 10:55am [] Individual Tx-  [x] Concurrent Tx -40  [] Co-Tx -   [] Group Tx -   [] Time Missed -     Second Session   [] Individual Tx- mins  [] Concurrent Tx -   [] Co-Tx -   [] Group Tx -   [] Time Missed -     Third Session    [] Individual Tx-   [] Concurrent Tx -  [] Co-Tx -   [] Group Tx -   [] Time Missed -         Total Tx Time:40 Mins     [facility]      I have read & agree with the above status  Jadyn Gamez OTR/L [id number]

## 2021-11-28 NOTE — PLAN OF CARE
Problem: Pain:  Goal: Pain level will decrease  Description: Pain level will decrease  11/28/2021 0116 by Joi Marion RN  Outcome: Met This Shift  11/27/2021 2132 by Claudia Shelton RN  Outcome: Ongoing  Goal: Control of acute pain  Description: Control of acute pain  11/28/2021 0116 by Joi Marion RN  Outcome: Met This Shift  11/27/2021 2132 by Claudia Shelton RN  Outcome: Ongoing  Goal: Control of chronic pain  Description: Control of chronic pain  11/28/2021 0116 by Joi Marion RN  Outcome: Met This Shift  11/27/2021 2132 by Claudia Shelton RN  Outcome: Ongoing  Goal: Patient's pain/discomfort is manageable  Description: Patient's pain/discomfort is manageable  11/28/2021 0116 by Joi Marion RN  Outcome: Met This Shift  11/27/2021 2132 by Claudia Shelton RN  Outcome: Ongoing     Problem: Falls - Risk of:  Goal: Will remain free from falls  Description: Will remain free from falls  11/28/2021 0116 by Joi Marion RN  Outcome: Met This Shift  11/27/2021 2132 by Claudia Shelton RN  Outcome: Ongoing  Goal: Absence of physical injury  Description: Absence of physical injury  11/28/2021 0116 by Joi Marion RN  Outcome: Met This Shift  11/27/2021 2132 by Claudia Shelton RN  Outcome: Ongoing     Problem: Skin Integrity:  Goal: Will show no infection signs and symptoms  Description: Will show no infection signs and symptoms  11/28/2021 0116 by Joi Marion RN  Outcome: Met This Shift  11/27/2021 2132 by Claudia Shelton RN  Outcome: Ongoing  Goal: Absence of new skin breakdown  Description: Absence of new skin breakdown  11/28/2021 0116 by Joi Marion RN  Outcome: Met This Shift  11/27/2021 2132 by Claudia Shelton RN  Outcome: Ongoing     Problem: ABCDS Injury Assessment  Goal: Absence of physical injury  11/28/2021 0116 by Joi Marion RN  Outcome: Met This Shift  11/27/2021 2132 by Claudia Shelton RN  Outcome: Ongoing     Problem: HEMODYNAMIC STATUS  Goal: Patient has stable vital signs and fluid balance  11/28/2021 0116 by Shukri Rojas RN  Outcome: Met This Shift  11/27/2021 2132 by Dolores Becker RN  Outcome: Ongoing     Problem: ACTIVITY INTOLERANCE/IMPAIRED MOBILITY  Goal: Mobility/activity is maintained at optimum level for patient  11/28/2021 0116 by Shukri Rojas RN  Outcome: Met This Shift  11/27/2021 2132 by Dolores Becker RN  Outcome: Ongoing     Problem: COMMUNICATION IMPAIRMENT  Goal: Ability to express needs and understand communication  11/28/2021 0116 by Shukri Rojas RN  Outcome: Met This Shift  11/27/2021 2132 by Dolores Becker RN  Outcome: Ongoing     Problem: Infection:  Goal: Will remain free from infection  Description: Will remain free from infection  11/28/2021 0116 by Shukri Rojas RN  Outcome: Met This Shift  11/27/2021 2132 by Dolores Becker RN  Outcome: Ongoing     Problem: Safety:  Goal: Free from accidental physical injury  Description: Free from accidental physical injury  11/28/2021 0116 by Shukri Rojas RN  Outcome: Met This Shift  11/27/2021 2132 by Dolores Becker RN  Outcome: Ongoing  Goal: Free from intentional harm  Description: Free from intentional harm  11/28/2021 0116 by Shukri Rojas RN  Outcome: Met This Shift  11/27/2021 2132 by Dolores Becker RN  Outcome: Ongoing     Problem: Daily Care:  Goal: Daily care needs are met  Description: Daily care needs are met  11/28/2021 0116 by Shukri Rojas RN  Outcome: Met This Shift  11/27/2021 2132 by Dolores Becker RN  Outcome: Ongoing     Problem: Skin Integrity:  Goal: Skin integrity will stabilize  Description: Skin integrity will stabilize  11/28/2021 0116 by Shukri oRjas RN  Outcome: Met This Shift  11/27/2021 2132 by Dolores Becker RN  Outcome: Ongoing     Problem: Discharge Planning:  Goal: Patients continuum of care needs are met  Description: Patients continuum of care needs are met  11/28/2021 0116 by Shukri Rojas RN  Outcome: Met This Shift  11/27/2021 2132 by Dolores Becker RN  Outcome: Ongoing

## 2021-11-29 PROCEDURE — 97535 SELF CARE MNGMENT TRAINING: CPT

## 2021-11-29 PROCEDURE — 6370000000 HC RX 637 (ALT 250 FOR IP): Performed by: PHYSICAL MEDICINE & REHABILITATION

## 2021-11-29 PROCEDURE — 6360000002 HC RX W HCPCS: Performed by: PHYSICAL MEDICINE & REHABILITATION

## 2021-11-29 PROCEDURE — 97530 THERAPEUTIC ACTIVITIES: CPT

## 2021-11-29 PROCEDURE — 97112 NEUROMUSCULAR REEDUCATION: CPT

## 2021-11-29 PROCEDURE — 97116 GAIT TRAINING THERAPY: CPT

## 2021-11-29 PROCEDURE — 1280000000 HC REHAB R&B

## 2021-11-29 RX ADMIN — GABAPENTIN 100 MG: 100 CAPSULE ORAL at 20:57

## 2021-11-29 RX ADMIN — DOCUSATE SODIUM 100 MG: 100 CAPSULE, LIQUID FILLED ORAL at 20:57

## 2021-11-29 RX ADMIN — BACLOFEN 5 MG: 10 TABLET ORAL at 13:52

## 2021-11-29 RX ADMIN — METOPROLOL TARTRATE 12.5 MG: 25 TABLET, FILM COATED ORAL at 13:22

## 2021-11-29 RX ADMIN — ACETAMINOPHEN 650 MG: 325 TABLET ORAL at 13:54

## 2021-11-29 RX ADMIN — Medication 6 MG: at 20:57

## 2021-11-29 RX ADMIN — LAMOTRIGINE 50 MG: 25 TABLET ORAL at 09:22

## 2021-11-29 RX ADMIN — BACLOFEN 5 MG: 10 TABLET ORAL at 20:58

## 2021-11-29 RX ADMIN — ASPIRIN 81 MG CHEWABLE TABLET 81 MG: 81 TABLET CHEWABLE at 09:19

## 2021-11-29 RX ADMIN — ENOXAPARIN SODIUM 40 MG: 100 INJECTION SUBCUTANEOUS at 20:57

## 2021-11-29 RX ADMIN — METOPROLOL TARTRATE 12.5 MG: 25 TABLET, FILM COATED ORAL at 20:57

## 2021-11-29 RX ADMIN — DOCUSATE SODIUM 100 MG: 100 CAPSULE, LIQUID FILLED ORAL at 09:19

## 2021-11-29 RX ADMIN — GABAPENTIN 100 MG: 100 CAPSULE ORAL at 09:19

## 2021-11-29 RX ADMIN — GABAPENTIN 100 MG: 100 CAPSULE ORAL at 13:52

## 2021-11-29 RX ADMIN — CLOPIDOGREL 75 MG: 75 TABLET, FILM COATED ORAL at 09:22

## 2021-11-29 RX ADMIN — SENNOSIDES 17.2 MG: 8.6 TABLET, COATED ORAL at 20:57

## 2021-11-29 RX ADMIN — ACETAMINOPHEN 650 MG: 325 TABLET ORAL at 20:57

## 2021-11-29 RX ADMIN — ATORVASTATIN CALCIUM 40 MG: 40 TABLET, FILM COATED ORAL at 20:58

## 2021-11-29 RX ADMIN — ACETAMINOPHEN 650 MG: 325 TABLET ORAL at 09:26

## 2021-11-29 RX ADMIN — TAMSULOSIN HYDROCHLORIDE 0.4 MG: 0.4 CAPSULE ORAL at 09:19

## 2021-11-29 RX ADMIN — BACLOFEN 5 MG: 10 TABLET ORAL at 09:21

## 2021-11-29 ASSESSMENT — PAIN DESCRIPTION - LOCATION
LOCATION: HEAD
LOCATION: SHOULDER

## 2021-11-29 ASSESSMENT — PAIN DESCRIPTION - DESCRIPTORS
DESCRIPTORS: CONSTANT;DISCOMFORT;SORE
DESCRIPTORS: ACHING;CONSTANT;DISCOMFORT
DESCRIPTORS: ACHING;CONSTANT;DISCOMFORT
DESCRIPTORS: HEADACHE;ACHING;DISCOMFORT

## 2021-11-29 ASSESSMENT — PAIN SCALES - GENERAL
PAINLEVEL_OUTOF10: 6
PAINLEVEL_OUTOF10: 4
PAINLEVEL_OUTOF10: 3
PAINLEVEL_OUTOF10: 4
PAINLEVEL_OUTOF10: 8
PAINLEVEL_OUTOF10: 7

## 2021-11-29 ASSESSMENT — PAIN DESCRIPTION - FREQUENCY
FREQUENCY: CONTINUOUS
FREQUENCY: CONTINUOUS
FREQUENCY: INTERMITTENT

## 2021-11-29 ASSESSMENT — PAIN DESCRIPTION - PAIN TYPE
TYPE: CHRONIC PAIN

## 2021-11-29 ASSESSMENT — PAIN DESCRIPTION - ONSET
ONSET: ON-GOING

## 2021-11-29 ASSESSMENT — PAIN DESCRIPTION - ORIENTATION
ORIENTATION: LEFT

## 2021-11-29 ASSESSMENT — PAIN DESCRIPTION - PROGRESSION
CLINICAL_PROGRESSION: NOT CHANGED
CLINICAL_PROGRESSION: NOT CHANGED

## 2021-11-29 NOTE — PROGRESS NOTES
OCCUPATIONAL THERAPY DAILY NOTE    Date:2021  Patient Name: Brenda Knight  MRN: [id number]  : 1975  Room: [address] [place], [place] [address]     Diagnosis:Acute CVA( Pt presented to [facility] as Level 2 stroke alert on 10/11/21. L side weakness, neglect. NIH-17  Found to have large R MCA involving R occipital)  Surgery: R frontotemporal decompressive hemicraniectomy on 10/13/21  Past Medical History: COVID (9/15), tobacco use  Precautions: Falls, L hemiplegia, L neglect, L sublux, bed/chair alarm, helmet when OOB, impulsive, L PRAFO & orange dot with mom    Functional Assessment:   Date Status AE  Comments   Feeding 21 Set-up  Pt seated upright in chair eating of breakfast meal, assistance to open packages, arrange tray. Pt able to hold utensil/cup, retrieve food and bring to mouth with use of RUE   Grooming 21 Set-up  w/c Pt washed face, applied deodorant, shaved facial hair seated upright in w/c at sink         Oral Care 21 Set-up  Pt brushed teeth seated upright in w/c at sink, with pt able to apply paste and complete task with use of RUE   Bathing 21 Min A Shower chair with wheels  Pt completed showering task seated/standing, with pt able to wash of UB/LB and malvin area, with assistance to stand and wash of buttocks with use of grab bar   UB Dressing 21 Melisa  Pt requiring assistance to jesus sports bra around back and zip in front. Pt donned pullover shirt   LB Dressing 21 Min A reacher Pt donned underwear and pants using cross over technique, with assistance to cross and steady of LLE while pt threaded. Footwear 21 CGA- Left sock  Mod A-Left shoe  Lh shoe horn Pt able to jesus sock and shoe to RLE using cross over technique. Pt able to jesus sock to LLE using cross over technique, with assistance to steady LLE.  Pt then requiring assistance to jesus AFO and shoe to LLE    Toileting 21 Min A Grab bar      Homemaking 21 Mod A       Functional Transfers / Balance:   Date Status DME  Comments   Sit Balance 11/29/21 SBA  Pt sat supported upright in w/c and on shower chair     Stand Balance 11/29/21 Min A SBQC  Gait belt   Grab bar  Bed rail   Pt stood during transfers and ADL tasks   [x] Tub        [x] Shower   Transfer 11/26/21 11/29/21 Min A          CGA Extended tub bench, SBQC      Roll in shower chair, grab bar, LBQC           Walk in shower transfer with use of SBQC, with pt then using of grab bar once inside to assist with sitting   Commode   Transfer 11/23/21 CGA  3:1 commode SBQC  Grab bar  . Functional   Mobility 11/29/21 CGA SBQC  Gait belt. Pt ambulated short household distance in room EOB<>Bathroom with [facility]     Functional Exercises / Activity:  Pt demo SBA sit<>stand. Pt ambulated with a SBQC short distance ambulation SBA-CGA to a chair without arm rests & min vc's for safety    Sensory / Neuromuscular Re-Education:  Pt completed InMotion Robotic Arm for L UE. L UE was ace wrapped and chair harness was used for positioning purposes & shoulders & head held in midline to ensure pt does not use alternative muscles to facilitate movement of her LUE. Pt used 10 cm [place] was used & completed pre & post testing, random protocol & [place] protocol. Pt required Min cues & assist from therapist d/t decrease compensatory movements. Pt educated on proper body alignment & only using her LUE for optimal outcomes & neurological recovery of her LUE. Pt verbalize understanding education. Pt demo the following gains in between sets of active assisted protocol using 10 cm [place]: initiation improved from 45/80 to 53/80 initiations, robot power in [place] decreased from 79 to 68, motion jerk decreased from 100 to 95 & distance from a straight line decreased from 7 to 6. Pt demo the following gains in her pre & post test after using active assisted protocol: smoothness improved from 0.220 to 0.270, path error decreased from 0.054 to 0.048, reach error decreased from 0.102 to 0.075.   Pt tolerated a total of 512 total movement using her LUE. All InMotion exercises are performed to increase function of L UE to assist with ADL's and IADL's at home. Pt appreciates the immediate feedback that is provided using the InMotion arm. All InMotion exercises are performed to increase function of L UE to assist with ADL's and IADL's. Pt appreciates the immediate feedback that is provided using the InMotion arm. Cognitive Skills:   Status Comments   Problem   Solving fair    Memory fair    Sequencing fair    Safety fair      Visual Perception:    Education:  Pt educated on safety and hand placement with transfers, trent dressing techniques. 10/25/21: pt educated on SROM of LUE and safe positioning of LUE  10/29/21: educated on 1 handed dressing technique for socks/shoes  11/1/21: shoe lace adapted for ease with donning shoe  11/1/21: attempted small strips of kineosio tape on pt's hand to assess if pt can tolerate on her shoulder due to report of allergy (cleared by )  11/2/21:  Therapist applied kineosiotape to pt's LUE to stabilize sublux. Will continue to assess issues that arise with adhesive and progression of sublux  11/2/21: pt educated on AE for ease with LB dressing  11/3/21: Pt very fatigued in AM and PM sessions, requiring max cueing to alert pt back to task and engage in session, with pt asking multiple times to return to room/return to bed. Extended time to complete all tasks  11/4/21: home ROM exercise program placed in pt's folder and pt's mother notified of it  11/7/21 Pt educated about LUE positioning both up in w/c and in bed to maintain joint integrity. 11/17/21: resting hand applied to pt's L hand due to report of tone at digits. This is to be worn at night time and will be monitored for further tone. 11/19/21: pt appears to be tolerating resting hand splint and skin integrity look well    [x] Family teach completed on:  11/4/21: Completed family teach this session. Pt's mother Eulalio Floyd present. Educated Eulalio Floyd on pt's status with ADLs and vc's for sequencing/initiation of tasks/hand placement with functional transfers. Eulalio Floyd able to provide hands on assist with was educated for body mechanics/hand placement/vc's to increase safety with only commode transfer with slideboard and toileting task. Eulalio Floyd educated on PROM (elbow/shoulder/wrist/digit flex/ext) of LUE to increase ROM, maintain joint integrity, and safe management of sublux. Eulalio Floyd educated on how to doff/jesus gait belt and LUE sling. Pt's limiting factor appears to be her impulsivity and safety with ADLs/transfer. Eulalio Floyd demo'ing F understanding/safety of education. Eulalio Floyd appeared motivated and willing to learn. Continue to educate. 11/9/21: Completed family teach this session. Pt's mother Eulalio Floyd present. Educated Eulalio Floyd on pt's increased independence with ADLs/functional transfers. Eulalio Floyd able to jesus pt's sling and gait belt. Eulalio Floyd completed hands on assist with SPT to [facility]. Eulalio Mariel and pt demo'ing improved communication with each other for safe transfer/completion of ADLs. Eulalio Floyd demo'ing F understanding/safety of education. Continue to educate. 11/11/21- Mother present and educated along with assisting with w/c<>commode transfers using grab bar for balance and one handed skills. LUE placed in sling for balance and joint protection. Pt/mom educated with ext tub bench transfers to simulate for home environment with parent participating with transfer. Pt needed cues for hand/trunk placement on bench along with proper follow thru for safety. Pt/mom educated SPT using quad cane from w/c <>ext tub bench with patient needing cues for technique with cane & taking few steps. 11/16/21 Mom present and educated with transfers on/off firm recliner, sofa and ext tub bench transfers at quad cane level along with standing balance and positioning for LUE seated up in w/c.    11/17/21: Pt's aunt present.  She observed as well as assisted pt intermittently with ADLs. 11/18/21- Pt mom & observed am OT treatment while pt evaluated using InMotion arm using pt LUE   11/23/21- Mom present during morning session, assisting pt with toileting task and transfer and extended tub bench transfer. Mom stating of feeling comfortable with ADL tasks. 11/26/21- Mom, Dad and Daughter present for FT and educated with functional transfer training, fxl mobility using quad cane including over carpet along with LUE ROM ex's for HEP. Pain Level: No c/o pain this date. Additional Notes:     Patient has made good progress during treatment sessions toward set goals. Therapy emphasis to obtain goals:    Time Frame for Long term goals  6 weeks   Long term goal 1 Pt will complete UB self-care tasks w/ Supervision   Long term goal 2 Pt will complete LB self-care tasks w/ Sup underwear, pants & socks but Min A to don AFO   Long term goal 3 Pt will complete toileting (all aspects) w/ Supervision   Long term goal 4 Pt will safely complete toilet transfers w/ Supervision   Long term goal 5 Pt will safely complete tub/shower transfers w/ Supervision   Long term goals 6 Pt will complete basic homemaking task w/ SBA   Long term goal 7 Pt will complete grooming task w/ Supervision   Long term goal 8 Pt will complete feeding task w/ Supervision/setup     [x] Continue with current OT Plan of care. [] Prepare for Discharge    10/27/21 OT plan of care updated on this date. Tentative length of stay is 6 weeks Felix Keller OTR/L [id number]  11/3/21- Pt POC updated on this date. Pt tentative length of day is 5 weeks to address above problem areas Magaly Hardy OTR/L [id number]  11/10/21- Pt POC updated on this date. Pt tentative length of day is 4 weeks to address above problem areas Felix Keller OTR/L [id number]  11/17/21- Pt POC updated on this date.  Pt tentative length of day is 3 weeks to address above problem areas -Felix Keller OTR/L [id number]  11/24/21 OT plan of care updated on this date. Tentative length of stay is 2 weeks . Birtney Thomas OTR/L [id number]       DISCHARGE RECOMMENDATIONS  Recommended DME:  3:1 commode, ext tub bench.      Post Discharge Care:   []Home Independently  []Home with 24hr Care / Supervision []Home with Partial Supervision []Home with Home Health OT []Home with Out Pt OT []Other: ___   Comments:         Time in Time out Tx Time Breakdown  Variance:   First Session  7:30am 8:20am [x] Individual Tx-50mins  [] Concurrent Tx -  [] Co-Tx -   [] Group Tx -   [] Time Missed -     Second Session 1000 1055 [] Individual Tx- mins  [x] Concurrent Tx - 55  [] Co-Tx -   [] Group Tx -   [] Time Missed -     Third Session    [] Individual Tx-   [] Concurrent Tx -  [] Co-Tx -   [] Group Tx -   [] Time Missed -         Total Tx Time:105 Mins     Trudy Tate GLOVER/L [id number]     Catrina Bowieluisana OTR/L [id number]    I have read & agree with the above status  Britney Thomas OTR/L [id number]

## 2021-11-29 NOTE — PLAN OF CARE
Problem: Pain:  Goal: Pain level will decrease  Description: Pain level will decrease  11/28/2021 2320 by Kesha Allen RN  Outcome: Met This Shift  11/28/2021 1920 by Bette James RN  Outcome: Ongoing  11/28/2021 1615 by Easton Manzano RN  Outcome: Ongoing  Goal: Control of acute pain  Description: Control of acute pain  11/28/2021 2320 by Kesha Allen RN  Outcome: Met This Shift  11/28/2021 1920 by Bette James RN  Outcome: Ongoing  11/28/2021 1615 by Easton Manzano RN  Outcome: Ongoing  Goal: Control of chronic pain  Description: Control of chronic pain  11/28/2021 2320 by Kesha Allen RN  Outcome: Met This Shift  11/28/2021 1920 by Bette James RN  Outcome: Ongoing  11/28/2021 1615 by Easton Manzano RN  Outcome: Ongoing  Goal: Patient's pain/discomfort is manageable  Description: Patient's pain/discomfort is manageable  11/28/2021 2320 by Kesha Allen RN  Outcome: Met This Shift  11/28/2021 1920 by Bette James RN  Outcome: Ongoing  11/28/2021 1615 by Easton Manzano RN  Outcome: Ongoing     Problem: Falls - Risk of:  Goal: Will remain free from falls  Description: Will remain free from falls  11/28/2021 2320 by Kesha Allen RN  Outcome: Met This Shift  11/28/2021 1920 by Bette James RN  Outcome: Ongoing  11/28/2021 1615 by Easton Manzano RN  Outcome: Ongoing  Goal: Absence of physical injury  Description: Absence of physical injury  11/28/2021 2320 by Kesha Allen RN  Outcome: Met This Shift  11/28/2021 1920 by Bette James RN  Outcome: Ongoing  11/28/2021 1615 by Easton Manzano RN  Outcome: Ongoing     Problem: Skin Integrity:  Goal: Will show no infection signs and symptoms  Description: Will show no infection signs and symptoms  11/28/2021 2320 by Kesha Allen RN  Outcome: Met This Shift  11/28/2021 1920 by Bette James RN  Outcome: Ongoing  11/28/2021 1615 by Mccormack Jose Juan, RN  Outcome: Ongoing  Goal: Absence of new skin breakdown  Description: Absence of new skin breakdown  11/28/2021 2320 by Reyes Pate, RN  Outcome: Met This Shift  11/28/2021 1920 by Seth Robert RN  Outcome: Ongoing  11/28/2021 1615 by Sonia Vazquez RN  Outcome: Ongoing     Problem: ABCDS Injury Assessment  Goal: Absence of physical injury  11/28/2021 2320 by Reyes Pate, RN  Outcome: Met This Shift  11/28/2021 1920 by Seth Robert RN  Outcome: Ongoing  11/28/2021 1615 by Sonia Vazquez RN  Outcome: Ongoing     Problem: HEMODYNAMIC STATUS  Goal: Patient has stable vital signs and fluid balance  11/28/2021 2320 by Reyes Pate, RN  Outcome: Met This Shift  11/28/2021 1920 by Seth Robert RN  Outcome: Ongoing  11/28/2021 1615 by Sonia Vazquez RN  Outcome: Ongoing     Problem: ACTIVITY INTOLERANCE/IMPAIRED MOBILITY  Goal: Mobility/activity is maintained at optimum level for patient  11/28/2021 2320 by Reyes Pate, RN  Outcome: Met This Shift  11/28/2021 1920 by Seth Robert RN  Outcome: Ongoing  11/28/2021 1615 by Sonia Vazquez RN  Outcome: Ongoing     Problem: COMMUNICATION IMPAIRMENT  Goal: Ability to express needs and understand communication  11/28/2021 2320 by Reyes Pate, RN  Outcome: Met This Shift  11/28/2021 1920 by Seth Robert RN  Outcome: Ongoing  11/28/2021 1615 by Sonia Vazquez RN  Outcome: Ongoing     Problem: Infection:  Goal: Will remain free from infection  Description: Will remain free from infection  11/28/2021 2320 by Reyes Pate, RN  Outcome: Met This Shift  11/28/2021 1920 by Seth Robert RN  Outcome: Ongoing  11/28/2021 1615 by Sonia Vazquez RN  Outcome: Ongoing     Problem: Safety:  Goal: Free from accidental physical injury  Description: Free from accidental physical injury  11/28/2021 2320 by Reyes Pate, RN  Outcome: Met This Shift  11/28/2021 1920 by Seth Robert RN  Outcome: Ongoing  11/28/2021 1615 by Sonia Vazquez RN  Outcome: Ongoing  Goal: Free from intentional harm  Description: Free from intentional harm  11/28/2021 2320 by Bria Oreilly RN  Outcome: Met This Shift  11/28/2021 1920 by Cydney Gutierrez RN  Outcome: Ongoing  11/28/2021 1615 by Isa Elder RN  Outcome: Ongoing     Problem: Daily Care:  Goal: Daily care needs are met  Description: Daily care needs are met  11/28/2021 2320 by Bria Oreilly RN  Outcome: Met This Shift  11/28/2021 1920 by Cydney Gutierrez RN  Outcome: Ongoing  11/28/2021 1615 by Isa Elder RN  Outcome: Ongoing     Problem: Skin Integrity:  Goal: Skin integrity will stabilize  Description: Skin integrity will stabilize  11/28/2021 2320 by Bria Oreilly RN  Outcome: Met This Shift  11/28/2021 1920 by Cydney Gutierrez RN  Outcome: Ongoing  11/28/2021 1615 by Isa Elder RN  Outcome: Ongoing     Problem: Discharge Planning:  Goal: Patients continuum of care needs are met  Description: Patients continuum of care needs are met  11/28/2021 2320 by Bria Oreilly RN  Outcome: Met This Shift  11/28/2021 1920 by Cydney Gutierrez RN  Outcome: Ongoing  11/28/2021 1615 by Isa Elder RN  Outcome: Ongoing

## 2021-11-29 NOTE — PROGRESS NOTES
Physical Therapy  Treatment Note  Supervising Therapist: Patty Client, PT, DPT [id number]    NAME: Kvng Sneed  [facility]  DIAGNOSIS: Acute CVA  PRECAUTIONS: Falls, helmet at all times when OOB, L hemiplegia, L homonymous hemianopsia L neglect, alarm,  L PRAFO, SBP <180, orange dot with mother. HPI: Pt is a 39 y.o. female with past medical history significant for recent COVID-19 infection (9/15/21) who [facility]21 with acute onset left-sided weakness. Initial NIHSS 17.  Initial CT ([facility]) showed R MCA hyperdensity; initial CTA showed right ICA/M1 occlusion. On 10/13, her neuro exam worsened and repeat imaging indicated worsening edema, 1 cm of midline shift. She underwent emergent R hemicraniectomy by Dr. Pete Orona [facility]) on 10/13. After being deemed medically appropriate, she was transferred to [place] [id number] on 10/22/2021    Social:  Pt lives with daughter (15 y/o), will be returning to parent's home upon discharge (1 floor plan, 2 HAIDER with 2 HR). Mother and father healthy, do not work. 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 Initial Evaluation  Date: 10/23/21 AM     PM    Short Term Goals Long Term Goals    Was pt agreeable to Eval/treatment? yes Yes Yes     Does pt have pain? Pain on the top of her head once the helmet was placed on Mild c/o L hip and R rib pain Mild c/o moderate L hip/knee pain with activity     Bed Mobility  Rolling: Max A  Supine to sit: Max A   Sit to supine: Max A   Scooting: Max A Supine>Sit SBA  Sit>Supine Noemy  Scooting Noemy  Rolling SBA NT Min A Supervision   Transfers Sit to stand: Max A   Stand to sit: Max A  Stand pivot:  Max A x2    5xSTS: Unable to complete Sit<>stand: CGA  Stand pivot: CGA with R SBQC Sit<>stand: CGA  Stand pivot: CGA with R SBQC   Min A SBA  5xSTS: TBD   Ambulation    15 feet with R mackey rail with Max A + wc follow    10mWT: TBA  6mWT:  feet x 1 rep with R 108/67  - Post Tx: 122/72 PM:  - Prior to Tx: 118/74  - Post Tx: 130/81   Time spent in HR ranges: Moderate intensity (60-70% HRmax): AM: 0:22:42 PM: 0:02:54   High intensity (70-85% HRmax): AM: 0:04:10 PM: 0:20:59     Therapeutic Exercise:   AM: FW non-reciprocal stepping over 4 SPC using R SPC x 4 reps with Noemy (9lb weight attached to posterior gait belt)  Weaving between 4 standing quad canes using R SPC x 4 reps with CGA/Noemy (9lb weight attached to posterior gait belt)  Ambulation with R SPC, orange resistance band at waist 200 feet x 2 reps with Noemy  PM:  FW non-reciprocal stepping over 4 quad canes on floor with R SPC leading with LLE x 4 reps with Noemy    Patient education  Pt educated on visual scanning strategies to compensate for L neglect    Patient response to education:   Pt verbalized understanding Pt demonstrated skill Pt requires further education in this area   yes partial yes     Additional Comments: Pt remains highly motivated with good recall during sessions, L neglect remains most evident barrier to safe function. Gait training progressed to include SPC for increased speed and reduced UE support, stance control challenged by addition of weight to posterior gait belt. Propulsion challenged by resisted walking to increase speed. Pt consistently reported RPE of 17/20. Directional cueing still required due to L visual field cut and neglect. PM session characterized by frequent seated rest breaks due to significantly elevated HR response to activity. Pt had declined beta blocker this AM, RN notified and present to administer due to elevated HR now that pt was agreeable. Gait speed appears to be improving significantly. AM  Time in: 0830  Time out: 0915    PM  Time in: 1300  Time out: 3312    Pt is making good progress toward established Physical Therapy goals. Continue with physical therapy current plan of care.     Joseph Lopez, PT, DPT  Board Certified Clinical Specialist in Neurologic

## 2021-11-30 PROCEDURE — 97110 THERAPEUTIC EXERCISES: CPT

## 2021-11-30 PROCEDURE — 6370000000 HC RX 637 (ALT 250 FOR IP): Performed by: PHYSICAL MEDICINE & REHABILITATION

## 2021-11-30 PROCEDURE — 97116 GAIT TRAINING THERAPY: CPT

## 2021-11-30 PROCEDURE — 97530 THERAPEUTIC ACTIVITIES: CPT

## 2021-11-30 PROCEDURE — 6360000002 HC RX W HCPCS: Performed by: PHYSICAL MEDICINE & REHABILITATION

## 2021-11-30 PROCEDURE — 1280000000 HC REHAB R&B

## 2021-11-30 RX ADMIN — ATORVASTATIN CALCIUM 40 MG: 40 TABLET, FILM COATED ORAL at 21:16

## 2021-11-30 RX ADMIN — DOCUSATE SODIUM 100 MG: 100 CAPSULE, LIQUID FILLED ORAL at 09:00

## 2021-11-30 RX ADMIN — BACLOFEN 5 MG: 10 TABLET ORAL at 14:30

## 2021-11-30 RX ADMIN — GABAPENTIN 100 MG: 100 CAPSULE ORAL at 09:00

## 2021-11-30 RX ADMIN — TAMSULOSIN HYDROCHLORIDE 0.4 MG: 0.4 CAPSULE ORAL at 09:00

## 2021-11-30 RX ADMIN — GABAPENTIN 100 MG: 100 CAPSULE ORAL at 21:17

## 2021-11-30 RX ADMIN — BACLOFEN 5 MG: 10 TABLET ORAL at 23:10

## 2021-11-30 RX ADMIN — GABAPENTIN 100 MG: 100 CAPSULE ORAL at 14:30

## 2021-11-30 RX ADMIN — Medication 6 MG: at 21:18

## 2021-11-30 RX ADMIN — ASPIRIN 81 MG CHEWABLE TABLET 81 MG: 81 TABLET CHEWABLE at 09:00

## 2021-11-30 RX ADMIN — LAMOTRIGINE 50 MG: 25 TABLET ORAL at 09:00

## 2021-11-30 RX ADMIN — BACLOFEN 5 MG: 10 TABLET ORAL at 09:00

## 2021-11-30 RX ADMIN — CLOPIDOGREL 75 MG: 75 TABLET, FILM COATED ORAL at 09:00

## 2021-11-30 RX ADMIN — METOPROLOL TARTRATE 12.5 MG: 25 TABLET, FILM COATED ORAL at 09:00

## 2021-11-30 RX ADMIN — ENOXAPARIN SODIUM 40 MG: 100 INJECTION SUBCUTANEOUS at 21:17

## 2021-11-30 ASSESSMENT — PAIN SCALES - WONG BAKER
WONGBAKER_NUMERICALRESPONSE: 0

## 2021-11-30 ASSESSMENT — PAIN SCALES - GENERAL
PAINLEVEL_OUTOF10: 0

## 2021-11-30 ASSESSMENT — PAIN DESCRIPTION - PROGRESSION
CLINICAL_PROGRESSION: GRADUALLY IMPROVING

## 2021-11-30 NOTE — PROGRESS NOTES
OCCUPATIONAL THERAPY DAILY NOTE    Date:2021  Patient Name: Rolo Vyas  MRN: [id number]  : 1975  Room: [address] [place], [place] [address]     Diagnosis:Acute CVA( Pt presented to [facility] as Level 2 stroke alert on 10/11/21. L side weakness, neglect. NIH-17  Found to have large R MCA involving R occipital)  Surgery: R frontotemporal decompressive hemicraniectomy on 10/13/21  Past Medical History: COVID (9/15), tobacco use  Precautions: Falls, L hemiplegia, L neglect, L sublux, bed/chair alarm, helmet when OOB, impulsive, L PRAFO & orange dot with mom    Functional Assessment:   Date Status AE  Comments   Feeding 21 Set-up     Grooming 21 Set-up  w/c          Oral Care 21 Set-up     Bathing 21 Min A Shower chair with wheels     UB Dressing 21 Melisa     LB Dressing 21 Min A reacher    Footwear 21 CGA- Left sock  Mod A-Left shoe  Lh shoe horn    Toileting 21 Min A Grab bar      Homemaking 21 Mod A  PM: 21   Pt stated I probably won't do any cooking since my Mom & kids will do it however may do some prepping at up in w/c seated at table when home. Functional Transfers / Balance:   Date Status DME  Comments   Sit Balance 21 SBA w/c Sitting balance demo'd during arm ex's in am up in w/c to increase trunk control/core strengthening for functional tasks. Stand Balance 21 Min A SBQC  Gait belt   Grab bar  Bed rail      [x] Tub        [x] Shower   Transfer 21 Min A          CGA Extended tub bench, SBQC      Roll in shower chair, grab bar, LBQC              Commode   Transfer 21 CGA  3:1 commode SBQC  Grab bar  . Functional   Mobility 21 CGA SBQC  Gait belt. Functional Exercises / Activity:  Am: BUE SROM/AAROM ex's completed up in w/c at table for towel & furniture glides wearing 1# wt on RUE and 1/2# LUE to facilitate both ROM and strength for ADL;s, transfers and mobility skills.   Pt tolerated 5-6 reps of 10 ea needing assist with LUE positioning and proper technique. LUE PROM/ ex's for shoulder flexion/extension, abduction/adduction, shoulder shrugs, elbow & wrist extension/flexion along with finger extension for open/closing  to increase  ROM at all joints. Bilateral shoulder shrugs tolerating 3 reps of 5 ea in both am/pm sessions(against gravity in am and gravity eliminated in pm). LUE muscle tapping/facilitation techs while up in w/c for bicep/triceps and forearm to increase muscle input for active movement. PM: LUE PROM and AAROM ex's conducted in gravity eliminated bed level for shoulder/scapula, elbow, wrist and hand to increase joint ROM for functional use. LUE gentle stretches for shoulder flex/extension, horizontal abduction/adduction, adduction/abduction,elbow flexion/ext, full finger extension at bed level to promote ROM at all joints. Pt tolerated 6-8 reps of 10ea. Sensory / Neuromuscular Re-Education:      Cognitive Skills:   Status Comments   Problem   Solving fair    Memory fair    Sequencing fair    Safety fair      Visual Perception:    Education:  Pt educated on LUE positioning when in bed and up in w/c in order to maintain shoulder joint integrity. 10/25/21: pt educated on SROM of LUE and safe positioning of LUE  10/29/21: educated on 1 handed dressing technique for socks/shoes  11/1/21: shoe lace adapted for ease with donning shoe  11/1/21: attempted small strips of kineosio tape on pt's hand to assess if pt can tolerate on her shoulder due to report of allergy (cleared by )  11/2/21:  Therapist applied kineosiotape to pt's LUE to stabilize sublux. Will continue to assess issues that arise with adhesive and progression of sublux  11/2/21: pt educated on AE for ease with LB dressing  11/3/21: Pt very fatigued in AM and PM sessions, requiring max cueing to alert pt back to task and engage in session, with pt asking multiple times to return to room/return to bed.  Extended time to complete all tasks  11/4/21: home ROM exercise program placed in pt's folder and pt's mother notified of it  11/7/21 Pt educated about LUE positioning both up in w/c and in bed to maintain joint integrity. 11/17/21: resting hand applied to pt's L hand due to report of tone at digits. This is to be worn at night time and will be monitored for further tone. 11/19/21: pt appears to be tolerating resting hand splint and skin integrity look well    [x] Family teach completed on:  11/4/21: Completed family teach this session. Pt's mother Álvaro Momin present. Educated Álvaro Momin on pt's status with ADLs and vc's for sequencing/initiation of tasks/hand placement with functional transfers. Álvaro Momin able to provide hands on assist with was educated for body mechanics/hand placement/vc's to increase safety with only commode transfer with slideboard and toileting task. Álvaro Momin educated on PROM (elbow/shoulder/wrist/digit flex/ext) of LUE to increase ROM, maintain joint integrity, and safe management of sublux. Álvaro Momin educated on how to doff/jesus gait belt and LUE sling. Pt's limiting factor appears to be her impulsivity and safety with ADLs/transfer. Álvaro Momin demo'ing F understanding/safety of education. Álvaro Momin appeared motivated and willing to learn. Continue to educate. 11/9/21: Completed family teach this session. Pt's mother Pinedafloyd Merrill present. Educated Álvaro Momin on pt's increased independence with ADLs/functional transfers. Álvaro Momin able to jesus pt's sling and gait belt. Álvaro Momin completed hands on assist with SPT to [facility]. Álvaro Momin and pt demo'ing improved communication with each other for safe transfer/completion of ADLs. Álvaro Momin demo'ing F understanding/safety of education. Continue to educate. 11/11/21- Mother present and educated along with assisting with w/c<>commode transfers using grab bar for balance and one handed skills. LUE placed in sling for balance and joint protection.  Pt/mom educated with ext tub bench transfers to simulate for home environment with parent participating with transfer. Pt needed cues for hand/trunk placement on bench along with proper follow thru for safety. Pt/mom educated SPT using quad cane from w/c <>ext tub bench with patient needing cues for technique with cane & taking few steps. 11/16/21 Mom present and educated with transfers on/off firm recliner, sofa and ext tub bench transfers at quad cane level along with standing balance and positioning for LUE seated up in w/c.    11/17/21: Pt's aunt present. She observed as well as assisted pt intermittently with ADLs. 11/18/21- Pt mom & observed am OT treatment while pt evaluated using [employer] arm using pt LUE   11/23/21- Mom present during morning session, assisting pt with toileting task and transfer and extended tub bench transfer. Mom stating of feeling comfortable with ADL tasks. 11/26/21- Mom, Dad and Daughter present for FT and educated with functional transfer training, fxl mobility using quad cane including over carpet along with LUE ROM ex's for HEP. Pain Level: No c/o pain this date. Additional Notes:     Patient has made good progress during treatment sessions toward set goals. Therapy emphasis to obtain goals:    Time Frame for Long term goals  6 weeks   Long term goal 1 Pt will complete UB self-care tasks w/ Supervision   Long term goal 2 Pt will complete LB self-care tasks w/ Sup underwear, pants & socks but Min A to don AFO   Long term goal 3 Pt will complete toileting (all aspects) w/ Supervision   Long term goal 4 Pt will safely complete toilet transfers w/ Supervision   Long term goal 5 Pt will safely complete tub/shower transfers w/ Supervision   Long term goals 6 Pt will complete basic homemaking task w/ SBA   Long term goal 7 Pt will complete grooming task w/ Supervision   Long term goal 8 Pt will complete feeding task w/ Supervision/setup     [x] Continue with current OT Plan of care.   [] Prepare for Discharge    10/27/21 OT plan of care updated on this date. Tentative length of stay is 6 weeks Alfredo Holly OTR/L [id number]  11/3/21- Pt POC updated on this date. Pt tentative length of day is 5 weeks to address above problem areas Kavita Massed OTR/L [id number]  11/10/21- Pt POC updated on this date. Pt tentative length of day is 4 weeks to address above problem areas Alfredo Holly OTR/L [id number]  11/17/21- Pt POC updated on this date. Pt tentative length of day is 3 weeks to address above problem areas Alfredo Holly OTR/L [id number]  11/24/21 OT plan of care updated on this date. Tentative length of stay is 2 weeks . Allison Edgar OTR/L [id number]       DISCHARGE RECOMMENDATIONS  Recommended DME:  3:1 commode, ext tub bench.      Post Discharge Care:   []Home Independently  []Home with 24hr Care / Supervision []Home with Partial Supervision []Home with Home Health OT []Home with Out Pt OT []Other: ___   Comments:         Time in Time out Tx Time Breakdown  Variance:   First Session  10:00am 10:45am [x] Individual Tx-45mins  [] Concurrent Tx -  [] Co-Tx -   [] Group Tx -   [] Time Missed -     Second Session 14:35pm 15:20pm [x] Individual Tx-45 mins  [] Concurrent Tx -  [] Co-Tx -   [] Group Tx -   [] Time Missed -     Third Session    [] Individual Tx-   [] Concurrent Tx -  [] Co-Tx -   [] Group Tx -   [] Time Missed -         Total Tx Time:90 Mins   2020 [facility] [place]/L [id number]

## 2021-11-30 NOTE — PROGRESS NOTES
Nutrition Assessment     Type and Reason for Visit: Reassess    Nutrition Recommendations/Plan: Continue current diet and ONS    Nutrition Assessment:  Pt remains stable from a nutritional standpoint w/ continued intakes >75% noted. Pt admit to ARU s/p CVA s/p crani. Will continue ONS and monitor. Malnutrition Assessment:  Malnutrition Status: No malnutrition    Estimated Daily Nutrient Needs:  Energy (kcal): MSJ 1443 x 1.1 SF = 6854-0830; Weight Used for Energy Requirements:  Current     Protein (g): 75-90; Weight Used for Protein Requirements:  Ideal (1.3-1.5)        Fluid (ml/day): 9516-9175; Weight Used for Fluid Requirements:  1 ml/kcal      Nutrition Related Findings: A&)x4, abd WDL, trace edema, +I/Os      Current Nutrition Therapies:    ADULT DIET; Regular  ADULT ORAL NUTRITION SUPPLEMENT; Lunch, Dinner; Frozen Oral Supplement    Anthropometric Measures:  · Height: 5' 6\" (167.6 cm)  · Current Body Wt: 170 lb (77.1 kg) (10/22 no method)   · BMI: 27.5    Nutrition Diagnosis:   No nutrition diagnosis at this time     Nutrition Interventions:   Food and/or Nutrient Delivery:  Continue Current Diet, Continue Oral Nutrition Supplement  Nutrition Education/Counseling:  Education not indicated   Coordination of Nutrition Care:  Continue to monitor while inpatient    Goals:  pt to consume >75% meals/ONS       Nutrition Monitoring and Evaluation:   Food/Nutrient Intake Outcomes:  Food and Nutrient Intake, Supplement Intake  Physical Signs/Symptoms Outcomes:  Biochemical Data, GI Status, Fluid Status or Edema, Nutrition Focused Physical Findings, Skin, Weight     Discharge Planning:     Too soon to determine     Electronically signed by Silvia Mir, MS, RD, LD on 11/30/21 at 11:39 AM EST    Contact: [phone]

## 2021-11-30 NOTE — PROGRESS NOTES
with Min A          500 feet with AAD with SBA     10mWT: >0.4 m/s  6mWT: >205 m   Walking 10 feet on uneven surface NT NT NT 10 feet with AAD with Min A 10 feet with AAD with SBA   Wheel Chair Mobility NT NT NT   150 feet with R extremities with SBA   Car Transfers NT NT CGA Min A SBA   Stair negotiation: ascended and descended NT NT 4 steps with 1 HR with CGA (leading with LLE ascent/descent) 4 steps with 1 rail with Min A 12 steps with 1 rail with SBA   Curb Step:   ascended and descended NT NT NT 4 inch step with AAD and Min A 4 inch step with AAD and SBA   Picking up object off the floor NT NT NT Will  an object with Min assist Will  an object with SBA   BLE ROM WFL: LLE AROM limited by flaccidity  WFL: LLE AROM limited by spasticity 2+ on MAS (hamstrings, hip extensors, PF)  L ankle DF lacking ~5 degrees from neutral         BLE Strength RLE 4+/5  LLE 0/5 RLE 5/5  LLE hip flexion 2+/5  Knee extension 2-/5  Ankle DF/PF 0/5         Balance  Static Sitting: Mod/Max A     BBS: TBA  FGA: TBA    Standing: Noemy  BBS: 11/56  (11/16/21)                 BBS: >20/56  FGA: TBD         Scheduled Meds:   baclofen  5 mg Oral TID    lamoTRIgine  50 mg Oral Daily    enoxaparin  40 mg SubCUTAneous Daily    polyethylene glycol  17 g Oral BID    lidocaine  1 patch TransDERmal Daily    nicotine  1 patch TransDERmal Daily    senna  2 tablet Oral Nightly    metoprolol tartrate  12.5 mg Oral BID    gabapentin  100 mg Oral TID    tamsulosin  0.4 mg Oral Daily    aspirin  81 mg Oral Daily    atorvastatin  40 mg Oral Nightly    vitamin D  50,000 Units Oral Weekly    docusate sodium  100 mg Oral BID    melatonin  6 mg Oral Nightly    clopidogrel  75 mg Oral Daily     Continuous Infusions:  PRN Meds:diclofenac sodium, diphenhydrAMINE-zinc acetate, ondansetron, lactulose, guaiFENesin, acetaminophen, magnesium hydroxide, bisacodyl  I/O last 3 completed shifts:   In: 600 [P.O.:600]  Out: -   No intake/output data recorded. Labs reviewed  CBC: No results for input(s): WBC, HGB, PLT in the last 72 hours. BMP:  No results for input(s): NA, K, CL, CO2, BUN, CREATININE, GLUCOSE in the last 72 hours. Hepatic: No results for input(s): AST, ALT, ALB, BILITOT, ALKPHOS in the last 72 hours. BNP: No results for input(s): BNP in the last 72 hours. Lipids: No results for input(s): CHOL, HDL in the last 72 hours. Invalid input(s): LDLCALCU  INR: No results for input(s): INR in the last 72 hours. Assessment/  Patient Active Problem List:     Acute cerebrovascular accident (CVA) ([facility] [address]     Status post craniectomy     Left spastic hemiplegia (HCC)     Tobacco abuse     Acute pain     Transient alteration of awareness      Plan/  1. Continue rehab program  2. Continue precautions per neurosurgery  3. Continue antiepileptic medicaiton  4.  Continue dvt prophylaxis          Mariana Sim MD

## 2021-11-30 NOTE — PROGRESS NOTES
0.15 m/s  6mWT: 52 m  (R SBQC, L Trial AFO, Noemy 11/16)     200 feet x 2 reps with R SPC and CGA  (L articulating AFO with DF/PF stop)  (L articulating AFO)   50 feet with AAD with Min A       500 feet with AAD with SBA    10mWT: >0.4 m/s  6mWT: >205 m   Walking 10 feet on uneven surface NT NT NT 10 feet with AAD with Min A 10 feet with AAD with SBA   Wheel Chair Mobility NT NT NT  150 feet with R extremities with SBA   Car Transfers NT NT NT Min A SBA   Stair negotiation: ascended and descended NT 12 steps with single HR with CGA/Noemy (non-reciprocal, ascending/descending with LLE)    8 steps with 1 HR with ModA (reciprocal pattern) 12 steps x 1 rep and 10 steps x 2 reps with 1 HR with ModA (reciprocal pattern) 4 steps with 1 rail with Min A 12 steps with 1 rail with SBA   Curb Step:   ascended and descended NT NT NT 4 inch step with AAD and Min A 4 inch step with AAD and SBA   Picking up object off the floor NT NT NT Will  an object with Min assist Will  an object with SBA   BLE ROM WFL: LLE AROM limited by flaccidity  WFL: LLE AROM limited by spasticity 2+ on MAS (hamstrings, hip extensors, PF)  L ankle DF lacking ~5 degrees from neutral      BLE Strength RLE 4+/5  LLE 0/5 RLE 5/5  LLE hip flexion 2+/5  Knee extension 2-/5  Ankle DF/PF 0/5      Balance  Static Sitting: Mod/Max A    BBS: TBA  FGA: TBA   Standing: Noemy  BBS: 11/56  (11/16/21)           BBS: >20/56  FGA: TBD   Date Family Teach Completed TBA Pt's mother present for observation 10/24, 11/2, 11/3, 114, hands on 11/9, 11/11, 11/16, 11/18, 11/23, aunt present to observe 11/17  Dad present 11/22, 11/24 for hands on training  Step father and daughter present 11/26 for hands on training      Is additional Family Teaching Needed?   Y or N Y Yes      Hindering Progress L hemiplegia, L neglect L hemiplegia, L neglect      PT recommended ELOS 5 weeks       Team's Discharge Plan        Therapist at Team Meeting          General:        HRmax: 178 bpm       Beta blockers (Y/N): Y      Adjusted HRmax: 168 bpm   Blood pressure (mmHg): AM:  - Prior to Tx: 115/73  - Post Tx: 114/78 PM:  - Prior to Tx: 118/62  - Post Tx: 118/73   Time spent in HR ranges: Moderate intensity (60-70% HRmax): AM: 0:24:20 PM: 0:19:27   High intensity (70-85% HRmax): AM: 0:07:44 PM: 0:13:31     Therapeutic Exercise:   AM: FW/BW/lateral stepping around large tile square with R SPC, 9lb weight at LLE, x 3 reps with Noemy  Resisted FW ambulation with R SPC, 9lb weight to LLE, 150 feet x 1 rep with Noemy  PM:   FW NR stepping over 6 SPC using R SPC (9lb weight LLE ) x 2 reps with CGA/Noemy  FW resisted walking with R SPC, 150 feet x 1 rep with Noemy    Patient education  Pt educated on optimal turning technique with attention to LLE for safety    Patient response to education:   Pt verbalized understanding Pt demonstrated skill Pt requires further education in this area   yes partial yes     Additional Comments: Pt remains motivated with continually improving activity tolerance. Gait training progressed to include increased use of SPC to challenge postural stability (compared to [facility]), increased ankle weight to challenge neural drive to LLE advancement, and resisted walking to challenge propulsion. L neglect remains a significant limiting factor to independent function as pt still requires frequent directional cues. PM session included progression of reciprocal stair negotiation to stairwell, pt remains impulsive leading to safety concerns with this task. Plan to progress gait training next session. AM  Time in: 0830  Time out: 0915    PM  Time in: 1300  Time out: 0251    Pt is making good progress toward established Physical Therapy goals. Continue with physical therapy current plan of care.     Lexi Quevedo, PT, DPT  Board Certified Clinical Specialist in Neurologic Physical Therapy  [phone]

## 2021-12-01 PROCEDURE — 1280000000 HC REHAB R&B

## 2021-12-01 PROCEDURE — 6370000000 HC RX 637 (ALT 250 FOR IP): Performed by: PHYSICAL MEDICINE & REHABILITATION

## 2021-12-01 PROCEDURE — 97530 THERAPEUTIC ACTIVITIES: CPT

## 2021-12-01 PROCEDURE — 97112 NEUROMUSCULAR REEDUCATION: CPT

## 2021-12-01 PROCEDURE — 97535 SELF CARE MNGMENT TRAINING: CPT

## 2021-12-01 PROCEDURE — 97116 GAIT TRAINING THERAPY: CPT

## 2021-12-01 PROCEDURE — 6360000002 HC RX W HCPCS: Performed by: PHYSICAL MEDICINE & REHABILITATION

## 2021-12-01 RX ADMIN — BACLOFEN 5 MG: 10 TABLET ORAL at 21:13

## 2021-12-01 RX ADMIN — METOPROLOL TARTRATE 12.5 MG: 25 TABLET, FILM COATED ORAL at 09:24

## 2021-12-01 RX ADMIN — Medication 6 MG: at 21:13

## 2021-12-01 RX ADMIN — BACLOFEN 5 MG: 10 TABLET ORAL at 13:39

## 2021-12-01 RX ADMIN — DOCUSATE SODIUM 100 MG: 100 CAPSULE, LIQUID FILLED ORAL at 21:14

## 2021-12-01 RX ADMIN — BACLOFEN 5 MG: 10 TABLET ORAL at 09:24

## 2021-12-01 RX ADMIN — GABAPENTIN 100 MG: 100 CAPSULE ORAL at 21:13

## 2021-12-01 RX ADMIN — GABAPENTIN 100 MG: 100 CAPSULE ORAL at 13:39

## 2021-12-01 RX ADMIN — ENOXAPARIN SODIUM 40 MG: 100 INJECTION SUBCUTANEOUS at 21:13

## 2021-12-01 RX ADMIN — ACETAMINOPHEN 650 MG: 325 TABLET ORAL at 12:46

## 2021-12-01 RX ADMIN — LAMOTRIGINE 50 MG: 25 TABLET ORAL at 09:24

## 2021-12-01 RX ADMIN — DOCUSATE SODIUM 100 MG: 100 CAPSULE, LIQUID FILLED ORAL at 09:24

## 2021-12-01 RX ADMIN — ATORVASTATIN CALCIUM 40 MG: 40 TABLET, FILM COATED ORAL at 21:14

## 2021-12-01 RX ADMIN — ASPIRIN 81 MG CHEWABLE TABLET 81 MG: 81 TABLET CHEWABLE at 09:23

## 2021-12-01 RX ADMIN — GABAPENTIN 100 MG: 100 CAPSULE ORAL at 09:24

## 2021-12-01 RX ADMIN — CLOPIDOGREL 75 MG: 75 TABLET, FILM COATED ORAL at 09:24

## 2021-12-01 RX ADMIN — TAMSULOSIN HYDROCHLORIDE 0.4 MG: 0.4 CAPSULE ORAL at 09:24

## 2021-12-01 ASSESSMENT — PAIN DESCRIPTION - FREQUENCY: FREQUENCY: INTERMITTENT

## 2021-12-01 ASSESSMENT — PAIN SCALES - GENERAL
PAINLEVEL_OUTOF10: 0
PAINLEVEL_OUTOF10: 8
PAINLEVEL_OUTOF10: 0
PAINLEVEL_OUTOF10: 2
PAINLEVEL_OUTOF10: 0
PAINLEVEL_OUTOF10: 0

## 2021-12-01 ASSESSMENT — PAIN DESCRIPTION - PAIN TYPE: TYPE: ACUTE PAIN

## 2021-12-01 ASSESSMENT — PAIN - FUNCTIONAL ASSESSMENT: PAIN_FUNCTIONAL_ASSESSMENT: ACTIVITIES ARE NOT PREVENTED

## 2021-12-01 ASSESSMENT — PAIN DESCRIPTION - PROGRESSION: CLINICAL_PROGRESSION: GRADUALLY IMPROVING

## 2021-12-01 ASSESSMENT — PAIN DESCRIPTION - ONSET: ONSET: ON-GOING

## 2021-12-01 ASSESSMENT — PAIN DESCRIPTION - DESCRIPTORS: DESCRIPTORS: HEADACHE;ACHING

## 2021-12-01 ASSESSMENT — PAIN DESCRIPTION - LOCATION: LOCATION: HEAD

## 2021-12-01 NOTE — PROGRESS NOTES
reps with R SBQC and CGA  (L articulating AFO with DF/PF stop)       50 feet with AAD with Min A          500 feet with AAD with SBA     10mWT: >0.4 m/s  6mWT: >205 m   Walking 10 feet on uneven surface NT NT NT 10 feet with AAD with Min A 10 feet with AAD with SBA   Wheel Chair Mobility NT NT NT   150 feet with R extremities with SBA   Car Transfers NT NT CGA  Min A SBA   Stair negotiation: ascended and descended NT NT 12 steps x 1 rep with 1 HR with CGA      8 steps with R HR with Noemy (ascending FW, descending BW)     8 steps with L HR with Noemy (ascending laterally, descending FW) 4 steps with 1 rail with Min A 12 steps with 1 rail with SBA   Curb Step:   ascended and descended NT NT NT 4 inch step with AAD and Min A 4 inch step with AAD and SBA   Picking up object off the floor NT NT NT Will  an object with Min assist Will  an object with SBA   BLE ROM WFL: LLE AROM limited by flaccidity  WFL: LLE AROM limited by spasticity 2+ on MAS (hamstrings, hip extensors, PF)  L ankle DF lacking ~5 degrees from neutral         BLE Strength RLE 4+/5  LLE 0/5 RLE 5/5  LLE hip flexion 2+/5  Knee extension 2-/5  Ankle DF/PF 0/5         Balance  Static Sitting: Mod/Max A     BBS: TBA  FGA: TBA    Standing: Noemy  BBS: 11/56  (11/16/21)                 BBS: >20/56  FGA: TBD         Scheduled Meds:   baclofen  5 mg Oral TID    lamoTRIgine  50 mg Oral Daily    enoxaparin  40 mg SubCUTAneous Daily    polyethylene glycol  17 g Oral BID    lidocaine  1 patch TransDERmal Daily    nicotine  1 patch TransDERmal Daily    senna  2 tablet Oral Nightly    metoprolol tartrate  12.5 mg Oral BID    gabapentin  100 mg Oral TID    tamsulosin  0.4 mg Oral Daily    aspirin  81 mg Oral Daily    atorvastatin  40 mg Oral Nightly    vitamin D  50,000 Units Oral Weekly    docusate sodium  100 mg Oral BID    melatonin  6 mg Oral Nightly    clopidogrel  75 mg Oral Daily     Continuous Infusions:  PRN Meds:diclofenac sodium, diphenhydrAMINE-zinc acetate, ondansetron, lactulose, guaiFENesin, acetaminophen, magnesium hydroxide, bisacodyl  I/O last 3 completed shifts: In: 780 [P.O.:780]  Out: 1 [Urine:1]  No intake/output data recorded. Labs reviewed  CBC: No results for input(s): WBC, HGB, PLT in the last 72 hours. BMP:  No results for input(s): NA, K, CL, CO2, BUN, CREATININE, GLUCOSE in the last 72 hours. Hepatic: No results for input(s): AST, ALT, ALB, BILITOT, ALKPHOS in the last 72 hours. BNP: No results for input(s): BNP in the last 72 hours. Lipids: No results for input(s): CHOL, HDL in the last 72 hours. Invalid input(s): LDLCALCU  INR: No results for input(s): INR in the last 72 hours. Assessment/  Patient Active Problem List:     Acute cerebrovascular accident (CVA) ([facility] [address]     Status post craniectomy     Left spastic hemiplegia (HCC)     Tobacco abuse     Acute pain     Transient alteration of awareness      Plan/  1. Continue rehab program  2. Continue dvt prophylaxis  3. Continue close neuro follow up  4. Discontinue nutritional supplements at her request  5. Continue other medications without change  6.  For team conference in           Alanna Barrera MD

## 2021-12-01 NOTE — PROGRESS NOTES
Physical Therapy  Treatment Note  Supervising Therapist: Mera Dowd, PT, DPT [id number]    NAME: Aaliyah Abbott  [facility]  DIAGNOSIS: Acute CVA  PRECAUTIONS: Falls, helmet at all times when OOB, L hemiplegia, L homonymous hemianopsia L neglect, alarm,  L PRAFO, SBP <180, orange dot with mother. HPI: Pt is a 39 y.o. female with past medical history significant for recent COVID-19 infection (9/15/21) who [facility] with acute onset left-sided weakness. Initial NIHSS 17.  Initial CT (OSH) showed R MCA hyperdensity; initial CTA showed right ICA/M1 occlusion. On 10/13, her neuro exam worsened and repeat imaging indicated worsening edema, 1 cm of midline shift. She underwent emergent R hemicraniectomy by Dr. [facility]) on 10/13. After being deemed medically appropriate, she was transferred to [place] [id number] on 10/22/2021    Social:  Pt lives with daughter (15 y/o), will be returning to parent's home upon discharge (1 floor plan, 2 HAIDER with 2 HR). Mother and father healthy, do not work. 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 Initial Evaluation  Date: 10/23/21 AM     PM    Short Term Goals Long Term Goals    Was pt agreeable to Eval/treatment? yes Yes Yes     Does pt have pain? Pain on the top of her head once the helmet was placed on Mild c/o L hip and R rib pain Mild c/o moderate L hip/knee pain with activity     Bed Mobility  Rolling: Max A  Supine to sit: Max A   Sit to supine: Max A   Scooting: Max A NT Supine>Sit SBA  Sit>Supine Noemy (LLE assist)  Scooting/Rolling SBA Min A Supervision   Transfers Sit to stand: Max A   Stand to sit: Max A  Stand pivot:  Max A x2    5xSTS: Unable to complete Sit<>stand: CGA  Stand pivot: CGA with R SBQC Sit<>stand: CGA  Stand pivot: CGA with R SBQC   Min A SBA  5xSTS: TBD   Ambulation    15 feet with R mackey rail with Max A + wc follow    10mWT: TBA  6mWT:  feet x 2 reps with R SPC and CGA  (L articulating AFO with DF/PF stop)    10mWT: 0.15 m/s  6mWT: 52 m  (R SBQC, L Trial AFO, Noemy 11/16)     200 feet x 2 reps with R SBQC and CGA  (L articulating AFO with DF/PF stop)     50 feet with AAD with Min A       500 feet with AAD with SBA    10mWT: >0.4 m/s  6mWT: >205 m   Walking 10 feet on uneven surface NT NT NT 10 feet with AAD with Min A 10 feet with AAD with SBA   Wheel Chair Mobility NT NT NT  150 feet with R extremities with SBA   Car Transfers NT NT CGA  Min A SBA   Stair negotiation: ascended and descended NT NT 12 steps x 1 rep with 1 HR with CGA     8 steps with R HR with Noemy (ascending FW, descending BW)    8 steps with L HR with Noemy (ascending laterally, descending FW) 4 steps with 1 rail with Min A 12 steps with 1 rail with SBA   Curb Step:   ascended and descended NT NT NT 4 inch step with AAD and Min A 4 inch step with AAD and SBA   Picking up object off the floor NT NT NT Will  an object with Min assist Will  an object with SBA   BLE ROM WFL: LLE AROM limited by flaccidity  WFL: LLE AROM limited by spasticity 2+ on MAS (hamstrings, hip extensors, PF)  L ankle DF lacking ~5 degrees from neutral      BLE Strength RLE 4+/5  LLE 0/5 RLE 5/5  LLE hip flexion 2+/5  Knee extension 2-/5  Ankle DF/PF 0/5      Balance  Static Sitting: Mod/Max A    BBS: TBA  FGA: TBA   Standing: Noemy  BBS: 11/56  (11/16/21)           BBS: >20/56  FGA: TBD   Date Family Teach Completed TBA Pt's mother present for observation 10/24, 11/2, 11/3, 114, hands on 11/9, 11/11, 11/16, 11/18, 11/23, aunt present to observe 11/17  Dad present 11/22, 11/24, 12/1 for hands on training  Step father and daughter present 11/26 for hands on training      Is additional Family Teaching Needed?   Y or N Y Yes      Hindering Progress L hemiplegia, L neglect L hemiplegia, L neglect      PT recommended ELOS 5 weeks       Team's Discharge Plan        Therapist at Team Meeting          General: HRmax: 178  bpm       Beta blockers (Y/N): Y      Adjusted HRmax: 168 bpm   Blood pressure (mmHg): AM:  - Prior to Tx: NA  - Post Tx: 135/85 PM:  - Prior to Tx: NA due to FT  - Post Tx: NA due FT   Time spent in HR ranges: Moderate intensity (60-70% HRmax): AM: 0:13:42 PM: NA due to FT   High intensity (70-85% HRmax): AM: 0:21:50 PM: NA due to FT     Therapeutic Exercise:   AM: Ambulation with R SPC, 9lb weight LLE, 150 feet x 2 reps with cues for increased speed  BW walking with R SPC, 9lb weight LLE, 35 feet x 2 reps  PM: NA    Patient education  Pt educated on safe hand placement with transfers     Patient response to education:   Pt verbalized understanding Pt demonstrated skill Pt requires further education in this area   yes partial yes     Additional Comments: Pt remains pleasant and motivated during sessions. L neglect remains most evident barrier to independent function as pt requires frequent directional cueing. Cueing still required to attend to transfer safety and LLE during transfers. Ankle weight added to address L limb advancement deficits. Pt tolerated activity without complaints. Father and aunt present for PM session FT, father completed further hands on training assisting patient with all mobility tasks as noted above. Reviewed various techniques for stair negotiation based on possibility of only one available rail. Plan to continue gait training next session. AM  Time in: 0830  Time out: 0915    PM  Time in:  1300  Time out: [id number]    Pt is making goood progress toward established Physical Therapy goals. Continue with physical therapy current plan of care.     Karyle Misty, PT, DPT  Board Certified Clinical Specialist in Neurologic Physical Therapy  [id number]

## 2021-12-01 NOTE — PROGRESS NOTES
Physical Therapy  Weekly Team Note  Supervising Therapist: Clarence Candelaria, PT, DPT [id number]    NAME: Melodie Duff  [facility]  DIAGNOSIS: Acute CVA  PRECAUTIONS: Falls, helmet at all times when OOB, L hemiplegia, L neglect, alarm,, L PRAFO, SPB <180, Orange dot with mother  HPI: Pt is a 39 y.o. female with past medical history significant for recent COVID-19 infection (9/15/21) [facility] with acute onset left-sided weakness. Initial NIHSS 17.  Initial CT (OSH) showed R MCA hyperdensity; initial CTA showed right ICA/M1 occlusion. On 10/13, her neuro exam worsened and repeat imaging indicated worsening edema, 1 cm of midline shift. She underwent emergent R hemicraniectomy by Dr. True [facility]) on 10/13. After being deemed medically appropriate, she was transferred to [place] [id number] on 10/22/2021    Social:  Pt lives with daughter (15 y/o), will be returning to parent's home upon discharge (1 floor plan, 2 HAIDER with 2 HR). Mother and father healthy, do not work. 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 Initial Evaluation  Date: 10/23/21 10/26/21     11/2/21 11/9/21 11/16/21 11/23/21 12/1/21 Comments Short Term Goals Long Term Goals    Was pt agreeable to Eval/treatment? yes Yes Yes Yes Yes yes yes      Does pt have pain? Pain on the top of her head once the helmet was placed on Moderate R rib and L hip pain, limiting ambulation at times Mild R rib and L hip pain No c/o pain No c/o pain Mild c/o L hip pain No c/o pain      Bed Mobility  Rolling: Max A  Supine to sit: Max A   Sit to supine: Max A   Scooting:  Max A Rolling ModA  Supine<>Sit MaxA  Scooting MaxA Supine<>Sit ModA  Scooting/Rolling ModA Supine<>sit Noemy  Scooting/Rolling Noemy (mat table) Supine<>sit Noemy  Scooting/Rolling Noemy (twin bed) Sit>Supine Noemy  Supine>Sit SBA  Scooting/Rolling SBA (twin bed) Sit>Supine Noemy  Supine>Sit SBA  Scooting/Rolling SBA (twin bed)  LLE assistance with sit>supine Min A Supervision   Transfers Sit to stand: Max A   Stand to sit: Max A  Stand pivot: Max A x2    5xSTS: Unable to complete Sit<>stand: Max A  Stand pivot: NT    Sliding board transfer MaxA to R and L from WC<>mat table Sit<>stand: ModA (MaxA with L knee immobilizer)  Stand pivot: MaxA with L hemicane     Sliding board transfer [address] to L and R Sit<>stand: ModA   Stand pivot: ModA with R LBQC   (trial L articulating AFO with DF/PF stop)    Sliding board transfer Noemy to R/L    Sit<>stand: Noemy   Stand pivot: Noemy with R LBQC   (trial L articulating AFO with DF/PF stop) Sit<>stand: CGA/Noemy  Stand pivot: CGA/Noemy with R SBQC Sit<>stand: CGA  Stand pivot: CGA with R SBQC Pt is significantly improving transfer ability. Impulsivity and L neglect remain most evident barriers to safe function Min A SBA  5xSTS: TBD   Ambulation    15 feet with R mackey rail with Max A + wc follow    10mWT: TBA  6mWT: TBA 40 feet with R hallway rail, L ankle DF wrap , L knee immobilizer, inside out sock over L shoe with MaxA    60 feet x 1 rep with R hemicane with ModA/MaxA      (L ankle PLS AFO , L knee immobilizer, inside out sock over L shoe, mirror for visual feedback)  (WC follow) 115 feet x 1 rep with R LBQC and ModA  (trial L articulating AFO with DF/PF stop)    10mWT: 0.10 m/s  6mWT: 33 m  (R hemicane, L knee immobilizer/AFO, ModA 11/5) 140 feet x 1 rep with R SBQC and Noemy  (trial L articulating AFO with DF/PF stop)    10mWT: 0.15 m/s  6mWT: 52 m  (R SBQC, L Trial AFO, Noemy 11/16) 185 feet x 1 rep with R SBQC and CGA  (L articulating AFO) 200 feet x 2 reps with R SBQC with CGA (L AFO) Pt received personal AFO, is working well, PF spasticity and contracture results in mild L knee hyperextension occasionally.  Spoke with orthotist who thinks only alternative is KAFO which would be impractical.   Cueing required for direction due to L neglect   50 feet with AAD with Min A       500 feet with AAD with SBA    10mWT: >0.4 m/s  6mWT: >270 m   Wheel Chair Mobility NT 10 feet MaxA with R extremities 20 feet with R extremities with MaxA 50 feet with R extremities with ModA 100 feet with R extremities with ModA 100 feet with R extremities with Noemy 150 feet with R extremities with Noemy Assistance to avoid obstacle collision  150 feet with R extremities with Supervision   Car Transfers NT NT NT ModA ModA Noemy CGA Extensive cueing for safety Min A SBA   Stair negotiation: ascended and descended NT NT NT 4 steps with 1 HR with ModA (RUE support, non-reciprocal) 8 steps with 1 HR with Noemy/ModA 12 steps with 1 HR with Noemy 12 steps with 1 HR with Noemy Safety concerns 4 steps with 1 rail with Min A 12 steps with 1 rail with SBA   BLE ROM WFL: LLE AROM limited by flaccidity  WFL: LLE AROM limited by spasticity 2+ on MAS (hamstrings, hip extensors) WFL: LLE AROM limited by spasticity 2+ on MAS (hamstrings, hip extensors, plantar flexors) WFL: LLE AROM limited by spasticity 2+ on MAS (hamstrings, hip extensors, plantar flexors) WFL: LLE AROM limited by spasticity 2+ on MAS (hamstrings, hip extensors, PF)  L ankle DF lacking ~5 degrees from neutral WFL: LLE AROM limited by spasticity 2+ on MAS (hamstrings, hip extensors, PF)  L ankle DF lacking ~5 degrees from neutral WFL: LLE AROM limited by spasticity 2+ on MAS (hamstrings, hip extensors, PF)  L ankle DF lacking ~5 degrees from neutral L PF spasticity places pt at risk for contracture, please make sure PRAFO is donned when in bed     BLE Strength RLE 4+/5  LLE 0/5 RLE 4+/5  LLE 0/5 grossly, hip flexors/adductors 1/5 RLE 4+/5  LLE 0/5 grossly, hip flexors/adductors 2-/5 RLE 5/5  LLE hip flexion 2+/5  Knee extension 2-/5  Ankle DF/PF 0/5 RLE 5/5  LLE hip flexion 2+/5  Knee extension 2-/5  Ankle DF/PF 0/5 RLE 5/5  LLE hip flexion 2+/5  Knee extension 2-/5  Ankle DF/PF 0/5 RLE 5/5  LLE hip flexion 2+/5  Knee extension 2-/5  Ankle DF/PF 0/5 MMT scores significantly improved Balance  Static Sitting: Mod/Max A    BBS: TBA  FGA: TBA Sitting: Mod A  Standing: Max A Sitting: Min A  Standing: Max A Sitting: CGA  Standing: ModA  BBS: 4/56  (11/5/21) Static and dynamic standing balance Noemy with R SBQC  BBS: 11/56  (11/16/21) Static and dynamic standing balance CGA with R SBQC Static and dynamic standing balance CGA with R SBQC       BBS: >20/56  FGA: TBD   Date Family Teach Completed TBA Pt's mother present for observation 10/24 Pt's mother present for observation 10/24 and 11/2 Pt's mother present for observation 10/24, 11/2, 11/3, 114, hands on 11/9 Pt's mother present for observation 10/24, 11/2, 11/3, 114, hands on 11/9, 11/11, 11/16 Pt's mother present for observation 10/24, 11/2, 11/3, 114, hands on 11/9, 11/11, 11/16, 11/18, 11/23, aunt present to observe 11/17  Dad present 11/22 for hands on training Pt's mother present for observation 10/24, 11/2, 11/3, 114, hands on 11/9, 11/11, 11/16, 11/18, 11/23, aunt present to observe 11/17  Dad present 11/22, 11/24, 12/1 for hands on training  Step father and daughter present 11/26 for hands on training      Is additional Family Teaching Needed?   Y or N Y Yes Y Y Y Y N      Hindering Progress L hemiplegia, L neglect L hemiplegia, L neglect L hemiplegia, L neglect, impulsivity, poor safety L hemiplegia, L neglect, impulsivity, poor safety L hemiplegia, L neglect, impulsivity, poor safety L hemiplegia, L neglect, impulsivity, poor safety L hemiplegia, L neglect, impulsivity, poor safety      PT recommended ELOS 6 weeks 6 weeks 5 weeks 4 weeks 3 weeks 2 weeks Rec discharge 12/7/21      Team's Discharge Plan  6 weeks 5 weeks 4 weeks 3 weeks 2 weeks Discharge Tuesday 12/7/21      Therapist at [address] [facility], [address] [facility], DPT [id number]   Melody Page, PT, DPT [id number]   Melody Page PT, DPT [id number]   Melody Page, PT, DPT [id number]   Melody Page, PT, DPT [id number]   Melody Page, PT, DPT [id number]          Date: 12/1/21  Supporting factors:  Pt remains highly motivated with improving physical function and superb social support  Barriers to discharge:  L neglect results in inattention to LLE during transfers as well as poor safety awareness with standing tasks, particularly novel tasks. Pt remains highly impulsive  Additional comments:  Pt is working well towards goal attainment  DME:  SBQC, 18\" WC with anti tippers, L brake lever extender, non-elevating legrests, full length armrests, L lap tray, foam cushion. Pt has received sling/gait belt/AFO  After Care:  HHPT for home safety assessement. Date:  11/23/21  Supporting factors:  Pt continues to exhibit improving physical function, remains motivated, family is very supportive, pt exhibits good recall of training  Barriers to discharge:  Pt's impulsivity continues to result in poor safety in novel situations. L neglect results in frequent directional requirements which limits independent function  Additional comments: Mother has received orange dot. Recommend continued time on unit to meet goals of SBA  DME:  SBQC, WC (18\", non-elevating legrests, foam cushion, desk length removable armrests, anti tippers, L brake lever extender)  After Care:  HHPT      Date:  11/16/21  Supporting factors:  Pt remains motivated with good social support, exhibits continually improving function  Barriers to discharge:  Pt's L neglect, visual field deficits, and impulsivity remain most evident barriers to safe function.   Additional comments:  Pt is participating well in HIGT, reciprocal gait training begun  DME:  TBD (anticipate SBQC, WC)  After Care:  HHPT      Date:  11/9/21  Supporting factors:  Pt is highly motivated with supportive family, is continually improving  Barriers to discharge:  Remaining hemiparesis, visual deficits, impulsivity with standing tasks  Additional comments:  Recommend holding on AFO consult at this time, pt has potential to progress to toe off AFO  DME: TBD  After Care:  TBD      Date:  11/2/21  Supporting factors: Blanca Gins age, improving activity tolerance and physical function, good recall of sequencing and technique (pt can verbalize sequence and her deficits)  Barriers to discharge:  Impulsivity, poor safety, and dense hemiplegia. Spasticity is not yet a significant problem during gait however pt remains at risk for PF contracture  Additional comments:  Pt has good social support, mother is considering installing ramp at home entrance (pt's father to build). Pt is consistently improving function. DME:  TBD  After Care:  TBD      Date:  10/26/21  Supporting factors: Pt is motivated, has good social support  Barriers to discharge:  Dense hemiplegia/trent-neglect on L. Spasticity may become a problem if worsened. Poor safety and pushing behavior result in high fall risk  Additional comments:  Pain limiting ambulation at times. Pt has excellent potential for continued progress.    DME:  TBD  After Care:  TBD    Jessica Goldman, PT, DPT  Board Certified Clinical Specialist in Neurologic Physical Therapy  [id number]

## 2021-12-01 NOTE — PROGRESS NOTES
OCCUPATIONAL THERAPY DAILY NOTE    Date:2021  Patient Name: Isaías Brenner  MRN: [id number]  : 1975  Room: [address] [place], [place] [address]     Diagnosis:Acute CVA( Pt presented to [facility] as Level 2 stroke alert on 10/11/21. L side weakness, neglect. NIH-17  Found to have large R MCA involving R occipital)  Surgery: R frontotemporal decompressive hemicraniectomy on 10/13/21  Past Medical History: COVID (9/15), tobacco use  Precautions: Falls, L hemiplegia, L neglect, L sublux, bed/chair alarm, helmet when OOB, impulsive, L PRAFO & orange dot with mom    Functional Assessment:   Date Status AE  Comments   Feeding 21 Set-up  Pt self fed breakfast   Grooming 21 Mod I w/c          Oral Care 21 Mod I  Seated for oral care, pt able to open toothpaste, jesus toothpaste, and brush teeth   Bathing 21 CGA Shower chair with wheels  Pt declined full shower. Completed sponge bath seated/standing at sink. Slight assist with slight assist with standing for malvin areas and L foot   UB Dressing 21 Melisa  Assist with bra. Pt able to jesus pullover shirt SBA   LB Dressing 21 CGA reacher Increased time to thread BLEs into underwear/shorts. Pt stood to pull over hips   Footwear 21 Mod A Lh shoe horn Pt able to jesus R sock/shoe/L sock. Required assist with AFO and L shoe   Toileting 21 SBA Grab bar   Pt able to complete malvin hygiene and pants management   Homemaking 21 Mod A       Functional Transfers / Balance:   Date Status DME  Comments   Sit Balance 21 SBA w/c     Stand Balance 21 SBA SBQC  Gait belt   Grab bar  Bed rail      [x] Tub        [x] Shower   Transfer 21 Min A          SBA Extended tub bench, SBQC      Roll in shower chair, grab bar, LBQC       Step in method into walk in shower         Commode   Transfer 21 SBA  3:1 commode SBQC  Grab bar  . Functional   Mobility 21 SBA SBQC  Gait belt.   To/from bathroom     Functional Exercises / Activity:  Pt supine in bed upon arrival to . Required assist with supine-sit EOB and completed functional mobiltiy to bathroom. Completed ADLs, see above for assessment. Pt appeared to have tolerated session well and has increased independence with ADLs. Pt's cognition appears to continue to limit safety. Sensory / Neuromuscular Re-Education:      Cognitive Skills:   Status Comments   Problem   Solving fair    Memory fair    Sequencing fair    Safety fair      Visual Perception:    Education:  Pt educated on LUE positioning when in bed and up in w/c in order to maintain shoulder joint integrity. 10/25/21: pt educated on SROM of LUE and safe positioning of LUE  10/29/21: educated on 1 handed dressing technique for socks/shoes  11/1/21: shoe lace adapted for ease with donning shoe  11/1/21: attempted small strips of kineosio tape on pt's hand to assess if pt can tolerate on her shoulder due to report of allergy (cleared by )  11/2/21:  Therapist applied kineosiotape to pt's LUE to stabilize sublux. Will continue to assess issues that arise with adhesive and progression of sublux  11/2/21: pt educated on AE for ease with LB dressing  11/3/21: Pt very fatigued in AM and PM sessions, requiring max cueing to alert pt back to task and engage in session, with pt asking multiple times to return to room/return to bed. Extended time to complete all tasks  11/4/21: home ROM exercise program placed in pt's folder and pt's mother notified of it  11/7/21 Pt educated about LUE positioning both up in w/c and in bed to maintain joint integrity. 11/17/21: resting hand applied to pt's L hand due to report of tone at digits. This is to be worn at night time and will be monitored for further tone. 11/19/21: pt appears to be tolerating resting hand splint and skin integrity look well    [x] Family teach completed on:  11/4/21: Completed family teach this session. Pt's mother Pedro Pablo Stevens present.  Educated Pedro Pablo Stevens on pt's status with ADLs and vc's for sequencing/initiation of tasks/hand placement with functional transfers. Ayesha Felipe able to provide hands on assist with was educated for body mechanics/hand placement/vc's to increase safety with only commode transfer with slideboard and toileting task. Ayesha Felipe educated on PROM (elbow/shoulder/wrist/digit flex/ext) of LUE to increase ROM, maintain joint integrity, and safe management of sublux. Ayesha Felipe educated on how to doff/jesus gait belt and LUE sling. Pt's limiting factor appears to be her impulsivity and safety with ADLs/transfer. Ayesha smitho'ing F understanding/safety of education. Ayesha Felipe appeared motivated and willing to learn. Continue to educate. 11/9/21: Completed family teach this session. Pt's mother Rebekahlorenzo Felipe present. Educated Ayesha Felipe on pt's increased independence with ADLs/functional transfers. Ayesha Felipe able to jesus pt's sling and gait belt. Ayesha Felipe completed hands on assist with SPT to [facility]. Ayesha Felipe and pt demo'ing improved communication with each other for safe transfer/completion of ADLs. Ayesha ospina'ing F understanding/safety of education. Continue to educate. 11/11/21- Mother present and educated along with assisting with w/c<>commode transfers using grab bar for balance and one handed skills. LUE placed in sling for balance and joint protection. Pt/mom educated with ext tub bench transfers to simulate for home environment with parent participating with transfer. Pt needed cues for hand/trunk placement on bench along with proper follow thru for safety. Pt/mom educated SPT using quad cane from w/c <>ext tub bench with patient needing cues for technique with cane & taking few steps. 11/16/21 Mom present and educated with transfers on/off firm recliner, sofa and ext tub bench transfers at quad cane level along with standing balance and positioning for LUE seated up in w/c.    11/17/21: Pt's aunt present. She observed as well as assisted pt intermittently with ADLs.    11/18/21- Pt mom & observed am OT treatment while pt evaluated using [employer] arm using pt LUE   11/23/21- Mom present during morning session, assisting pt with toileting task and transfer and extended tub bench transfer. Mom stating of feeling comfortable with ADL tasks. 11/26/21- Mom, Dad and Daughter present for FT and educated with functional transfer training, fxl mobility using quad cane including over carpet along with LUE ROM ex's for HEP. Pain Level: No c/o pain this date. Additional Notes:     Patient has made good progress during treatment sessions toward set goals. Therapy emphasis to obtain goals:    Time Frame for Long term goals  6 weeks   Long term goal 1 Pt will complete UB self-care tasks w/ Supervision   Long term goal 2 Pt will complete LB self-care tasks w/ Sup underwear, pants & socks but Min A to don AFO   Long term goal 3 Pt will complete toileting (all aspects) w/ Supervision   Long term goal 4 Pt will safely complete toilet transfers w/ Supervision   Long term goal 5 Pt will safely complete tub/shower transfers w/ Supervision   Long term goals 6 Pt will complete basic homemaking task w/ SBA   Long term goal 7 Pt will complete grooming task w/ Supervision   Long term goal 8 Pt will complete feeding task w/ Supervision/setup     [x] Continue with current OT Plan of care. [] Prepare for Discharge    10/27/21 OT plan of care updated on this date. Tentative length of stay is 6 weeks Richie Mcpherson OTR/L [id number]  11/3/21- Pt POC updated on this date. Pt tentative length of day is 5 weeks to address above problem areas Ann Ward OTR/L [id number]  11/10/21- Pt POC updated on this date. Pt tentative length of day is 4 weeks to address above problem areas Richie Mcpherson OTR/L [id number]  11/17/21- Pt POC updated on this date. Pt tentative length of day is 3 weeks to address above problem areas Richie Mcpherson OTR/L [id number]  11/24/21 OT plan of care updated on this date.  Tentative length of stay

## 2021-12-01 NOTE — PROGRESS NOTES
Progress Note - covering Dr. Taya Roberts    Subjective/   39y.o. year old female on the rehab unit for stroke and craniectomy. She is offering no complaints. Tolerating therapy. No SOB, chest pain. Bowel and bladder OK. Objective/   VITALS:  /74   Pulse 73   Temp 98.1 °F (36.7 °C) (Oral)   Resp 18   Ht 5' 6\" (1.676 m)   Wt 170 lb (77.1 kg)   SpO2 100%   BMI 27.44 kg/m²   24HR INTAKE/OUTPUT:    Intake/Output Summary (Last 24 hours) at 11/30/2021 2029  Last data filed at 11/30/2021 1406  Gross per 24 hour   Intake 720 ml   Output --   Net 720 ml     Constitutional:  Alert, awake, no apparent distress   Cardiovascular:  S1, S2 without m/r/g   Respiratory:  CTA B without w/r/r   Abdomen: +BS  Ext: no pitting LE edema  Neuro: awake and alert, (L) trent. Good sitting balance    Functional Level        Date   Status   AE    Comments     Feeding   11/29/21   Set-up             Grooming   11/29/21   Set-up    w/c               Oral Care   11/29/21   Set-up             Bathing   11/29/21   Min A   Shower chair with wheels          UB Dressing   11/29/21   Melisa             LB Dressing   11/29/21   Min A   reacher         Footwear   11/29/21   CGA- Left sock  Mod A-Left shoe  Lh shoe horn         Toileting   11/23/21   Min A   Grab bar            Homemaking   11/9/21   Mod A       PM: 11/30/21   Pt stated I probably won't do any cooking since my Mom & kids will do it however may do some prepping at up in w/c seated at table when home.            Functional Transfers / Balance:      Date Status DME  Comments   Sit Balance 11/30/21   SBA w/c Sitting balance demo'd during arm ex's in am up in w/c to increase trunk control/core strengthening for functional tasks. Stand Balance 11/29/21   Min A SBQC  Gait belt   Grab bar  Bed rail       [x]? Tub           [x]?  Shower   Transfer 11/26/21 11/29/21 Min A             CGA Extended tub bench, SBQC        Roll in shower chair, grab bar, LBQC                    Commode   Transfer 11/23/21   CGA  3:1 commode SBQC  Grab bar  . Functional   Mobility 11/29/21 CGA SBQC  Gait belt.         Functional Exercises / Activity:  Am: BUE SROM/AAROM ex's completed up in w/c at table for towel & furniture glides wearing 1# wt on RUE and 1/2# LUE to facilitate both ROM and strength for ADL;s, transfers and mobility skills. Pt tolerated 5-6 reps of 10 ea needing assist with LUE positioning and proper technique. LUE PROM/ ex's for shoulder flexion/extension, abduction/adduction, shoulder shrugs, elbow & wrist extension/flexion along with finger extension for open/closing  to increase  ROM at all joints. Bilateral shoulder shrugs tolerating 3 reps of 5 ea in both am/pm sessions(against gravity in am and gravity eliminated in pm). LUE muscle tapping/facilitation techs while up in w/c for bicep/triceps and forearm to increase muscle input for active movement.        PM: LUE PROM and AAROM ex's conducted in gravity eliminated bed level for shoulder/scapula, elbow, wrist and hand to increase joint ROM for functional use. LUE gentle stretches for shoulder flex/extension, horizontal abduction/adduction, adduction/abduction,elbow flexion/ext, full finger extension at bed level to promote ROM at all joints.    Pt tolerated 6-8 reps of 10ea.      Sensory / Neuromuscular Re-Education:        Cognitive Skills:    Status Comments   Problem   Solving fair     Memory fair     Sequencing fair     Safety fair            Scheduled Meds:   baclofen  5 mg Oral TID    lamoTRIgine  50 mg Oral Daily    enoxaparin  40 mg SubCUTAneous Daily    polyethylene glycol  17 g Oral BID    lidocaine  1 patch TransDERmal Daily    nicotine  1 patch TransDERmal Daily    senna  2 tablet Oral Nightly    metoprolol tartrate  12.5 mg Oral BID    gabapentin  100 mg Oral TID    tamsulosin  0.4 mg Oral Daily    aspirin  81 mg Oral Daily    atorvastatin  40 mg Oral Nightly    vitamin D  50,000 Units Oral Weekly    docusate sodium  100 mg Oral BID    melatonin  6 mg Oral Nightly    clopidogrel  75 mg Oral Daily     Continuous Infusions:  PRN Meds:diclofenac sodium, diphenhydrAMINE-zinc acetate, ondansetron, lactulose, guaiFENesin, acetaminophen, magnesium hydroxide, bisacodyl  I/O last 3 completed shifts: In: 720 [P.O.:720]  Out: -   No intake/output data recorded. Labs reviewed  CBC: No results for input(s): WBC, HGB, PLT in the last 72 hours. BMP:  No results for input(s): NA, K, CL, CO2, BUN, CREATININE, GLUCOSE in the last 72 hours. Hepatic: No results for input(s): AST, ALT, ALB, BILITOT, ALKPHOS in the last 72 hours. BNP: No results for input(s): BNP in the last 72 hours. Lipids: No results for input(s): CHOL, HDL in the last 72 hours. Invalid input(s): LDLCALCU  INR: No results for input(s): INR in the last 72 hours. Assessment/  Patient Active Problem List:     Acute cerebrovascular accident (CVA) ([facility] [address]     Status post craniectomy     Left spastic hemiplegia (HCC)     Tobacco abuse     Acute pain     Transient alteration of awareness      Plan/  1. Continue rehab program  2. Continue current medications  3. Continue precautions per neurosurgery  4. Continue dvt prophylaxis  5.  Continue secondary stroke prevention          Shyla Smiley MD

## 2021-12-02 PROCEDURE — 97530 THERAPEUTIC ACTIVITIES: CPT

## 2021-12-02 PROCEDURE — 6370000000 HC RX 637 (ALT 250 FOR IP): Performed by: PHYSICAL MEDICINE & REHABILITATION

## 2021-12-02 PROCEDURE — 6360000002 HC RX W HCPCS: Performed by: PHYSICAL MEDICINE & REHABILITATION

## 2021-12-02 PROCEDURE — 1280000000 HC REHAB R&B

## 2021-12-02 PROCEDURE — 97112 NEUROMUSCULAR REEDUCATION: CPT

## 2021-12-02 PROCEDURE — 97110 THERAPEUTIC EXERCISES: CPT

## 2021-12-02 RX ADMIN — LAMOTRIGINE 50 MG: 25 TABLET ORAL at 08:03

## 2021-12-02 RX ADMIN — GABAPENTIN 100 MG: 100 CAPSULE ORAL at 14:19

## 2021-12-02 RX ADMIN — ASPIRIN 81 MG CHEWABLE TABLET 81 MG: 81 TABLET CHEWABLE at 08:04

## 2021-12-02 RX ADMIN — ACETAMINOPHEN 650 MG: 325 TABLET ORAL at 20:43

## 2021-12-02 RX ADMIN — GABAPENTIN 100 MG: 100 CAPSULE ORAL at 08:04

## 2021-12-02 RX ADMIN — METOPROLOL TARTRATE 12.5 MG: 25 TABLET, FILM COATED ORAL at 20:34

## 2021-12-02 RX ADMIN — BACLOFEN 5 MG: 10 TABLET ORAL at 14:19

## 2021-12-02 RX ADMIN — TAMSULOSIN HYDROCHLORIDE 0.4 MG: 0.4 CAPSULE ORAL at 08:04

## 2021-12-02 RX ADMIN — Medication 6 MG: at 20:34

## 2021-12-02 RX ADMIN — GABAPENTIN 100 MG: 100 CAPSULE ORAL at 20:34

## 2021-12-02 RX ADMIN — DOCUSATE SODIUM 100 MG: 100 CAPSULE, LIQUID FILLED ORAL at 08:03

## 2021-12-02 RX ADMIN — BACLOFEN 5 MG: 10 TABLET ORAL at 09:26

## 2021-12-02 RX ADMIN — SENNOSIDES 17.2 MG: 8.6 TABLET, COATED ORAL at 20:35

## 2021-12-02 RX ADMIN — DICLOFENAC SODIUM 4 G: 10 GEL TOPICAL at 08:00

## 2021-12-02 RX ADMIN — ATORVASTATIN CALCIUM 40 MG: 40 TABLET, FILM COATED ORAL at 20:34

## 2021-12-02 RX ADMIN — BACLOFEN 5 MG: 10 TABLET ORAL at 20:34

## 2021-12-02 RX ADMIN — ENOXAPARIN SODIUM 40 MG: 100 INJECTION SUBCUTANEOUS at 20:35

## 2021-12-02 RX ADMIN — ACETAMINOPHEN 650 MG: 325 TABLET ORAL at 08:10

## 2021-12-02 RX ADMIN — DOCUSATE SODIUM 100 MG: 100 CAPSULE, LIQUID FILLED ORAL at 20:34

## 2021-12-02 RX ADMIN — CLOPIDOGREL 75 MG: 75 TABLET, FILM COATED ORAL at 08:03

## 2021-12-02 RX ADMIN — METOPROLOL TARTRATE 12.5 MG: 25 TABLET, FILM COATED ORAL at 08:03

## 2021-12-02 ASSESSMENT — PAIN SCALES - GENERAL
PAINLEVEL_OUTOF10: 6
PAINLEVEL_OUTOF10: 0
PAINLEVEL_OUTOF10: 7
PAINLEVEL_OUTOF10: 3
PAINLEVEL_OUTOF10: 0

## 2021-12-02 ASSESSMENT — PAIN DESCRIPTION - FREQUENCY: FREQUENCY: INTERMITTENT

## 2021-12-02 ASSESSMENT — PAIN DESCRIPTION - ORIENTATION: ORIENTATION: LEFT

## 2021-12-02 ASSESSMENT — PAIN DESCRIPTION - PAIN TYPE: TYPE: ACUTE PAIN

## 2021-12-02 ASSESSMENT — PAIN - FUNCTIONAL ASSESSMENT: PAIN_FUNCTIONAL_ASSESSMENT: ACTIVITIES ARE NOT PREVENTED

## 2021-12-02 ASSESSMENT — PAIN DESCRIPTION - DESCRIPTORS: DESCRIPTORS: ACHING;DISCOMFORT

## 2021-12-02 ASSESSMENT — PAIN DESCRIPTION - ONSET: ONSET: ON-GOING

## 2021-12-02 ASSESSMENT — PAIN DESCRIPTION - LOCATION: LOCATION: SHOULDER;HIP

## 2021-12-02 ASSESSMENT — PAIN DESCRIPTION - PROGRESSION: CLINICAL_PROGRESSION: GRADUALLY IMPROVING

## 2021-12-02 NOTE — PATIENT CARE CONFERENCE
[address] NOTE/PATIENT PLAN OF CARE    The physician was present and led this team conference    Date: 2021  Admission date: 10/22/2021  Patient Name: Jamie Pollock        MRN: [id number]    : 1975  (39 y.o.)  Gender: female   Rehab diagnosis/surgery with date:  CVA-right MCA of 10/12/21  craniectomy 10/13/21  Impairment Group Code:  1.1      MEDICAL/FUNCTIONAL HISTORY/STATUS:  stable medically, continues to make gains    Consultations/Labs/X-rays: none recent, will follow up with cardiology as an outpatient for tachycardia      MEDICATION UPDATE:    baclofen  5 mg Oral TID     lamoTRIgine  50 mg Oral Daily    enoxaparin  40 mg SubCUTAneous Daily    polyethylene glycol  17 g Oral BID    lidocaine  1 patch TransDERmal Daily    nicotine  1 patch TransDERmal Daily    senna  2 tablet Oral Nightly    metoprolol tartrate  12.5 mg Oral BID    gabapentin  100 mg Oral TID    tamsulosin  0.4 mg Oral Daily    aspirin  81 mg Oral Daily    atorvastatin  40 mg Oral Nightly    vitamin D  50,000 Units Oral Weekly    docusate sodium  100 mg Oral BID    melatonin  6 mg Oral Nightly    clopidogrel  75 mg Oral Daily       NURSING :    Bowel:   Always Continent  [x]   Occasionally incontinent  []   Frequently incontinent  []   Always incontinent  []   Not occurred  []     Bladder:   Always Continent  [x]    Incontinent less than daily[]   Incontinent  daily []   Always incontinent  []   No urine output    []   Indwelling catheter []       Toilet Hygiene:   Current level : standby assist  Short term Toilet hygiene goal: supervision  Long term toilet hygiene goal:  supervision    Skin integrity: bruising, crani incision healing, continues to wear helmet  Pain: left shoulder, tylenol effective    NUTRITION    Diet  regular  Liquid consistency   thin    SOCIAL INFORMATION:  Lives with: dtr, 15 yr old  Prior [address] Architecture:  going to mom's at discharge:  ranch with 2 entry, 2 rails  Prior Level of function:  independent  DME:  none    FAMILY / PATIENT EDUCATION:  family education ongoing 2-3 times weekly    PHYSICAL THERAPY    Bed mobility:   Current level: standby assist-min assist  Short term bed mobility goal: met  Long term bed mobility goal: supervision    Chair/bed transfers:  Current level: Contact guard assist with small based quad cane  Short term Chair/bed transfers goal: met  Long term Chair/bed transfers goal: standby assist      Ambulation:   Current level: 200 ft small based quad cane  at Contact guard assist, left AFO  Short term ambulation goal: met  Long term ambulation goal: 500 ft at standby assist    Wheelchair Mobility:  Current level: 150 ft at min assist, right arm and leg  Short term wheelchair goal: 150 ft at supervision  Long term wheelchair goal: 150 ft at supervision      Car transfers:   Current level: Contact guard assist  Short term car transfers goal: met  Long term car transfers goal:standby assist    Stairs:   Current level : 12 with 1 rail at min assist  Short term stairs goal: met  Long term stairs goal: 12 at standby assist    Lower Extremity Strength Issues:   RLE 5/5  LLE hip flexion 2+/5  Knee extension 2-/5  Ankle DF/PF 0/5  Other comments: family education  with mom and dad, step- dad and daughter is going well      OCCUPATIONAL THERAPY:      Tub/shower:   Current level: standby assist  Short term tub/shower goal: supervision  Long term tub/shower goal: supervision      Feeding:  Current level: supervision  Short term feeding goal: supervision  Long term feeding goal: supervision    Grooming:   Current level: Modified independent  Short term grooming goal: met  Long term grooming goal: supervision , goal exceeded    Bathing:  Current level: Contact guard assist  Short term bathing goal: supervision  Long term bathing goal: supervision    Homemaking:   Current level: mod assist  Short term homemaking goal: standby assist  Long term homemaking goal: standby assist    Upper body dressing:  Current level: min assist , struggles with bra  Short term upper body dressing goal: supervision  Long term upper body dressing goal: supervision    Lower body dressing:                                                                          Current level: mod assist            Short term lower body dressing goal: supervision          Long term lower body dressing goal: supervision            Footwear  Current level: mod assist due to shoe and AFL  Short term goal: supervision  Long term goal:supervision      Toilet transfer:   Current level: standby assist  Short term toilet transfer goal: supervision  Long term toilet transfer goal: supervision    Upper body strength issues: using resting hand splint at night on the left , sling during therapy      SPEECH THERAPY-met goals, speech therapy has signed off case      Safety awareness: fair      Patient/family's personal goals: \"get home for [place]\"  Factors supporting goal achievement:  making gains, good family support  Factors hindering goal achievement:  left side weakness      Discharge Plan   Estimated Length of Stay: 12/7/21            Destination: home   Services at Discharge: home health for PT,OT, aide, nursing  Equipment at Discharge: small based quad cane, 18 inch wheelchair,  foam cushion , lap tray, 3 in 1 commode, extended tub bench      INTERDISCIPLINARY TEAM/PHYSICIAN RECOMMENDATION AND/OR REVISIONS OF PLAN OF CARE:  prepare for 12/07/21 discharge      I approve the established interdisciplinary plan of care as documented [facility]. Electronically signed by Veronica Butt RN  on 12/2/2021 at 8:34 AM  The following interdisciplinary team members were present:  SJ Lopez, [facility],LSW  Lalo Jolley, PT, DPT  Tg Hawkins.  Cesar Allen, DPT  Elana Almeida, OT

## 2021-12-02 NOTE — HOME CARE
[address] [facility] 9 referral received, awaiting final orders. Spoke with patient and verified demographics. Will follow. Brenton Retana LPN [id number] [facility] 9.

## 2021-12-02 NOTE — CARE COORDINATION
Per team meeting:  D/c 12/7. Updated patient and mother. PAtient is thrilled to be d/c on her birthday. Patient made great gains. All goals are for Sup which her mother will be providing. Dc to mother's home:  [address], [facility], [address] for wheelchair, cushion, narrow base quad cane and 3 in 1 commode. [facility] for nursing/PT/OT/aid. FT - frequently occured 2 times per week. The Plan for Transition of Care is related to the following treatment goals: home    The Patient and/or patient representative mother was provided with a choice of provider and agrees   with the discharge plan. [x] Yes [] No    Freedom of choice list was provided with basic dialogue that supports the patient's individualized plan of care/goals, treatment preferences and shares the quality data associated with the providers.  [x] Yes [] No    LUCIANO Oakes

## 2021-12-02 NOTE — PROGRESS NOTES
Physical Therapy  Treatment Note  Supervising Therapist: Lalo Jolley, PT, DPT [id number]    NAME: Floresita [place]  ROOM: [facility]5/5505-B  DIAGNOSIS: Acute CVA  PRECAUTIONS: Falls, helmet at all times when OOB, L hemiplegia, L homonymous hemianopsia L neglect, alarm,  L PRAFO, SBP <180, orange dot with mother. HPI: Pt is a 39 y.o. female with past medical history significant for recent COVID-19 infection (9/15/21) who [facility]21 with acute onset left-sided weakness. Initial NIHSS 17.  Initial CT (OS[facility]) showed R MCA hyperdensity; initial CTA showed right ICA/M1 occlusion. On 10/13, her neuro exam worsened and repeat imaging indicated worsening edema, 1 cm of midline shift. She underwent emergent R hemicraniectomy by Dr. Charleen Lara [facility]) on 10/13. After being deemed medically appropriate, she was transferred to [place] [address] on 10/22/2021    Social:  Pt lives with daughter (15 y/o), will be returning to parent's home upon discharge (1 floor plan, 2 HAIDER with 2 HR). Mother and father healthy, do not work. 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 Initial Evaluation  Date: 10/23/21 AM     PM    Short Term Goals Long Term Goals    Was pt agreeable to Eval/treatment? yes Yes Yes     Does pt have pain? Pain on the top of her head once the helmet was placed on Mild c/o L hip  L hip pain      Bed Mobility  Rolling: Max A  Supine to sit: Max A   Sit to supine: Max A   Scooting: Max A Right side of mat  Rolling sba  Sit to supine min for L LE  Supine to sit sba    Supine>Sit SBA  Sit>Supine Noemy (LLE assist)  Scooting/Rolling SBA Min A Supervision   Transfers Sit to stand: Max A   Stand to sit: Max A  Stand pivot:  Max A x2    5xSTS: Unable to complete Sit<>stand: CGA  Stand pivot: CGA with R SBQC Sit<>stand: CGA  Stand pivot: CGA with R SBQC   Min A SBA  5xSTS: TBD   Ambulation    15 feet with R mackey rail with Max A + wc follow    10mWT: TBA  6mWT: TBA 280feet x 1reps with R  200 feet x 1  SPC and CGA  (L articulating AFO with DF/PF stop)       280 feet x 2 reps with R SBQC and CGA  (L articulating AFO with DF/PF stop)     50 feet with AAD with Min A       500 feet with AAD with SBA    10mWT: >0.4 m/s  6mWT: >205 m   Walking 10 feet on uneven surface NT NT NT 10 feet with AAD with Min A 10 feet with AAD with SBA   Wheel Chair Mobility NT NT NT  150 feet with R extremities with SBA   Car Transfers NT cga  CGA  Min A SBA   Stair negotiation: ascended and descended NT NT 12 steps x 1 rep with 1 HR with cga       4 steps with 1 rail with Min A 12 steps with 1 rail with SBA   Curb Step:   ascended and descended NT NT NT 4 inch step with AAD and Min A 4 inch step with AAD and SBA   Picking up object off the floor NT NT NT Will  an object with Min assist Will  an object with SBA   BLE ROM WFL: LLE AROM limited by flaccidity  WFL: LLE AROM limited by spasticity 2+ on MAS (hamstrings, hip extensors, PF)  L ankle DF lacking ~5 degrees from neutral      BLE Strength RLE 4+/5  LLE 0/5 RLE 5/5  LLE hip flexion 2+/5  Knee extension 2-/5  Ankle DF/PF 0/5      Balance  Static Sitting: Mod/Max A    BBS: TBA  FGA: TBA   Standing: Noemy  BBS: 11/56  (11/16/21)           BBS: >20/56  FGA: TBD   Date Family Teach Completed TBA Pt's mother present for observation 10/24, 11/2, 11/3, 114, hands on 11/9, 11/11, 11/16, 11/18, 11/23, aunt present to observe 11/17  Dad present 11/22, 11/24, 12/1 for hands on training  Step father and daughter present 11/26 for hands on training      Is additional Family Teaching Needed?   Y or N Y Yes      Hindering Progress L hemiplegia, L neglect L hemiplegia, L neglect      PT recommended ELOS 5 weeks       Team's Discharge Plan        Therapist at Team Meeting                                 Therapeutic Exercise:   AM: Ambulation with R SPC, 9lb weight LLE, 150 feet x 2 reps with cues for increased speed  BW walking with R SPC, 9lb weight LLE, 35 feet x 2 reps  PM: NA    Patient education  Pt educated on safe hand placement with transfers     Patient response to education:   Pt verbalized understanding Pt demonstrated skill Pt requires further education in this area   yes partial yes     Additional Comments: Pt remains pleasant and motivated during sessions. L neglect remains most evident barrier to independent function as pt requires frequent directional cueing. L circumduction with gait when advancing LE, cues for cane placement. PM   Mother declined FT   Pt ambulation better with swing through gait this pm.   Pt has poor carry over of downward gaze through out ambulation. Educated pt on her weakness in her leg and how important safety. Pt also fixated on her phone this afternoon. Plan to continue gait training next session. AM  Time in: 0830  Time out: 0915    PM  Time in:  1300  Time out: 9856    Pt is making goood progress toward established Physical Therapy goals. Continue with physical therapy current plan of care.     Lissa Sandifer, [address]

## 2021-12-02 NOTE — PROGRESS NOTES
OCCUPATIONAL THERAPY DAILY NOTE    Date:2021  Patient Name: Floresita [place]  MRN: [id number]  : 1975  Room: [address] [place] [address]     Diagnosis:Acute CVA( Pt [facility] as Level 2 stroke alert on 10/11/21. L side weakness, neglect. NIH-17  Found to have large R MCA involving R occipital)  Surgery: R frontotemporal decompressive hemicraniectomy on 10/13/21  Past Medical History: COVID (9/15), tobacco use  Precautions: Falls, L hemiplegia, L neglect, L sublux, bed/chair alarm, helmet when OOB, impulsive, L PRAFO & orange dot with mom    Functional Assessment:   Date Status AE  Comments   Feeding 21 Set-up     Grooming 21 Mod I w/c          Oral Care 21 Mod I     Bathing 21 CGA Shower chair with wheels     UB Dressing 21 Melisa     LB Dressing 21 CGA reacher    Footwear 21 Mod A Lh shoe horn    Toileting 21 SBA Grab bar      Homemaking 21 Mod A       Functional Transfers / Balance:   Date Status DME  Comments   Sit Balance 21 SBA w/c     Stand Balance 21 SBA SBQC  Gait belt   Grab bar  Bed rail      [x] Tub        [x] Shower   Transfer 21 Min A          SBA Extended tub bench, SBQC      Roll in shower chair, grab bar, LBQC    Commode   Transfer 21 SBA  3:1 commode SBQC  Grab bar     Functional   Mobility 21 SBA SBQC  Gait belt. Back to rm in PM     Functional Exercises / Activity:  Pt sitting in chair upon arrival to OT gym in AM. Pt completed PROM, 3x15 reps (elbow/shoulder/wrist/digit flex/ext) of UE to increase ROM and maintain joint integrity. Pt demonstrates tone at elbow extension. Participated in arm scait, 3x10 reps using LUE to increase ROM for ease with ADLs. Pt required slight hand over hand assist from RUE. Engaged in therex of towel slides while standing at table to increase LUE ROM, 3x15 reps shoulder flex/ab/adduction and proprioceptive input. Pt able to stand at [facility] and used RUE for assist. Therapist used massager on pt's LUE to elicit neuromuscular response ~15 mins. Pt reported no irritation or discomfort with massage. No response noted. Pt sitting in chair upon arrival to OT gym in PM. Completed functional mobility. Participated in armbike exercise, seated, 2x5 mins, to increase endurance for ease with ADLs. Engaged in therex, seated, using 5# DB, 3x15 reps (elbow/shoulder flex), to increase BUE strength for ease with functional transfers. Completed functional mobility back to . Sensory / Neuromuscular Re-Education:      Cognitive Skills:   Status Comments   Problem   Solving fair    Memory fair    Sequencing fair    Safety fair      Visual Perception:    Education:  Pt educated on LUE positioning when in bed and up in w/c in order to maintain shoulder joint integrity. 10/25/21: pt educated on SROM of LUE and safe positioning of LUE  10/29/21: educated on 1 handed dressing technique for socks/shoes  11/1/21: shoe lace adapted for ease with donning shoe  11/1/21: attempted small strips of kineosio tape on pt's hand to assess if pt can tolerate on her shoulder due to report of allergy (cleared by )  11/2/21:  Therapist applied kineosiotape to pt's LUE to stabilize sublux. Will continue to assess issues that arise with adhesive and progression of sublux  11/2/21: pt educated on AE for ease with LB dressing  11/3/21: Pt very fatigued in AM and PM sessions, requiring max cueing to alert pt back to task and engage in session, with pt asking multiple times to return to room/return to bed. Extended time to complete all tasks  11/4/21: home ROM exercise program placed in pt's folder and pt's mother notified of it  11/7/21 Pt educated about LUE positioning both up in w/c and in bed to maintain joint integrity. 11/17/21: resting hand applied to pt's L hand due to report of tone at digits. This is to be worn at night time and will be monitored for further tone.   11/19/21: pt appears to be tolerating resting hand splint and skin integrity look well    [x] Family teach completed on:  11/4/21: Completed family teach this session. Pt's mother Junior Lopez present. Educated Junior Lopez on pt's status with ADLs and vc's for sequencing/initiation of tasks/hand placement with functional transfers. Junior Lopez able to provide hands on assist with was educated for body mechanics/hand placement/vc's to increase safety with only commode transfer with slideboard and toileting task. Junior Lopez educated on PROM (elbow/shoulder/wrist/digit flex/ext) of LUE to increase ROM, maintain joint integrity, and safe management of sublux. Junior Lopez educated on how to doff/jesus gait belt and LUE sling. Pt's limiting factor appears to be her impulsivity and safety with ADLs/transfer. Junior Lopez demo'ing F understanding/safety of education. Junior Lopez appeared motivated and willing to learn. Continue to educate. 11/9/21: Completed family teach this session. Pt's mother Junior Lopez present. Educated Junior Lopez on pt's increased independence with ADLs/functional transfers. Junior Lopez able to jesus pt's sling and gait belt. Junior Lopez completed hands on assist with SPT to [facility]. Junior Lopez and pt demo'ing improved communication with each other for safe transfer/completion of ADLs. Junior Lopez demo'ing F understanding/safety of education. Continue to educate. 11/11/21- Mother present and educated along with assisting with w/c<>commode transfers using grab bar for balance and one handed skills. LUE placed in sling for balance and joint protection. Pt/mom educated with ext tub bench transfers to simulate for home environment with parent participating with transfer. Pt needed cues for hand/trunk placement on bench along with proper follow thru for safety. Pt/mom educated SPT using quad cane from w/c <>ext tub bench with patient needing cues for technique with cane & taking few steps.       11/16/21 Mom present and educated with transfers on/off firm recliner, sofa and ext tub bench transfers at quad cane level along with standing balance and positioning for LUE seated up in w/c.    11/17/21: Pt's aunt present. She observed as well as assisted pt intermittently with ADLs. 11/18/21- Pt mom & observed am OT treatment while pt evaluated using [employer] arm using pt LUE   11/23/21- Mom present during morning session, assisting pt with toileting task and transfer and extended tub bench transfer. Mom stating of feeling comfortable with ADL tasks. 11/26/21- Mom, Dad and Daughter present for FT and educated with functional transfer training, fxl mobility using quad cane including over carpet along with LUE ROM ex's for HEP.    12/2/21: family did not show up for scheduled FT in AM.        Pain Level: No c/o pain this date. Additional Notes:     Patient has made good progress during treatment sessions toward set goals. Therapy emphasis to obtain goals:    Time Frame for Long term goals  6 weeks   Long term goal 1 Pt will complete UB self-care tasks w/ Supervision   Long term goal 2 Pt will complete LB self-care tasks w/ Sup underwear, pants & socks but Min A to don AFO   Long term goal 3 Pt will complete toileting (all aspects) w/ Supervision   Long term goal 4 Pt will safely complete toilet transfers w/ Supervision   Long term goal 5 Pt will safely complete tub/shower transfers w/ Supervision   Long term goals 6 Pt will complete basic homemaking task w/ SBA   Long term goal 7 Pt will complete grooming task w/ Supervision   Long term goal 8 Pt will complete feeding task w/ Supervision/setup     [x] Continue with current OT Plan of care. [] Prepare for Discharge    10/27/21 OT plan of care updated on this date. Tentative length of stay is 6 weeks Fernando Bowels OTR/L [id number]  11/3/21- Pt POC updated on this date. Pt tentative length of day is 5 weeks to address above problem areas David Jesus OTR/L [id number]  11/10/21- Pt POC updated on this date.  Pt tentative length of day is 4 weeks to address above problem areas Slime Montoya OTR/L [id number]  11/17/21- Pt POC updated on this date. Pt tentative length of day is 3 weeks to address above problem areas Slime Montoya OTR/L [id number]  11/24/21 OT plan of care updated on this date. Tentative length of stay is 2 weeks . Gwenetta Meckel OTR/L [id number]   12/2/21 OT plan of care updated on this date. Tentative length of stay is 12/7/21. PILAR Sherman, OTR/L [id number]    DISCHARGE RECOMMENDATIONS  Recommended DME:  3:1 commode, ext tub bench.      Post Discharge Care:   []Home Independently  []Home with 24hr Care / Supervision []Home with Partial Supervision []Home with Home Health OT []Home with Out Pt OT []Other: ___   Comments:         Time in Time out Tx Time Breakdown  Variance:   First Session  [id number] [x] Individual Tx- 45  [] Concurrent Tx -  [] Co-Tx -   [] Group Tx -   [] Time Missed -     Second Session [id number] [x] Individual Tx- 45  [] Concurrent Tx -  [] Co-Tx -   [] Group Tx -   [] Time Missed -     Third Session    [] Individual Tx-   [] Concurrent Tx -  [] Co-Tx -   [] Group Tx -   [] Time Missed -         Total Tx Time: Cassius 42, [address] [facility], OTR/L [id number]

## 2021-12-03 PROCEDURE — 97112 NEUROMUSCULAR REEDUCATION: CPT

## 2021-12-03 PROCEDURE — 97116 GAIT TRAINING THERAPY: CPT

## 2021-12-03 PROCEDURE — 6370000000 HC RX 637 (ALT 250 FOR IP): Performed by: PHYSICAL MEDICINE & REHABILITATION

## 2021-12-03 PROCEDURE — 1280000000 HC REHAB R&B

## 2021-12-03 PROCEDURE — 97530 THERAPEUTIC ACTIVITIES: CPT

## 2021-12-03 PROCEDURE — 6360000002 HC RX W HCPCS: Performed by: PHYSICAL MEDICINE & REHABILITATION

## 2021-12-03 RX ADMIN — Medication 6 MG: at 20:58

## 2021-12-03 RX ADMIN — LAMOTRIGINE 50 MG: 25 TABLET ORAL at 08:33

## 2021-12-03 RX ADMIN — ATORVASTATIN CALCIUM 40 MG: 40 TABLET, FILM COATED ORAL at 20:58

## 2021-12-03 RX ADMIN — METOPROLOL TARTRATE 12.5 MG: 25 TABLET, FILM COATED ORAL at 20:59

## 2021-12-03 RX ADMIN — CLOPIDOGREL 75 MG: 75 TABLET, FILM COATED ORAL at 08:33

## 2021-12-03 RX ADMIN — BACLOFEN 5 MG: 10 TABLET ORAL at 20:58

## 2021-12-03 RX ADMIN — TAMSULOSIN HYDROCHLORIDE 0.4 MG: 0.4 CAPSULE ORAL at 08:36

## 2021-12-03 RX ADMIN — SENNOSIDES 17.2 MG: 8.6 TABLET, COATED ORAL at 20:58

## 2021-12-03 RX ADMIN — ASPIRIN 81 MG CHEWABLE TABLET 81 MG: 81 TABLET CHEWABLE at 08:33

## 2021-12-03 RX ADMIN — ENOXAPARIN SODIUM 40 MG: 100 INJECTION SUBCUTANEOUS at 20:57

## 2021-12-03 RX ADMIN — GABAPENTIN 100 MG: 100 CAPSULE ORAL at 20:58

## 2021-12-03 RX ADMIN — GABAPENTIN 100 MG: 100 CAPSULE ORAL at 14:25

## 2021-12-03 RX ADMIN — GABAPENTIN 100 MG: 100 CAPSULE ORAL at 08:32

## 2021-12-03 RX ADMIN — METOPROLOL TARTRATE 12.5 MG: 25 TABLET, FILM COATED ORAL at 08:36

## 2021-12-03 RX ADMIN — BACLOFEN 5 MG: 10 TABLET ORAL at 08:33

## 2021-12-03 RX ADMIN — DOCUSATE SODIUM 100 MG: 100 CAPSULE, LIQUID FILLED ORAL at 20:58

## 2021-12-03 RX ADMIN — BACLOFEN 5 MG: 10 TABLET ORAL at 14:25

## 2021-12-03 NOTE — PROGRESS NOTES
Progress Note - Covering Dr. Orlando Astorga    Subjective/   39y.o. year old female on the rehab unit for stroke/craniectomy. She denies complaints. Tolerating therapy program.  No SOB, chest pain, dizziness, nausea or vomiting. Objective/   VITALS:  /67   Pulse 86   Temp 98.7 °F (37.1 °C) (Temporal)   Resp 16   Ht 5' 6\" (1.676 m)   Wt 170 lb (77.1 kg)   SpO2 95%   BMI 27.44 kg/m²   24HR INTAKE/OUTPUT:    Intake/Output Summary (Last 24 hours) at 12/2/2021 1903  Last data filed at 12/2/2021 1130  Gross per 24 hour   Intake 840 ml   Output --   Net 840 ml     Constitutional:  Alert, awake, no apparent distress   Cardiovascular:  S1, S2 without m/r/g   Respiratory:  CTA B without w/r/r   Abdomen: +BS  Ext: no pitting LE edema  Neuro: awake and alert, (L) trent. Good sitting balance. Functional Level        Date   Status   AE    Comments     Feeding   12/1/21   Set-up             Grooming   12/1/21   Mod I   w/c               Oral Care   12/1/21   Mod I             Bathing   12/1/21   CGA   Shower chair with wheels          UB Dressing   12/1/21   Melisa             LB Dressing   12/1/21   CGA   reacher         Footwear   12/1/21   Mod A Lh shoe horn         Toileting   12/1/21   SBA   Grab bar            Homemaking   11/9/21   Mod A                  Functional Transfers / Balance:      Date Status DME  Comments   Sit Balance 12/1/21   SBA w/c     Stand Balance 12/1/21   SBA SBQC  Gait belt   Grab bar  Bed rail       [x]? Tub           [x]? Shower   Transfer 11/26/21 12/1/21 Min A             SBA Extended tub bench, SBQC        Roll in shower chair, grab bar, LBQC     Commode   Transfer 12/1/21   SBA  3:1 commode SBQC  Grab bar      Functional   Mobility 12/1/21   SBA SBQC  Gait belt.   Back to rm in PM      Functional Exercises / Activity:  Pt sitting in chair upon arrival to OT gym in AM. Pt completed PROM, 3x15 reps (elbow/shoulder/wrist/digit flex/ext) of UE to increase ROM and maintain joint integrity. Pt demonstrates tone at elbow extension. Participated in arm scait, 3x10 reps using LUE to increase ROM for ease with ADLs. Pt required slight hand over hand assist from RUE. Engaged in therex of towel slides while standing at table to increase LUE ROM, 3x15 reps shoulder flex/ab/adduction and proprioceptive input. Pt able to stand at [facility] and used RUE for assist. Therapist used massager on pt's LUE to elicit neuromuscular response ~15 mins. Pt reported no irritation or discomfort with massage. No response noted.      Pt sitting in chair upon arrival to OT gym in PM. Completed functional mobility. Participated in armbike exercise, seated, 2x5 mins, to increase endurance for ease with ADLs. Engaged in therex, seated, using 5# DB, 3x15 reps (elbow/shoulder flex), to increase BUE strength for ease with functional transfers. Completed functional mobility back to .      Sensory / Neuromuscular Re-Education:        Cognitive Skills:    Status Comments   Problem   Solving fair     Memory fair     Sequencing fair     Safety fair            Scheduled Meds:   baclofen  5 mg Oral TID    lamoTRIgine  50 mg Oral Daily    enoxaparin  40 mg SubCUTAneous Daily    polyethylene glycol  17 g Oral BID    lidocaine  1 patch TransDERmal Daily    nicotine  1 patch TransDERmal Daily    senna  2 tablet Oral Nightly    metoprolol tartrate  12.5 mg Oral BID    gabapentin  100 mg Oral TID    tamsulosin  0.4 mg Oral Daily    aspirin  81 mg Oral Daily    atorvastatin  40 mg Oral Nightly    vitamin D  50,000 Units Oral Weekly    docusate sodium  100 mg Oral BID    melatonin  6 mg Oral Nightly    clopidogrel  75 mg Oral Daily     Continuous Infusions:  PRN Meds:diclofenac sodium, diphenhydrAMINE-zinc acetate, ondansetron, lactulose, guaiFENesin, acetaminophen, magnesium hydroxide, bisacodyl  I/O last 3 completed shifts: In: 840 [P.O.:840]  Out: -   No intake/output data recorded.     Labs reviewed  CBC:

## 2021-12-03 NOTE — PROGRESS NOTES
OCCUPATIONAL THERAPY DAILY NOTE    Date:2021  Patient Name: Miguel Reyna  MRN: [id number]  : 1975  Room: [address] [place] [address]     Diagnosis:Acute CVA( Pt presented to [facility] as Level 2 stroke alert on 10/11/21. L side weakness, neglect. NIH-17  Found to have large R MCA involving R occipital)  Surgery: R frontotemporal decompressive hemicraniectomy on 10/13/21  Past Medical History: COVID (9/15), tobacco use  Precautions: Falls, L hemiplegia, L neglect, L sublux, bed/chair alarm, helmet when OOB, impulsive, L PRAFO & orange dot with mom    Functional Assessment:   Date Status AE  Comments   Feeding 21 Set-up     Grooming 21 Mod I w/c          Oral Care 21 Mod I     Bathing 21 CGA Shower chair with wheels     UB Dressing 21 Melisa     LB Dressing 21 CGA reacher    Footwear 21 Mod A Lh shoe horn    Toileting 21 SBA Grab bar      Homemaking 11/3/21 Min A SBQC See below     Functional Transfers / Balance:   Date Status DME  Comments   Sit Balance 21 SBA w/c     Stand Balance 21 SBA SBQC  Gait belt   Grab bar  Bed rail      [x] Tub        [x] Shower   Transfer 21 Min A          SBA Extended tub bench, SBQC      Roll in shower chair, grab bar, LBQC    Commode   Transfer 21 SBA  3:1 commode SBQC  Grab bar     Functional   Mobility 21 SBA SBQC  Gait belt. Functional Exercises / Activity:  Pt sitting in chair upon arrival to OT gym in AM. Engaged in light homemaking task at [address] [place] [address]. Pt able to complete baking task with set-up of items. Pt sat while she placed and removed muffins from oven. Pt had 2 LOB in kitchen. Pt able to follow directions on bag with no vc's. Pt lastly stood to wash dishes at sink. Therapist completed scapular mobilizations, 4x10 reps, to LUE to maintain joint integrity for ease with ADLs. Pt reported no discomfort. Sensory / Neuromuscular Re-Education:  Pt completed InMotion Robotic Arm for L UE.  L was ace wrapped for positioning and chair straps were utilized to decrease compensatory movements. 10 cm & 14 cm Wiyot was used. Pt completed active assistive (10 cm) and fan south (14 cm) protocol for 432 reps including pre and post testing. Pt demo'd improvements in RI from 15 to 15, DFT from 10 to 6, MJ from 116 to 109 and DFSL from 11 to 7. Pt demo'd improvements from pre to post testing in smoothness from 0.215 to 0.216, reach error from 0.108 to 0.099, mean velocity from 0.012 to 0.013 and path error from 0.067 to 0.057. All InMotion exercises are performed to increase function of L UE to assist with ADL's and IADL's. Pt appreciates the immediate feedback that is provided using the InMotion arm. Cognitive Skills:   Status Comments   Problem   Solving fair    Memory fair    Sequencing fair    Safety fair      Visual Perception:    Education:  Pt educated on LUE positioning when in bed and up in w/c in order to maintain shoulder joint integrity. 10/25/21: pt educated on SROM of LUE and safe positioning of LUE  10/29/21: educated on 1 handed dressing technique for socks/shoes  11/1/21: shoe lace adapted for ease with donning shoe  11/1/21: attempted small strips of kineosio tape on pt's hand to assess if pt can tolerate on her shoulder due to report of allergy (cleared by )  11/2/21:  Therapist applied kineosiotape to pt's LUE to stabilize sublux. Will continue to assess issues that arise with adhesive and progression of sublux  11/2/21: pt educated on AE for ease with LB dressing  11/3/21: Pt very fatigued in AM and PM sessions, requiring max cueing to alert pt back to task and engage in session, with pt asking multiple times to return to room/return to bed. Extended time to complete all tasks  11/4/21: home ROM exercise program placed in pt's folder and pt's mother notified of it  11/7/21 Pt educated about LUE positioning both up in w/c and in bed to maintain joint integrity.    11/17/21: resting hand applied to pt's L hand due to report of tone at digits. This is to be worn at night time and will be monitored for further tone. 11/19/21: pt appears to be tolerating resting hand splint and skin integrity look well    [x] Family teach completed on:  11/4/21: Completed family teach this session. Pt's mother Donovan Servin present. Educated Donovan Servin on pt's status with ADLs and vc's for sequencing/initiation of tasks/hand placement with functional transfers. Donovan Servin able to provide hands on assist with was educated for body mechanics/hand placement/vc's to increase safety with only commode transfer with slideboard and toileting task. Donovan Servin educated on PROM (elbow/shoulder/wrist/digit flex/ext) of LUE to increase ROM, maintain joint integrity, and safe management of sublux. Donovan Servin educated on how to doff/jesus gait belt and LUE sling. Pt's limiting factor appears to be her impulsivity and safety with ADLs/transfer. Donovan Servin demo'ing F understanding/safety of education. Donovan Servin appeared motivated and willing to learn. Continue to educate. 11/9/21: Completed family teach this session. Pt's mother Donovan Servin present. Educated Donovan Servin on pt's increased independence with ADLs/functional transfers. Donovan Servin able to jesus pt's sling and gait belt. Donovan Servin completed hands on assist with SPT to [facility]. Donovan Servin and pt demo'ing improved communication with each other for safe transfer/completion of ADLs. Donovan Servin demo'ing F understanding/safety of education. Continue to educate. 11/11/21- Mother present and educated along with assisting with w/c<>commode transfers using grab bar for balance and one handed skills. LUE placed in sling for balance and joint protection. Pt/mom educated with ext tub bench transfers to simulate for home environment with parent participating with transfer. Pt needed cues for hand/trunk placement on bench along with proper follow thru for safety.   Pt/mom educated SPT using quad cane from w/c <>ext tub bench with patient is 5 weeks to address above problem areas Melecio Vazquez OTR/L [id number]  11/10/21- Pt POC updated on this date. Pt tentative length of day is 4 weeks to address above problem areas Helga Graham OTR/L [id number]  11/17/21- Pt POC updated on this date. Pt tentative length of day is 3 weeks to address above problem areas Helga Graham OTR/L [id number]  11/24/21 OT plan of care updated on this date. Tentative length of stay is 2 weeks . Dortha Leyden OTR/L [id number]   12/2/21 OT plan of care updated on this date. Tentative length of stay is 12/7/21. PILAR Mendoza, OTR/L [id number]    DISCHARGE RECOMMENDATIONS  Recommended DME:  3:1 commode, ext tub bench.      Post Discharge Care:   []Home Independently  []Home with 24hr Care / Supervision []Home with Partial Supervision []Home with Home Health OT []Home with Out Pt OT []Other: ___   Comments:         Time in Time out Tx Time Breakdown  Variance:   First Session  [id number] [x] Individual Tx- 45  [] Concurrent Tx -  [] Co-Tx -   [] Group Tx -   [] Time Missed -     Second Session [id number] [x] Individual Tx- 45 Mins  [] Concurrent Tx -  [] Co-Tx -   [] Group Tx -   [] Time Missed -     Third Session    [] Individual Tx-   [] Concurrent Tx -  [] Co-Tx -   [] Group Tx -   [] Time Missed -         Total Tx Time: 90 Mins     PILAR Mendoza, OTR/L [address] [place]/L [id number]

## 2021-12-03 NOTE — PROGRESS NOTES
Physical Therapy  Treatment Note  Supervising Therapist: Mary Ansari, PT, DPT [id number]    NAME: Miguel Reyna  [facility]  DIAGNOSIS: Acute CVA  PRECAUTIONS: Falls, helmet at all times when OOB, L hemiplegia, L homonymous hemianopsia L neglect, alarm,  L PRAFO, SBP <180, orange dot with mother. HPI: Pt is a 39 y.o. female with past medical history significant for recent COVID-19 infection (9/15/21) who [facility] on [facility]21 with acute onset left-sided weakness. Initial NIHSS 17.  Initial CT ([facility]) showed R MCA hyperdensity; initial CTA showed right ICA/M1 occlusion. On 10/13, her neuro exam worsened and repeat imaging indicated worsening edema, 1 cm of midline shift. She underwent emergent R hemicraniectomy by Dr. Phuc Gonzalez [facility]) on 10/13. After being deemed medically appropriate, she was transferred to [place] [id number] on 10/22/2021    Social:  Pt lives with daughter (15 y/o), will be returning to parent's home upon discharge (1 floor plan, 2 HAIDER with 2 HR). Mother and father healthy, do not work. 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 Initial Evaluation  Date: 10/23/21 AM     PM    Short Term Goals Long Term Goals    Was pt agreeable to Eval/treatment? yes Yes Yes     Does pt have pain? Pain on the top of her head once the helmet was placed on Mild c/o L hip and R rib pain Mild c/o moderate L hip/knee pain with activity     Bed Mobility  Rolling: Max A  Supine to sit: Max A   Sit to supine: Max A   Scooting: Max A NT Supine>Sit SBA  Sit>Supine Noemy (LLE assist)  Scooting/Rolling SBA Min A Supervision   Transfers Sit to stand: Max A   Stand to sit: Max A  Stand pivot:  Max A x2    5xSTS: Unable to complete Sit<>stand: CGA  Stand pivot: CGA with R SBQC Sit<>stand: CGA  Stand pivot: CGA with R SBQC   Min A SBA  5xSTS: TBD   Ambulation    15 feet with R mackey rail with Max A + wc follow    10mWT: TBA  6mWT:  feet x 2 reps Therapy  [id number]

## 2021-12-04 PROCEDURE — 6370000000 HC RX 637 (ALT 250 FOR IP): Performed by: PHYSICAL MEDICINE & REHABILITATION

## 2021-12-04 PROCEDURE — 99232 SBSQ HOSP IP/OBS MODERATE 35: CPT | Performed by: PHYSICAL MEDICINE & REHABILITATION

## 2021-12-04 PROCEDURE — 1280000000 HC REHAB R&B

## 2021-12-04 PROCEDURE — 97110 THERAPEUTIC EXERCISES: CPT

## 2021-12-04 PROCEDURE — 97116 GAIT TRAINING THERAPY: CPT

## 2021-12-04 PROCEDURE — 6360000002 HC RX W HCPCS: Performed by: PHYSICAL MEDICINE & REHABILITATION

## 2021-12-04 RX ADMIN — GABAPENTIN 100 MG: 100 CAPSULE ORAL at 21:01

## 2021-12-04 RX ADMIN — METOPROLOL TARTRATE 12.5 MG: 25 TABLET, FILM COATED ORAL at 09:05

## 2021-12-04 RX ADMIN — ASPIRIN 81 MG CHEWABLE TABLET 81 MG: 81 TABLET CHEWABLE at 09:05

## 2021-12-04 RX ADMIN — DOCUSATE SODIUM 100 MG: 100 CAPSULE, LIQUID FILLED ORAL at 09:05

## 2021-12-04 RX ADMIN — DICLOFENAC SODIUM 4 G: 10 GEL TOPICAL at 14:58

## 2021-12-04 RX ADMIN — BACLOFEN 5 MG: 10 TABLET ORAL at 09:05

## 2021-12-04 RX ADMIN — ENOXAPARIN SODIUM 40 MG: 100 INJECTION SUBCUTANEOUS at 21:00

## 2021-12-04 RX ADMIN — GABAPENTIN 100 MG: 100 CAPSULE ORAL at 14:30

## 2021-12-04 RX ADMIN — BACLOFEN 5 MG: 10 TABLET ORAL at 21:01

## 2021-12-04 RX ADMIN — CLOPIDOGREL 75 MG: 75 TABLET, FILM COATED ORAL at 09:05

## 2021-12-04 RX ADMIN — DOCUSATE SODIUM 100 MG: 100 CAPSULE, LIQUID FILLED ORAL at 21:01

## 2021-12-04 RX ADMIN — TAMSULOSIN HYDROCHLORIDE 0.4 MG: 0.4 CAPSULE ORAL at 09:05

## 2021-12-04 RX ADMIN — ATORVASTATIN CALCIUM 40 MG: 40 TABLET, FILM COATED ORAL at 21:01

## 2021-12-04 RX ADMIN — BACLOFEN 5 MG: 10 TABLET ORAL at 14:30

## 2021-12-04 RX ADMIN — METOPROLOL TARTRATE 12.5 MG: 25 TABLET, FILM COATED ORAL at 21:01

## 2021-12-04 RX ADMIN — LAMOTRIGINE 50 MG: 25 TABLET ORAL at 09:05

## 2021-12-04 RX ADMIN — ERGOCALCIFEROL 50000 UNITS: 1.25 CAPSULE ORAL at 09:05

## 2021-12-04 RX ADMIN — Medication 6 MG: at 21:00

## 2021-12-04 RX ADMIN — GABAPENTIN 100 MG: 100 CAPSULE ORAL at 09:05

## 2021-12-04 RX ADMIN — SENNOSIDES 17.2 MG: 8.6 TABLET, COATED ORAL at 21:03

## 2021-12-04 ASSESSMENT — PAIN SCALES - GENERAL
PAINLEVEL_OUTOF10: 0
PAINLEVEL_OUTOF10: 0

## 2021-12-04 ASSESSMENT — PAIN DESCRIPTION - PROGRESSION
CLINICAL_PROGRESSION: GRADUALLY IMPROVING
CLINICAL_PROGRESSION: GRADUALLY IMPROVING

## 2021-12-04 ASSESSMENT — PAIN SCALES - WONG BAKER
WONGBAKER_NUMERICALRESPONSE: 0

## 2021-12-04 NOTE — PROGRESS NOTES
Progress Note    Subjective/   39y.o. year old female on the rehab unit for stroke and craniectomy. She is seen ambulating with PT in hallway. She has no complaints. Therapy has AFO on (L) and notes when gait is slow and deliberate less genu recurvatum. Patient has no knee pain. No SOB, chest pain. Objective/   VITALS:  /72   Pulse 72   Temp 97.9 °F (36.6 °C) (Oral)   Resp 18   Ht 5' 6\" (1.676 m)   Wt 170 lb (77.1 kg)   SpO2 99%   BMI 27.44 kg/m²   24HR INTAKE/OUTPUT:    Intake/Output Summary (Last 24 hours) at 12/3/2021 2211  Last data filed at 12/3/2021 1700  Gross per 24 hour   Intake 1080 ml   Output --   Net 1080 ml     Constitutional:  Alert, awake, no apparent distress   Cardiovascular:  S1, S2 without m/r/g   Respiratory:  CTA B without w/r/r   Abdomen: +BS  Ext: no pitting LE edema, genu recurvatum standing with weight on (L) LE and with AFO in place  Neuro: aaox3, supervision standing balance    Functional Level      Initial Evaluation  Date: 10/23/21 AM     PM    Short Term Goals Long Term Goals    Was pt agreeable to Eval/treatment? yes Yes Yes       Does pt have pain? Pain on the top of her head once the helmet was placed on Mild c/o L hip and R rib pain Mild c/o moderate L hip/knee pain with activity       Bed Mobility  Rolling: Max A  Supine to sit: Max A   Sit to supine: Max A   Scooting: Max A NT Supine>Sit SBA  Sit>Supine Noemy (LLE assist)  Scooting/Rolling SBA Min A Supervision   Transfers Sit to stand: Max A   Stand to sit: Max A  Stand pivot:  Max A x2     5xSTS: Unable to complete Sit<>stand: CGA  Stand pivot: CGA with R SBQC Sit<>stand: CGA  Stand pivot: CGA with R SBQC    Min A SBA  5xSTS: TBD   Ambulation    15 feet with R mackey rail with Max A + wc follow     10mWT: TBA  6mWT:  feet x 2 reps with R SPC and SBA  (L articulating AFO with DF/PF stop)     10mWT: 0.15 m/s  6mWT: 52 m  (R SBQC, L Trial AFO, Noemy 11/16)       200 feet x 4 reps without AD with Donaldo  (L articulating AFO with DF/PF stop, L swedish knee cage)       50 feet with AAD with Min A          500 feet with AAD with SBA     10mWT: >0.4 m/s  6mWT: >205 m   Walking 10 feet on uneven surface NT NT NT 10 feet with AAD with Min A 10 feet with AAD with SBA   Wheel Chair Mobility NT NT NT   150 feet with R extremities with SBA   Car Transfers NT NT CGA  Min A SBA   Stair negotiation: ascended and descended NT NT 12 steps x 1 rep with 1 HR with Donaldo   (ascending/descending with LLE, non-reciprocal) 4 steps with 1 rail with Min A 12 steps with 1 rail with SBA   Curb Step:   ascended and descended NT 4\" step with R SPC, 9lb weight LLE x  2 reps with Donaldo NT 4 inch step with AAD and Min A 4 inch step with AAD and SBA   Picking up object off the floor NT NT NT Will  an object with Min assist Will  an object with SBA   BLE ROM WFL: LLE AROM limited by flaccidity  WFL: LLE AROM limited by spasticity 2+ on MAS (hamstrings, hip extensors, PF)  L ankle DF lacking ~5 degrees from neutral         BLE Strength RLE 4+/5  LLE 0/5 RLE 5/5  LLE hip flexion 2+/5  Knee extension 2-/5  Ankle DF/PF 0/5         Balance  Static Sitting: Mod/Max A     BBS: TBA  FGA: TBA    Standing: Donaldo  BBS: 11/56  (11/16/21)                 BBS: >20/56  FGA: TBD         Scheduled Meds:   baclofen  5 mg Oral TID    lamoTRIgine  50 mg Oral Daily    enoxaparin  40 mg SubCUTAneous Daily    polyethylene glycol  17 g Oral BID    lidocaine  1 patch TransDERmal Daily    nicotine  1 patch TransDERmal Daily    senna  2 tablet Oral Nightly    metoprolol tartrate  12.5 mg Oral BID    gabapentin  100 mg Oral TID    tamsulosin  0.4 mg Oral Daily    aspirin  81 mg Oral Daily    atorvastatin  40 mg Oral Nightly    vitamin D  50,000 Units Oral Weekly    docusate sodium  100 mg Oral BID    melatonin  6 mg Oral Nightly    clopidogrel  75 mg Oral Daily     Continuous Infusions:  PRN Meds:diclofenac sodium, diphenhydrAMINE-zinc acetate, ondansetron, lactulose, guaiFENesin, acetaminophen, magnesium hydroxide, bisacodyl  I/O last 3 completed shifts: In: 840 [P.O.:840]  Out: -   I/O this shift: In: 480 [P.O.:480]  Out: -     Labs reviewed  CBC: No results for input(s): WBC, HGB, PLT in the last 72 hours. BMP:  No results for input(s): NA, K, CL, CO2, BUN, CREATININE, GLUCOSE in the last 72 hours. Hepatic: No results for input(s): AST, ALT, ALB, BILITOT, ALKPHOS in the last 72 hours. BNP: No results for input(s): BNP in the last 72 hours. Lipids: No results for input(s): CHOL, HDL in the last 72 hours. Invalid input(s): LDLCALCU  INR: No results for input(s): INR in the last 72 hours. Assessment/  Patient Active Problem List:     Acute cerebrovascular accident (CVA) ([facility] [address]     Status post craniectomy     Left spastic hemiplegia (HCC)     Tobacco abuse     Acute pain     Transient alteration of awareness      Plan/  1. Continue rehab program  2. Continue current medications  3. Continue mobilize as able  4. Continue precautions per neurosurgery  5.  Continue dvt prophylaxis          Chastity Cordova MD

## 2021-12-04 NOTE — PLAN OF CARE
Problem: Pain:  Goal: Pain level will decrease  Description: Pain level will decrease  12/3/2021 2220 by Leonela Nelson RN  Outcome: Met This Shift  12/3/2021 2141 by Marya Manning RN  Outcome: Ongoing  12/3/2021 1615 by Panchito Diggs RN  Outcome: Ongoing  Goal: Control of acute pain  Description: Control of acute pain  12/3/2021 2220 by Leonela Nelson RN  Outcome: Met This Shift  12/3/2021 2141 by Marya Manning RN  Outcome: Ongoing  12/3/2021 1615 by Panchito Diggs RN  Outcome: Ongoing  Goal: Control of chronic pain  Description: Control of chronic pain  12/3/2021 2220 by Leonela Nelson RN  Outcome: Met This Shift  12/3/2021 2141 by Marya Manning RN  Outcome: Ongoing  12/3/2021 1615 by Panchito Diggs RN  Outcome: Ongoing  Goal: Patient's pain/discomfort is manageable  Description: Patient's pain/discomfort is manageable  12/3/2021 2220 by Leonela Nelson RN  Outcome: Met This Shift  12/3/2021 2141 by Marya Manning RN  Outcome: Ongoing  12/3/2021 1615 by Panchito Diggs RN  Outcome: Ongoing     Problem: Falls - Risk of:  Goal: Will remain free from falls  Description: Will remain free from falls  12/3/2021 2220 by Leonela Nelson RN  Outcome: Met This Shift  12/3/2021 2141 by Marya Manning RN  Outcome: Ongoing  12/3/2021 1615 by Panchito Diggs RN  Outcome: Ongoing  Goal: Absence of physical injury  Description: Absence of physical injury  12/3/2021 2220 by Leonela Nelson RN  Outcome: Met This Shift  12/3/2021 2141 by Marya Manning RN  Outcome: Ongoing  12/3/2021 1615 by Panchito Diggs RN  Outcome: Ongoing     Problem: Skin Integrity:  Goal: Will show no infection signs and symptoms  Description: Will show no infection signs and symptoms  12/3/2021 2220 by Leonela Nelson RN  Outcome: Met This Shift  12/3/2021 2141 by Marya Manning RN  Outcome: Ongoing  12/3/2021 1615 by Panchito Diggs RN  Outcome: Ongoing  Goal: Absence of new skin breakdown  Description: Absence of new skin breakdown  12/3/2021 2220 by Geoffrey Canseco Hoa Celaya RN  Outcome: Met This Shift  12/3/2021 2141 by Tal Murry RN  Outcome: Ongoing  12/3/2021 1615 by Zev Zuniga RN  Outcome: Ongoing     Problem: ABCDS Injury Assessment  Goal: Absence of physical injury  12/3/2021 2220 by Magali Dukes RN  Outcome: Met This Shift  12/3/2021 2141 by Tal Murry RN  Outcome: Ongoing  12/3/2021 1615 by Zev Zuniga RN  Outcome: Ongoing     Problem: HEMODYNAMIC STATUS  Goal: Patient has stable vital signs and fluid balance  12/3/2021 2220 by Magali Dukes RN  Outcome: Met This Shift  12/3/2021 2141 by Tal Murry RN  Outcome: Ongoing  12/3/2021 1615 by Zev Zuniga RN  Outcome: Ongoing     Problem: ACTIVITY INTOLERANCE/IMPAIRED MOBILITY  Goal: Mobility/activity is maintained at optimum level for patient  12/3/2021 2220 by Magali Dukes RN  Outcome: Met This Shift  12/3/2021 2141 by Tal Murry RN  Outcome: Ongoing  12/3/2021 1615 by Zev Zuniga RN  Outcome: Ongoing     Problem: COMMUNICATION IMPAIRMENT  Goal: Ability to express needs and understand communication  12/3/2021 2220 by Magali Dukes RN  Outcome: Met This Shift  12/3/2021 2141 by Tal Murry RN  Outcome: Ongoing  12/3/2021 1615 by Zev Zuniga RN  Outcome: Ongoing     Problem: Infection:  Goal: Will remain free from infection  Description: Will remain free from infection  12/3/2021 2220 by Magali Dukes RN  Outcome: Met This Shift  12/3/2021 2141 by Tal Murry RN  Outcome: Ongoing  12/3/2021 1615 by Zev Zuniga RN  Outcome: Ongoing     Problem: Safety:  Goal: Free from accidental physical injury  Description: Free from accidental physical injury  12/3/2021 2220 by Magali Dukes RN  Outcome: Met This Shift  12/3/2021 2141 by Tal Murry RN  Outcome: Ongoing  12/3/2021 1615 by Zev Zuniga RN  Outcome: Ongoing  Goal: Free from intentional harm  Description: Free from intentional harm  12/3/2021 2220 by Lacretia Halon, RN  Outcome: Met This Shift  12/3/2021 2141 by Tal Murry, RN  Outcome: Ongoing  12/3/2021 1615 by Rosa Dockery RN  Outcome: Ongoing     Problem: Daily Care:  Goal: Daily care needs are met  Description: Daily care needs are met  12/3/2021 2220 by Jared Banks RN  Outcome: Met This Shift  12/3/2021 2141 by Clover Rose RN  Outcome: Ongoing  12/3/2021 1615 by Rosa Dockery RN  Outcome: Ongoing     Problem: Skin Integrity:  Goal: Skin integrity will stabilize  Description: Skin integrity will stabilize  12/3/2021 2220 by Jared Banks RN  Outcome: Met This Shift  12/3/2021 2141 by Clover Rose RN  Outcome: Ongoing  12/3/2021 1615 by Rosa Dockery RN  Outcome: Ongoing     Problem: Discharge Planning:  Goal: Patients continuum of care needs are met  Description: Patients continuum of care needs are met  12/3/2021 2220 by Jared Banks RN  Outcome: Met This Shift  12/3/2021 2141 by Clover Rose RN  Outcome: Ongoing  12/3/2021 1615 by Rosa Dockery RN  Outcome: Ongoing

## 2021-12-04 NOTE — PROGRESS NOTES
Physical Therapy  Treatment Note  Supervising Therapist: Maria M Bartlett, PT, DPT [id number]    NAME: Pranav Samson  [facility]  DIAGNOSIS: Acute CVA  PRECAUTIONS: Falls, helmet at all times when OOB, L hemiplegia, L homonymous hemianopsia L neglect, alarm,  L PRAFO, SBP <180, orange dot with mother. HPI: Pt is a 39 y.o. female with past medical history significant for recent COVID-19 infection (9/15/21) who [facility] with acute onset left-sided weakness. Initial NIHSS 17.  Initial CT (OSH) showed R MCA hyperdensity; initial CTA showed right ICA/M1 occlusion. On 10/13, her neuro exam worsened and repeat imaging indicated worsening edema, 1 cm of midline shift. She underwent emergent R hemicraniectomy by Dr. Reinier Kingsley [facility]) on 10/13. After being deemed medically appropriate, she was transferred to [place] [address] on 10/22/2021    Social:  Pt lives with daughter (15 y/o), will be returning to parent's home upon discharge (1 floor plan, 2 HAIDER with 2 HR). Mother and father healthy, do not work. 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 Initial Evaluation  Date: 10/23/21 AM     PM    Short Term Goals Long Term Goals    Was pt agreeable to Eval/treatment? yes Yes      Does pt have pain? Pain on the top of her head once the helmet was placed on Mild c/o L hip and R rib pain      Bed Mobility  Rolling: Max A  Supine to sit: Max A   Sit to supine: Max A   Scooting: Max A NT  Min A Supervision   Transfers Sit to stand: Max A   Stand to sit: Max A  Stand pivot:  Max A x2    5xSTS: Unable to complete Sit<>stand: CGA  Stand pivot: CGA with R SPC  Min A SBA  5xSTS: TBD   Ambulation    15 feet with R mackey rail with Max A + wc follow    10mWT: TBA  6mWT:  feet x 2 reps with R SPC and SBA  (L articulating AFO with DF/PF stop)    10mWT: 0.15 m/s  6mWT: 52 m  (R SBQC, L Trial AFO, Noemy 11/16)      50 feet with AAD with Min A       500 feet with AAD with SBA    10mWT: >0.4 m/s  6mWT: >205 m   Walking 10 feet on uneven surface NT NT  10 feet with AAD with Min A 10 feet with AAD with SBA   Wheel Chair Mobility NT NT   150 feet with R extremities with SBA   Car Transfers NT CGA  Min A SBA   Stair negotiation: ascended and descended NT NT  4 steps with 1 rail with Min A 12 steps with 1 rail with SBA   Curb Step:   ascended and descended NT 2\" step without AD x 4 reps with Noemy  4 inch step with AAD and Min A 4 inch step with AAD and SBA   Picking up object off the floor NT NT  Will  an object with Min assist Will  an object with SBA   BLE ROM WFL: LLE AROM limited by flaccidity  WFL: LLE AROM limited by spasticity 2+ on MAS (hamstrings, hip extensors, PF)  L ankle DF lacking ~5 degrees from neutral      BLE Strength RLE 4+/5  LLE 0/5 RLE 5/5  LLE hip flexion 2+/5  Knee extension 2-/5  Ankle DF/PF 0/5      Balance  Static Sitting: Mod/Max A    BBS: TBA  FGA: TBA   Standing: Noemy  BBS: 11/56  (11/16/21)           BBS: >20/56  FGA: TBD   Date Family Teach Completed TBA Pt's mother present for observation 10/24, 11/2, 11/3, 114, hands on 11/9, 11/11, 11/16, 11/18, 11/23, aunt present to observe 11/17  Dad present 11/22, 11/24, 12/1 for hands on training  Step father and daughter present 11/26 for hands on training      Is additional Family Teaching Needed?   Y or N Y Yes      Hindering Progress L hemiplegia, L neglect L hemiplegia, L neglect      PT recommended ELOS 5 weeks       Team's Discharge Plan        Therapist at Team Meeting            Therapeutic Exercise:   AM: Ambulation without AD and R HHA (fw propulsion assisted by therapist) 200 feet x 2 reps with Noemy  Without AD, weaving between 4 standing quad canes followed by FW non-reciprocal stepping over 3 SPC on floor (leading with LLE) followed by ascending/descending 2\" curb step (leading with LLE), x 4 reps with Noemy    Patient education  Pt educated on strategies to maintain neutral LLE alignment    Patient response to education:   Pt verbalized understanding Pt demonstrated skill Pt requires further education in this area   yes partial yes     Additional Comments: Pt remains pleasant and motivated during session. Pt ambulated to/from rehab gym with [facility] with cues for direction required. Activity without UE support progressed to include further obstacle negotiation, pt remains impulsive and frequently reaches for support. Maori knee cage utilized with all standing tasks without UE support in order to control genu recurvatum which is more pronounced when UE support is reduced. Pt returned to room with RN present and all needs met following session. AM  Time in: 0945  Time out: 1015    Pt is making good progress toward established Physical Therapy goals. Continue with physical therapy current plan of care.     Mary Ansari, PT, DPT  Board Certified Clinical Specialist in Neurologic Physical Therapy  [id number]

## 2021-12-04 NOTE — PROGRESS NOTES
[facility]  Comprehensive Progress Note    Subjective:      Salbador Tadeo is a 39 y.o. female admitted to inpatient rehabilitation for impairments and activities limitations in ADLs and mobility secondary to right MCA CVA. No acute events overnight. No cp, sob, n/v. Tolerating therapy. No complaints. The patient's medical records have been reviewed. Scheduled Meds:baclofen, 5 mg, TID  lamoTRIgine, 50 mg, Daily  enoxaparin, 40 mg, Daily  polyethylene glycol, 17 g, BID  lidocaine, 1 patch, Daily  nicotine, 1 patch, Daily  senna, 2 tablet, Nightly  metoprolol tartrate, 12.5 mg, BID  gabapentin, 100 mg, TID  tamsulosin, 0.4 mg, Daily  aspirin, 81 mg, Daily  atorvastatin, 40 mg, Nightly  vitamin D, 50,000 Units, Weekly  docusate sodium, 100 mg, BID  melatonin, 6 mg, Nightly  clopidogrel, 75 mg, Daily      Continuous Infusions:  PRN Meds:diclofenac sodium, 4 g, 4x Daily PRN  diphenhydrAMINE-zinc acetate, , Q6H PRN  ondansetron, 4 mg, Q6H PRN  lactulose, 20 g, BID PRN  guaiFENesin, 200 mg, BID PRN  acetaminophen, 650 mg, Q4H PRN  magnesium hydroxide, 30 mL, Daily PRN  bisacodyl, 10 mg, Daily PRN         Objective:      Vitals:    12/02/21 2034 12/03/21 0836 12/03/21 2300 12/04/21 0900   BP: (!) 100/52 115/72 129/61 130/73   Pulse: 70 72 87 84   Resp:  18 18 18   Temp:  97.9 °F (36.6 °C) 98.2 °F (36.8 °C) 98 °F (36.7 °C)   TempSrc:  Oral Temporal Temporal   SpO2:  99% 95% 96%   Weight:       Height:           General appearance: alert, NAD, up in w/c   Head: R crani site c/d/i   Eyes: conjunctivae/corneas clear. PERRL, EOM's intact. Lungs: clear to auscultation bilaterally  Heart: regular rate and rhythm, S1, S2 normal  Abdomen: soft, non-tender, normal bowel sounds  Extremities: extremities normal, atraumatic, no cyanosis or edema  MSK: no cyanosis, clubbing, edema  Skin: R crani c/d/i   Neurologic: Alert, oriented x4. Answering all questions appropriately. Left neglect. Speech clear. No aphasia appreciated. Mild L facial droop. Spastic left hemiplegia. MAS 2 in LUE; MAS 1+-2 in LLE. Motor 5/5 RUE and RLE; 0/5 LUE. LLE is 2/5 HF;  1/5 KE;  0/5 DF and PF      Exam stable     Laboratory:    Lab Results   Component Value Date    WBC 7.3 11/18/2021    HGB 11.8 11/18/2021    HCT 36.3 11/18/2021    MCV 93.6 11/18/2021     11/18/2021     Lab Results   Component Value Date     11/18/2021    K 4.6 11/18/2021     11/18/2021    CO2 25 11/18/2021    BUN 14 11/18/2021    CREATININE 0.7 11/18/2021    GLUCOSE 89 11/18/2021    CALCIUM 9.2 11/18/2021      Lab Results   Component Value Date    ALT 75 (H) 10/23/2021    AST 46 (H) 10/23/2021    ALKPHOS 89 10/23/2021    BILITOT 0.4 10/23/2021       Functional Status:   Bed mobility: Min A  Transfers: CGA  Ambulation: 220 ft with R SPC, L articulating AFO, SBA  Feeding: Set up  Grooming: Mod I  UB dressing: Min A  LB dressing: CGA       Assessment/Plan:       39 y.o. female admitted to inpatient rehabilitation for impairments and activities limitations in ADLs and mobility secondary to right MCA CVA.      -Right MCA CVA: Complicated by cerebral edema requiring emergent right hemicraniectomy (10/13/2021). Dense left spastic hemiplegia, left neglect, mild cognitive impairment. Helmet when out of bed. L PRAFO in bed. LUE sling for gait. L w/c tray. Obtained left AFO. Aspirin, Plavix, Lipitor for secondary prevention. Monitor neuro exam. Continue Acute Rehab program.   -Spasticity LUE/LLE: Monitor. Some degree of tone currently helpful to compensate for weakness with attempted gait in PT. Started on low dose baclofen due to worsening painful spasticity.    -Possible seizure:  EEG with no seizure activity, but showed an increased potential for focal seizures from the right frontotemporal region. Evaluated by Neurology.  Started on Lamictal - 25 mg daily x 2 weeks (start 11/11), then 50 mg daily x 2 weeks (start 11/25), then 100 mg daily x 2 weeks (start 12/9), then 50 mg qAM and 100 mg qHS x 2 weeks (start 12/23), then 100 mg BID for maintenance (start1/6/22). -Pain control, MSK pain, neuropathic pain: Tylenol PRN, gabapentin, Lidoderm, Voltaren gel  -Urinary retention: On flomax, now voiding well, low PVRs  -Tachycardia: intermittent. Cardiology consulted. She was started on Lopressor. Cardiology attempted to access data from her heart monitor unsuccessfully. She will follow up with Cardiology in office to review heart monitor data once available. 30 day cardiac monitoring completed and monitor mailed in for analysis per instructions on device.   -Constipation: colace, senna, glycolax. PRNs available.   -Tobacco abuse: Nicoderm patch  -DVT prophylaxis: lovenox SQ, SCDs    Monitor spasticity. Continue baclofen at current dose for now.     Decrease Nicoderm to 7mg patch      Electronically signed by Jonathan Snyder MD on 12/4/2021 at 11:21 AM

## 2021-12-04 NOTE — PROGRESS NOTES
OCCUPATIONAL THERAPY DAILY NOTE    Date:2021  Patient Name: Miguel Reyna  MRN: [id number]  : 1975  Room: [address] [place], [place] [address]     Diagnosis:Acute CVA( [facility] as Level 2 stroke alert on 10/11/21. L side weakness, neglect. NIH-17  Found to have large R MCA involving R occipital)  Surgery: R frontotemporal decompressive hemicraniectomy on 10/13/21  Past Medical History: COVID (9/15), tobacco use  Precautions: Falls, L hemiplegia, L neglect, L sublux, bed/chair alarm, helmet when OOB, impulsive, L PRAFO & orange dot with mom    Functional Assessment:   Date Status AE  Comments   Feeding 21 Set-up     Grooming 21 Mod I w/c          Oral Care 21 Mod I     Bathing 21 CGA Shower chair with wheels     UB Dressing 21 Melisa     LB Dressing 21 CGA reacher    Footwear 21 Mod A Lh shoe horn    Toileting 21 SBA Grab bar      Homemaking 11/3/21 Min A SBQC See below     Functional Transfers / Balance:   Date Status DME  Comments   Sit Balance 21 SBA w/c     Stand Balance 21 SBA SBQC  Gait belt   Grab bar  Bed rail      [x] Tub        [x] Shower   Transfer 21 Min A          SBA Extended tub bench, SBQC      Roll in shower chair, grab bar, LBQC    Commode   Transfer 21 SBA  3:1 commode SBQC  Grab bar     Functional   Mobility 21 SBA SBQC  Gait belt. Functional Exercises / Activity:  Arm bike for ~5mins x2, with rest break, LUE supported with ace bandage, focusing on increasing of activity tolerance, strength and ROM of BUE's  -PROM and retrograde massage to LUE/hand/digits, per pt's request, for multiple reps in multiple planes, with rest breaks, focusing on increasing of ROM and prevention of contractures.  Pt complaining of minimal pain with movement    Sensory / Neuromuscular Re-Education:        Cognitive Skills:   Status Comments   Problem   Solving fair    Memory fair    Sequencing fair    Safety fair      Visual Perception:    Education:  Pt educated on ROM to hand/digits, importance of wearing splint, to prevent of further contractures to L hand/digits      10/25/21: pt educated on SROM of LUE and safe positioning of LUE  10/29/21: educated on 1 handed dressing technique for socks/shoes  11/1/21: shoe lace adapted for ease with donning shoe  11/1/21: attempted small strips of kineosio tape on pt's hand to assess if pt can tolerate on her shoulder due to report of allergy (cleared by )  11/2/21:  Therapist applied kineosiotape to pt's LUE to stabilize sublux. Will continue to assess issues that arise with adhesive and progression of sublux  11/2/21: pt educated on AE for ease with LB dressing  11/3/21: Pt very fatigued in AM and PM sessions, requiring max cueing to alert pt back to task and engage in session, with pt asking multiple times to return to room/return to bed. Extended time to complete all tasks  11/4/21: home ROM exercise program placed in pt's folder and pt's mother notified of it  11/7/21 Pt educated about LUE positioning both up in w/c and in bed to maintain joint integrity. 11/17/21: resting hand applied to pt's L hand due to report of tone at digits. This is to be worn at night time and will be monitored for further tone. 11/19/21: pt appears to be tolerating resting hand splint and skin integrity look well    [x] Family teach completed on:  11/4/21: Completed family teach this session. Pt's mother Mony Skinner present. Educated Mony Skinner on pt's status with ADLs and vc's for sequencing/initiation of tasks/hand placement with functional transfers. Mony Skinner able to provide hands on assist with was educated for body mechanics/hand placement/vc's to increase safety with only commode transfer with slideboard and toileting task. Mony Skinner educated on PROM (elbow/shoulder/wrist/digit flex/ext) of LUE to increase ROM, maintain joint integrity, and safe management of sublux.  Mony Skinner educated on how to doff/jesus gait belt and LUE sling. Pt's limiting factor appears to be her impulsivity and safety with ADLs/transfer. Chastity Sanchez demo'ing F understanding/safety of education. Chastity Sanchez appeared motivated and willing to learn. Continue to educate. 11/9/21: Completed family teach this session. Pt's mother Chastity Sanchez present. Educated Chastity Sanchez on pt's increased independence with ADLs/functional transfers. Chastity Sanchez able to jesus pt's sling and gait belt. Chastity Sanchez completed hands on assist with SPT to [facility]. Chastity Sanchez and pt demo'ing improved communication with each other for safe transfer/completion of ADLs. Chastity Sanchez luiso'ing F understanding/safety of education. Continue to educate. 11/11/21- Mother present and educated along with assisting with w/c<>commode transfers using grab bar for balance and one handed skills. LUE placed in sling for balance and joint protection. Pt/mom educated with ext tub bench transfers to simulate for home environment with parent participating with transfer. Pt needed cues for hand/trunk placement on bench along with proper follow thru for safety. Pt/mom educated SPT using quad cane from w/c <>ext tub bench with patient needing cues for technique with cane & taking few steps. 11/16/21 Mom present and educated with transfers on/off firm recliner, sofa and ext tub bench transfers at quad cane level along with standing balance and positioning for LUE seated up in w/c.    11/17/21: Pt's aunt present. She observed as well as assisted pt intermittently with ADLs. 11/18/21- Pt mom & observed am OT treatment while pt evaluated using [employer] arm using pt LUE   11/23/21- Mom present during morning session, assisting pt with toileting task and transfer and extended tub bench transfer. Mom stating of feeling comfortable with ADL tasks.     11/26/21- Mom, Dad and Daughter present for FT and educated with functional transfer training, fxl mobility using quad cane including over carpet along with LUE ROM ex's for HEP.    12/2/21: family did not show up for scheduled FT in AM.        Pain Level: No c/o pain this date. Additional Notes:     Patient has made good progress during treatment sessions toward set goals. Therapy emphasis to obtain goals:    Time Frame for Long term goals  6 weeks   Long term goal 1 Pt will complete UB self-care tasks w/ Supervision   Long term goal 2 Pt will complete LB self-care tasks w/ Sup underwear, pants & socks but Min A to don AFO   Long term goal 3 Pt will complete toileting (all aspects) w/ Supervision   Long term goal 4 Pt will safely complete toilet transfers w/ Supervision   Long term goal 5 Pt will safely complete tub/shower transfers w/ Supervision   Long term goals 6 Pt will complete basic homemaking task w/ SBA   Long term goal 7 Pt will complete grooming task w/ Supervision   Long term goal 8 Pt will complete feeding task w/ Supervision/setup     [x] Continue with current OT Plan of care. [] Prepare for Discharge    10/27/21 OT plan of care updated on this date. Tentative length of stay is 6 weeks Loretta Ramires OTR/L [id number]  11/3/21- Pt POC updated on this date. Pt tentative length of day is 5 weeks to address above problem areas Andreina Harp OTR/L [id number]  11/10/21- Pt POC updated on this date. Pt tentative length of day is 4 weeks to address above problem areas Loretta Ramires OTR/L [id number]  11/17/21- Pt POC updated on this date. Pt tentative length of day is 3 weeks to address above problem areas Loretta Ramires OTR/L [id number]  11/24/21 OT plan of care updated on this date. Tentative length of stay is 2 weeks . Solange Spatz OTR/L [id number]   12/2/21 OT plan of care updated on this date. Tentative length of stay is 12/7/21. PILAR Mata, OTR/L [id number]    DISCHARGE RECOMMENDATIONS  Recommended DME:  3:1 commode, ext tub bench.      Post Discharge Care:   []Home Independently  []Home with 24hr Care / Supervision []Home with Partial Supervision []Home with Home Health OT []Home with Out Pt OT []Other: ___   Comments:         Time in Time out Tx Time Breakdown  Variance:   First Session  8:30am 9:00am [x] Individual Tx- 30  [] Concurrent Tx -  [] Co-Tx -   [] Group Tx -   [] Time Missed -     Second Session   [] Individual Tx-   [] Concurrent Tx -  [] Co-Tx -   [] Group Tx -   [] Time Missed -     Third Session    [] Individual Tx-   [] Concurrent Tx -  [] Co-Tx -   [] Group Tx -   [] Time Missed -         Total Tx Time: 30 Mins     Trudy Tate GLOVER/L [id number]    I have read & agree with the above status  Judge Pope OTR/L [id number]

## 2021-12-05 PROCEDURE — 6360000002 HC RX W HCPCS: Performed by: PHYSICAL MEDICINE & REHABILITATION

## 2021-12-05 PROCEDURE — 97535 SELF CARE MNGMENT TRAINING: CPT

## 2021-12-05 PROCEDURE — 97530 THERAPEUTIC ACTIVITIES: CPT

## 2021-12-05 PROCEDURE — 6370000000 HC RX 637 (ALT 250 FOR IP): Performed by: PHYSICAL MEDICINE & REHABILITATION

## 2021-12-05 PROCEDURE — 1280000000 HC REHAB R&B

## 2021-12-05 PROCEDURE — 97116 GAIT TRAINING THERAPY: CPT

## 2021-12-05 PROCEDURE — 97110 THERAPEUTIC EXERCISES: CPT

## 2021-12-05 RX ADMIN — DOCUSATE SODIUM 100 MG: 100 CAPSULE, LIQUID FILLED ORAL at 08:55

## 2021-12-05 RX ADMIN — ATORVASTATIN CALCIUM 40 MG: 40 TABLET, FILM COATED ORAL at 20:52

## 2021-12-05 RX ADMIN — METOPROLOL TARTRATE 12.5 MG: 25 TABLET, FILM COATED ORAL at 08:56

## 2021-12-05 RX ADMIN — DOCUSATE SODIUM 100 MG: 100 CAPSULE, LIQUID FILLED ORAL at 20:52

## 2021-12-05 RX ADMIN — GABAPENTIN 100 MG: 100 CAPSULE ORAL at 13:22

## 2021-12-05 RX ADMIN — ENOXAPARIN SODIUM 40 MG: 100 INJECTION SUBCUTANEOUS at 20:51

## 2021-12-05 RX ADMIN — ACETAMINOPHEN 650 MG: 325 TABLET ORAL at 20:57

## 2021-12-05 RX ADMIN — BACLOFEN 5 MG: 10 TABLET ORAL at 08:57

## 2021-12-05 RX ADMIN — TAMSULOSIN HYDROCHLORIDE 0.4 MG: 0.4 CAPSULE ORAL at 08:54

## 2021-12-05 RX ADMIN — CLOPIDOGREL 75 MG: 75 TABLET, FILM COATED ORAL at 08:57

## 2021-12-05 RX ADMIN — SENNOSIDES 17.2 MG: 8.6 TABLET, COATED ORAL at 20:52

## 2021-12-05 RX ADMIN — GABAPENTIN 100 MG: 100 CAPSULE ORAL at 20:52

## 2021-12-05 RX ADMIN — Medication 6 MG: at 20:52

## 2021-12-05 RX ADMIN — LAMOTRIGINE 50 MG: 25 TABLET ORAL at 08:57

## 2021-12-05 RX ADMIN — BACLOFEN 5 MG: 10 TABLET ORAL at 13:22

## 2021-12-05 RX ADMIN — BACLOFEN 5 MG: 10 TABLET ORAL at 20:52

## 2021-12-05 RX ADMIN — ASPIRIN 81 MG CHEWABLE TABLET 81 MG: 81 TABLET CHEWABLE at 08:54

## 2021-12-05 RX ADMIN — GABAPENTIN 100 MG: 100 CAPSULE ORAL at 08:54

## 2021-12-05 ASSESSMENT — PAIN SCALES - GENERAL
PAINLEVEL_OUTOF10: 0
PAINLEVEL_OUTOF10: 4
PAINLEVEL_OUTOF10: 9

## 2021-12-05 ASSESSMENT — PAIN DESCRIPTION - ORIENTATION
ORIENTATION: LEFT
ORIENTATION: LEFT

## 2021-12-05 ASSESSMENT — PAIN SCALES - WONG BAKER: WONGBAKER_NUMERICALRESPONSE: 0

## 2021-12-05 ASSESSMENT — PAIN DESCRIPTION - DESCRIPTORS
DESCRIPTORS: ACHING;CONSTANT;DISCOMFORT
DESCRIPTORS: ACHING;CONSTANT;DISCOMFORT

## 2021-12-05 ASSESSMENT — PAIN DESCRIPTION - PROGRESSION
CLINICAL_PROGRESSION: GRADUALLY IMPROVING
CLINICAL_PROGRESSION: GRADUALLY IMPROVING

## 2021-12-05 ASSESSMENT — PAIN DESCRIPTION - FREQUENCY: FREQUENCY: INTERMITTENT

## 2021-12-05 ASSESSMENT — PAIN DESCRIPTION - PAIN TYPE
TYPE: ACUTE PAIN
TYPE: ACUTE PAIN

## 2021-12-05 ASSESSMENT — PAIN DESCRIPTION - LOCATION
LOCATION: SHOULDER;HIP
LOCATION: SHOULDER;HIP

## 2021-12-05 ASSESSMENT — PAIN DESCRIPTION - ONSET: ONSET: ON-GOING

## 2021-12-05 NOTE — PROGRESS NOTES
Physical Therapy  Treatment Note  Supervising Therapist: Melissa Bello, PT, DPT [id number]    NAME: Kody Henson  [facility]  DIAGNOSIS: Acute CVA  PRECAUTIONS: Falls, helmet at all times when OOB, L hemiplegia, L homonymous hemianopsia L neglect, alarm,  L PRAFO, SBP <180, orange dot with mother. HPI: Pt is a 39 y.o. female with past medical history significant for recent COVID-19 infection (9/15/21) [facility] with acute onset left-sided weakness. Initial NIHSS 17.  Initial CT (OSH) showed R MCA hyperdensity; initial CTA showed right ICA/M1 occlusion. On 10/13, her neuro exam worsened and repeat imaging indicated worsening edema, 1 cm of midline shift. She underwent emergent R hemicraniectomy by Dr. Gerard Cobb [facility]) on 10/13. After being deemed medically appropriate, she was transferred to [place] [address] on 10/22/2021    Social:  Pt lives with daughter (15 y/o), will be returning to parent's home upon discharge (1 floor plan, 2 HAIDER with 2 HR). Mother and father healthy, do not work. 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 Initial Evaluation  Date: 10/23/21 AM     PM    Short Term Goals Long Term Goals    Was pt agreeable to Eval/treatment? yes Yes      Does pt have pain? Pain on the top of her head once the helmet was placed on Mild c/o L hip and R rib pain      Bed Mobility  Rolling: Max A  Supine to sit: Max A   Sit to supine: Max A   Scooting: Max A NT  Min A Supervision   Transfers Sit to stand: Max A   Stand to sit: Max A  Stand pivot:  Max A x2    5xSTS: Unable to complete Sit<>stand: CGA  Stand pivot: CGA with R SPC  Min A SBA  5xSTS: TBD   Ambulation    15 feet with R mackey rail with Max A + wc follow    10mWT: TBA  6mWT:  feet x 3 reps with R SPC and SBA  (L articulating AFO with DF/PF stop)    10mWT: 0.15 m/s  6mWT: 52 m  (R SBQC, L Trial AFO, Noemy 11/16)      50 feet with AAD with Min A       500 feet with AAD with SBA    10mWT: >0.4 m/s  6mWT: >205 m   Walking 10 feet on uneven surface NT NT  10 feet with AAD with Min A 10 feet with AAD with SBA   Wheel Chair Mobility NT NT   150 feet with R extremities with SBA   Car Transfers NT CGA  Min A SBA   Stair negotiation: ascended and descended NT 12 steps with R HR (non-reciprocal) with CGA  4 steps with 1 rail with Min A 12 steps with 1 rail with SBA   Curb Step:   ascended and descended NT NT  4 inch step with AAD and Min A 4 inch step with AAD and SBA   Picking up object off the floor NT NT  Will  an object with Min assist Will  an object with SBA   BLE ROM WFL: LLE AROM limited by flaccidity  WFL: LLE AROM limited by spasticity 2+ on MAS (hamstrings, hip extensors, PF)  L ankle DF lacking ~5 degrees from neutral      BLE Strength RLE 4+/5  LLE 0/5 RLE 5/5  LLE hip flexion 2+/5  Knee extension 2-/5  Ankle DF/PF 0/5      Balance  Static Sitting: Mod/Max A    BBS: TBA  FGA: TBA   Standing: Noemy  BBS: 11/56  (11/16/21)           BBS: >20/56  FGA: TBD   Date Family Teach Completed TBA Pt's mother present for observation 10/24, 11/2, 11/3, 114, hands on 11/9, 11/11, 11/16, 11/18, 11/23, aunt present to observe 11/17  Dad present 11/22, 11/24, 12/1 for hands on training  Step father and daughter present 11/26 for hands on training      Is additional Family Teaching Needed? Y or N Y Yes      Hindering Progress L hemiplegia, L neglect L hemiplegia, L neglect      PT recommended ELOS 5 weeks       Team's Discharge Plan        Therapist at Team Meeting            Therapeutic Exercise:   AM: Ambulation without AD and 150 feet x 1 rep with CGA/Noemy    Patient education  Pt educated on strategies to maintain neutral LLE alignment    Patient response to education:   Pt verbalized understanding Pt demonstrated skill Pt requires further education in this area   yes partial yes     Additional Comments: Pt remains pleasant and motivated during session.  Pt ambulated to/from rehab gym with [facility] with cues for direction required. Various heel wedge combinations attempted to correct L genu recurvatum. Heel wedges added to R shoe to prevent functional leg length discrepancy. Genu recurvatum better controlled with 7/8 inch heel wedge to both paretic and non-paretic shoes. Pt returned to room with all needs met following session. AM  Time in: 1015  Time out: 1045    Pt is making good progress toward established Physical Therapy goals. Continue with physical therapy current plan of care.     Mary Ansari, PT, DPT  Board Certified Clinical Specialist in Neurologic Physical Therapy  [id number]

## 2021-12-05 NOTE — PROGRESS NOTES
OCCUPATIONAL THERAPY DAILY NOTE    Date:2021  Patient Name: Duke Wright  MRN: [id number]  : 1975  Room: [address] [place], [place] [address]     Diagnosis:Acute CVA( Pt presented to [facility] as Level 2 stroke alert on 10/11/21. L side weakness, neglect. NIH-17  Found to have large R MCA involving R occipital)  Surgery: R frontotemporal decompressive hemicraniectomy on 10/13/21  Past Medical History: COVID (9/15), tobacco use  Precautions: Falls, L hemiplegia, L neglect, L sublux, bed/chair alarm, helmet when OOB, impulsive, L PRAFO & orange dot with mom    Functional Assessment:   Date Status AE  Comments   Feeding 21 Set-up     Grooming 21 Mod I w/c          Oral Care 21 Mod I     Bathing 21 CGA Shower chair with wheels     UB Dressing 21 Melisa     LB Dressing 21 CGA reacher    Footwear 21 Mod A Lh shoe horn    Toileting 21 SBA Grab bar   Pt completed toileting task on standard commode, with pt able to complete of transfer with use of grab bar, complete hygiene to malvin area, and manage of clothes   Homemaking 11/3/21 Min A SBQC See below     Functional Transfers / Balance:   Date Status DME  Comments   Sit Balance 21 SBA w/c  Pt sat supported upright in w/c   Stand Balance 21 SBA SBQC  Gait belt   Grab bar  Bed rail   Pt stood during transfers and toileting task   [x] Tub        [x] Shower   Transfer 21 Min A          SBA Extended tub bench, SBQC      Roll in shower chair, grab bar, LBQC    Commode   Transfer 21 SBA  3:1 commode SBQC  Grab bar  3in1 commode with use of grab bar   Functional   Mobility 21 SBA SBQC  Gait belt. Functional Exercises / Activity:  -PROM and retrograde massage to LUE/hand/digits, per pt's request, for multiple reps in multiple planes, with rest breaks, focusing on increasing of ROM and prevention of contractures.  Pt complaining of minimal pain with movement  -Chandu box activity for 15 reps in multiple planes, with rest breaks, LUE supported with ace bandage, focusing on increasing of activity tolerance, strength and ROM of BUE's, core strengthening. Pt requiring Melisa to assist with movement to LUE    Sensory / Neuromuscular Re-Education:        Cognitive Skills:   Status Comments   Problem   Solving fair    Memory fair    Sequencing fair    Safety fair      Visual Perception:    Education:  Pt educated on safety and hand placement with toileting task      10/25/21: pt educated on SROM of LUE and safe positioning of LUE  10/29/21: educated on 1 handed dressing technique for socks/shoes  11/1/21: shoe lace adapted for ease with donning shoe  11/1/21: attempted small strips of kineosio tape on pt's hand to assess if pt can tolerate on her shoulder due to report of allergy (cleared by )  11/2/21:  Therapist applied kineosiotape to pt's LUE to stabilize sublux. Will continue to assess issues that arise with adhesive and progression of sublux  11/2/21: pt educated on AE for ease with LB dressing  11/3/21: Pt very fatigued in AM and PM sessions, requiring max cueing to alert pt back to task and engage in session, with pt asking multiple times to return to room/return to bed. Extended time to complete all tasks  11/4/21: home ROM exercise program placed in pt's folder and pt's mother notified of it  11/7/21 Pt educated about LUE positioning both up in w/c and in bed to maintain joint integrity. 11/17/21: resting hand applied to pt's L hand due to report of tone at digits. This is to be worn at night time and will be monitored for further tone. 11/19/21: pt appears to be tolerating resting hand splint and skin integrity look well    [x] Family teach completed on:  11/4/21: Completed family teach this session. Pt's mother Donovan Malathi present. Educated Donovan Servin on pt's status with ADLs and vc's for sequencing/initiation of tasks/hand placement with functional transfers.  Donovan Servin able to provide hands on assist with was educated for body mechanics/hand placement/vc's to increase safety with only commode transfer with slideboard and toileting task. Olegario Szymanski educated on PROM (elbow/shoulder/wrist/digit flex/ext) of LUE to increase ROM, maintain joint integrity, and safe management of sublux. Olegario Szymanski educated on how to doff/jesus gait belt and LUE sling. Pt's limiting factor appears to be her impulsivity and safety with ADLs/transfer. Olegario Szymanski demo'ing F understanding/safety of education. Olegario Szymanski appeared motivated and willing to learn. Continue to educate. 11/9/21: Completed family teach this session. Pt's mother Olegario Szymanski present. Educated Olegario Szymanski on pt's increased independence with ADLs/functional transfers. Olegario Szymanski able to jesus pt's sling and gait belt. Olegario Szymanski completed hands on assist with SPT to [facility]. Olegario Szymanski and pt demo'ing improved communication with each other for safe transfer/completion of ADLs. Olegario Szymanski demo'ing F understanding/safety of education. Continue to educate. 11/11/21- Mother present and educated along with assisting with w/c<>commode transfers using grab bar for balance and one handed skills. LUE placed in sling for balance and joint protection. Pt/mom educated with ext tub bench transfers to simulate for home environment with parent participating with transfer. Pt needed cues for hand/trunk placement on bench along with proper follow thru for safety. Pt/mom educated SPT using quad cane from w/c <>ext tub bench with patient needing cues for technique with cane & taking few steps. 11/16/21 Mom present and educated with transfers on/off firm recliner, sofa and ext tub bench transfers at quad cane level along with standing balance and positioning for LUE seated up in w/c.    11/17/21: Pt's aunt present. She observed as well as assisted pt intermittently with ADLs.    11/18/21- Pt mom & observed am OT treatment while pt evaluated using [employer] arm using pt LUE   11/23/21- Mom present during morning session, assisting pt with toileting task and transfer and extended tub bench transfer. Mom stating of feeling comfortable with ADL tasks. 11/26/21- Mom, Dad and Daughter present for FT and educated with functional transfer training, fxl mobility using quad cane including over carpet along with LUE ROM ex's for HEP.    12/2/21: family did not show up for scheduled FT in AM.        Pain Level: No c/o pain this date. Additional Notes:     Patient has made good progress during treatment sessions toward set goals. Therapy emphasis to obtain goals:    Time Frame for Long term goals  6 weeks   Long term goal 1 Pt will complete UB self-care tasks w/ Supervision   Long term goal 2 Pt will complete LB self-care tasks w/ Sup underwear, pants & socks but Min A to don AFO   Long term goal 3 Pt will complete toileting (all aspects) w/ Supervision   Long term goal 4 Pt will safely complete toilet transfers w/ Supervision   Long term goal 5 Pt will safely complete tub/shower transfers w/ Supervision   Long term goals 6 Pt will complete basic homemaking task w/ SBA   Long term goal 7 Pt will complete grooming task w/ Supervision   Long term goal 8 Pt will complete feeding task w/ Supervision/setup     [x] Continue with current OT Plan of care. [] Prepare for Discharge    10/27/21 OT plan of care updated on this date. Tentative length of stay is 6 weeks Reagan Colon OTR/L [id number]  11/3/21- Pt POC updated on this date. Pt tentative length of day is 5 weeks to address above problem areas Goldagueda Luciaomar OTR/L [id number]  11/10/21- Pt POC updated on this date. Pt tentative length of day is 4 weeks to address above problem areas Reagan Colon OTR/L [id number]  11/17/21- Pt POC updated on this date. Pt tentative length of day is 3 weeks to address above problem areas Reagan Colon OTR/L [id number]  11/24/21 OT plan of care updated on this date. Tentative length of stay is 2 weeks . Pacheco Anderson OTR/L [id number]   12/2/21 OT plan of care updated on this date. Tentative length of stay is 12/7/21. Mei Millard, MOT, OTR/L [id number]    DISCHARGE RECOMMENDATIONS  Recommended DME:  3:1 commode, ext tub bench.      Post Discharge Care:   []Home Independently  []Home with 24hr Care / Supervision []Home with Partial Supervision []Home with Home Health OT []Home with Out Pt OT []Other: ___   Comments:         Time in Time out Tx Time Breakdown  Variance:   First Session  8:05am 8:30am [x] Individual Tx- 25mins  [] Concurrent Tx -  [] Co-Tx -   [] Group Tx -   [] Time Missed -     Second Session   [] Individual Tx-   [] Concurrent Tx -  [] Co-Tx -   [] Group Tx -   [] Time Missed -     Third Session    [] Individual Tx-   [] Concurrent Tx -  [] Co-Tx -   [] Group Tx -   [] Time Missed -         Total Tx Time: 25 Mins     Trudy Tate GLOVER/L [id number]      I have read & agree with the above status  Nael Moser OTR/L [id number]

## 2021-12-05 NOTE — PROGRESS NOTES
12/05/21 1215   Attendance   Activity Cards   Participation Active participation   [facility] Demonstrates ability to complete social goals   Leisure Education Demonstrates knowledge of benefits of leisure involvement  (Omkar Warner identified using my brain,fine motor control and fun.)   Leisure Attitude/Participation Participates in 1:1 structured activity   Time Spent With Patient   Minutes 40

## 2021-12-06 PROCEDURE — 97112 NEUROMUSCULAR REEDUCATION: CPT

## 2021-12-06 PROCEDURE — 97530 THERAPEUTIC ACTIVITIES: CPT

## 2021-12-06 PROCEDURE — 6370000000 HC RX 637 (ALT 250 FOR IP): Performed by: PHYSICAL MEDICINE & REHABILITATION

## 2021-12-06 PROCEDURE — 99232 SBSQ HOSP IP/OBS MODERATE 35: CPT | Performed by: PHYSICAL MEDICINE & REHABILITATION

## 2021-12-06 PROCEDURE — 6360000002 HC RX W HCPCS: Performed by: PHYSICAL MEDICINE & REHABILITATION

## 2021-12-06 PROCEDURE — 1280000000 HC REHAB R&B

## 2021-12-06 RX ADMIN — ASPIRIN 81 MG CHEWABLE TABLET 81 MG: 81 TABLET CHEWABLE at 08:18

## 2021-12-06 RX ADMIN — LAMOTRIGINE 50 MG: 25 TABLET ORAL at 08:17

## 2021-12-06 RX ADMIN — GABAPENTIN 100 MG: 100 CAPSULE ORAL at 08:18

## 2021-12-06 RX ADMIN — ATORVASTATIN CALCIUM 40 MG: 40 TABLET, FILM COATED ORAL at 20:51

## 2021-12-06 RX ADMIN — BACLOFEN 5 MG: 10 TABLET ORAL at 20:51

## 2021-12-06 RX ADMIN — GABAPENTIN 100 MG: 100 CAPSULE ORAL at 20:51

## 2021-12-06 RX ADMIN — METOPROLOL TARTRATE 12.5 MG: 25 TABLET, FILM COATED ORAL at 20:51

## 2021-12-06 RX ADMIN — ACETAMINOPHEN 650 MG: 325 TABLET ORAL at 16:18

## 2021-12-06 RX ADMIN — METOPROLOL TARTRATE 12.5 MG: 25 TABLET, FILM COATED ORAL at 08:18

## 2021-12-06 RX ADMIN — ACETAMINOPHEN 650 MG: 325 TABLET ORAL at 02:49

## 2021-12-06 RX ADMIN — ACETAMINOPHEN 650 MG: 325 TABLET ORAL at 20:51

## 2021-12-06 RX ADMIN — SENNOSIDES 17.2 MG: 8.6 TABLET, COATED ORAL at 20:51

## 2021-12-06 RX ADMIN — Medication 6 MG: at 20:51

## 2021-12-06 RX ADMIN — DOCUSATE SODIUM 100 MG: 100 CAPSULE, LIQUID FILLED ORAL at 08:18

## 2021-12-06 RX ADMIN — BACLOFEN 5 MG: 10 TABLET ORAL at 13:53

## 2021-12-06 RX ADMIN — POLYETHYLENE GLYCOL 3350 17 G: 17 POWDER, FOR SOLUTION ORAL at 08:17

## 2021-12-06 RX ADMIN — ENOXAPARIN SODIUM 40 MG: 100 INJECTION SUBCUTANEOUS at 20:50

## 2021-12-06 RX ADMIN — GABAPENTIN 100 MG: 100 CAPSULE ORAL at 13:52

## 2021-12-06 RX ADMIN — DOCUSATE SODIUM 100 MG: 100 CAPSULE, LIQUID FILLED ORAL at 20:51

## 2021-12-06 RX ADMIN — BACLOFEN 5 MG: 10 TABLET ORAL at 08:18

## 2021-12-06 RX ADMIN — TAMSULOSIN HYDROCHLORIDE 0.4 MG: 0.4 CAPSULE ORAL at 08:18

## 2021-12-06 RX ADMIN — CLOPIDOGREL 75 MG: 75 TABLET, FILM COATED ORAL at 08:17

## 2021-12-06 ASSESSMENT — PAIN SCALES - GENERAL
PAINLEVEL_OUTOF10: 0
PAINLEVEL_OUTOF10: 5
PAINLEVEL_OUTOF10: 0
PAINLEVEL_OUTOF10: 8
PAINLEVEL_OUTOF10: 8
PAINLEVEL_OUTOF10: 7
PAINLEVEL_OUTOF10: 2

## 2021-12-06 ASSESSMENT — PAIN SCALES - WONG BAKER: WONGBAKER_NUMERICALRESPONSE: 0

## 2021-12-06 ASSESSMENT — PAIN DESCRIPTION - FREQUENCY
FREQUENCY: INTERMITTENT

## 2021-12-06 ASSESSMENT — PAIN DESCRIPTION - PAIN TYPE
TYPE: CHRONIC PAIN
TYPE: ACUTE PAIN

## 2021-12-06 ASSESSMENT — PAIN DESCRIPTION - DESCRIPTORS
DESCRIPTORS: ACHING;CONSTANT;DISCOMFORT
DESCRIPTORS: HEADACHE;DISCOMFORT;DULL

## 2021-12-06 ASSESSMENT — PAIN DESCRIPTION - LOCATION
LOCATION: HEAD
LOCATION: LEG;HIP
LOCATION: SHOULDER;HIP
LOCATION: LEG;HIP

## 2021-12-06 ASSESSMENT — PAIN DESCRIPTION - ORIENTATION
ORIENTATION: LEFT

## 2021-12-06 ASSESSMENT — PAIN DESCRIPTION - PROGRESSION
CLINICAL_PROGRESSION: GRADUALLY IMPROVING
CLINICAL_PROGRESSION: NOT CHANGED
CLINICAL_PROGRESSION: NOT CHANGED
CLINICAL_PROGRESSION: GRADUALLY IMPROVING

## 2021-12-06 ASSESSMENT — PAIN DESCRIPTION - ONSET
ONSET: ON-GOING

## 2021-12-06 NOTE — PROGRESS NOTES
Physical Therapy  Treatment Note  Supervising Therapist: Brittany Fermin, PT, DPT [id number]    NAME: Rose Lloyd  [facility]: [facility]  DIAGNOSIS: Acute CVA  PRECAUTIONS: Falls, helmet at all times when OOB, L hemiplegia, L homonymous hemianopsia L neglect, alarm,  L PRAFO, SBP <180, orange dot with mother. HPI: Pt is a 39 y.o. female with past medical history significant for recent COVID-19 infection (9/15/21) who [facility] with acute onset left-sided weakness. Initial NIHSS 17.  Initial CT ([facility]) showed R MCA hyperdensity; initial CTA showed right ICA/M1 occlusion. On 10/13, her neuro exam worsened and repeat imaging indicated worsening edema, 1 cm of midline shift. She underwent emergent R hemicraniectomy by Dr. Silas Valle [facility]) on 10/13. After being deemed medically appropriate, she was transferred to [place] [id number] on 10/22/2021    Social:  Pt lives with daughter (15 y/o), will be returning to parent's home upon discharge (1 floor plan, 2 HAIDER with 2 HR). Mother and father healthy, do not work. 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 Initial Evaluation  Date: 10/23/21 AM     PM    Short Term Goals Long Term Goals    Was pt agreeable to Eval/treatment? yes Yes Yes     Does pt have pain? Pain on the top of her head once the helmet was placed on Mild c/o L hip and R rib pain Mild c/o moderate L hip/knee pain with activity     Bed Mobility  Rolling: Max A  Supine to sit: Max A   Sit to supine: Max A   Scooting: Max A NT NT Min A Supervision   Transfers Sit to stand: Max A   Stand to sit: Max A  Stand pivot:  Max A x2    5xSTS: Unable to complete Sit<>stand: CGA  Stand pivot: CGA with R SBQC Sit<>stand: CGA  Stand pivot: CGA with R SPC   Min A SBA  5xSTS: TBD   Ambulation    15 feet with R mackey rail with Max A + wc follow    10mWT: TBA  6mWT:  feet x 2 reps with R SBQC and SBA  (L articulating AFO with DF/PF stop)    10mWT: 0.26 m/s  6mWT: 75 m  (R SBQC, L AFO, SBA 12/6)     200 feet x 2 reps with R SPC with SBA  (L articulating AFO with DF/PF stop)     50 feet with AAD with Min A       500 feet with AAD with SBA    10mWT: >0.4 m/s  6mWT: >205 m   Walking 10 feet on uneven surface NT 10 feet with R SBQC with CGA 10 feet with R SPC, L AFO, 12.5 lb ankle weight at posterior gait belt, 9lb weight at L ankle  Noemy x 6 reps 10 feet with AAD with Min A 10 feet with AAD with SBA   Wheel Chair Mobility NT NT NT  150 feet with R extremities with SBA   Car Transfers NT CGA NT Min A SBA   Stair negotiation: ascended and descended NT NT 12 steps x 1 rep with 1 HR with CGA   (ascending/descending with LLE, non-reciprocal) 4 steps with 1 rail with Min A 12 steps with 1 rail with SBA   Curb Step:   ascended and descended NT 4\" step with R SBQC with Noemy 2\" step x 6 reps  4\" step x 6 reps   R SPC, L AFO, 12.5 lb ankle weight at posterior gait belt, 9lb weight at L ankle  Noemy 4 inch step with AAD and Min A 4 inch step with AAD and SBA   Picking up object off the floor NT Pt picked up cone with Noemy NT Will  an object with Min assist Will  an object with SBA   BLE ROM WFL: LLE AROM limited by flaccidity  WFL: LLE AROM limited by spasticity 2+ on MAS (hamstrings, hip extensors, PF)  L ankle DF lacking ~5 degrees from neutral      BLE Strength RLE 4+/5  LLE 0/5 RLE 5/5  LLE hip flexion 2+/5  Knee extension 2-/5  Ankle DF/PF 0/5      Balance  Static Sitting: Mod/Max A    BBS: TBA  FGA: TBA   Standing: Noemy       BBS: 28/56      BBS: >20/56  FGA: TBD   Date Family Teach Completed TBA Pt's mother present for observation 10/24, 11/2, 11/3, 114, hands on 11/9, 11/11, 11/16, 11/18, 11/23, aunt present to observe 11/17  Dad present 11/22, 11/24, 12/1 for hands on training  Step father and daughter present 11/26 for hands on training      Is additional Family Teaching Needed?   Y or N Y Yes      Hindering Progress L hemiplegia, L neglect L hemiplegia, L neglect      PT recommended ELOS 5 weeks       Team's Discharge Plan        Therapist at Team Meeting          General:        HRmax: 178  bpm       Beta blockers (Y/N): Y      Adjusted HRmax: 168 bpm   Blood pressure (mmHg): AM:  - Prior to Tx: 123/68  - Post Tx: 108/68 PM:  - Prior to Tx: NA  - Post Tx: NA   Time spent in HR ranges: Moderate intensity (60-70% HRmax): AM: 0:24:27 PM: 0:28:04   High intensity (70-85% HRmax): AM: 0:01:48 PM: 0:00:00       Patient education  Pt educated on safe hand placement with car transfer    Patient response to education:   Pt verbalized understanding Pt demonstrated skill Pt requires further education in this area   yes partial yes     Additional Comments: Pt remains pleasant and motivated during session. Reviewed functional mobility with [employer] [place] [place] as pt is safest with this device, recommend this device to be used at home. OM completed with [employer] [place] [place] as pt is more stable with this device. Pt exhibits significant improvement in gait speed and overall significant improvement in walking distance capability. Pt likely would have scored higher on these OM if she used an [facility] however she is less stable with this device. HR monitored continuously although not much time was spent in target HR ranges due to OM completion. BBS improvement reflects significant improvement in static/dynamic standing balance capability. Plan for discharge tomorrow. AM  Time in: 0830  Time out: 0915    PM  Time in: 1300  Time out: 4931    Pt is making good progress toward established Physical Therapy goals. Continue with physical therapy current plan of care.     Malinda Pizarro, PT, DPT  Board Certified Clinical Specialist in Neurologic Physical Therapy  [id number]

## 2021-12-06 NOTE — PROGRESS NOTES
OCCUPATIONAL THERAPY DAILY NOTE    Date:2021  Patient Name: Zachery Monterroso  MRN: [id number]  : 1975  Room: [address] [place], [place] [address]     Diagnosis:Acute CVA( Pt presented to [facility] as Level 2 stroke alert on 10/11/21. L side weakness, neglect. NIH-17  Found to have large R MCA involving R occipital)  Surgery: R frontotemporal decompressive hemicraniectomy on 10/13/21  Past Medical History: COVID (9/15), tobacco use  Precautions: Falls, L hemiplegia, L neglect, L sublux, bed/chair alarm, helmet when OOB, impulsive, L PRAFO & orange dot with mom    Functional Assessment:   Date Status AE  Comments   Feeding 21 Mod I     Grooming 21 Mod I w/c          Oral Care 21 Mod I     Bathing 21 CGA Shower chair with wheels     UB Dressing 21 Melisa     LB Dressing 21 CGA reacher    Footwear 21 Mod A Lh shoe horn    Toileting 21 SBA Grab bar   Pt completed toileting task on standard commode, with pt able to complete of transfer with use of grab bar, complete hygiene to malvin area, and manage of clothes   Homemaking 11/3/21 Min A SBQC See below     Functional Transfers / Balance:   Date Status DME  Comments   Sit Balance 21 SBA w/c    Stand Balance 21 SBA SBQC  Gait belt   Grab bar  Bed rail      [x] Tub        [x] Shower   Transfer 21 Min A          SBA Extended tub bench, SBQC      Roll in shower chair, grab bar, LBQC    Commode   Transfer 21 SBA  3:1 commode SBQC  Grab bar     Functional   Mobility 21 SBA SBQC        Functional Exercises / Activity:  Calendar activity with focus on orientation to month, year, problem solving and LTM recall. Pt requires assist to identify X 2 problem solving questions and is able to correct independently. Sensory / Neuromuscular Re-Education:  Pt tolerated Inmotion robotic arm intervention for neurological recovery of her LUE. Pt sat in chair with arm straps &  LUE ace wrapped for positioning purposes.  Pt tolerated 10cm naseem. Pt completed reassessment & first assessment performed on 11/18/2021, pre & post testing & active assisted protocol. Pt demo the following gains from her initial assessment: displacement improved from 0.018 to 0.023. Pt demo the following gains in between sets of active assisted protocol: robot initiate improved from 64/80 initiations to initiating 69/80 initiations, robot power decreased from 100 to 96 & motion jerk decreased from 107 to 106 & distance from a straight line decreased from 8 to 7 . Pt continues to demo compensatory movements to facilitate LUE movement by using her head & back. Pt educated on these aspects. Pt demo the following gains in her pre & post test after using random protocol:  reach  error decreased from 0.084 to 0.077, path error decreased from 0.058 to 0.052 & initiation time decreased from 0.858 sec to 0.521 sec. Pt tolerated a total of 624 total movement using her LUE. All InMotion exercises are performed to increase function of L UE to assist with ADL's and IADL's at home. Pt appreciates the immediate feedback that is provided using the InMotion arm. PROM 1 x 10 on all planes of LUE within pt tolerance to increase ROM, decrease tone and maintain joint mobility. Cognitive Skills:   Status Comments   Problem   Solving fair    Memory fair    Sequencing fair    Safety Fair+      Visual Perception:    Education:  Pt educated on gains made using robotic arm for neurological recovery.  Pt verbalize a good understanding but continues to be frustrated by limited functional use & movement of her LUE    10/25/21: pt educated on SROM of LUE and safe positioning of LUE  10/29/21: educated on 1 handed dressing technique for socks/shoes  11/1/21: shoe lace adapted for ease with donning shoe  11/1/21: attempted small strips of kineosio tape on pt's hand to assess if pt can tolerate on her shoulder due to report of allergy (cleared by )  11/2/21:  Therapist applied kineosiotape to pt's LUE to stabilize sublux. Will continue to assess issues that arise with adhesive and progression of sublux  11/2/21: pt educated on AE for ease with LB dressing  11/3/21: Pt very fatigued in AM and PM sessions, requiring max cueing to alert pt back to task and engage in session, with pt asking multiple times to return to room/return to bed. Extended time to complete all tasks  11/4/21: home ROM exercise program placed in pt's folder and pt's mother notified of it  11/7/21 Pt educated about LUE positioning both up in w/c and in bed to maintain joint integrity. 11/17/21: resting hand applied to pt's L hand due to report of tone at digits. This is to be worn at night time and will be monitored for further tone. 11/19/21: pt appears to be tolerating resting hand splint and skin integrity look well    [x] Family teach completed on:  11/4/21: Completed family teach this session. Pt's mother Shavon Escobar present. Educated Shavon Escobar on pt's status with ADLs and vc's for sequencing/initiation of tasks/hand placement with functional transfers. Shavon Escobar able to provide hands on assist with was educated for body mechanics/hand placement/vc's to increase safety with only commode transfer with slideboard and toileting task. Shavon Escobar educated on PROM (elbow/shoulder/wrist/digit flex/ext) of LUE to increase ROM, maintain joint integrity, and safe management of sublux. Shavon Escobar educated on how to doff/jesus gait belt and LUE sling. Pt's limiting factor appears to be her impulsivity and safety with ADLs/transfer. Shavon Escobar demo'ing F understanding/safety of education. Shavon Escobar appeared motivated and willing to learn. Continue to educate. 11/9/21: Completed family teach this session. Pt's mother Shavon Escobar present. Educated Shavon Escobar on pt's increased independence with ADLs/functional transfers. Shavon Escobar able to jesus pt's sling and gait belt. Shavon Escobar completed hands on assist with SPT to [facility].  Shavon Escobar and pt demo'ing improved communication with each other for safe transfer/completion of ADLs. Candice WINKLER understanding/safety of education. Continue to educate. 11/11/21- Mother present and educated along with assisting with w/c<>commode transfers using grab bar for balance and one handed skills. LUE placed in sling for balance and joint protection. Pt/mom educated with ext tub bench transfers to simulate for home environment with parent participating with transfer. Pt needed cues for hand/trunk placement on bench along with proper follow thru for safety. Pt/mom educated SPT using quad cane from w/c <>ext tub bench with patient needing cues for technique with cane & taking few steps. 11/16/21 Mom present and educated with transfers on/off firm recliner, sofa and ext tub bench transfers at quad cane level along with standing balance and positioning for LUE seated up in w/c.    11/17/21: Pt's aunt present. She observed as well as assisted pt intermittently with ADLs. 11/18/21- Pt mom & observed am OT treatment while pt evaluated using InMotion arm using pt LUE   11/23/21- Mom present during morning session, assisting pt with toileting task and transfer and extended tub bench transfer. Mom stating of feeling comfortable with ADL tasks. 11/26/21- Mom, Dad and Daughter present for FT and educated with functional transfer training, fxl mobility using quad cane including over carpet along with LUE ROM ex's for HEP.    12/2/21: family did not show up for scheduled FT in AM.        Pain Level: No c/o pain this date. Additional Notes:     Patient has made good progress during treatment sessions toward set goals.    Therapy emphasis to obtain goals:    Time Frame for Long term goals  6 weeks   Long term goal 1 Pt will complete UB self-care tasks w/ Supervision   Long term goal 2 Pt will complete LB self-care tasks w/ Sup underwear, pants & socks but Min A to don AFO   Long term goal 3 Pt will complete toileting (all aspects) w/ Supervision   Long term goal 4 Pt will safely complete toilet transfers w/ Supervision   Long term goal 5 Pt will safely complete tub/shower transfers w/ Supervision   Long term goals 6 Pt will complete basic homemaking task w/ SBA   Long term goal 7 Pt will complete grooming task w/ Supervision   Long term goal 8 Pt will complete feeding task w/ Supervision/setup     [x] Continue with current OT Plan of care. [] Prepare for Discharge    10/27/21 OT plan of care updated on this date. Tentative length of stay is 6 weeks Soto Colon OTR/L [id number]  11/3/21- Pt POC updated on this date. Pt tentative length of day is 5 weeks to address above problem areas Nate Mendoza OTR/L [id number]  11/10/21- Pt POC updated on this date. Pt tentative length of day is 4 weeks to address above problem areas Soto Exon OTR/L [id number]  11/17/21- Pt POC updated on this date. Pt tentative length of day is 3 weeks to address above problem areas Soto Colon OTR/L [id number]  11/24/21 OT plan of care updated on this date. Tentative length of stay is 2 weeks . Susan Griffin OTR/L [id number]   12/2/21 OT plan of care updated on this date. Tentative length of stay is 12/7/21. Josefina Tobar, MOT, OTR/L [id number]    DISCHARGE RECOMMENDATIONS  Recommended DME:  3:1 commode, ext tub bench.      Post Discharge Care:   []Home Independently  []Home with 24hr Care / Supervision []Home with Partial Supervision []Home with Home Health OT []Home with Out Pt OT []Other: ___   Comments:         Time in Time out Tx Time Breakdown  Variance:   First Session  [id number] [] Individual Tx-  [x] Concurrent Tx -45  [] Co-Tx -   [] Group Tx -   [] Time Missed -     Second Session 2:30 1:45 [x] Individual Tx- 45 Mins  [] Concurrent Tx -  [] Co-Tx -   [] Group Tx -   [] Time Missed -     Third Session    [] Individual Tx-   [] Concurrent Tx -  [] Co-Tx -   [] Group Tx -   [] Time Missed -         Total Tx Time:  90 Mins     Catrina Karimi OTR/L [id number] [facility] [place]/L [id number]  I have read the above note and agree with the documentation.   Imtiaz Aw OTR/L [id number]

## 2021-12-06 NOTE — PROGRESS NOTES
[facility]  Comprehensive Progress Note    Subjective:      Kvng Sneed is a 39 y.o. female admitted to inpatient rehabilitation for impairments and activities limitations in ADLs and mobility secondary to right MCA CVA. No acute events overnight. No cp, sob, n/v. Tolerating therapy. No complaints. No new issues reported. Looking forward to discharge tomorrow. The patient's medical records have been reviewed. Scheduled Meds:nicotine, 1 patch, Daily  baclofen, 5 mg, TID  lamoTRIgine, 50 mg, Daily  enoxaparin, 40 mg, Daily  polyethylene glycol, 17 g, BID  lidocaine, 1 patch, Daily  senna, 2 tablet, Nightly  metoprolol tartrate, 12.5 mg, BID  gabapentin, 100 mg, TID  tamsulosin, 0.4 mg, Daily  aspirin, 81 mg, Daily  atorvastatin, 40 mg, Nightly  vitamin D, 50,000 Units, Weekly  docusate sodium, 100 mg, BID  melatonin, 6 mg, Nightly  clopidogrel, 75 mg, Daily      Continuous Infusions:  PRN Meds:diclofenac sodium, 4 g, 4x Daily PRN  diphenhydrAMINE-zinc acetate, , Q6H PRN  ondansetron, 4 mg, Q6H PRN  lactulose, 20 g, BID PRN  guaiFENesin, 200 mg, BID PRN  acetaminophen, 650 mg, Q4H PRN  magnesium hydroxide, 30 mL, Daily PRN  bisacodyl, 10 mg, Daily PRN         Objective:      Vitals:    12/05/21 0744 12/05/21 1945 12/06/21 0100 12/06/21 0811   BP: 115/77 (!) 90/53  123/68   Pulse: 74 75  88   Resp: 18 18  18   Temp: 99 °F (37.2 °C)   99 °F (37.2 °C)   TempSrc:       SpO2:   96%    Weight:       Height:           General appearance: alert, NAD, up in w/c   Head: R crani site c/d/i   Eyes: conjunctivae/corneas clear. PERRL, EOM's intact. Lungs: clear to auscultation bilaterally  Heart: regular rate and rhythm, S1, S2 normal  Abdomen: soft, non-tender, normal bowel sounds  Extremities: extremities normal, atraumatic, no cyanosis or edema  MSK: no cyanosis, clubbing, edema  Skin: R crani c/d/i   Neurologic: Alert, oriented x4. Answering all questions appropriately.  Left neglect. Speech clear. No aphasia appreciated. Mild L facial droop. Spastic left hemiplegia. MAS 2 in LUE; MAS 1+-2 in LLE. Motor 5/5 RUE and RLE; 0/5 LUE. LLE is 2/5 HF;  1/5 KE;  0/5 DF and PF        Laboratory:    Lab Results   Component Value Date    WBC 7.3 11/18/2021    HGB 11.8 11/18/2021    HCT 36.3 11/18/2021    MCV 93.6 11/18/2021     11/18/2021     Lab Results   Component Value Date     11/18/2021    K 4.6 11/18/2021     11/18/2021    CO2 25 11/18/2021    BUN 14 11/18/2021    CREATININE 0.7 11/18/2021    GLUCOSE 89 11/18/2021    CALCIUM 9.2 11/18/2021      Lab Results   Component Value Date    ALT 75 (H) 10/23/2021    AST 46 (H) 10/23/2021    ALKPHOS 89 10/23/2021    BILITOT 0.4 10/23/2021       Functional Status:   Bed mobility: SBA - Min A  Transfers: CGA  Ambulation: 200 ft with R SBQC and SBA (L articulating AFO)  Feeding: Mod I  Grooming: Mod I  UB dressing: Min A  LB dressing: CGA       Assessment/Plan:       39 y.o. female admitted to inpatient rehabilitation for impairments and activities limitations in ADLs and mobility secondary to right MCA CVA.      -Right MCA CVA: Complicated by cerebral edema requiring emergent right hemicraniectomy (10/13/2021). Dense left spastic hemiplegia, left neglect, mild cognitive impairment. Helmet when out of bed. L PRAFO in bed. LUE sling for gait. L w/c tray. Obtained left AFO. Aspirin, Plavix, Lipitor for secondary prevention. Monitor neuro exam. Continue Acute Rehab program.   -Spasticity LUE/LLE: Monitor. Some degree of tone currently helpful to compensate for weakness with attempted gait in PT. Started on low dose baclofen due to worsening painful spasticity.    -Possible seizure:  EEG with no seizure activity, but showed an increased potential for focal seizures from the right frontotemporal region. Evaluated by Neurology.  Started on Lamictal - 25 mg daily x 2 weeks (start 11/11), then 50 mg daily x 2 weeks (start 11/25), then 100 mg

## 2021-12-07 VITALS
SYSTOLIC BLOOD PRESSURE: 113 MMHG | BODY MASS INDEX: 27.32 KG/M2 | OXYGEN SATURATION: 98 % | HEART RATE: 81 BPM | WEIGHT: 170 LBS | TEMPERATURE: 97.9 F | HEIGHT: 66 IN | DIASTOLIC BLOOD PRESSURE: 74 MMHG | RESPIRATION RATE: 18 BRPM

## 2021-12-07 PROCEDURE — 6370000000 HC RX 637 (ALT 250 FOR IP): Performed by: PHYSICAL MEDICINE & REHABILITATION

## 2021-12-07 PROCEDURE — 97535 SELF CARE MNGMENT TRAINING: CPT

## 2021-12-07 PROCEDURE — 99239 HOSP IP/OBS DSCHRG MGMT >30: CPT | Performed by: PHYSICAL MEDICINE & REHABILITATION

## 2021-12-07 PROCEDURE — 97116 GAIT TRAINING THERAPY: CPT

## 2021-12-07 PROCEDURE — 97530 THERAPEUTIC ACTIVITIES: CPT

## 2021-12-07 RX ORDER — ASPIRIN 81 MG/1
81 TABLET, CHEWABLE ORAL DAILY
Qty: 30 TABLET | Refills: 0 | Status: SHIPPED | OUTPATIENT
Start: 2021-12-07 | End: 2021-12-09 | Stop reason: SDUPTHER

## 2021-12-07 RX ORDER — CLOPIDOGREL BISULFATE 75 MG/1
75 TABLET ORAL DAILY
Qty: 30 TABLET | Refills: 0 | Status: SHIPPED | OUTPATIENT
Start: 2021-12-07 | End: 2021-12-09 | Stop reason: SDUPTHER

## 2021-12-07 RX ORDER — TAMSULOSIN HYDROCHLORIDE 0.4 MG/1
0.4 CAPSULE ORAL DAILY
Qty: 30 CAPSULE | Refills: 0 | Status: SHIPPED | OUTPATIENT
Start: 2021-12-08 | End: 2022-07-07

## 2021-12-07 RX ORDER — GABAPENTIN 100 MG/1
100 CAPSULE ORAL 3 TIMES DAILY
Qty: 90 CAPSULE | Refills: 0 | Status: ON HOLD | OUTPATIENT
Start: 2021-12-07 | End: 2022-04-22 | Stop reason: HOSPADM

## 2021-12-07 RX ORDER — ATORVASTATIN CALCIUM 40 MG/1
40 TABLET, FILM COATED ORAL DAILY
Qty: 30 TABLET | Refills: 0 | Status: SHIPPED | OUTPATIENT
Start: 2021-12-07 | End: 2021-12-09 | Stop reason: SDUPTHER

## 2021-12-07 RX ORDER — LAMOTRIGINE 25 MG/1
TABLET ORAL
Qty: 254 TABLET | Refills: 0 | Status: ON HOLD | OUTPATIENT
Start: 2021-12-07 | End: 2022-04-22 | Stop reason: HOSPADM

## 2021-12-07 RX ORDER — ERGOCALCIFEROL 1.25 MG/1
50000 CAPSULE ORAL WEEKLY
Qty: 5 CAPSULE | Refills: 0 | Status: SHIPPED | OUTPATIENT
Start: 2021-12-11 | End: 2022-08-11

## 2021-12-07 RX ORDER — LANOLIN ALCOHOL/MO/W.PET/CERES
6 CREAM (GRAM) TOPICAL NIGHTLY PRN
Qty: 30 TABLET | Refills: 0 | Status: ON HOLD | OUTPATIENT
Start: 2021-12-07 | End: 2022-04-22 | Stop reason: HOSPADM

## 2021-12-07 RX ORDER — BACLOFEN 5 MG/1
5 TABLET ORAL 3 TIMES DAILY
Qty: 90 TABLET | Refills: 0 | Status: SHIPPED | OUTPATIENT
Start: 2021-12-07 | End: 2021-12-20

## 2021-12-07 RX ADMIN — BACLOFEN 5 MG: 10 TABLET ORAL at 14:01

## 2021-12-07 RX ADMIN — BACLOFEN 5 MG: 10 TABLET ORAL at 07:38

## 2021-12-07 RX ADMIN — TAMSULOSIN HYDROCHLORIDE 0.4 MG: 0.4 CAPSULE ORAL at 07:37

## 2021-12-07 RX ADMIN — POLYETHYLENE GLYCOL 3350 17 G: 17 POWDER, FOR SOLUTION ORAL at 07:37

## 2021-12-07 RX ADMIN — LAMOTRIGINE 50 MG: 25 TABLET ORAL at 07:38

## 2021-12-07 RX ADMIN — ASPIRIN 81 MG CHEWABLE TABLET 81 MG: 81 TABLET CHEWABLE at 07:36

## 2021-12-07 RX ADMIN — METOPROLOL TARTRATE 12.5 MG: 25 TABLET, FILM COATED ORAL at 07:37

## 2021-12-07 RX ADMIN — GABAPENTIN 100 MG: 100 CAPSULE ORAL at 07:36

## 2021-12-07 RX ADMIN — GABAPENTIN 100 MG: 100 CAPSULE ORAL at 14:01

## 2021-12-07 RX ADMIN — CLOPIDOGREL 75 MG: 75 TABLET, FILM COATED ORAL at 07:38

## 2021-12-07 RX ADMIN — DOCUSATE SODIUM 100 MG: 100 CAPSULE, LIQUID FILLED ORAL at 07:37

## 2021-12-07 ASSESSMENT — PAIN SCALES - WONG BAKER
WONGBAKER_NUMERICALRESPONSE: 0
WONGBAKER_NUMERICALRESPONSE: 0

## 2021-12-07 ASSESSMENT — PAIN DESCRIPTION - PROGRESSION
CLINICAL_PROGRESSION: GRADUALLY IMPROVING
CLINICAL_PROGRESSION: GRADUALLY IMPROVING

## 2021-12-07 ASSESSMENT — PAIN SCALES - GENERAL
PAINLEVEL_OUTOF10: 0
PAINLEVEL_OUTOF10: 0

## 2021-12-07 NOTE — PROGRESS NOTES
OCCUPATIONAL THERAPY DAILY NOTE/DISCHARGE SUMMARY    Date:2021  Patient Name: Stacey Salinas  MRN: [id number]  : 1975  Room: [address] [place] [address]     Diagnosis:Acute CVA( Pt presented to [facility] as Level 2 stroke alert on 10/11/21. L side weakness, neglect. NIH-17  Found to have large R MCA involving R occipital)  Surgery: R frontotemporal decompressive hemicraniectomy on 10/13/21  Past Medical History: COVID (9/15), tobacco use  Precautions: Falls, L hemiplegia, L neglect, L sublux, bed/chair alarm, helmet when OOB, impulsive, L PRAFO & orange dot with mom    Functional Assessment:   Date Status AE  Comments   Feeding 21 Mod I     Grooming 21 Mod I w/c Pt applied deodorant, washed face and hands seated up at sink. Oral Care 21 Mod I  Pt used electric toothbrush for oral care. Bathing 21 UB-SBA  LB-CGA Sponge   Pt washed UB skills using one hand due to limited LUE ROM. LB bathing completed using BLE crossover  Method then stood for malvin care at sink. UB Dressing 21 Melisa  To jesus bra and long sleeve shirt due to limited ROM in LUE. LB Dressing 21 CGA reacher To jesus briefs and pants using reacher needing assist threading garment over LLE. Pt stood to pull garments up/down over hips. Footwear 21 Mod A Lh shoe horn Pt needed assist to jesus Left sock, shoe and AFO. Pt able to jesus R sock using one handed skills. Toileting 21 SBA Grab bar      Homemaking 11/3/21 Min A SBQC See below     Functional Transfers / Balance:   Date Status DME  Comments   Sit Balance 21 S/SBA w/c demo'd up in w/c during ADL's to increase dyn/static sitting balance along with endurance and strength. Stand Balance 21 SBA SBQC  Gait belt   Grab bar  Bed rail   Dyn/static standing balance demo'd during ADL's to improve overall endurance and strength.     [x] Tub        [x] Shower   Transfer 21 Min A          SBA Extended tub bench, SBQC      Roll in shower chair, grab bar, LBQC    Commode   Transfer 12/5/21 SBA  3:1 commode SBQC  Grab bar     Functional   Mobility 12/6/21 SBA SBQC        Functional Exercises / Activity:    Sensory / Neuromuscular Re-Education:    Cognitive Skills:   Status Comments   Problem   Solving Fair +    Memory Fair+    Sequencing Fair+    Safety Fair+      Visual Perception:    Education:  Pt educated with safety during ADL's along with standing balance to increase awareness. 10/25/21: pt educated on SROM of LUE and safe positioning of LUE  10/29/21: educated on 1 handed dressing technique for socks/shoes  11/1/21: shoe lace adapted for ease with donning shoe  11/1/21: attempted small strips of kineosio tape on pt's hand to assess if pt can tolerate on her shoulder due to report of allergy (cleared by )  11/2/21:  Therapist applied kineosiotape to pt's LUE to stabilize sublux. Will continue to assess issues that arise with adhesive and progression of sublux  11/2/21: pt educated on AE for ease with LB dressing  11/3/21: Pt very fatigued in AM and PM sessions, requiring max cueing to alert pt back to task and engage in session, with pt asking multiple times to return to room/return to bed. Extended time to complete all tasks  11/4/21: home ROM exercise program placed in pt's folder and pt's mother notified of it  11/7/21 Pt educated about LUE positioning both up in w/c and in bed to maintain joint integrity. 11/17/21: resting hand applied to pt's L hand due to report of tone at digits. This is to be worn at night time and will be monitored for further tone. 11/19/21: pt appears to be tolerating resting hand splint and skin integrity look well    [x] Family teach completed on:  11/4/21: Completed family teach this session. Pt's mother Ayesha Felipe present. Educated Ayesha Felipe on pt's status with ADLs and vc's for sequencing/initiation of tasks/hand placement with functional transfers.  Ayesha Felipe able to provide hands on assist with was educated for body mechanics/hand placement/vc's to increase safety with only commode transfer with slideboard and toileting task. Kin Sapp educated on PROM (elbow/shoulder/wrist/digit flex/ext) of LUE to increase ROM, maintain joint integrity, and safe management of sublux. Kin Velezs educated on how to doff/jesus gait belt and LUE sling. Pt's limiting factor appears to be her impulsivity and safety with ADLs/transfer. Kin Sapp demo'ing F understanding/safety of education. Kin Sapp appeared motivated and willing to learn. Continue to educate. 11/9/21: Completed family teach this session. Pt's mother Kinkayley Velezs present. Educated Kin Sapp on pt's increased independence with ADLs/functional transfers. Kin Sapp able to jesus pt's sling and gait belt. Kin Sapp completed hands on assist with SPT to [facility]. Kin Sapp and pt demo'ing improved communication with each other for safe transfer/completion of ADLs. Kin Sapp demo'ing F understanding/safety of education. Continue to educate. 11/11/21- Mother present and educated along with assisting with w/c<>commode transfers using grab bar for balance and one handed skills. LUE placed in sling for balance and joint protection. Pt/mom educated with ext tub bench transfers to simulate for home environment with parent participating with transfer. Pt needed cues for hand/trunk placement on bench along with proper follow thru for safety. Pt/mom educated SPT using quad cane from w/c <>ext tub bench with patient needing cues for technique with cane & taking few steps. 11/16/21 Mom present and educated with transfers on/off firm recliner, sofa and ext tub bench transfers at quad cane level along with standing balance and positioning for LUE seated up in w/c.    11/17/21: Pt's aunt present. She observed as well as assisted pt intermittently with ADLs.    11/18/21- Pt mom & observed am OT treatment while pt evaluated using InMotion arm using pt LUE   11/23/21- Mom present during morning session, assisting pt with toileting task and transfer and extended tub bench transfer. Mom stating of feeling comfortable with ADL tasks. 11/26/21- Mom, Dad and Daughter present for FT and educated with functional transfer training, fxl mobility using quad cane including over carpet along with LUE ROM ex's for HEP.    12/2/21: family did not show up for scheduled FT in AM.        Pain Level: No c/o pain today    Additional Notes:     Patient has made good progress during treatment sessions toward set goals. Therapy emphasis to obtain goals:    Time Frame for Long term goals  6 weeks   Long term goal 1 Pt will complete UB self-care tasks w/ Supervision   Long term goal 2 Pt will complete LB self-care tasks w/ Sup underwear, pants & socks but Min A to don AFO   Long term goal 3 Pt will complete toileting (all aspects) w/ Supervision   Long term goal 4 Pt will safely complete toilet transfers w/ Supervision   Long term goal 5 Pt will safely complete tub/shower transfers w/ Supervision   Long term goals 6 Pt will complete basic homemaking task w/ SBA   Long term goal 7 Pt will complete grooming task w/ Supervision   Long term goal 8 Pt will complete feeding task w/ Supervision/setup     [x] Continue with current OT Plan of care. [] Prepare for Discharge    10/27/21 OT plan of care updated on this date. Tentative length of stay is 6 weeks Sarabjitshiela Gabrieln OTR/L [id number]  11/3/21- Pt POC updated on this date. Pt tentative length of day is 5 weeks to address above problem areas Barrett Watson OTR/L [id number]  11/10/21- Pt POC updated on this date. Pt tentative length of day is 4 weeks to address above problem areas Sarabjitshiela Gabrieln OTR/L [id number]  11/17/21- Pt POC updated on this date. Pt tentative length of day is 3 weeks to address above problem areas Lupshiela Gabrieln OTR/L [id number]  11/24/21 OT plan of care updated on this date. Tentative length of stay is 2 weeks .  Melissa Jones OTR/L [id number]   12/2/21 OT plan of care updated on this date. Tentative length of stay is 12/7/21. Angélica Ayala, MOT, OTR/L [id number]    DISCHARGE RECOMMENDATIONS                                            OCCUPATIONAL THERAPY DISCHARGE SUMMARY    Discontinue Occupational Therapy intervention. Pt has:   [] met all set goals. [x] made good progress toward set goals. [] has made slow progress toward goals and would benefit from rehab setting change.  [] had a medical decline and therefore was transferred off the Rehab unit. Long term goals  Time Frame for Long term goals : 6 weeks  Long term goal 1: Pt will complete UB self-care tasks w/ Supervision  Long term goal 2: Pt will complete LB self-care tasks w/ Supervision  Long term goal 3: Pt will complete toileting (all aspects) w/ Supervision  Long term goal 4: Pt will safely complete toilet transfers w/ Supervision  Long term goal 5: Pt will safely complete tub/shower transfers w/ Supervision  Long term goals 6: Pt will complete basic homemaking task w/ SBA  Long term goal 7: Pt will complete grooming task w/ Supervision  Long term goal 8: Pt will complete feeding task w/ Supervision/setup    The Patient has received education on:  [x]Transfer Safety [x]Compensatory tech for ADLs [x]AE training [x]DME training [x]Energy Conservation [x]Home / Kitchen Safety  [x]Fall Prevention  [x]Other: LUE HEP training. Family training was completed: Yes FT completed 2 times per week. Recommendations: Home health for Nurse, PT/OT/HHA. Recommended DME:  3:1 commode, ext tub bench.      Post Discharge Care:   []Home Independently  [x]Home with 24hr Care / Supervision []Home with Partial Supervision [x]Home with Home Health OT []Home with Out Pt OT []Other: ___   Comments:         Time in Time out Tx Time Breakdown  Variance:   First Session  7:40am 8:25am [x] Individual Tx-45  [] Concurrent Tx -  [] Co-Tx -   [] Group Tx -   [] Time Missed -     Second Session   [] Individual Tx-  Mins  [] Concurrent Tx -  [] Co-Tx - [] Group Tx -   [] Time Missed -     Third Session    [] Individual Tx-   [] Concurrent Tx -  [] Co-Tx -   [] Group Tx -   [] Time Missed -         Total Tx Time:  [address] [place]ITH [id number]

## 2021-12-07 NOTE — FLOWSHEET NOTE
Pt was transported to private car at this time and placed in safety belt with all belonging and medications.

## 2021-12-07 NOTE — DISCHARGE SUMMARY
[facility]  Discharge Summary    Date of Original Admission:10/22/2021    Date of Discharge: 12/7/2021    Primary Care Physician:No primary care provider on file. Attending Physician: Marj Haynes MD  Primary Service:  Acute Inpatient Rehabilitation     Primary Diagnosis: Right MCA CVA  Secondary Diagnosis/es: cerebral edema requiring emergent right hemicraniectomy (10/13/2021).  Dense left spastic hemiplegia, left neglect, mild cognitive impairment, spasticity, possible seizure, musculoskeletal pain, urinary retention, tachycardia, constipation, tobacco abuse     Consults:   Neurology, Cardiology    Procedures:  None  Significant Radiologic Results:       CT Head, CTA head, CTA neck 11/10/2021  FINDINGS:       CT HEAD:       BRAIN/VENTRICLES:  No acute intracranial hemorrhage or extraaxial fluid   collection.  A large right frontal parietal and temporal craniectomy is   identified.  Extensive low-attenuation changes are present within the right   MCA distribution compatible with subacute to old MCA infarct.       ORBITS: The visualized portion of the orbits demonstrate no acute abnormality.       SINUSES:  The visualized paranasal sinuses and mastoid air cells demonstrate   no acute abnormality.           CTA NECK:       AORTIC ARCH/ARCH VESSELS: No dissection or arterial injury.  No significant   stenosis of the brachiocephalic or subclavian arteries.       CAROTID ARTERIES: No dissection, arterial injury, or hemodynamically   significant stenosis by NASCET criteria.       VERTEBRAL ARTERIES: No dissection, arterial injury, or significant stenosis.       SOFT TISSUES: The lung apices are clear.  No cervical or superior mediastinal   lymphadenopathy.  The larynx and pharynx are unremarkable.  No acute   abnormality of the salivary and thyroid glands.       BONES: No acute osseous abnormality.           CTA HEAD:       ANTERIOR CIRCULATION: Moderate smooth narrowing is identified within the   distal M1 segment of the right MCA.  The M2 and M3 vessels are opacified   although to a lesser extent than the contralateral side.       The anterior cerebral arteries and left MCA are unremarkable.       Both internal carotid arteries are patent.       POSTERIOR CIRCULATION: No significant stenosis of the vertebral, basilar, or   posterior cerebral arteries. No aneurysm.       OTHER: No dural venous sinus thrombosis on this non-dedicated study.           Impression   1. Large subacute to old right MCA infarct.  Extensive adjacent craniectomy   changes are identified in the right side. 2. Moderate diffuse smooth narrowing involving the distal M1 segment of the   right MCA.  There is diffuse mildly limited perfusion of the right MCA   compared to the contralateral side. 3. Unremarkable CT angiogram of the neck.               Right rib xray 10/27/2021     Impression   No displaced rib fractures or complications related to rib fractures. Atherosclerotic disease. Discharge Disposition:  Home  Condition on Discharge:  Stable. Functionally improved.   ===================================================================  History of Present Illness:      Sy Hadley is a 39 y.o. female with past medical history significant for recent COVID-19 infection (9/15/21) who [facility] with acute onset left-sided weakness. Initial NIHSS 17.  Initial CT (OSH) showed R MCA hyperdensity; initial CTA (OSH) showed right ICA/M1 occlusion. On 10/13, her neuro exam worsened and repeat imaging indicated worsening edema, 1 cm of midline shift, effacement of the right lateral ventricle, and increased left lateral ventricle caliber concerning for entrapment. She underwent emergent R hemicraniectomy by Dr. Jamila Reyna [facility] on 10/13.  After being deemed medically appropriate, she was transferred to [place] [address] on 10/22/2021.    ===================================================================    Functional Status        Initial Evaluation  Date: 10/23/21 Discharge 12/7/21      Was pt agreeable to Eval/treatment? yes Yes   Bed Mobility  Rolling: Max A  Supine to sit: Max A   Sit to supine: Max A   Scooting: Max A Supine>Sit SBA  Scooting/Rolling SBA  Sit>Supine Noemy   Transfers Sit to stand: Max A   Stand to sit: Max A  Stand pivot: Max A x2     5xSTS: Unable to complete Sit<>stand: CGA  Stand pivot: CGA with R SBQC   Ambulation    15 feet with R mackey rail with Max A + wc follow     10mWT: TBA  6mWT:  feet x 2 reps with R SBQC and SBA  (L articulating AFO with DF/PF stop)     10mWT: 0.26 m/s  6mWT: 75 m  (R SBQC, L AFO, SBA 12/6)         Walking 10 feet on uneven surface NT 10 feet with R SBQC with CGA   Wheel Chair Mobility NT NT   Car Transfers NT CGA   Stair negotiation: ascended and descended NT 12 steps x 1 rep with 1 HR with CGA   (ascending/descending with LLE, non-reciprocal)   Curb Step:   ascended and descended NT 4\" step with R SBQC with Noemy   Picking up object off the floor NT Pt picked up cone with Noemy   Balance  Static Sitting: Mod/Max A     BBS: TBA  FGA: TBA    Standing: Noemy  BBS: 28/56      Date Family Teach Completed TBA Pt's mother present for observation 10/24, 11/2, 11/3, 114, hands on 11/9, 11/11, 11/16, 11/18, 11/23, aunt present to observe 11/17  Dad present 11/22, 11/24, 12/1 for hands on training  Step father and daughter present 11/26 for hands on training          Functional Assessment:      Date   Status   AE    Comments     Feeding   12/6/21   Mod I             Grooming   12/7/21   Mod I   w/c   Pt applied deodorant, washed face and hands seated up at sink. Oral Care   12/7/21   Mod I       Pt used electric toothbrush for oral care. Bathing   12/7/21   UB-SBA    LB-CGA   Sponge     Pt washed UB skills using one hand due to limited LUE ROM.    LB bathing completed using BLE crossover  Method then stood for malvin care at sink. UB Dressing   12/7/21   Melisa       To jesus bra and long sleeve shirt due to limited ROM in LUE. LB Dressing   12/7/21   CGA   reacher   To jesus briefs and pants using reacher needing assist threading garment over LLE. Pt stood to pull garments up/down over hips. Footwear   12/7/21   Mod A Lh shoe horn   Pt needed assist to jesus Left sock, shoe and AFO. Pt able to jesus R sock using one handed skills. Toileting   12/5/21   SBA   Grab bar            Homemaking   11/3/21   Min A   SBQC   See below           Discharge Physical Examination  General appearance: alert, NAD  Head: R crani site c/d/i   Eyes: conjunctivae/corneas clear. PERRL, EOM's intact. Lungs: clear to auscultation bilaterally  Heart: regular rate and rhythm, S1, S2 normal  Abdomen: soft, non-tender, normal bowel sounds  Extremities: extremities normal, atraumatic, no cyanosis or edema  MSK: no cyanosis, clubbing, edema  Skin: R crani c/d/i   Neurologic: Alert, oriented x4. Answering all questions appropriately. Left neglect. Speech clear. No aphasia appreciated. Mild L facial droop. Spastic left hemiplegia. MAS 2 in LUE; MAS 1+-2 in LLE. Motor 5/5 RUE and RLE; 0/5 LUE. LLE is 2/5 HF;  1/5 KE;  0/5 DF and PF       Hospital Course   Functional and mobility deficits secondary to right MCA CVA:  The patient completed an intensive inpatient rehabilitation program including PT, OT, SLP, and achieved significant improvement in function as documented above. Recommended continued therapies are documented below.     -Right MCA CVA: Complicated by cerebral edema requiring emergent right hemicraniectomy (10/13/2021). Dense left spastic hemiplegia, left neglect, mild cognitive impairment. Helmet when out of bed. L PRAFO in bed. LUE sling for gait. L w/c tray. Obtained left AFO. Aspirin, Plavix, Lipitor for secondary prevention.   Completed the Acute Rehab program with functional progress as above.   -Spasticity LUE/LLE: Some degree of tone currently helpful to compensate for weakness with attempted gait in PT. Started on low dose baclofen due to worsening painful spasticity.    -Possible seizure:  EEG with no seizure activity, but showed an increased potential for focal seizures from the right frontotemporal region. Evaluated by Neurology. Started on Lamictal - 25 mg daily x 2 weeks (start 11/11), then 50 mg daily x 2 weeks (start 11/25), then 100 mg daily x 2 weeks (start 12/9), then 50 mg qAM and 100 mg qHS x 2 weeks (start 12/23), then 100 mg BID for maintenance (start1/6/22). -Pain control, MSK pain, neuropathic pain: Tylenol PRN, gabapentin, Lidoderm, Voltaren gel while inpatient. Discharge medication per prescription.   -Urinary retention: On flomax, now voiding well, low PVRs  -Tachycardia: intermittent. Cardiology consulted. She was started on Lopressor. Cardiology attempted to access data from her heart monitor unsuccessfully. She will follow up with Cardiology in office to review heart monitor data once available. 30 day cardiac monitoring completed and monitor mailed in for analysis per instructions on device.   -Constipation: colace, senna, glycolax. PRNs available while inpatient.   -Tobacco abuse: Nicoderm patch.        ==================================================================  Discharge Medications       Medication List      START taking these medications    Baclofen 5 MG tablet  Commonly known as: LIORESAL  Take 1 tablet by mouth 3 times daily     gabapentin 100 MG capsule  Commonly known as: NEURONTIN  Take 1 capsule by mouth 3 times daily for 30 days.      lamoTRIgine 25 MG tablet  Commonly known as: LAMICTAL  Take 50mg PO daily x 1 day, then take 100mg daily x 14 days, then take 50mg Qam and 100mg QHS x 14 days, then take 100mg BID for maintenance     melatonin 3 MG Tabs tablet  Take 2 tablets by mouth nightly as needed (sleep)     metoprolol tartrate 25 MG tablet  Commonly known as: LOPRESSOR  Take 0.5 tablets by mouth 2 times daily     nicotine 7 MG/24HR  Commonly known as: [address] [facility] 1 patch onto the skin daily  Start taking on: December 8, 2021  Replaces: nicotine 21 MG/24HR     tamsulosin 0.4 MG capsule  Commonly known as: FLOMAX  Take 1 capsule by mouth daily  Start taking on: December 8, 2021     vitamin D 1.25 MG (04984 UT) Caps capsule  Commonly known as: ERGOCALCIFEROL  Take 1 capsule by mouth once a week  Start taking on: December 11, 2021        CONTINUE taking these medications    acetaminophen 325 MG tablet  Commonly known as: TYLENOL  Notes to patient: AS NEEDED     aspirin 81 MG chewable tablet  Take 1 tablet by mouth daily     atorvastatin 40 MG tablet  Commonly known as: LIPITOR  Take 1 tablet by mouth daily     clopidogrel 75 MG tablet  Commonly known as: PLAVIX  Take 1 tablet by mouth daily     polyethylene glycol 17 g packet  Commonly known as: GLYCOLAX  Notes to patient: AS NEEDED        STOP taking these medications    diclofenac 0.1 % ophthalmic solution  Commonly known as: VOLTAREN     nicotine 21 MG/24HR  Commonly known as: NICODERM CQ  Replaced by: nicotine 7 MG/24HR           Where to Get Your Medications      These medications were sent to [facility], [address] [place] [address]    Phone: [phone]   aspirin 81 MG chewable tablet  atorvastatin 40 MG tablet  Baclofen 5 MG tablet  clopidogrel 75 MG tablet  gabapentin 100 MG capsule  lamoTRIgine 25 MG tablet  melatonin 3 MG Tabs tablet  metoprolol tartrate 25 MG tablet  nicotine 7 MG/24HR  tamsulosin 0.4 MG capsule  vitamin D 1.25 MG (70035 UT) Caps capsule       Allergies  Vicodin [hydrocodone-acetaminophen]      Recommended Therapies at Discharge: home health for PT,OT, aide, nursing      Follow-Up  -Primary care provider in 1 week  -PM&R: Dr. Jessica Potter  -Neurology ANDREW Wolff  -[facility]  [place]  -Cardiology: Dr. Edi Lam provided to the patient and appropriate family members regarding discharge medications and follow up     More than 30 minutes was spent in preparation of this patient's discharge including, but not limited to, examination, preparation of documents, prescription preparation, counseling and coordination.     Electronically signed by Gin Hill MD on 12/7/2021 at 1:22 PM

## 2021-12-07 NOTE — PROGRESS NOTES
CLINICAL PHARMACY NOTE: MEDS TO BEDS    Total # of Prescriptions Filled: 11   The following medications were delivered to the patient:  · Aspirin 81mg  · Gabapentin 100mg  · Baclofen 5mg  · Nicotine 7mg patch  · tamsulosin 0.4mg  · Melatonin 3mg  · Atorvastatin 40mg  · Vitamin d 1.25mg  · Clopidogrel 75mg  · Metoprolol 25mg  · Lamotrigine 25mg    Additional Documentation:

## 2021-12-07 NOTE — PROGRESS NOTES
Physical Therapy  Discharge Report  Supervising Therapist: Joseph Lopez, PT, DPT [id number]    NAME: Sapphire Dia  [facility]  DIAGNOSIS: Acute CVA  PRECAUTIONS: Falls, helmet at all times when OOB, L hemiplegia, L homonymous hemianopsia L neglect, alarm,  L PRAFO, SBP <180, orange dot with mother. HPI: Pt is a 39 y.o. female with past medical history significant for recent COVID-19 infection (9/15/21) who [facility] with acute onset left-sided weakness. Initial NIHSS 17.  Initial CT (OSH) showed R MCA hyperdensity; initial CTA showed right ICA/M1 occlusion. On 10/13, her neuro exam worsened and repeat imaging indicated worsening edema, 1 cm of midline shift. She underwent emergent R hemicraniectomy by Dr. Jeni Nolen [facility]) on 10/13. After being deemed medically appropriate, she was transferred to [place] [address] on 10/22/2021    Social:  Pt lives with daughter (15 y/o), will be returning to parent's home upon discharge (1 floor plan, 2 HAIDER with 2 HR). Mother and father healthy, do not work. 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 Initial Evaluation  Date: 10/23/21 Discharge 12/7/21    Short Term Goals Long Term Goals    Was pt agreeable to Eval/treatment? yes Yes     Does pt have pain? Pain on the top of her head once the helmet was placed on Mild c/o L hip and R rib pain     Bed Mobility  Rolling: Max A  Supine to sit: Max A   Sit to supine: Max A   Scooting: Max A Supine>Sit SBA  Scooting/Rolling SBA  Sit>Supine Noemy Min A Supervision   Transfers Sit to stand: Max A   Stand to sit: Max A  Stand pivot:  Max A x2    5xSTS: Unable to complete Sit<>stand: CGA  Stand pivot: CGA with R SBQC Min A SBA  5xSTS: TBD   Ambulation    15 feet with R mackey rail with Max A + wc follow    10mWT: TBA  6mWT:  feet x 2 reps with R SBQC and SBA  (L articulating AFO with DF/PF stop)    10mWT: 0.26 m/s  6mWT: 75 m  (R SBQC, L AFO, SBA 12/6)     50 feet with AAD with Min A       500 feet with AAD with SBA    10mWT: >0.4 m/s  6mWT: >205 m   Walking 10 feet on uneven surface NT 10 feet with R SBQC with CGA 10 feet with AAD with Min A 10 feet with AAD with SBA   Wheel Chair Mobility NT NT  150 feet with R extremities with SBA   Car Transfers NT CGA Min A SBA   Stair negotiation: ascended and descended NT 12 steps x 1 rep with 1 HR with CGA   (ascending/descending with LLE, non-reciprocal) 4 steps with 1 rail with Min A 12 steps with 1 rail with SBA   Curb Step:   ascended and descended NT 4\" step with R SBQC with Noemy 4 inch step with AAD and Min A 4 inch step with AAD and SBA   Picking up object off the floor NT Pt picked up cone with Noemy Will  an object with Min assist Will  an object with SBA   BLE ROM WFL: LLE AROM limited by flaccidity  WFL: LLE AROM limited by spasticity 2+ on MAS (hamstrings, hip extensors, PF)  L ankle DF lacking ~5 degrees from neutral     BLE Strength RLE 4+/5  LLE 0/5 RLE 5/5  LLE hip flexion 2+/5  Knee extension 2-/5  Ankle DF/PF 0/5     Balance  Static Sitting: Mod/Max A    BBS: TBA  FGA: TBA   Standing: Noemy  BBS: 28/56        BBS: >20/56  FGA: TBD   Date Family Teach Completed TBA Pt's mother present for observation 10/24, 11/2, 11/3, 114, hands on 11/9, 11/11, 11/16, 11/18, 11/23, aunt present to observe 11/17  Dad present 11/22, 11/24, 12/1 for hands on training  Step father and daughter present 11/26 for hands on training     Is additional Family Teaching Needed? Y or N Y N     Hindering Progress L hemiplegia, L neglect L hemiplegia, L neglect     PT recommended ELOS 5 weeks      Team's Discharge Plan       Therapist at Team Meeting           Additional Comments: Pt was pleasant and motivated during stay. Pt exhibited good memory and intact orientation however impulsivity and L neglect led to poor safety awareness during stay. Pt received custom AFO during stay.  Mother present for multiple FT as noted above, mother to be primary caregiver upon discharge. Pt received SBQC, WC, and gait belt prior to discharge. Pt participated in high intensity gait training without incident. Recommend HHPT follow up.      Clarence Candelaria, PT, DPT  Board Certified Clinical Specialist in Neurologic Physical Therapy  [id number]

## 2021-12-07 NOTE — PROGRESS NOTES
Physical Therapy  Treatment Note  Supervising Therapist: Debra Trejo PT, DPT [id number]    NAME: True Mock  ROOM: [facility]  DIAGNOSIS: Acute CVA  PRECAUTIONS: Falls, helmet at all times when OOB, L hemiplegia, L homonymous hemianopsia L neglect, alarm,  L PRAFO, SBP <180, orange dot with mother. HPI: Pt is a 39 y.o. female with past medical history significant for recent COVID-19 infection (9/15/21) who [facility] with acute onset left-sided weakness. Initial NIHSS 17.  Initial CT (OSH) showed R MCA hyperdensity; initial CTA showed right ICA/M1 occlusion. On 10/13, her neuro exam worsened and repeat imaging indicated worsening edema, 1 cm of midline shift. She underwent emergent R hemicraniectomy by Dr. Audra [place]) on 10/13. After being deemed medically appropriate, she was transferred to [place] [address] on 10/22/2021    Social:  Pt lives with daughter (15 y/o), will be returning to parent's home upon discharge (1 floor plan, 2 HAIDER with 2 HR). Mother and father healthy, do not work. 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 Initial Evaluation  Date: 10/23/21 AM     PM    Short Term Goals Long Term Goals    Was pt agreeable to Eval/treatment? yes Yes      Does pt have pain? Pain on the top of her head once the helmet was placed on Mild c/o L hip and R rib pain      Bed Mobility  Rolling: Max A  Supine to sit: Max A   Sit to supine: Max A   Scooting: Max A Supine>Sit SBA  Scooting/Rolling SBA  Sit>Supine Noemy  Min A Supervision   Transfers Sit to stand: Max A   Stand to sit: Max A  Stand pivot:  Max A x2    5xSTS: Unable to complete Sit<>stand: CGA  Stand pivot: CGA with R SBQC  Min A SBA  5xSTS: TBD   Ambulation    15 feet with R mackey rail with Max A + wc follow    10mWT: TBA  6mWT:  feet x 2 reps with R SBQC and SBA  (L articulating AFO with DF/PF stop)    10mWT: 0.26 m/s  6mWT: 75 m  (R SBQC, L AFO, SBA 12/6)      50 feet with AAD with Min A       500 feet with AAD with SBA    10mWT: >0.4 m/s  6mWT: >205 m   Walking 10 feet on uneven surface NT NT  10 feet with AAD with Min A 10 feet with AAD with SBA   Wheel Chair Mobility NT NT   150 feet with R extremities with SBA   Car Transfers NT NT  Min A SBA   Stair negotiation: ascended and descended NT 20 steps with 1 HR with CGA  4 steps with 1 rail with Min A 12 steps with 1 rail with SBA   Curb Step:   ascended and descended NT NT  4 inch step with AAD and Min A 4 inch step with AAD and SBA   Picking up object off the floor NT NT  Will  an object with Min assist Will  an object with SBA   BLE ROM WFL: LLE AROM limited by flaccidity  WFL: LLE AROM limited by spasticity 2+ on MAS (hamstrings, hip extensors, PF)  L ankle DF lacking ~5 degrees from neutral      BLE Strength RLE 4+/5  LLE 0/5 RLE 5/5  LLE hip flexion 2+/5  Knee extension 2-/5  Ankle DF/PF 0/5      Balance  Static Sitting: Mod/Max A    BBS: TBA  FGA: TBA   Standing: Noemy    BBS: 28/56 (12/6)         BBS: >20/56  FGA: TBD   Date Family Teach Completed TBA Pt's mother present for observation 10/24, 11/2, 11/3, 114, hands on 11/9, 11/11, 11/16, 11/18, 11/23, aunt present to observe 11/17  Dad present 11/22, 11/24, 12/1 for hands on training  Step father and daughter present 11/26 for hands on training      Is additional Family Teaching Needed? Y or N Y Yes      Hindering Progress L hemiplegia, L neglect L hemiplegia, L neglect      PT recommended ELOS 5 weeks       Team's Discharge Plan        Therapist at Team Meeting          General:        HRmax: 178  bpm       Beta blockers (Y/N): Y      Adjusted HRmax: 168 bpm   Blood pressure (mmHg): AM:  - Prior to Tx: 113/74  - Post Tx: 117/72 PM:  - Prior to Tx: NA  - Post Tx: NA   Time spent in HR ranges:    Moderate intensity (60-70% HRmax): AM: 0:24:24 PM: NA   High intensity (70-85% HRmax): AM: 0:00:00 PM: NA     Therapeutic Exercise:   AM: Fast walking (therapist assisted propulsion) 500 feet x 2 reps with R SPC and Noemy/ModA    Patient education  Pt educated on implications of improved gait speed on kinematics    Patient response to education:   Pt verbalized understanding Pt demonstrated skill Pt requires further education in this area   yes yes no     Additional Comments: Pt remains pleasant and motivated during session, is excited for discharge today. Propulsion deficits addressed with fast walking trials. Pt remains fearful of falling and tends to retropulse with therapist attempts at assisting FW propulsion. HR monitored and did not reach high intensity ranges however visual appraisal and RPE (consistently 16-17) suggested otherwise. Plan for discharge today. AM  Time in: 0830  Time out: 0915    Pt is making good progress toward established Physical Therapy goals. Continue with physical therapy current plan of care.     Mary Harkins, PT, DPT  Board Certified Clinical Specialist in Neurologic Physical Therapy  [id number]

## 2021-12-08 PROBLEM — Z88.5 ALLERGY STATUS TO NARCOTIC AGENT: Status: ACTIVE | Noted: 2017-09-23
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
## 2021-12-08 RX ORDER — IBUPROFEN 800 MG/1
TABLET ORAL
COMMUNITY
Start: 2021-11-19 | End: 2021-12-09

## 2021-12-08 RX ORDER — NORGESTREL AND ETHINYL ESTRADIOL 0.3-0.03MG
KIT ORAL
COMMUNITY
Start: 2021-11-19 | End: 2021-12-09

## 2021-12-09 ENCOUNTER — OFFICE VISIT (OUTPATIENT)
Dept: CARDIOLOGY CLINIC | Age: 46
End: 2021-12-09
Payer: MEDICARE

## 2021-12-09 VITALS
SYSTOLIC BLOOD PRESSURE: 124 MMHG | HEIGHT: 66 IN | DIASTOLIC BLOOD PRESSURE: 64 MMHG | BODY MASS INDEX: 27.32 KG/M2 | RESPIRATION RATE: 18 BRPM | HEART RATE: 70 BPM | WEIGHT: 170 LBS

## 2021-12-09 DIAGNOSIS — I63.9 ACUTE CEREBROVASCULAR ACCIDENT (CVA) (HCC): Primary | ICD-10-CM

## 2021-12-09 PROCEDURE — 1036F TOBACCO NON-USER: CPT | Performed by: INTERNAL MEDICINE

## 2021-12-09 PROCEDURE — 99214 OFFICE O/P EST MOD 30 MIN: CPT | Performed by: INTERNAL MEDICINE

## 2021-12-09 PROCEDURE — G8427 DOCREV CUR MEDS BY ELIG CLIN: HCPCS | Performed by: INTERNAL MEDICINE

## 2021-12-09 PROCEDURE — G8484 FLU IMMUNIZE NO ADMIN: HCPCS | Performed by: INTERNAL MEDICINE

## 2021-12-09 PROCEDURE — G8419 CALC BMI OUT NRM PARAM NOF/U: HCPCS | Performed by: INTERNAL MEDICINE

## 2021-12-09 PROCEDURE — 93000 ELECTROCARDIOGRAM COMPLETE: CPT | Performed by: INTERNAL MEDICINE

## 2021-12-09 PROCEDURE — 1111F DSCHRG MED/CURRENT MED MERGE: CPT | Performed by: INTERNAL MEDICINE

## 2021-12-09 RX ORDER — ASPIRIN 81 MG/1
81 TABLET, CHEWABLE ORAL DAILY
Qty: 90 TABLET | Refills: 3 | Status: ON HOLD
Start: 2021-12-09 | End: 2022-04-22 | Stop reason: SDUPTHER

## 2021-12-09 RX ORDER — DICLOFENAC SODIUM 1 MG/ML
1 SOLUTION/ DROPS OPHTHALMIC 4 TIMES DAILY
COMMUNITY
End: 2022-08-10

## 2021-12-09 RX ORDER — ATORVASTATIN CALCIUM 40 MG/1
40 TABLET, FILM COATED ORAL DAILY
Qty: 90 TABLET | Refills: 3 | Status: ON HOLD
Start: 2021-12-09 | End: 2022-04-22 | Stop reason: SDUPTHER

## 2021-12-09 RX ORDER — CLOPIDOGREL BISULFATE 75 MG/1
75 TABLET ORAL DAILY
Qty: 90 TABLET | Refills: 3 | Status: ON HOLD
Start: 2021-12-09 | End: 2022-04-22 | Stop reason: HOSPADM

## 2021-12-09 NOTE — PROGRESS NOTES
CHIEF COMPLAINT: CVA    HISTORY OF PRESENT ILLNESS: Patient is a 42 y.o. female seen at the request of No primary care provider on file. .      Patient seen after recent CVA with significant residual deficits. No CP or SOB. Past Medical History:   Diagnosis Date    [facility]erebral [facility] occlusion with [facility] infarction ([facility]     Hx of blood clots        Patient Active Problem List   Diagnosis    Acute cerebrovascular accident (CVA) ([facility]    Status post craniectomy    Left spastic hemiplegia (HCC)    Tobacco abuse    Acute pain    Transient alteration of awareness    Activity, other specified    Allergy status to narcotic agent       Allergies   Allergen Reactions    Vicodin [Hydrocodone-Acetaminophen]      Gets severe hypotension    Acetaminophen     Hydrocodone     Propoxyphene        Current Outpatient Medications   Medication Sig Dispense Refill    diclofenac (VOLTAREN) 0.1 % ophthalmic solution 1 drop 4 times daily      atorvastatin (LIPITOR) 40 MG tablet Take 1 tablet by mouth daily 90 tablet 3    aspirin 81 MG chewable tablet Take 1 tablet by mouth daily 90 tablet 3    clopidogrel (PLAVIX) 75 MG tablet Take 1 tablet by mouth daily 90 tablet 3    metoprolol tartrate (LOPRESSOR) 25 MG tablet Take 0.5 tablets by mouth 2 times daily 180 tablet 3    gabapentin (NEURONTIN) 100 MG capsule Take 1 capsule by mouth 3 times daily for 30 days.  90 capsule 0    melatonin 3 MG TABS tablet Take 2 tablets by mouth nightly as needed (sleep) 30 tablet 0    tamsulosin (FLOMAX) 0.4 MG capsule Take 1 capsule by mouth daily 30 capsule 0    nicotine (NICODERM CQ) 7 MG/24HR Place 1 patch onto the skin daily 14 patch 0    baclofen (LIORESAL) 5 MG tablet Take 1 tablet by mouth 3 times daily 90 tablet 0    vitamin D (ERGOCALCIFEROL) 1.25 MG (82917 UT) CAPS capsule Take 1 capsule by mouth once a week 5 capsule 0    lamoTRIgine (LAMICTAL) 25 MG tablet Take 50mg PO daily x 1 day, then take 100mg daily x 14 days, then take 50mg Qam and 100mg QHS x 14 days, then take 100mg BID for maintenance 254 tablet 0    acetaminophen (TYLENOL) 325 MG tablet Take 650 mg by mouth every 4 hours as needed for Pain      polyethylene glycol (GLYCOLAX) 17 g packet Take 17 g by mouth 2 times daily As needed       No current facility-administered medications for this visit. Social History     Socioeconomic History    Marital status: Single     Spouse name: Not on file    Number of children: 1    Years of education: Not on file    Highest education level: 12th grade   Occupational History    Not on file   Tobacco Use    Smoking status: Former Smoker     Packs/day: 1.00     Years: 20.00     Pack years: 20.00     Types: Cigarettes     Start date: 10/1/2020     Quit date: 10/12/2021     Years since quittin.1    Smokeless tobacco: Never Used   Vaping Use    Vaping Use: Never used   Substance and Sexual Activity    Alcohol use: Not Currently    Drug use: Never    Sexual activity: Not Currently     Partners: Male     Birth control/protection: Pill   Other Topics Concern    Not on file   Social History Narrative    Not on file     Social Determinants of Health     Financial Resource Strain: Low Risk     Difficulty of Paying Living Expenses: Not hard at all   Food Insecurity: No Food Insecurity    Worried About [address] in the Last Year: Never true    [address] in the Last Year: Never true   Transportation Needs: No Transportation Needs    Lack of Transportation (Medical): No    Lack of Transportation (Non-Medical):  No   Physical Activity: Insufficiently Active    Days of Exercise per Week: 2 days    Minutes of Exercise per Session: 30 min   Stress: Stress Concern Present    Feeling of Stress : Rather much   Social Connections: Unknown    Frequency of Communication with Friends and Family: Once a week    Frequency of Social Gatherings with Friends and Family: Twice a week    Attends Caodaism Services: Never    Active Member of Clubs or Organizations: No    Attends Club or Organization Meetings: Never    Marital Status: Patient refused   Intimate Partner Violence: Not At Risk    Fear of Current or Ex-Partner: No    Emotionally Abused: No    Physically Abused: No    Sexually Abused: No   Housing Stability: Low Risk     Unable to Pay for Housing in the Last Year: No    Number of [place] in the Last Year: 1    Unstable Housing in the Last Year: No       No family history on file. Review of Systems:  Heart: as above   Lungs: as above   Eyes: denies changes in vision or discharge. Ears: denies changes in hearing or pain. Nose: denies epistaxis or masses   Throat: denies sore throat or trouble swallowing. Neuro: denies numbness, tingling, tremors. Skin: denies rashes or itching. : denies hematuria, dysuria   GI: denies vomiting, diarrhea   Psych: denies mood changed, anxiety, depression. All other systems negative. Physical Exam   /64   Pulse 70   Resp 18   Ht 5' 6\" (1.676 m)   Wt 170 lb (77.1 kg)   BMI 27.44 kg/m²   Constitutional: Oriented to person, place, and time. Well-developed and well-nourished. No distress. Head: Normocephalic and atraumatic. Eyes: EOM are normal. Pupils are equal, round, and reactive to light. Neck: Normal range of motion. Neck supple. No hepatojugular reflux and no JVD present. Carotid bruit is not present. No tracheal deviation present. No thyromegaly present. Cardiovascular: Normal rate, regular rhythm, normal heart sounds and intact distal pulses. Exam reveals no gallop and no friction rub. No murmur heard. Pulmonary/Chest: Effort normal and breath sounds normal. No respiratory distress. No wheezes. No rales. No tenderness. Abdominal: Soft. Bowel sounds are normal. No distension and no mass. No tenderness. No rebound and no guarding. Musculoskeletal: Normal range of motion. No edema and no tenderness.    Lymphadenopathy: No cervical adenopathy. No groin adenopathy. Neurological: Alert and oriented to person, place, and time. Skin: Skin is warm and dry. No rash noted. Not diaphoretic. No erythema. Psychiatric: Normal mood and affect. Behavior is normal.     EKG personally reviewed 12/13/21:  normal EKG, normal sinus rhythm. ASSESSMENT AND PLAN:  Patient Active Problem List   Diagnosis    [facility]A) [facility]    Status post craniectomy    Left spastic hemiplegia (HCC)    Tobacco abuse    Acute pain    Transient alteration of awareness    Activity, other specified    Allergy status to narcotic agent     1. Tachycardia: Stable monitor 10/22/2021 at [facility]. 2. CVA: [facility] and [facility] okay. ASA/statin/plavix. 3. Carotid disease: Mild. 4. Lipids: Statin. Xenia Patricia D.O.   Cardiologist  Cardiology, [facility]

## 2021-12-13 ENCOUNTER — TELEPHONE (OUTPATIENT)
Dept: PHYSICAL MEDICINE AND REHAB | Age: 46
End: 2021-12-13

## 2021-12-13 NOTE — TELEPHONE ENCOUNTER
Pt called to schedule a HFU post CVA with Dr Perri Fraire.   Please call her with appt info [phone]

## 2021-12-20 ENCOUNTER — OFFICE VISIT (OUTPATIENT)
Dept: PHYSICAL MEDICINE AND REHAB | Age: 46
End: 2021-12-20
Payer: MEDICARE

## 2021-12-20 VITALS
SYSTOLIC BLOOD PRESSURE: 128 MMHG | DIASTOLIC BLOOD PRESSURE: 62 MMHG | OXYGEN SATURATION: 97 % | WEIGHT: 173.8 LBS | TEMPERATURE: 97.5 F | HEIGHT: 66 IN | HEART RATE: 92 BPM | BODY MASS INDEX: 27.93 KG/M2

## 2021-12-20 DIAGNOSIS — Z78.9 IMPAIRED MOBILITY AND ADLS: ICD-10-CM

## 2021-12-20 DIAGNOSIS — Z74.09 IMPAIRED MOBILITY AND ADLS: ICD-10-CM

## 2021-12-20 DIAGNOSIS — Z86.73 H/O ISCHEMIC RIGHT MCA STROKE: Primary | ICD-10-CM

## 2021-12-20 DIAGNOSIS — G81.14 LEFT SPASTIC HEMIPLEGIA (HCC): ICD-10-CM

## 2021-12-20 PROCEDURE — 1036F TOBACCO NON-USER: CPT | Performed by: PHYSICAL MEDICINE & REHABILITATION

## 2021-12-20 PROCEDURE — G8484 FLU IMMUNIZE NO ADMIN: HCPCS | Performed by: PHYSICAL MEDICINE & REHABILITATION

## 2021-12-20 PROCEDURE — 99214 OFFICE O/P EST MOD 30 MIN: CPT | Performed by: PHYSICAL MEDICINE & REHABILITATION

## 2021-12-20 PROCEDURE — G8419 CALC BMI OUT NRM PARAM NOF/U: HCPCS | Performed by: PHYSICAL MEDICINE & REHABILITATION

## 2021-12-20 PROCEDURE — G8427 DOCREV CUR MEDS BY ELIG CLIN: HCPCS | Performed by: PHYSICAL MEDICINE & REHABILITATION

## 2021-12-20 PROCEDURE — 1111F DSCHRG MED/CURRENT MED MERGE: CPT | Performed by: PHYSICAL MEDICINE & REHABILITATION

## 2021-12-20 RX ORDER — BACLOFEN 5 MG/1
10 TABLET ORAL 3 TIMES DAILY
Qty: 180 TABLET | Refills: 2 | Status: SHIPPED | OUTPATIENT
Start: 2021-12-20 | End: 2022-03-20

## 2021-12-20 NOTE — PROGRESS NOTES
Lexie Ball M.D. [facility]ICINE [facility]ILITA[facility]  [address] [place] [id number]  Dept: [phone]  Dept Fax: [phone]    PCP: No primary care provider on file. Date of visit: 12/20/21    Chief Complaint   Patient presents with    Neurologic Problem     F/U CVA discharged 12/7/2021, AFO left which helps her with ambulation, Home PT, OT going well, Taking Baclofen. Lamictal is making her itchy and would like to know if it can be switched, Needs new prescription for a handicap placard due to misplacing first prescription. Tonny Romo is a 42 y. o. woman who presents for follow up after Acute Rehab. Patient sustained a right MCA ischemic infarct 10/12/2021 and was [facility]. She had worsening of her Neurologic exam on 10/13 and repeat imaging indicated worsening edema, 1 cm of midline shift, effacement of the right lateral ventricle, and increased left lateral ventricle caliber concerning for entrapment.  She underwent emergent R hemicraniectomy by Dr. Janice Grigsby [facility]) on 10/13. After being deemed medically appropriate, she was transferred to [facility] on 10/22/2021. She completed the Acute Rehab program from 10/22/2021-12/7/2021 and was discharged home. At the time of Acute Rehab discharge patient was at [facility] for bed mobility and transfers and ambulating 400 ft with right SBQC SBA (L AFO). She was Mod I for grooming and CGA-Min A for dressing and bathing tasks. Patient was discharged about a week and a half ago. Since she has been home she has been doing well overall. She was able to get started with home health PT and OT and is doing well with the therapy thus far. She reports she will be discharged from [facility] soon and has already obtained an order for outpatient PT and OT, will be attending [facility] in [place].  She does ask about coming back to [facility] to work with the In [employer] Arm. No falls since she has been home. She lost her handicap placard and needs a new one. Her left AFO is fitting well and helps with gait. She is taking the baclofen with improvement in spasticity. She is still having some lower extremity spasticity contributing to pain in the left leg. She asks about B12 shots and I did direct her to discuss this with her primary care physician. Patient was started on Lamictal in the hospital by Neurology due to possible seizure. She feels the medication is causing some itching, no other side effects noted. She has a follow up with Neurology scheduled in January. She is scheduled to follow up with [facility] regarding have her flap put back on. Functional status:  Bed mobility: SBA  Transfers: SBA  Ambulation: 400 ft right SBQC SBA (L AFO). Feeding: Mod I  Grooming: Mod I  UB dressing: Min A  LB dressing: CGA  Bathing: CGA        Past Medical History:   Diagnosis Date    [facility]     Hx of blood clots        No past surgical history on file.     Social History     Socioeconomic History    Marital status: Single     Spouse name: Not on file    Number of children: 1    Years of education: Not on file    Highest education level: 12th grade   Occupational History    Not on file   Tobacco Use    Smoking status: Former Smoker     Packs/day: 1.00     Years: 20.00     Pack years: 20.00     Types: Cigarettes     Start date: 10/1/2020     Quit date: 10/12/2021     Years since quittin.1    Smokeless tobacco: Never Used   Vaping Use    Vaping Use: Never used   Substance and Sexual Activity    Alcohol use: Not Currently    Drug use: Never    Sexual activity: Not Currently     Partners: Male     Birth control/protection: Pill   Other Topics Concern    Not on file   Social History Narrative    Not on file     Social Determinants of Health     Financial Resource Strain: Low Risk     Difficulty of Paying Living Expenses: Not hard at all   Food Insecurity: No Food Insecurity    Worried About [facility] in the Last Year: Never true    [place]y of Food in the Last Year: Never true   Transportation Needs: No Transportation Needs    Lack of Transportation (Medical): No    Lack of Transportation (Non-Medical): No   Physical Activity: Insufficiently Active    Days of Exercise per Week: 2 days    Minutes of Exercise per Session: 30 min   Stress: Stress Concern Present    Feeling of Stress : Rather much   Social Connections: Unknown    Frequency of Communication with Friends and Family: Once a week    Frequency of Social Gatherings with Friends and Family: Twice a week    Attends Taoism Services: Never    Active Member of Clubs or Organizations: No    Attends Club or Organization Meetings: Never    Marital Status: Patient refused   Intimate Partner Violence: Not At Risk    Fear of Current or Ex-Partner: No    Emotionally Abused: No    Physically Abused: No    Sexually Abused: No   Housing Stability: [facility] [address] Unable to Pay for Housing in the Last Year: No    Number of [place] in the Last Year: 1    Unstable Housing in the Last Year: No       Family history: no pertinent history reported. Allergies   Allergen Reactions    Vicodin [Hydrocodone-Acetaminophen]      Gets severe hypotension    Acetaminophen     Hydrocodone     Propoxyphene        Current Outpatient Medications   Medication Sig Dispense Refill    diclofenac (VOLTAREN) 0.1 % ophthalmic solution 1 drop 4 times daily      atorvastatin (LIPITOR) 40 MG tablet Take 1 tablet by mouth daily 90 tablet 3    aspirin 81 MG chewable tablet Take 1 tablet by mouth daily 90 tablet 3    clopidogrel (PLAVIX) 75 MG tablet Take 1 tablet by mouth daily 90 tablet 3    metoprolol tartrate (LOPRESSOR) 25 MG tablet Take 0.5 tablets by mouth 2 times daily 180 tablet 3    gabapentin (NEURONTIN) 100 MG capsule Take 1 capsule by mouth 3 times daily for 30 days.  80 capsule 0    melatonin 3 MG TABS tablet Take 2 tablets by mouth nightly as needed (sleep) 30 tablet 0    tamsulosin (FLOMAX) 0.4 MG capsule Take 1 capsule by mouth daily 30 capsule 0    nicotine (NICODERM CQ) 7 MG/24HR Place 1 patch onto the skin daily 14 patch 0    baclofen (LIORESAL) 5 MG tablet Take 1 tablet by mouth 3 times daily 90 tablet 0    vitamin D (ERGOCALCIFEROL) 1.25 MG (54400 UT) CAPS capsule Take 1 capsule by mouth once a week 5 capsule 0    lamoTRIgine (LAMICTAL) 25 MG tablet Take 50mg PO daily x 1 day, then take 100mg daily x 14 days, then take 50mg Qam and 100mg QHS x 14 days, then take 100mg BID for maintenance 254 tablet 0    acetaminophen (TYLENOL) 325 MG tablet Take 650 mg by mouth every 4 hours as needed for Pain      polyethylene glycol (GLYCOLAX) 17 g packet Take 17 g by mouth 2 times daily As needed       No current facility-administered medications for this visit. Review of Systems  General: No chills, fatigue, fever, malaise, night sweats, weight gain,  weight loss. Psychological: No anxiety, depression, suicidal ideation   Ophthalmic: No blurry vision, decreased vision, double vision, loss of vision  Ear Nose Throat: No hearing loss, tinnitus, phonophobia, sensitivity to smells, vertigo, or vocal changes. Allergy/Immunology: No watery eyes, itchy eyes, frequent infections. Hematological and Lymphatic: No bleeding problems, blood clots, bruising  Endocrine:  No polydypsia, polyuria, temperature intolerance. Respiratory: No cough, shortness of breath, wheezing. Cardiovascular: No syncope, chest pain, dyspnea on exertion,palpitations.    Gastrointestinal: No abdominal pain, hematemesis, melena, nausea, vomiting, stool incontinence  Genito-Urinary: No dysuria, hematuria, incontinence   Musculoskeletal: Per HPI  Neurological: Per HPI  Dermatological:  No rash      Physical Exam:   Pulse 92, temperature 97.5 °F (36.4 °C), temperature source Temporal, height 5' 6\" (1.676 m), weight 173 lb 12.8 oz (78.8 kg), last menstrual period 12/05/2021, SpO2 97 %, not currently breastfeeding. General: The patient is in no apparent distress. HEENT: No rhinorrhea, sneezing, yawning, or lacrimation. No scleral icterus or conjunctival injection. SKIN: No piloerection. No track marks. No rash. Normal turgor. No erythema or ecchymosis. Psychological: Mood and affect are appropriate. Hygiene is appropriate. Cardiovascular:  Heart is regular rate. Peripheral pulses are 2+ and symmetric. There is no edema. Respiratory: Respirations are regular and unlabored. There is no cyanosis. Gastrointestinal: No abdominal distention  Genitourinary: No costovertebral angle tenderness. MSK: No gross deformity. Full passive ROM LUE and LLE. AROM otherwise intact on the right side. Neurologic: Awake, alert and oriented in three planes. Speech is fluent. Mild L facial droop. Tongue is midline. Hearing is intact for conversation. Pupils are equal and round. Extraocular muscles are intact. Shoulder shrug symmetric. Strength:   R  L  Deltoid   5  0  Biceps   5  0  Triceps  5  0  Wrist Ext  5  0  Finger Abd  5  0  Hip Flex  5  2  Knee Ext  5  0  Ankle dorsi  5  0  EHL   5 0  Ankle Plantar  5  0    LUE spasticity MAS 2  LLE spasticity MAS 2    Sensory:  Intact for light touch in all upper and lower extremity dermatomes. No pathological reflexes     Gait: spastic hemiparetic gait, ambulates with L AFO, no LOB      Impression:   Right MCA CVA, complicated by cerebral edema requiring right hemicraniectomy  Left spastic hemiplegia   Impaired mobility and ADLs      Plan:   · Continue home health PT and OT. Will progress to outpatient therapy upon completion of home health. She will be doing outpatient therapy in [facility] due to the distance to drive to [facility].  She would benefit from use of the In Motion Arm, will place order for In Motion arm though unsure if insurance will allow her to begin work with the In Motion arm while still in therapy in [place]. · Increase Baclofen to 10mg TID   · Continue left AFO brace  · Advised patient to contact Neurology regarding possible side effect with Lamictal. She has a follow up scheduled with them in [facility], but advised that she call them now to discuss the medication. · New Rx for handicap placard provided  The patient was educated about the diagnosis, prognosis, indications, risks and benefits of treatment. An opportunity to ask questions was given to the patient and questions were answered. The patient agreed to proceed with the recommended treatment as described above. Follow up 3-4 months or sooner if needed          Marco Goldstein M.D.   Physical Medicine and Rehabilitation

## 2022-01-04 ENCOUNTER — TELEPHONE (OUTPATIENT)
Dept: PHYSICAL MEDICINE AND REHAB | Age: 47
End: 2022-01-04

## 2022-01-04 NOTE — TELEPHONE ENCOUNTER
Patient called in stating that she has been experiencing itching with Lamictal, no other side affects. Patient states that she has an appointment to see neurology 1-, but they will not advise her what to do with medication issue until she is seen. Patient would like to know if medication can be changed by you now until she is seen with neurology. Patient also is requesting pain medication to help her sleep at night.

## 2022-01-05 ENCOUNTER — HOSPITAL ENCOUNTER (OUTPATIENT)
Dept: OCCUPATIONAL THERAPY | Age: 47
Setting detail: THERAPIES SERIES
Discharge: HOME | End: 2022-01-05
Payer: COMMERCIAL

## 2022-01-05 PROCEDURE — 97167 OT EVAL HIGH COMPLEX 60 MIN: CPT

## 2022-01-05 NOTE — TELEPHONE ENCOUNTER
Patient's possible seizures are managed by Neurology. Seizure management is outside the scope of my practice. She will need to follow up with Neurology for management of antiepileptics. No new pain medication orders at this time. Thanks.

## 2022-01-05 NOTE — PROGRESS NOTES
Occupational Therapy   Occupational Therapy Initial Assessment  OCCUPATIONAL THERAPY TREATMENT NOTE  [facility]  [facility]  [place] [address]  Dept: [phone]  [facility] MA[facility] OT fax [phone]        Date:  2021                      Date of Service: 2022    Date: 2022   Patient Name: Amber Quiñonez  MRN: [id number]     : 1975    Treating Therapist: John Ivey OTR/L [id number]     Restrictions/Precautions:  Spasticity, low fall rsik  Diagnosis:  h/o ischemic right MCA stroke (Z86.73), Left spastic hemiplegia (HCC)(G81.14, impaired mobility & ADLs (Z74.09 Z78.9)  Date of Injury:      Insurance/Certification information: [address] of care signed (Y/N): No  Visit# / total visits: TBD     Referring Practitioner:  Indio Krause MD  Specific Practitioner Orders: Eval and Treat, In-motion Arm     Home Living: Lives with her parents & daughter in 1-story home 2 steps BHR, 1st floor s/u bed/bath; tub. Prior Level of Function: Pt is Mod I using a SBQC for mobility & transfers & Mod I for toileting. Pt require assist with bathing, UE/LE dressing. IADL History  Homemaking Responsibility: Assist  Shopping Responsibility: Assist     Cognition   Alert & oriented x 3     Current ADL Status  Feeding: Independent  Grooming: Independent  Bathing: Mod A  UE Dressing: Mod A  LE Dressing: Mod A underwear & pants & Max A socks, brace & shoes  Toileting: Mod I   Transfers: Mod I   Functional mobility: Mod I using a SBQC     Pain Level- Pt report pain during PROM LUE     UE Assessment:   RUE: AROM & strength WFL  LUE- Pt demo PROM: shoulder flexion 0.90 degrees, shoulder abduction 0.60 degrees, elbow flexion 0-100 degrees, wrist flexion/extension 0-10, supination/pronation 0-40. Pt has pain @ end range. Pt presents with spasticity & hold her LUE in a flexed position.  Pt wears a sling for support & wears a resting hand splint on her L hand @ night for positioning purposes     Hand Dominance: R handed     Sensation: grossly intact   Tone- spasticity LUE   Vision/Perception WFL     InMotion Robotic Evaluation  Point to Point Assessment:  Smoothness: 0.257  Reach Error: 0.073  Max Velocity: 0.077,   Path Error: 0.033   Initiation Time: 0.591 seconds  [place] Assessment:  [place] Size: 0.006  [place]: 0.430  Stabilization:  Hold Deviation: 0.060  [place]itance Assessment:  Displacement: 0.021     Assessment of current deficits   []? Functional mobility                         [x]? ADLs          [x]? Strength                 []? Cognition   []? Functional transfers           [x]? IADLs         []? Safety Awareness  [x]? Endurance   [x]? Fine Motor Coordination    [x]? Balance      []? Vision/perception   []? Sensation     [x]? Gross Motor Coordination [x]? ROM          []? Pain                        []? Edema          []? Scar Adhesion/Skin Integrity      OT PLAN OF CARE   OT POC based on physician orders, patient diagnosis and results of clinical assessment.     Frequency/Duration: 2x/wk for 80-42 visits  /  Certification Period From: 1/5/2022  To: 06/29/2022     Specific OT Treatment to include:   [x]? Instruction in HEP                   Modalities:  [x]?  Therapeutic Exercise                 [x]? Ultrasound               [x]? Electrical Stimulation/Attended  [x]? PROM/Stretching                    [x]? Fluidotherapy          [x]?   Paraffin                   [x]? AAROM  [x]?  AROM                 []? Iontophoresis:   [x]? Anthony Aly                                       [x]? Neuromuscular Re-Ed             [x]? ADL/IADL re-training    [x]?  Therapeutic Activity                  []? Pain Management with/without modalities PRN                 [x]?  Manual Therapy                      [x]? Splinting                                   []? Scar Management                   []?Joint Protection/Training  []? Ergonomics                             []? Joint Mobilization [x]? Adaptive Equipment Assessment/Training                             [x]?  Manual Edema Mobilization   [x]?  Myofascial Release                 [x]? Energy Conservation/Work Simplification  [x]? GM/FM Coordination                [x]? Safety retraining/education per  individual diagnosis/goals  []? Desensitization        Patient Specific Goal: \"To be able to  & manipulate things using my left arm. Do everything. \"                          GOALS (Long term same as Short term):  1.) Patient will demonstrate good understanding of home program(exercises/activities/diagnosis/prognosis/goals) with good accuracy. 2)Patient will demonstrate improved smoothness of 0.257 on InMotion Arm (Norm is 0.533)  which will improve patient ability to complete ADLs. 3)Patient will demonstrate improved initiation time 0.591 sec (Norm is 0.25 seconds) which a lower number that is required for cooking & home management tasks  4) Pt [place] size 0.006 & [place] independence of 0.430 (8cm [place] 0.0201 or >) will improve to a higher number which is required for a functional tasks & ADLs. 5) Pt will demonstrate a lower hold deviation 0.060  (Norm is 0) which will assist a patient open a door & perform transfers. 6) Pt will demonstrate a higher displacement number 0.021 (norm is 0.14) which will improve a patient ability to perform ADLs.    TODAY'S TREATMENT      Pain Level: No c/o pain this date. During PROM LUE @ end range     Subjective: Pt reports \"I to be able to  & manipulate objects using my left hand. Do everything. \"     OBJECTIVE: InMotion   Updated POC to be completed by 10th.           RI   DFT  RP  MJ  DFSL  Protocol Comments      # 1/80 15 11 77 103 8 Random 10 cm   # 2/160    14 8 74 106 7 Random Focus on DFT & RP   # 3/240    15 8 96 107 9 Random Focus on RP & MJ    # 4/320             #5/400           #6/480             #7/560             #8/640                    Stabilization                   Resistance                                                       Pt assessed on this date. Pt used 10cm [place] & shoulder straps & forearm ace wrapped for positioning purposes. Pt smoothness 0.257, path error 0.033, reach error 0.073, max velocity 0.077, initiation time 0.591 seconds, [place] size 0.006, [place] independence 0.430, hold deviation 0.060 & displacement 0.021. Pt does attempt to compensate her movements by using her head & trunk movements to facilitate movement of the LUE. Pt head is held in place & her trunk & R shoulder are held to prevent compensatory movement patterns     Pt completed assessment, pre & post testing & random protocol for a total of 324 total movements using her  LUE. Pt made the following gains in between sets of random protocol in the following areas: robot power in [place] decreased from 77 to 74 which means patient powered more of the movement, robot initiations improved from 56/80 initiations to 66/80 initiations which means pt initiated more movement, distance from a target decreased from 11 to 8 which means she hit the target with greater accuracy & distance from a straight line decreased from 8 to 7 which means her lines were straighter. Pt also made the following gains in her pre & post test using random protocol in the following areas: reach error decreased from 0.014 to 0.012 & path error decreased from 0.007 to 0.006. Pt also improved her pre & post test during erandom protocol in the following area: smoothness improved from 0.254 to 0.344, reach error decreased from 0.089 to 0.075 & path error decreased from 0.051 to 0.031. Pt enjoys immediate results the Inmotion robotic arm provides. All InMotion exercises are performed to increase function of L UE to assist with ADL's and IADL's at home. Pt appreciates the immediate feedback that is provided using the InMotion arm.  Pt is highly motivated.     Pt will benefit from InMotion robotic arm intervention & OT to improve pt funcitonal use of her LUE.     Assessment/Comments:      Time In: 1300          Time Out: 1400                      Timed Code Treatment Minutes: 60 Minutes     CODE   Minutes  Units   48266 OT Eval Low       72057 OT Eval Medium 60 1   53688 OT Eval High       54585 Fluidotherapy       71146 Manual       53375 Therapeutic Ex       49182 Therapeutic Activity       55073 ADL/COMP Tech Train       39200 Neuromuscular Re-Ed       95671 OrthoManagementTraining       63831 Paraffin       93557 Electrical Stim - Attended       30865 Iontophoresis       73604 Ultrasound         Other                    -Response to Treatment: Good     Plan:   X  Continues Plan of care: Will continue to focus on decreasing spasticity, improving function of her LUE, improving GMC and strength to increase ADL and IADL function with robotic arm intervention. []?  Alter Plan of care:   []?  Discharge:     Patient. Education:  X Plans/Goals, Risks/Benefits discussed  ? Home exercise program  Method of Education: ? Verbal  ? Demo  ? Written  Comprehension of Education:  ? David Ralph understanding. ? Demonstrates understanding. ? Needs Review. ? Demonstrates/verbalizes understanding of HEP/Ed previously given.        Patient understands diagnosis/prognosis and consents to treatment, plan and goals: X Yes    ? No      Electronically signed by: XENA Schroeder/KEITH, PILAR        Physician's Certification / Comments      Frequency/Duration 1x / week for 18-30 visits.    Certification period From: 1/5/2022  To: 6/29/2022     I have reviewed the Plan of Care established for skilled therapy services and certify that the services are required and that they will be provided while the patient is under my care.     Physician's Comments/Revisions:                   Physician's Printed Name:                                           Physician's Signature:                                                               Date:         Please review Patient's OT evaluation and if you agree sign/date and fax back to us at our [facility] fax # [phone].     Donaldo January OTR/L [id number]

## 2022-01-20 ENCOUNTER — TELEPHONE (OUTPATIENT)
Dept: ADMINISTRATIVE | Age: 47
End: 2022-01-20

## 2022-01-20 NOTE — TELEPHONE ENCOUNTER
Patient called to reschedule her hospital follow up with [facility] for stroke. Please call patient to reschedule her appointment. Thank you!

## 2022-01-24 NOTE — TELEPHONE ENCOUNTER
Pt called and rescheduled her hosp f/u for stroke from 1/20/22 to 3/18/22. This was the next available. Please contact pt if this appt is not acceptable or if pt needs seen sooner.

## 2022-02-10 ENCOUNTER — TELEPHONE (OUTPATIENT)
Dept: NEUROLOGY | Age: 47
End: 2022-02-10

## 2022-02-10 NOTE — TELEPHONE ENCOUNTER
MA called patient to reschedule appt from 3/18/22. Bill Parker not in office that day.    Electronically signed by Lloyd Lara MA on 2/10/22 at 9:41 AM EST

## 2022-04-06 ENCOUNTER — HOSPITAL ENCOUNTER (INPATIENT)
Age: 47
LOS: 16 days | Discharge: HOME | DRG: 058 | End: 2022-04-22
Attending: PHYSICAL MEDICINE & REHABILITATION | Admitting: PHYSICAL MEDICINE & REHABILITATION
Payer: MEDICAID

## 2022-04-06 PROBLEM — Z86.73 HISTORY OF STROKE: Status: ACTIVE | Noted: 2022-04-06

## 2022-04-06 PROCEDURE — 1280000000 HC REHAB R&B

## 2022-04-06 PROCEDURE — 6370000000 HC RX 637 (ALT 250 FOR IP): Performed by: PHYSICAL MEDICINE & REHABILITATION

## 2022-04-06 RX ORDER — ACETAMINOPHEN 500 MG
1000 TABLET ORAL EVERY 4 HOURS PRN
Status: DISCONTINUED | OUTPATIENT
Start: 2022-04-06 | End: 2022-04-06

## 2022-04-06 RX ORDER — ACETAMINOPHEN 500 MG
1000 TABLET ORAL EVERY 6 HOURS PRN
Status: DISCONTINUED | OUTPATIENT
Start: 2022-04-06 | End: 2022-04-10

## 2022-04-06 RX ORDER — LANOLIN ALCOHOL/MO/W.PET/CERES
9 CREAM (GRAM) TOPICAL NIGHTLY
Status: DISCONTINUED | OUTPATIENT
Start: 2022-04-06 | End: 2022-04-22 | Stop reason: HOSPADM

## 2022-04-06 RX ORDER — LAMOTRIGINE 25 MG/1
50 TABLET ORAL 2 TIMES DAILY
Status: DISCONTINUED | OUTPATIENT
Start: 2022-04-06 | End: 2022-04-22 | Stop reason: HOSPADM

## 2022-04-06 RX ORDER — ASPIRIN 81 MG/1
81 TABLET, CHEWABLE ORAL DAILY
Status: DISCONTINUED | OUTPATIENT
Start: 2022-04-06 | End: 2022-04-22 | Stop reason: HOSPADM

## 2022-04-06 RX ORDER — SENNA PLUS 8.6 MG/1
2 TABLET ORAL NIGHTLY
Status: DISCONTINUED | OUTPATIENT
Start: 2022-04-06 | End: 2022-04-22 | Stop reason: HOSPADM

## 2022-04-06 RX ORDER — GABAPENTIN 300 MG/1
300 CAPSULE ORAL NIGHTLY
Status: DISCONTINUED | OUTPATIENT
Start: 2022-04-07 | End: 2022-04-22 | Stop reason: HOSPADM

## 2022-04-06 RX ORDER — POLYETHYLENE GLYCOL 3350 17 G/17G
17 POWDER, FOR SOLUTION ORAL 2 TIMES DAILY PRN
Status: DISCONTINUED | OUTPATIENT
Start: 2022-04-06 | End: 2022-04-06 | Stop reason: SDUPTHER

## 2022-04-06 RX ORDER — METOCLOPRAMIDE 5 MG/1
10 TABLET ORAL
Status: DISCONTINUED | OUTPATIENT
Start: 2022-04-06 | End: 2022-04-12

## 2022-04-06 RX ORDER — BACLOFEN 10 MG/1
10 TABLET ORAL 3 TIMES DAILY
Status: DISCONTINUED | OUTPATIENT
Start: 2022-04-06 | End: 2022-04-22 | Stop reason: HOSPADM

## 2022-04-06 RX ORDER — POLYETHYLENE GLYCOL 3350 17 G/17G
17 POWDER, FOR SOLUTION ORAL DAILY PRN
Status: DISCONTINUED | OUTPATIENT
Start: 2022-04-06 | End: 2022-04-06

## 2022-04-06 RX ORDER — ERGOCALCIFEROL 1.25 MG/1
50000 CAPSULE ORAL WEEKLY
Status: DISCONTINUED | OUTPATIENT
Start: 2022-04-10 | End: 2022-04-22 | Stop reason: HOSPADM

## 2022-04-06 RX ORDER — ATORVASTATIN CALCIUM 40 MG/1
40 TABLET, FILM COATED ORAL NIGHTLY
Status: DISCONTINUED | OUTPATIENT
Start: 2022-04-06 | End: 2022-04-22 | Stop reason: HOSPADM

## 2022-04-06 RX ORDER — ACETAMINOPHEN 325 MG/1
650 TABLET ORAL EVERY 6 HOURS PRN
Status: DISCONTINUED | OUTPATIENT
Start: 2022-04-06 | End: 2022-04-22 | Stop reason: HOSPADM

## 2022-04-06 RX ORDER — ONDANSETRON 4 MG/1
4 TABLET, ORALLY DISINTEGRATING ORAL EVERY 8 HOURS PRN
Status: DISCONTINUED | OUTPATIENT
Start: 2022-04-06 | End: 2022-04-22 | Stop reason: HOSPADM

## 2022-04-06 RX ORDER — NICOTINE 21 MG/24HR
1 PATCH, TRANSDERMAL 24 HOURS TRANSDERMAL DAILY
Status: DISCONTINUED | OUTPATIENT
Start: 2022-04-06 | End: 2022-04-12

## 2022-04-06 RX ADMIN — Medication 9 MG: at 22:24

## 2022-04-06 RX ADMIN — SENNOSIDES 17.2 MG: 8.6 TABLET, COATED ORAL at 22:23

## 2022-04-06 RX ADMIN — METOCLOPRAMIDE HYDROCHLORIDE 10 MG: 5 TABLET ORAL at 22:24

## 2022-04-06 RX ADMIN — BACLOFEN 10 MG: 10 TABLET ORAL at 22:23

## 2022-04-06 RX ADMIN — LAMOTRIGINE 50 MG: 25 TABLET ORAL at 22:30

## 2022-04-06 RX ADMIN — ACETAMINOPHEN 1000 MG: 500 TABLET ORAL at 22:27

## 2022-04-06 RX ADMIN — METOPROLOL TARTRATE 12.5 MG: 25 TABLET, FILM COATED ORAL at 22:28

## 2022-04-06 RX ADMIN — ATORVASTATIN CALCIUM 40 MG: 40 TABLET, FILM COATED ORAL at 22:23

## 2022-04-06 RX ADMIN — ASPIRIN 81 MG 81 MG: 81 TABLET ORAL at 22:23

## 2022-04-06 RX ADMIN — DICLOFENAC SODIUM 2 G: 10 GEL TOPICAL at 22:28

## 2022-04-06 ASSESSMENT — PAIN SCALES - GENERAL: PAINLEVEL_OUTOF10: 6

## 2022-04-06 NOTE — LETTER
PORTABLE PATIENT PROFILE  Rodriguez Morrow  [id number]    MEDICAL DIAGNOSIS/CONDITION:   Patient Active Problem List   Diagnosis    Acute cerebrovascular accident (CVA) ([address]    Status post craniectomy    Left spastic hemiplegia (HCC)    Tobacco abuse    Acute pain    Transient alteration of awareness    Activity, other specified    Allergy status to narcotic agent    History of stroke    History of cranioplasty    Neuropathic pain       INSURANCE INFORMATION:  Payor: MEDICAID [place] /  /  /     ADVANCED DIRECTIVES:      [unfilled]     EMERGENCY CONTACT:       RISK FACTORS:   Social History     Tobacco Use    Smoking status: Former Smoker     Packs/day: 1.00     Years: 20.00     Pack years: 20.00     Types: Cigarettes     Start date: 10/1/2020     Quit date: 10/12/2021     Years since quittin.5    Smokeless tobacco: Never Used   Substance Use Topics    Alcohol use: Not Currently       ALLERGIES:  Allergies   Allergen Reactions    Vicodin [Hydrocodone-Acetaminophen]      Gets severe hypotension    Acetaminophen     Hydrocodone     Propoxyphene        IMMUNIZATIONS:  Immunization History   Administered Date(s) Administered    COVID-19, Viviane Reas, Primary or Immunocompromised, PF, 100mcg/0.5mL 2021, 2021    Influenza A ([id number]) Vaccine PF IM 2009       SWALLOWING:   Difficulty Chewing or Swallowing Food: No    VISION AND HEARING:   Sensory Problems  Visual impairment: Glasses    PHYSICIANS INVOLVED WITH CARE:    Bridgette Valenzuela MD  No ref. provider found  No primary care provider on file.   Bridgette Valenzuela MD

## 2022-04-07 LAB
ANION GAP SERPL CALCULATED.3IONS-SCNC: 11 MMOL/L (ref 7–16)
BASOPHILS ABSOLUTE: 0.1 E9/L (ref 0–0.2)
BASOPHILS RELATIVE PERCENT: 1.3 % (ref 0–2)
BUN BLDV-MCNC: 14 MG/DL (ref 6–20)
CALCIUM SERPL-MCNC: 9.3 MG/DL (ref 8.6–10.2)
CHLORIDE BLD-SCNC: 103 MMOL/L (ref 98–107)
CO2: 22 MMOL/L (ref 22–29)
CREAT SERPL-MCNC: 0.7 MG/DL (ref 0.5–1)
EOSINOPHILS ABSOLUTE: 0.2 E9/L (ref 0.05–0.5)
EOSINOPHILS RELATIVE PERCENT: 2.6 % (ref 0–6)
GFR AFRICAN AMERICAN: >60
GFR NON-AFRICAN AMERICAN: >60 ML/MIN/1.73
GLUCOSE BLD-MCNC: 95 MG/DL (ref 74–99)
HCT VFR BLD CALC: 37.2 % (ref 34–48)
HEMOGLOBIN: 12 G/DL (ref 11.5–15.5)
IMMATURE GRANULOCYTES #: 0.03 E9/L
IMMATURE GRANULOCYTES %: 0.4 % (ref 0–5)
LYMPHOCYTES ABSOLUTE: 2.35 E9/L (ref 1.5–4)
LYMPHOCYTES RELATIVE PERCENT: 30.3 % (ref 20–42)
MCH RBC QN AUTO: 30.8 PG (ref 26–35)
MCHC RBC AUTO-ENTMCNC: 32.3 % (ref 32–34.5)
MCV RBC AUTO: 95.6 FL (ref 80–99.9)
MONOCYTES ABSOLUTE: 0.47 E9/L (ref 0.1–0.95)
MONOCYTES RELATIVE PERCENT: 6.1 % (ref 2–12)
NEUTROPHILS ABSOLUTE: 4.6 E9/L (ref 1.8–7.3)
NEUTROPHILS RELATIVE PERCENT: 59.3 % (ref 43–80)
PDW BLD-RTO: 14.2 FL (ref 11.5–15)
PLATELET # BLD: 469 E9/L (ref 130–450)
PMV BLD AUTO: 10.3 FL (ref 7–12)
POTASSIUM REFLEX MAGNESIUM: 4.4 MMOL/L (ref 3.5–5)
RBC # BLD: 3.89 E12/L (ref 3.5–5.5)
SODIUM BLD-SCNC: 136 MMOL/L (ref 132–146)
WBC # BLD: 7.8 E9/L (ref 4.5–11.5)

## 2022-04-07 PROCEDURE — 85025 COMPLETE CBC W/AUTO DIFF WBC: CPT

## 2022-04-07 PROCEDURE — 97112 NEUROMUSCULAR REEDUCATION: CPT

## 2022-04-07 PROCEDURE — 99223 1ST HOSP IP/OBS HIGH 75: CPT | Performed by: PHYSICAL MEDICINE & REHABILITATION

## 2022-04-07 PROCEDURE — 36415 COLL VENOUS BLD VENIPUNCTURE: CPT

## 2022-04-07 PROCEDURE — 97166 OT EVAL MOD COMPLEX 45 MIN: CPT

## 2022-04-07 PROCEDURE — 97535 SELF CARE MNGMENT TRAINING: CPT

## 2022-04-07 PROCEDURE — 1280000000 HC REHAB R&B

## 2022-04-07 PROCEDURE — 97162 PT EVAL MOD COMPLEX 30 MIN: CPT

## 2022-04-07 PROCEDURE — 6370000000 HC RX 637 (ALT 250 FOR IP): Performed by: PHYSICAL MEDICINE & REHABILITATION

## 2022-04-07 PROCEDURE — 97530 THERAPEUTIC ACTIVITIES: CPT

## 2022-04-07 PROCEDURE — 92523 SPEECH SOUND LANG COMPREHEN: CPT

## 2022-04-07 PROCEDURE — 80048 BASIC METABOLIC PNL TOTAL CA: CPT

## 2022-04-07 RX ADMIN — METOPROLOL TARTRATE 12.5 MG: 25 TABLET, FILM COATED ORAL at 21:53

## 2022-04-07 RX ADMIN — ATORVASTATIN CALCIUM 40 MG: 40 TABLET, FILM COATED ORAL at 21:15

## 2022-04-07 RX ADMIN — ACETAMINOPHEN 1000 MG: 500 TABLET ORAL at 17:28

## 2022-04-07 RX ADMIN — Medication 9 MG: at 21:14

## 2022-04-07 RX ADMIN — GABAPENTIN 300 MG: 300 CAPSULE ORAL at 21:15

## 2022-04-07 RX ADMIN — METOCLOPRAMIDE HYDROCHLORIDE 10 MG: 5 TABLET ORAL at 06:00

## 2022-04-07 RX ADMIN — DICLOFENAC SODIUM 2 G: 10 GEL TOPICAL at 21:23

## 2022-04-07 RX ADMIN — METOCLOPRAMIDE HYDROCHLORIDE 10 MG: 5 TABLET ORAL at 17:28

## 2022-04-07 RX ADMIN — ASPIRIN 81 MG 81 MG: 81 TABLET ORAL at 10:47

## 2022-04-07 RX ADMIN — SENNOSIDES 17.2 MG: 8.6 TABLET, COATED ORAL at 21:15

## 2022-04-07 RX ADMIN — LAMOTRIGINE 50 MG: 25 TABLET ORAL at 10:47

## 2022-04-07 RX ADMIN — ACETAMINOPHEN 1000 MG: 500 TABLET ORAL at 06:00

## 2022-04-07 RX ADMIN — METOCLOPRAMIDE HYDROCHLORIDE 10 MG: 5 TABLET ORAL at 21:14

## 2022-04-07 RX ADMIN — BACLOFEN 10 MG: 10 TABLET ORAL at 21:14

## 2022-04-07 RX ADMIN — LAMOTRIGINE 50 MG: 25 TABLET ORAL at 21:14

## 2022-04-07 RX ADMIN — BACLOFEN 10 MG: 10 TABLET ORAL at 10:47

## 2022-04-07 RX ADMIN — BACLOFEN 10 MG: 10 TABLET ORAL at 14:30

## 2022-04-07 ASSESSMENT — PAIN DESCRIPTION - DESCRIPTORS: DESCRIPTORS: DISCOMFORT

## 2022-04-07 ASSESSMENT — PAIN SCALES - GENERAL
PAINLEVEL_OUTOF10: 5
PAINLEVEL_OUTOF10: 3
PAINLEVEL_OUTOF10: 3

## 2022-04-07 ASSESSMENT — PAIN DESCRIPTION - ORIENTATION: ORIENTATION: LEFT

## 2022-04-07 ASSESSMENT — PAIN DESCRIPTION - PAIN TYPE: TYPE: CHRONIC PAIN

## 2022-04-07 ASSESSMENT — PAIN DESCRIPTION - FREQUENCY: FREQUENCY: INTERMITTENT

## 2022-04-07 ASSESSMENT — PAIN DESCRIPTION - LOCATION: LOCATION: HIP

## 2022-04-07 NOTE — PROGRESS NOTES
Occupational Therapy  OCCUPATIONAL THERAPY DAILY NOTE    Date:2022  Patient Name: Ana Duke  MRN: [id number]  : 1975  Room: [address] [place] [address]     Referring Practitioner: Pedro Pablo Webb MD  Diagnosis: late effect CVA, L cranioplasty 3/30/2022  Additional Pertinent Hx: seizures, hx R MCA 10/13/2021  Restrictions: Fall Risk, seizures & impulsive    Functional Assessment:   Date Status AE  Comments   Feeding  Minimal Assist   Assist to open small packages & containers   Grooming  Minimal Assist   Standing @ sink- pt dropped tooth paste & require assist to  off the floor         Oral Care  Minimal Assist      Bathing  Minimal Assist   Showering both seated & standing- assist provided due to tone LLE   UB Dressing  Minimal Assist   Assist to don bra- Sup to don t-shirt   LB Dressing  Stand by Assist      Footwear  Supervision   Including socks L AFO & shoes   Toileting  Stand by Assist      Homemaking  TBA        Functional Transfers / Balance:   Date Status DME  Comments   Sit Balance  Independent   Seated EOB   Stand Balance  Stand by Assist  sc    [] Tub  [] Shower   Transfer  SBA-Min A  Bench & sc Pt demo SBA to ambulate into the walk in shower & Min A to exit due to tone LLE    Commode   Transfer  Stand by Assist  sc Min vc's for safety   Functional   Mobility  Stand by Assist  sc In her room, hallway & around OT gym   Other:         Functional Exercises / Activity:   Pt presents seated up in her wc. Pt demo Sup sit<>stand. Pt demo SBA using a sc to ambulate from her room to the OT gym to a chair with out arm rests. Sensory / Neuromuscular Re-Education:  Pt tolerated Inmotion robotic arm intervention for neurological recovery of her LUE. Pt sat in a chair with arm straps for positioning & LUE ace wrapped for positioning purposes & 10 cm [place] used. Pt tolerated assessment, pre & post testing, random & fan Agustin Lu protocol. Pt does compensate lack of movement of her LUE by using her head & trunk.  Pt demo the following during the assessment- smoothness 0.361, reach error 0.032, path error 0.013, initiation time 0.007 sec, [place] size 0.008, [place] independence 0.444, hold deviation 0.058 & displacement 0.058. Pt made the following gains from her pre to post test after completing random protocol: smoothness improved from 0.353 to 0.397, reach error decreased from 0.051 to 0.022, path error decreased from 0.029 to 0.017 & initiation time decreased from 0.588 sec to 0.113 sec. Pt tolerated a total of 644 total movement. Pt is hindered by tone but does demo potential of using inmotion arm for neurological recovery of her LUE. All InMotion exercises are performed to increase functional use of her LUE to assist with ADL's and IADL's at home. Pt appreciates the immediate feedback that is provided using the InMotion arm. Pt benefits from robotic intervention with regards to neurological recovery & attention to task    Cognitive Skills:   Status Comments   Problem   Solving fair  During ADL   Memory fair  \"   Sequencing fair + \"   Safety fair  \"     Visual Perception:    Education:  Pt educated with regards to impulsive behaviors exhibited during ADL,  transfers & mobility. Pt verbalize understanding but continues to require vc's to ensure pt safety    [] Family teach completed on:    Pain Level: Pt c/o 6/10 headache, L hip 7/10    Additional Notes:       Patient has made good  progress during treatment sessions toward set goals. Therapy emphasis to obtain goals: ADL retraining, transfers training, functional mobility, therex, endurance/balance retraining, homemaking, PROM/AAROM LUE, neuro recovery, pt/family educaiton      [x] Continue with current OT Plan of care.   [] Prepare for Discharge     Goals  Long term goals  Time Frame for Long term goals : 7-10 days to address above problem areas  Long term goal 1: Pt demo independnet to eat all meals  Long term goal 2: Pt demo Mod I grooming standing @ sink level or seated  Long term goal 3: Pt demo Sup bathing @ shower level seated  Long term goal 4: Pt demo Mod I UE dress & Mod I LE dress inculding underwear, pants, socks, L AFO & shoes & demo G- safety & insight  Long term goal 5: Pt demo Mod I commode trf using approrpatie DME to ensure pt safety  Long term goals 6: Pt demo Sup tub trf using appropriate DME to ensure pt safety  Long term goal 7: Pt demo Min A light homemaking activity & demo G- safety & insight  Long term goal 8: Pt demo G endurance for a 30 minute functional activity  Long term goal 9: Pt will tolerate PROM/AAROM LUE & demo improvement using robotic inmotion arm to facilitate pt ability to functionally use her LUE during ADLs & IADLs as evidenced by gains in th efollowing areas from assessment on 4/7/22-  Patient Goals   Patient goals : \"I want to be normal.\" Pt asked to elaborate- \"Be able to cook & clean. \"       DISCHARGE RECOMMENDATIONS  Recommended DME:  Sc, tub bench- pt owns  Post Discharge Care:   []Home Independently  []Home with 24hr Care / Supervision []Home with Partial Supervision []Home with Home Health OT []Home with Out Pt OT []Other: ___   Comments:         Time in Time out Tx Time Breakdown  Variance:   First Session  eval+rx  [] Individual Tx-   [] Concurrent Tx -  [] Co-Tx -   [] Group Tx -   [] Time Missed -     Second Session [phone] [x] Individual Tx- 60  [] Concurrent Tx -  [] Co-Tx -   [] Group Tx -   [] Time Missed -     Third Session    [] Individual Tx-   [] Concurrent Tx -  [] Co-Tx -   [] Group Tx -   [] Time Missed -         Total Tx Time: Aroldo Wolfe OT R/L [id number]

## 2022-04-07 NOTE — PROGRESS NOTES
SPEECH/LANGUAGE PATHOLOGY  SPEECH/LANGUAGE/COGNITIVE EVALUATION      PATIENT NAME:  Sonu Conrad  (female)     MRN:  [id number]    :  1975  (42 y.o.)  STATUS:  Inpatient: Room 5504/5504-B    TODAY'S DATE:  2022  REFERRING PROVIDER: Eh Cedeno MD      SPECIFIC PROVIDER ORDER: SLP eval and treat    Date of order:  22  REASON FOR REFERRAL: \"eval of cognition\"  EVALUATING THERAPIST: PATTI Morgan    ADMITTING DIAGNOSIS: History of stroke [Z86.73]    VISIT DIAGNOSIS:      SPEECH THERAPY  PLAN OF CARE   The speech therapy  POC is established based on physician order, speech pathology diagnosis and results of clinical assessment     SPEECH PATHOLOGY DIAGNOSIS:    Functional cognitive linguistic skills    Speech Pathology intervention is not warranted at this time. When asked, pt reported not noticing any changes in memory/cognitive/thinking skills at this time. SLP reviewed eval results and that ST does not appear indicated at this time; pt agreeable when reviewed. Conditions Requiring Skilled Therapeutic Intervention for speech, language and/or cognition    Not applicable     Specific Speech Therapy Interventions to Include:   Not applicable    Specific instructions for next treatment:     not applicable    SHORT/LONG TERM GOALS  Not applicable. Therapy is not recommended    Patient stated goals: N/A    Rehabilitation Potential/Prognosis: N/A       _______________________________________________________________________  ASSESSMENT:  MOTOR SPEECH       Oral Peripheral Examination   Mild Left labiobuccal weakness    Parameters of Speech Production  Respiration:  Adequate for speech production  Articulation:  Within functional limits  Resonance:  Within functional limits  Quality:   Within functional limits  Pitch:     Within functional limits  Intensity: Within functional limits  Fluency:  Intact  Prosody Intact    RECEPTIVE LANGUAGE Understanding Verbal and Non-Verbal Content: Understands    Comprehension of Yes/No Questions: Within functional limits    Process  Simple Verbal Commands:   Within functional limits  Process Intermediate Verbal Commands:   Within functional limits  Process Complex Verbal Commands:     Within functional limits    Comprehension of Conversation:      Within functional limits      EXPRESSIVE LANGUAGE Expression of Ideas and Wants: Without difficulty      Serials: Functional    Imitation:  Words   Functional   Sentences Functional    Naming:  (Modality used:  Verbal)  Confrontation Naming  Functional  Functional Description  Did not assess  Response Naming: Functional    Conversation:      Conversation was within functional limits    COGNITION       Attention/Orientation  Attention: Sustained attention     General  Orientation:   Person:  oriented      Place:  oriented      Situation:  accurately described    Temporal Orientation:  Temporal Orientation: Year: Correct      Temporal Orientation: Month: Accurate within 5 days      Temporal Orientation: Day: Correct    Memory   Long Term Recall: Address:  recalled without cuing  Birthdate:  recalled without cuing  Age: recalled without cuing  Family: recalled without cuing    Immediate Recall: Repetition of Three Words (First Attempt): 3    Delayed Recall:   Able to recall \"sock: Yes, no cue required     Able to recall \"blue\": Yes, no cue required     Able to recall \"bed\": Yes, no cue required    Organization/Problem Solving/Reasoning   Sequencing:    Verbal: Functional      Motor:  Did not assess    Problem solving: Verbal: Functional      Motor:  Did not assess    Mathematics:  Simple:  Functional     Complex:  Did not assess      CLINICAL OBSERVATIONS NOTED DURING THE EVALUATION  Flat affect (inconsistent) noted; pt did engage in conversation appropriately when initiated by SLP.      Speech Language Pathology  SHANAE COGNITIVE ASSESSMENT    The Shanae Cognitive Assessment (MoCA) was informally administered this date for assessment of the following cognitive domains: attention and concentration, executive functions, memory, language, visuoconstructional skills, conceptual thinking, calculations, and orientation. Of note, MoCA not standardized for pt's current age; therefore, scoring ratings cannot be appropriated to pt; however,   this assessment informally administered in order to further gauge cognitive linguistic skills. Visuospatial/Executive: 3/5   Naming:   3/3   Attention:   6/6   Language:   3/3   Abstraction:   2/2   Delayed recall:  5/5   Orientation:   6/6        A score of 28/30 was attained. This typically indicates \"within functional limits\" as per the assessment parameters for age group normed. RAW SCORE SEVERITY   26-30 Within functional   limits   18-25 Mild cognitive   impairment   10-17 Moderate cognitive impairment   <10 Severe cognitive impairment                         FIMS SCORES      Swallowing    Current Status  7--Independent-swallow not assessed on ARU as referral for cognition only at this time    Short Term Goal 7--Independent    Long Term Goal 7--Independent     Receptive    Current Status  7--Independent    Short Term Goal 7--Independent    Long Term Goal 7--Independent     Expressive    Current Status  7--Independent    Short Term Goal 7--Independent    Long Term Goal 7--Independent     Social Interaction    Current Status  6--Modified Independent    Short Term Goal 6--Modified Independent    Long Term Goal 6--Modified Independent         Problem Solving    Current Status  6--Modified Independent    Short Term Goal 6--Modified Independent    Long Term Goal 6--Modified Independent      Memory    Current Status  6--Modified Independent    Short Term Goal 6--Modified Independent    Long Term Goal 6--Modified Independent       EDUCATION    When asked, pt reported not noticing any changes in memory/cognitive/thinking skills at this time.  SLP reviewed eval results and that ST does not appear indicated at this time; pt agreeable when reviewed. No ST concerns raised by pt to this time. ?                  Prognosis for improvements is N/A  Patient stated goals: N/A  Treatment goals discussed with N/A        The admitting diagnosis and active problem list, as listed below have been reviewed prior to initiation of this evaluation.         ACTIVE PROBLEM LIST:   Patient Active Problem List   Diagnosis    Acute cerebrovascular accident (CVA) ([facility] [address]    Status post craniectomy    Left spastic hemiplegia (HCC)    Tobacco abuse    Acute pain    Transient alteration of awareness    Activity, other specified    Allergy status to narcotic agent    History of stroke

## 2022-04-07 NOTE — PROGRESS NOTES
Physical Therapy    Facility/Department: [address] [facility]    NAME: Abby May  : 1975  MRN: [id number]    Date of Service: 2022    Evaluating Therapist: Gomez Mcgrath, PT, DPT, [id number]      ROOM: [address] [place] [address]  DIAGNOSIS: Late affect CVA  PRECAUTIONS: falls, L hemiparesis, L neglect, L homonymous hemianopsia  Impulsivity, small area of blistering on dorsum of L great toe. 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 At that time she underwent emergent R hemicraniectomy on 10/13. After being deemed medically appropriate, she was transferred to [facility] on 10/22/2021. She progressed well and was discharged home with necessary equipment and  supervision. Pt returned to ARU 22 following replacement of bone flap. Social:  Pt lives with mother and daughter in a 1 floor plan 4 steps and 2 rails to enter. Pt moved in with mother following CVA in .  supervision is possible. Prior to admission: Since return home following CVA in , pt was independent with toileting and bathing. Pt did require Supervision with functional mobility. Pt ambulated with SPC/SBQC, owns , was participating in [place]. Initial Evaluation  Date: 22 AM     PM    Short Term Goals Long Term Goals    Was pt agreeable to Eval/treatment? Yes See eval yes     Does pt have pain?  No c/o pain  No c/o     Bed Mobility  Rolling: Noemy  Supine to sit: Noemy  Sit to supine: Noemy  Scooting: Noemy  N/T SBA Mod I   Transfers Sit to stand: Noemy  Stand to sit: Noemy  Stand pivot: Noemy with R SPC    5xSTS: TBA   Sit to stand Noemy  Stand to sit Noemy  Stand pivot Noemy with SPC SBA   Supervision   Ambulation  150 feet with R SPC with Noemy    W/c follow? (Y/N): N    10mWT: 0.25 m/s  6mWT: 112 m  (AD: R SPC, Assist level: Noemy)  200 feet x1 with SPC [place] >300 feet with AAD with SBA >600 feet with AAD with Supervision    10mWT: >0.4 m/s  6mWT: >175 m   Wheelchair Mobility NA       Car Transfers Noemy  Noemy SBA Supervision Stair negotiation: ascended and descended 12 steps with 1 rail with Noemy  N/T 12 steps with 1 rail with SBA 20 steps with 1 rail with Supervision   Walking 10 feet on uneven surface 10 feet with R SPC with Noemy  N/T 10 feet with AAD with SBA 10 feet with AAD with Supervision   Curb Step:   ascended and descended 4 inch step with R SPC and Noemy  N/t 4 inch step with AAD and SBA 4 inch step with AAD and Supervision   Picking up object off the floor Picked up cone with Noemy  N/T Will  cone with SBA Will  2lb weight with SBA   BLE ROM RLE: WNL  LLE:   Hip flexion: WNL  Knee extension: WNL  Dorsiflexion: lacking ~5 degrees from neutral  Plantarflexion: WNL       BLE Strength RLE:  Grossly 5/5  LLE:   Hip flexion: 3/5  Knee extension: 4/5  Dorsiflexion: 0/5  Plantarflexion: 2+/5       Balance  Static and dynamic standing balance Noemy with R SPC     BBS: TBA      BBS: TBD   Date Family Teach Completed        Is additional Family Teaching Needed? Y or N Y day of discharge       Hindering Progress Balance L hemiparesis       PT recommended ELOS 10-14 days       Team's Discharge Plan        Therapist at Team Meeting          Pt is alert and Oriented x 3  Sensation: grossly impaired L hemibody  Edema: none noted  Endurance: fair     Therapeutic ex: amb with SPC weave around quad canes x4 reps, amb in parallel bars working on reciprocal gait, amb 75 feet x2 reps with SPC. ASSESSMENT  Pt displays functional ability as noted in the objective portion of this evaluation. Comments:  Pt found in room and agreed to tx session. Sling donned. Pt amb with SPC Noemy. Encouraged to amb reciprocally. Increased amb distance this a.m. Pt c/o fatigue after amb. Worked on reciprocal gait with SPC and in parallel bars. Pt returned to room at end of tx session.    Patient education  Pt educated on safe technique with approach to chair during transfers    Patient response to education:   Pt verbalized understanding Pt demonstrated skill Pt requires further education in this area   yes partial yes     Rehab potential is Good for reaching above PT goals. Pts/ family goals   1. Return home    Patient and or family understand(s) diagnosis, prognosis, and plan of care. Yes    PLAN  PT care will be provided in accordance with the objectives noted above. Whenever appropriate, clear delegation orders will be provided for nursing staff. Exercises and functional mobility practice will be used as well as appropriate assistive devices or modalities to obtain goals. Patient and family education will also be administered as needed. Frequency of treatments will be 2x/day M-F and 1x/day Sat or Sun x 10-14 days.     Time in: 14:00  Time out: 14:45      Kassandra Max [id number]

## 2022-04-07 NOTE — PROGRESS NOTES
Physical Therapy    [facility]  Initial Assessment    NAME: Jo-Ann Simomns  : 1975  MRN: [id number]    Date of Service: 2022    Evaluating Therapist: Mendez Bruce PT, LINDY [id number]      ROOM: [address] [place] [address]  DIAGNOSIS: Late affect CVA  PRECAUTIONS: falls, L hemiparesis, L neglect, L homonymous hemianopsia  Impulsivity, small area of blistering on dorsum of L great toe. 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 At that time she underwent emergent R hemicraniectomy on 10/13. After being deemed medically appropriate, she was transferred to [facility] on 10/22/2021. She progressed well and was discharged home with necessary equipment and  supervision. Pt returned to ARU 22 following replacement of bone flap. Social:  Pt lives with mother and daughter in a 1 floor plan 4 steps and 2 rails to enter. Pt moved in with mother following CVA in .  supervision is possible. Prior to admission: Since return home following CVA in , pt was independent with toileting and bathing. Pt did require Supervision with functional mobility. Pt ambulated with SPC/SBQC, owns , was participating in [facility]. Initial Evaluation  Date: 22 AM     PM    Short Term Goals Long Term Goals    Was pt agreeable to Eval/treatment? Yes       Does pt have pain?  No c/o pain       Bed Mobility  Rolling: Noemy  Supine to sit: Noemy  Sit to supine: Noemy  Scooting: Noemy   SBA Mod I   Transfers Sit to stand: Noemy  Stand to sit: Noemy  Stand pivot: Noemy with R SPC    5xSTS: TBA    SBA   Supervision   Ambulation  150 feet with R SPC with Noemy    W/c follow? (Y/N): N    10mWT: 0.25 m/s  6mWT: 112 m  (AD: R SPC, Assist level: Noemy)   >300 feet with AAD with SBA >600 feet with AAD with Supervision    10mWT: >0.4 m/s  6mWT: >175 m   Wheelchair Mobility NA       Car Transfers Noemy   SBA Supervision   Stair negotiation: ascended and descended 12 steps with 1 rail with Noemy   12 steps with 1 rail with SBA 20 steps with 1 rail with Supervision   Walking 10 feet on uneven surface 10 feet with R SPC with Noemy   10 feet with AAD with SBA 10 feet with AAD with Supervision   Curb Step:   ascended and descended 4 inch step with R SPC and Noemy   4 inch step with AAD and SBA 4 inch step with AAD and Supervision   Picking up object off the floor Picked up cone with Noemy   Will  cone with SBA Will  2lb weight with SBA   BLE ROM RLE: WNL  LLE:   Hip flexion: WNL  Knee extension: WNL  Dorsiflexion: lacking ~5 degrees from neutral  Plantarflexion: WNL       BLE Strength RLE:  Grossly 5/5  LLE:   Hip flexion: 3/5  Knee extension: 4/5  Dorsiflexion: 0/5  Plantarflexion: 2+/5       Balance  Static and dynamic standing balance Noemy with R SPC     BBS: TBA      BBS: TBD   Date Family Teach Completed        Is additional Family Teaching Needed? Y or N Y day of discharge       Hindering Progress Balance L hemiparesis       PT recommended ELOS 10-14 days       Team's Discharge Plan        Therapist at Team Meeting          Pt is alert and Oriented x 3  Sensation: grossly impaired L hemibody  Edema: none noted  Endurance: fair    General:        HRmax: 177 bpm       Beta blockers (Y/N): Y      Adjusted HRmax: 167 bpm   Blood pressure (mmHg): AM:  - Prior to Tx: 126/65  - Post Tx: 125/62    Time spent in HR ranges: Moderate intensity (60-70% HRmax): AM: 0:00:39    High intensity (70-85% HRmax): AM: 0:00:00           ASSESSMENT  Pt displays functional ability as noted in the objective portion of this evaluation. Comments:  Pt presents as pleasant and agreeable to activity as she was during last admission. Pt is highly motivated to improve her function. Pt's safety is limited by impulsivity, L neglect, and L visual field deficits. This results in frequent therapist direction required to visualize destination.  R AFO has caused small area of blister on dorsum of L great toe, pt reports her mother is brining in a larger pair of shoes. Pt ambulates with slow gait speed and requires encouragement for reciprocal pattern. Occasional steadying assistance required during basic walking tasks. Plan to continue gait training with increased intensity targeting propulsion, limb advancement, and stance control. Pt has good potential to improve functional outcomes. Patient education  Pt educated on safe technique with approach to chair during transfers    Patient response to education:   Pt verbalized understanding Pt demonstrated skill Pt requires further education in this area   yes partial yes     Rehab potential is Good for reaching above PT goals. Pts/ family goals   1. Return home    Patient and or family understand(s) diagnosis, prognosis, and plan of care. Yes    PLAN  PT care will be provided in accordance with the objectives noted above. Whenever appropriate, clear delegation orders will be provided for nursing staff. Exercises and functional mobility practice will be used as well as appropriate assistive devices or modalities to obtain goals. Patient and family education will also be administered as needed. Frequency of treatments will be 2x/day M-F and 1x/day Sat or Sun x 10-14 days.     Time in: 0915  Time out: [address], PT, DPT  Board Certified Clinical Specialist in Neurologic Physical Therapy  [id number]

## 2022-04-07 NOTE — CARE COORDINATION
Social Work Assessment Summary    PCP: Dr Micky Bustos    Patient Resides: Pt lives with  Mom and daughter. Pets: 1 large dog & 2 small dogs. Home Architecture : Pt lives in a ranch home with 4 steps and 2 rails at entrance. Bathroom is a tub/shower combo with shower curtain. Pt moved in with mom after 10/21 hospital stay. Address: [address], [address] [place] [address]. Will you return to your own home? Yes. 24HR supervision possible with Mom. Primary Caregiver: Pt's primary caregivers are parents: Divina Schultzjdisaac (56) & Florin Aguero. Pt has a daughter Samira Robledo (17) who attends [employer]. Level of Function PTA : Pt independent with Bathing and Toileting only. Pt needed assistance with the following: Ambulating, Dressing, Meal Prep, Homemaking, and Driving. Employment: None    DME Pta  : Wheel Chair, Qcane, Bedside Commode, Shower Chair. Community Agency Involvement PTA : [facility] & DME 10/21. [facility] Waiver [phone]- Estela Koo Rosholt confirmed that her assessment was completed 2/25/22. Flor Rizzo initially declined home delivered meals, however, wanted to wait until her procedure to determine exactly what services she wanted. Flor Rizzo is eligible for an ERS and 3 hrs HHA/day totaling 21/wk. Rosholt suggested those HHA could be slightly increased on a temp basis contingent on her needs at [place]. Flor Rizzo also verbalized a \"family friend\" Patricia Mas was interested in becoming her paid caregiver. Due to staffing, that would be the best option. Do they have a medical profile: No    Family Teaching: TBD    Strength: \"Her daughter\" - Per Mom    Preference: \"To be able to get dressed & get stronger legs\" - Per Mom      NAME RELATION HOME # WORK # CELL #   Zayda German Mom   [phone]   Tamiko Altman Dad   [phone]            Height: 5'6  Weight: 181    Pt's Mom is requesting vision test.  Pt ability to see/drive is an ongoing argument.     PETER Holliday Intern  [place], [place]

## 2022-04-07 NOTE — H&P
PM&R Admission History & Physical Examination    Patient: Rajendra Mcgill  Age/sex: 42 y.o. female  Medical Record #: [id number]    Admission to Acute Rehab Unit: Date: 2022   Diagnosis: History of stroke [Z86.73]  Admitting Physician: Catherine Quick MD  Primary care provider: No primary care provider on file. Chief complaint:   Impairments and acitivities limitations in ADL and mobility secondary to CVA    HPI:   Rajendra Mcgill is a 42 y.o. female with history of right MCA CVA on 10/12/2021 with acute onset left-sided weakness.  Initial NIHSS 17.  Initial head CT ([facility]) showed R MCA hyperdensity; initial CTA ([facility]) showed right ICA/M1 occlusion.  On 10/13/2021, her neuro exam worsened and repeat imaging indicated worsening edema, 1 cm of midline shift, effacement of the right lateral ventricle, and increased left lateral ventricle caliber concerning for entrapment.  She underwent emergent Right hemicraniectomy by Dr. Adri Bartlett [facility] on 10/13/2021. Patient was then transferred to [facility] and completed the Acute rehab program from  10/22/2021-2021. She was discharged home at [facility] level for functional mobility and [facility] level for ADLs. She then returned to [facility] and underwent right cranioplasty on 3/30/2022. Her post operative therapy evaluations showed functional decline as compared to her prior baseline. Therefore, she was felt to be a good candidate for Acute rehabilitation to improve her function and independence at least to her former baseline. Patient is now admitted to [facility] for Acute rehabilitation.            Past Medical History:   Diagnosis Date    Cerebral artery occlusion with cerebral infarction ([address]     Hx of blood clots        Past Surgical History:   Procedure Laterality Date     SECTION      CHOLECYSTECTOMY      TYMPANOPLASTY      Ear drum reconstruction left ear        Family History   Problem Relation Age of Onset    No Known Problems Mother     No Known Problems Father        Allergies   Allergen Reactions    Vicodin [Hydrocodone-Acetaminophen]      Gets severe hypotension    Acetaminophen     Hydrocodone     Propoxyphene        Scheduled Meds:  aspirin, 81 mg, Daily  atorvastatin, 40 mg, Nightly  baclofen, 10 mg, TID  [START ON 4/10/2022] vitamin D, 50,000 Units, Weekly  gabapentin, 300 mg, Nightly  lamoTRIgine, 50 mg, BID  melatonin, 9 mg, Nightly  metoclopramide, 10 mg, 4x Daily AC & HS  metoprolol tartrate, 12.5 mg, BID  nicotine, 1 patch, Daily  senna, 2 tablet, Nightly        PRN Meds  acetaminophen, 650 mg, Q6H PRN  diclofenac sodium, 2 g, 4x Daily PRN  ondansetron, 4 mg, Q8H PRN  acetaminophen, 1,000 mg, Q6H PRN        Social History:  Social History     Socioeconomic History    Marital status: Single     Spouse name: Not on file    Number of children: 1    Years of education: Not on file    Highest education level: 12th grade   Occupational History    Not on file   Tobacco Use    Smoking status: Former Smoker     Packs/day: 1.00     Years: 20.00     Pack years: 20.00     Types: Cigarettes     Start date: 10/1/2020     Quit date: 10/12/2021     Years since quittin.5    Smokeless tobacco: Never Used   Vaping Use    Vaping Use: Never used   Substance and Sexual Activity    Alcohol use: Not Currently    Drug use: Never    Sexual activity: Not Currently     Partners: Male     Birth control/protection: Pill   Other Topics Concern    Not on file   Social History Narrative    Not on file     Social Determinants of Health     Financial Resource Strain: Low Risk     Difficulty of Paying Living Expenses: Not hard at all   Food Insecurity: No Food Insecurity    Worried About Running Out of Food in the Last Year: Never true    Addy of Food in the Last Year: Never true   Transportation Needs: No Transportation Needs    Lack of Transportation (Medical):  No    Lack of Transportation (Non-Medical): No   Physical Activity: Insufficiently Active    Days of Exercise per Week: 2 days    Minutes of Exercise per Session: 30 min   Stress: Stress Concern Present    Feeling of Stress : Rather much   Social Connections: Unknown    Frequency of Communication with Friends and Family: Once a week    Frequency of Social Gatherings with Friends and Family: Twice a week    Attends [employer]ist Services: Never    Active Member of Clubs or Organizations: No    Attends Club or Organization Meetings: Never    Marital Status: Patient refused   Intimate Partner Violence: Not At Risk    Fear of Current or Ex-Partner: No    Emotionally Abused: No    Physically Abused: No    Sexually Abused: No   Housing Stability: Low Risk     Unable to Pay for Housing in the Last Year: No    Number of [place] in the Last Year: 1    Unstable Housing in the Last Year: No       Home situation: Lives with mother in a 1 level home with 4 steps and 2 rails to enter       Functional History:  Premorbid ADL's: Mod I for feeding, grooming. Min A for dressing since stroke. Independent prior to stroke Oct 2021. Premorbid Mobility: CGA, ambulating 400 ft R SBC SBA-CGA, left AFO - since stroke. Independent prior to stroke Oct 2021. Current Level of Function:     Physical Therapy:  Bed mobility: Min A  Transfers: Min A  Ambulation: 150 ft R SPC Min A, left AFO    Occupational Therapy:  Feeding: Setup  Grooming: Min A  UB dressing: Min A  LB dressing: CGA      Review Of Systems:   Constitutional: No Fever, chills  Skin: No rash, ulcers, or other lesions  HEENT: No HA, blurry vision. She does endorse peripheral vision deficit, present since stroke Oct 2021.    Respiratory: No Cough, SOB  Cardiovascular: No CP  Gastrointestinal: No nausea, vomiting, diarrhea, constipation, abdominal discomfort  Genitourinary: No Dysuria  Musculoskeletal:  per HPI  Neurologic: per HPI  Psychiatric: No depression, No anxiety      Physical Examination:  Vitals:   Vitals:    04/06/22 1658 04/06/22 1909 04/06/22 2227 04/07/22 1030   BP: 119/74 120/72 119/80 104/64   Pulse: 57 62 63 58   Resp: 17 17  18   Temp: 98.2 °F (36.8 °C) 98.2 °F (36.8 °C)  98.2 °F (36.8 °C)   TempSrc: Tympanic Oral  Temporal   SpO2:  98%     Weight: 181 lb 4.8 oz (82.2 kg)      Height: 5' 6\" (1.676 m)          GEN: NAD, conversational   HEENT: NC/AT, PERRLA, EOMI   RESP: CTAB, no R/R/W   CV: +S1 S2, RRR  ABD: soft, NT, ND, normal BS   : no page   EXT: Normal ROM, No clubbing/cyanosis, 2+ pulses   SKIN: No rash  PSYCH: appropriate mood and affect     Neuro Exam:  AAOx4  Speech clear, content appropriate  Follows multi step commands  Impulsive at times     Cranial Nerves:  I: olfactory not tested  II: Impaired peripheral vision   III, IV, VI: extra occular muscles intact  Pupils (II, III): ERRL  V: Facial Sensation/Jaw clench:  intact  VII: Facial Motor:  intact  VIII.  Hearing:  intact  XI/X: Palate:  intact  XI: Shoulder shrug/SCM:  intact  XII: Tongue:  intact    Coordination:  F - N:  Intact on right, unable on left relative to weakness     Sensory:    LT:  Intact throughout          Motor:      Strength:                     R          L  Deltoid                         5          0  Biceps                         5          0  Triceps                        5          0  Wrist Ext                     5          0  FDP   5 2  Finger Abd                  5          0  Hip Flex                       5         3-  Knee Ext                     5         4-  Ankle dorsi                  5          0  EHL                             5          0  Ankle Plantar               5          0    LUE spasticity MAS 2  LLE spasticity MAS 2      DTR  No pathological reflexes         Laboratory:  Lab Results   Component Value Date    WBC 7.8 04/07/2022    HGB 12.0 04/07/2022    HCT 37.2 04/07/2022    MCV 95.6 04/07/2022     (H) 04/07/2022     Lab Results   Component Value Date    NA 136 04/07/2022    K 4.4 04/07/2022     04/07/2022    CO2 22 04/07/2022    BUN 14 04/07/2022    CREATININE 0.7 04/07/2022    GLUCOSE 95 04/07/2022    CALCIUM 9.3 04/07/2022      Lab Results   Component Value Date    ALT 75 (H) 10/23/2021    AST 46 (H) 10/23/2021    ALKPHOS 89 10/23/2021    BILITOT 0.4 10/23/2021         DIAGNOSIS: History of right MCA CVA, s/p right hemicraniectomy (Oct 2021) and now s/p right cranioplasty (3/30/3022). IMPRESSION/INDIVIDUAL PLAN OF CARE:  Fuad Mendez is a 42 y.o. female with impairments and acitivities limitations in ADL and mobility secondary to history of right MCA CVA with cerebral edema and midline shift, s/p right hemicraniectomy (Oct 2021) and now s/p right cranioplasty (3/30/3022). Patient has impairments in:  Demonstrating impaired strength, impaired ROM, impaired balance, impaired safety awareness, decreased endurance. Patient has activities limitations in self care, mobility, functional communication and cognition, and is [facility] for acute comprehensive rehabilitation. I. Rehabilitation Necessity/Diagnosis:   Medical impact on function as a result of impaired functional mobility, ambulation, bed mobility, transfers, self-care/ADL's, strength, endurance, range of motion/flexibility, coordination and impaired gait/balance. Treatment plan will include a comprehensive rehabilitation program with intense therapies for 3 hours/day, 5 days/week. 1. Physical therapy to address bed mobidility, car and mat transfer, progressive ambulation with use of least restrictive assistive device, optimization of gait biomechanics, truncal strengthening, lower extremity strengthening, coordination, range of motion/flexibility, wheelchair and cushion evaluation, durable medical equipment evaluation, patient and family instruction and endurance training.   2. Occupational therapy to address feeding, grooming, upper and lower body dressing and bathing, toileting, tub/toilet/bed transfers, durable medical equipment evaluation, upper extremity strengthening, range of motion/flexibility, dexterity, coordination and patient and family instruction. 3. Rehabilitation nursing 24 hours per day to monitor bowel and bladder function, work on bowel routine, voiding trials/page catheter management as per bladder management protocol, assess falls risk upon admission and periodically thereafter, implement and revise falls prevention strategies, maintain skin integrity through initial and daily pressure sore risk assessment (Gio scale), implement and revise pressure sore prevention strategies, educate patient and family members regarding medication administration, ADL's, transfers, and mobility and continue therapy carryover with ADL's, transfers, and mobility  4. Social work and case management consults for discharge planning/disposition issues, as well as coordination and communication of patient progress between family and providers. 5. Rehab psychology - as needed for adjustment, coping  6. Recreational therapy for community reintegration activities. 7. Will have Speech therapy evaluate language and cognition       This patient is sufficiently stable at this time of admission to [facility] to be able to participate in an intensive rehabilitation program described above and requires physician supervision by a rehabilitation physician as outlined below in Medical Management section. II. Medical Management:  -History of right MCA CVA (Oct 2021)-- with cerebral edema and midline shift, s/p right hemicraniectomy (Oct 2021-[facility]). Patient completed ARU 10/22/2021-12/7/2021 and was discharged home. She is now s/p right cranioplasty (3/30/3022 - [facility]). Left spastic hemiparesis, left inattention, vision impairment. LUE sling for gait. Left AFO. Continue Aspirin, Lipitor for secondary stroke prevention. Monitor neuro exam. Begin Acute rehab evaluations. -Pain control: Tylenol for mild pain, headache. Gabapentin for neuropathic pain. Voltaren gel prn.    -Spasticity, LUE/LLE: Continue Baclofen  -History of suspected seizure: Continue Lamictal at home dose. Patient to follow back up with Neurology after discharge.   -History of intermittent Tachycardia--Seen by Cardiology during prior ARU admission, started on low dose metoprolol. Outpatient event monitor with ?result and Cardiology follow up. Continue home dose metoprolol.   -Tobacco abuse: tobacco cessation education, resources. Nicoderm patch       # Disposition/social - anticipate discharge home, will discuss in team rounds. # Code status: Full Code     III. Estimated length of stay: 2 weeks    IV. Goals - Supervision - Mod I     V. Anticipated discharge setting: Home    VI. Prognosis:  Rehab Prognosis: good  Medical Prognosis: good    CMS Required Post-Admission Physician Evaluation Elements  History and Physical, including medical history, functional history and active comorbidities as in above text. Post -Admission Physician Evaluation comparison with pre-admission screen:  I concur with the preadmission screen except as documented above     Medication Reconciliation CMS Requirements:  Drug Regimen Review:  Upon admission, a complete drug regimen review to identify potential clinically significant medication issues requiring immediate attention by a physician was completed. Electronically signed by Laura Vidal MD on 4/7/2022 at 11:53 AM       Please note that the above documentation was prepared using voice recognition software. Every attempt was made to ensure accuracy but there may be spelling, grammatical, and contextual errors.

## 2022-04-07 NOTE — PROGRESS NOTES
Occupational Therapy   Occupational Therapy Initial Assessment  Date: 2022   Patient Name: Matilda Pelaez  MRN: [id number]     : 1975  Room: 5504B    Referring Practitioner: Melody Jordan MD  Diagnosis: late effect CVA, L cranioplasty 3/30/2022  Additional Pertinent Hx: seizures, hx R MCA 10/13/2021  Restrictions: Fall Risk, seizures & impulsive    Date of Service: 2022    Discharge Recommendations:  Home with assist PRN,Outpatient OT     Subjective   Chart Reviewed: Yes  Family / Caregiver Present: No  Subjective: Pt presents seated EOB  Pain Comments: Pt did complain headache 6/10 & L hip pain 7/10     Social/Functional History  Lives With: Family- Mom & 15year old daughter  Type of Home: House  Home Layout: One level  Home Access: Stairs to enter with rails  Entrance Stairs - Number of Steps: 4 HAIDER BHR  Bathroom Shower/Tub: Tub/Shower unit,Curtain  Bathroom Toilet: Standard  ADL Assistance: Needs assistance  Dressing: Minimal assistance (occasional assist donning a bra)  Homemaking Assistance: Needs assistance  Ambulation Assistance: Independent  Transfer Assistance: Independent  Active : No  Leisure & Hobbies: dogs  IADL Comments: PLOF pt Mod I mobility & transfers & Sup to bathe @ shower level & occasional Min A to don a bra but otherwise Mod I to dress     Objective   Vision: Within Functional Limits  Hearing: Within functional limits      Orientation  Overall Orientation Status: Within Functional Limits     Observation/Palpation  Posture: Good     Balance  Sitting Balance: Independent  Standing Balance: Stand by assistance  Functional Mobility  Functional - Mobility Device: Cane  Activity: To/from bathroom  Assist Level: Stand by assistance  Functional Mobility Comments: min vc's for safety & insight when opening the bathroom door  Toilet Transfers  Toilet - Technique: Ambulating  Equipment Used: Standard toilet  Toilet Transfer: Stand by assistance  Toilet Transfers Comments: Pt ambulated with a sc to an elevated commode  Shower Transfers  Shower - Transfer From: [employer] - Transfer Type: To and From  Shower - Transfer To: Shower seat without back  Shower - Technique: Ambulating  Shower Transfers: Minimal assistance;Stand by assistance  Shower Transfers Comments: Pt ambualted SBA to step into the walk in shower & required Min A to exit shower as she did not have her L AFO on     ADL  Feeding: Setup  Grooming: Minimal assistance;Setup (Pt stood @ sink & brushed her teeth & dropped her toothe paste & required assist to  off the floor)  UE Bathing: Setup;Supervision  LE Bathing: Minimal assistance;Setup;Verbal cueing; Increased time to complete (standing in shower due to tone in her LLE)  UE Dressing: Minimal assistance; Increased time to complete;Verbal cueing;Setup (Pt require MiN A to don her bra & Sup to don a t-shirt with increased time)  LE Dressing: Contact guard assistance;Setup; Increased time to complete (CGA to don underwear & shorts & SBA to don socks, L AFO & her shoes)  Toileting: Stand by assistance  Additional Comments: Pt does demo impulsivity in her actions & require vc's to ensure pt safety throughout the ADL     Tone LUE  LUE Tone: Hypertonic  Coordination  Movements Are Fluid And Coordinated: No  Coordination and Movement description: Fine motor impairments;Gross motor impairments; Left UE  Quality of Movement Other  Comment: Pt has residual deficits LUE & wears a resting hand splint @ night        Transfers  Stand Pivot Transfers: Stand by assistance  Sit to stand: Stand by assistance  Stand to sit: Stand by assistance  Transfer Comments: min vc's to ensure pt LLE in properly placed to ensure pt safety as she stands     Vision - Basic Assessment  Prior Vision: No visual deficits  Visual History: No significant visual history  Patient Visual Report: No visual complaint reported.      Cognition  Overall Cognitive Status: Exceptions  Safety Judgement: Decreased awareness of need for assistance  Problem Solving: Assistance required to generate solutions;Assistance required to correct errors made  Insights: Decreased awareness of deficits  Cognition Comment: Pt continues to demo impulsive behaviors but is aware that she is impuslive     Sensation  Overall Sensation Status:  grossly intact to touch      LUE PROM: <1/2 PROm & tone is a limiting factor  Left Hand PROM: <1/2 & tone is a limiting factor  Left Hand AROM: pt can elicit a gross flexion of her fingers but tone is not encouraged  RUE AROM : WFL  Right Hand AROM: WFL    LUE Strength  LUE Strength Comment: 0/5- Pt has a L sublux & wear a shoulder support  RUE Strength  RUE Strength: WFL  R Hand: 4-/5  RUE Strength Comment: RUE strength 4/5 in all planes    Hand Dominance: Right    Right Hand Strength -    Handle Setting 2: 54# & norm for pt age & age & gender is 74#    Fine Motor Skills  Right 9-Hole Peg Test:  23.8 sec & norm for pt age & gender is 16.5 sec      Plan   Times per week: OT twice a day for 7-10 days to address above problem areas  Times per day: Twice a day  Current Treatment Recommendations: Strengthening,Gait Training,Patient/Caregiver Education & Training,Home Management Training,ROM,Equipment Evaluation, Education, & procurement,Balance Training,Neuromuscular Re-education,Functional Mobility Training,Cognitive Reorientation,Positioning,Endurance Training,Safety Education & Training,Self-Care / ADL    Assessment   Performance deficits / Impairments: Decreased functional mobility ; Decreased endurance;Decreased coordination;Decreased ADL status; Decreased ROM; Decreased balance;Decreased strength;Decreased safe awareness;Decreased high-level IADLs;Decreased cognition;Decreased fine motor control  Prognosis: Good  Decision Making: Medium Complexity  OT Education: OT Role;Plan of Care;Equipment;Precautions; ADL Adaptive Strategies;Transfer Training;Energy Conservation  Patient Education: Pt educated with regards to OT process. Pt participated in OT goal planning. Pt has previous rehab experience. Pt in agreement with POC after explained to the patient  REQUIRES OT FOLLOW UP: Yes  Activity Tolerance: Patient Tolerated treatment well  Safety Devices in place: Yes  Type of devices: Call light within reach         Goals  Long term goals  Time Frame for Long term goals : 7-10 days to address above problem areas  Long term goal 1: Pt demo independnet to eat all meals  Long term goal 2: Pt demo Mod I grooming standing @ sink level or seated  Long term goal 3: Pt demo Sup bathing @ shower level seated  Long term goal 4: Pt demo Mod I UE dress & Mod I LE dress inculding underwear, pants, socks, L AFO & shoes & demo G- safety & insight  Long term goal 5: Pt demo Mod I commode trf using approrpatie DME to ensure pt safety  Long term goals 6: Pt demo Sup tub trf using appropriate DME to ensure pt safety  Long term goal 7: Pt demo Min A light homemaking activity & demo G- safety & insight  Long term goal 8: Pt demo G endurance for a 30 minute functional activity  Long term goal 9: Pt will tolerate PROM/AAROM LUE & demo improvement using robotic inmotion arm to facilitate pt ability to functionally use her LUE during ADLs & IADLs as evidenced by gains in th efollowing areas from assessment on 4/7/22-  Patient Goals   Patient goals : \"I want to be normal.\" Pt asked to elaborate- \"Be able to cook & clean. \"     Therapy Time   Individual Concurrent Group Co-treatment   Time In 625-OT eval  635-OT rx         Time Out 635-OT eval  730-OT rx         Minutes 65 Min OT total  10 Min OT eval   625 [facility] OT rx            OS, 320 [facility]

## 2022-04-08 PROCEDURE — 1280000000 HC REHAB R&B

## 2022-04-08 PROCEDURE — 99232 SBSQ HOSP IP/OBS MODERATE 35: CPT | Performed by: PHYSICAL MEDICINE & REHABILITATION

## 2022-04-08 PROCEDURE — 6370000000 HC RX 637 (ALT 250 FOR IP): Performed by: PHYSICAL MEDICINE & REHABILITATION

## 2022-04-08 PROCEDURE — 97530 THERAPEUTIC ACTIVITIES: CPT | Performed by: PHYSICAL THERAPIST

## 2022-04-08 PROCEDURE — 97116 GAIT TRAINING THERAPY: CPT | Performed by: PHYSICAL THERAPIST

## 2022-04-08 PROCEDURE — 97112 NEUROMUSCULAR REEDUCATION: CPT

## 2022-04-08 PROCEDURE — 97530 THERAPEUTIC ACTIVITIES: CPT

## 2022-04-08 RX ADMIN — ACETAMINOPHEN 650 MG: 325 TABLET ORAL at 21:26

## 2022-04-08 RX ADMIN — METOCLOPRAMIDE HYDROCHLORIDE 10 MG: 5 TABLET ORAL at 16:41

## 2022-04-08 RX ADMIN — GABAPENTIN 300 MG: 300 CAPSULE ORAL at 21:29

## 2022-04-08 RX ADMIN — METOCLOPRAMIDE HYDROCHLORIDE 10 MG: 5 TABLET ORAL at 12:14

## 2022-04-08 RX ADMIN — METOCLOPRAMIDE HYDROCHLORIDE 10 MG: 5 TABLET ORAL at 06:32

## 2022-04-08 RX ADMIN — ATORVASTATIN CALCIUM 40 MG: 40 TABLET, FILM COATED ORAL at 21:27

## 2022-04-08 RX ADMIN — BACLOFEN 10 MG: 10 TABLET ORAL at 14:30

## 2022-04-08 RX ADMIN — Medication 9 MG: at 22:10

## 2022-04-08 RX ADMIN — BACLOFEN 10 MG: 10 TABLET ORAL at 08:22

## 2022-04-08 RX ADMIN — DICLOFENAC SODIUM 2 G: 10 GEL TOPICAL at 21:21

## 2022-04-08 RX ADMIN — METOCLOPRAMIDE HYDROCHLORIDE 10 MG: 5 TABLET ORAL at 21:37

## 2022-04-08 RX ADMIN — BACLOFEN 10 MG: 10 TABLET ORAL at 22:09

## 2022-04-08 RX ADMIN — SENNOSIDES 17.2 MG: 8.6 TABLET, COATED ORAL at 21:27

## 2022-04-08 RX ADMIN — METOPROLOL TARTRATE 12.5 MG: 25 TABLET, FILM COATED ORAL at 21:38

## 2022-04-08 RX ADMIN — ASPIRIN 81 MG 81 MG: 81 TABLET ORAL at 08:22

## 2022-04-08 RX ADMIN — LAMOTRIGINE 50 MG: 25 TABLET ORAL at 21:37

## 2022-04-08 RX ADMIN — LAMOTRIGINE 50 MG: 25 TABLET ORAL at 08:22

## 2022-04-08 ASSESSMENT — PAIN DESCRIPTION - FREQUENCY: FREQUENCY: INTERMITTENT

## 2022-04-08 ASSESSMENT — PAIN DESCRIPTION - PAIN TYPE: TYPE: CHRONIC PAIN

## 2022-04-08 ASSESSMENT — PAIN SCALES - GENERAL
PAINLEVEL_OUTOF10: 6
PAINLEVEL_OUTOF10: 0
PAINLEVEL_OUTOF10: 3

## 2022-04-08 ASSESSMENT — PAIN DESCRIPTION - ORIENTATION: ORIENTATION: LEFT

## 2022-04-08 ASSESSMENT — PAIN DESCRIPTION - LOCATION: LOCATION: HIP

## 2022-04-08 ASSESSMENT — PAIN DESCRIPTION - DESCRIPTORS: DESCRIPTORS: DISCOMFORT

## 2022-04-08 NOTE — PROGRESS NOTES
Occupational Therapy  OCCUPATIONAL THERAPY DAILY NOTE    Date:2022  Patient Name: Matilda Pelaez  MRN: [id number]  : 1975  Room: [address] [place] [address]     Referring Practitioner: Melody Jordan MD  Diagnosis: late effect CVA, R cranioplasty 3/30/2022  Additional Pertinent Hx: seizures, hx R MCA 10/13/2021  Restrictions: Fall Risk, seizures & impulsive    Functional Assessment:   Date Status AE  Comments   Feeding 22 Minimal Assist      Grooming 22 Minimal Assist            Oral Care 22 Minimal Assist      Bathing 22 Minimal Assist      UB Dressing 22 Minimal Assist      LB Dressing 22 Stand by Assist      Footwear 22 Supervision      Toileting 22 Stand by Assist      Homemaking 22 TBA        Functional Transfers / Balance:   Date Status DME  Comments   Sit Balance 22 Independent      Stand Balance 22 Stand by Assist  sc    [] Tub  [] Shower   Transfer 22 SBA-Min A  Bench & sc    Commode   Transfer 22 Stand by Assist  sc    Functional   Mobility 22 Stand by Assist  sc Throughout therapy apt setting and throughout hallways    Other:on/off recliner ,kitchen chair with arm rests and standard queen bed  22  SBA  SC      Functional Exercises / Activity:  BUE ROM integration activity with pt completing emanuel box with 3 # wt 3 sets 10 reps in all planes on table top surface     Sensory / Neuromuscular Re-Education:  Pt completed visual scanning worksheets with pt needing verbal cues to locate missed items in left lower quadrant   PROM exercises completed to LUE in all planes to help maintain joint integrity   Tone reduction completed to LUE with focus on hand and digits with fair results.  Pt  Instructed to wear resting hand splint at night     Cognitive Skills:   Status Comments   Problem   Solving fair  During ADL   Memory fair  \"   Sequencing fair + \"   Safety fair  \"     Visual Perception:    Education:  Pt educated on visual scanning techniques to utilize when up and completing functional mobility     [] Family teach completed on:    Pain Level: Pt c/o 5/10 headache     Additional Notes:       Patient has made good  progress during treatment sessions toward set goals. Therapy emphasis to obtain goals: ADL retraining, transfers training, functional mobility, therex, endurance/balance retraining, homemaking, PROM/AAROM LUE, neuro recovery, pt/family educaiton      [x] Continue with current OT Plan of care. [] Prepare for Discharge     Goals  Long term goals  Time Frame for Long term goals : 7-10 days to address above problem areas  Long term goal 1: Pt demo independnet to eat all meals  Long term goal 2: Pt demo Mod I grooming standing @ sink level or seated  Long term goal 3: Pt demo Sup bathing @ shower level seated  Long term goal 4: Pt demo Mod I UE dress & Mod I LE dress inculding underwear, pants, socks, L AFO & shoes & demo G- safety & insight  Long term goal 5: Pt demo Mod I commode trf using approrpatie DME to ensure pt safety  Long term goals 6: Pt demo Sup tub trf using appropriate DME to ensure pt safety  Long term goal 7: Pt demo Min A light homemaking activity & demo G- safety & insight  Long term goal 8: Pt demo G endurance for a 30 minute functional activity  Long term goal 9: Pt will tolerate PROM/AAROM LUE & demo improvement using robotic inmotion arm to facilitate pt ability to functionally use her LUE during ADLs & IADLs as evidenced by gains in th efollowing areas from assessment on 4/7/22-  Patient Goals   Patient goals : \"I want to be normal.\" Pt asked to elaborate- \"Be able to cook & clean. \"       DISCHARGE RECOMMENDATIONS  Recommended DME:  Sc, tub bench- pt owns  Post Discharge Care:   []Home Independently  []Home with 24hr Care / Supervision []Home with Partial Supervision []Home with Home Health OT []Home with Out Pt OT []Other: ___   Comments:         Time in Time out Tx Time Breakdown  Variance:   First Session  [id number] [] Individual Tx-   [x] Concurrent Tx -45  [] Co-Tx -   [] Group Tx -   [] Time Missed -     Second Session [id number] [x] Individual Tx- 45  [] Concurrent Tx -  [] Co-Tx -   [] Group Tx -   [] Time Missed -     Third Session    [] Individual Tx-   [] Concurrent Tx -  [] Co-Tx -   [] Group Tx -   [] Time Missed -         Total Tx Time: 90 mins        [employer], OTR/L [id number]

## 2022-04-08 NOTE — PROGRESS NOTES
Therapeutic Recreation Assessment     LEISURE INTEREST SURVEY               Key: P = Past Interest C = Current Interest F = Future Interest     Arts/Crafts:  ___Mechanical  ___Woodworking    ___ Painting   ___Floral arranging ___ Ceramics         _ __ Knitting                                                     ___ Crocheting        ___Other: ___________      Cooking:  _C _Baking ___Cooking    ___   Other: _______   Comments: Pt reported she likes to bake zucchini bread. Pt reported she does need  assistance with cooking d/t her physical limitations (pt stated she cannot scrap the bowl,  get pan in and out of the oven). Games:  _C__Cards  ___Darts  _C__Board games    ___Word games  ___Crossword puzzles    ___Seek-n-find/jumble                   __C_Other: Kimberly Barba searches\"      Horticulture:  _C__Vegetables  _C__Flowers  ___House plants                                                     ___Other: ___________      House/Yard Care:  ___Ironing  ___Laundry  ___Cleaning                                                      ___Repairs              ___Lawn care                                                     ___Other: ___________      Music Listening/Playing:  ___Classical       ___Big Band       __I_Wcnn-c-Azkz                                                     ___Country          ___R&B             ___Gospel/Hymns                                                     ___Other: ___________      Outdoor Activities:  ___Hunting          ___Fishing          ___Camping                                                     ___Other: ___________      Pets/Animals:  _C_Dogs             ___Cats                ___Fish                                                     ___Birds             ___Livestock         ___Other: _________    Comments: Pt reported she currently has 2 dogs.     Reading:   ___Newspapers  ___Magazines ___Other:stories                                                     __C_Other: Rajendra Kelly, romance\"      Pentecostal Activities:  Jainism: ___________   R[place] Falling Activities: ___________      Shopping:   ___Grocery  _C__Clothing  ___Flea market     ___Other: ___________      Socialization: ___Family  _C__Friends  ___Neighbors       ___Church  _P__Volunteer ___Club/Organization                                                     ___Other: ___________    Sports/Exercises:  ___Walking  ___Football  ___Basketball     ___Golf  ___Baseball  ___Tennis       ___Bowling  ___Other: ___________      Traveling:   _C,F__Global  ___Stateside  ___Local       ___Other: ___________    Comments: Pt reported she enjoys going to the beach in [place] and [facility]. Pt reported   future plans to go to the [facility]. TV/Radio:   ___Mysteries  ___Comedies ___Romance                                                     ___Game show       ___Sports       ___News                                                      ___Talk show          __C_Other: Najma Bounds, True Crime      Other:    Personal Leisure Profile:   Question Response   Attributes Identify a positive quality: []Ambitious  []Appreciative  []Caring  []Courteous  []Considerate  []Compassionate  []Creative  []Dependable   []Generous  []Honest  []Hard working  []Helpful  []Kind  [][place]   []Flushing  []Mannerly  []Patient  []Polite  []Respectful  []Sociable  []Sense of humor  []Thoughtful  [x]Other: \"I'm pretty happy go jorge, I am easy to get along with. \"    You are very good at [employer]. \"   Awareness Why do you participate in your leisure interest: []Competition  []Creativity  []Concentration  []Exercise  []Enjoyment  []Fun  []Hand/eye Coordination  []Increase confidence  []Learning a new skill  []Relaxation  []Social Interaction  []Supporting one another  []Thinking  []Other: \"Because I have extra time, boredom. \"    Are your leisure activities limited at this time? [x]Yes  []No  Comments: Pt stated \"half of my body isn't working. \" Pt reported having left side paralysis.     How often do you participate in your leisure interest? \"Once a week if I'm jorge. \"   Resources Name a restaurant, store or business you frequent? \"[employer]. Not that I want to, I just go for the things I need. \"   Personal When are you most happy? \"When I'm home. \"     Barriers to Leisure Participation:  []Visual   [][place]  []Cognition/Communication  []Motivation  []Financial  []Transportation  []Time Constraints  [x]Physical Ability  []Leisure Awareness  []Drug/Alcohol  []Other: ___________    Recommendations:  []Group Session  [x]Individual Session  []Client Refused Services  []No Therapy  []Other: ___________    Objectives:  [x]Establish adaptations for leisure involvement   []Increase Self worth/self esteem  []Community reintegration training   [x]Social interaction  [x]Leisure participation  []Other: ___________    Goals for Therapeutic Recreation Services:   Social Interaction:   Admission Functional [place] Measure: ____7_______  Discharge Functional [place] Measure: ____7______   Other:________________________________      Leisure Choice/ Increase [place] Quality of Life: To participate in 1 premorbid leisure activity of choice by discharge to increase leisure choice and independence thus increasing the overall quality of his/her life. Adaptations: To increase knowledge of adaptations to leisure involvement by participating in 1 modified leisure activities per week. Leisure Activity Tolerance: To increase leisure tolerance by attending 2 leisure activities per week for at least 30 minutes with active participation. Leisure Awareness: To increase leisure awareness by identifying 1 benefit to leisure participation 2x per week by discharge. Self Expression: To participate in 2 leisure activity(s) of choice, identifying 1 benefit to leisure participation by discharge. Leisure Skills: To increase leisure skills by displaying the ability to participate in 2 new leisure activity by discharge.     [employer] Louise Gant

## 2022-04-08 NOTE — PROGRESS NOTES
Physical Therapy  Facility/Department: [address] REHAB  Daily Treatment Note  NAME: Ana Duke  : 1975  MRN: [id number]    Date of Service: 2022     Evaluating Therapist: Emmanuel Holder PT, SAIRA, [id number]        ROOM: [address] [place] [address]  DIAGNOSIS: Late affect CVA  PRECAUTIONS: falls, L hemiparesis, L neglect, L homonymous hemianopsia  Impulsivity, small area of blistering on dorsum of L great toe. 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 At that time she underwent emergent R hemicraniectomy on 10/13. After being deemed medically appropriate, she was transferred to [facility] on 10/22/2021. She progressed well and was discharged home with necessary equipment and / supervision. Pt returned to ARU 22 following replacement of bone flap.      Social:  Pt lives with mother and daughter in a 1 floor plan 4 steps and 2 rails to enter. Pt moved in with mother following CVA in . 24/7 supervision is possible. Prior to admission: Since return home following CVA in , pt was independent with toileting and bathing. Pt did require Supervision with functional mobility. Pt ambulated with SPC/SBQC, owns , was participating in [facility].                   Initial Evaluation  Date: 22 AM     PM    Short Term Goals Long Term Goals    Was pt agreeable to Eval/treatment? Yes  yes yes        Does pt have pain?  No c/o pain  no c/o pain No c/o pain       Bed Mobility  Rolling: Noemy  Supine to sit: Noemy  Sit to supine: Noemy  Scooting: Noemy NT, pt in w/c upon arrival NT SBA Mod I   Transfers Sit to stand: Noemy  Stand to sit: Noemy  Stand pivot: Noemy with R SPC     5xSTS: TBA  Sit to stand: Noemy  Stand to sit: Noemy  Stand pivot: CGA with R SPC     5xSTS: TBA   Sit to stand: Noemy  Stand to sit: Noemy  Stand pivot: Noemy with R SPC     5xSTS: TBA SBA    Supervision   Ambulation  150 feet with R SPC with Noemy     W/c follow? (Y/N): N     10mWT: 0.25 m/s  6mWT: 112 m  (AD: R SPC, Assist level: Noemy) 200 feet x2 reps with R SPC with Noemy     W/c follow? (Y/N): N     10mWT: 0.25 m/s  6mWT: 112 m  (AD: R SPC, Assist level: Noemy)  200 feet x2 reps with R SPC with Noemy     W/c follow? (Y/N): N      >300 feet with AAD with SBA >600 feet with AAD with Supervision     10mWT: >0.4 m/s  6mWT: >175 m   Wheelchair Mobility NA  N/A  N/A       Car Transfers Noemy  NT  NT SBA Supervision   Stair negotiation: ascended and descended 12 steps with 1 rail with Noemy  8 steps with 1 rail with Noemy  NT 12 steps with 1 rail with SBA 20 steps with 1 rail with Supervision   Walking 10 feet on uneven surface 10 feet with R SPC with Noemy  NT NT 10 feet with AAD with SBA 10 feet with AAD with Supervision   Curb Step:   ascended and descended 4 inch step with R SPC and Noemy NT NT 4 inch step with AAD and SBA 4 inch step with AAD and Supervision   Picking up object off the floor Picked up cone with Noemy Picked up shoe with Noemy NT  Will  cone with SBA Will  2lb weight with SBA   BLE ROM RLE: WNL  LLE:   Hip flexion: WNL  Knee extension: WNL  Dorsiflexion: lacking ~5 degrees from neutral  Plantarflexion: WNL           BLE Strength RLE:  Grossly 5/5  LLE:   Hip flexion: 3/5  Knee extension: 4/5  Dorsiflexion: 0/5  Plantarflexion: 2+/5           Balance  Static and dynamic standing balance Noemy with R SPC      BBS: TBA  Static and dynamic standing balance Noemy with R SPC     BBS: 20/56   Static and dynamic standing balance Noemy with R SPC       BBS: TBD   Date Family Teach Completed    TBD         Is additional Family Teaching Needed?   Y or N Y day of discharge  Y         Hindering Progress Balance L hemiparesis Balance L hemiparesis  Balance L hemiparesis        PT recommended ELOS 10-14 days           Team's Discharge Plan             Therapist at Team Meeting                Pt is alert and Oriented x 3  Sensation: grossly impaired L hemibody  Edema: none noted  Endurance: fair          General:        HRmax: 177 bpm       Beta blockers (Y/N): Y      Adjusted HRmax: 167 bpm   Blood pressure (mmHg): AM:  - Prior to Tx: 112/90    PM:  - Prior to Tx: 95/75  - Post Tx: 116/71   Time spent in HR ranges: Moderate intensity (60-70% HRmax): AM: 0:11:54  PM: 0:28:53   High intensity (70-85% HRmax): AM: 0:00:26  PM: 0:07:06      Therapeutic Exercise:   AM:  Myers balance assessment  Gait training without weights 200 feet x2 [id number] feet x2 reps with R SPC with Noemy   Gait training with 7# weights on gait belt x300 feet with R SPC with Noemy    PM:  Gait training with 18.5#  on gait belt and 5# at L ankle using R SPC with Noemy 200 feet x2  Side stepping with above weights to R/L with R UE support on HR facing wall 15x ea direction  Standing step ups leading with L LE ascending, R LE descending for improved R quad strength and knee proprioception x10 with R HR and Noemy    Patient education  Pt educated on safety with mobility, results of balance assessment, improved UE support on stairs    Patient response to education:   Pt verbalized understanding Pt demonstrated skill Pt requires further education in this area   yes yes yes     Additional Comments: Pt seated in w/c upon arrival, agreeable to PT session. Pt with L flexible AFO and L UE sling donned for all mobility. Pt is hesitant for attempting unsupported standing throughout session reaching for support on therapist during initial attempts to achieve static standing without UE and requesting use of SPC for all mobility for comfort. Pt was able to increase HR training zones with application of weights d/t inability to increase gait speed this AM d/t fear of falling. During PM session pt was able to maintain HR within training zone for majority of session with minimal rest breaks required as weighted walking was progressed.  Pt reports feeling uncertain of L LE stability at times with mobility therefore step-ups were introduced, manual assist required to facilitate L weight shift d/t offloading towards R. Side stepping with weight introduced for increased glute med strengthening with tactile cues required to avoid trunk and pelvis rotation towards L during this task. AM  Time in: 0915  Time out: 1000    PM  Time in: 1430  Time out: 1515    Pt is making good progress toward established Physical Therapy goals. Continue with physical therapy current plan of care.     Jo Calwdell, PT, DPT  [id number]

## 2022-04-08 NOTE — PROGRESS NOTES
[facility]  Comprehensive Progress Note    Subjective:      Anali Agrawal is a 42 y.o. female admitted to inpatient rehabilitation for impairments and acitivities limitations in ADL and mobility secondary to CVA. No acute events overnight. No cp, sob, n/v. Mild headache reported, improved with tylenol. Tolerating therapy evaluations. No other complaints. VSS. The patient's medical records have been reviewed. Scheduled Meds:aspirin, 81 mg, Daily  atorvastatin, 40 mg, Nightly  baclofen, 10 mg, TID  [START ON 4/10/2022] vitamin D, 50,000 Units, Weekly  gabapentin, 300 mg, Nightly  lamoTRIgine, 50 mg, BID  melatonin, 9 mg, Nightly  metoclopramide, 10 mg, 4x Daily AC & HS  metoprolol tartrate, 12.5 mg, BID  nicotine, 1 patch, Daily  senna, 2 tablet, Nightly      Continuous Infusions:  PRN Meds:acetaminophen, 650 mg, Q6H PRN  diclofenac sodium, 2 g, 4x Daily PRN  ondansetron, 4 mg, Q8H PRN  acetaminophen, 1,000 mg, Q6H PRN         Objective:      Vitals:    04/06/22 2227 04/07/22 1030 04/07/22 2153 04/08/22 0828   BP: 119/80 104/64 (!) 103/51 (!) 102/58   Pulse: 63 58 72 68   Resp:  18     Temp:  98.2 °F (36.8 °C)  98.8 °F (37.1 °C)   TempSrc:  Temporal  Oral   SpO2:       Weight:       Height:         General appearance: alert, appears stated age and cooperative, NAD  Head: Right cranioplasty, incision c/d/i with staples in place  Eyes: conjunctivae/corneas clear. PERRL, EOM's intact. Lungs: clear to auscultation bilaterally  Heart: regular rate and rhythm, S1, S2 normal  Abdomen: soft, non-tender, normal bowel sounds   Musculoskeletal: Range of motion impaired   Skin: Right cranioplasty, incision c/d/i with staples in place  Neurologic: AAOx4. Speech clear, content appropriate. Follows commands. SILT.  LUE and LLE spasticity, MAS 2    Strength:                     R          L  Deltoid                         5          1  Biceps                         5          5  Triceps                        5          2  Wrist Ext                     5          0  FDP                             5          2  Finger Abd                  5          1  Hip Flex                       5         9-  Knee Ext                     5         4-  Ankle dorsi                  5          9  XOU                             0          9  Ankle Plantar               5          0      Laboratory:    Lab Results   Component Value Date    WBC 7.8 04/07/2022    HGB 12.0 04/07/2022    HCT 37.2 04/07/2022    MCV 95.6 04/07/2022     (H) 04/07/2022     Lab Results   Component Value Date     04/07/2022    K 4.4 04/07/2022     04/07/2022    CO2 22 04/07/2022    BUN 14 04/07/2022    CREATININE 0.7 04/07/2022    GLUCOSE 95 04/07/2022    CALCIUM 9.3 04/07/2022      Lab Results   Component Value Date    ALT 75 (H) 10/23/2021    AST 46 (H) 10/23/2021    ALKPHOS 89 10/23/2021    BILITOT 0.4 10/23/2021       Functional Status:   Physical Therapy:  Bed mobility: Min A  Transfers: Min A  Ambulation: 150 ft R SPC Min A, left AFO     Occupational Therapy:  Feeding: Setup  Grooming: Min A  UB dressing: Min A  LB dressing: CGA       Assessment/Plan:       42 y.o. female admitted to inpatient rehabilitation for impairments and acitivities limitations in ADL and mobility secondary to CVA. -History of right MCA CVA (Oct 2021)-- with cerebral edema and midline shift, s/p right hemicraniectomy (Oct 2021-[facility]). Patient completed ARU 10/22/2021-12/7/2021 and was discharged home. She is now s/p right cranioplasty (3/30/3022 - [facility]). Left spastic hemiparesis, left inattention, vision impairment. LUE sling for gait. Left AFO. Continue Aspirin, Lipitor for secondary stroke prevention. Monitor neuro exam. Continue Acute rehab program.   -Pain control: Tylenol for mild pain, headache. Gabapentin for neuropathic pain.  Voltaren gel prn.    -Spasticity, LUE/LLE: Continue Baclofen  -History of suspected seizure: Continue Lamictal at home dose. Patient to follow back up with Neurology after discharge.   -History of intermittent Tachycardia--Seen by Cardiology during prior ARU admission, started on low dose metoprolol. Outpatient event monitor with ?result and Cardiology follow up. Continue home dose metoprolol.   -Tobacco abuse: tobacco cessation education, resources.  Nicoderm patch      Continue Acute rehab evaluations       Electronically signed by Asa Perez MD on 4/8/2022 at 2:28 PM

## 2022-04-08 NOTE — CARE COORDINATION
Met with patient. She is aware that there are problems with insurance. At this time [facility] and [place] both state she is NOT active with them. Informed patient that I will keep her informed as I find information out. She was able to accurately recite her [place] number to me. Verification currently working on case. Patient signed and given a copy of disclosure statement along with Inpatient Rehab Privacy Act Statement.

## 2022-04-08 NOTE — PROGRESS NOTES
THERAPEUTIC RECREATION PROGRESS NOTE     Task/Intervention: [] Cards  [] Crafts  [] Crocheting  [] Fishing  [] Floral arranging  [] Games  [] Golfing   [] Homemaking  [] Hunting  [] Horticulture  [] Mechanical  [] Ledora Leap working  [] [employer]  [x] Other: Leisure Concentration Time/Duration: _20__Mins  Comments: Pt used cognitive skills to play leisure concentration memory game. Pt matched leisure activity to item needed for that activity. CTRS educated pt about activity as needed. Pt used fine motor skills to  cards in front of them. Pt requested activities to improve her scanning skills and was provided with 3 ispy sheets to complete during her leisure time. Leisure Awareness: Identified the following as being benefits to leisure involvement. [] Competition  [] Creativity  [] Concentration  [] Enjoyment   [] Exercise  [x] Fun  [] Hand/eye Coordination  [] Increase confidence  [] Learning a new skill  [] Relaxation  [] Social Interaction  [] Supporting one another  [x] Thinking  [] Other:___ Comments: Pt stated she liked the game as it helped her use her memory. Pt stated she often plays games similar to concentration on her phone. Affect: [] Appropriate  [] Bizarre  [] Blunted  [x] Bright  [] Flat  [] Labile  [] Superficial  [] Tearful Comments: Pt's affect was consistently pleasant throughout activity.    Self Esteem:   Identified the following as being personal positive qualities [] Ambitious  [] Appreciative  [] Caring  [] Courteous  [] Considerate  [] Compassionate  [] Creative  [] Dependable   [] Generous  [] Honest  [] Hard working  [] Helpful  [] Kind  [] [place]   [] [place]  [] [employer]  [] Patient  [] Polite  [] Respectful  [] Sociable  [] Sense of humor  [] Thoughtful  [] Other: ___________ Comments: _____   Social Interaction: [x] Independent  [] Modified Independent  [] Supervision  [] Minimal Assistance  [] Moderate Assistance  [] Maximal Assistance  [] Total Assistance  [] Other: ______________ Comments: _____   Drug & Alcohol:  Indicated the following as alternatives for leisure and how to recreate without drugs and alcohol.   [] Cards  [] Crafts  [] Crocheting  [] Fishing  [] Floral arranging  [] Games  [] Golfing   [] Homemaking  [] Hunting  [] Horticulture  [] Mechanical  [] Tyesha Estela working  [] [employer]  [] Other________ Comments: _____   Financial: Listed _____ as being specific low cost activities to be utilized after D/C. Comments: _____   Com. Resource Education: Listed _____ community resources to include type of services, address and phone.  Comments: _____   Farhat De Leon

## 2022-04-09 PROCEDURE — 1280000000 HC REHAB R&B

## 2022-04-09 PROCEDURE — 97535 SELF CARE MNGMENT TRAINING: CPT

## 2022-04-09 PROCEDURE — 99232 SBSQ HOSP IP/OBS MODERATE 35: CPT | Performed by: PHYSICAL MEDICINE & REHABILITATION

## 2022-04-09 PROCEDURE — 97110 THERAPEUTIC EXERCISES: CPT

## 2022-04-09 PROCEDURE — 6370000000 HC RX 637 (ALT 250 FOR IP): Performed by: PHYSICAL MEDICINE & REHABILITATION

## 2022-04-09 PROCEDURE — 97530 THERAPEUTIC ACTIVITIES: CPT

## 2022-04-09 RX ADMIN — GABAPENTIN 300 MG: 300 CAPSULE ORAL at 21:00

## 2022-04-09 RX ADMIN — ACETAMINOPHEN 1000 MG: 500 TABLET ORAL at 21:01

## 2022-04-09 RX ADMIN — ASPIRIN 81 MG 81 MG: 81 TABLET ORAL at 08:03

## 2022-04-09 RX ADMIN — METOCLOPRAMIDE HYDROCHLORIDE 10 MG: 5 TABLET ORAL at 11:09

## 2022-04-09 RX ADMIN — METOCLOPRAMIDE HYDROCHLORIDE 10 MG: 5 TABLET ORAL at 06:19

## 2022-04-09 RX ADMIN — METOCLOPRAMIDE HYDROCHLORIDE 10 MG: 5 TABLET ORAL at 20:59

## 2022-04-09 RX ADMIN — BACLOFEN 10 MG: 10 TABLET ORAL at 08:03

## 2022-04-09 RX ADMIN — ACETAMINOPHEN 650 MG: 325 TABLET ORAL at 06:19

## 2022-04-09 RX ADMIN — Medication 9 MG: at 20:59

## 2022-04-09 RX ADMIN — LAMOTRIGINE 50 MG: 25 TABLET ORAL at 08:03

## 2022-04-09 RX ADMIN — BACLOFEN 10 MG: 10 TABLET ORAL at 20:59

## 2022-04-09 RX ADMIN — LAMOTRIGINE 50 MG: 25 TABLET ORAL at 21:01

## 2022-04-09 RX ADMIN — METOPROLOL TARTRATE 12.5 MG: 25 TABLET, FILM COATED ORAL at 21:00

## 2022-04-09 RX ADMIN — ATORVASTATIN CALCIUM 40 MG: 40 TABLET, FILM COATED ORAL at 21:00

## 2022-04-09 RX ADMIN — BACLOFEN 10 MG: 10 TABLET ORAL at 13:25

## 2022-04-09 RX ADMIN — SENNOSIDES 17.2 MG: 8.6 TABLET, COATED ORAL at 21:01

## 2022-04-09 RX ADMIN — ACETAMINOPHEN 1000 MG: 500 TABLET ORAL at 14:37

## 2022-04-09 RX ADMIN — DICLOFENAC SODIUM 2 G: 10 GEL TOPICAL at 21:00

## 2022-04-09 ASSESSMENT — PAIN DESCRIPTION - ONSET
ONSET: ON-GOING
ONSET: ON-GOING

## 2022-04-09 ASSESSMENT — PAIN DESCRIPTION - DESCRIPTORS
DESCRIPTORS: CONSTANT;SHARP;TENDER
DESCRIPTORS: BURNING;ACHING
DESCRIPTORS: CONSTANT;SHARP;ACHING

## 2022-04-09 ASSESSMENT — PAIN DESCRIPTION - PAIN TYPE
TYPE: CHRONIC PAIN
TYPE: CHRONIC PAIN

## 2022-04-09 ASSESSMENT — PAIN SCALES - GENERAL
PAINLEVEL_OUTOF10: 4
PAINLEVEL_OUTOF10: 4
PAINLEVEL_OUTOF10: 5
PAINLEVEL_OUTOF10: 0
PAINLEVEL_OUTOF10: 4

## 2022-04-09 ASSESSMENT — PAIN DESCRIPTION - ORIENTATION
ORIENTATION: LEFT;INNER
ORIENTATION: LEFT;INNER
ORIENTATION: LEFT

## 2022-04-09 ASSESSMENT — PAIN DESCRIPTION - LOCATION
LOCATION: HEAD;SHOULDER
LOCATION: HEAD;SHOULDER;HIP
LOCATION: HEAD

## 2022-04-09 ASSESSMENT — PAIN DESCRIPTION - FREQUENCY
FREQUENCY: CONTINUOUS
FREQUENCY: CONTINUOUS

## 2022-04-09 ASSESSMENT — PAIN - FUNCTIONAL ASSESSMENT
PAIN_FUNCTIONAL_ASSESSMENT: PREVENTS OR INTERFERES WITH MANY ACTIVE NOT PASSIVE ACTIVITIES
PAIN_FUNCTIONAL_ASSESSMENT: PREVENTS OR INTERFERES WITH MANY ACTIVE NOT PASSIVE ACTIVITIES

## 2022-04-09 NOTE — PROGRESS NOTES
[facility]  Comprehensive Progress Note    Subjective:      Marcos Parks is a 42 y.o. female admitted to inpatient rehabilitation for impairments and acitivities limitations in ADL and mobility secondary to CVA. No acute events overnight. No cp, sob, n/v. Tolerating therapy. No new complaints. The patient's medical records have been reviewed. Scheduled Meds:aspirin, 81 mg, Daily  atorvastatin, 40 mg, Nightly  baclofen, 10 mg, TID  [START ON 4/10/2022] vitamin D, 50,000 Units, Weekly  gabapentin, 300 mg, Nightly  lamoTRIgine, 50 mg, BID  melatonin, 9 mg, Nightly  metoclopramide, 10 mg, 4x Daily AC & HS  metoprolol tartrate, 12.5 mg, BID  nicotine, 1 patch, Daily  senna, 2 tablet, Nightly      Continuous Infusions:  PRN Meds:acetaminophen, 650 mg, Q6H PRN  diclofenac sodium, 2 g, 4x Daily PRN  ondansetron, 4 mg, Q8H PRN  acetaminophen, 1,000 mg, Q6H PRN         Objective:      Vitals:    04/08/22 0828 04/08/22 2138 04/08/22 2200 04/09/22 0759   BP: (!) 102/58 112/60 120/72 (!) 97/58   Pulse: 68 70 68 63   Resp:    16   Temp: 98.8 °F (37.1 °C)   98.6 °F (37 °C)   TempSrc: Oral   Temporal   SpO2:    96%   Weight:       Height:         General appearance: alert,  NAD  Head: Right cranioplasty, incision c/d/i with staples in place  Eyes: conjunctivae/corneas clear. PERRL, EOM's intact. Lungs: clear to auscultation bilaterally  Heart: regular rate and rhythm, S1, S2 normal  Abdomen: soft, non-tender, normal bowel sounds   Musculoskeletal: Range of motion impaired   Skin: Right cranioplasty, incision c/d/i with staples in place  Neurologic: AAOx4. Speech clear, content appropriate. Follows commands. SILT.  LUE and LLE spasticity, MAS 2     Strength:                     R          L  Deltoid                         5          3  Biceps                         5          0  Triceps                        5          0  Wrist Ext                     5          3  FDP                             5          2  Finger Abd                  5          9  Hip Flex                       5         3-  Knee Ext                     5         4-  Ankle dorsi                  5          5  HMU                             5          4  Ankle Plantar               5          0          Laboratory:    Lab Results   Component Value Date    WBC 7.8 04/07/2022    HGB 12.0 04/07/2022    HCT 37.2 04/07/2022    MCV 95.6 04/07/2022     (H) 04/07/2022     Lab Results   Component Value Date     04/07/2022    K 4.4 04/07/2022     04/07/2022    CO2 22 04/07/2022    BUN 14 04/07/2022    CREATININE 0.7 04/07/2022    GLUCOSE 95 04/07/2022    CALCIUM 9.3 04/07/2022      Lab Results   Component Value Date    ALT 75 (H) 10/23/2021    AST 46 (H) 10/23/2021    ALKPHOS 89 10/23/2021    BILITOT 0.4 10/23/2021       Functional Status:   Physical Therapy:  Bed mobility: Min A  Transfers: Min A  Ambulation: 150 ft R SPC Min A, left AFO     Occupational Therapy:  Feeding: Setup  Grooming: Min A  UB dressing: Min A  LB dressing: CGA         Assessment/Plan:       42 y.o. female admitted to inpatient rehabilitation for impairments and acitivities limitations in ADL and mobility secondary to CVA.    -History of right MCA CVA (Oct 2021)-- with cerebral edema and midline shift, s/p right hemicraniectomy (Oct 2021-[facility]). Patient completed ARU 10/22/2021-12/7/2021 and was discharged home. She is now s/p right cranioplasty (3/30/3022 - [facility]). Left spastic hemiparesis, left inattention, vision impairment. LUE sling for gait. Left AFO. Continue Aspirin, Lipitor for secondary stroke prevention. Monitor neuro exam. Continue Acute rehab program.   -Pain control: Tylenol for mild pain, headache. Gabapentin for neuropathic pain. Voltaren gel prn.    -Spasticity, LUE/LLE: Continue Baclofen  -History of suspected seizure: Continue Lamictal at home dose.  Patient to follow back up with Neurology after discharge.   -History of intermittent Tachycardia--Seen by Cardiology during prior ARU admission, started on low dose metoprolol. Outpatient event monitor with ?result and Cardiology follow up. Continue home dose metoprolol.   -Tobacco abuse: tobacco cessation education, resources. Nicoderm patch     Parameters added to metoprolol. Monitor.    Continue rehab program.       Electronically signed by Pablo Lugo MD on 4/9/2022 at 11:40 AM

## 2022-04-09 NOTE — PROGRESS NOTES
Occupational Therapy  OCCUPATIONAL THERAPY DAILY NOTE    Date:2022  Patient Name: Katy Evangelista  MRN: [id number]  : 1975  Room: [address] [place], [place] [address]     Referring Practitioner: Barney Felder MD  Diagnosis: late effect CVA, R cranioplasty 3/30/2022  Additional Pertinent Hx: seizures, hx R MCA 10/13/2021  Restrictions: Fall Risk, seizures & impulsive    Functional Assessment:   Date Status AE  Comments   Feeding 22 Minimal Assist      Grooming 22 Minimal Assist            Oral Care 22 Minimal Assist      Bathing 22 Minimal Assist      UB Dressing 22 Minimal Assist      LB Dressing 22 Stand by Assist      Footwear 22 min A  Min A due to new shoes & tighter fit with AFO   Toileting 22 Stand by Assist      Homemaking 22 min A  Str. Cane  Pt completed item retrieval from fridge, pantry needing cues for body mechanics for balance when reaching using one handed skills. Pt washed out coffee pot and filter standing at sink using one handed skills with cues for body mechanics to increase safety and balance. Functional Transfers / Balance:   Date Status DME  Comments   Sit Balance 22 Independent   demo'd up in w/c, arm chairs in gym and on EOB to increase overall strength & endurance. Stand Balance 22 Stand by Assist  sc demo'd during fxl mobility, sit to stand transfers, hmkg    [] Tub  [] Shower   Transfer 22 SBA-Min A  Bench & sc    Commode   Transfer 22 Stand by Assist  sc    Functional   Mobility 22 Stand by Assist  sc Pt ambulated from room to kitchen then over to gym using str. Cane needing occ cues for pacing self to increase impulsiveness. Other:on/off recliner ,kitchen chair with arm rests and standard queen bed       Sit to stand  22 SBA             SBA SC            Str. cane         Cues for pacing self during sit to stand transfers on/off EOB, w/c and arm chairs in kitchen and gym.           Functional Exercises / Activity:  BUE AAROM ex's using cane as dowel laurie while seated up in arm chair with mirror present for visual input and follow thru. Pt needed assist using LUE for shoulder punches 3-4 reps of 5 ea along with scapula protraction/retraction using dowel laurie while wearing 1# wt on RUE. Pt completed 3-5 reps of 10 ea. Towel glides ex's performed on table top using BUE's to increase LUE ROM at all joint while wearing 1# on RUE for strengthening. Pt tolerated 3 reps of 5 ea. LUE PROM ex's for elbow, wrist and finger to decrease tone and muscle tightness while promoting ROM/gentle stretching at all joints. Sensory / Neuromuscular Re-Education:      Cognitive Skills:   Status Comments   Problem   Solving fair  Demo'd during hmkg, fxl mobility, sit to stand transfers and arm exercises. Memory fair  \"   Sequencing fair + \"   Safety fair  \"     Visual Perception:    Education:  Pt educated on visual scanning techniques during ambulation using str. Cane to increase safety. Pt educated with LUE positioning and BUE HEP exercises to increase LUE ROM. [] Family teach completed on:    Pain Level: Pt c/o 4/10 headache     Additional Notes:       Patient has made good  progress during treatment sessions toward set goals. Therapy emphasis to obtain goals: ADL retraining, transfers training, functional mobility, therex, endurance/balance retraining, homemaking, PROM/AAROM LUE, neuro recovery, pt/family educaiton      [x] Continue with current OT Plan of care.   [] Prepare for Discharge     Goals  Long term goals  Time Frame for Long term goals : 7-10 days to address above problem areas  Long term goal 1: Pt demo independnet to eat all meals  Long term goal 2: Pt demo Mod I grooming standing @ sink level or seated  Long term goal 3: Pt demo Sup bathing @ shower level seated  Long term goal 4: Pt demo Mod I UE dress & Mod I LE dress inculding underwear, pants, socks, L AFO & shoes & demo G- safety & insight  Long term goal 5: Pt demo Mod I commode trf using approrpatie DME to ensure pt safety  Long term goals 6: Pt demo Sup tub trf using appropriate DME to ensure pt safety  Long term goal 7: Pt demo Min A light homemaking activity & demo G- safety & insight  Long term goal 8: Pt demo G endurance for a 30 minute functional activity  Long term goal 9: Pt will tolerate PROM/AAROM LUE & demo improvement using robotic inmotion arm to facilitate pt ability to functionally use her LUE during ADLs & IADLs as evidenced by gains in th efollowing areas from assessment on 4/7/22-  Patient Goals   Patient goals : \"I want to be normal.\" Pt asked to elaborate- \"Be able to cook & clean. \"       DISCHARGE RECOMMENDATIONS  Recommended DME:  Sc, tub bench- pt owns  Post Discharge Care:   []Home Independently  []Home with 24hr Care / Supervision []Home with Partial Supervision []Home with Home Health OT []Home with Out Pt OT []Other: ___   Comments:         Time in Time out Tx Time Breakdown  Variance:   First Session  10:25am 11:05am [x] Individual Tx- 40  [] Concurrent Tx -  [] Co-Tx -   [] Group Tx -   [] Time Missed -     Second Session   [] Individual Tx-   [] Concurrent Tx -  [] Co-Tx -   [] Group Tx -   [] Time Missed -     Third Session    [] Individual Tx-   [] Concurrent Tx -  [] Co-Tx -   [] Group Tx -   [] Time Missed -         Total Tx Time: 40 mins    Lizett Levine GLOVER/L [address], OTR/L [id number]

## 2022-04-09 NOTE — PROGRESS NOTES
04/09/22 1138   Attendance   Activity Cards   Participation Active participation   Therapeutic Recreation   Leisure Education Demonstrates knowledge of benefits of leisure involvement  (Jaime Boyce identified competition,fun and using my brain.)   Leisure Attitude/Participation Participates in 1:1 structured activity   Time Spent With Patient   Minutes 35

## 2022-04-10 PROCEDURE — 1280000000 HC REHAB R&B

## 2022-04-10 PROCEDURE — 97530 THERAPEUTIC ACTIVITIES: CPT

## 2022-04-10 PROCEDURE — 6370000000 HC RX 637 (ALT 250 FOR IP): Performed by: PHYSICAL MEDICINE & REHABILITATION

## 2022-04-10 PROCEDURE — 97535 SELF CARE MNGMENT TRAINING: CPT

## 2022-04-10 RX ORDER — ACETAMINOPHEN 500 MG
1000 TABLET ORAL 3 TIMES DAILY
Status: DISCONTINUED | OUTPATIENT
Start: 2022-04-10 | End: 2022-04-22 | Stop reason: HOSPADM

## 2022-04-10 RX ADMIN — SENNOSIDES 17.2 MG: 8.6 TABLET, COATED ORAL at 21:09

## 2022-04-10 RX ADMIN — BACLOFEN 10 MG: 10 TABLET ORAL at 21:09

## 2022-04-10 RX ADMIN — GABAPENTIN 300 MG: 300 CAPSULE ORAL at 21:09

## 2022-04-10 RX ADMIN — ASPIRIN 81 MG 81 MG: 81 TABLET ORAL at 08:26

## 2022-04-10 RX ADMIN — ATORVASTATIN CALCIUM 40 MG: 40 TABLET, FILM COATED ORAL at 21:10

## 2022-04-10 RX ADMIN — LAMOTRIGINE 50 MG: 25 TABLET ORAL at 21:10

## 2022-04-10 RX ADMIN — DICLOFENAC SODIUM 2 G: 10 GEL TOPICAL at 21:11

## 2022-04-10 RX ADMIN — BACLOFEN 10 MG: 10 TABLET ORAL at 14:05

## 2022-04-10 RX ADMIN — ACETAMINOPHEN 1000 MG: 500 TABLET ORAL at 21:10

## 2022-04-10 RX ADMIN — ACETAMINOPHEN 1000 MG: 500 TABLET ORAL at 15:28

## 2022-04-10 RX ADMIN — Medication 9 MG: at 21:11

## 2022-04-10 RX ADMIN — METOPROLOL TARTRATE 12.5 MG: 25 TABLET, FILM COATED ORAL at 08:26

## 2022-04-10 RX ADMIN — METOPROLOL TARTRATE 12.5 MG: 25 TABLET, FILM COATED ORAL at 21:10

## 2022-04-10 RX ADMIN — ACETAMINOPHEN 1000 MG: 500 TABLET ORAL at 07:03

## 2022-04-10 RX ADMIN — ERGOCALCIFEROL 50000 UNITS: 1.25 CAPSULE ORAL at 08:26

## 2022-04-10 RX ADMIN — LAMOTRIGINE 50 MG: 25 TABLET ORAL at 08:26

## 2022-04-10 RX ADMIN — METOCLOPRAMIDE HYDROCHLORIDE 10 MG: 5 TABLET ORAL at 07:03

## 2022-04-10 RX ADMIN — BACLOFEN 10 MG: 10 TABLET ORAL at 08:26

## 2022-04-10 ASSESSMENT — PAIN DESCRIPTION - ONSET: ONSET: ON-GOING

## 2022-04-10 ASSESSMENT — PAIN DESCRIPTION - LOCATION: LOCATION: HEAD

## 2022-04-10 ASSESSMENT — PAIN DESCRIPTION - PAIN TYPE: TYPE: SURGICAL PAIN

## 2022-04-10 ASSESSMENT — PAIN DESCRIPTION - DESCRIPTORS: DESCRIPTORS: CONSTANT;DISCOMFORT;ACHING

## 2022-04-10 ASSESSMENT — PAIN - FUNCTIONAL ASSESSMENT: PAIN_FUNCTIONAL_ASSESSMENT: ACTIVITIES ARE NOT PREVENTED

## 2022-04-10 ASSESSMENT — PAIN SCALES - GENERAL
PAINLEVEL_OUTOF10: 6
PAINLEVEL_OUTOF10: 2
PAINLEVEL_OUTOF10: 6
PAINLEVEL_OUTOF10: 6

## 2022-04-10 ASSESSMENT — PAIN DESCRIPTION - ORIENTATION: ORIENTATION: LEFT;INNER

## 2022-04-10 ASSESSMENT — PAIN DESCRIPTION - FREQUENCY: FREQUENCY: CONTINUOUS

## 2022-04-10 NOTE — PROGRESS NOTES
Physical Therapy  Facility/Department: [address] [facility]  Treatment Note  NAME: Sonu Conrad  : 1975  MRN: [id number]    Date of Service: 4/10/2022     Evaluating Therapist: Celestine Rodrigues, PT, DPT, [id number]        ROOM: [address] [place] [address]  DIAGNOSIS: Late affect CVA  PRECAUTIONS: falls, L hemiparesis, L neglect, L homonymous hemianopsia  Impulsivity, small area of blistering on dorsum of L great toe. 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 At that time she underwent emergent R hemicraniectomy on 10/13. After being deemed medically appropriate, she was transferred to [facility] on 10/22/2021. She progressed well and was discharged home with necessary equipment and  supervision. Pt returned to ARU 22 following replacement of bone flap.      Social:  Pt lives with mother and daughter in a 1 floor plan 4 steps and 2 rails to enter. Pt moved in with mother following CVA in . / supervision is possible. Prior to admission: Since return home following CVA in , pt was independent with toileting and bathing. Pt did require Supervision with functional mobility. Pt ambulated with SPC/SBQC, owns , was participating in [place].                   Initial Evaluation  Date: 22 AM     Short Term Goals Long Term Goals    Was pt agreeable to Eval/treatment? Yes Yes        Does pt have pain?  No c/o pain No c/o pain        Bed Mobility  Rolling: Noemy  Supine to sit: Noemy  Sit to supine: Noemy  Scooting: Noemy NT  SBA Mod I   Transfers Sit to stand: Noemy  Stand to sit: Noemy  Stand pivot: Noemy with R SPC     5xSTS: TBA  Sit to stand: Noemy  Stand to sit: Noemy  Stand pivot: Noemy with R SPC     5xSTS: TBA   SBA    Supervision   Ambulation  150 feet with R SPC with Noemy     W/c follow? (Y/N): N     10mWT: 0.25 m/s  6mWT: 112 m  (AD: R SPC, Assist level: Noemy) 150 feet with R SPC with Noemy     W/c follow? (Y/N): N     10mWT: 0.25 m/s  6mWT: 80 m  (AD: R SPC, Assist level: Noemy)  >300 feet with AAD with SBA >600 feet with AAD with Supervision     10mWT: >0.4 m/s  6mWT: >175 m   Wheelchair Mobility NA  N/A        Car Transfers Neomy  NT  SBA Supervision   Stair negotiation: ascended and descended 12 steps with 1 rail with Noemy  12 steps with 1 rail with Noemy (reciprocal ascending, nonrecip descending)  12 steps with 1 rail with SBA 20 steps with 1 rail with Supervision   Walking 10 feet on uneven surface 10 feet with R SPC with Noemy  NT  10 feet with AAD with SBA 10 feet with AAD with Supervision   Curb Step:   ascended and descended 4 inch step with R SPC and Noemy NT  4 inch step with AAD and SBA 4 inch step with AAD and Supervision   Picking up object off the floor Picked up cone with Noemy NT  Will  cone with SBA Will  2lb weight with SBA   BLE ROM RLE: WNL  LLE:   Hip flexion: WNL  Knee extension: WNL  Dorsiflexion: lacking ~5 degrees from neutral  Plantarflexion: WNL NT          BLE Strength RLE:  Grossly 5/5  LLE:   Hip flexion: 3/5  Knee extension: 4/5  Dorsiflexion: 0/5  Plantarflexion: 2+/5  NT         Balance  Static and dynamic standing balance Noemy with R SPC      BBS: TBA  Static and dynamic standing balance Noemy with R SPC     BBS: 20/56       BBS: TBD   Date Family Teach Completed    TBD        Is additional Family Teaching Needed? Y or N Y day of discharge  Y        Hindering Progress Balance L hemiparesis Balance L hemiparesis         PT recommended ELOS 10-14 days           Team's Discharge Plan             Therapist at Team Meeting                Therapeutic Exercise:   AM:   Supine hip/knee flexion x50 vs manual resistance  Ambulation: 2x200 feet with SPC with SBA  Ambulation: 2x75 feet with no AD with Noemy    Additional Comments: The pt stated she is discouraged that her knee is not bending as much as she would like, performed supine exercises to train the movement pattern and to show the pt that she has the strength to perform the movement.  The pt was then instructed on proper gait technique and emphasized R foot advancement toward reciprocal pattern. The pt ambulated with no AD to progress functional dynamic standing balance. Patient/family education  Pt/family educated on gait. Patient/family response to education:   Pt/family verbalized understanding Pt/family demonstrated skill Pt/family requires further education in this area   Yes Yes Reinforce      AM  Time in: [id number]  Time out: 0820    Pt is making good progress toward established Physical Therapy goals. Continue with physical therapy current plan of care. Usama Lindsey.  Santy Gee, [place]  License Number: [id number]

## 2022-04-10 NOTE — PROGRESS NOTES
Occupational Therapy  OCCUPATIONAL THERAPY DAILY NOTE    Date:4/10/2022  Patient Name: Kirby Cushing  MRN: [id number]  : 1975  Room: [address] [place] [address]     Referring Practitioner: Matthew Hutchins MD  Diagnosis: late effect CVA, R cranioplasty 3/30/2022  Additional Pertinent Hx: seizures, hx R MCA 10/13/2021  Restrictions: Fall Risk, seizures & impulsive    Functional Assessment:   Date Status AE  Comments   Feeding 22 Minimal Assist      Grooming 4/10/22 SBA/CGA seated Pt used adapted techs for one handed skills to soap/shampoo bottles and deodorant while seated. Pt washed face, hands and hair seated on shower stall bench. Oral Care 4/10/22 SBA seated Pt able to set up toothbrush seated at sink using one handed skills using adaptive techniques. Bathing 4/10/22 UB- SBA/CGA    LB-min A  Shower   LH hose UB skills performed during shower seated on shower bench. .  Pt able to wash LB skills except buttocks needing therapist assist for balance and safety when standing using grab bar due to LLE placement without AFO. UB Dressing 4/10/22 Minimal Assist  Seated  To jesus bra and long sleeve shirt needing cues for proper technique. LB Dressing 4/10/22 Stand by Assist   Pt used BLE crossover method to jesus underwear and shorts with one handed skills due to limited ROM in LUE. Footwear 4/10/22 SBA- R sock & shoe  min A- L sock & shoe  Pt able to jesus R sock & shoe with elastic laces crossing leg up. .  Difficulty with L shoe due to being new however patient able to jesus L AFO without assist and sock crossing up LLE with one handed skills. Toileting 4/10/22 SBA  Performed own hygiene seated on toilet. Homemaking 22 min A  Str. Cane        Functional Transfers / Balance:   Date Status DME  Comments   Sit Balance 4/10/22 Independent/Sup  demo'd up in w/c and on EOB however Supervision on shower stall bench for safety during ADL. Stand Balance 4/10/22 min A sc Standing balance using str.  Cane to enter shower stall along with sit to stand transfers and fxl mobility for safety and balance. [] Tub  [x] Shower   Transfer 4/10/22 Min A  shower stall Bench & sc Pt entered shower stall using str cane needing assist with balance to increase safety. Pt exited by w/c level due to wet floor surface and no AFO on LLE. Pt used grab bar to elevate on/off bench needing assistance with LLE positioning to maintain flexed knee versus leg extended. Gripper sock utilized on L foot in order to stabilize foot on floor when standing. Commode   Transfer 4/10/22 min A sc Pt demo'd x1 LOB turning towards toilet using str. Cane needing therapist assist to prevent fall and difficulty moving LLE around to safely target toilet. Functional   Mobility 4/10/22 Stand by Assist  sc Pt ambulated into bathroom using str. Cane to assist with balance. Other:on/off recliner ,kitchen chair with arm rests and standard queen bed       Sit to stand  4/8/22             4/10/22 SBA             SBA SC            Str. cane         Cues for pacing self during sit to stand transfers to increase balance and safety. Functional Exercises / Activity:  Pt engaged in am ADL's including shower/washing hair to increase independence with UB/LB dressing/bathing, grooming and transfers. See above for details. Sensory / Neuromuscular Re-Education:      Cognitive Skills:   Status Comments   Problem   Solving fair  Demo'd during ADL;s, shower stall and commode transfers, fxl mobility. Memory fair  \"   Sequencing fair + \"   Safety fair  demo'd during turns needing assist and cues. Visual Perception:    Education:  Pt educated on safety during ADL's, shower stall and commode transfers to increase awareness. [] Family teach completed on:    Pain Level: no pain    Additional Notes:       Patient has made good  progress during treatment sessions toward set goals.  Therapy emphasis to obtain goals: ADL retraining, transfers training, functional mobility, therex, endurance/balance retraining, homemaking, PROM/AAROM LUE, neuro recovery, pt/family educaiton      [x] Continue with current OT Plan of care. [] Prepare for Discharge     Goals  Long term goals  Time Frame for Long term goals : 7-10 days to address above problem areas  Long term goal 1: Pt demo independnet to eat all meals  Long term goal 2: Pt demo Mod I grooming standing @ sink level or seated  Long term goal 3: Pt demo Sup bathing @ shower level seated  Long term goal 4: Pt demo Mod I UE dress & Mod I LE dress inculding underwear, pants, socks, L AFO & shoes & demo G- safety & insight  Long term goal 5: Pt demo Mod I commode trf using approrpatie DME to ensure pt safety  Long term goals 6: Pt demo Sup tub trf using appropriate DME to ensure pt safety  Long term goal 7: Pt demo Min A light homemaking activity & demo G- safety & insight  Long term goal 8: Pt demo G endurance for a 30 minute functional activity  Long term goal 9: Pt will tolerate PROM/AAROM LUE & demo improvement using robotic inmotion arm to facilitate pt ability to functionally use her LUE during ADLs & IADLs as evidenced by gains in th efollowing areas from assessment on 4/7/22-  Patient Goals   Patient goals : \"I want to be normal.\" Pt asked to elaborate- \"Be able to cook & clean. \"       DISCHARGE RECOMMENDATIONS  Recommended DME:  Sc, tub bench- pt owns  Post Discharge Care:   []Home Independently  []Home with 24hr Care / Supervision []Home with Partial Supervision []Home with Home Health OT []Home with Out Pt OT []Other: ___   Comments:         Time in Time out Tx Time Breakdown  Variance:   First Session  9:05 am 10:05am [x] Individual Tx- 60  [] Concurrent Tx -  [] Co-Tx -   [] Group Tx -   [] Time Missed -     Second Session   [] Individual Tx-   [] Concurrent Tx -  [] Co-Tx -   [] Group Tx -   [] Time Missed -     Third Session    [] Individual Tx-   [] Concurrent Tx -  [] Co-Tx -   [] Group Tx -   [] Time Missed -         Total Tx Time: 60 mins    Elvira GLOVER/L [address], [address], [address]

## 2022-04-11 PROCEDURE — 97116 GAIT TRAINING THERAPY: CPT

## 2022-04-11 PROCEDURE — 97112 NEUROMUSCULAR REEDUCATION: CPT

## 2022-04-11 PROCEDURE — 1280000000 HC REHAB R&B

## 2022-04-11 PROCEDURE — 97530 THERAPEUTIC ACTIVITIES: CPT

## 2022-04-11 PROCEDURE — 6370000000 HC RX 637 (ALT 250 FOR IP): Performed by: PHYSICAL MEDICINE & REHABILITATION

## 2022-04-11 RX ADMIN — GABAPENTIN 300 MG: 300 CAPSULE ORAL at 20:29

## 2022-04-11 RX ADMIN — BACLOFEN 10 MG: 10 TABLET ORAL at 20:29

## 2022-04-11 RX ADMIN — METOCLOPRAMIDE HYDROCHLORIDE 10 MG: 5 TABLET ORAL at 06:32

## 2022-04-11 RX ADMIN — METOPROLOL TARTRATE 12.5 MG: 25 TABLET, FILM COATED ORAL at 09:11

## 2022-04-11 RX ADMIN — ACETAMINOPHEN 1000 MG: 500 TABLET ORAL at 09:11

## 2022-04-11 RX ADMIN — ACETAMINOPHEN 1000 MG: 500 TABLET ORAL at 14:41

## 2022-04-11 RX ADMIN — BACLOFEN 10 MG: 10 TABLET ORAL at 09:17

## 2022-04-11 RX ADMIN — ACETAMINOPHEN 1000 MG: 500 TABLET ORAL at 20:29

## 2022-04-11 RX ADMIN — Medication 9 MG: at 20:29

## 2022-04-11 RX ADMIN — ATORVASTATIN CALCIUM 40 MG: 40 TABLET, FILM COATED ORAL at 20:29

## 2022-04-11 RX ADMIN — LAMOTRIGINE 50 MG: 25 TABLET ORAL at 09:17

## 2022-04-11 RX ADMIN — METOCLOPRAMIDE HYDROCHLORIDE 10 MG: 5 TABLET ORAL at 11:42

## 2022-04-11 RX ADMIN — ASPIRIN 81 MG 81 MG: 81 TABLET ORAL at 09:16

## 2022-04-11 RX ADMIN — DICLOFENAC SODIUM 2 G: 10 GEL TOPICAL at 20:37

## 2022-04-11 RX ADMIN — LAMOTRIGINE 50 MG: 25 TABLET ORAL at 20:35

## 2022-04-11 RX ADMIN — SENNOSIDES 17.2 MG: 8.6 TABLET, COATED ORAL at 20:29

## 2022-04-11 RX ADMIN — BACLOFEN 10 MG: 10 TABLET ORAL at 14:38

## 2022-04-11 ASSESSMENT — PAIN SCALES - GENERAL
PAINLEVEL_OUTOF10: 5
PAINLEVEL_OUTOF10: 6
PAINLEVEL_OUTOF10: 2
PAINLEVEL_OUTOF10: 6

## 2022-04-11 ASSESSMENT — PAIN DESCRIPTION - FREQUENCY
FREQUENCY: CONTINUOUS
FREQUENCY: CONTINUOUS

## 2022-04-11 ASSESSMENT — PAIN DESCRIPTION - ORIENTATION
ORIENTATION: LEFT
ORIENTATION: LEFT

## 2022-04-11 ASSESSMENT — PAIN DESCRIPTION - LOCATION
LOCATION: HIP;SHOULDER
LOCATION: HIP;SHOULDER

## 2022-04-11 ASSESSMENT — PAIN DESCRIPTION - PROGRESSION: CLINICAL_PROGRESSION: NOT CHANGED

## 2022-04-11 ASSESSMENT — PAIN DESCRIPTION - DESCRIPTORS
DESCRIPTORS: DISCOMFORT;DULL;THROBBING
DESCRIPTORS: DULL;DISCOMFORT;THROBBING

## 2022-04-11 ASSESSMENT — PAIN DESCRIPTION - PAIN TYPE
TYPE: CHRONIC PAIN
TYPE: CHRONIC PAIN

## 2022-04-11 ASSESSMENT — PAIN DESCRIPTION - ONSET
ONSET: ON-GOING
ONSET: ON-GOING

## 2022-04-11 NOTE — PROGRESS NOTES
Occupational Therapy  OCCUPATIONAL THERAPY DAILY NOTE    Date:2022  Patient Name: Brennan Lopez  MRN: [id number]  : 1975  Room: [address] [place], [place] [address]     Referring Practitioner: Estuardo Tony MD  Diagnosis: late effect CVA, R cranioplasty 3/30/2022  Additional Pertinent Hx: seizures, hx R MCA 10/13/2021  Restrictions: Fall Risk, seizures & impulsive    Functional Assessment:   Date Status AE  Comments   Feeding 22  Set up    Pt fed self lunch after set up of tray    Grooming 4/10/22 SBA/CGA seated          Oral Care 4/10/22 SBA seated    Bathing 4/10/22 UB- SBA/CGA    LB-min A  Shower   LH hose    UB Dressing 4/10/22 Minimal Assist  Seated     LB Dressing 4/10/22 Stand by Assist      Footwear 4/10/22 SBA- R sock & shoe  min A- L sock & shoe     Toileting 4/10/22 SBA     Homemaking 22 min A  Str. Cane        Functional Transfers / Balance:   Date Status DME  Comments   Sit Balance 4/10/22 Independent/Sup     Stand Balance 22 min A sc    [] Tub  [x] Shower   Transfer 4/10/22 Min A  shower stall Bench & sc    Commode   Transfer 22 SBA  sc    Functional   Mobility 22  Stand by Assist  sc Back and forth from pt's room to OT clinic    Other:on/off recliner ,kitchen chair with arm rests and standard queen bed       Sit to stand  4/8/22             4/10/22 SBA             SBA SC            Str. cane                  Functional Exercises / Activity:  BUE integration activity: wheel and medium resistive theraputty on table top surface    AAROM exercise to LUE with over the door pulleys with in ace wrap to help maintain grasp in left hand                                          Standing balance/endurance with pt completing emanuel box with 8 # wt 3 sets 10 reps in all planes on table top surface. Pt stood at SBA needing one seated rest break     Sensory / Neuromuscular Re-Education:  Towel stretches to LUE in all planes on table top surface to decrease level of tone and increase AROM   PROM exercises completed to LUE in all planes  Tone reduction with gentle stretch and wt bearing and vibration  to left hand and wrist with fair results  Prolonged stretch to L hand and digits over 5 # wt ball with good results       Cognitive Skills:   Status Comments   Problem   Solving fair  Demo'd during ADL;s, shower stall and commode transfers, fxl mobility. Memory fair  \"   Sequencing fair + \"   Safety fair  demo'd during turns needing assist and cues. Visual Perception:    Education:  Pt was educated on safe functional transfers at [facility] level       [] Family teach completed on:    Pain Level: no pain    Additional Notes:       Patient has made good  progress during treatment sessions toward set goals. Therapy emphasis to obtain goals: ADL retraining, transfers training, functional mobility, therex, endurance/balance retraining, homemaking, PROM/AAROM LUE, neuro recovery, pt/family educaiton      [x] Continue with current OT Plan of care.   [] Prepare for Discharge     Goals  Long term goals  Time Frame for Long term goals : 7-10 days to address above problem areas  Long term goal 1: Pt demo independnet to eat all meals  Long term goal 2: Pt demo Mod I grooming standing @ sink level or seated  Long term goal 3: Pt demo Sup bathing @ shower level seated  Long term goal 4: Pt demo Mod I UE dress & Mod I LE dress inculding underwear, pants, socks, L AFO & shoes & demo G- safety & insight  Long term goal 5: Pt demo Mod I commode trf using approrpatie DME to ensure pt safety  Long term goals 6: Pt demo Sup tub trf using appropriate DME to ensure pt safety  Long term goal 7: Pt demo Min A light homemaking activity & demo G- safety & insight  Long term goal 8: Pt demo G endurance for a 30 minute functional activity  Long term goal 9: Pt will tolerate PROM/AAROM LUE & demo improvement using robotic inmotion arm to facilitate pt ability to functionally use her LUE during ADLs & IADLs as evidenced by gains in th efollowing areas from assessment on 4/7/22-  Patient Goals   Patient goals : \"I want to be normal.\" Pt asked to elaborate- \"Be able to cook & clean. \"       DISCHARGE RECOMMENDATIONS  Recommended DME:  Sc, tub bench- pt owns  Post Discharge Care:   []Home Independently  []Home with 24hr Care / Supervision []Home with Partial Supervision []Home with Home Health OT []Home with Out Pt OT []Other: ___   Comments:         Time in Time out Tx Time Breakdown  Variance:   First Session  [phone] [x] Individual Tx-45  [] Concurrent Tx -  [] Co-Tx -   [] Group Tx -   [] Time Missed -     Second Session [phone] [x] Individual Tx-45   [] Concurrent Tx -  [] Co-Tx -   [] Group Tx -   [] Time Missed -     Third Session    [] Individual Tx-   [] Concurrent Tx -  [] Co-Tx -   [] Group Tx -   [] Time Missed -         Total Tx Time: 90 mins   [employer] OTR/L [id number]

## 2022-04-11 NOTE — PROGRESS NOTES
Physical Therapy    Facility/Department: [address] [facility]    NAME: Hernán Perez  : 1975  MRN: [id number]    Date of Service: 2022    Evaluating Therapist: Gayatri Quiros PT, SAIRA, [id number]      ROOM: [address] [place] [address]  DIAGNOSIS: Late affect CVA  PRECAUTIONS: falls, L hemiparesis, L neglect, L homonymous hemianopsia  Impulsivity, small area of blistering on dorsum of L great toe. 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 At that time she underwent emergent R hemicraniectomy on 10/13. After being deemed medically appropriate, she was transferred to [facility] on 10/22/2021. She progressed well and was discharged home with necessary equipment and  supervision. Pt returned to ARU 22 following replacement of bone flap. Social:  Pt lives with mother and daughter in a 1 floor plan 4 steps and 2 rails to enter. Pt moved in with mother following CVA in .  supervision is possible. Prior to admission: Since return home following CVA in , pt was independent with toileting and bathing. Pt did require Supervision with functional mobility. Pt ambulated with SPC/SBQC, owns , was participating in [facility]. Initial Evaluation  Date: 22 AM     PM    Short Term Goals Long Term Goals    Was pt agreeable to Eval/treatment? Yes yes yes     Does pt have pain?  No c/o pain No c/o pain No c/o     Bed Mobility  Rolling: Noemy  Supine to sit: Noemy  Sit to supine: Noemy  Scooting: Noemy NT N/T SBA Mod I   Transfers Sit to stand: Noemy  Stand to sit: Noemy  Stand pivot: Noemy with R SPC    5xSTS: TBA  Sit<>Stand CGA  Stand-pivot CGA with R SPC Sit<>Stand CGA  Stand-pivot CGA with R SPC SBA   Supervision   Ambulation  150 feet with R SPC with Noemy    W/c follow? (Y/N): N    10mWT: 0.25 m/s  6mWT: 112 m  (AD: R SPC, Assist level: Noemy) 200 feet x 2 reps with R SPC with CGA (L toe off AFO) 200 feet x 2 reps with R SPC with CGA (L toe off AFO) >300 feet with AAD with SBA >600 feet with AAD with Supervision    10mWT: >0.4 m/s  6mWT: >175 m   Wheelchair Mobility NA NT NT     Car Transfers Noemy NT NT SBA Supervision   Stair negotiation: ascended and descended 12 steps with 1 rail with Noemy NT 10 steps with R HR, 1 x 2 reps and 1 x 3 reps with Noemy (reciprocal ascent, non-reciprocal descent) 12 steps with 1 rail with SBA 20 steps with 1 rail with Supervision   Walking 10 feet on uneven surface 10 feet with R SPC with Noemy NT N/T 10 feet with AAD with SBA 10 feet with AAD with Supervision   Curb Step:   ascended and descended 4 inch step with R SPC and Noemy NT N/t 4 inch step with AAD and SBA 4 inch step with AAD and Supervision   Picking up object off the floor Picked up cone with Noemy NT N/T Will  cone with SBA Will  2lb weight with SBA   BLE ROM RLE: WNL  LLE:   Hip flexion: WNL  Knee extension: WNL  Dorsiflexion: lacking ~5 degrees from neutral  Plantarflexion: WNL RLE: WNL  LLE:   Hip flexion: WNL  Knee extension: WNL  Dorsiflexion: lacking ~5 degrees from neutral  Plantarflexion: WNL      BLE Strength RLE:  Grossly 5/5  LLE:   Hip flexion: 3/5  Knee extension: 4/5  Dorsiflexion: 0/5  Plantarflexion: 2+/5 RLE:  Grossly 5/5  LLE:   Hip flexion: 3/5  Knee extension: 4/5  Dorsiflexion: 0/5  Plantarflexion: 2+/5      Balance  Static and dynamic standing balance Noemy with R SPC     BBS: 20/56 (4/8/22) Static and dynamic standing balance CGA with R SPC     BBS: TBD   Date Family Teach Completed        Is additional Family Teaching Needed? Y or N Y day of discharge Y      Hindering Progress Balance L hemiparesis Balance L hemiparesis      PT recommended ELOS 10-14 days       Team's Discharge Plan        Therapist at Team Meeting          General:        HRmax: 177 bpm       Beta blockers (Y/N): Y      Adjusted HRmax: 167 bpm   Blood pressure (mmHg): AM:  - Prior to Tx: 108/62  - Post Tx: 106/58  PM:  - Prior to Tx: 114/60  - Post Tx: 123/71   Time spent in HR ranges:    Moderate intensity (60-70% HRmax): AM: 0:06:19  PM: 0:15:55   High intensity (70-85% HRmax): AM: 0:00:00  PM: 0:00:00        Therapeutic Exercise:   AM: Ambulation without AD and therapist assistance for FW propulsion, 200 feet x 4 reps with Noemy/ModA (cues for increased pace)  Ambulation timed trials with goals to increase speed, 50 feet x 4 reps (40\" x 2 reps, 42\" x 1 rep, 38 seconds x 1 rep)  FW non-reciprocal stepping over 6 SPC on floor using R SPC, x 8 reps with Noemy  PM:  Ambulation with R SPC, L AFO, resisted with small orange flex band around waist, 150 feet x 3 reps and 200 feet x 2 reps with Noemy    Patient education  Pt educated on strategies to improve reciprocal gait training    Patient response to education:   Pt verbalized understanding Pt demonstrated skill Pt requires further education in this area   yes partial yes     Additional Comments: Pt remains pleasant and agreeable to activity however L neglect and impulsivity remain barriers to safe function. Attempted resisted FW walking however pt's deficits in propulsion limit ability to ambulate reciprocally at increased speeds. Attempted assisted walking at higher speeds however pt tends to lean heavily on therapist hand and resist increased speed. Timed trials completed to challenge propulsion with some success. PM session included challenge to propulsion and stance control with resisted FW walking and reciprocal stair negotiation. Pt requires encouragement for simultaneous advancement of LLE/SPC while ambulating. Plan to continue gait training focusing on improving gait speed. AM  Time in: 0915  Time out: 1000    PM  Time in: 1345  Time out: 1430    Pt is making good progress toward established Physical Therapy goals. Continue with physical therapy current plan of care.     Lydia Higgins, PT, DPT  Board Certified Clinical Specialist in Neurologic Physical Therapy  [phone]

## 2022-04-12 PROCEDURE — 99232 SBSQ HOSP IP/OBS MODERATE 35: CPT | Performed by: PHYSICAL MEDICINE & REHABILITATION

## 2022-04-12 PROCEDURE — 6370000000 HC RX 637 (ALT 250 FOR IP): Performed by: PHYSICAL MEDICINE & REHABILITATION

## 2022-04-12 PROCEDURE — 1280000000 HC REHAB R&B

## 2022-04-12 PROCEDURE — 97530 THERAPEUTIC ACTIVITIES: CPT

## 2022-04-12 PROCEDURE — 97112 NEUROMUSCULAR REEDUCATION: CPT

## 2022-04-12 PROCEDURE — 97116 GAIT TRAINING THERAPY: CPT

## 2022-04-12 RX ORDER — NICOTINE 21 MG/24HR
1 PATCH, TRANSDERMAL 24 HOURS TRANSDERMAL DAILY
Status: DISCONTINUED | OUTPATIENT
Start: 2022-04-12 | End: 2022-04-20

## 2022-04-12 RX ORDER — NICOTINE 21 MG/24HR
1 PATCH, TRANSDERMAL 24 HOURS TRANSDERMAL DAILY
Status: DISCONTINUED | OUTPATIENT
Start: 2022-04-13 | End: 2022-04-12

## 2022-04-12 RX ADMIN — BACLOFEN 10 MG: 10 TABLET ORAL at 13:05

## 2022-04-12 RX ADMIN — GABAPENTIN 300 MG: 300 CAPSULE ORAL at 21:12

## 2022-04-12 RX ADMIN — ACETAMINOPHEN 1000 MG: 500 TABLET ORAL at 08:17

## 2022-04-12 RX ADMIN — DICLOFENAC SODIUM 2 G: 10 GEL TOPICAL at 21:11

## 2022-04-12 RX ADMIN — METOPROLOL TARTRATE 12.5 MG: 25 TABLET, FILM COATED ORAL at 14:05

## 2022-04-12 RX ADMIN — SENNOSIDES 17.2 MG: 8.6 TABLET, COATED ORAL at 21:11

## 2022-04-12 RX ADMIN — BACLOFEN 10 MG: 10 TABLET ORAL at 21:12

## 2022-04-12 RX ADMIN — ACETAMINOPHEN 1000 MG: 500 TABLET ORAL at 14:31

## 2022-04-12 RX ADMIN — ATORVASTATIN CALCIUM 40 MG: 40 TABLET, FILM COATED ORAL at 21:12

## 2022-04-12 RX ADMIN — ACETAMINOPHEN 1000 MG: 500 TABLET ORAL at 21:12

## 2022-04-12 RX ADMIN — LAMOTRIGINE 50 MG: 25 TABLET ORAL at 21:11

## 2022-04-12 RX ADMIN — ASPIRIN 81 MG 81 MG: 81 TABLET ORAL at 08:17

## 2022-04-12 RX ADMIN — Medication 9 MG: at 21:11

## 2022-04-12 RX ADMIN — LAMOTRIGINE 50 MG: 25 TABLET ORAL at 08:18

## 2022-04-12 RX ADMIN — BACLOFEN 10 MG: 10 TABLET ORAL at 08:17

## 2022-04-12 ASSESSMENT — PAIN DESCRIPTION - PAIN TYPE
TYPE: CHRONIC PAIN

## 2022-04-12 ASSESSMENT — PAIN DESCRIPTION - ORIENTATION
ORIENTATION: LEFT
ORIENTATION: LEFT

## 2022-04-12 ASSESSMENT — PAIN DESCRIPTION - FREQUENCY
FREQUENCY: CONTINUOUS

## 2022-04-12 ASSESSMENT — PAIN SCALES - GENERAL
PAINLEVEL_OUTOF10: 0
PAINLEVEL_OUTOF10: 6
PAINLEVEL_OUTOF10: 5
PAINLEVEL_OUTOF10: 6
PAINLEVEL_OUTOF10: 1
PAINLEVEL_OUTOF10: 1
PAINLEVEL_OUTOF10: 2

## 2022-04-12 ASSESSMENT — PAIN DESCRIPTION - LOCATION
LOCATION: GENERALIZED

## 2022-04-12 ASSESSMENT — PAIN DESCRIPTION - DESCRIPTORS
DESCRIPTORS: ACHING;DISCOMFORT
DESCRIPTORS: ACHING;DISCOMFORT
DESCRIPTORS: DISCOMFORT
DESCRIPTORS: ACHING;DISCOMFORT

## 2022-04-12 ASSESSMENT — PAIN DESCRIPTION - PROGRESSION
CLINICAL_PROGRESSION: NOT CHANGED

## 2022-04-12 ASSESSMENT — PAIN DESCRIPTION - ONSET
ONSET: ON-GOING

## 2022-04-12 ASSESSMENT — PAIN - FUNCTIONAL ASSESSMENT
PAIN_FUNCTIONAL_ASSESSMENT: ACTIVITIES ARE NOT PREVENTED

## 2022-04-12 NOTE — PROGRESS NOTES
[facility]  Comprehensive Progress Note    Subjective:      Vannesa Can is a 42 y.o. female admitted to inpatient rehabilitation for impairments and acitivities limitations in ADL and mobility secondary to CVA. No acute events overnight. No cp, sob, n/v. Tolerating therapy. No complaints. The patient's medical records have been reviewed. Scheduled Meds:[START ON 4/13/2022] nicotine, 1 patch, Daily  acetaminophen, 1,000 mg, TID  aspirin, 81 mg, Daily  atorvastatin, 40 mg, Nightly  baclofen, 10 mg, TID  vitamin D, 50,000 Units, Weekly  gabapentin, 300 mg, Nightly  lamoTRIgine, 50 mg, BID  melatonin, 9 mg, Nightly  metoprolol tartrate, 12.5 mg, BID  senna, 2 tablet, Nightly      Continuous Infusions:  PRN Meds:acetaminophen, 650 mg, Q6H PRN  diclofenac sodium, 2 g, 4x Daily PRN  ondansetron, 4 mg, Q8H PRN         Objective:      Vitals:    04/11/22 0900 04/11/22 2029 04/12/22 0730 04/12/22 0740   BP: 108/68 95/72 (!) 98/53 (!) 95/50   Pulse: 61 60 63 62   Resp: 17  18 18   Temp: 97.6 °F (36.4 °C)  98.8 °F (37.1 °C) 98.8 °F (37.1 °C)   TempSrc: Oral  Oral Temporal   SpO2:   96% 96%   Weight:       Height:         General appearance: alert,  NAD  Head: Right cranioplasty, incision c/d/i with staples in place  Eyes: conjunctivae/corneas clear. PERRL, EOM's intact.   Lungs: clear to auscultation bilaterally  Heart: regular rate and rhythm, S1, S2 normal  Abdomen: soft, non-tender, normal bowel sounds   Musculoskeletal: Range of motion impaired   Skin: Right cranioplasty, incision c/d/i with staples in place  Neurologic: AAOx4. Speech clear, content appropriate. Follows commands. SILT.  LUE and LLE spasticity, MAS 2     Strength:                     R          L  Deltoid                         5          9  Biceps                         5          0  Triceps                        5          0  Wrist Ext                     5          0  FDP                             5          2  Finger Abd                  5          5  Hip Flex                       5         3-  Knee Ext                     5         4-  Ankle dorsi                  5          9  OJA                             1          8  Ankle Plantar               5          0       Laboratory:    Lab Results   Component Value Date    WBC 7.8 04/07/2022    HGB 12.0 04/07/2022    HCT 37.2 04/07/2022    MCV 95.6 04/07/2022     (H) 04/07/2022     Lab Results   Component Value Date     04/07/2022    K 4.4 04/07/2022     04/07/2022    CO2 22 04/07/2022    BUN 14 04/07/2022    CREATININE 0.7 04/07/2022    GLUCOSE 95 04/07/2022    CALCIUM 9.3 04/07/2022      Lab Results   Component Value Date    ALT 75 (H) 10/23/2021    AST 46 (H) 10/23/2021    ALKPHOS 89 10/23/2021    BILITOT 0.4 10/23/2021       Functional Status:   Physical Therapy:  Bed mobility: Min A  Transfers: CGA  Ambulation: 150 ft R SPC CGA, left AFO     Occupational Therapy:  Feeding: Setup  Grooming: SBA -CGA  UB dressing: Min A  LB dressing: SBA       Assessment/Plan:       42 y. o. female admitted to inpatient rehabilitation for impairments and acitivities limitations in ADL and mobility secondary to CVA.       -History of right MCA CVA (Oct 2021)-- with cerebral edema and midline shift, s/p right hemicraniectomy (Oct 2021-[facility]). Patient completed ARU 10/22/2021-12/7/2021 and was discharged home. She is now s/p right cranioplasty (3/30/3022 - [facility]). Left spastic hemiparesis, left inattention, vision impairment. LUE sling for gait. Left AFO. Continue Aspirin, Lipitor for secondary stroke prevention. Monitor neuro exam. Continue Acute rehab program.   -Pain control: Tylenol for mild pain, headache. Gabapentin for neuropathic pain. Voltaren gel prn.    -Spasticity, LUE/LLE: Continue Baclofen  -History of suspected seizure: Continue Lamictal at home dose.  Patient to follow back up with Neurology after discharge.   -History of intermittent Tachycardia--Seen by Cardiology during prior ARU admission, started on low dose metoprolol. Outpatient event monitor with ?result and Cardiology follow up. Continue home dose metoprolol.   -Tobacco abuse: tobacco cessation education, resources. Nicoderm patch     Reglan was started for post operative nausea and emesis. Symptoms resolved. Will dc Reglan and monitor. PRN zofran available if needed.      Decrease Nicoderm patch to 14mg/24hr      Electronically signed by Jerry Elizalde MD on 4/12/2022 at 11:51 AM

## 2022-04-12 NOTE — PROGRESS NOTES
Occupational Therapy  OCCUPATIONAL THERAPY DAILY NOTE    Date:2022  Patient Name: Marcos Parks  MRN: [id number]  : 1975  Room: [address] [place] [address]     Referring Practitioner: Zunilda Shelton MD  Diagnosis: late effect CVA, R cranioplasty 3/30/2022  Additional Pertinent Hx: seizures, hx R MCA 10/13/2021  Restrictions: Fall Risk, seizures & impulsive    Functional Assessment:   Date Status AE  Comments   Feeding 22  Set up       Grooming 4/10/22 SBA/CGA seated          Oral Care 4/10/22 SBA seated    Bathing 4/10/22 UB- SBA/CGA    LB-min A  Shower   LH hose    UB Dressing 4/10/22 Minimal Assist  Seated     LB Dressing 4/10/22 Stand by Assist      Footwear 4/10/22 SBA- R sock & shoe  min A- L sock & shoe     Toileting 4/10/22 SBA     Homemaking 22 min A  Str. Cane        Functional Transfers / Balance:   Date Status DME  Comments   Sit Balance 4/10/22 Independent/Sup     Stand Balance 22 min A sc    [] Tub  [x] Shower   Transfer 4/10/22 Min A  shower stall Bench & sc    Commode   Transfer 22 SBA  sc    Functional   Mobility 22  Stand by Assist  sc     Other:on/off recliner ,kitchen chair with arm rests and standard queen bed       Sit to stand  4/8/22             4/10/22 SBA             SBA SC            Str. cane                  Functional Exercises / Activity:  AROM exercises to LUE with pt completing arm skate on table top figure eight board. Pt presents with AROM in horizontal abd/add and trace elbow flex on gravity eliminated surface   BUE AROM exercises with emanuel box with 6 # wt 3 sets 10 reps in all planes on table top surface. Pt able to maintain gross grasp in left hand on emanuel box     Sensory / Neuromuscular Re-Education:  Neuro re -ed completed to LUE with pt completing 352 reps on the In Motion Robotic Arm. Pt demonstrated improvement in smoothness, AIM,and movement duration.  Pt states that she feels some increased AROM in shoulder and would like to see more movement in her elbow, wrist and hand. Gentle long stretch completed to left hand to help reduce tone and prevent contractures       Cognitive Skills:   Status Comments   Problem   Solving fair  Demo'd during ADL;s, shower stall and commode transfers, fxl mobility. Memory fair  \"   Sequencing fair + \"   Safety fair  demo'd during turns needing assist and cues. Visual Perception:    Education:  Pt was educated on importance of wearing left resting hand splint at night to help in tone reduction of LUE       [] Family teach completed on:    Pain Level: no pain    Additional Notes:       Patient has made good  progress during treatment sessions toward set goals. Therapy emphasis to obtain goals: ADL retraining, transfers training, functional mobility, therex, endurance/balance retraining, homemaking, PROM/AAROM LUE, neuro recovery, pt/family educaiton      [x] Continue with current OT Plan of care.   [] Prepare for Discharge     Goals  Long term goals  Time Frame for Long term goals : 7-10 days to address above problem areas  Long term goal 1: Pt demo independnet to eat all meals  Long term goal 2: Pt demo Mod I grooming standing @ sink level or seated  Long term goal 3: Pt demo Sup bathing @ shower level seated  Long term goal 4: Pt demo Mod I UE dress & Mod I LE dress inculding underwear, pants, socks, L AFO & shoes & demo G- safety & insight  Long term goal 5: Pt demo Mod I commode trf using approrpatie DME to ensure pt safety  Long term goals 6: Pt demo Sup tub trf using appropriate DME to ensure pt safety  Long term goal 7: Pt demo Min A light homemaking activity & demo G- safety & insight  Long term goal 8: Pt demo G endurance for a 30 minute functional activity  Long term goal 9: Pt will tolerate PROM/AAROM LUE & demo improvement using robotic inmotion arm to facilitate pt ability to functionally use her LUE during ADLs & IADLs as evidenced by gains in th efollowing areas from assessment on 4/7/22-  Patient Goals   Patient goals : \"I want to be normal.\" Pt asked to elaborate- \"Be able to cook & clean. \"       DISCHARGE RECOMMENDATIONS  Recommended DME:  Sc, tub bench- pt owns  Post Discharge Care:   []Home Independently  []Home with 24hr Care / Supervision []Home with Partial Supervision []Home with Home Health OT []Home with Out Pt OT []Other: ___   Comments:         Time in Time out Tx Time Breakdown  Variance:   First Session  [phone] [x] Individual Tx-45  [] Concurrent Tx -  [] Co-Tx -   [] Group Tx -   [] Time Missed -     Second Session [phone] [x] Individual Tx-45   [] Concurrent Tx -  [] Co-Tx -   [] Group Tx -   [] Time Missed -     Third Session    [] Individual Tx-   [] Concurrent Tx -  [] Co-Tx -   [] Group Tx -   [] Time Missed -         Total Tx Time: 90 mins   Paxton Schofield OTR/L [id number]

## 2022-04-12 NOTE — PROGRESS NOTES
Physical Therapy    Facility/Department: [address] REHAB    NAME: Gerhardt Levels  : 1975  MRN: [id number]    Date of Service: 2022  Evaluating Therapist: Tuan Ames PT, SAIRA, [id number]      ROOM: [address] [place] [address]  DIAGNOSIS: Late affect CVA  PRECAUTIONS: falls, L hemiparesis, L neglect, L homonymous hemianopsia  Impulsivity, small area of blistering on dorsum of L great toe. 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 At that time she underwent emergent R hemicraniectomy on 10/13. After being deemed medically appropriate, she was transferred to [facility] on 10/22/2021. She progressed well and was discharged home with necessary equipment and  supervision. Pt returned to ARU 22 following replacement of bone flap. Social:  Pt lives with mother and daughter in a 1 floor plan 4 steps and 2 rails to enter. Pt moved in with mother following CVA in .  supervision is possible. Prior to admission: Since return home following CVA in , pt was independent with toileting and bathing. Pt did require Supervision with functional mobility. Pt ambulated with SPC/SBQC, owns , was participating in [facility]. Initial Evaluation  Date: 22 AM     PM    Short Term Goals Long Term Goals    Was pt agreeable to Eval/treatment? Yes yes yes     Does pt have pain?  No c/o pain No c/o pain No c/o     Bed Mobility  Rolling: Noemy  Supine to sit: Noemy  Sit to supine: Noemy  Scooting: Noemy NT N/T SBA Mod I   Transfers Sit to stand: Noemy  Stand to sit: Noemy  Stand pivot: Noemy with R SPC    5xSTS: TBA  Sit<>Stand CGA  Stand-pivot CGA with R SPC Sit<>Stand CGA  Stand-pivot CGA with R SPC SBA   Supervision   Ambulation  150 feet with R SPC with Noemy    W/c follow? (Y/N): N    10mWT: 0.25 m/s  6mWT: 112 m  (AD: R SPC, Assist level: Noemy) 200 feet x 2 reps with R SPC with CGA (L toe off AFO) 150 feet x 2 reps and 75 feet x 6 reps with R SPC with CGA (L toe off AFO) >300 feet with AAD with SBA >600 feet with AAD with Supervision    10mWT: >0.4 m/s  6mWT: >175 m   Wheelchair Mobility NA NT NT     Car Transfers Noemy NT CGA SBA Supervision   Stair negotiation: ascended and descended 12 steps with 1 rail with Noemy NT NT due to elevated HR 12 steps with 1 rail with SBA 20 steps with 1 rail with Supervision   Walking 10 feet on uneven surface 10 feet with R SPC with Noemy NT N/T 10 feet with AAD with SBA 10 feet with AAD with Supervision   Curb Step:   ascended and descended 4 inch step with R SPC and Noemy NT N/T 4 inch step with AAD and SBA 4 inch step with AAD and Supervision   Picking up object off the floor Picked up cone with Noemy NT N/T Will  cone with SBA Will  2lb weight with SBA   BLE ROM RLE: WNL  LLE:   Hip flexion: WNL  Knee extension: WNL  Dorsiflexion: lacking ~5 degrees from neutral  Plantarflexion: WNL RLE: WNL  LLE:   Hip flexion: WNL  Knee extension: WNL  Dorsiflexion: lacking ~5 degrees from neutral  Plantarflexion: WNL      BLE Strength RLE:  Grossly 5/5  LLE:   Hip flexion: 3/5  Knee extension: 4/5  Dorsiflexion: 0/5  Plantarflexion: 2+/5 RLE:  Grossly 5/5  LLE:   Hip flexion: 3/5  Knee extension: 4/5  Dorsiflexion: 0/5  Plantarflexion: 2+/5      Balance  Static and dynamic standing balance Noemy with R SPC     BBS: 20/56 (4/8/22) Static and dynamic standing balance CGA with R SPC     BBS: TBD   Date Family Teach Completed        Is additional Family Teaching Needed? Y or N Y day of discharge Y      Hindering Progress Balance L hemiparesis Balance L hemiparesis      PT recommended ELOS 10-14 days       Team's Discharge Plan        Therapist at Team Meeting          General:        HRmax: 177 bpm       Beta blockers (Y/N): Y      Adjusted HRmax: 167 bpm   Blood pressure (mmHg): AM:  - Prior to Tx: 95/53  - Post Tx: 111/61  PM:  - During Tx: 124/76   Time spent in HR ranges:    Moderate intensity (60-70% HRmax): AM: 0:18:45  PM: 0:15:12   High intensity (70-85% HRmax): AM: 0:10:11  PM: 0:19:58      Therapeutic Exercise:   AM: Ambulation with TENA walker, assited FW propulsion by therapist, 200 feet x 6 reps with ModA  Resisted FW ambulation with R SPC, small red flex band resistance around waist, 200 feet x 2 reps with Noemy  Timed ambulation 50' x 4 reps with R SPC with SBA (40\", 38\", 37\", 36\")  PM: NA    Patient education  Pt educated on safe technique with approach to sit in chair and functional implications    Patient response to education:   Pt verbalized understanding Pt demonstrated skill Pt requires further education in this area   yes partial yes     Additional Comments: Pt remains pleasant and agreeable to activity progression. Pt remains impulsive with L neglect while poses safety concerns during sessions. Pt is aware of these deficits but does not always compensate for them leading to increased risk of falls. TENA walker training, timed trials, and resisted walking trials all progressed in effort to progress pt's gait speed from slow, non-reciprocal pattern to faster more reciprocal pattern. Pt continues to exhibit good potential for improved safety, balance, and walking speed which will improve pt's participation in life roles and improve overall quality of life upon discharge. Pt's resting HR noted to be elevated upon initiation of PM session. With basic walking tasks, pt's HR elevated to 140-150 bpm, with pt reporting no complaints. RN immediately notified and was present to administer prescribed beta-blocker, which was held earlier today. HR remained easily elevated to nearly unsafe ranges for the remainder of the session. Session therefore consisted of short distance interval walking trials with seated rest breaks provided when HR neared unsafe ranges. Pt denied complaints and remained asymptomatic throughout and following session. Plan to continue gait training within safe HR ranges.        AM  Time in: 0915  Time out: 1000    PM  Time in: 1345  Time out:  1430    Pt is making good progress toward established Physical Therapy goals. Continue with physical therapy current plan of care.     Damian Elliott, PT, DPT  Board Certified Clinical Specialist in Neurologic Physical Therapy  [id number]

## 2022-04-12 NOTE — PROGRESS NOTES
Physical Therapy  Facility/Department: [address] REHAB  Weekly Team Note    NAME: Eva Trevizo  : 1975  MRN: [id number]    Date of Service: 2022  Evaluating Therapist: Hanane Oleary PT, DPT, [id number]      ROOM: [address] [place] [address]  DIAGNOSIS: Late affect CVA  PRECAUTIONS: falls, L hemiparesis, L neglect, L homonymous hemianopsia  Impulsivity, Custom L AFO small area of blistering on dorsum of L great toe. 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 At that time she underwent emergent R hemicraniectomy on 10/13. After being deemed medically appropriate, she was transferred to [facility] on 10/22/2021. She progressed well and was discharged home with necessary equipment and  supervision. Pt returned to ARU 22 following replacement of bone flap. Social:  Pt lives with mother and daughter in a 1 floor plan 4 steps and 2 rails to enter. Pt moved in with mother following CVA in . 24/ supervision is possible. Prior to admission: Since return home following CVA in , pt was independent with toileting and bathing. Pt did require Supervision with functional mobility. Pt ambulated with SPC/SBQC, owns , was participating in [facility]. Initial Evaluation  Date: 22    Comments Short Term Goals Long Term Goals    Was pt agreeable to Eval/treatment? Yes yes      Does pt have pain?  No c/o pain No c/o pain      Bed Mobility  Rolling: Liv  Supine to sit: Liv  Sit to supine: Liv  Scooting: Liv Supine<>Sit SBA  Scooting/Rolling SBA  SBA Mod I   Transfers Sit to stand: Liv  Stand to sit: Liv  Stand pivot: Liv with R SPC    5xSTS: TBA  Sit<>Stand CGA  Stand-pivot CGA with R SPC  cues for safety with transfers and approach to chair SBA   Supervision   Ambulation  150 feet with R SPC with Liv    W/c follow? (Y/N): N    10mWT: 0.25 m/s  6mWT: 112 m  (AD: R SPC, Assist level: Liv) 200 feet x 2 reps with R SPC with CGA (L toe off AFO) Directional cues required due to L neglect/visual deficits    Gait speed is slow resulting in poor performance on walking tests, pt has good potential to improve    AFO had previously caused small area of blister on dorsum of toe, family has since brought in a bigger pair of shoes to correct >300 feet with AAD with SBA >600 feet with AAD with Supervision    10mWT: >0.4 m/s  6mWT: >175 m   Wheelchair Mobility NA NA      Car Transfers Noemy CGA  SBA Supervision   Stair negotiation: ascended and descended 12 steps with 1 rail with Noemy 12 steps with 1 HR with CGA Cues for safety strategies 12 steps with 1 rail with SBA 20 steps with 1 rail with Supervision   BLE ROM RLE: WNL  LLE:   Hip flexion: WNL  Knee extension: WNL  Dorsiflexion: lacking ~5 degrees from neutral  Plantarflexion: WNL RLE: WNL  LLE:   Hip flexion: WNL  Knee extension: WNL  Dorsiflexion: lacking ~5 degrees from neutral  Plantarflexion: WNL      BLE Strength RLE:  Grossly 5/5  LLE:   Hip flexion: 3/5  Knee extension: 4/5  Dorsiflexion: 0/5  Plantarflexion: 2+/5 RLE:  Grossly 5/5  LLE:   Hip flexion: 3/5  Knee extension: 4/5  Dorsiflexion: 0/5  Plantarflexion: 2+/5      Balance  Static and dynamic standing balance Noemy with R SPC     BBS: 20/56 (4/8/22) Static and dynamic standing balance CGA with R SPC    BBS: 20/56 (4/8/22) BBS reflects poor balance, pt has good potential to improve    BBS: TBD   Date Family Teach Completed        Is additional Family Teaching Needed? Y or N Y day of discharge Y Day of discharge     Hindering Progress Balance L hemiparesis Balance L hemiparesis      PT recommended ELOS 2 weeks 10-14 days      Team's Discharge Plan  10-14 days      Therapist at [facility], Lexii Jett PT, DPT [id number]          Date:  4/12/22  Supporting factors:  Pt has supportive family, is motivated, has appropriate home setup and all necessary equipment. Barriers to discharge:  Poor safety, visual/spatial neglect, L hemiparesis.    Additional comments:  Walking and balance deficits lead to poor participation in life roles, pt has potential to improve QOL with recommended LOS  DME:  NA, pt has all necessary equipment  After Care:  OPPT, continued supervision from family    Arpan Abad PT, DPT  Board Certified Clinical Specialist in Neurologic Physical Therapy  [id number]

## 2022-04-12 NOTE — PROGRESS NOTES
Called Kendy from [facility] regarding patietns appointment to have staples removed.  Voice mail left asking for return call for orders to remove staples

## 2022-04-13 PROCEDURE — 6370000000 HC RX 637 (ALT 250 FOR IP): Performed by: PHYSICAL MEDICINE & REHABILITATION

## 2022-04-13 PROCEDURE — 97032 APPL MODALITY 1+ESTIM EA 15: CPT

## 2022-04-13 PROCEDURE — 97112 NEUROMUSCULAR REEDUCATION: CPT

## 2022-04-13 PROCEDURE — 99232 SBSQ HOSP IP/OBS MODERATE 35: CPT | Performed by: PHYSICAL MEDICINE & REHABILITATION

## 2022-04-13 PROCEDURE — 97535 SELF CARE MNGMENT TRAINING: CPT

## 2022-04-13 PROCEDURE — 1280000000 HC REHAB R&B

## 2022-04-13 PROCEDURE — 97116 GAIT TRAINING THERAPY: CPT

## 2022-04-13 RX ADMIN — LAMOTRIGINE 50 MG: 25 TABLET ORAL at 21:30

## 2022-04-13 RX ADMIN — SENNOSIDES 17.2 MG: 8.6 TABLET, COATED ORAL at 21:30

## 2022-04-13 RX ADMIN — DICLOFENAC SODIUM 2 G: 10 GEL TOPICAL at 21:32

## 2022-04-13 RX ADMIN — BACLOFEN 10 MG: 10 TABLET ORAL at 21:30

## 2022-04-13 RX ADMIN — LAMOTRIGINE 50 MG: 25 TABLET ORAL at 09:07

## 2022-04-13 RX ADMIN — ACETAMINOPHEN 1000 MG: 500 TABLET ORAL at 21:30

## 2022-04-13 RX ADMIN — ASPIRIN 81 MG 81 MG: 81 TABLET ORAL at 09:06

## 2022-04-13 RX ADMIN — Medication 9 MG: at 21:30

## 2022-04-13 RX ADMIN — ACETAMINOPHEN 1000 MG: 500 TABLET ORAL at 09:05

## 2022-04-13 RX ADMIN — GABAPENTIN 300 MG: 300 CAPSULE ORAL at 21:30

## 2022-04-13 RX ADMIN — ACETAMINOPHEN 1000 MG: 500 TABLET ORAL at 13:04

## 2022-04-13 RX ADMIN — ATORVASTATIN CALCIUM 40 MG: 40 TABLET, FILM COATED ORAL at 21:30

## 2022-04-13 RX ADMIN — BACLOFEN 10 MG: 10 TABLET ORAL at 13:04

## 2022-04-13 RX ADMIN — BACLOFEN 10 MG: 10 TABLET ORAL at 09:06

## 2022-04-13 RX ADMIN — METOPROLOL TARTRATE 12.5 MG: 25 TABLET, FILM COATED ORAL at 09:07

## 2022-04-13 ASSESSMENT — PAIN DESCRIPTION - DESCRIPTORS: DESCRIPTORS: ACHING;CONSTANT;DISCOMFORT

## 2022-04-13 ASSESSMENT — PAIN DESCRIPTION - LOCATION: LOCATION: HIP

## 2022-04-13 ASSESSMENT — PAIN DESCRIPTION - ORIENTATION: ORIENTATION: LEFT

## 2022-04-13 ASSESSMENT — PAIN SCALES - GENERAL
PAINLEVEL_OUTOF10: 7
PAINLEVEL_OUTOF10: 6
PAINLEVEL_OUTOF10: 3

## 2022-04-13 ASSESSMENT — PAIN DESCRIPTION - PAIN TYPE: TYPE: ACUTE PAIN;CHRONIC PAIN

## 2022-04-13 NOTE — PATIENT CARE CONFERENCE
level: min assist  Short term homemaking goal: min assist  Long term homemaking goal: min assist    Upper body dressing:  Current level: min assist, help with sling to left upper  Short term upper body dressing goal: supervision  Long term upper body dressing goal: Modified independent    Lower body dressing:                                                                          Current level: standby assist             Short term lower body dressing goal: supervision          Long term lower body dressing goal: Modified independent            Footwear  Current level: min assist, help with brace  Short term goal: supervision  Long term goal:Modified independent      Toilet transfer:   Current level: standby assist-Contact guard assist  Short term toilet transfer goal: standby assist  Long term toilet transfer goal: Modified independent      Other comments: InMotion arm to left upper, will try e-stim to left hand to prevent contracture      SPEECH THERAPY-evaluated by speech on admission, within functional levels and not picked up        Patient/family's personal goals: \"walk better and dress myself\"  Factors supporting goal achievement:  prior rehab experience, motivated, making gains  Factors hindering goal achievement:  left hemiplegia, impulsive      Discharge Plan   Estimated Length of Stay: 10-14 days            Destination: home  Services at Discharge: to be assessed  Equipment at Discharge: to be assessed      INTERDISCIPLINARY TEAM/PHYSICIAN [address] [place] [address] [address]:  continue working towards goals, start e-stim to left hand      I approve the established interdisciplinary plan of care as documented within the medical record of Eva Thomasing. Electronically signed by Greyson Bustos RN  on 4/13/2022 at 8:40 AM  The following interdisciplinary team members were present:  SJ Jon.  SAIRA Pineda Louis Guillory MA CCC-SLP

## 2022-04-13 NOTE — PROGRESS NOTES
[facility] Physical Medicine and Rehabilitation  Comprehensive Progress Note    Subjective:      Shereen Roach is a 42 y.o. female admitted to inpatient rehabilitation for impairments and acitivities limitations in ADL and mobility secondary to CVA. No acute events overnight. No cp, sob, n/v. Pain controlled. Tolerating therapy. The patient's medical records have been reviewed. Scheduled Meds:nicotine, 1 patch, Daily  acetaminophen, 1,000 mg, TID  aspirin, 81 mg, Daily  atorvastatin, 40 mg, Nightly  baclofen, 10 mg, TID  vitamin D, 50,000 Units, Weekly  gabapentin, 300 mg, Nightly  lamoTRIgine, 50 mg, BID  melatonin, 9 mg, Nightly  metoprolol tartrate, 12.5 mg, BID  senna, 2 tablet, Nightly      Continuous Infusions:  PRN Meds:acetaminophen, 650 mg, Q6H PRN  diclofenac sodium, 2 g, 4x Daily PRN  ondansetron, 4 mg, Q8H PRN         Objective:      Vitals:    04/12/22 1405 04/12/22 2112 04/13/22 0736 04/13/22 1151   BP: 123/73 90/62 116/62    Pulse: 140 87 71    Resp:   18    Temp:   98.1 °F (36.7 °C)    TempSrc:   Temporal    SpO2:   98%    Weight:       Height:    5' 6\" (1.676 m)     General appearance: alert,  NAD  Head: Right cranioplasty, incision c/d/i with staples in place  Eyes: conjunctivae/corneas clear. PERRL, EOM's intact.   Lungs: clear to auscultation bilaterally  Heart: regular rate and rhythm, S1, S2 normal  Abdomen: soft, non-tender, normal bowel sounds   Musculoskeletal: Range of motion impaired   Skin: Right cranioplasty, incision c/d/i with staples in place  Neurologic: AAOx4. Speech clear, content appropriate. Follows commands. SILT.  LUE and LLE spasticity, MAS 2     Strength:                     R          L  Deltoid                         5          7  Biceps                         5          0  Triceps                        5          0  Wrist Ext                     5          9  FDP                             5          2  Finger Abd                  5          0  Hip Flex                       5         3-  Knee Ext                     5         4-  Ankle dorsi                  5          3  UEW                             2          9  Ankle Plantar               5          0    Exam stable    Laboratory:    Lab Results   Component Value Date    WBC 7.8 04/07/2022    HGB 12.0 04/07/2022    HCT 37.2 04/07/2022    MCV 95.6 04/07/2022     (H) 04/07/2022     Lab Results   Component Value Date     04/07/2022    K 4.4 04/07/2022     04/07/2022    CO2 22 04/07/2022    BUN 14 04/07/2022    CREATININE 0.7 04/07/2022    GLUCOSE 95 04/07/2022    CALCIUM 9.3 04/07/2022      Lab Results   Component Value Date    ALT 75 (H) 10/23/2021    AST 46 (H) 10/23/2021    ALKPHOS 89 10/23/2021    BILITOT 0.4 10/23/2021       Functional Status:   Physical Therapy:  Bed mobility: Min A  Transfers: CGA  Ambulation: 150 ft R SPC CGA, left AFO     Occupational Therapy:  Feeding: Setup  Grooming: SBA -CGA  UB dressing: Min A  LB dressing: SBA       Assessment/Plan:       42 y. o. female admitted to inpatient rehabilitation for impairments and acitivities limitations in ADL and mobility secondary to CVA.       -History of right MCA CVA (Oct 2021)-- with cerebral edema and midline shift, s/p right hemicraniectomy (Oct 2021-[facility]). Patient completed ARU 10/22/2021-12/7/2021 and was discharged home. She is now s/p right cranioplasty (3/30/3022 - [facility]). Left spastic hemiparesis, left inattention, vision impairment. LUE sling for gait. Left AFO. Continue Aspirin, Lipitor for secondary stroke prevention. Monitor neuro exam. Continue Acute rehab program.   -Pain control: Tylenol for mild pain, headache. Gabapentin for neuropathic pain. Voltaren gel prn.    -Spasticity, LUE/LLE: Continue Baclofen  -History of suspected seizure: Continue Lamictal at home dose.  Patient to follow back up with Neurology after discharge.   -History of intermittent Tachycardia--Seen by Cardiology during prior ARU

## 2022-04-13 NOTE — PROGRESS NOTES
evidenced by wounds (s/p replacement of bone flap)      Nutrition Interventions:   Food and/or Nutrient Delivery:  Continue Current Diet,Start Oral Nutrition Supplement (Ensure HP once daily)  Nutrition Education/Counseling:  Education not indicated   Coordination of Nutrition Care:  Continue to monitor while inpatient    Goals:  Pt. to consume >75% meals/ONS       Nutrition Monitoring and Evaluation:   Behavioral-Environmental Outcomes:  None Identified   Food/Nutrient Intake Outcomes:  Food and Nutrient Intake,Supplement Intake  Physical Signs/Symptoms Outcomes:  Biochemical Data,GI Status,Fluid Status or Edema,Hemodynamic Status,Meal Time Behavior,Nutrition Focused Physical Findings,Skin,Weight     Discharge Planning:    No discharge needs at this time     Electronically signed by Heidi Narvaez RD on 4/13/22 at 12:03 PM EDT    Contact: ext [phone]

## 2022-04-13 NOTE — PROGRESS NOTES
Spoke with Nurse from [facility] ACUTE PSYCH UNIT has an appointment with them on April 26th at 1400.  Dr Florinda Narayanan made aware of this appointment for staple removal

## 2022-04-13 NOTE — PROGRESS NOTES
THERAPEUTIC RECREATION PROGRESS NOTE     Task/Intervention: [] Cards  [] Crafts  [] Crocheting  [] Fishing  [] Floral arranging  [x] Games  [] Golfing   [] Homemaking  [] Hunting  [] Horticulture  [] Mechanical  [] Azzie Page working  [] [employer]  [] Other________ Time/Duration: _40__Mins  Comments: CTRS met with pt to play connect four. Pt used cognitive skills/strategy to get 4 checkers in a row against opponent (CTRS). Pt used fine motor skills to  1  from table and drop into game board. Pt demonstrated ability to use strategy to block opponent's attempts to connect 4. Pt was able to scan entire game board. Pt then completed ispy worksheets to continue to practice her scanning skills. Leisure Awareness: Identified the following as being benefits to leisure involvement. [x] Competition  [] Creativity  [] Concentration  [x] Enjoyment   [] Exercise  [x] Fun  [] Hand/eye Coordination  [] Increase confidence  [] Learning a new skill  [] Relaxation  [] Social Interaction  [] Supporting one another  [] Thinking  [] Other:___ Comments: _____   Affect: [] Appropriate  [] Bizarre  [] Blunted  [x] Bright  [] Flat  [] Labile  [] Superficial  [] Tearful Comments: _____   Self Esteem:   Identified the following as being personal positive qualities [] Ambitious  [] Appreciative  [] Caring  [] Courteous  [] Considerate  [] Compassionate  [] Creative  [] Dependable   [] Generous  [] Honest  [] Hard working  [] Helpful  [] Kind  [] [place]   [] [place]  [] [employer]  [] Patient  [] Polite  [] Respectful  [] Sociable  [] Sense of humor  [] Thoughtful  [] Other: ___________ Comments: _____   Social Interaction: [x] Independent  [] Modified Independent  [] Supervision  [] Minimal Assistance  [] Moderate Assistance  [] Maximal Assistance  [] Total Assistance  [] Other: ______________ Comments: _____   Drug & Alcohol:  Indicated the following as alternatives for leisure and how to recreate without drugs and alcohol.    [] Cards  [] Crafts  [] Crocheting  [] Fishing  [] Floral arranging  [] Games  [] Golfing   [] Homemaking  [] Hunting  [] Horticulture  [] Mechanical  [] Delorse Albino working  [] [employer]  [] Other________ Comments: _____   Financial: Listed _____ as being specific low cost activities to be utilized after D/C. Comments: _____   Com. Resource Education: Listed _____ community resources to include type of services, address and phone.  Comments: _____   Yonathan Rosario

## 2022-04-13 NOTE — CARE COORDINATION
Per Team: ELOS: Re-evaluate next week ( 10-14 days). Pt and Mom have been updated. No IV-ABX, O2, or Insulin use. LTG: Supervision to Mod I. Pt has a slow gait speed. Pt is using the in-motion arm. Diet: Regular/Thin. Mom was notified that once discharge is determined, pt should return home with her initially. Mom and pt were in agreement. DME: Pt has a Wheel Chair, Qcane, Bedside Commode, Shower Chair.     Aftercare: TBD    FT: PETER Gillespie Intern  [place], [place]

## 2022-04-13 NOTE — PROGRESS NOTES
Physical Therapy    [place] [facility] [place] [facility]    NAME: Kirby Cushing  : 1975  MRN: [id number]    Date of Service: 2022  Evaluating Therapist: Jessica Tyson PT, RADHA [id number]      ROOM: [address] [place] [address]  DIAGNOSIS: Late affect CVA  PRECAUTIONS: falls, L hemiparesis, L neglect, L homonymous hemianopsia  Impulsivity, small area of blistering on dorsum of L great toe. 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 At that time she underwent emergent R hemicraniectomy on 10/13. After being deemed medically appropriate, she was transferred to [facility] on 10/22/2021. She progressed well and was discharged home with necessary equipment and  supervision. Pt returned to ARU 22 following replacement of bone flap. Social:  Pt lives with mother and daughter in a 1 floor plan 4 steps and 2 rails to enter. Pt moved in with mother following CVA in .  supervision is possible. Prior to admission: Since return home following CVA in , pt was independent with toileting and bathing. Pt did require Supervision with functional mobility. Pt ambulated with SPC/SBQC, owns , was participating in [facility]. Initial Evaluation  Date: 22 AM     PM    Short Term Goals Long Term Goals    Was pt agreeable to Eval/treatment? Yes yes yes     Does pt have pain?  No c/o pain No c/o pain No c/o     Bed Mobility  Rolling: Noemy  Supine to sit: Noemy  Sit to supine: Noemy  Scooting: Noemy NT N/T SBA Mod I   Transfers Sit to stand: Noemy  Stand to sit: Noemy  Stand pivot: Noemy with R SPC    5xSTS: TBA  Sit<>Stand CGA  Stand-pivot CGA with R SPC Sit<>Stand CGA  Stand-pivot CGA with R SPC SBA   Supervision   Ambulation  150 feet with R SPC with Noemy    W/c follow? (Y/N): N    10mWT: 0.25 m/s  6mWT: 112 m  (AD: R SPC, Assist level: Noemy) 200 feet x 2 reps with R SPC with CGA (L toe off AFO) 225  with R SPC with CGA (L toe off AFO) >300 feet with AAD with SBA >600 feet with AAD with Supervision    10mWT: >0.4 m/s  6mWT: >175 m   Wheelchair Mobility NA NT NT     Car Transfers Noemy NT NT SBA Supervision   Stair negotiation: ascended and descended 12 steps with 1 rail with Noemy 12 steps with RUE support on HR with CGA (non reciprocal, ascending with LLE, descending with LLE) 10 steps x 1 rep and 20 steps x 3 reps with RUE support on HR with CGA (non-reciprocal ascent/descent leading with LLE) 12 steps with 1 rail with SBA 20 steps with 1 rail with Supervision   Walking 10 feet on uneven surface 10 feet with R SPC with Noemy NT N/T 10 feet with AAD with SBA 10 feet with AAD with Supervision   Curb Step:   ascended and descended 4 inch step with R SPC and Noemy NT N/T 4 inch step with AAD and SBA 4 inch step with AAD and Supervision   Picking up object off the floor Picked up cone with Noemy NT N/T Will  cone with SBA Will  2lb weight with SBA   BLE ROM RLE: WNL  LLE:   Hip flexion: WNL  Knee extension: WNL  Dorsiflexion: lacking ~5 degrees from neutral  Plantarflexion: WNL RLE: WNL  LLE:   Hip flexion: WNL  Knee extension: WNL  Dorsiflexion: lacking ~5 degrees from neutral  Plantarflexion: WNL      BLE Strength RLE:  Grossly 5/5  LLE:   Hip flexion: 3/5  Knee extension: 4/5  Dorsiflexion: 0/5  Plantarflexion: 2+/5 RLE:  Grossly 5/5  LLE:   Hip flexion: 3/5  Knee extension: 4/5  Dorsiflexion: 0/5  Plantarflexion: 2+/5      Balance  Static and dynamic standing balance Noemy with R SPC     BBS: 20/56 (4/8/22) Static and dynamic standing balance CGA with R SPC     BBS: TBD   Date Family Teach Completed        Is additional Family Teaching Needed?   Y or N Y day of discharge Y      Hindering Progress Balance L hemiparesis Balance L hemiparesis      PT recommended ELOS 10-14 days       Team's Discharge Plan        Therapist at Team Meeting          General:        HRmax: 177 bpm       Beta blockers (Y/N): Y      Adjusted HRmax: 167 bpm   Blood pressure (mmHg): AM:  - Prior to Tx: 116/72  - Post Tx: 99/62  PM:  - Prior to Tx: 105/62  - Post Tx: 108/63   Time spent in HR ranges: Moderate intensity (60-70% HRmax): AM: 0:13:00  PM: 0:02:29   High intensity (70-85% HRmax): AM: 0:00:00  PM: 0:00:00      Therapeutic Exercise:   AM: BW walking with R SPC, 50 feet x 1 rep, 50 feet x 1 rep with 7.5lb ankle weight LLE with Noemy  L side stepping with R SPC, 40 feet x 1 rep, 40 feet x 1 rep with 7.5lb ankle weight LLE with Noemy  Ambulation timed trials with cues to increase speed, 50 feet x 6 reps with CGA (34\" x 3, 37\", 31\", 33\")  PM: Ambulation with red flex band resistance around waist, 7.5lb ankle weight RLE, 200 feet x 1 rep with M Marianela    Patient education  Pt educated on safe technique with approach to sit in chair and functional implications    Patient response to education:   Pt verbalized understanding Pt demonstrated skill Pt requires further education in this area   yes partial yes     Additional Comments: Pt remains pleasant and motivated during sessions, safety remains limited by L visual field deficits and L visual/spatial neglect. Pt exhibits objective improvements in gait speed based on timed trials. Pt requires encouragement to avoid closely following wall on R as well as for simultaneous advancement of SPC and LLE. R foot inspected, Still exhibiting some redness at dorsum of L foot although it appears to be improving following acquisition of larger shoes. Mass movement patterns of LLE persist, obstacles and stair negotiation tasks utilized to promote fractionation of movement. PM session included progression of stair training to address limb advancement and propulsion deficits. Pt exhibiting improved consistency of LLE placement with stair ascent. HR relatively blunted in PM, likely due to medication, however pt reports RPE of 16 (hard/very hard) and exhibits objective signs of exertion consistently throughout session. Plan to continue gait training focusing on improving propulsion and limb advancement.      AM  Time in: 0915  Time out: 1000    PM  Time in: 1345  Time out:  1430    Pt is making good progress toward established Physical Therapy goals. Continue with physical therapy current plan of care.     Natasha Greene, PT, DPT  Board Certified Clinical Specialist in Neurologic Physical Therapy  [id number]

## 2022-04-13 NOTE — PROGRESS NOTES
Occupational Therapy  OCCUPATIONAL THERAPY DAILY NOTE    Date:2022  Patient Name: Matthew Ferris  MRN: [id number]  : 1975  Room: [address] [place], [place] [address]     Referring Practitioner: Layne Gomez MD  Diagnosis: late effect CVA, R cranioplasty 3/30/2022  Additional Pertinent Hx: seizures, hx R MCA 10/13/2021  Restrictions: Fall Risk, seizures & impulsive    Functional Assessment:   Date Status AE  Comments   Feeding 22  Set up    Pt sitting upright in chair eating of breakfast meal, able to hold utensil/cup, retrieve food and bring to mouth   Grooming 22 SBA seated Pt washed face, applied deodorant, blow dried hair seated in w/c at sink         Oral Care 22 Setup seated Pt brushed teeth seated in w/c at sink   Bathing 22 UB- SBA    LB-min A  Shower   LH hose Pt completed showering task seated/standing, with pt able to wash of UB,LB and malvin area with assistance to stand with use of grab bar and wash of buttocks   UB Dressing 22 modA Seated  Assistance to jesus bra, thread and fasten. Pt requiring assistance to thread shirt over L arm, with pt able to pull over head. Per pt, has assistance with this at home prior to arrival   LB Dressing 22 Melisa  Pt donned underwear and pants, able to thread, with assistance to stand and pull over hips   Footwear 22 SBA- R sock & shoe  min A- L sock & shoe  Pt donned socks using cross over technique. Pt able to jesus brace to L foot. Assistance to [employer] with elastic laces   Toileting 22 CGA  Pt completed toileting task raised commode, pt able to complete of hygiene, manage of clothes and stand with use of cane   Homemaking 22 min A  Str.  Cane        Functional Transfers / Balance:   Date Status DME  Comments   Sit Balance 22 Independent/Sup  Pt sat unsupported on shower bench and upright in w/c   Stand Balance 22 min A sc Pt stood during transfers and ADL tasks   [] Tub  [x] Shower   Transfer 22 Min A  shower stall Bench & sc Walk in shower transfer with use of cane, with pt then grasping of grab bar once inside and sitting on shower bench   Commode   Transfer 4/13/22 CGA  sc Raised toilet transfer with use of hand rail to RUE   Functional   Mobility 4/13/22  SBA/CGA sc Pt ambulated short distance in bathroom Commode<>Shower with cane   Other:on/off recliner ,kitchen chair with arm rests and standard queen bed       Sit to stand  4/8/22             4/10/22 SBA             SBA SC            Str. cane                  Functional Exercises / Activity:      Sensory / Neuromuscular Re-Education:  PROM exercises completed to LUE in all planes to prevent contracture  Functional e stim completed to L wrist and hand with focus on extension. Duty cycle 10/20 and intensity of 45 with good results       Cognitive Skills:   Status Comments   Problem   Solving fair  Demo'd during ADL;s, shower stall and commode transfers, fxl mobility. Memory fair  \"   Sequencing fair + \"   Safety fair  demo'd during turns needing assist and cues. Visual Perception:    Education:  Pt was educated on safety with transfers, techniques to assist with LB dressing/bathing tasks       [] Family teach completed on:    Pain Level: no pain    Additional Notes:       Patient has made good  progress during treatment sessions toward set goals. Therapy emphasis to obtain goals: ADL retraining, transfers training, functional mobility, therex, endurance/balance retraining, homemaking, PROM/AAROM LUE, neuro recovery, pt/family educaiton      [x] Continue with current OT Plan of care.   [] Prepare for Discharge     Goals  Long term goals  Time Frame for Long term goals : 7-10 days to address above problem areas  Long term goal 1: Pt demo independnet to eat all meals  Long term goal 2: Pt demo Mod I grooming standing @ sink level or seated  Long term goal 3: Pt demo Sup bathing @ shower level seated  Long term goal 4: Pt demo Mod I UE dress & Mod I LE dress inculding underwear, pants, socks, L AFO & shoes & demo G- safety & insight  Long term goal 5: Pt demo Mod I commode trf using approrpatie DME to ensure pt safety  Long term goals 6: Pt demo Sup tub trf using appropriate DME to ensure pt safety  Long term goal 7: Pt demo Min A light homemaking activity & demo G- safety & insight  Long term goal 8: Pt demo G endurance for a 30 minute functional activity  Long term goal 9: Pt will tolerate PROM/AAROM LUE & demo improvement using robotic inmotion arm to facilitate pt ability to functionally use her LUE during ADLs & IADLs as evidenced by gains in th efollowing areas from assessment on 4/7/22-  Patient Goals   Patient goals : \"I want to be normal.\" Pt asked to elaborate- \"Be able to cook & clean. \"    4/13/22 OT plan of care updated on this date. Tentative length of stay is 10-14 days. Dharmesh Hamlin OTR/L [id number]       DISCHARGE RECOMMENDATIONS  Recommended DME:  Sc, tub bench- pt owns  Post Discharge Care:   []Home Independently  [x]Home with 24hr Care / Supervision []Home with Partial Supervision []Home with Home Health OT []Home with Out Pt OT []Other: ___   Comments:         Time in Time out Tx Time Breakdown  Variance:   First Session  7:50am  8:30am [x] Individual Tx-40mins  [] Concurrent Tx -  [] Co-Tx -   [] Group Tx -   [] Time Missed -     Second Session [phone] [x] Individual Tx-45  [] Concurrent Tx -  [] Co-Tx -   [] Group Tx -   [] Time Missed -     Third Session    [] Individual Tx-   [] Concurrent Tx -  [] Co-Tx -   [] Group Tx -   [] Time Missed -         Total Tx Time: 85 mins      Trudy Tate GLOVER/L [id number]  Dharmesh Hamlin OTR/L [id number]

## 2022-04-14 PROCEDURE — 97112 NEUROMUSCULAR REEDUCATION: CPT

## 2022-04-14 PROCEDURE — 97032 APPL MODALITY 1+ESTIM EA 15: CPT

## 2022-04-14 PROCEDURE — 97116 GAIT TRAINING THERAPY: CPT

## 2022-04-14 PROCEDURE — 97530 THERAPEUTIC ACTIVITIES: CPT

## 2022-04-14 PROCEDURE — 99232 SBSQ HOSP IP/OBS MODERATE 35: CPT | Performed by: PHYSICAL MEDICINE & REHABILITATION

## 2022-04-14 PROCEDURE — 6370000000 HC RX 637 (ALT 250 FOR IP): Performed by: PHYSICAL MEDICINE & REHABILITATION

## 2022-04-14 PROCEDURE — 1280000000 HC REHAB R&B

## 2022-04-14 RX ADMIN — BACLOFEN 10 MG: 10 TABLET ORAL at 14:36

## 2022-04-14 RX ADMIN — GABAPENTIN 300 MG: 300 CAPSULE ORAL at 21:31

## 2022-04-14 RX ADMIN — LAMOTRIGINE 50 MG: 25 TABLET ORAL at 21:56

## 2022-04-14 RX ADMIN — ATORVASTATIN CALCIUM 40 MG: 40 TABLET, FILM COATED ORAL at 21:31

## 2022-04-14 RX ADMIN — BACLOFEN 10 MG: 10 TABLET ORAL at 21:31

## 2022-04-14 RX ADMIN — DICLOFENAC SODIUM 2 G: 10 GEL TOPICAL at 08:10

## 2022-04-14 RX ADMIN — ACETAMINOPHEN 1000 MG: 500 TABLET ORAL at 08:07

## 2022-04-14 RX ADMIN — ACETAMINOPHEN 1000 MG: 500 TABLET ORAL at 21:33

## 2022-04-14 RX ADMIN — METOPROLOL TARTRATE 12.5 MG: 25 TABLET, FILM COATED ORAL at 21:32

## 2022-04-14 RX ADMIN — ASPIRIN 81 MG 81 MG: 81 TABLET ORAL at 08:07

## 2022-04-14 RX ADMIN — SENNOSIDES 17.2 MG: 8.6 TABLET, COATED ORAL at 21:30

## 2022-04-14 RX ADMIN — LAMOTRIGINE 50 MG: 25 TABLET ORAL at 08:06

## 2022-04-14 RX ADMIN — Medication 9 MG: at 21:32

## 2022-04-14 RX ADMIN — BACLOFEN 10 MG: 10 TABLET ORAL at 08:07

## 2022-04-14 RX ADMIN — ACETAMINOPHEN 1000 MG: 500 TABLET ORAL at 14:36

## 2022-04-14 ASSESSMENT — PAIN DESCRIPTION - ORIENTATION
ORIENTATION: OTHER (COMMENT)
ORIENTATION: RIGHT
ORIENTATION: RIGHT

## 2022-04-14 ASSESSMENT — PAIN DESCRIPTION - DESCRIPTORS
DESCRIPTORS: ACHING;DISCOMFORT;SORE
DESCRIPTORS: ACHING;HEADACHE
DESCRIPTORS: ACHING;HEADACHE

## 2022-04-14 ASSESSMENT — PAIN DESCRIPTION - ONSET
ONSET: GRADUAL
ONSET: GRADUAL

## 2022-04-14 ASSESSMENT — PAIN DESCRIPTION - PROGRESSION
CLINICAL_PROGRESSION: GRADUALLY IMPROVING

## 2022-04-14 ASSESSMENT — PAIN DESCRIPTION - PAIN TYPE
TYPE: SURGICAL PAIN
TYPE: SURGICAL PAIN
TYPE: CHRONIC PAIN

## 2022-04-14 ASSESSMENT — PAIN SCALES - GENERAL
PAINLEVEL_OUTOF10: 7
PAINLEVEL_OUTOF10: 7
PAINLEVEL_OUTOF10: 8

## 2022-04-14 ASSESSMENT — PAIN DESCRIPTION - LOCATION
LOCATION: HEAD
LOCATION: HEAD
LOCATION: GENERALIZED

## 2022-04-14 ASSESSMENT — PAIN DESCRIPTION - FREQUENCY
FREQUENCY: INTERMITTENT

## 2022-04-14 NOTE — PROGRESS NOTES
Occupational Therapy  OCCUPATIONAL THERAPY DAILY NOTE    Date:2022  Patient Name: Curt Mahoney  MRN: [id number]  : 1975  Room: [address] [place] [address]     Referring Practitioner: Lina Edgar MD  Diagnosis: late effect CVA, R cranioplasty 3/30/2022  Additional Pertinent Hx: seizures, hx R MCA 10/13/2021  Restrictions: Fall Risk, seizures & impulsive    Functional Assessment:   Date Status AE  Comments   Feeding 22  Set up       Grooming 22 SBA seated          Oral Care 22 Setup seated    Bathing 22 UB- SBA    LB-min A  Shower   LH hose    UB Dressing 22 modA Seated     LB Dressing 22 Melisa     Footwear 22 SBA- R sock & shoe  min A- L sock & shoe     Toileting 22 CGA     Homemaking 22 min A  Str. Cane        Functional Transfers / Balance:   Date Status DME  Comments   Sit Balance 22 Independent/Sup     Stand Balance 22  CGA  sc During toileting    [] Tub  [x] Shower   Transfer 22 Min A  shower stall Bench & sc    Commode   Transfer 22  CGA  sc    Functional   Mobility 22  SBA/CGA sc Throughout OT clinic    Other:on/off recliner ,kitchen chair with arm rests and standard queen bed       Sit to stand  4/8/22             4/10/22 SBA             SBA SC            Str. cane                  Functional Exercises / Activity:  AAROM exercises to LUE with emanuel box with 6 # wt 3 sets 10 reps in all planes on table top surface   Towel stretches to LUE up incline wedge to help decrease level of tone and increase AROM 3 sets 10 reps     Sensory / Neuromuscular Re-Education:  Functional e stim completed to LUE with focus on wrist and finger extension with duty cycle 10/20 and intensity of 41 with good results   Neuro re -ed completed to LUE with pt completing 352 reps of active assistive movement on  In Motion Robotic Arm. Pt demonstrated improvements in smoothness, Aim and Movement Duration.  Pt reports feeling less stiffness following the treatment on the Robotic Arm.      Cognitive Skills:   Status Comments   Problem   Solving fair  Demo'd during ADL;s, shower stall and commode transfers, fxl mobility. Memory fair  \"   Sequencing fair + \"   Safety fair  demo'd during turns needing assist and cues. Visual Perception:    Education:  Pt was educated on pad placement for e stim to wrist and finger extension . Pt reports having an estim unit at home that she had received from outpatient       [] Family teach completed on:    Pain Level: back and right foot 5/10     Additional Notes:       Patient has made good  progress during treatment sessions toward set goals. Therapy emphasis to obtain goals: ADL retraining, transfers training, functional mobility, therex, endurance/balance retraining, homemaking, PROM/AAROM LUE, neuro recovery, pt/family educaiton      [x] Continue with current OT Plan of care.   [] Prepare for Discharge     Goals  Long term goals  Time Frame for Long term goals : 7-10 days to address above problem areas  Long term goal 1: Pt demo independnet to eat all meals  Long term goal 2: Pt demo Mod I grooming standing @ sink level or seated  Long term goal 3: Pt demo Sup bathing @ shower level seated  Long term goal 4: Pt demo Mod I UE dress & Mod I LE dress inculding underwear, pants, socks, L AFO & shoes & demo G- safety & insight  Long term goal 5: Pt demo Mod I commode trf using approrpatie DME to ensure pt safety  Long term goals 6: Pt demo Sup tub trf using appropriate DME to ensure pt safety  Long term goal 7: Pt demo Min A light homemaking activity & demo G- safety & insight  Long term goal 8: Pt demo G endurance for a 30 minute functional activity  Long term goal 9: Pt will tolerate PROM/AAROM LUE & demo improvement using robotic inmotion arm to facilitate pt ability to functionally use her LUE during ADLs & IADLs as evidenced by gains in th efollowing areas from assessment on 4/7/22-  Patient Goals   Patient goals : \"I want to be normal.\" Pt asked

## 2022-04-14 NOTE — PROGRESS NOTES
Physical Therapy    Facility/Department: [address] [facility]    NAME: Kendrick Agee  : 1975  MRN: [id number]    Date of Service: 2022  Evaluating Therapist: Mohit Camarillo, JAYME, PORTIA [id number]      ROOM: [address] [place] [address]  DIAGNOSIS: Late affect CVA  PRECAUTIONS: falls, L hemiparesis, L neglect, L homonymous hemianopsia  Impulsivity, small area of blistering on dorsum of L great toe. 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 At that time she underwent emergent R hemicraniectomy on 10/13. After being deemed medically appropriate, she was transferred to [facility] on 10/22/2021. She progressed well and was discharged home with necessary equipment and  supervision. Pt returned to ARU 22 following replacement of bone flap. Social:  Pt lives with mother and daughter in a 1 floor plan 4 steps and 2 rails to enter. Pt moved in with mother following CVA in .  supervision is possible. Prior to admission: Since return home following CVA in , pt was independent with toileting and bathing. Pt did require Supervision with functional mobility. Pt ambulated with SPC/SBQC, owns , was participating in [facility]. Initial Evaluation  Date: 22 AM     PM    Short Term Goals Long Term Goals    Was pt agreeable to Eval/treatment? Yes yes yes     Does pt have pain?  No c/o pain No c/o pain No c/o     Bed Mobility  Rolling: Noemy  Supine to sit: Noemy  Sit to supine: Noemy  Scooting: Noemy NT N/T SBA Mod I   Transfers Sit to stand: Noemy  Stand to sit: Noemy  Stand pivot: Noemy with R SPC    5xSTS: TBA  Sit<>Stand CGA  Stand-pivot CGA with R SPC Sit<>Stand CGA  Stand-pivot CGA with R SPC SBA   Supervision   Ambulation  150 feet with R SPC with Noemy    W/c follow? (Y/N): N    10mWT: 0.25 m/s  6mWT: 112 m  (AD: R SPC, Assist level: Noemy) 225 feet x 2 reps with R SPC with CGA/SBA (L toe off AFO) 225  with R SPC with CGA/SBA (L toe off AFO) >300 feet with AAD with SBA >600 feet with AAD with Supervision    10mWT: >0.4 m/s  6mWT: >175 m   Wheelchair Mobility NA NT NT     Car Transfers Noemy NT NT SBA Supervision   Stair negotiation: ascended and descended 12 steps with 1 rail with Noemy NT 30 steps x 1 rep with RUE support on HR with CGA (non-reciprocal ascent/descent leading with LLE) 12 steps with 1 rail with SBA 20 steps with 1 rail with Supervision   Walking 10 feet on uneven surface 10 feet with R SPC with Noemy NT N/T 10 feet with AAD with SBA 10 feet with AAD with Supervision   Curb Step:   ascended and descended 4 inch step with R SPC and Noemy NT N/T 4 inch step with AAD and SBA 4 inch step with AAD and Supervision   Picking up object off the floor Picked up cone with Noemy NT N/T Will  cone with SBA Will  2lb weight with SBA   BLE ROM RLE: WNL  LLE:   Hip flexion: WNL  Knee extension: WNL  Dorsiflexion: lacking ~5 degrees from neutral  Plantarflexion: WNL RLE: WNL  LLE:   Hip flexion: WNL  Knee extension: WNL  Dorsiflexion: lacking ~5 degrees from neutral  Plantarflexion: WNL      BLE Strength RLE:  Grossly 5/5  LLE:   Hip flexion: 3/5  Knee extension: 4/5  Dorsiflexion: 0/5  Plantarflexion: 2+/5 RLE:  Grossly 5/5  LLE:   Hip flexion: 3/5  Knee extension: 4/5  Dorsiflexion: 0/5  Plantarflexion: 2+/5      Balance  Static and dynamic standing balance Noemy with R SPC     BBS: 20/56 (4/8/22) Static and dynamic standing balance CGA with R SPC     BBS: TBD   Date Family Teach Completed        Is additional Family Teaching Needed? Y or N Y day of discharge Y      Hindering Progress Balance L hemiparesis Balance L hemiparesis      PT recommended ELOS 10-14 days       Team's Discharge Plan        Therapist at Team Meeting          General:        HRmax: 177 bpm       Beta blockers (Y/N): Y      Adjusted HRmax: 167 bpm   Blood pressure (mmHg): AM:  - Prior to Tx: 97/58  - Post Tx: 118/72  PM:  - Prior to Tx: 112/66  - Post Tx: 117/70   Time spent in HR ranges:    Moderate intensity (60-70% HRmax): AM: 0:17:38  PM: 0:15:29   High intensity (70-85% HRmax): AM: 0:08:35  PM: 0:10:55      Therapeutic Exercise:   AM: Ambulation timed trials with R SPC, cues to increase speed, 50 feet x 4 reps with CGA (36\" x2, 32\", 31\")  Ambulation with RUE support on TENA walker, 200 feet x 8 reps with therapist assistance with FW propulsion, with Noemy  PM: Ascending/descending 30 steps x 1 rep with RUE support on HR with CGA (non-reciprocal ascent/descent leading with LLE), 5lb ankle weight LLE, 9lb ankle weight attached to gait belt  Ambulation with R SPC, 5lb ankle weight LLE, 9lb ankle weight attached to gait belt, 200 feet x 1 rep with CGA  Side stepping to L with R SPC, 5lb anle weight LLE with Noemy  BW walking with R SPC, 5lb ankle weight LLE, 50 feet x 1 rep with CGA      Patient education  Pt educated on gait speed implication on gait mechanics and functional application    Patient response to education:   Pt verbalized understanding Pt demonstrated skill Pt requires further education in this area   yes yes no     Additional Comments: Pt remains pleasant and motivated during session, reports mildly increased generalized soreness at B hips and lumbar spine today. Propulsion training resumed with TENA walker and timed trials, seated rest breaks provided upon patient request. Propulsion targeted with the goal of improving gait speed to reduce activity limitations an promote improved participation in life roles. Pt requires direction for simultaneous advancement of LLE and SPC in an effort to transition to more fluid reciprocal gait pattern. Safety remains limited by impulsivity and L visual/spatial neglect. RPE consistently reported as 14-15/20. PM session included progression of stair training with added resistance via ankle weights in order to increase intensity. Pt exhibiting improving consistency and less circumduction when ascending with LLE. Plan to continue propulsive gait training next session.        AM  Time in: 0915  Time out: 1000    PM  Time in: 1345  Time out: 1430    Pt is making good progress toward established Physical Therapy goals. Continue with physical therapy current plan of care.     Gracie Masters, PT, DPT  Board Certified Clinical Specialist in Neurologic Physical Therapy  [phone]

## 2022-04-14 NOTE — PROGRESS NOTES
Dr. See Chandler notified that staples can be removed after 4/17/22 here on our unit per Dr. Carlos Paige office.

## 2022-04-14 NOTE — PROGRESS NOTES
[facility]  Comprehensive Progress Note    Subjective:      Kirby Cushing is a 42 y.o. female admitted to inpatient rehabilitation for impairments and acitivities limitations in ADL and mobility secondary to CVA. No acute events overnight. No cp, sob, n/v. Pain controlled. Tolerating therapy. No new issues. The patient's medical records have been reviewed. Scheduled Meds:nicotine, 1 patch, Daily  acetaminophen, 1,000 mg, TID  aspirin, 81 mg, Daily  atorvastatin, 40 mg, Nightly  baclofen, 10 mg, TID  vitamin D, 50,000 Units, Weekly  gabapentin, 300 mg, Nightly  lamoTRIgine, 50 mg, BID  melatonin, 9 mg, Nightly  metoprolol tartrate, 12.5 mg, BID  senna, 2 tablet, Nightly      Continuous Infusions:  PRN Meds:acetaminophen, 650 mg, Q6H PRN  diclofenac sodium, 2 g, 4x Daily PRN  ondansetron, 4 mg, Q8H PRN         Objective:      Vitals:    04/13/22 0736 04/13/22 1151 04/13/22 2115 04/14/22 0746   BP: 116/62  (!) 92/51 (!) 95/59   Pulse: 71  62 67   Resp: 18  16 16   Temp: 98.1 °F (36.7 °C)  98.5 °F (36.9 °C) 98.2 °F (36.8 °C)   TempSrc: Temporal  Temporal Oral   SpO2: 98%   95%   Weight:       Height:  5' 6\" (1.676 m)       General appearance: alert,  NAD  Head: Right cranioplasty, incision c/d/i with staples in place  Eyes: conjunctivae/corneas clear. PERRL, EOM's intact.   Lungs: clear to auscultation bilaterally  Heart: regular rate and rhythm, S1, S2 normal  Abdomen: soft, non-tender, normal bowel sounds   Musculoskeletal: Range of motion impaired   Skin: Right cranioplasty, incision c/d/i with staples in place  Neurologic: AAOx4. Speech clear, content appropriate. Follows commands. SILT.  LUE and LLE spasticity, MAS 2     Strength:                     R          L  Deltoid                         5          2  Biceps                         5          0  Triceps                        5          0  Wrist Ext                     5          6  FDP                             5          2  Finger Abd                  5          4  Hip Flex                       5         3-  Knee Ext                     5         4-  Ankle dorsi                  5          9  AER                             4          8  Ankle Plantar               5          3      Laboratory:    Lab Results   Component Value Date    WBC 7.8 04/07/2022    HGB 12.0 04/07/2022    HCT 37.2 04/07/2022    MCV 95.6 04/07/2022     (H) 04/07/2022     Lab Results   Component Value Date     04/07/2022    K 4.4 04/07/2022     04/07/2022    CO2 22 04/07/2022    BUN 14 04/07/2022    CREATININE 0.7 04/07/2022    GLUCOSE 95 04/07/2022    CALCIUM 9.3 04/07/2022      Lab Results   Component Value Date    ALT 75 (H) 10/23/2021    AST 46 (H) 10/23/2021    ALKPHOS 89 10/23/2021    BILITOT 0.4 10/23/2021       Functional Status:   Physical Therapy:  Bed mobility: Min A  Transfers: CGA  Ambulation: 150 ft R SPC CGA, left AFO     Occupational Therapy:  Feeding: Setup  Grooming: SBA -CGA  UB dressing: Min A  LB dressing: SBA       Assessment/Plan:       42 y. o. female admitted to inpatient rehabilitation for impairments and acitivities limitations in ADL and mobility secondary to CVA.       -History of right MCA CVA (Oct 2021)-- with cerebral edema and midline shift, s/p right hemicraniectomy (Oct 2021-[facility]). Patient completed ARU 10/22/2021-12/7/2021 and was discharged home. She is now s/p right cranioplasty (3/30/3022 - [facility]). Left spastic hemiparesis, left inattention, vision impairment. LUE sling for gait. Left AFO. Continue Aspirin, Lipitor for secondary stroke prevention. Monitor neuro exam. Continue Acute rehab program.   -Pain control: Tylenol for mild pain, headache. Gabapentin for neuropathic pain. Voltaren gel prn.    -Spasticity, LUE/LLE: Continue Baclofen  -History of suspected seizure: Continue Lamictal at home dose.  Patient to follow back up with Neurology after discharge.   -History of intermittent Tachycardia--Seen by Cardiology during prior ARU admission, started on low dose metoprolol. Outpatient event monitor with ?result and Cardiology follow up. Continue home dose metoprolol.   -Tobacco abuse: tobacco cessation education, resources. Nicoderm patch     Scalp staples to be removed Monday 4/18. Orders placed. Continue Acute rehab program, progressing.       Electronically signed by Anna Flores MD on 4/14/2022 at 2:55 PM

## 2022-04-15 PROCEDURE — 97116 GAIT TRAINING THERAPY: CPT

## 2022-04-15 PROCEDURE — 97112 NEUROMUSCULAR REEDUCATION: CPT

## 2022-04-15 PROCEDURE — 6370000000 HC RX 637 (ALT 250 FOR IP): Performed by: PHYSICAL MEDICINE & REHABILITATION

## 2022-04-15 PROCEDURE — 99232 SBSQ HOSP IP/OBS MODERATE 35: CPT | Performed by: PHYSICAL MEDICINE & REHABILITATION

## 2022-04-15 PROCEDURE — 1280000000 HC REHAB R&B

## 2022-04-15 PROCEDURE — 97530 THERAPEUTIC ACTIVITIES: CPT

## 2022-04-15 PROCEDURE — 97032 APPL MODALITY 1+ESTIM EA 15: CPT

## 2022-04-15 RX ADMIN — ASPIRIN 81 MG 81 MG: 81 TABLET ORAL at 08:34

## 2022-04-15 RX ADMIN — Medication 9 MG: at 20:00

## 2022-04-15 RX ADMIN — BACLOFEN 10 MG: 10 TABLET ORAL at 14:33

## 2022-04-15 RX ADMIN — ACETAMINOPHEN 1000 MG: 500 TABLET ORAL at 14:33

## 2022-04-15 RX ADMIN — ATORVASTATIN CALCIUM 40 MG: 40 TABLET, FILM COATED ORAL at 20:00

## 2022-04-15 RX ADMIN — ACETAMINOPHEN 1000 MG: 500 TABLET ORAL at 08:34

## 2022-04-15 RX ADMIN — LAMOTRIGINE 50 MG: 25 TABLET ORAL at 08:35

## 2022-04-15 RX ADMIN — GABAPENTIN 300 MG: 300 CAPSULE ORAL at 20:00

## 2022-04-15 RX ADMIN — ACETAMINOPHEN 1000 MG: 500 TABLET ORAL at 20:01

## 2022-04-15 RX ADMIN — METOPROLOL TARTRATE 12.5 MG: 25 TABLET, FILM COATED ORAL at 08:34

## 2022-04-15 RX ADMIN — BACLOFEN 10 MG: 10 TABLET ORAL at 20:00

## 2022-04-15 RX ADMIN — LAMOTRIGINE 50 MG: 25 TABLET ORAL at 20:01

## 2022-04-15 RX ADMIN — BACLOFEN 10 MG: 10 TABLET ORAL at 08:34

## 2022-04-15 RX ADMIN — SENNOSIDES 17.2 MG: 8.6 TABLET, COATED ORAL at 20:00

## 2022-04-15 RX ADMIN — METOPROLOL TARTRATE 12.5 MG: 25 TABLET, FILM COATED ORAL at 20:01

## 2022-04-15 ASSESSMENT — PAIN DESCRIPTION - ONSET
ONSET: ON-GOING
ONSET: ON-GOING

## 2022-04-15 ASSESSMENT — PAIN DESCRIPTION - DESCRIPTORS: DESCRIPTORS: HEADACHE

## 2022-04-15 ASSESSMENT — PAIN SCALES - GENERAL
PAINLEVEL_OUTOF10: 7
PAINLEVEL_OUTOF10: 7
PAINLEVEL_OUTOF10: 4
PAINLEVEL_OUTOF10: 7
PAINLEVEL_OUTOF10: 3

## 2022-04-15 ASSESSMENT — PAIN DESCRIPTION - PAIN TYPE
TYPE: CHRONIC PAIN
TYPE: CHRONIC PAIN

## 2022-04-15 ASSESSMENT — PAIN DESCRIPTION - FREQUENCY
FREQUENCY: CONTINUOUS
FREQUENCY: CONTINUOUS

## 2022-04-15 ASSESSMENT — PAIN - FUNCTIONAL ASSESSMENT: PAIN_FUNCTIONAL_ASSESSMENT: PREVENTS OR INTERFERES SOME ACTIVE ACTIVITIES AND ADLS

## 2022-04-15 ASSESSMENT — PAIN DESCRIPTION - PROGRESSION
CLINICAL_PROGRESSION: NOT CHANGED
CLINICAL_PROGRESSION: NOT CHANGED

## 2022-04-15 ASSESSMENT — PAIN DESCRIPTION - LOCATION
LOCATION: HEAD
LOCATION: HEAD

## 2022-04-15 ASSESSMENT — PAIN DESCRIPTION - ORIENTATION: ORIENTATION: ANTERIOR

## 2022-04-15 NOTE — PROGRESS NOTES
Occupational Therapy  OCCUPATIONAL THERAPY DAILY NOTE    Date:4/15/2022  Patient Name: Kendrick Agee  MRN: [id number]  : 1975  Room: [address] [place] [address]     Referring Practitioner: Savana Flores MD  Diagnosis: late effect CVA, R cranioplasty 3/30/2022  Additional Pertinent Hx: seizures, hx R MCA 10/13/2021  Restrictions: Fall Risk, seizures & impulsive    Functional Assessment:   Date Status AE  Comments   Feeding 22  Set up       Grooming 4/15/22  CGA Stand  Pt stood and sink and washed hands          Oral Care 22 Setup seated    Bathing 22 UB- SBA    LB-min A  Shower   LH hose    UB Dressing 22 modA Seated     LB Dressing 22 Melisa     Footwear 22 SBA- R sock & shoe  min A- L sock & shoe     Toileting 4/15/22 CGA  Pt completed hygiene steady assist when standing to pull up pants    Homemaking 22 min A  Str. Cane        Functional Transfers / Balance:   Date Status DME  Comments   Sit Balance 22 Independent/Sup     Stand Balance 4/15/22  CGA  sc During toileting    [] Tub  [x] Shower   Transfer 22 Min A  shower stall Bench & sc    Commode   Transfer 4/15/22  CGA  sc    Functional   Mobility 4/15/22  SBA/CGA sc Back and forth to the bathroom with [facility]    Other:on/off recliner ,kitchen chair with arm rests and standard queen bed       Sit to stand  4/8/22             4/10/22 SBA             SBA SC            Str. cane                  Functional Exercises / Activity:  BUE integration activity with pt completing arm bike 5 mins for 3 reps with ace wrap to left hand to help maintain gross grasp. Pt was able to complete several reps of arm bike using only LUE.  Pt is demonstrating some increase in active shoulder movement     Sensory / Neuromuscular Re-Education:  Functional e stim completed to LUE for 15 mins  with focus on wrist and finger extension on duty cycle 10/ and intensity of 35 with good results  Tone reduction to left hand and wrist including prolonged stretch and placing hand over weighted ball to provide open palm stretch with pt having good tolerance       Cognitive Skills:   Status Comments   Problem   Solving fair  Demo'd during ADL;s, shower stall and commode transfers, fxl mobility. Memory fair  \"   Sequencing fair + \"   Safety fair  demo'd during turns needing assist and cues. Visual Perception:    Education:  Pt was educated on prolonged stretch using weights to left hand while seated at table top level completing her leisure activities       [] Family teach completed on:    Pain Level: back and right foot 5/10     Additional Notes:       Patient has made good  progress during treatment sessions toward set goals. Therapy emphasis to obtain goals: ADL retraining, transfers training, functional mobility, therex, endurance/balance retraining, homemaking, PROM/AAROM LUE, neuro recovery, pt/family educaiton      [x] Continue with current OT Plan of care.   [] Prepare for Discharge     Goals  Long term goals  Time Frame for Long term goals : 7-10 days to address above problem areas  Long term goal 1: Pt demo independnet to eat all meals  Long term goal 2: Pt demo Mod I grooming standing @ sink level or seated  Long term goal 3: Pt demo Sup bathing @ shower level seated  Long term goal 4: Pt demo Mod I UE dress & Mod I LE dress inculding underwear, pants, socks, L AFO & shoes & demo G- safety & insight  Long term goal 5: Pt demo Mod I commode trf using approrpatie DME to ensure pt safety  Long term goals 6: Pt demo Sup tub trf using appropriate DME to ensure pt safety  Long term goal 7: Pt demo Min A light homemaking activity & demo G- safety & insight  Long term goal 8: Pt demo G endurance for a 30 minute functional activity  Long term goal 9: Pt will tolerate PROM/AAROM LUE & demo improvement using robotic inmotion arm to facilitate pt ability to functionally use her LUE during ADLs & IADLs as evidenced by gains in th efollowing areas from assessment on 4/7/22-  Patient Goals   Patient goals : \"I want to be normal.\" Pt asked to elaborate- \"Be able to cook & clean. \"    4/13/22 OT plan of care updated on this date. Tentative length of stay is 10-14 days. Akhil Hardy OTR/L [id number]       DISCHARGE RECOMMENDATIONS  Recommended DME:  Sc, tub bench- pt owns  Post Discharge Care:   []Home Independently  [x]Home with 24hr Care / Supervision []Home with Partial Supervision []Home with Home Health OT []Home with Out Pt OT []Other: ___   Comments:         Time in Time out Tx Time Breakdown  Variance:   First Session  [phone] [x] Individual Tx-45  [] Concurrent Tx -  [] Co-Tx -   [] Group Tx -   [] Time Missed -     Second Session [phone] [x] Individual Tx-45  [] Concurrent Tx -  [] Co-Tx -   [] Group Tx -   [] Time Missed -     Third Session    [] Individual Tx-   [] Concurrent Tx -  [] Co-Tx -   [] Group Tx -   [] Time Missed -         Total Tx Time: 90 mins        Akhil Hardy OTR/L [id number]

## 2022-04-15 NOTE — PROGRESS NOTES
Physical Therapy    Facility/Department: [address] [facility]    NAME: Marcos Parks  : 1975  MRN: [id number]    Date of Service: 4/15/2022  Evaluating Therapist: Дмитрий Dsouza PT, PAULINE [id number]      ROOM: [address] [place] [address]  DIAGNOSIS: Late affect CVA  PRECAUTIONS: falls, L hemiparesis, L neglect, L homonymous hemianopsia  Impulsivity, small area of blistering on dorsum of L great toe. 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 At that time she underwent emergent R hemicraniectomy on 10/13. After being deemed medically appropriate, she was transferred to [facility] on 10/22/2021. She progressed well and was discharged home with necessary equipment and  supervision. Pt returned to ARU 22 following replacement of bone flap. Social:  Pt lives with mother and daughter in a 1 floor plan 4 steps and 2 rails to enter. Pt moved in with mother following CVA in .  supervision is possible. Prior to admission: Since return home following CVA in , pt was independent with toileting and bathing. Pt did require Supervision with functional mobility. Pt ambulated with SPC/SBQC, owns , was participating in [facility]. Initial Evaluation  Date: 22 AM     PM    Short Term Goals Long Term Goals    Was pt agreeable to Eval/treatment? Yes yes yes     Does pt have pain?  No c/o pain No c/o pain No c/o     Bed Mobility  Rolling: Noemy  Supine to sit: Noemy  Sit to supine: Noemy  Scooting: Noemy NT N/T SBA Mod I   Transfers Sit to stand: Noemy  Stand to sit: Noemy  Stand pivot: Noemy with R SPC    5xSTS: TBA  Sit<>Stand SBA  Stand-pivot CGA with R SPC Sit<>Stand SBA  Stand-pivot CGA with R SPC SBA   Supervision   Ambulation  150 feet with R SPC with Noemy    W/c follow? (Y/N): N    10mWT: 0.25 m/s  6mWT: 112 m  (AD: R SPC, Assist level: Noemy) 230 feet x 2 reps with R SPC with SBA (L toe off AFO) 230 feet x 2 reps  with R SPC with SBA (L toe off AFO) >300 feet with AAD with SBA >600 feet with AAD with Supervision    10mWT: >0.4 m/s  6mWT: >175 m   Wheelchair Mobility NA NT NT     Car Transfers Noemy NT NT SBA Supervision   Stair negotiation: ascended and descended 12 steps with 1 rail with Noemy NT 30 steps x 1 rep with RUE support on HR with CGA (non-reciprocal ascent/descent leading with LLE) 9lb weight posterior gait belt 12 steps with 1 rail with SBA 20 steps with 1 rail with Supervision   Walking 10 feet on uneven surface 10 feet with R SPC with Noemy NT N/T 10 feet with AAD with SBA 10 feet with AAD with Supervision   Curb Step:   ascended and descended 4 inch step with R SPC and Noemy NT N/T 4 inch step with AAD and SBA 4 inch step with AAD and Supervision   Picking up object off the floor Picked up cone with Noemy NT N/T Will  cone with SBA Will  2lb weight with SBA   BLE ROM RLE: WNL  LLE:   Hip flexion: WNL  Knee extension: WNL  Dorsiflexion: lacking ~5 degrees from neutral  Plantarflexion: WNL RLE: WNL  LLE:   Hip flexion: WNL  Knee extension: WNL  Dorsiflexion: lacking ~5 degrees from neutral  Plantarflexion: WNL      BLE Strength RLE:  Grossly 5/5  LLE:   Hip flexion: 3/5  Knee extension: 4/5  Dorsiflexion: 0/5  Plantarflexion: 2+/5 RLE:  Grossly 5/5  LLE:   Hip flexion: 3/5  Knee extension: 4/5  Dorsiflexion: 0/5  Plantarflexion: 2+/5      Balance  Static and dynamic standing balance Noemy with R SPC     BBS: 20/56 (4/8/22) Static and dynamic standing balance CGA with R SPC     BBS: TBD   Date Family Teach Completed        Is additional Family Teaching Needed?   Y or N Y day of discharge Y      Hindering Progress Balance L hemiparesis Balance L hemiparesis      PT recommended ELOS 10-14 days       Team's Discharge Plan        Therapist at Team Meeting          General:        HRmax: 177 bpm       Beta blockers (Y/N): Y      Adjusted HRmax: 167 bpm   Blood pressure (mmHg): AM:  - Prior to Tx: 104/68  - Post Tx: 112/67  PM:  - Prior to Tx: 113/65  - Post Tx: 103/64   Time spent in HR ranges: Moderate intensity (60-70% HRmax): AM: 0:11:35  PM: 0:07:28   High intensity (70-85% HRmax): AM: 0:00:00  PM: 0:00:00      Therapeutic Exercise:   AM: Ambulation with RUE support on TENA walker, 200 feet x 10 reps with therapist assistance with FW propulsion, with Noemy  PM: Ascending/descending 30 steps x 1 rep with RUE support on HR with CGA (non-reciprocal ascent/descent leading with LLE) 7.5 lb ankle weight LLE, 9lb weight posterior gait belt  Ambulation with R SPC, 7.5 lb ankle weight LLE, 9lb weight posterior gait belt, 150 feet x 1 rep with Noemy  Ambulation with R SPC, 9lb weight posterior gait belt, 150 feet x 1 rep with Noemy  Resisted FW walking with TENA walker, 9lb weight posterior gait belt, red flex band resistance around waist, therapist assistance with FW propulsion, 200 feet x 2 reps with Noemy    Patient education  Pt educated on gait speed improvement's implication on participation in life roles    Patient response to education:   Pt verbalized understanding Pt demonstrated skill Pt requires further education in this area   yes n/a no     Additional Comments: Pt remains motivated and pleasant during sessions with good verbal recall of safety technique. L visual field deficits/neglect and impulsivity remain evident barriers to safe function. AM session dedicated largely to addressing propulsion deficits and attaining adequate repetition of reciprocal stepping. HR blunted likely due to medication but visual appraisal and reported RPE (16/20) indicates adequate intensity of activity. Pt continues to exhibit non-reciprocal walking pattern but is able to progress to semi-reciprocal with cueing from therapist. PM session included progression of stair training with ankle weight to challenge limb advancement, weight at posterior gait belt to challenge stance control. Pt tolerated well and continued to rate RPE at 16 despite blunted HR response. Plan to continue gait training focusing on improving gait speed. AM  Time in: 0915  Time out: 1000    PM  Time in: 1345  Time out: 1430    Pt is making good progress toward established Physical Therapy goals. Continue with physical therapy current plan of care.     Jo Starr, PT, DPT  Board Certified Clinical Specialist in Neurologic Physical Therapy  [id number]

## 2022-04-15 NOTE — PROGRESS NOTES
[facility] Physical Medicine and Rehabilitation  Comprehensive Progress Note    Subjective:      Shereen Roach is a 42 y.o. female admitted to inpatient rehabilitation for impairments and acitivities limitations in ADL and mobility secondary to CVA. No acute events overnight. No cp, sob, n/v. Pain controlled. Tolerating therapy. No new complaints. The patient's medical records have been reviewed. Scheduled Meds:nicotine, 1 patch, Daily  acetaminophen, 1,000 mg, TID  aspirin, 81 mg, Daily  atorvastatin, 40 mg, Nightly  baclofen, 10 mg, TID  vitamin D, 50,000 Units, Weekly  gabapentin, 300 mg, Nightly  lamoTRIgine, 50 mg, BID  melatonin, 9 mg, Nightly  metoprolol tartrate, 12.5 mg, BID  senna, 2 tablet, Nightly      Continuous Infusions:  PRN Meds:acetaminophen, 650 mg, Q6H PRN  diclofenac sodium, 2 g, 4x Daily PRN  ondansetron, 4 mg, Q8H PRN         Objective:      Vitals:    04/13/22 2115 04/14/22 0746 04/14/22 2132 04/15/22 0805   BP: (!) 92/51 (!) 95/59 102/68 104/68   Pulse: 62 67 73 66   Resp: 16 16 18 18   Temp: 98.5 °F (36.9 °C) 98.2 °F (36.8 °C) 98.1 °F (36.7 °C) 99 °F (37.2 °C)   TempSrc: Temporal Oral Oral Temporal   SpO2:  95%     Weight:       Height:         General appearance: alert,  NAD, up in w/c   Head: Right cranioplasty, incision c/d/i with staples in place  Eyes: conjunctivae/corneas clear. PERRL, EOM's intact.   Lungs: clear to auscultation bilaterally  Heart: regular rate and rhythm, S1, S2 normal  Abdomen: soft, non-tender, normal bowel sounds   Musculoskeletal: Range of motion impaired   Skin: Right cranioplasty, incision c/d/i with staples in place  Neurologic: AAOx4. Speech clear, content appropriate. Follows commands. SILT.  LUE and LLE spasticity, MAS 2     Strength:                     R          L  Deltoid                         5          6  Biceps                         5          0  Triceps                        5          0  Wrist Ext                     5          2  FDP                             5          2  Finger Abd                  5          5  Hip Flex                       5         3-  Knee Ext                     5         4-  Ankle dorsi                  5          3  AOU                             7          6  Ankle Plantar               5          0      Laboratory:    Lab Results   Component Value Date    WBC 7.8 04/07/2022    HGB 12.0 04/07/2022    HCT 37.2 04/07/2022    MCV 95.6 04/07/2022     (H) 04/07/2022     Lab Results   Component Value Date     04/07/2022    K 4.4 04/07/2022     04/07/2022    CO2 22 04/07/2022    BUN 14 04/07/2022    CREATININE 0.7 04/07/2022    GLUCOSE 95 04/07/2022    CALCIUM 9.3 04/07/2022      Lab Results   Component Value Date    ALT 75 (H) 10/23/2021    AST 46 (H) 10/23/2021    ALKPHOS 89 10/23/2021    BILITOT 0.4 10/23/2021         Functional Status:   Physical Therapy:  Bed mobility: Min A  Transfers: CGA  Ambulation: 0 ft R SPC SBA, left AFO     Occupational Therapy:  Feeding: Setup  Grooming: CGA  UB dressing: Min-Mod A  LB dressing: SBA-Min A       Assessment/Plan:       42 y. o. female admitted to inpatient rehabilitation for impairments and acitivities limitations in ADL and mobility secondary to CVA.       -History of right MCA CVA (Oct 2021)-- with cerebral edema and midline shift, s/p right hemicraniectomy (Oct 2021-[facility]). Patient completed ARU 10/22/2021-12/7/2021 and was discharged home. She is now s/p right cranioplasty (3/30/3022 - [facility]). Left spastic hemiparesis, left inattention, vision impairment. LUE sling for gait. Left AFO. Continue Aspirin, Lipitor for secondary stroke prevention. Monitor neuro exam. Continue Acute rehab program.   -Pain control: Tylenol for mild pain, headache. Gabapentin for neuropathic pain. Voltaren gel prn.    -Spasticity, LUE/LLE: Continue Baclofen  -History of suspected seizure: Continue Lamictal at home dose.  Patient to follow back up with Neurology after discharge.   -History of intermittent Tachycardia--Seen by Cardiology during prior ARU admission, started on low dose metoprolol. Outpatient event monitor with ?result and Cardiology follow up. Continue home dose metoprolol.   -Tobacco abuse: tobacco cessation education, resources. Nicoderm patch     Continue Acute rehab program, progressing.    No medication changes today, continue plan of care       Electronically signed by Niecy Barajas MD on 4/15/2022 at 11:34 AM

## 2022-04-16 PROBLEM — Z98.890 HISTORY OF CRANIOPLASTY: Status: ACTIVE | Noted: 2022-04-16

## 2022-04-16 PROBLEM — M79.2 NEUROPATHIC PAIN: Status: ACTIVE | Noted: 2022-04-16

## 2022-04-16 PROCEDURE — 97110 THERAPEUTIC EXERCISES: CPT

## 2022-04-16 PROCEDURE — 99233 SBSQ HOSP IP/OBS HIGH 50: CPT | Performed by: PHYSICAL MEDICINE & REHABILITATION

## 2022-04-16 PROCEDURE — 1280000000 HC REHAB R&B

## 2022-04-16 PROCEDURE — 97535 SELF CARE MNGMENT TRAINING: CPT

## 2022-04-16 PROCEDURE — 6370000000 HC RX 637 (ALT 250 FOR IP): Performed by: PHYSICAL MEDICINE & REHABILITATION

## 2022-04-16 PROCEDURE — 97530 THERAPEUTIC ACTIVITIES: CPT

## 2022-04-16 RX ADMIN — LAMOTRIGINE 50 MG: 25 TABLET ORAL at 07:54

## 2022-04-16 RX ADMIN — ASPIRIN 81 MG 81 MG: 81 TABLET ORAL at 07:54

## 2022-04-16 RX ADMIN — LAMOTRIGINE 50 MG: 25 TABLET ORAL at 20:53

## 2022-04-16 RX ADMIN — ACETAMINOPHEN 1000 MG: 500 TABLET ORAL at 20:53

## 2022-04-16 RX ADMIN — DICLOFENAC SODIUM 2 G: 10 GEL TOPICAL at 20:52

## 2022-04-16 RX ADMIN — ACETAMINOPHEN 1000 MG: 500 TABLET ORAL at 13:47

## 2022-04-16 RX ADMIN — DICLOFENAC SODIUM 2 G: 10 GEL TOPICAL at 08:20

## 2022-04-16 RX ADMIN — SENNOSIDES 17.2 MG: 8.6 TABLET, COATED ORAL at 20:53

## 2022-04-16 RX ADMIN — METOPROLOL TARTRATE 12.5 MG: 25 TABLET, FILM COATED ORAL at 07:53

## 2022-04-16 RX ADMIN — METOPROLOL TARTRATE 12.5 MG: 25 TABLET, FILM COATED ORAL at 20:54

## 2022-04-16 RX ADMIN — Medication 9 MG: at 20:53

## 2022-04-16 RX ADMIN — BACLOFEN 10 MG: 10 TABLET ORAL at 20:54

## 2022-04-16 RX ADMIN — BACLOFEN 10 MG: 10 TABLET ORAL at 13:46

## 2022-04-16 RX ADMIN — ATORVASTATIN CALCIUM 40 MG: 40 TABLET, FILM COATED ORAL at 20:53

## 2022-04-16 RX ADMIN — ACETAMINOPHEN 1000 MG: 500 TABLET ORAL at 07:54

## 2022-04-16 RX ADMIN — BACLOFEN 10 MG: 10 TABLET ORAL at 07:54

## 2022-04-16 RX ADMIN — GABAPENTIN 300 MG: 300 CAPSULE ORAL at 20:54

## 2022-04-16 ASSESSMENT — PAIN DESCRIPTION - FREQUENCY: FREQUENCY: CONTINUOUS

## 2022-04-16 ASSESSMENT — PAIN DESCRIPTION - ORIENTATION: ORIENTATION: ANTERIOR

## 2022-04-16 ASSESSMENT — PAIN DESCRIPTION - LOCATION
LOCATION: HEAD
LOCATION: HEAD

## 2022-04-16 ASSESSMENT — PAIN SCALES - GENERAL
PAINLEVEL_OUTOF10: 6
PAINLEVEL_OUTOF10: 0
PAINLEVEL_OUTOF10: 8
PAINLEVEL_OUTOF10: 7

## 2022-04-16 ASSESSMENT — PAIN DESCRIPTION - ONSET: ONSET: ON-GOING

## 2022-04-16 ASSESSMENT — PAIN DESCRIPTION - PROGRESSION: CLINICAL_PROGRESSION: NOT CHANGED

## 2022-04-16 ASSESSMENT — PAIN DESCRIPTION - PAIN TYPE: TYPE: CHRONIC PAIN

## 2022-04-16 ASSESSMENT — PAIN - FUNCTIONAL ASSESSMENT: PAIN_FUNCTIONAL_ASSESSMENT: PREVENTS OR INTERFERES SOME ACTIVE ACTIVITIES AND ADLS

## 2022-04-16 ASSESSMENT — PAIN DESCRIPTION - DESCRIPTORS: DESCRIPTORS: HEADACHE

## 2022-04-16 NOTE — PROGRESS NOTES
Occupational Therapy  OCCUPATIONAL THERAPY DAILY NOTE    Date:2022  Patient Name: Rodriguez Morrow  MRN: [id number]  : 1975  Room: [address] [place] [address]     Referring Practitioner: Alexis Murray MD  Diagnosis: late effect CVA, R cranioplasty 3/30/2022  Additional Pertinent Hx: seizures, hx R MCA 10/13/2021  Restrictions: Fall Risk, seizures & impulsive    Functional Assessment:   Date Status AE  Comments   Feeding 22  Set up       Grooming 22  CGA Str. Cane  Pt stood and sink and washed R hand following toilet needs. Oral Care 22 Setup seated    Bathing 22 UB- SBA    LB-min A  Shower   LH hose    UB Dressing 22 modA Seated     LB Dressing 22 Melisa     Footwear 22 SBA- R sock & shoe  min A- L sock & shoe     Toileting 22 CGA  Pt completed toilet hygiene needing assist for underwear and shorts pulled up on Left side due to limited LUE ROM/. Homemaking 22 min A  Str. Cane        Functional Transfers / Balance:   Date Status DME  Comments   Sit Balance 22 Independent/Sup  Demo'd up in w/c and on commode   Stand Balance 22  CGA  str cane During malvin care/clothing mgmt, sit to stand transfers and fxl mobility to increase overall endurance and strength.     [] Tub  [x] Shower   Transfer 22 Min A  shower stall Bench & sc    Commode   Transfer 22  CGA  str cane demo'd on/off commode in room using cane for balance with one cue for LLE placement prior to standing. Functional   Mobility 22  SBA/CGA sc Pt ambulated from room<>gym x 2 reps due to bathroom needs. Other:on/off recliner ,kitchen chair with arm rests and standard queen bed       Sit to stand  22 SBA             SBA SC            Str. cane            Sit to stand transfers on/off commode, w/c and arm chair to increase endurance and strength.       Functional Exercises / Activity:  Pt tolerated Left hand/wrist stretching on table top with 3# wts over wrist and fingers to promote wrist into neutral and full finger extension versus flexion in order to reduce tone. Pt tolerated 1# wt on RUE during card game to increase strength & endurance for ADL's, transfers and mobility skills while reaching across table and increasing wt. Bearing thru LUE with 3# wts on extremity. Card activity promoting fine motor/coordination and dexterity skills for Right handed skills along with problem solving. Sensory / Neuromuscular Re-Education:     Cognitive Skills:   Status Comments   Problem   Solving fair  Demo'd during commode transfers, fxl mobility and sit to stand. Memory fair  \"   Sequencing fair + \"   Safety fair  demo'd during turns needing assist and cues to increase balance using str. Cane. Visual Perception:    Education:  Pt educated with safety and balance during mobility skills using str. Cane especially during turns to increase awareness. [] Family teach completed on:    Pain Level: back and right foot 4-5/10     Additional Notes:       Patient has made good  progress during treatment sessions toward set goals. Therapy emphasis to obtain goals: ADL retraining, transfers training, functional mobility, therex, endurance/balance retraining, homemaking, PROM/AAROM LUE, neuro recovery, pt/family educaiton      [x] Continue with current OT Plan of care.   [] Prepare for Discharge     Goals  Long term goals  Time Frame for Long term goals : 7-10 days to address above problem areas  Long term goal 1: Pt demo independnet to eat all meals  Long term goal 2: Pt demo Mod I grooming standing @ sink level or seated  Long term goal 3: Pt demo Sup bathing @ shower level seated  Long term goal 4: Pt demo Mod I UE dress & Mod I LE dress inculding underwear, pants, socks, L AFO & shoes & demo G- safety & insight  Long term goal 5: Pt demo Mod I commode trf using approrpatie DME to ensure pt safety  Long term goals 6: Pt demo Sup tub trf using appropriate DME to ensure pt safety  Long term goal 7: Pt demo Min A light homemaking activity & demo G- safety & insight  Long term goal 8: Pt demo G endurance for a 30 minute functional activity  Long term goal 9: Pt will tolerate PROM/AAROM LUE & demo improvement using robotic inmotion arm to facilitate pt ability to functionally use her LUE during ADLs & IADLs as evidenced by gains in th efollowing areas from assessment on 4/7/22-  Patient Goals   Patient goals : \"I want to be normal.\" Pt asked to elaborate- \"Be able to cook & clean. \"    4/13/22 OT plan of care updated on this date. Tentative length of stay is 10-14 days. Mingo Mooney OTR/L [id number]       DISCHARGE RECOMMENDATIONS  Recommended DME:  Sc, tub bench- pt owns  Post Discharge Care:   []Home Independently  [x]Home with 24hr Care / Supervision []Home with Partial Supervision []Home with Home Health OT []Home with Out Pt OT []Other: ___   Comments:         Time in Time out Tx Time Breakdown  Variance:   First Session   11:05am 11:50am [] Individual Tx-  [x] Concurrent Tx -45  [] Co-Tx -   [] Group Tx -   [] Time Missed -     Second Session   [] Individual Tx-  [] Concurrent Tx -  [] Co-Tx -   [] Group Tx -   [] Time Missed -     Third Session    [] Individual Tx-   [] Concurrent Tx -  [] Co-Tx -   [] Group Tx -   [] Time Missed -         Total Tx Time:45 mins    Ambrose Hooks GLOVER/L [id number]

## 2022-04-16 NOTE — PROGRESS NOTES
PM&R Weekend Progress Note  Patient: Fuad Mendez  Age/sex: 42 y.o. female  Medical Record #: [id number]  Date: 2022    Covering for: Dr. Lneora Myrick: Patient seen and examined in her room this morning. No issues overnight. No complaints this morning. Denies abdominal pain, chest pain, shortness of breath. All pertinent labs reviewed. Past Medical History:   Diagnosis Date    Cerebral artery occlusion with cerebral infarction ([address]     Hx of blood clots        Past Surgical History:   Procedure Laterality Date     SECTION      CHOLECYSTECTOMY  2015    TYMPANOPLASTY      Ear drum reconstruction left ear        Family History   Problem Relation Age of Onset    No Known Problems Mother     No Known Problems Father        Objective:  Vitals:    22 2132 04/15/22 0805 04/15/22 2001 04/16/22 0753   BP: 102/68 104/68 108/64 105/68   Pulse: 73 66 68 70   Resp: 18 18  18   Temp: 98.1 °F (36.7 °C) 99 °F (37.2 °C)  97.7 °F (36.5 °C)   TempSrc: Oral Temporal  Temporal   SpO2:    97%   Weight:       Height:          1901 -  0700  In: 1591 [P.O.:1591]  Out: -     GEN: NAD, conversational   HEENT: Cranioplasty site with staples, PERRLA, EOMI  RESP: CTAB, no R/R/W   CV: +S1 S2, RR   ABD: soft, NT, ND, normal BS   : no page   EXT: Normal ROM, No clubbing/cyanosis, 2+ pulses   SKIN: No erythema, warm. Crani site C/D/I with staples  NEURO: Alert, no new focal deficits     Labs reviewed  CBC: No results for input(s): WBC, HGB, PLT in the last 72 hours. BMP:  No results for input(s): NA, K, CL, CO2, BUN, CREATININE, GLUCOSE in the last 72 hours. Hepatic: No results for input(s): AST, ALT, ALB, BILITOT, ALKPHOS in the last 72 hours. BNP: No results for input(s): BNP in the last 72 hours. Lipids: No results for input(s): CHOL, HDL in the last 72 hours. Invalid input(s): LDLCALCU  INR: No results for input(s): INR in the last 72 hours.     A/P  This is 42 y.o. female with history of stroke s/p cranioplasty. Rehab: Continue extensive rehabilitation. Continue physical therapy, occupational therapy, and speech-language pathology. No change in medications. Continue current management. Maurizio Guerrero DO, [facility]  Physical Medicine and Rehabilitation     Please note that the above documentation was prepared using voice recognition software. Every attempt was made to ensure accuracy but there may be spelling, grammatical, and contextual errors.

## 2022-04-17 PROCEDURE — 97530 THERAPEUTIC ACTIVITIES: CPT

## 2022-04-17 PROCEDURE — 1280000000 HC REHAB R&B

## 2022-04-17 PROCEDURE — 99232 SBSQ HOSP IP/OBS MODERATE 35: CPT | Performed by: PHYSICAL MEDICINE & REHABILITATION

## 2022-04-17 PROCEDURE — 6370000000 HC RX 637 (ALT 250 FOR IP): Performed by: PHYSICAL MEDICINE & REHABILITATION

## 2022-04-17 RX ADMIN — BACLOFEN 10 MG: 10 TABLET ORAL at 20:26

## 2022-04-17 RX ADMIN — BACLOFEN 10 MG: 10 TABLET ORAL at 15:13

## 2022-04-17 RX ADMIN — ACETAMINOPHEN 1000 MG: 500 TABLET ORAL at 14:40

## 2022-04-17 RX ADMIN — SENNOSIDES 17.2 MG: 8.6 TABLET, COATED ORAL at 20:26

## 2022-04-17 RX ADMIN — ATORVASTATIN CALCIUM 40 MG: 40 TABLET, FILM COATED ORAL at 20:26

## 2022-04-17 RX ADMIN — GABAPENTIN 300 MG: 300 CAPSULE ORAL at 20:26

## 2022-04-17 RX ADMIN — BACLOFEN 10 MG: 10 TABLET ORAL at 09:41

## 2022-04-17 RX ADMIN — ACETAMINOPHEN 1000 MG: 500 TABLET ORAL at 09:40

## 2022-04-17 RX ADMIN — LAMOTRIGINE 50 MG: 25 TABLET ORAL at 20:26

## 2022-04-17 RX ADMIN — METOPROLOL TARTRATE 12.5 MG: 25 TABLET, FILM COATED ORAL at 09:37

## 2022-04-17 RX ADMIN — Medication 9 MG: at 20:27

## 2022-04-17 RX ADMIN — ASPIRIN 81 MG 81 MG: 81 TABLET ORAL at 09:39

## 2022-04-17 RX ADMIN — LAMOTRIGINE 50 MG: 25 TABLET ORAL at 09:40

## 2022-04-17 RX ADMIN — ACETAMINOPHEN 1000 MG: 500 TABLET ORAL at 20:26

## 2022-04-17 RX ADMIN — ERGOCALCIFEROL 50000 UNITS: 1.25 CAPSULE ORAL at 09:39

## 2022-04-17 ASSESSMENT — PAIN SCALES - GENERAL
PAINLEVEL_OUTOF10: 1
PAINLEVEL_OUTOF10: 7
PAINLEVEL_OUTOF10: 5
PAINLEVEL_OUTOF10: 7

## 2022-04-17 ASSESSMENT — PAIN DESCRIPTION - FREQUENCY: FREQUENCY: INTERMITTENT

## 2022-04-17 ASSESSMENT — PAIN DESCRIPTION - PAIN TYPE: TYPE: CHRONIC PAIN

## 2022-04-17 ASSESSMENT — PAIN DESCRIPTION - LOCATION: LOCATION: HEAD

## 2022-04-17 ASSESSMENT — PAIN DESCRIPTION - ORIENTATION: ORIENTATION: ANTERIOR

## 2022-04-17 ASSESSMENT — PAIN DESCRIPTION - DESCRIPTORS: DESCRIPTORS: HEADACHE

## 2022-04-17 NOTE — PROGRESS NOTES
Physical Therapy    Facility/Department: [facility]    NAME: Paulo Christianson  : 1975  MRN: [id number]    Date of Service: 2022  Evaluating Therapist: Amor Garcia, PT, DPT, [id number]      ROOM: [address] [place] [address]  DIAGNOSIS: Late affect CVA  PRECAUTIONS: falls, L hemiparesis, L neglect, L homonymous hemianopsia  Impulsivity, small area of blistering on dorsum of L great toe. 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 At that time she underwent emergent R hemicraniectomy on 10/13. After being deemed medically appropriate, she was transferred to [place] [id number] on 10/22/2021. She progressed well and was discharged home with necessary equipment and  supervision. Pt returned to ARU 22 following replacement of bone flap. Social:  Pt lives with mother and daughter in a 1 floor plan 4 steps and 2 rails to enter. Pt moved in with mother following CVA in .  supervision is possible. Prior to admission: Since return home following CVA in , pt was independent with toileting and bathing. Pt did require Supervision with functional mobility. Pt ambulated with SPC/SBQC, owns , was participating in [facility]. Initial Evaluation  Date: 22 AM      Short Term Goals Long Term Goals    Was pt agreeable to Eval/treatment? Yes yes      Does pt have pain?  No c/o pain No c/o pain      Bed Mobility  Rolling: Noemy  Supine to sit: Noemy  Sit to supine: Noemy  Scooting: Noemy NT  SBA Mod I   Transfers Sit to stand: Noemy  Stand to sit: Noemy  Stand pivot: Noemy with R SPC    5xSTS: TBA  Sit<>Stand SBA  Stand-pivot CGA with R SPC  SBA   Supervision   Ambulation  150 feet with R SPC with Noemy    W/c follow? (Y/N): N    10mWT: 0.25 m/s  6mWT: 112 m  (AD: R SPC, Assist level: Noemy) 200' x 2  with R SPC with SBA (L toe off AFO)     With weight on ankle and posterior of her gait belt   >300 feet with AAD with SBA >600 feet with AAD with Supervision    10mWT: >0.4 m/s  6mWT: >175 m   Wheelchair Mobility NA NT NT     Car Transfers Noemy sba  NT SBA Supervision   Stair negotiation: ascended and descended 12 steps with 1 rail with Noemy     30 steps x 1 rep with RUE support on HR with CGA (non-reciprocal ascent/descent leading with LLE) 9lb weight posterior gait belt     12 steps with 1 rail with SBA 20 steps with 1 rail with Supervision   Walking 10 feet on uneven surface 10 feet with R SPC with Noemy NT N/T 10 feet with AAD with SBA 10 feet with AAD with Supervision   Curb Step:   ascended and descended 4 inch step with R SPC and Noemy NT N/T 4 inch step with AAD and SBA 4 inch step with AAD and Supervision   Picking up object off the floor Picked up cone with Noemy NT N/T Will  cone with SBA Will  2lb weight with SBA   BLE ROM RLE: WNL  LLE:   Hip flexion: WNL  Knee extension: WNL  Dorsiflexion: lacking ~5 degrees from neutral  Plantarflexion: WNL  from previous note RLE: WNL  LLE:   Hip flexion: WNL  Knee extension: WNL  Dorsiflexion: lacking ~5 degrees from neutral  Plantarflexion: WNL      BLE Strength RLE:  Grossly 5/5  LLE:   Hip flexion: 3/5  Knee extension: 4/5  Dorsiflexion: 0/5  Plantarflexion: 2+/5   From previous noteRLE:  Grossly 5/5  LLE:   Hip flexion: 3/5  Knee extension: 4/5  Dorsiflexion: 0/5  Plantarflexion: 2+/5      Balance  Static and dynamic standing balance Noemy with R SPC     BBS: 20/56 (4/8/22) Static and dynamic standing balance CGA with R SPC     BBS: TBD   Date Family Teach Completed        Is additional Family Teaching Needed?   Y or N Y day of discharge Y      Hindering Progress Balance L hemiparesis Balance L hemiparesis      PT recommended ELOS 10-14 days       Team's Discharge Plan        Therapist at Team Meeting                            PM: Ascending/descending 30 steps x 1 rep with RUE support on HR with CGA (non-reciprocal ascent/descent leading with LLE) 7.5 lb ankle weight LLE, 9lb weight posterior gait belt  Ambulation with R SPC, 7.5 lb ankle weight LLE, 9lb weight posterior gait belt  Seated knee flexion with sheet and hip support  Wheel chair propulsion with right le and assist  - short distance     Patient education  Pt educated safety       Patient response to education:   Pt verbalized understanding Pt demonstrated skill Pt requires further education in this area   yes n/a no     Additional Comments:   Pt performed function as per above with weights. AM  830  Time out: 900      Pt is making good progress toward established Physical Therapy goals. Continue with physical therapy current plan of care.     Suraj Gallagher, [address]

## 2022-04-17 NOTE — PLAN OF CARE
Problem: Falls - Risk of:  Goal: Will remain free from falls  Description: Will remain free from falls  4/17/2022 1224 by Dank Vargas RN  Outcome: Ongoing  4/16/2022 2356 by Rex Ortez RN  Outcome: Met This Shift  Goal: Absence of physical injury  Description: Absence of physical injury  4/17/2022 1224 by Dank Vargas RN  Outcome: Ongoing  4/16/2022 2356 by Rex Ortez RN  Outcome: Met This Shift

## 2022-04-17 NOTE — PROGRESS NOTES
PM&R Weekend Progress Note  Patient: Tracy Eye  Age/sex: 42 y.o. female  Medical Record #: [id number]  Date: 4/17/2022    Covering for: Dr. Mary Lisa: Patient seen and examined in her room this morning. No issues overnight. Admits to edema to right cheek, inferior to cranioplasty. Denies facial pain. Denies abdominal pain, chest pain, shortness of breath. All pertinent labs reviewed. Objective:  Vitals:    04/15/22 2001 04/16/22 0753 04/16/22 2053 04/17/22 0937   BP: 108/64 105/68 (!) 102/54 111/60   Pulse: 68 70  70   Resp:  18     Temp:  97.7 °F (36.5 °C)     TempSrc:  Temporal     SpO2:  97%     Weight:       Height:         04/15 1901 - 04/17 0700  In: 1231 [P.O.:1231]  Out: -     GEN: NAD, conversational  AFFECT: Pleasant  HEENT: Cranioplasty site with staples, EOMI  RESP: breathing comfortably, no cyanosis  ABD: Soft, non-tender, non-distended  NEURO: Alert, no new focal deficits     Labs reviewed  CBC: No results for input(s): WBC, HGB, PLT in the last 72 hours. BMP:  No results for input(s): NA, K, CL, CO2, BUN, CREATININE, GLUCOSE in the last 72 hours. Hepatic: No results for input(s): AST, ALT, ALB, BILITOT, ALKPHOS in the last 72 hours. BNP: No results for input(s): BNP in the last 72 hours. Lipids: No results for input(s): CHOL, HDL in the last 72 hours. Invalid input(s): LDLCALCU  INR: No results for input(s): INR in the last 72 hours. A/P  This is 42 y.o. female with stroke s/p cranioplasty. Rehab: Continue extensive rehabilitation. Continue physical therapy, occupational therapy, and speech-language pathology. No change in medications. Continue current management. Maurizio Guerrero DO, [facility]  Physical Medicine and Rehabilitation     Please note that the above documentation was prepared using voice recognition software. Every attempt was made to ensure accuracy but there may be spelling, grammatical, and contextual errors.

## 2022-04-18 PROCEDURE — 6370000000 HC RX 637 (ALT 250 FOR IP): Performed by: PHYSICAL MEDICINE & REHABILITATION

## 2022-04-18 PROCEDURE — 97116 GAIT TRAINING THERAPY: CPT

## 2022-04-18 PROCEDURE — 97112 NEUROMUSCULAR REEDUCATION: CPT

## 2022-04-18 PROCEDURE — 97530 THERAPEUTIC ACTIVITIES: CPT

## 2022-04-18 PROCEDURE — 97032 APPL MODALITY 1+ESTIM EA 15: CPT

## 2022-04-18 PROCEDURE — 97535 SELF CARE MNGMENT TRAINING: CPT

## 2022-04-18 PROCEDURE — 1280000000 HC REHAB R&B

## 2022-04-18 RX ADMIN — LAMOTRIGINE 50 MG: 25 TABLET ORAL at 08:46

## 2022-04-18 RX ADMIN — SENNOSIDES 17.2 MG: 8.6 TABLET, COATED ORAL at 21:04

## 2022-04-18 RX ADMIN — BACLOFEN 10 MG: 10 TABLET ORAL at 08:46

## 2022-04-18 RX ADMIN — ATORVASTATIN CALCIUM 40 MG: 40 TABLET, FILM COATED ORAL at 21:03

## 2022-04-18 RX ADMIN — ACETAMINOPHEN 1000 MG: 500 TABLET ORAL at 08:47

## 2022-04-18 RX ADMIN — ASPIRIN 81 MG 81 MG: 81 TABLET ORAL at 08:46

## 2022-04-18 RX ADMIN — BACLOFEN 10 MG: 10 TABLET ORAL at 21:03

## 2022-04-18 RX ADMIN — ACETAMINOPHEN 1000 MG: 500 TABLET ORAL at 21:03

## 2022-04-18 RX ADMIN — ACETAMINOPHEN 1000 MG: 500 TABLET ORAL at 14:51

## 2022-04-18 RX ADMIN — GABAPENTIN 300 MG: 300 CAPSULE ORAL at 21:04

## 2022-04-18 RX ADMIN — METOPROLOL TARTRATE 12.5 MG: 25 TABLET, FILM COATED ORAL at 21:04

## 2022-04-18 RX ADMIN — LAMOTRIGINE 50 MG: 25 TABLET ORAL at 21:04

## 2022-04-18 RX ADMIN — BACLOFEN 10 MG: 10 TABLET ORAL at 14:28

## 2022-04-18 RX ADMIN — METOPROLOL TARTRATE 12.5 MG: 25 TABLET, FILM COATED ORAL at 08:47

## 2022-04-18 RX ADMIN — Medication 9 MG: at 21:03

## 2022-04-18 ASSESSMENT — PAIN - FUNCTIONAL ASSESSMENT
PAIN_FUNCTIONAL_ASSESSMENT: ACTIVITIES ARE NOT PREVENTED
PAIN_FUNCTIONAL_ASSESSMENT: PREVENTS OR INTERFERES SOME ACTIVE ACTIVITIES AND ADLS

## 2022-04-18 ASSESSMENT — PAIN DESCRIPTION - LOCATION
LOCATION: HEAD

## 2022-04-18 ASSESSMENT — PAIN DESCRIPTION - FREQUENCY
FREQUENCY: INTERMITTENT

## 2022-04-18 ASSESSMENT — PAIN DESCRIPTION - PROGRESSION
CLINICAL_PROGRESSION: GRADUALLY IMPROVING

## 2022-04-18 ASSESSMENT — PAIN DESCRIPTION - ONSET
ONSET: ON-GOING

## 2022-04-18 ASSESSMENT — PAIN SCALES - GENERAL
PAINLEVEL_OUTOF10: 7
PAINLEVEL_OUTOF10: 3
PAINLEVEL_OUTOF10: 3
PAINLEVEL_OUTOF10: 7
PAINLEVEL_OUTOF10: 6

## 2022-04-18 ASSESSMENT — PAIN DESCRIPTION - PAIN TYPE
TYPE: CHRONIC PAIN

## 2022-04-18 ASSESSMENT — PAIN DESCRIPTION - DESCRIPTORS
DESCRIPTORS: HEADACHE

## 2022-04-18 ASSESSMENT — PAIN DESCRIPTION - ORIENTATION: ORIENTATION: ANTERIOR

## 2022-04-18 NOTE — PROGRESS NOTES
Occupational Therapy  OCCUPATIONAL THERAPY DAILY NOTE    Date:2022  Patient Name: Jo-Ann Simmons  MRN: [id number]  : 1975  Room: [address] [place] [address]     Referring Practitioner: Zoë Becker MD  Diagnosis: late effect CVA, R cranioplasty 3/30/2022  Additional Pertinent Hx: seizures, hx R MCA 10/13/2021  Restrictions: Fall Risk, seizures & impulsive    Functional Assessment:   Date Status AE  Comments   Feeding 22  Set up    Pt seated upright in w/c eating of breakfast meal   Grooming 22  Setup Str. Cane  Pt washed face, applied deodorant, combed and blow dried hair seated in w.c at sink         Oral Care 22 Setup seated Pt brushed teeth seated in w/c at sink   Bathing 22 UB-Melisa    LB-min A  Shower   LH hose Pt completed showering task seated/standing, with pt able to wash of UB,LB and malvin area with assistance to stand with use of grab bar and wash of buttocks    Pt stating of having shower seat with hole, able to complete task at home without standing       UB Dressing 22 modA Seated  Assistance to jesus bra, thread and fasten. Pt requiring assistance to thread shirt over L arm, with pt able to pull over head. Per pt, has assistance with this at home prior to arrival     LB Dressing 22 Melisa  Pt donned underwear and pants, able to thread, standing to pull over hips, assistance to L side   Footwear 22 SBA  Pt donned socks, and brace to LLE using cross over technique. Pt able to jesus shoes with elastic laces seated upright in w/c   Toileting 22 SBA  Pt completed toileting task on raised commode, pt able to manage of clothes, complete of hygiene and transfer with use of grab bar   Homemaking 22 min A  Str.  Cane        Functional Transfers / Balance:   Date Status DME  Comments   Sit Balance 22 Independent/Sup  Pt sat supported upright in w/c, and unsupported on shower bench   Stand Balance 22  CGA  str cane Pt stood during transfers and ADL tasks   [] Tub  [x] Shower Transfer 4/18/22 Min A  shower stall Bench & sc Walk in shower transfer with use of grab bar. Pt then completing SPT from shower bench<>w/c at end of task per pt's request due to not having brace donned to LLE   Commode   Transfer 4/18/22  CGA  str cane Raised toilet seat transfer with use of hand rail on commode   Functional   Mobility 4/18/22  SBA/CGA sc Pt ambulated short distance in bathroom with standard cane   Other:on/off recliner ,kitchen chair with arm rests and standard queen bed       Sit to stand  4/8/22 4/19/22 SBA             SBA SC            Str. cane                 Functional Exercises / Activity:    Sensory / Neuromuscular Re-Education:   Functional e stim completed to LUE for 15 mins  with focus on wrist and finger extension. Duty cycle 12/20 and intensity of 37 with good results   Neuro re-ed completed to LUE with pt completing In Motion Robotic Arm. Pt completed 352 reps with focus on active assistive movement, initiation and decreasing level of robot power. Pt demonstrated good tolerance and reports some decrease in overall tightness in elbow and wrist.       Cognitive Skills:   Status Comments   Problem   Solving fair  Demo'd during commode transfers, fxl mobility and sit to stand. Memory fair  \"   Sequencing fair + \"   Safety fair  demo'd during turns needing assist and cues to increase balance using str. Cane. Visual Perception:    Education:  Pt educated on trent dressing techniques with UB dressing tasks    [] Family teach completed on:    Pain Level: back and right foot 4-5/10     Additional Notes:       Patient has made good  progress during treatment sessions toward set goals. Therapy emphasis to obtain goals: ADL retraining, transfers training, functional mobility, therex, endurance/balance retraining, homemaking, PROM/AAROM LUE, neuro recovery, pt/family educaiton      [x] Continue with current OT Plan of care.   [] Prepare for Discharge     Goals  Long term goals  Time Frame for Long term goals : 7-10 days to address above problem areas  Long term goal 1: Pt demo independnet to eat all meals  Long term goal 2: Pt demo Mod I grooming standing @ sink level or seated  Long term goal 3: Pt demo Sup bathing @ shower level seated  Long term goal 4: Pt demo Mod I UE dress & Mod I LE dress inculding underwear, pants, socks, L AFO & shoes & demo G- safety & insight  Long term goal 5: Pt demo Mod I commode trf using approrpatie DME to ensure pt safety  Long term goals 6: Pt demo Sup tub trf using appropriate DME to ensure pt safety  Long term goal 7: Pt demo Min A light homemaking activity & demo G- safety & insight  Long term goal 8: Pt demo G endurance for a 30 minute functional activity  Long term goal 9: Pt will tolerate PROM/AAROM LUE & demo improvement using robotic inmotion arm to facilitate pt ability to functionally use her LUE during ADLs & IADLs as evidenced by gains in th efollowing areas from assessment on 4/7/22-  Patient Goals   Patient goals : \"I want to be normal.\" Pt asked to elaborate- \"Be able to cook & clean. \"    4/13/22 OT plan of care updated on this date. Tentative length of stay is 10-14 days. Radha Villasenor OTR/L [id number]       DISCHARGE RECOMMENDATIONS  Recommended DME:  Sc, tub bench- pt owns  Post Discharge Care:   []Home Independently  [x]Home with 24hr Care / Supervision []Home with Partial Supervision []Home with Home Health OT []Home with Out Pt OT []Other: ___   Comments:         Time in Time out Tx Time Breakdown  Variance:   First Session  7:50am 8:45am [x] Individual Tx-55mins  [] Concurrent Tx -  [] Co-Tx -   [] Group Tx -   [] Time Missed -     Second Session 1000  1015 [] Individual Tx-  [x] Concurrent Tx -15  [] Co-Tx -   [] Group Tx -   [] Time Missed -     Third Session  1300  1345 [] Individual Tx-   [x] Concurrent Tx -45  [] Co-Tx -   [] Group Tx -   [] Time Missed -              Total Tx Mins: 115 mins      Trudy GLOVER/KEITH [address] OTR/L [id number]

## 2022-04-18 NOTE — PROGRESS NOTES
Physical Therapy    Facility/Department: [address] REHAB    NAME: Rajendra Mcgill  : 1975  MRN: [id number]    Date of Service: 2022  Evaluating Therapist: Lydia Higgins, PT, DPT, [id number]      ROOM: [address] [place] [address]  DIAGNOSIS: Late affect CVA  PRECAUTIONS: falls, L hemiparesis, L neglect, L homonymous hemianopsia  Impulsivity, small area of blistering on dorsum of L great toe. 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 At that time she underwent emergent R hemicraniectomy on 10/13. After being deemed medically appropriate, she was transferred to [facility] on 10/22/2021. She progressed well and was discharged home with necessary equipment and  supervision. Pt returned to ARU 22 following replacement of bone flap. Social:  Pt lives with mother and daughter in a 1 floor plan 4 steps and 2 rails to enter. Pt moved in with mother following CVA in .  supervision is possible. Prior to admission: Since return home following CVA in , pt was independent with toileting and bathing. Pt did require Supervision with functional mobility. Pt ambulated with SPC/SBQC, owns , was participating in [facility]. Initial Evaluation  Date: 22 AM     PM    Short Term Goals Long Term Goals    Was pt agreeable to Eval/treatment? Yes yes yes     Does pt have pain?  No c/o pain No c/o pain No c/o     Bed Mobility  Rolling: Noemy  Supine to sit: Noemy  Sit to supine: Noemy  Scooting: Noemy NT N/T SBA Mod I   Transfers Sit to stand: Noemy  Stand to sit: Noemy  Stand pivot: Noemy with R SPC    5xSTS: TBA  Sit<>Stand SBA  Stand-pivot SBA with R SPC Sit<>Stand SBA  Stand-pivot SBA with R SPC SBA   Supervision   Ambulation  150 feet with R SPC with Noemy    W/c follow? (Y/N): N    10mWT: 0.25 m/s  6mWT: 112 m  (AD: R SPC, Assist level: Noemy) 250 feet x 2 reps with R SPC with SBA (L toe off AFO) 250 feet x 1 rep  with R SPC with SBA (L toe off AFO) >300 feet with AAD with SBA >600 feet with AAD with Supervision    10mWT: >0.4 m/s  6mWT: >175 m   Wheelchair Mobility NA NT NT     Car Transfers Noemy NT NT SBA Supervision   Stair negotiation: ascended and descended 12 steps with 1 rail with Noemy NT 30 steps x 1 rep and 10 steps x 1 rep with RUE support on HR with CGA (non-reciprocal ascent/descent leading with LLE) 12 steps with 1 rail with SBA 20 steps with 1 rail with Supervision   Walking 10 feet on uneven surface 10 feet with R SPC with Noemy NT N/T 10 feet with AAD with SBA 10 feet with AAD with Supervision   Curb Step:   ascended and descended 4 inch step with R SPC and Noemy NT N/T 4 inch step with AAD and SBA 4 inch step with AAD and Supervision   Picking up object off the floor Picked up cone with Noemy NT N/T Will  cone with SBA Will  2lb weight with SBA   BLE ROM RLE: WNL  LLE:   Hip flexion: WNL  Knee extension: WNL  Dorsiflexion: lacking ~5 degrees from neutral  Plantarflexion: WNL RLE: WNL  LLE:   Hip flexion: WNL  Knee extension: WNL  Dorsiflexion: lacking ~5 degrees from neutral  Plantarflexion: WNL      BLE Strength RLE:  Grossly 5/5  LLE:   Hip flexion: 3/5  Knee extension: 4/5  Dorsiflexion: 0/5  Plantarflexion: 2+/5 RLE:  Grossly 5/5  LLE:   Hip flexion: 3/5  Knee extension: 4/5  Dorsiflexion: 0/5  Plantarflexion: 2+/5      Balance  Static and dynamic standing balance Noemy with R SPC     BBS: 20/56 (4/8/22) Static and dynamic standing balance CGA with R SPC     BBS: TBD   Date Family Teach Completed        Is additional Family Teaching Needed? Y or N Y day of discharge Y      Hindering Progress Balance L hemiparesis Balance L hemiparesis      PT recommended ELOS 10-14 days       Team's Discharge Plan        Therapist at Team Meeting          General:        HRmax: 177 bpm       Beta blockers (Y/N): Y      Adjusted HRmax: 167 bpm   Blood pressure (mmHg): AM:  - Prior to Tx: 104/61  - Post Tx: 107/63  PM:  - Prior to Tx: NA  - Post Tx: 110/59   Time spent in HR ranges:    Moderate intensity (60-70% HRmax): AM: 0:16:25  PM: 0:19:19   High intensity (70-85% HRmax): AM: 0:06:58  PM: 0:00:55      Therapeutic Exercise:   AM: Ambulation with TENA walker, therapist assistance for FW propulsion, 200 feet x 4 reps with Noemy  Ambulation timed trials with R SPC, 50 feet x 4 reps with SBA (27\" x 2, 26\", 25\")  Ambulation without AD and cues for fast pace, 200 feet x 2 reps with CGA/Noemy  BW walking 50 feet x 2 reps with R SPC  PM: Ascending/descending 30 steps x 1 rep with RUE support on HR with CGA (reciprocal ascent/non reciprocal descent leading with LLE)   Ascending/descending 75 foot ramp with R SPC x 1 rep with Noemy  Ambulation without  feet x 2 reps with Noemy/ModA    Patient education  Pt educated on increased baseline gait speed implication on participation    Patient response to education:   Pt verbalized understanding Pt demonstrated skill Pt requires further education in this area   yes partial yes     Additional Comments: Pt remains pleasant and motivated during sessions. AM session dedicated to improving gait speed via interventions targeting propulsion. Pt is exhibiting objective gait speed improvements evidenced by improving performance on timed gait trials. Pt remains fearful of falling which limits willingness to increase speed, particularly when UE support is reduced. L neglect and visual field deficits result in safety awareness deficits. PM session included continued challenge to propulsion and stance control with progression of stair training and introduction of ramp. Plan to formally re-assess OM tomorrow. Pt appears to be making significant gains in walking capability overall. AM  Time in: 0915  Time out: 1000    PM  Time in: 1345  Time out: 1430    Pt is making good progress toward established Physical Therapy goals. Continue with physical therapy current plan of care.     Dru Zacarias, PT, DPT  Board Certified Clinical Specialist in Neurologic Physical Therapy  [id number]

## 2022-04-18 NOTE — PLAN OF CARE
Problem: Falls - Risk of:  Goal: Will remain free from falls  Description: Will remain free from falls  4/18/2022 1145 by Delta Jordan RN  Outcome: Ongoing     Problem: Falls - Risk of:  Goal: Absence of physical injury  Description: Absence of physical injury  4/18/2022 1145 by Delta Jordan RN  Outcome: Ongoing

## 2022-04-19 PROCEDURE — 97032 APPL MODALITY 1+ESTIM EA 15: CPT

## 2022-04-19 PROCEDURE — 99232 SBSQ HOSP IP/OBS MODERATE 35: CPT | Performed by: PHYSICAL MEDICINE & REHABILITATION

## 2022-04-19 PROCEDURE — 6370000000 HC RX 637 (ALT 250 FOR IP): Performed by: PHYSICAL MEDICINE & REHABILITATION

## 2022-04-19 PROCEDURE — 1280000000 HC REHAB R&B

## 2022-04-19 PROCEDURE — 97116 GAIT TRAINING THERAPY: CPT

## 2022-04-19 PROCEDURE — 97112 NEUROMUSCULAR REEDUCATION: CPT

## 2022-04-19 PROCEDURE — 97530 THERAPEUTIC ACTIVITIES: CPT

## 2022-04-19 RX ADMIN — BACLOFEN 10 MG: 10 TABLET ORAL at 20:51

## 2022-04-19 RX ADMIN — ASPIRIN 81 MG 81 MG: 81 TABLET ORAL at 08:27

## 2022-04-19 RX ADMIN — BACLOFEN 10 MG: 10 TABLET ORAL at 15:16

## 2022-04-19 RX ADMIN — ACETAMINOPHEN 1000 MG: 500 TABLET ORAL at 20:50

## 2022-04-19 RX ADMIN — LAMOTRIGINE 50 MG: 25 TABLET ORAL at 20:51

## 2022-04-19 RX ADMIN — METOPROLOL TARTRATE 12.5 MG: 25 TABLET, FILM COATED ORAL at 08:27

## 2022-04-19 RX ADMIN — Medication 9 MG: at 20:51

## 2022-04-19 RX ADMIN — ATORVASTATIN CALCIUM 40 MG: 40 TABLET, FILM COATED ORAL at 20:51

## 2022-04-19 RX ADMIN — BACLOFEN 10 MG: 10 TABLET ORAL at 08:26

## 2022-04-19 RX ADMIN — ACETAMINOPHEN 1000 MG: 500 TABLET ORAL at 15:17

## 2022-04-19 RX ADMIN — LAMOTRIGINE 50 MG: 25 TABLET ORAL at 08:27

## 2022-04-19 RX ADMIN — SENNOSIDES 17.2 MG: 8.6 TABLET, COATED ORAL at 20:51

## 2022-04-19 RX ADMIN — ACETAMINOPHEN 1000 MG: 500 TABLET ORAL at 08:26

## 2022-04-19 RX ADMIN — GABAPENTIN 300 MG: 300 CAPSULE ORAL at 20:51

## 2022-04-19 RX ADMIN — METOPROLOL TARTRATE 12.5 MG: 25 TABLET, FILM COATED ORAL at 20:51

## 2022-04-19 ASSESSMENT — PAIN DESCRIPTION - LOCATION
LOCATION: HEAD
LOCATION: OTHER (COMMENT)

## 2022-04-19 ASSESSMENT — PAIN SCALES - GENERAL
PAINLEVEL_OUTOF10: 6
PAINLEVEL_OUTOF10: 6
PAINLEVEL_OUTOF10: 8
PAINLEVEL_OUTOF10: 8

## 2022-04-19 ASSESSMENT — PAIN DESCRIPTION - PROGRESSION
CLINICAL_PROGRESSION: GRADUALLY IMPROVING

## 2022-04-19 ASSESSMENT — PAIN DESCRIPTION - PAIN TYPE
TYPE: CHRONIC PAIN
TYPE: CHRONIC PAIN

## 2022-04-19 ASSESSMENT — PAIN DESCRIPTION - DESCRIPTORS
DESCRIPTORS: ACHING;DISCOMFORT
DESCRIPTORS: ACHING;DISCOMFORT;SORE

## 2022-04-19 ASSESSMENT — PAIN DESCRIPTION - ONSET
ONSET: GRADUAL
ONSET: GRADUAL

## 2022-04-19 ASSESSMENT — PAIN DESCRIPTION - ORIENTATION: ORIENTATION: ANTERIOR

## 2022-04-19 ASSESSMENT — PAIN DESCRIPTION - FREQUENCY
FREQUENCY: INTERMITTENT
FREQUENCY: INTERMITTENT

## 2022-04-19 NOTE — PROGRESS NOTES
Occupational Therapy  OCCUPATIONAL THERAPY DAILY NOTE    Date:2022  Patient Name: Danette Castellano  MRN: [id number]  : 1975  Room: [address] [place] [address]     Referring Practitioner: Glenn Burgos MD  Diagnosis: late effect CVA, R cranioplasty 3/30/2022  Additional Pertinent Hx: seizures, hx R MCA 10/13/2021  Restrictions: Fall Risk, seizures & impulsive    Functional Assessment:   Date Status AE  Comments   Feeding 22  Set up       Grooming 22  Setup Str. Cane           Oral Care 22 Setup seated    Bathing 22 UB-Melisa    LB-min A  Shower   LH hose    UB Dressing 22 modA Seated     LB Dressing 22 Melisa     Footwear 22 SBA     Toileting 22 SBA     Homemaking 22 min A  Str. Cane        Functional Transfers / Balance:   Date Status DME  Comments   Sit Balance 22 Independent/Sup     Stand Balance 22  CGA  str cane    [] Tub  [x] Shower   Transfer 22 Min A  shower stall Bench & sc    Commode   Transfer 22  CGA  str cane    Functional   Mobility 22  SBA/CGA sc    Other:on/off recliner ,kitchen chair with arm rests and standard queen bed       Sit to stand  22 SBA             SBA SC            Str. cane                 Functional Exercises / Activity:  BUE integration activity with pt completing arm bike 5 mins for 3 reps with ace wrap to help maintain gross grasp to left hand   Arm skate to LUE on table top surface. Pt with trace active shoulder horizontal adduction and elbow flex on gravity eliminated surface. Sensory / Neuromuscular Re-Education:   Functional e stim completed to LUE for 15 mins with focus on wrist and finger extension with duty cycle 10/20 and intensity of 34 with good results. Pt reports having some more ease when donning hand splint at night due to a min decrease in amount of tone in hand and fingers   Tone reduction to left hand with prolonged stretch, wt bearing and vibration with fair results         Cognitive Skills:   Status Comments   Problem   Solving fair  Demo'd during commode transfers, fxl mobility and sit to stand. Memory fair  \"   Sequencing fair + \"   Safety fair  demo'd during turns needing assist and cues to increase balance using str. Cane. Visual Perception:    Education:  Pt educated on tone reduction techniques to L wrist and hand     [] Family teach completed on:    Pain Level: no complaints on pain on this date     Additional Notes:       Patient has made good  progress during treatment sessions toward set goals. Therapy emphasis to obtain goals: ADL retraining, transfers training, functional mobility, therex, endurance/balance retraining, homemaking, PROM/AAROM LUE, neuro recovery, pt/family educaiton      [x] Continue with current OT Plan of care. [] Prepare for Discharge     Goals  Long term goals  Time Frame for Long term goals : 7-10 days to address above problem areas  Long term goal 1: Pt demo independnet to eat all meals  Long term goal 2: Pt demo Mod I grooming standing @ sink level or seated  Long term goal 3: Pt demo Sup bathing @ shower level seated  Long term goal 4: Pt demo Mod I UE dress & Mod I LE dress inculding underwear, pants, socks, L AFO & shoes & demo G- safety & insight  Long term goal 5: Pt demo Mod I commode trf using approrpatie DME to ensure pt safety  Long term goals 6: Pt demo Sup tub trf using appropriate DME to ensure pt safety  Long term goal 7: Pt demo Min A light homemaking activity & demo G- safety & insight  Long term goal 8: Pt demo G endurance for a 30 minute functional activity  Long term goal 9: Pt will tolerate PROM/AAROM LUE & demo improvement using robotic inmotion arm to facilitate pt ability to functionally use her LUE during ADLs & IADLs as evidenced by gains in th efollowing areas from assessment on 4/7/22-  Patient Goals   Patient goals : \"I want to be normal.\" Pt asked to elaborate- \"Be able to cook & clean. \"    4/13/22 OT plan of care updated on this date. Tentative length of stay is 10-14 days. Chad Meek OTR/L [id number]       DISCHARGE RECOMMENDATIONS  Recommended DME:  Sc, tub bench- pt owns  Post Discharge Care:   []Home Independently  [x]Home with 24hr Care / Supervision []Home with Partial Supervision []Home with Home Health OT []Home with Out Pt OT []Other: ___   Comments:         Time in Time out Tx Time Breakdown  Variance:   First Session  [phone] [x] Individual Tx-45  [] Concurrent Tx -  [] Co-Tx -   [] Group Tx -   [] Time Missed -     Second Session [phone] [x] Individual Tx45-  [] Concurrent Tx -  [] Co-Tx -   [] Group Tx -   [] Time Missed -     Third Session    [] Individual Tx-   [] Concurrent Tx -  [] Co-Tx -   [] Group Tx -   [] Time Missed -              Total Tx Mins: 90 mins        Chad Meek OTR/L [id number]

## 2022-04-19 NOTE — PROGRESS NOTES
Physical Therapy   Facility/Department: [facility] RE[facility]  Treatment Note    NAME: Matilda Pelaez  : 1975  MRN: [id number]    Date of Service: 2022  Evaluating Therapist: Misty Alejandre, PT, DPT, [id number]      ROOM: [address] [place] [address]  DIAGNOSIS: Late affect CVA  PRECAUTIONS: falls, L hemiparesis, L neglect, L homonymous hemianopsia  Impulsivity, small area of blistering on dorsum of L great toe. 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 At that time she underwent emergent R hemicraniectomy on 10/13. After being deemed medically appropriate, she was transferred to [facility] on 10/22/2021. She progressed well and was discharged home with necessary equipment and  supervision. Pt returned to ARU 22 following replacement of bone flap. Social:  Pt lives with mother and daughter in a 1 floor plan 4 steps and 2 rails to enter. Pt moved in with mother following CVA in . / supervision is possible. Prior to admission: Since return home following CVA in , pt was independent with toileting and bathing. Pt did require Supervision with functional mobility. Pt ambulated with SPC/SBQC, owns , was participating in [facility]. Initial Evaluation  Date: 22 AM     PM    Short Term Goals Long Term Goals    Was pt agreeable to Eval/treatment? Yes yes yes     Does pt have pain?  No c/o pain No c/o pain No c/o     Bed Mobility  Rolling: Liv  Supine to sit: Liv  Sit to supine: Liv  Scooting: Liv Supine<>Sit Mod I  Scooting/Rolling Mod I (twin bed) N/T SBA Mod I   Transfers Sit to stand: Liv  Stand to sit: Liv  Stand pivot: Liv with R SPC    5xSTS: TBA  Sit<>Stand SBA  Stand-pivot SBA with R SPC Sit<>Stand SBA  Stand-pivot SBA with R SPC SBA   Supervision   Ambulation  150 feet with R SPC with Liv    W/c follow? (Y/N): N    10mWT: 0.25 m/s  6mWT: 112 m  (AD: R SPC, Assist level: Liv) 275 feet x 2 reps with R SPC with SBA (L toe off AFO)    10mWT: 0.49 m/s  6mWT: 137 m  (, R SPC, SBA) 275 feet x 1 rep  with R SPC with SBA (L toe off AFO) >300 feet with AAD with SBA >600 feet with AAD with Supervision    10mWT: >0.4 m/s  6mWT: >175 m   Wheelchair Mobility NA NT NT     Car Transfers Noemy Supervision NT SBA Supervision   Stair negotiation: ascended and descended 12 steps with 1 rail with Noemy 16 steps with RUE support on HR with SBA (non-reciprocal) 30 steps x 2 reps with RUE support on HR with SBA (non-reciprocal ascent/descent leading with LLE) 12 steps with 1 rail with SBA 20 steps with 1 rail with Supervision   Walking 10 feet on uneven surface 10 feet with R SPC with Noemy NT N/T 10 feet with AAD with SBA 10 feet with AAD with Supervision   Curb Step:   ascended and descended 4 inch step with R SPC and Noemy NT N/T 4 inch step with AAD and SBA 4 inch step with AAD and Supervision   Picking up object off the floor Picked up cone with Noemy NT N/T Will  cone with SBA Will  2lb weight with SBA   BLE ROM RLE: WNL  LLE:   Hip flexion: WNL  Knee extension: WNL  Dorsiflexion: lacking ~5 degrees from neutral  Plantarflexion: WNL RLE: WNL  LLE:   Hip flexion: WNL  Knee extension: WNL  Dorsiflexion: lacking ~5 degrees from neutral  Plantarflexion: WNL      BLE Strength RLE:  Grossly 5/5  LLE:   Hip flexion: 3/5  Knee extension: 4/5  Dorsiflexion: 0/5  Plantarflexion: 2+/5 RLE:  Grossly 5/5  LLE:   Hip flexion: 3/5  Knee extension: 4/5  Dorsiflexion: 0/5  Plantarflexion: 2+/5      Balance  Static and dynamic standing balance Noemy with R SPC     BBS: 20/56 (4/8/22) Static and dynamic standing balance CGA with R SPC BBS: 32/56 (4/19/22)    BBS: TBD   Date Family Teach Completed        Is additional Family Teaching Needed?   Y or N Y day of discharge Y      Hindering Progress Balance L hemiparesis Balance L hemiparesis      PT recommended ELOS 10-14 days       Team's Discharge Plan        Therapist at Team Meeting          General:        HRmax: 177 bpm       Beta blockers (Y/N): Y Adjusted HRmax: 167 bpm   Blood pressure (mmHg): AM:  - Prior to Tx: 102/64  - Post Tx: 103/60  PM:  - Prior to Tx: NA  - Post Tx: 108/72   Time spent in HR ranges: Moderate intensity (60-70% HRmax): AM: 0:12:18  PM: 0:19:01   High intensity (70-85% HRmax): AM: 0:00:45  PM: 0:10:46      Therapeutic Exercise:   AM: Ambulation timed trials with R SPC, 50 feet x 4 reps (26\" x 2, 25\", 24\")  PM: Resisted (red flex band) forward ambulation with R SPC, 7.5lb ankle weight LLE, 9lb ankle weight at posterior gait belt, 200 feet x 2 reps with Noemy  Counter clockwise box step around small tile square with R SPC, 7.5lb ankle weight LLE, 9lb ankle weight at posterior gait belt, x 5 reps with CGA/SBA    Patient education  Pt educated on implications of OM results on activity and participation level    Patient response to education:   Pt verbalized understanding Pt demonstrated skill Pt requires further education in this area   yes partial yes     Additional Comments: Pt remains pleasant and motivated during session. Functional mobility with SPC reviewed with emphasis on safe technique, outcome measures repeated with improvements as noted above. Pt exhibits significant improvements in gait speed over short distances and nearly doubled her speed compared to initial evaluation. Long distance walking however remains relatively unchanged as reflected by minimal improvement in 6mWT. 6mWT score improvement was limited by frequent pauses and increased time spent turning which negatively affected pt's score. It should be noted however that pt's low initial 6mWT score may indicate a greater functional impact of small improvements. Intensity of AM session limited due to OM assessment. PM session included resumption of propulsion training with intensity added by resistance bands/added weight. Pt consistently rates RPE at 16/20 during intense walking tasks. BBS repeated with significant improvement in static/dynamic standing balance.  Plan to

## 2022-04-19 NOTE — PROGRESS NOTES
[facility]  Comprehensive Progress Note    Subjective:      Genny Schafer is a 42 y.o. female admitted to inpatient rehabilitation for impairments and acitivities limitations in ADL and mobility secondary to CVA. No acute events overnight. No cp, sob, n/v. Pain controlled. Tolerating therapy. No new complaints. No new issues. The patient's medical records have been reviewed. Scheduled Meds:nicotine, 1 patch, Daily  acetaminophen, 1,000 mg, TID  aspirin, 81 mg, Daily  atorvastatin, 40 mg, Nightly  baclofen, 10 mg, TID  vitamin D, 50,000 Units, Weekly  gabapentin, 300 mg, Nightly  lamoTRIgine, 50 mg, BID  melatonin, 9 mg, Nightly  metoprolol tartrate, 12.5 mg, BID  senna, 2 tablet, Nightly      Continuous Infusions:  PRN Meds:acetaminophen, 650 mg, Q6H PRN  diclofenac sodium, 2 g, 4x Daily PRN  ondansetron, 4 mg, Q8H PRN         Objective:      Vitals:    04/17/22 2028 04/18/22 0847 04/18/22 2103 04/19/22 0730   BP: (!) 94/55 104/61 111/60 102/65   Pulse: 81 78 71 74   Resp:  16  18   Temp:  97.8 °F (36.6 °C)  97.5 °F (36.4 °C)   TempSrc:  Oral  Oral   SpO2:  96%     Weight:       Height:         General appearance: alert,  NAD, up in w/c in gym  Head: Right cranioplasty, incision c/d/i   Eyes: conjunctivae/corneas clear. PERRL, EOM's intact.   Lungs: clear to auscultation bilaterally  Heart: regular rate and rhythm, S1, S2 normal  Abdomen: soft, non-tender, normal bowel sounds   Musculoskeletal: Range of motion impaired   Skin: Right cranioplasty, incision c/d/i  Neurologic: AAOx4. Speech clear, content appropriate. Follows commands. SILT.  LUE and LLE spasticity, MAS 2     Strength:                     R          L  Deltoid                         5          8  Biceps                         5          0  Triceps                        5          0  Wrist Ext                     5          0  FDP                             5          2  Finger Tami Renetta          6  Hip Flex                       5         3-  Knee Ext                     5         4-  Ankle dorsi                  5          8  ZBT                             8          4  Ankle Plantar               5          3      Laboratory:    Lab Results   Component Value Date    WBC 7.8 04/07/2022    HGB 12.0 04/07/2022    HCT 37.2 04/07/2022    MCV 95.6 04/07/2022     (H) 04/07/2022     Lab Results   Component Value Date     04/07/2022    K 4.4 04/07/2022     04/07/2022    CO2 22 04/07/2022    BUN 14 04/07/2022    CREATININE 0.7 04/07/2022    GLUCOSE 95 04/07/2022    CALCIUM 9.3 04/07/2022      Lab Results   Component Value Date    ALT 75 (H) 10/23/2021    AST 46 (H) 10/23/2021    ALKPHOS 89 10/23/2021    BILITOT 0.4 10/23/2021         Functional Status:   Physical Therapy:  Bed mobility: Min A  Transfers: CGA  Ambulation: 0 ft R SPC SBA, left AFO     Occupational Therapy:  Feeding: Setup  Grooming: CGA  UB dressing: Min-Mod A  LB dressing: SBA-Min A       Assessment/Plan:       42 y. o. female admitted to inpatient rehabilitation for impairments and acitivities limitations in ADL and mobility secondary to CVA.       -History of right MCA CVA (Oct 2021)-- with cerebral edema and midline shift, s/p right hemicraniectomy (Oct 2021-[facility]). Patient completed ARU 10/22/2021-12/7/2021 and was discharged home. She is now s/p right cranioplasty (3/30/3022 - [facility]). Left spastic hemiparesis, left inattention, vision impairment. LUE sling for gait. Left AFO. Continue Aspirin, Lipitor for secondary stroke prevention. Monitor neuro exam. Continue Acute rehab program. Patient has post op follow up appt at [facility] on 4/26.   -Pain control: Tylenol for mild pain, headache. Gabapentin for neuropathic pain. Voltaren gel prn.    -Spasticity, LUE/LLE: Continue Baclofen  -History of suspected seizure: Continue Lamictal at home dose. Patient to follow back up with Neurology after discharge. -History of intermittent Tachycardia--Seen by Cardiology during prior ARU admission, started on low dose metoprolol. Outpatient event monitor with ?result and Cardiology follow up. Continue home dose metoprolol.   -Tobacco abuse: tobacco cessation education, resources. Nicoderm patch     Staples have been removed, incision closed, c/d/i   Continue Acute rehab program, progressing.    Team conference tomorrow      Electronically signed by Anna Flores MD on 4/19/2022 at 10:16 AM

## 2022-04-19 NOTE — PROGRESS NOTES
Physical Therapy  Facility/Department: [address] REHAB  Weekly Team Note    NAME: Matthew Ferris  : 1975  MRN: [id number]    Date of Service: 2022  Evaluating Therapist: Kanchan Hsu PT, SIMÓN [id number]      [facility]: [id number]  DIAGNOSIS: Late affect CVA  PRECAUTIONS: falls, L hemiparesis, L neglect, L homonymous hemianopsia  Impulsivity, Custom L AFO small area of blistering on dorsum of L great toe. 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 At that time she underwent emergent R hemicraniectomy on 10/13. After being deemed medically appropriate, she was transferred to [facility] on 10/22/2021. She progressed well and was discharged home with necessary equipment and  supervision. Pt returned to ARU 22 following replacement of bone flap. Social:  Pt lives with mother and daughter in a 1 floor plan 4 steps and 2 rails to enter. Pt moved in with mother following CVA in . 24/ supervision is possible. Prior to admission: Since return home following CVA in , pt was independent with toileting and bathing. Pt did require Supervision with functional mobility. Pt ambulated with SPC/SBQC, owns , was participating in [facility]. Initial Evaluation  Date: 22 Comments Short Term Goals Long Term Goals    Was pt agreeable to Eval/treatment? Yes yes Yes      Does pt have pain?  No c/o pain No c/o pain No c/o pain      Bed Mobility  Rolling: Noemy  Supine to sit: Noemy  Sit to supine: Noemy  Scooting: Noemy Supine<>Sit SBA  Scooting/Rolling SBA Supine<>Sit Mod I  Scooting/Rolling Mod I (twin bed)  SBA Mod I   Transfers Sit to stand: Noemy  Stand to sit: Noemy  Stand pivot: Noemy with R SPC    5xSTS: TBA  Sit<>Stand CGA  Stand-pivot CGA with R SPC Sit<>Stand SBA  Stand-pivot SBA with R SPC  cues for safety with transfers and approach to chair SBA   Supervision   Ambulation  150 feet with R SPC with Noemy    W/c follow? (Y/N): N    10mWT: 0.25 m/s  6mWT: 112 m  (AD: R SPC, Assist level: Noemy) 200 feet x 2 reps with R SPC with CGA (L toe off AFO) 300 feet with R SPC with SBA  (L toe off AFO)    10mWT: 0.49 m/s  6mWT: 137 m  (4/19, R SPC, SBA) Directional cues required due to L neglect/visual deficits    Patient has nearly doubled walking speed over short distances (10mWT). 6mWT score not as improved due to limited endurance, slow turns, multiple standing breaks required. >300 feet with AAD with SBA >600 feet with AAD with Supervision    10mWT: >0.4 m/s  6mWT: >175 m   Wheelchair Mobility NA NA NA      Car Transfers Noemy CGA Supervision  SBA Supervision   Stair negotiation: ascended and descended 12 steps with 1 rail with Noeym 12 steps with 1 HR with CGA 16 steps with RUE support on HR with SBA (non-reciprocal) Cues for safety strategies 12 steps with 1 rail with SBA 20 steps with 1 rail with Supervision   BLE ROM RLE: WNL  LLE:   Hip flexion: WNL  Knee extension: WNL  Dorsiflexion: lacking ~5 degrees from neutral  Plantarflexion: WNL RLE: WNL  LLE:   Hip flexion: WNL  Knee extension: WNL  Dorsiflexion: lacking ~5 degrees from neutral  Plantarflexion: WNL RLE: WNL  LLE:   Hip flexion: WNL  Knee extension: WNL  Dorsiflexion: lacking ~5 degrees from neutral  Plantarflexion: WNL      BLE Strength RLE:  Grossly 5/5  LLE:   Hip flexion: 3/5  Knee extension: 4/5  Dorsiflexion: 0/5  Plantarflexion: 2+/5 RLE:  Grossly 5/5  LLE:   Hip flexion: 3/5  Knee extension: 4/5  Dorsiflexion: 0/5  Plantarflexion: 2+/5 RLE:  Grossly 5/5  LLE:   Hip flexion: 3/5  Knee extension: 4/5  Dorsiflexion: 0/5  Plantarflexion: 2+/5      Balance  Static and dynamic standing balance Noemy with R SPC     BBS: 20/56 (4/8/22) Static and dynamic standing balance CGA with R SPC    BBS: 20/56 (4/8/22) Static and dynamic standing balance SBA with R SPC    BBS: 32/56     BBS: TBD   Date Family Teach Completed         Is additional Family Teaching Needed?   Y or N Y day of discharge Y Y Day of discharge     Hindering Progress Balance L hemiparesis Balance L hemiparesis Balance, walking deficits, L hemiparesis      PT recommended ELOS 2 weeks 10-14 days Rec discharge Friday 4/22       Team's Discharge Plan  10-14 days Discharge Friday 4/22/22      Therapist at Team Meeting  Charmayne Brave, PT, DPT [id number]   Charmayne Brave, PT, DPT [id number]          Date:  4/19/22  Supporting factors:  Pt is motivated, has supportive family and appropriate home setup, exhibiting continued improvements  Barriers to discharge:  Remaining L visual field deficits and neglect, remaining L hemiparesis  Additional comments:  Gait speed and balance have significantly improved, reflected by OM. DME:  NA, pt owns all  After Care:  OPPT, continued supervision from family    Date:  4/12/22  Supporting factors:  Pt has supportive family, is motivated, has appropriate home setup and all necessary equipment. Barriers to discharge:  Poor safety, visual/spatial neglect, L hemiparesis.    Additional comments:  Walking and balance deficits lead to poor participation in life roles, pt has potential to improve QOL with recommended LOS  DME:  NA, pt has all necessary equipment  After Care:  OPPT, continued supervision from family    Nora Holstein, PT, DPT  Board Certified Clinical Specialist in Neurologic Physical Therapy  [id number]

## 2022-04-19 NOTE — PROGRESS NOTES
THERAPEUTIC RECREATION PROGRESS NOTE     Task/Intervention: [] Cards  [] Crafts  [] Crocheting  [] Fishing  [] Floral arranging  [x] Games  [] Golfing   [] Homemaking  [] Hunting  [] Horticulture  [] Mechanical  [] Rosamaria[employer] Winn working  [] [employer]  [] Other________ Time/Duration: _30__Mins  Comments: CTRS met with pt to play things that go together trivia. CTRS read statements and pt was informed to finish statement with item that went with it. Pt answered approximately 90% of questions correctly. Pt discussed with this writer different combinations of food she likes to have together. Leisure Awareness: Identified the following as being benefits to leisure involvement.  [] Competition  [] Creativity  [] Concentration  [] Enjoyment   [] Exercise  [x] Fun  [] Hand/eye Coordination  [] Increase confidence  [] Learning a new skill  [] Relaxation  [x] Social Interaction  [] Supporting one another  [] Thinking  [] Other:___ Comments: _____   Affect: [] Appropriate  [] Bizarre  [] Blunted  [x] Bright  [] Flat  [] Labile  [] Superficial  [] Tearful Comments: _____   Self Esteem:   Identified the following as being personal positive qualities [] Ambitious  [] Appreciative  [] Caring  [] Courteous  [] Considerate  [] Compassionate  [] Creative  [] Dependable   [] Generous  [] Honest  [] Hard working  [] Helpful  [] Kind  [] [place]   [] [place]  [] [employer]  [] Patient  [] Polite  [] Respectful  [] Sociable  [] Sense of humor  [] Thoughtful  [] Other: ___________ Comments: _____   Social Interaction: [x] Independent  [] Modified Independent  [] Supervision  [] Minimal Assistance  [] Moderate Assistance  [] Maximal Assistance  [] Total Assistance  [] Other: ______________ Comments: _____   Drug & Alcohol:  Indicated the following as alternatives for leisure and how to recreate without drugs and alcohol.   [] Cards  [] Crafts  [] Crocheting  [] Fishing  [] Floral arranging  [] Games  [] Golfing   [] Homemaking  [] Hunting  [] Horticulture  [] Mechanical  [] [place] working  [] [employer]  [] Other________ Comments: _____   Financial: Listed _____ as being specific low cost activities to be utilized after D/C. Comments: _____   Com. Resource Education: Listed _____ community resources to include type of services, address and phone.  Comments: _____   Noa Parham

## 2022-04-20 ENCOUNTER — TELEPHONE (OUTPATIENT)
Dept: PHYSICAL MEDICINE AND REHAB | Age: 47
End: 2022-04-20

## 2022-04-20 PROCEDURE — 99232 SBSQ HOSP IP/OBS MODERATE 35: CPT | Performed by: PHYSICAL MEDICINE & REHABILITATION

## 2022-04-20 PROCEDURE — 1280000000 HC REHAB R&B

## 2022-04-20 PROCEDURE — 97530 THERAPEUTIC ACTIVITIES: CPT

## 2022-04-20 PROCEDURE — 97112 NEUROMUSCULAR REEDUCATION: CPT

## 2022-04-20 PROCEDURE — 6370000000 HC RX 637 (ALT 250 FOR IP): Performed by: PHYSICAL MEDICINE & REHABILITATION

## 2022-04-20 PROCEDURE — 97116 GAIT TRAINING THERAPY: CPT

## 2022-04-20 RX ADMIN — GABAPENTIN 300 MG: 300 CAPSULE ORAL at 21:37

## 2022-04-20 RX ADMIN — ACETAMINOPHEN 1000 MG: 500 TABLET ORAL at 08:49

## 2022-04-20 RX ADMIN — BACLOFEN 10 MG: 10 TABLET ORAL at 22:00

## 2022-04-20 RX ADMIN — ATORVASTATIN CALCIUM 40 MG: 40 TABLET, FILM COATED ORAL at 21:36

## 2022-04-20 RX ADMIN — LAMOTRIGINE 50 MG: 25 TABLET ORAL at 08:50

## 2022-04-20 RX ADMIN — Medication 9 MG: at 21:41

## 2022-04-20 RX ADMIN — BACLOFEN 10 MG: 10 TABLET ORAL at 15:53

## 2022-04-20 RX ADMIN — LAMOTRIGINE 50 MG: 25 TABLET ORAL at 21:41

## 2022-04-20 RX ADMIN — ACETAMINOPHEN 1000 MG: 500 TABLET ORAL at 15:52

## 2022-04-20 RX ADMIN — ASPIRIN 81 MG 81 MG: 81 TABLET ORAL at 08:50

## 2022-04-20 RX ADMIN — ACETAMINOPHEN 1000 MG: 500 TABLET ORAL at 21:38

## 2022-04-20 RX ADMIN — SENNOSIDES 17.2 MG: 8.6 TABLET, COATED ORAL at 22:00

## 2022-04-20 RX ADMIN — BACLOFEN 10 MG: 10 TABLET ORAL at 08:50

## 2022-04-20 ASSESSMENT — PAIN DESCRIPTION - DESCRIPTORS
DESCRIPTORS: ACHING
DESCRIPTORS: ACHING;DISCOMFORT;SORE
DESCRIPTORS: SORE
DESCRIPTORS: ACHING

## 2022-04-20 ASSESSMENT — PAIN SCALES - GENERAL
PAINLEVEL_OUTOF10: 6
PAINLEVEL_OUTOF10: 0
PAINLEVEL_OUTOF10: 7
PAINLEVEL_OUTOF10: 8

## 2022-04-20 ASSESSMENT — PAIN DESCRIPTION - LOCATION
LOCATION: HIP
LOCATION: HEAD
LOCATION: LEG
LOCATION: HIP
LOCATION: HEAD

## 2022-04-20 ASSESSMENT — PAIN DESCRIPTION - FREQUENCY
FREQUENCY: INTERMITTENT

## 2022-04-20 ASSESSMENT — PAIN DESCRIPTION - ONSET
ONSET: GRADUAL

## 2022-04-20 ASSESSMENT — PAIN DESCRIPTION - ORIENTATION
ORIENTATION: LEFT
ORIENTATION: RIGHT;LEFT

## 2022-04-20 ASSESSMENT — PAIN - FUNCTIONAL ASSESSMENT
PAIN_FUNCTIONAL_ASSESSMENT: ACTIVITIES ARE NOT PREVENTED

## 2022-04-20 ASSESSMENT — PAIN DESCRIPTION - PAIN TYPE
TYPE: SURGICAL PAIN
TYPE: CHRONIC PAIN

## 2022-04-20 ASSESSMENT — PAIN DESCRIPTION - PROGRESSION: CLINICAL_PROGRESSION: GRADUALLY IMPROVING

## 2022-04-20 NOTE — CARE COORDINATION
Per Team: d/c 4/22. Updated pt and Mom Judie Johnson. LTG: Min assist/Mod I. Pt complains of headache. [facility]. Pt had staples removed 4/18. Pt is making good gains. Pt experiences a left neglect. Pt has visual deficits. Pt COLEY score is 32/56- she is considered a \"high fall risk\". DME: Pt has single point cane- No other equipment needed. Aftercare: Outpatient PT, OT- Scripts faxed to [facility] ([phone]). Called them at [phone] to let them know. They will call Ladan to schedule. OP IN MOTION ARM:  Script faxed to Marion Barcenas at [facility]. Marion Barcenas to call patient directly to schedule. FT: 4/22 AM with mom Judie Johnson. Keiko Wright reported to [facility] insurance is changing again. Blanchie Bevels Blanchie Bevels \"  Tuyet Bo suggested she call [facility]. PETER spoke to Eli Camacho from [facility]. [facility] will plan to call Keiko Wright tomorrow to discuss services. The Plan for Transition of Care is related to the following treatment goals: Home with outpatient PT and OT. The Patient and/or patient representative Ladan/Elsa was provided with a choice of provider and agrees   with the discharge plan. [x] Yes [] No    Freedom of choice list was provided with basic dialogue that supports the patient's individualized plan of care/goals, treatment preferences and shares the quality data associated with the providers.  [x] Yes [] No    PETER Jacobs Intern  Janine Guerrier, [place]  4/20/2022

## 2022-04-20 NOTE — PROGRESS NOTES
Occupational Therapy  OCCUPATIONAL THERAPY DAILY NOTE    Date:2022  Patient Name: Shereen Roach  MRN: [id number]  : 1975  Room: [address] [place], [place] [address]     Referring Practitioner: See Chandler MD  Diagnosis: late effect CVA, R cranioplasty 3/30/2022  Additional Pertinent Hx: seizures, hx R MCA 10/13/2021  Restrictions: Fall Risk, seizures & impulsive    Functional Assessment:   Date Status AE  Comments   Feeding 22  Set up       Grooming 22  Setup Str. Cane           Oral Care 22 Setup seated    Bathing 22 UB-Melisa    LB-min A  Shower   LH hose    UB Dressing 22  Min A  Seated     LB Dressing 22 Melisa     Footwear 22 SBA     Toileting 22 SBA     Homemaking 22 min A  Str. Cane        Functional Transfers / Balance:   Date Status DME  Comments   Sit Balance 22 Independent/Sup     Stand Balance 22  CGA  str cane    [] Tub  [x] Shower   Transfer 22 Min A  shower stall Bench & sc    Commode   Transfer 22 SBA  str cane    Functional   Mobility 22  SBA sc Back and forth to the bathroom    Other:on/off recliner ,kitchen chair with arm rests and standard queen bed       Sit to stand  22 SBA             SBA SC            Str. cane                 Functional Exercises / Activity:  BUE integration activity with pt completing emanuel box with 8 # wt 3 sets 10 reps in all planes on table top surface     Sensory / Neuromuscular Re-Education:  PROM exercises to LUE in all planes to maintain joint integrity   Tone reduction to L hand  with prolonged stretch over weighted ball,  vibration, wt bearing with 3 # weight applied on hand on table top surface with good results. Neuro re-ed completed to LUE on In Motion Robotic Arm. Pt tolerated 432 reps with improvement noted AIM, level of robot power and active initiation. Pt also able to complete 80 of the reps as stabilization  demonstrating improved ability to maintain in midline          Cognitive Skills:   Status Comments   Problem   Solving fair  Demo'd during commode transfers, fxl mobility and sit to stand. Memory fair  \"   Sequencing fair + \"   Safety fair  demo'd during turns needing assist and cues to increase balance using str. Cane. Visual Perception:    Education:  Pt was educated on safe functional transfers at [address] level     [] Family teach completed on:    Pain Level: no complaints on pain on this date     Additional Notes:       Patient has made good  progress during treatment sessions toward set goals. Therapy emphasis to obtain goals: ADL retraining, transfers training, functional mobility, therex, endurance/balance retraining, homemaking, PROM/AAROM LUE, neuro recovery, pt/family educaiton      [x] Continue with current OT Plan of care. [] Prepare for Discharge     Goals  Long term goals  Time Frame for Long term goals : 7-10 days to address above problem areas  Long term goal 1: Pt demo independnet to eat all meals  Long term goal 2: Pt demo Mod I grooming standing @ sink level or seated  Long term goal 3: Pt demo Sup bathing @ shower level seated  Long term goal 4: Pt demo Mod I UE dress & Mod I LE dress inculding underwear, pants, socks, L AFO & shoes & demo G- safety & insight  Long term goal 5: Pt demo Mod I commode trf using approrpatie DME to ensure pt safety  Long term goals 6: Pt demo Sup tub trf using appropriate DME to ensure pt safety  Long term goal 7: Pt demo Min A light homemaking activity & demo G- safety & insight  Long term goal 8: Pt demo G endurance for a 30 minute functional activity  Long term goal 9: Pt will tolerate PROM/AAROM LUE & demo improvement using robotic inmotion arm to facilitate pt ability to functionally use her LUE during ADLs & IADLs as evidenced by gains in th efollowing areas from assessment on 4/7/22-  Patient Goals   Patient goals : \"I want to be normal.\" Pt asked to elaborate- \"Be able to cook & clean. \"    4/13/22 OT plan of care updated on this date. Tentative length of stay is 10-14 days. Juana Sandhu OTR/L [id number]  4/20/22 OT plan of care updated on this date. Tentative discharge date is 4/22/22 . Juana Salomonica OTR/L [id number]       DISCHARGE RECOMMENDATIONS  Recommended DME:  Sc, tub bench- pt owns  Post Discharge Care:   []Home Independently  [x]Home with 24hr Care / Supervision []Home with Partial Supervision []Home with Home Health OT []Home with Out Pt OT []Other: ___   Comments:         Time in Time out Tx Time Breakdown  Variance:   First Session  [id number] [x] Individual Tx-45  [] Concurrent Tx -  [] Co-Tx -   [] Group Tx -   [] Time Missed -     Second Session (03) [phone] [x] Individual Tx45-  [] Concurrent Tx -  [] Co-Tx -   [] Group Tx -   [] Time Missed -     Third Session    [] Individual Tx-   [] Concurrent Tx -  [] Co-Tx -   [] Group Tx -   [] Time Missed -              Total Tx Mins: 90 mins        Juana Salomonica OTR/L [id number]

## 2022-04-20 NOTE — PATIENT CARE CONFERENCE
[address] NOTE/PATIENT PLAN OF CARE    The physician was present and led this team conference    Date: 2022  Admission date: 2022  Patient Name: Katy Evangelista        MRN: [id number]    : 1975  (42 y.o.)  Gender: female   Rehab diagnosis/surgery with date:  Late effect CVA from original right MCA stroke of 10/12/21  Impairment Group Code:  1.1      MEDICAL/FUNCTIONAL HISTORY/STATUS:  staples out from craniotomy incision, making gains    Consultations/Labs/X-rays: none recent      MEDICATION UPDATE:  reglan stopped      NURSING :    Bowel:   Always Continent  [x]   Occasionally incontinent  []   Frequently incontinent  []   Always incontinent  []   Not occurred  []     Bladder:   Always Continent  [x]    Incontinent less than daily[]   Incontinent  daily []   Always incontinent  []   No urine output    []   Indwelling catheter []       Toilet Hygiene:   Current level : standby assist  Short term Toilet hygiene goal: Modified independent  Long term toilet hygiene goal:  Modified independent    Skin integrity: open to air, all staples and sutures out from scalp  Pain: Headache pain, has tylenol ordered    NUTRITION    Diet  regular  Liquid consistency   thin    SOCIAL INFORMATION:  Lives with: mom and daughter  Prior community services:  has Waiver services in place -eligible for home health aid 3 hours daily and emergency response button  ( is Lizeth Zhao, phone [phone])  Home Architecture:  ranch, 4 entry 2 rails  Prior Level of function:  help with dressing, meal prep, homemaking  DME:  quad cane, wheelchair, bedside commode, shower chair     FAMILY / PATIENT EDUCATION:  will discharge to mom's house    PHYSICAL THERAPY    Bed mobility:   Current level: Modified independent  Short term bed mobility goal: Modified independent  Long term bed mobility goal: Modified independent    Chair/bed transfers:  Current level: standby assist with straight cane  and safety cues  Short term Chair/bed transfers goal: met  Long term Chair/bed transfers goal: supervision      Ambulation:   Current level: 300 ft straight cane  at standby assist and left toe off brace  Short term ambulation goal: met  Long term ambulation goal: 600 ft at supervision    Car transfers:   Current level: supervision  Short term car transfers goal: supervision  Long term car transfers goal:supervision    Stairs:   Current level : 16 with 1 rail at standby assist, cues for safety  Short term stairs goal: met  Long term stairs goal: 20 at supervision    Lower Extremity Strength Issues:    RLE:  Grossly 5/5  LLE:   Hip flexion: 3/5  Knee extension: 4/5  Dorsiflexion: 0/5  Plantarflexion: 2+/5    Other comments: COLEY balance improved to 32/56 from 20/56 on 4/8/22      OCCUPATIONAL THERAPY:      Tub/shower:   Current level: min assist  Short term tub/shower goal: supervision  Long term tub/shower goal: supervision      Feeding:  Current level: supervision  Short term feeding goal: Modified independent  Long term feeding goal: Modified independent    Grooming:   Current level: standby assist  Short term grooming goal: Modified independent  Long term grooming goal: Modified independent    Bathing:  Current level: min assist at shower level  Short term bathing goal: supervision  Long term bathing goal: supervision    Homemaking:   Current level: min assist  Short term homemaking goal: min assist  Long term homemaking goal: min assist    Upper body dressing:  Current level: min assist, struggles with bra, help with sling  Short term upper body dressing goal: supervision  Long term upper body dressing goal: Modified independent    Lower body dressing:                                                                          Current level: standby assist             Short term lower body dressing goal: met          Long term lower body dressing goal: Modified independent Footwear  Current level: supervision  Short term goal: met  Long term goal:Modified independent      Toilet transfer:   Current level: Contact guard assist-standby assist  Short term toilet transfer goal: met  Long term toilet transfer goal: Modified independent    Upper body strength issues: InMotion arm and e-stim to left upper, sling for left upper      Safety awareness: fair      Patient/family's personal goals: \"be normal, be able to clean and cook\"  Factors supporting goal achievement:  motivated, making gains  Factors hindering goal achievement:  left side weakness, left visual field deficits      Discharge Plan   Estimated Length of Stay: 4/22            Destination: home   Services at Discharge: outpatient  PT, OT  Equipment at Discharge: has a straight cane, no new needs      INTERDISCIPLINARY TEAM/PHYSICIAN RECOMMENDATION AND/OR REVISIONS OF PLAN OF CARE:  finalize discharge for 4/22/22      I approve the established interdisciplinary plan of care as documented within the medical record of [facility]. Electronically signed by Phil Campbell RN  on 4/20/2022 at 8:54 AM  The following interdisciplinary team members were present:  SJ Fabian RN Sherita Igo, LUCIANO Baumann, DPT  Mingo Mooney, OTR

## 2022-04-20 NOTE — PROGRESS NOTES
THERAPEUTIC RECREATION PROGRESS NOTE     Task/Intervention: [] Cards  [] Crafts  [] Crocheting  [] Fishing  [] Floral arranging  [x] Games  [] Golfing   [] Homemaking  [] Hunting  [] Horticulture  [] Mechanical  [] Bryant Dominic working  [] [employer]  [] Other________ Time/Duration: _60__Mins  Comments: Pt played bingo in a small group setting. Pt listened for letters and numbers called and identified letters and numbers called by placing chip on card. Pt used fine motor skills to  chips and place on card. Pt used scanning skills to find numbers called on card. Pt gave positive encouragement to peer to attend activity and offered assistance to peer to help her with her card if peer needed it. Pt requested to play 2 bingo cards. Pt demonstrated some difficulty looking to her left to play card on her left. Pt needed verbal cuing to clear card on her left and was receptive to cuing. Pt stated \"I just don't want to look to my left\" and made a joke about how she is trying to cheat. Pt won x2 games. Pt thanked CTRS for facilitating game. Leisure Awareness: Identified the following as being benefits to leisure involvement.  [] Competition  [] Creativity  [] Concentration  [x] Enjoyment   [] Exercise  [x] Fun  [] Hand/eye Coordination  [] Increase confidence  [] Learning a new skill  [] Relaxation  [] Social Interaction  [x] Supporting one another  [] Thinking  [] Other:___ Comments: _____   Affect: [] Appropriate  [] Bizarre  [] Blunted  [x] Bright  [] Flat  [] Labile  [] Superficial  [] Tearful Comments: _____   Self Esteem:   Identified the following as being personal positive qualities [] Ambitious  [] Appreciative  [] Caring  [] Courteous  [] Considerate  [] Compassionate  [] Creative  [] Dependable   [] Generous  [] Honest  [] Hard working  [x] Helpful  [] Kind  [] [place]   [] [place]  [] [employer]  [] Patient  [] Polite  [] Respectful  [] Sociable  [] Sense of humor  [] Thoughtful  [] Other: ___________ Comments: _____ Social Interaction: [x] Independent  [] Modified Independent  [] Supervision  [] Minimal Assistance  [] Moderate Assistance  [] Maximal Assistance  [] Total Assistance  [] Other: ______________ Comments: _____   Drug & Alcohol:  Indicated the following as alternatives for leisure and how to recreate without drugs and alcohol.   [] Cards  [] Crafts  [] Crocheting  [] Fishing  [] Floral arranging  [] Games  [] Golfing   [] Homemaking  [] Hunting  [] Horticulture  [] Mechanical  [] [address] [place] working  [] [employer]  [] Other________ Comments: _____   Financial: Listed _____ as being specific low cost activities to be utilized after D/C. Comments: _____   Com. Resource Education: Listed _____ community resources to include type of services, address and phone.  Comments: _____   Glenda Ross

## 2022-04-20 NOTE — PROGRESS NOTES
Physical Therapy   Facility/Department: [address] [facility]AB  Treatment Note    NAME: Sonu Conrad  : 1975  MRN: [id number]    Date of Service: 2022  Evaluating Therapist: Celestine Rodrigues PT, SAIRA, [id number]      ROOM: [address] [place] [address]  DIAGNOSIS: Late affect CVA  PRECAUTIONS: falls, L hemiparesis, L neglect, L homonymous hemianopsia  Impulsivity, small area of blistering on dorsum of L great toe. 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 At that time she underwent emergent R hemicraniectomy on 10/13. After being deemed medically appropriate, she was transferred to [facility] on 10/22/2021. She progressed well and was discharged home with necessary equipment and  supervision. Pt returned to ARU 22 following replacement of bone flap. Social:  Pt lives with mother and daughter in a 1 floor plan 4 steps and 2 rails to enter. Pt moved in with mother following CVA in . / supervision is possible. Prior to admission: Since return home following CVA in , pt was independent with toileting and bathing. Pt did require Supervision with functional mobility. Pt ambulated with SPC/SBQC, owns , was participating in [facility]. Initial Evaluation  Date: 22 AM     PM    Short Term Goals Long Term Goals    Was pt agreeable to Eval/treatment? Yes yes yes     Does pt have pain?  No c/o pain No c/o pain No c/o     Bed Mobility  Rolling: Noemy  Supine to sit: Noemy  Sit to supine: Noemy  Scooting: Noemy Supine<>Sit Mod I  Scooting/Rolling Mod I (twin bed) N/T SBA Mod I   Transfers Sit to stand: Noemy  Stand to sit: Noemy  Stand pivot: Noemy with R SPC    5xSTS: TBA  Sit<>Stand SBA  Stand-pivot SBA with R SPC Sit<>Stand SBA  Stand-pivot SBA with R SPC SBA   Supervision   Ambulation  150 feet with R SPC with Noemy    W/c follow? (Y/N): N    10mWT: 0.25 m/s  6mWT: 112 m  (AD: R SPC, Assist level: Noemy) 300 feet x 2 reps with R SPC with SBA (L toe off AFO)    10mWT: 0.49 m/s  6mWT: 137 m  (, R SPC, SBA) 300 feet x 1 rep  with R SPC with SBA (L toe off AFO) >300 feet with AAD with SBA >600 feet with AAD with Supervision    10mWT: >0.4 m/s  6mWT: >175 m   Wheelchair Mobility NA NT NT     Car Transfers Noemy NT NT SBA Supervision   Stair negotiation: ascended and descended 12 steps with 1 rail with Noemy 30 steps with RUE support on HR with SBA (non-reciprocal) NT 12 steps with 1 rail with SBA 20 steps with 1 rail with Supervision   Walking 10 feet on uneven surface 10 feet with R SPC with Noemy NT N/T 10 feet with AAD with SBA 10 feet with AAD with Supervision   Curb Step:   ascended and descended 4 inch step with R SPC and Noemy NT N/T 4 inch step with AAD and SBA 4 inch step with AAD and Supervision   Picking up object off the floor Picked up cone with Noemy NT N/T Will  cone with SBA Will  2lb weight with SBA   BLE ROM RLE: WNL  LLE:   Hip flexion: WNL  Knee extension: WNL  Dorsiflexion: lacking ~5 degrees from neutral  Plantarflexion: WNL RLE: WNL  LLE:   Hip flexion: WNL  Knee extension: WNL  Dorsiflexion: lacking ~5 degrees from neutral  Plantarflexion: WNL      BLE Strength RLE:  Grossly 5/5  LLE:   Hip flexion: 3/5  Knee extension: 4/5  Dorsiflexion: 0/5  Plantarflexion: 2+/5 RLE:  Grossly 5/5  LLE:   Hip flexion: 3/5  Knee extension: 4/5  Dorsiflexion: 0/5  Plantarflexion: 2+/5      Balance  Static and dynamic standing balance Noemy with R SPC     BBS: 20/56 (4/8/22) Static and dynamic standing balance CGA with R SPC    BBS: 32/56 (4/19/22)     BBS: TBD   Date Family Teach Completed        Is additional Family Teaching Needed?   Y or N Y day of discharge Y      Hindering Progress Balance L hemiparesis Balance L hemiparesis      PT recommended ELOS 10-14 days       Team's Discharge Plan        Therapist at Team Meeting          General:        HRmax: 177 bpm       Beta blockers (Y/N): Y      Adjusted HRmax: 167 bpm   Blood pressure (mmHg): AM:  - Prior to Tx: 95/59  - Post Tx: 108/61  PM:  NA Time spent in HR ranges: Moderate intensity (60-70% HRmax): AM: 0:18:38  PM: NA   High intensity (70-85% HRmax): AM: 0:01:45  PM: NA      Therapeutic Exercise:   AM: Timed ambulation trials with R SPC, 100 feet x 2 reps with SBA (1', 1'01\")  Ambulation with R SPC with cues for fast walking, 400 feet x 1 rep with SBA  FW non-reciprocal stepping over 7 quad canes on floor with R SPC, leading with LLE, x 4 reps with Noemy  PM: Ascending/descending outdoor 50 foot ramp with R SPC x 2 reps FW and x 2 reps BW with R SPC with SBA  Ascending/descending 200 foot ramp with R SPC x 1 rep with SBA  Ascending/descending 8\" curb step with R SPC with Noemy  Ascending/descending ten 7\" steps outdoors with RUE support on rail x 1 rep with Noemy    Patient education  Pt educated on strategies to avoid obstacle collision on L     Patient response to education:   Pt verbalized understanding Pt demonstrated skill Pt requires further education in this area   yes yes no     Additional Comments: Pt remains highly motivated during sessions. Gait training resumed with emphasis on improving gait speed over longer distances. Timed trial distances increased as well as fast walking repetitions. Pt continues to report RPE 15 following walking tasks. L knee flexion appears to be improving with stair ascent and with stepping over obstacles. Pt's safety remains limited by L neglect and L visual field deficits. PM session included outdoor ambulation over various inclines, curbs, and uneven surfaces. Pt encouraged to reciprocal pattern throughout these tasks. Pt is cautious in unfamiliar environments and tends to slow speed significantly. Plan to resume high intensity gait training next session. AM  Time in: 0915  Time out: 1000    PM  Time in: 1430  Time out: 1515    Pt is making good progress toward established Physical Therapy goals. Continue with physical therapy current plan of care.     Oliver Antoine, PT, DPT  Board Certified Clinical Specialist in Neurologic Physical Therapy  [id number]

## 2022-04-20 NOTE — PROGRESS NOTES
Valentino Brennon          1  Hip Flex                       5         3-  Knee Ext                     5         4-  Ankle dorsi                  5          5  BCR                             5          2  Ankle Plantar               5          6      Laboratory:    Lab Results   Component Value Date    WBC 7.8 04/07/2022    HGB 12.0 04/07/2022    HCT 37.2 04/07/2022    MCV 95.6 04/07/2022     (H) 04/07/2022     Lab Results   Component Value Date     04/07/2022    K 4.4 04/07/2022     04/07/2022    CO2 22 04/07/2022    BUN 14 04/07/2022    CREATININE 0.7 04/07/2022    GLUCOSE 95 04/07/2022    CALCIUM 9.3 04/07/2022      Lab Results   Component Value Date    ALT 75 (H) 10/23/2021    AST 46 (H) 10/23/2021    ALKPHOS 89 10/23/2021    BILITOT 0.4 10/23/2021         Functional Status:   Physical Therapy:  Bed mobility: Mod I  Transfers: SBA  Ambulation: 300 ft R SPC SBA, left AFO     Occupational Therapy:  Feeding: Setup  Grooming: Setup  UB dressing: Mod A  LB dressing: Min A       Assessment/Plan:       42 y. o. female admitted to inpatient rehabilitation for impairments and acitivities limitations in ADL and mobility secondary to CVA.       -History of right MCA CVA (Oct 2021)-- with cerebral edema and midline shift, s/p right hemicraniectomy (Oct 2021-[facility]). Patient completed ARU 10/22/2021-12/7/2021 and was discharged home. She is now s/p right cranioplasty (3/30/3022 - [facility]). Left spastic hemiparesis, left inattention, vision impairment. LUE sling for gait. Left AFO. Continue Aspirin, Lipitor for secondary stroke prevention. Monitor neuro exam. Continue Acute rehab program. Patient has post op follow up appt at [facility] on 4/26.   -Pain control: Tylenol for mild pain, headache. Gabapentin for neuropathic pain. Voltaren gel prn.    -Spasticity, LUE/LLE: Continue Baclofen  -History of suspected seizure: Continue Lamictal at home dose. Patient to follow back up with Neurology after discharge. -History of intermittent Tachycardia--Seen by Cardiology during prior ARU admission, started on low dose metoprolol. Outpatient event monitor with ?result and Cardiology follow up. Continue home dose metoprolol.   -Tobacco abuse: tobacco cessation education, resources.  Nicoderm patch -- gradually weaning dose      Decrease Nicoderm to 7mg/24h patch  Team conference today  Anticipate discharge on Friday       Electronically signed by Yeison Jung MD on 4/20/2022 at 2:40 PM

## 2022-04-21 PROCEDURE — 97112 NEUROMUSCULAR REEDUCATION: CPT

## 2022-04-21 PROCEDURE — 99232 SBSQ HOSP IP/OBS MODERATE 35: CPT | Performed by: PHYSICAL MEDICINE & REHABILITATION

## 2022-04-21 PROCEDURE — 6370000000 HC RX 637 (ALT 250 FOR IP): Performed by: PHYSICAL MEDICINE & REHABILITATION

## 2022-04-21 PROCEDURE — 97116 GAIT TRAINING THERAPY: CPT

## 2022-04-21 PROCEDURE — 97535 SELF CARE MNGMENT TRAINING: CPT

## 2022-04-21 PROCEDURE — 1280000000 HC REHAB R&B

## 2022-04-21 RX ADMIN — GABAPENTIN 300 MG: 300 CAPSULE ORAL at 20:29

## 2022-04-21 RX ADMIN — LAMOTRIGINE 50 MG: 25 TABLET ORAL at 08:50

## 2022-04-21 RX ADMIN — ATORVASTATIN CALCIUM 40 MG: 40 TABLET, FILM COATED ORAL at 20:29

## 2022-04-21 RX ADMIN — METOPROLOL TARTRATE 12.5 MG: 25 TABLET, FILM COATED ORAL at 08:50

## 2022-04-21 RX ADMIN — BACLOFEN 10 MG: 10 TABLET ORAL at 15:49

## 2022-04-21 RX ADMIN — METOPROLOL TARTRATE 12.5 MG: 25 TABLET, FILM COATED ORAL at 20:28

## 2022-04-21 RX ADMIN — BACLOFEN 10 MG: 10 TABLET ORAL at 20:29

## 2022-04-21 RX ADMIN — ACETAMINOPHEN 1000 MG: 500 TABLET ORAL at 08:49

## 2022-04-21 RX ADMIN — ACETAMINOPHEN 1000 MG: 500 TABLET ORAL at 15:49

## 2022-04-21 RX ADMIN — SENNOSIDES 17.2 MG: 8.6 TABLET, COATED ORAL at 20:27

## 2022-04-21 RX ADMIN — Medication 9 MG: at 20:29

## 2022-04-21 RX ADMIN — ASPIRIN 81 MG 81 MG: 81 TABLET ORAL at 08:50

## 2022-04-21 RX ADMIN — BACLOFEN 10 MG: 10 TABLET ORAL at 08:49

## 2022-04-21 RX ADMIN — LAMOTRIGINE 50 MG: 25 TABLET ORAL at 20:27

## 2022-04-21 RX ADMIN — ACETAMINOPHEN 1000 MG: 500 TABLET ORAL at 20:27

## 2022-04-21 ASSESSMENT — PAIN DESCRIPTION - LOCATION
LOCATION: HEAD
LOCATION: HEAD

## 2022-04-21 ASSESSMENT — PAIN SCALES - GENERAL
PAINLEVEL_OUTOF10: 6
PAINLEVEL_OUTOF10: 6
PAINLEVEL_OUTOF10: 7

## 2022-04-21 ASSESSMENT — PAIN DESCRIPTION - DESCRIPTORS: DESCRIPTORS: ACHING

## 2022-04-21 ASSESSMENT — PAIN - FUNCTIONAL ASSESSMENT: PAIN_FUNCTIONAL_ASSESSMENT: ACTIVITIES ARE NOT PREVENTED

## 2022-04-21 ASSESSMENT — PAIN DESCRIPTION - PROGRESSION: CLINICAL_PROGRESSION: GRADUALLY IMPROVING

## 2022-04-21 NOTE — PROGRESS NOTES
Occupational Therapy  OCCUPATIONAL THERAPY DAILY NOTE    Date:2022  Patient Name: Marcos Parks  MRN: [id number]  : 1975  Room: [address] [place] [address]     Referring Practitioner: Zunilda Shelton MD  Diagnosis: late effect CVA, R cranioplasty 3/30/2022  Additional Pertinent Hx: seizures, hx R MCA 10/13/2021  Restrictions: Fall Risk, seizures & impulsive    Functional Assessment:   Date Status AE  Comments   Feeding 22  Set up       Grooming 22  Distant SUP  Str. Cane  Pt stood at sink and washed hands          Oral Care 22 Setup seated    Bathing 22 UB-Melisa    LB-min A  Shower   LH hose    UB Dressing 22  Min A  Seated     LB Dressing 22 Melisa     Footwear 22 SBA     Toileting 22  Distant Sup      Homemaking 22 min A  Str. Cane        Functional Transfers / Balance:   Date Status DME  Comments   Sit Balance 22 Independent/Sup     Stand Balance 22  CGA  str cane    [] Tub  [x] Shower   Transfer 22 Min A  shower stall Bench & sc    Commode   Transfer 22 SBA  str cane    Functional   Mobility 22  SBA sc     Other:on/off recliner ,kitchen chair with arm rests and standard queen bed       Sit to stand  22 SBA             SBA SC            Str. cane                 Functional Exercises / Activity:       Sensory / Neuromuscular Re-Education:  Functional e stim completed to LUE with focus on wrist and finger extension. Pt tolerated 15 mins with duty cycle 10/20 and intensity of 37 with good results   Tone reduction techniques to L hand with pt wt bearing hand on table top surface with 3 # wt placed over for tactile and visual feedback with good results     Re Assessment completed on In Motion Robotic Arm. Pt completed 320 reps of treatment with focus on completing southern part of the 14 cm [place]. Pt demonstrated min improvement in smoothness and aim.  Pt reports a decrease in overall level of tone in L elbow and is demonstrating some active movement in shoulder elevation       Cognitive Skills:   Status Comments   Problem   Solving fair  Demo'd during commode transfers, fxl mobility and sit to stand. Memory fair  \"   Sequencing fair + \"   Safety fair  demo'd during turns needing assist and cues to increase balance using str. Cane. Visual Perception:    Education:  Pt was educated on safe functional transfers at [facility] level     [] Family teach completed on:    Pain Level: no complaints on pain on this date     Additional Notes:       Patient has made good  progress during treatment sessions toward set goals. Therapy emphasis to obtain goals: ADL retraining, transfers training, functional mobility, therex, endurance/balance retraining, homemaking, PROM/AAROM LUE, neuro recovery, pt/family educaiton      [x] Continue with current OT Plan of care.   [] Prepare for Discharge     Goals  Long term goals  Time Frame for Long term goals : 7-10 days to address above problem areas  Long term goal 1: Pt demo independnet to eat all meals  Long term goal 2: Pt demo Mod I grooming standing @ sink level or seated  Long term goal 3: Pt demo Sup bathing @ shower level seated  Long term goal 4: Pt demo Mod I UE dress & Mod I LE dress inculding underwear, pants, socks, L AFO & shoes & demo G- safety & insight  Long term goal 5: Pt demo Mod I commode trf using approrpatie DME to ensure pt safety  Long term goals 6: Pt demo Sup tub trf using appropriate DME to ensure pt safety  Long term goal 7: Pt demo Min A light homemaking activity & demo G- safety & insight  Long term goal 8: Pt demo G endurance for a 30 minute functional activity  Long term goal 9: Pt will tolerate PROM/AAROM LUE & demo improvement using robotic inmotion arm to facilitate pt ability to functionally use her LUE during ADLs & IADLs as evidenced by gains in th efollowing areas from assessment on 4/7/22-  Patient Goals   Patient goals : \"I want to be normal.\" Pt asked to elaborate- \"Be able to cook & clean.\"    4/13/22 OT plan of care updated on this date. Tentative length of stay is 10-14 days. Boris Medina OTR/L [id number]  4/20/22 OT plan of care updated on this date. Tentative discharge date is 4/22/22 . Boris Medina OTR/L [id number]       DISCHARGE RECOMMENDATIONS  Recommended DME:  Sc, tub bench- pt owns  Post Discharge Care:   []Home Independently  [x]Home with 24hr Care / Supervision []Home with Partial Supervision []Home with Home Health OT []Home with Out Pt OT []Other: ___   Comments:         Time in Time out Tx Time Breakdown  Variance:   First Session  [id number] [x] Individual Tx-45  [] Concurrent Tx -  [] Co-Tx -   [] Group Tx -   [] Time Missed -     Second Session [phone]) [phone] [x] Individual Tx45-  [] Concurrent Tx -  [] Co-Tx -   [] Group Tx -   [] Time Missed -     Third Session    [] Individual Tx-   [] Concurrent Tx -  [] Co-Tx -   [] Group Tx -   [] Time Missed -              Total Tx Mins: 90 mins        Boris Carol OTR/L [id number]

## 2022-04-21 NOTE — PROGRESS NOTES
Physical Therapy   [address]Department[address] [facility]  Treatment Note    NAME: Anali Agrawal  : 1975  MRN: [id number]    Date of Service: 2022  Evaluating Therapist: Anaid Bell PT, DPT, [id number]      ROOM: [address] [place] [address]  DIAGNOSIS: Late affect CVA  PRECAUTIONS: falls, L hemiparesis, L neglect, L homonymous hemianopsia  Impulsivity, small area of blistering on dorsum of L great toe. 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 At that time she underwent emergent R hemicraniectomy on 10/13. After being deemed medically appropriate, she was transferred to [facility] on 10/22/2021. She progressed well and was discharged home with necessary equipment and  supervision. Pt returned to ARU 22 following replacement of bone flap. Social:  Pt lives with mother and daughter in a 1 floor plan 4 steps and 2 rails to enter. Pt moved in with mother following CVA in .  supervision is possible. Prior to admission: Since return home following CVA in , pt was independent with toileting and bathing. Pt did require Supervision with functional mobility. Pt ambulated with SPC/SBQC, owns , was participating in [facility]. Initial Evaluation  Date: 22 AM     PM    Short Term Goals Long Term Goals    Was pt agreeable to Eval/treatment? Yes yes yes     Does pt have pain?  No c/o pain No c/o pain No c/o     Bed Mobility  Rolling: Liv  Supine to sit: Liv  Sit to supine: Liv  Scooting: Liv Supine<>Sit Mod I  Scooting/Rolling Mod I (twin bed) N/T SBA Mod I   Transfers Sit to stand: Liv  Stand to sit: Liv  Stand pivot: Liv with R SPC    5xSTS: TBA  Sit<>Stand Supervision  Stand-pivot Supervision with R SPC Sit<>Stand Supervision  Stand-pivot Supervision with R SPC SBA   Supervision   Ambulation  150 feet with R SPC with Liv    W/c follow? (Y/N): N    10mWT: 0.25 m/s  6mWT: 112 m  (AD: R SPC, Assist level: Liv) 400 feet x 2 reps with R SPC with SBA (L toe off AFO)    10mWT: 0.49 m/s  6mWT: 137 m  (4/19, R SPC, SBA) 400 feet x 2 reps with R SPC with SBA (L toe off AFO) >300 feet with AAD with SBA >600 feet with AAD with Supervision    10mWT: >0.4 m/s  6mWT: >175 m   Wheelchair Mobility NA NT NT     Car Transfers Noemy Supervision NT SBA Supervision   Stair negotiation: ascended and descended 12 steps with 1 rail with Noemy 40 steps with RUE support on HR with SBA (non-reciprocal) 20 steps x 1 rep and 10 steps x 1 rep with RUE support on HR with SBA (non-reciprocal)  (9lb weight at posterior gait belt 12 steps with 1 rail with SBA 20 steps with 1 rail with Supervision   Walking 10 feet on uneven surface 10 feet with R SPC with Noemy 10 feet x 4 reps with R SPC with SBA N/T 10 feet with AAD with SBA 10 feet with AAD with Supervision   Curb Step:   ascended and descended 4 inch step with R SPC and Noemy 4\" step x 4 reps with R SPC with SBA N/T 4 inch step with AAD and SBA 4 inch step with AAD and Supervision   Picking up object off the floor Picked up cone with Noemy Pt picked up shoe with RUE with SBA N/T Will  cone with SBA Will  2lb weight with SBA   BLE ROM RLE: WNL  LLE:   Hip flexion: WNL  Knee extension: WNL  Dorsiflexion: lacking ~5 degrees from neutral  Plantarflexion: WNL RLE: WNL  LLE:   Hip flexion: WNL  Knee extension: WNL  Dorsiflexion: lacking ~5 degrees from neutral  Plantarflexion: WNL      BLE Strength RLE:  Grossly 5/5  LLE:   Hip flexion: 3/5  Knee extension: 4/5  Dorsiflexion: 0/5  Plantarflexion: 2+/5 RLE:  Grossly 5/5  LLE:   Hip flexion: 3/5  Knee extension: 4/5  Dorsiflexion: 0/5  Plantarflexion: 2+/5      Balance  Static and dynamic standing balance Noemy with R SPC     BBS: 20/56 (4/8/22) Static and dynamic standing balance CGA with R SPC    BBS: 32/56 (4/19/22)     BBS: TBD   Date Family Teach Completed        Is additional Family Teaching Needed?   Y or N Y day of discharge Y      Hindering Progress Balance L hemiparesis Balance L hemiparesis      PT recommended ELOS 10-14 days       Team's Discharge Plan        Therapist at Team Meeting          General:        HRmax: 177 bpm       Beta blockers (Y/N): Y      Adjusted HRmax: 167 bpm   Blood pressure (mmHg): AM:  - Prior to Tx: 115/68  - Post Tx: NT  PM:  - Prior to Tx: NT  - Post Tx: 110/64   Time spent in HR ranges: Moderate intensity (60-70% HRmax): AM: 0:01:49  PM: 0:07:25   High intensity (70-85% HRmax): AM: 0:00:00  PM: 0:00:00      Therapeutic Exercise:   AM: Ambulation timed trials with R SPC, 100' x 3 reps (1', 58\", 53\") with SBA  BW walking with cues for fast pace 50 feet x 1 rep with SBA using R SPC  PM: Ambulation timed trials with R SPC, 100' x 2 reps (54\", 56\") with SBA  FW non-reciprocal stepping over 7 quad canes on floor leading with RLE, using R SPC, x 6 reps with Noemy (9lb weight at posterior gait belt)    Patient education  Pt educated on strategies to improve safety with curb step ascent    Patient response to education:   Pt verbalized understanding Pt demonstrated skill Pt requires further education in this area   yes partial yes     Additional Comments: Pt remains pleasant and motivated during session. Stair training progressed to include increased repetitions. Pt continues to exhibit objective improvement in timed trials emphasizing fast walking. Safety remains continually limited by L neglect and visual field cut. Pt exhibits low resting HR (64 bpm) which results in difficult attaining target HR ranges. Obstacle negotiation incorporated into walking tasks. Plan for FT tomorrow prior to discharge. PM Session included progression of limb advancement training with stair ascent and stepping over quad canes. Pt tolerated sessions well. AM  Time in: 0915  Time out: 1000    PM  Time in: 1430  Time out: 1515    Pt is making good progress toward established Physical Therapy goals. Continue with physical therapy current plan of care.     Stephanie Tinoco, PT, DPT  Board Certified Clinical Specialist in Neurologic Physical Therapy  [id number]

## 2022-04-21 NOTE — PROGRESS NOTES
[facility]  Comprehensive Progress Note    Subjective:      Ritu Mejia is a 42 y.o. female admitted to inpatient rehabilitation for impairments and acitivities limitations in ADL and mobility secondary to CVA. No acute events overnight. No cp, sob, n/v.  Tolerating therapy, progressing. Patient denies complaints. She is looking forward to upcoming discharge. The patient's medical records have been reviewed. Scheduled Meds:nicotine, 1 patch, Daily  acetaminophen, 1,000 mg, TID  aspirin, 81 mg, Daily  atorvastatin, 40 mg, Nightly  baclofen, 10 mg, TID  vitamin D, 50,000 Units, Weekly  gabapentin, 300 mg, Nightly  lamoTRIgine, 50 mg, BID  melatonin, 9 mg, Nightly  metoprolol tartrate, 12.5 mg, BID  senna, 2 tablet, Nightly      Continuous Infusions:  PRN Meds:acetaminophen, 650 mg, Q6H PRN  diclofenac sodium, 2 g, 4x Daily PRN  ondansetron, 4 mg, Q8H PRN         Objective:      Vitals:    04/19/22 2050 04/20/22 0830 04/20/22 2136 04/21/22 0849   BP: 123/69 (!) 95/59 100/60 (!) 115/57   Pulse: 72 70 70 70   Resp:  17     Temp:  97.8 °F (36.6 °C)     TempSrc:  Oral     SpO2:  98%     Weight:       Height:         General appearance: alert,  NAD  Head: Right cranioplasty, incision c/d/i   Eyes: conjunctivae/corneas clear. PERRL, EOM's intact.   Lungs: clear to auscultation bilaterally  Heart: regular rate and rhythm, S1, S2 normal  Abdomen: soft, non-tender, normal bowel sounds   Musculoskeletal: Range of motion impaired   Skin: Right cranioplasty, incision c/d/i  Neurologic: AAOx4. Speech clear, content appropriate. Follows commands. SILT.  LUE and LLE spasticity, MAS 2     Strength:                     R          L  Deltoid                         5          7  Biceps                         5          0  Triceps                        5          0  Wrist Ext                     5          1  FDP                             5          2  Finger Lianet Gautam          3  Hip Flex                       5         3-  Knee Ext                     5         4-  Ankle dorsi                  5          7  WVA                             3          6  Ankle Plantar               5          5      Laboratory:    Lab Results   Component Value Date    WBC 7.8 04/07/2022    HGB 12.0 04/07/2022    HCT 37.2 04/07/2022    MCV 95.6 04/07/2022     (H) 04/07/2022     Lab Results   Component Value Date     04/07/2022    K 4.4 04/07/2022     04/07/2022    CO2 22 04/07/2022    BUN 14 04/07/2022    CREATININE 0.7 04/07/2022    GLUCOSE 95 04/07/2022    CALCIUM 9.3 04/07/2022      Lab Results   Component Value Date    ALT 75 (H) 10/23/2021    AST 46 (H) 10/23/2021    ALKPHOS 89 10/23/2021    BILITOT 0.4 10/23/2021         Functional Status:   Physical Therapy:  Bed mobility: Mod I  Transfers: Supervision   Ambulation: 400 ft R SPC SBA, left AFO     Occupational Therapy:  Feeding: Setup  Grooming: Supervision   UB dressing: Min A  LB dressing: Min A       Assessment/Plan:       42 y. o. female admitted to inpatient rehabilitation for impairments and acitivities limitations in ADL and mobility secondary to CVA.       -History of right MCA CVA (Oct 2021)-- with cerebral edema and midline shift, s/p right hemicraniectomy (Oct 2021-[facility]). Patient completed ARU 10/22/2021-12/7/2021 and was discharged home. She is now s/p right cranioplasty (3/30/3022 - [facility]). Left spastic hemiparesis, left inattention, vision impairment. LUE sling for gait. Left AFO. Continue Aspirin, Lipitor for secondary stroke prevention. Monitor neuro exam. Continue Acute rehab program. Patient has post op follow up appt at [address] [address] on 4/26.   -Pain control: Tylenol for mild pain, headache. Gabapentin for neuropathic pain. Voltaren gel prn.    -Spasticity, LUE/LLE: Continue Baclofen  -History of suspected seizure: Continue Lamictal at home dose.  Patient to follow back up with Neurology after

## 2022-04-22 VITALS
DIASTOLIC BLOOD PRESSURE: 67 MMHG | BODY MASS INDEX: 29.14 KG/M2 | RESPIRATION RATE: 18 BRPM | SYSTOLIC BLOOD PRESSURE: 102 MMHG | OXYGEN SATURATION: 97 % | HEIGHT: 66 IN | WEIGHT: 181.3 LBS | HEART RATE: 61 BPM | TEMPERATURE: 98.5 F

## 2022-04-22 PROCEDURE — 97112 NEUROMUSCULAR REEDUCATION: CPT

## 2022-04-22 PROCEDURE — 6370000000 HC RX 637 (ALT 250 FOR IP): Performed by: PHYSICAL MEDICINE & REHABILITATION

## 2022-04-22 PROCEDURE — 97032 APPL MODALITY 1+ESTIM EA 15: CPT

## 2022-04-22 PROCEDURE — 99239 HOSP IP/OBS DSCHRG MGMT >30: CPT | Performed by: PHYSICAL MEDICINE & REHABILITATION

## 2022-04-22 PROCEDURE — 97530 THERAPEUTIC ACTIVITIES: CPT

## 2022-04-22 RX ORDER — BACLOFEN 10 MG/1
10 TABLET ORAL 3 TIMES DAILY
Qty: 90 TABLET | Refills: 0 | Status: SHIPPED | OUTPATIENT
Start: 2022-04-22 | End: 2022-05-22

## 2022-04-22 RX ORDER — LANOLIN ALCOHOL/MO/W.PET/CERES
9 CREAM (GRAM) TOPICAL NIGHTLY PRN
Qty: 30 TABLET | Refills: 0 | Status: SHIPPED | OUTPATIENT
Start: 2022-04-22 | End: 2022-07-06 | Stop reason: DRUGHIGH

## 2022-04-22 RX ORDER — GABAPENTIN 300 MG/1
300 CAPSULE ORAL NIGHTLY
Qty: 30 CAPSULE | Refills: 0 | Status: SHIPPED | OUTPATIENT
Start: 2022-04-22 | End: 2022-08-11

## 2022-04-22 RX ORDER — LAMOTRIGINE 25 MG/1
50 TABLET ORAL 2 TIMES DAILY
Qty: 120 TABLET | Refills: 0 | Status: SHIPPED | OUTPATIENT
Start: 2022-04-22 | End: 2022-05-17 | Stop reason: SINTOL

## 2022-04-22 RX ORDER — ATORVASTATIN CALCIUM 40 MG/1
40 TABLET, FILM COATED ORAL DAILY
Qty: 30 TABLET | Refills: 0 | Status: SHIPPED | OUTPATIENT
Start: 2022-04-22

## 2022-04-22 RX ORDER — ASPIRIN 81 MG/1
81 TABLET, CHEWABLE ORAL DAILY
Qty: 30 TABLET | Refills: 0 | Status: SHIPPED | OUTPATIENT
Start: 2022-04-22

## 2022-04-22 RX ADMIN — METOPROLOL TARTRATE 12.5 MG: 25 TABLET, FILM COATED ORAL at 08:19

## 2022-04-22 RX ADMIN — ACETAMINOPHEN 1000 MG: 500 TABLET ORAL at 08:18

## 2022-04-22 RX ADMIN — LAMOTRIGINE 50 MG: 25 TABLET ORAL at 08:17

## 2022-04-22 RX ADMIN — BACLOFEN 10 MG: 10 TABLET ORAL at 08:19

## 2022-04-22 RX ADMIN — ASPIRIN 81 MG 81 MG: 81 TABLET ORAL at 08:19

## 2022-04-22 ASSESSMENT — PAIN DESCRIPTION - ORIENTATION: ORIENTATION: RIGHT

## 2022-04-22 ASSESSMENT — PAIN DESCRIPTION - LOCATION: LOCATION: HEAD

## 2022-04-22 ASSESSMENT — PAIN SCALES - GENERAL
PAINLEVEL_OUTOF10: 6
PAINLEVEL_OUTOF10: 0

## 2022-04-22 ASSESSMENT — PAIN DESCRIPTION - DESCRIPTORS: DESCRIPTORS: ACHING

## 2022-04-22 NOTE — PROGRESS NOTES
DISCHARGE SUMMARY    Ladan displayed social interaction skills at the complete independence level. Leisure participation/awareness:  Maryse Glasgow participated in 5 therapeutic recreation interventions identifying 4 benefits to leisure participation.     Outcomes: goals achieved      Electronically signed by Ana Lilia Rich on 4/21/2022 at 9:45 PM

## 2022-04-22 NOTE — PLAN OF CARE
Problem: Increased nutrient needs (NI-5.1)  Goal: Food and/or Nutrient Delivery  Description: Individualized approach for food/nutrient provision.   Outcome: Progressing

## 2022-04-22 NOTE — DISCHARGE SUMMARY
[facility]  Discharge Summary    Date of Original Admission:4/6/2022    Date of Discharge: 4/22/2022    Primary Care Physician:No primary care provider on file. Attending Physician: Amaury Vasquez MD  Primary Service:  Acute Inpatient Rehabilitation     Primary Diagnosis: CVA  Secondary Diagnosis/es: s/p right hemicraniectomy (Oct 2021-[facility]), s/p  right cranioplasty (3/30/3022 - [facility]), Left spastic hemiparesis, left inattention, vision impairment, post operative pain, headache, spasticity, history of suspected seizure, history of tachycardia, tobacco abuse   Consults:   None  Procedures:  None  Significant Radiologic Results:   None    Discharge Disposition:  Home  Condition on Discharge:  Stable. Functionally improved.   ===================================================================  History of Present Illness:     Kendrick Agee is a 42 y.o. female with history of right MCA CVA on 10/12/2021 with acute onset left-sided weakness.  Initial NIHSS 17.  Initial head CT ([facility]) showed R MCA hyperdensity; initial CTA ([facility]) showed right ICA/M1 occlusion.  On 10/13/2021, her neuro exam worsened and repeat imaging indicated worsening edema, 1 cm of midline shift, effacement of the right lateral ventricle, and increased left lateral ventricle caliber concerning for entrapment.  She underwent emergent Right hemicraniectomy by Dr. Keith Clay [facility]) on 10/13/2021. Patient was then transferred to [facility] and completed the Acute rehab program from  10/22/2021-12/7/2021. She was discharged home at [facility] level for functional mobility and CGA-Mod I level for ADLs. She then returned to [facility] and underwent right cranioplasty on 3/30/2022. Her post operative therapy evaluations showed functional decline as compared to her prior baseline.  Therefore, she was felt to be a good candidate for Acute rehabilitation to improve her function and independence at least to her former baseline. Patient was then admitted to [facility] for Acute rehabilitation.          ===================================================================    Functional Status        Initial Evaluation  Date: 4/7/22 Discharge 4/22/22      Was pt agreeable to Eval/treatment? Yes yes   Does pt have pain? No c/o pain No c/o pain   Bed Mobility  Rolling: Melisa  Supine to sit: Melisa  Sit to supine: Melisa  Scooting: Melisa Supine<>Sit Mod I  Scooting/Rolling Mod I (twin bed)   Transfers Sit to stand: Melisa  Stand to sit: Melisa  Stand pivot: Melisa with R SPC     5xSTS: TBA  Sit<>Stand Supervision  Stand-pivot Supervision with R SPC   Ambulation  150 feet with R SPC with Melisa     W/c follow? (Y/N): N     10mWT: 0.25 m/s  6mWT: 112 m  (AD: R SPC, Assist level: Melisa) 400 feet x 2 reps with R SPC with SBA (L toe off AFO)     10mWT: 0.49 m/s  6mWT: 137 m  (4/19, R SPC, SBA)   Wheelchair Mobility NA NT   Car Transfers Melisa Supervision   Stair negotiation: ascended and descended 12 steps with 1 rail with Melisa 40 steps with RUE support on HR with SBA (non-reciprocal)   Walking 10 feet on uneven surface 10 feet with R SPC with Melisa 10 feet x 4 reps with R SPC with SBA   Curb Step:   ascended and descended 4 inch step with R SPC and Melisa 4\" step x 4 reps with R SPC with SBA   Picking up object off the floor Picked up cone with Melisa Pt picked up shoe with RUE with SBA   Balance  Static and dynamic standing balance Melisa with R SPC      BBS: 20/56 (4/8/22) Static and dynamic standing balance CGA with R SPC     BBS: 32/56 (4/19/22)   Date Family Teach Completed   Mother present 4/22       Functional Assessment:      Date   Status   AE    Comments     Feeding   4/18/22    Set up               Grooming   4/21/22    Distant SUP    Str.  Cane                Oral Care   4/18/22   Setup   seated         Bathing   4/18/22   UB-Melisa         LB-min A    Shower     LH hose     UB Dressing   4/20/22    Min A    Seated          LB Dressing   4/22/22   SBA        Pt threaded shorts on BLE's. SBA to stand and pull up over hips      Footwear   4/18/22   SBA             Toileting   4/22/22   Mod I        Pt completed hygiene and pulled pants up and down     Homemaking   4/9/22   min A    Str. Cane                Discharge Physical Examination  General appearance: alert,  NAD  Head: Right cranioplasty, incision c/d/i   Eyes: conjunctivae/corneas clear. PERRL, EOM's intact.   Lungs: clear to auscultation bilaterally  Heart: regular rate and rhythm, S1, S2 normal  Abdomen: soft, non-tender, normal bowel sounds   Musculoskeletal: Range of motion impaired   Skin: Right cranioplasty, incision c/d/i  Neurologic: AAOx4. Speech clear, content appropriate. Follows commands. SILT. LUE and LLE spasticity, MAS 2     Strength:                     R          L  Deltoid                         3          8  Biceps                         5          0  Triceps                        5          1  Wrist Ext                     5          9  FDP                             5          2  Finger Abd                  5          8  Hip Flex                       5         3-  Knee Ext                     5         4-  Ankle dorsi                  5          1  VYB                             6          1  Ankle Plantar               5          8         Hospital Course   Functional and mobility deficits secondary to CVA:  The patient completed an intensive inpatient rehabilitation program including PT, OT, SLP and achieved significant improvement in function as documented above. Recommended continued therapies are documented below. The patient will follow up with the outpatient PM&R clinics to review ongoing rehab needs.      -History of right MCA CVA (Oct 2021)-- with cerebral edema and midline shift, s/p right hemicraniectomy (Oct 2021-[facility]). Patient completed ARU 10/22/2021-12/7/2021 and was discharged home. She is now s/p right cranioplasty (3/30/3022 - [facility]). Left spastic hemiparesis, left inattention, vision impairment. LUE sling for gait. Left AFO. Continued Aspirin, Lipitor for secondary stroke prevention. Monitored neuro exam-stable during rehab. Completed Acute rehab program with functional progress as above. Patient is scheduled for her post op follow up appt at [facility] on 4/26/2022.   -Pain control: Tylenol for mild pain, headache. Gabapentin for neuropathic pain. Voltaren gel prn.  Pain was controlled.   -Spasticity, LUE/LLE: Continued Baclofen  -History of suspected seizure: Continued Lamictal at home dose. Patient to follow up with Neurology after discharge.   -History of intermittent Tachycardia--Seen by Cardiology during prior ARU admission, started on low dose metoprolol. Outpatient event monitor with ?result and Cardiology follow up. Continued home dose metoprolol during rehab stay.    -Tobacco abuse: tobacco cessation education, resources. Nicoderm patch -- gradually weaning dose        ==================================================================  Discharge Medications     Medication List      START taking these medications    baclofen 10 MG tablet  Commonly known as: LIORESAL  Take 1 tablet by mouth 3 times daily        CHANGE how you take these medications    gabapentin 300 MG capsule  Commonly known as: NEURONTIN  Take 1 capsule by mouth nightly for 30 days.   What changed:   · medication strength  · how much to take  · when to take this     lamoTRIgine 25 MG tablet  Commonly known as: LAMICTAL  Take 2 tablets by mouth 2 times daily  What changed:   · how much to take  · how to take this  · when to take this  · additional instructions     melatonin 3 MG Tabs tablet  Take 3 tablets by mouth nightly as needed (sleep)  What changed: how much to take        CONTINUE taking these medications    acetaminophen 325 MG tablet  Commonly known as: TYLENOL     aspirin 81 MG chewable tablet  Take 1 tablet by mouth daily     atorvastatin 40 MG tablet  Commonly known as: LIPITOR  Take 1 tablet by mouth daily     diclofenac 0.1 % ophthalmic solution  Commonly known as: VOLTAREN     Handicap Placard Misc  by Does not apply route Reason for disability:  H/O ischemic right MCA stroke  (primary encounter diagnosis)  Left spastic hemiplegia (HCC)  Impaired mobility and ADLs    Duration: 5 years     metoprolol tartrate 25 MG tablet  Commonly known as: LOPRESSOR  Take 0.5 tablets by mouth 2 times daily     nicotine 7 MG/24HR  Commonly known as: NICODERM CQ  Place 1 patch onto the skin daily     polyethylene glycol 17 g packet  Commonly known as: GLYCOLAX     tamsulosin 0.4 MG capsule  Commonly known as: FLOMAX  Take 1 capsule by mouth daily     vitamin D 1.25 MG (29373 UT) Caps capsule  Commonly known as: ERGOCALCIFEROL  Take 1 capsule by mouth once a week        STOP taking these medications    clopidogrel 75 MG tablet  Commonly known as: PLAVIX           Where to Get Your Medications      These medications were sent to [address] [place] [address], [facility] [facility] [address], [address] [id number]    Phone: [phone]   · aspirin 81 MG chewable tablet  · atorvastatin 40 MG tablet  · baclofen 10 MG tablet  · gabapentin 300 MG capsule  · lamoTRIgine 25 MG tablet  · melatonin 3 MG Tabs tablet  · metoprolol tartrate 25 MG tablet  · nicotine 7 MG/24HR         Allergies  Vicodin [hydrocodone-acetaminophen], Acetaminophen, Hydrocodone, and Propoxyphene      Recommended Therapies at Discharge: outpatient  PT, OT      Follow-Up  -Primary care physician  -PM&R: Dr. Matthew Hutchins  -NS: Patient [facility] as scheduled 4/26/2022  -Neurology: ANDREW Christianson     Information provided to the patient and appropriate family members regarding discharge medications and follow up    More than 30 minutes was spent in preparation of this patient's discharge including, but not limited to, examination, preparation of documents, prescription preparation, counseling and coordination.       Electronically signed by Tammie Dillard MD on 4/22/2022 at 11:29 AM

## 2022-04-22 NOTE — PROGRESS NOTES
Occupational Therapy  OCCUPATIONAL THERAPY DAILY NOTE/DISCHARGE SUMMARY     Date:2022  Patient Name: Curt Mahoney  MRN: [id number]  : 1975  Room: [address] [place] [address]     Referring Practitioner: Lina Edgar MD  Diagnosis: late effect CVA, R cranioplasty 3/30/2022  Additional Pertinent Hx: seizures, hx R MCA 10/13/2021  Restrictions: Fall Risk, seizures & impulsive    Functional Assessment:   Date Status AE  Comments   Feeding 22  Set up       Grooming 22  Distant SUP  Str. Cane           Oral Care 22 Setup seated    Bathing 22 UB-Melisa    LB-min A  Shower   LH hose    UB Dressing 22  Min A  Seated     LB Dressing 22 SBA   Pt threaded shorts on BLE's. SBA to stand and pull up over hips    Footwear 22 SBA     Toileting 22 Mod I   Pt completed hygiene and pulled pants up and down   Homemaking 22 min A  Str. Cane        Functional Transfers / Balance:   Date Status DME  Comments   Sit Balance 22 Independent/Sup     Stand Balance 22  CGA  str cane    [] Tub  [x] Shower   Transfer 22 Min A  shower stall Bench & sc    Commode   Transfer 22 SBA  str cane    Functional   Mobility 22  SBA sc     Other:on/off recliner ,kitchen chair with arm rests and standard queen bed       Sit to stand  22 SBA             SBA SC            Str. cane                 Functional Exercises / Activity:       Sensory / Neuromuscular Re-Education:  Functional e stim completed to LUE with focus on wrist and finger extension. Pt tolerated 15 mins with duty cycle 10/20 and intensity of 40  with good results   Tone reduction to L wrist and hand with hand placed on weighted ball for prolonged stretch and wt bearing throughout LUE on table top surface with 5 # wt applied          Cognitive Skills:   Status Comments   Problem   Solving fair  Demo'd during commode transfers, fxl mobility and sit to stand.      Memory fair  \"   Sequencing fair + \"   Safety fair tolerate PROM/AAROM LUE & demo improvement using robotic inmotion arm to facilitate pt ability to functionally use her LUE during ADLs & IADLs as evidenced by gains in th efollowing areas from assessment on 4/7/22-  Patient Goals   Patient goals : \"I want to be normal.\" Pt asked to elaborate- \"Be able to cook & clean. \"    4/13/22 OT plan of care updated on this date. Tentative length of stay is 10-14 days. Cristianisaac Lentz OTR/L [id number]  4/20/22 OT plan of care updated on this date. Tentative discharge date is 4/22/22 . Cristianisaac Lentz OTR/L [id number]   OCCUPATIONAL THERAPY DISCHARGE SUMMARY    Discontinue Occupational Therapy intervention. Pt has:   [] met all set goals. [x] made good progress toward set goals. [] has made slow progress toward goals and would benefit from rehab setting change.  [] had a medical decline and therefore was transferred off the Rehab unit.     Long Term Goals  Time Frame for Long term goals : 7-10 days to address above problem areas  Long Term Goal 1: Pt demo independnet to eat all meals  Long Term Goal 2: Pt demo Mod I grooming standing @ sink level or seated  Long Term Goal 3: Pt demo Sup bathing @ shower level seated  Long Term Goal 4: Pt demo Mod I UE dress & Mod I LE dress inculding underwear, pants, socks, L AFO & shoes & demo G- safety & insight  Long Term Goal 5: Pt demo Mod I commode trf using approrpatie DME to ensure pt safety  Long Term Goal 6: Pt demo Sup tub trf using appropriate DME to ensure pt safety  Long Term Goal 7: Pt demo Min A light homemaking activity & demo G- safety & insight  Long Term Goal 8: Pt demo G endurance for a 30 minute functional activity  Long Term Goal 9: Pt will tolerate PROM/AAROM LUE & demo improvement using robotic inmotion arm to facilitate pt ability to functionally use her LUE during ADLs & IADLs as evidenced by gains in th efollowing areas from assessment on 4/7/22-    The Patient has received education on:  [x]Transfer Safety [x]Compensatory tech for ADLs [x]AE training [x]DME training [x]Energy Conservation [x]Home / Kitchen Safety  [x]Fall Prevention  []Other:    Family training was completed: yes    Recommendations: Outpatient OT            DISCHARGE RECOMMENDATIONS  Recommended DME:  Sc, tub bench- pt owns  Post Discharge Care:   []Home Independently  [x]Home with 24hr Care / Supervision []Home with Partial Supervision []Home with Home Health OT [x]Home with Out Pt OT []Other: ___   Comments:         Time in Time out Tx Time Breakdown  Variance:   First Session  [phone] [] Individual Tx-  [x] Concurrent Tx -  [] Co-Tx -   [] Group Tx -   [] Time Missed -     Second Session    [] Individual Tx-  [] Concurrent Tx -  [] Co-Tx -   [] Group Tx -   [] Time Missed -     Third Session    [] Individual Tx-   [] Concurrent Tx -  [] Co-Tx -   [] Group Tx -   [] Time Missed -              Total Tx Mins: Κασνέτη 22 OTR/L [id number]

## 2022-04-22 NOTE — PROGRESS NOTES
Physical Therapy   Facility/Department: [facility]  Discharge Report    NAME: Ritu Mejia  : 1975  MRN: [id number]    Date of Service: 2022  Evaluating Therapist: Stephanie Tinoco PT, TERRANCE [id number]      ROOM: [address] [place] [address]  DIAGNOSIS: Late affect CVA  PRECAUTIONS: falls, L hemiparesis, L neglect, L homonymous hemianopsia  Impulsivity, small area of blistering on dorsum of L great toe. 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 At that time she underwent emergent R hemicraniectomy on 10/13. After being deemed medically appropriate, she was transferred to [facility] on 10/22/2021. She progressed well and was discharged home with necessary equipment and  supervision. Pt returned to ARU 22 following replacement of bone flap. Social:  Pt lives with mother and daughter in a 1 floor plan 4 steps and 2 rails to enter. Pt moved in with mother following CVA in .  supervision is possible. Prior to admission: Since return home following CVA in , pt was independent with toileting and bathing. Pt did require Supervision with functional mobility. Pt ambulated with SPC/SBQC, owns , was participating in [facility]. Initial Evaluation  Date: 22 Discharge 22    Short Term Goals Long Term Goals    Was pt agreeable to Eval/treatment? Yes yes     Does pt have pain?  No c/o pain No c/o pain     Bed Mobility  Rolling: Noemy  Supine to sit: Noemy  Sit to supine: Noemy  Scooting: Noemy Supine<>Sit Mod I  Scooting/Rolling Mod I (twin bed) SBA Mod I   Transfers Sit to stand: Noemy  Stand to sit: Noemy  Stand pivot: Noemy with R SPC    5xSTS: TBA  Sit<>Stand Supervision  Stand-pivot Supervision with R SPC SBA   Supervision   Ambulation  150 feet with R SPC with Noemy    W/c follow? (Y/N): N    10mWT: 0.25 m/s  6mWT: 112 m  (AD: R SPC, Assist level: Noemy) 400 feet x 2 reps with R SPC with SBA (L toe off AFO)    10mWT: 0.49 m/s  6mWT: 137 m  (, R SPC, SBA) >300 feet with AAD with SBA >600 feet with AAD with Supervision    10mWT: >0.4 m/s  6mWT: >175 m   Wheelchair Mobility NA NT     Car Transfers Noemy Supervision SBA Supervision   Stair negotiation: ascended and descended 12 steps with 1 rail with Noemy 40 steps with RUE support on HR with SBA (non-reciprocal) 12 steps with 1 rail with SBA 20 steps with 1 rail with Supervision   Walking 10 feet on uneven surface 10 feet with R SPC with Noemy 10 feet x 4 reps with R SPC with SBA 10 feet with AAD with SBA 10 feet with AAD with Supervision   Curb Step:   ascended and descended 4 inch step with R SPC and Noemy 4\" step x 4 reps with R SPC with SBA 4 inch step with AAD and SBA 4 inch step with AAD and Supervision   Picking up object off the floor Picked up cone with Noemy Pt picked up shoe with RUE with SBA Will  cone with SBA Will  2lb weight with SBA   BLE ROM RLE: WNL  LLE:   Hip flexion: WNL  Knee extension: WNL  Dorsiflexion: lacking ~5 degrees from neutral  Plantarflexion: WNL RLE: WNL  LLE:   Hip flexion: WNL  Knee extension: WNL  Dorsiflexion: lacking ~5 degrees from neutral  Plantarflexion: WNL     BLE Strength RLE:  Grossly 5/5  LLE:   Hip flexion: 3/5  Knee extension: 4/5  Dorsiflexion: 0/5  Plantarflexion: 2+/5 RLE:  Grossly 5/5  LLE:   Hip flexion: 3/5  Knee extension: 4/5  Dorsiflexion: 0/5  Plantarflexion: 2+/5     Balance  Static and dynamic standing balance Noemy with R SPC     BBS: 20/56 (4/8/22) Static and dynamic standing balance CGA with R SPC    BBS: 32/56 (4/19/22)    BBS: TBD   Date Family Teach Completed  Mother present 4/22     Is additional Family Teaching Needed? Y or N Y day of discharge N     Hindering Progress Balance L hemiparesis Balance L hemiparesis     PT recommended ELOS 10-14 days      Team's Discharge Plan       Therapist at Team Meeting         Additional Comments: Pt made excellent progress toward goals during stay, was able to significantly improve level of independence and activity tolerance.  Pt was able to nearly double her gait speed over short distances as reflected by 10mWT. Static/dynamic standing balance also improved as reflected by significant improvement in BBS. Safety remains limited by L visual field deficits and L neglect. Family was present on day of discharge for family training session. Recommend continued use of SPC, supervision by family, and OPPT upon discharge.      Kathleen Duke, PT, DPT  Board Certified Clinical Specialist in Neurologic Physical Therapy  [phone]

## 2022-04-22 NOTE — PROGRESS NOTES
Physical Therapy   Facility/Department: [address] REHAB  Treatment Note    NAME: Danette Castellano  : 1975  MRN: [id number]    Date of Service: 2022  Evaluating Therapist: Natasha Greene PT, AYANA [id number]      ROOM: [address] [place] [address]  DIAGNOSIS: Late affect CVA  PRECAUTIONS: falls, L hemiparesis, L neglect, L homonymous hemianopsia  Impulsivity, small area of blistering on dorsum of L great toe. 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 At that time she underwent emergent R hemicraniectomy on 10/13. After being deemed medically appropriate, she was transferred to [facility] on 10/22/2021. She progressed well and was discharged home with necessary equipment and  supervision. Pt returned to ARU 22 following replacement of bone flap. Social:  Pt lives with mother and daughter in a 1 floor plan 4 steps and 2 rails to enter. Pt moved in with mother following CVA in .  supervision is possible. Prior to admission: Since return home following CVA in , pt was independent with toileting and bathing. Pt did require Supervision with functional mobility. Pt ambulated with SPC/SBQC, owns , was participating in [place]. Initial Evaluation  Date: 22 AM     Short Term Goals Long Term Goals    Was pt agreeable to Eval/treatment? Yes yes     Does pt have pain?  No c/o pain No c/o pain     Bed Mobility  Rolling: Noemy  Supine to sit: Noemy  Sit to supine: Noemy  Scooting: Noemy Mod I all aspects  (twin bed) SBA Mod I   Transfers Sit to stand: Noemy  Stand to sit: Noemy  Stand pivot: Noemy with R SPC    5xSTS: TBA  Sit<>Stand Supervision  Stand-pivot Supervision with R SPC SBA   Supervision   Ambulation  150 feet with R SPC with Noemy    W/c follow? (Y/N): N    10mWT: 0.25 m/s  6mWT: 112 m  (AD: R SPC, Assist level: Noemy) 400 feet x 2 reps with R SPC with SBA (L toe off AFO)    10mWT: 0.49 m/s  6mWT: 137 m  (, R SPC, SBA) >300 feet with AAD with SBA >600 feet with AAD with Supervision    10mWT: >0.4 m/s  6mWT: >175 m   Wheelchair Mobility NA NT     Car Transfers Noemy Supervision SBA Supervision   Stair negotiation: ascended and descended 12 steps with 1 rail with Noemy 10 steps with RUE support on HR with SBA (non-reciprocal)    10 steps with RUE support on HR with SBA (reciprocal) 12 steps with 1 rail with SBA 20 steps with 1 rail with Supervision   Walking 10 feet on uneven surface 10 feet with R SPC with Noemy 10 feet x with R SPC with SBA 10 feet with AAD with SBA 10 feet with AAD with Supervision   Curb Step:   ascended and descended 4 inch step with R SPC and Noemy 4\" step with R SPC with SBA 4 inch step with AAD and SBA 4 inch step with AAD and Supervision   Picking up object off the floor Picked up cone with Noemy Pt picked up cone with RUE with SBA Will  cone with SBA Will  2lb weight with SBA   BLE ROM RLE: WNL  LLE:   Hip flexion: WNL  Knee extension: WNL  Dorsiflexion: lacking ~5 degrees from neutral  Plantarflexion: WNL RLE: WNL  LLE:   Hip flexion: WNL  Knee extension: WNL  Dorsiflexion: lacking ~5 degrees from neutral  Plantarflexion: WNL     BLE Strength RLE:  Grossly 5/5  LLE:   Hip flexion: 3/5  Knee extension: 4/5  Dorsiflexion: 0/5  Plantarflexion: 2+/5 RLE:  Grossly 5/5  LLE:   Hip flexion: 3/5  Knee extension: 4/5  Dorsiflexion: 0/5  Plantarflexion: 2+/5     Balance  Static and dynamic standing balance Noemy with R SPC     BBS: 20/56 (4/8/22) Static and dynamic standing balance CGA with R SPC    BBS: 32/56 (4/19/22)    BBS: TBD   Date Family Teach Completed  Mother and Daughter present 4/22/22     Is additional Family Teaching Needed? Y or N Y day of discharge N     Hindering Progress Balance L hemiparesis Balance L hemiparesis     PT recommended ELOS 10-14 days      Team's Discharge Plan       Therapist at Team Meeting         Patient education  Pt educated on continuum of care from a PT standpoint.     Patient response to education:   Pt verbalized understanding Pt demonstrated skill Pt requires further education in this area   Yes NA No     Additional Comments: Pt was agreeable to participate in therapy. Pt's mother performed all functional activity with the pt safely during FT session. All questions regarding the pt's mobility were answered. Safe interventions for continued functional improvement at home were discussed with the pt and family. Plan for discharge today. AM  Time in: 0915  Time out: 1000    Pt is making good progress toward established Physical Therapy goals. Continue with physical therapy current plan of care.     Ro Cheema, SPT    Alfredo Schwarz, PT, DPT  Board Certified Clinical Specialist in Neurologic Physical Therapy  [phone]

## 2022-04-22 NOTE — PROGRESS NOTES
Comprehensive Nutrition Assessment    Type and Reason for Visit:  Reassess    Nutrition Recommendations/Plan:   1. Continue current diet and ONS  2. Will continue to monitor intakes     Malnutrition Assessment:  Malnutrition Status:  No malnutrition (04/13/22 1203)    Context:  Acute Illness     Findings of the 6 clinical characteristics of malnutrition:  Energy Intake:  No significant decrease in energy intake  Weight Loss:  No significant weight loss     Body Fat Loss:  No significant body fat loss     Muscle Mass Loss:  No significant muscle mass loss    Fluid Accumulation:  No significant fluid accumulation     Strength:  Not Performed    Nutrition Assessment:    Pt admit 4/6 to ARU following replacement of bone flap. Noted hx of large R MCA CVA w/ emergent R hemicraniectomy on 10/13/2; pt tolerating therapy and diet; Pt remains stable from a nutritional standpoint w/ continued intakes ~% most meals noted. Continue current diet regimen; will continue to monitor. Nutrition Related Findings:    +I/O; A&Ox4; active BS; no edema Wound Type: Surgical Incision (Head)       Current Nutrition Intake & Therapies:    Average Meal Intake: %  Average Supplements Intake: Unable to assess (no intakes recorded per EMR)  ADULT DIET; Regular  ADULT ORAL NUTRITION SUPPLEMENT; Dinner; Low Calorie/High Protein Oral Supplement    Anthropometric Measures:  Height: 5' 6\" (167.6 cm)  Ideal Body Weight (IBW): 130 lbs (59 kg)    Admission Body Weight: 181 lb 4.8 oz (82.2 kg) (BS 4/06)  Current Body Weight: 181 lb 4.8 oz (82.2 kg) (4/06 BS), 139.5 % IBW. Current BMI (kg/m2): 29.3  Usual Body Weight: 173 lb 12.8 oz (78.8 kg) (12/20/21 no method)  % Weight Change (Calculated): 4.3  Weight Adjustment For: No Adjustment                 BMI Categories: Overweight (BMI 25.0-29. 9)    Estimated Daily Nutrient Needs:  Energy Requirements Based On:  Aurelio Medina  Weight Used for Energy Requirements: Current  Energy (kcal/day): MSJ 1489 x 1.2SF = 1700-1800kcal  Weight Used for Protein Requirements: Ideal  Protein (g/day): 77-89g (1.3-1.5g/kgIBW)  Method Used for Fluid Requirements: 1 ml/kcal  Fluid (ml/day): 0935-2703    Nutrition Diagnosis:   · Increased nutrient needs related to increase demand for energy/nutrients as evidenced by wounds (s/p replacement of bone flap)      Nutrition Interventions:   Food and/or Nutrient Delivery: Continue Current Diet,Continue Oral Nutrition Supplement (Ensure HP 1x/day)  Nutrition Education/Counseling: No recommendation at this time  Coordination of Nutrition Care: Continue to monitor while inpatient       Goals:  Previous Goal Met: Goal(s) Achieved  Goals: PO intake 75% or greater       Nutrition Monitoring and Evaluation:   Behavioral-Environmental Outcomes: None Identified  Food/Nutrient Intake Outcomes: Food and Nutrient Intake,Supplement Intake  Physical Signs/Symptoms Outcomes: Biochemical Data,GI Status,Fluid Status or Edema,Hemodynamic Status,Meal Time Behavior,Nutrition Focused Physical Findings,Skin,Weight    Discharge Planning:    No discharge needs at this time     Kenny Yoo RD  Contact: [phone] patient

## 2022-04-25 ENCOUNTER — TELEPHONE (OUTPATIENT)
Dept: ADMINISTRATIVE | Age: 47
End: 2022-04-25

## 2022-05-17 ENCOUNTER — OFFICE VISIT (OUTPATIENT)
Dept: NEUROLOGY | Age: 47
End: 2022-05-17
Payer: MEDICAID

## 2022-05-17 VITALS
TEMPERATURE: 99 F | OXYGEN SATURATION: 99 % | HEIGHT: 66 IN | DIASTOLIC BLOOD PRESSURE: 62 MMHG | HEART RATE: 65 BPM | BODY MASS INDEX: 28.61 KG/M2 | SYSTOLIC BLOOD PRESSURE: 103 MMHG | WEIGHT: 178 LBS

## 2022-05-17 DIAGNOSIS — I63.9 CRYPTOGENIC STROKE (HCC): Primary | ICD-10-CM

## 2022-05-17 DIAGNOSIS — G40.109: ICD-10-CM

## 2022-05-17 PROCEDURE — 99204 OFFICE O/P NEW MOD 45 MIN: CPT | Performed by: PHYSICIAN ASSISTANT

## 2022-05-17 RX ORDER — LEVETIRACETAM 500 MG/1
500 TABLET ORAL 2 TIMES DAILY
Qty: 60 TABLET | Refills: 2 | Status: SHIPPED
Start: 2022-05-17 | End: 2022-07-07

## 2022-05-17 NOTE — PATIENT INSTRUCTIONS
Start Keppra 500 mg twice daily     Decrease Lamictal to 25 mg twice daily x 1 week, then 25 mg once daily for 1 week, then stop

## 2022-05-17 NOTE — PROGRESS NOTES
[address] [facility]. Monroe Sutherland M.D., F.A.C.P. Javan Darby, DNP, APRN, ACNS-BC  Benita Rachel. Valery Zamora, MSN, APRN-FNP-C  VAUGHN Foster, PA-C  Duke Winter, MSN, APRN-FNP-C  [address], [facility] [place], [address]  Phone: [phone]  Fax: [phone]       Eva Trevizo is a 42 y.o. right handed female     Patient presents for hospital follow-up for seizures. She had a prior large right MCA stroke requiring craniectomy and was worked up at [facility]. Her stroke remains cryptogenic at this time and implantable loop recorder was recommended. She has an appointment with cardiology in June. She remains on aspirin and Lipitor for secondary prevention. Prior work-up was unrevealing. She continues to have a left spastic hemiparesis and hemisensory changes. She continues to work with outpatient therapy. She has had no further loss of consciousness or seizure-like episodes since leaving the hospital.  She is on Lamictal 50 mg twice daily. She does report significant itchiness with this medication and wishes to change to something different. No chest pain or palpitations  No SOB  No vertigo, lightheadedness or loss of consciousness  No falls, tripping or stumbling  No incontinence of bowels or bladder  No itching or bruising appreciated  + L side weakness and numbness     ROS otherwise negative     Objective:       /62 (Site: Right Upper Arm)   Pulse 65   Temp 99 °F (37.2 °C)   Ht 5' 6\" (1.676 m)   Wt 178 lb (80.7 kg)   SpO2 99%   BMI 28.73 kg/m²      General appearance: alert, appears stated age and cooperative  Head: Normocephalic, well healed crani scar   Eyes: conjunctivae/corneas clear.    Neck: no adenopathy, no carotid bruit, no JVD, supple, symmetrical, trachea midline and thyroid not enlarged, symmetric, no tenderness/mass/nodules  Lungs: clear to auscultation bilaterally  Extremities: extremities normal, atraumatic, no cyanosis or edema- L AFO brace   Pulses: 2+ and symmetric  Skin: Skin color, texture, turgor normal. No rashes or lesions     Mental Status: Alert, oriented, thought content appropriate    Appropriate attention/concentration    Speech: No dysarthria     Language: No aphasia       Cranial Nerves:  I: smell    II: visual acuity     II: visual fields Full to confrontation   II: pupils IBETH   III,VII: ptosis None   III,IV,VI: extraocular muscles  Full ROM   V: mastication Normal   V: facial light touch sensation  Normal   V,VII: corneal reflex     VII: facial muscle function - upper  Normal   VII: facial muscle function - lower Normal   VIII: hearing Normal   IX: soft palate elevation  Normal   IX,X: gag reflex    XI: trapezius strength  5/5   XI: sternocleidomastoid strength 5/5   XI: neck extension strength  5/5   XII: tongue strength  Normal       Motor:  L spastic hemiparesis   Normal bulk   No abnormal movements     Sensory:  LT decreased LUE  Vibration normal     Coordination:   FFM, FNF impaired on L r/t weakness     Gait:   L spastic hemiparetic gait with AFO brace on LLE     DTR:   Increased reflexes on L     No Rodriguez's  No pathological reflexes    Laboratory/Radiology:     CBC with Differential:    Lab Results   Component Value Date    WBC 7.8 04/07/2022    RBC 3.89 04/07/2022    HGB 12.0 04/07/2022    HCT 37.2 04/07/2022     04/07/2022    MCV 95.6 04/07/2022    MCH 30.8 04/07/2022    MCHC 32.3 04/07/2022    RDW 14.2 04/07/2022    LYMPHOPCT 30.3 04/07/2022    MONOPCT 6.1 04/07/2022    BASOPCT 1.3 04/07/2022    MONOSABS 0.47 04/07/2022    LYMPHSABS 2.35 04/07/2022    EOSABS 0.20 04/07/2022    BASOSABS 0.10 04/07/2022     CMP:    Lab Results   Component Value Date     04/07/2022    K 4.4 04/07/2022     04/07/2022    CO2 22 04/07/2022    BUN 14 04/07/2022    CREATININE 0.7 04/07/2022    GFRAA >60 04/07/2022    LABGLOM >60 04/07/2022    GLUCOSE 95 04/07/2022    PROT 7.3 10/23/2021    LABALBU 3.7 10/23/2021 CALCIUM 9.3 04/07/2022    BILITOT 0.4 10/23/2021    ALKPHOS 89 10/23/2021    AST 46 10/23/2021    ALT 75 10/23/2021     HgBA1c:  No results found for: LABA1C  FLP:  No results found for: TRIG, HDL, LDLCALC, LDLDIRECT, LABVLDL    CTA head/neck  1. Large subacute to old right MCA infarct.  Extensive adjacent craniectomy  changes are identified in the right side. 2. Moderate diffuse smooth narrowing involving the distal M1 segment of the  right MCA.  There is diffuse mildly limited perfusion of the right MCA  compared to the contralateral side. 3. Unremarkable CT angiogram of the neck. EEG  This abnormal study showed evidence of:     1. An increased potential for focal seizures from the right frontotemporal region  2.  Moderate to severe nonspecific cerebral dysfunction of the right frontotemporal region     Structural abnormalities should be considered for the findings above and appropriate imaging obtained if clinically indicated. No seizures were noted during this study.      Records reports reviewed from [facility] in care everywhere     I personally reviewed all labs andimages today   Assessment:     Cryptogenic RMCA stroke    Recommend ILR- has appt with cardiology in June    Prior workup so far unrevealing including hypercoagulable workup- done at [facility]    Focal seizures    None since hospitalization    Side effects of skin itching on Lamictal, will wean and start Keppra 500 mg BID     Plan:     Continue ASA and statin     Recommend ILR     Wean Lamictal - take 25 mg BID x 1 week, then 25 mg daily x 1 week, then stop    Start Keppra 500 mg BID     Seizure safety precautions     RTO 3 months or sooner prn     Call with questions or concerns       Estrellita Edward PA-C  3:21 PM  5/17/2022

## 2022-05-20 ENCOUNTER — HOSPITAL ENCOUNTER (OUTPATIENT)
Dept: OCCUPATIONAL THERAPY | Age: 47
Setting detail: THERAPIES SERIES
Discharge: HOME | End: 2022-05-20
Payer: MEDICAID

## 2022-05-20 PROCEDURE — 97112 NEUROMUSCULAR REEDUCATION: CPT

## 2022-05-20 PROCEDURE — 97166 OT EVAL MOD COMPLEX 45 MIN: CPT

## 2022-05-20 NOTE — PROGRESS NOTES
Date:  2021                      Date of Service: 2022    Date:2022   Patient Name: Humaira Giron  MRN: [id number]     : 1975    Treating Therapist: Donaldo Avelar OTR/L [id number]     Restrictions/Precautions:  Spasticity, low fall rsik  Diagnosis:  h/o ischemic right MCA stroke (Z86.73), Left spastic hemiplegia (HCC)(G81.14, impaired mobility & ADLs (Z74.09 Z78.9)  Date of Injury:      Insurance/Certification information:   Plan of care signed (Y/N): No  Visit# / total visits: TBD     Referring Practitioner:  Maik Gallagher MD  Specific Practitioner Orders: Eval and Treat, In-motion Arm     Home Living: Lives with her parents & daughter in 1-story home 2 steps BHR, 1st floor s/u bed/bath; tub. Prior Level of Function: Pt is Mod I using a SBQC for mobility & transfers & Mod I for toileting. Pt require assist with bathing, UE/LE dressing. IADL History  Homemaking Responsibility: Assist  Shopping Responsibility: Assist     Cognition   Alert & oriented x 3     Current ADL Status  Feeding: Independent  Grooming: Independent  Bathing: Mod A  UE Dressing: Mod A  LE Dressing: Mod A underwear & pants & Max A socks, brace & shoes  Toileting: Mod I   Transfers: Mod I   Functional mobility: Mod I using a SBQC     Pain Level- Pt report pain during PROM LUE     UE Assessment:   RUE: AROM & strength WF  LUE- Pt demo PROM: shoulder flexion 0.90 degrees, shoulder abduction 0.60 degrees, elbow flexion 0-100 degrees, wrist flexion/extension 0-10, supination/pronation 0-40. Pt has pain @ end range. Pt presents with spasticity & hold her LUE in a flexed position.  Pt wears a sling for support & wears a resting hand splint on her L hand @ night for positioning purposes     Hand Dominance: R handed     Sensation: grossly intact   Tone- spasticity LUE   Vision/Perception [facility]     InMotion Robotic Evaluation  Point to Point Assessment:  Smoothness: 0.257  Reach Error: 0.073  Max Velocity: 0.077,   Path Error: 0.033 Initiation Time: 0.591 seconds  [place] Assessment:  [place] Size: 0.006  [place]: 0.430  Stabilization:  Hold Deviation: 0.060  Resitance Assessment:  Displacement: 0.021     Assessment of current deficits   []? Functional mobility                         [x]? ADLs          [x]? Strength                 []? Cognition   []? Functional transfers           [x]? IADLs         []? Safety Awareness  [x]? Endurance   [x]? Fine Motor Coordination    [x]? Balance      []? Vision/perception   []? Sensation     [x]? Gross Motor Coordination [x]? ROM          []? Pain                        []? Edema          []? Scar Adhesion/Skin Integrity      OT PLAN OF CARE   OT POC based on physician orders, patient diagnosis and results of clinical assessment.     Frequency/Duration: 2x/wk for 96-39 visits  /  Certification Period From: 1/5/2022  To: 06/29/2022     Specific OT Treatment to include:   [x]? Instruction in HEP                   Modalities:  [x]?  Therapeutic Exercise                 [x]? Ultrasound               [x]? Electrical Stimulation/Attended  [x]? PROM/Stretching                    [x]? Fluidotherapy          [x]?   Paraffin                   [x]? AAROM  [x]?  AROM                 []? Iontophoresis:   [x]? [place] Res                                       [x]? Neuromuscular Re-Ed             [x]? ADL/IADL re-training    [x]?  Therapeutic Activity                  []? Pain Management with/without modalities PRN                 [x]?  Manual Therapy                      [x]? Splinting                                   []? Scar Management                   []?Joint Protection/Training  []? Ergonomics                             []? Joint Mobilization                      [x]? Adaptive Equipment Assessment/Training                             [x]?  Manual Edema Mobilization   [x]?  Myofascial Release                 [x]? Energy Conservation/Work Simplification  [x]? GM/FM Coordination                [x]? Safety retraining/education per  individual diagnosis/goals  []? Desensitization        Patient Specific Goal: \"To be able to  & manipulate things using my left arm. Do everything. \"                          GOALS (Long term same as Short term):  1.) Patient will demonstrate good understanding of home program(exercises/activities/diagnosis/prognosis/goals) with good accuracy. 2)Patient will demonstrate improved smoothness of 0.257 on InMotion Arm (Norm is 0.533)  which will improve patient ability to complete ADLs. 3)Patient will demonstrate improved initiation time 0.591 sec (Norm is 0.25 seconds) which a lower number that is required for cooking & home management tasks  4) Pt [place] size 0.006 & [place] independence of 0.430 (8cm [place] 0.0201 or >) will improve to a higher number which is required for a functional tasks & ADLs. 5) Pt will demonstrate a lower hold deviation 0.060  (Norm is 0) which will assist a patient open a door & perform transfers. 6) Pt will demonstrate a higher displacement number 0.021 (norm is 0.14) which will improve a patient ability to perform ADLs.    TODAY'S TREATMENT      Pain Level: No c/o pain this date. During PROM LUE @ end range     Subjective: Pt reports \"I to be able to  & manipulate objects using my left hand. Do everything. \"     OBJECTIVE: InMotion   Updated POC to be completed by 10th.         RI   DFT  RP  MJ  DFSL  Protocol Comments      # 1/80 15 11 77 103 8 Random 10 cm   # 2/160    14 8 74 106 7 Random Focus on DFT & RP   # 3/240    15 8 96 107 9 Random Focus on RP & MJ    # 4/320             #5/400           #6/480             #7/560             #8/640                    Stabilization                      Resistance                                                       Pt assessed on this date. Pt used 10cm [place] & shoulder straps & forearm ace wrapped for positioning purposes.  Pt smoothness 0.257, path error 0.033, reach error 0.073, max velocity 0.077, initiation time 0.591 seconds, [place] size 0.006, [place] independence 0.430, hold deviation 0.060 & displacement 0.021. Pt does attempt to compensate her movements by using her head & trunk movements to facilitate movement of the LUE. Pt head is held in place & her trunk & R shoulder are held to prevent compensatory movement patterns     Pt completed assessment, pre & post testing & random protocol for a total of 324 total movements using her  LUE. Pt made the following gains in between sets of random protocol in the following areas: robot power in [place] decreased from 77 to 74 which means patient powered more of the movement, robot initiations improved from 56/80 initiations to 66/80 initiations which means pt initiated more movement, distance from a target decreased from 11 to 8 which means she hit the target with greater accuracy & distance from a straight line decreased from 8 to 7 which means her lines were straighter. Pt also made the following gains in her pre & post test using random protocol in the following areas: reach error decreased from 0.014 to 0.012 & path error decreased from 0.007 to 0.006. Pt also improved her pre & post test during erandom protocol in the following area: smoothness improved from 0.254 to 0.344, reach error decreased from 0.089 to 0.075 & path error decreased from 0.051 to 0.031. Pt enjoys immediate results the Inmotion robotic arm provides. All InMotion exercises are performed to increase function of L UE to assist with ADL's and IADL's at home. Pt appreciates the immediate feedback that is provided using the InMotion arm.  Pt is highly motivated.     Pt will benefit from InMotion robotic arm intervention & OT to improve pt funcitonal use of her LUE.     Assessment/Comments:      Time In: 1300          Time Out: 1400                      Timed Code Treatment Minutes: 60 Minutes     CODE   Minutes  Units   82986 OT Eval Low       22568 OT Eval Medium 60 1   27694 OT Eval High     [address] Manual       81391 Therapeutic Ex       02401 Therapeutic Activity       26084 ADL/COMP Tech Train       22159 Neuromuscular Re-Ed       22816 OrthoManagementTraining       00194 Paraffin       63361 Electrical Stim - Attended       71766 Iontophoresis       62265 Ultrasound         Other                    -Response to Treatment: Good     Plan:   X  Continues Plan of care: Will continue to focus on decreasing spasticity, improving function of her LUE, improving GMC and strength to increase ADL and IADL function with robotic arm intervention. []?  Alter Plan of care:   []?  Discharge:     Patient. Education:  X Plans/Goals, Risks/Benefits discussed  ? Home exercise program  Method of Education: ? Verbal  ? Demo  ? Written  Comprehension of Education:  ? Amira Stevenson understanding. ? Demonstrates understanding. ? Needs Review. ? Demonstrates/verbalizes understanding of HEP/Ed previously given.        Patient understands diagnosis/prognosis and consents to treatment, plan and goals: X Yes    ? No      Electronically signed by: XENA Kemp/L, PILAR        Physician's Certification / Comments      Frequency/Duration 1x / week for 18-30 visits.    Certification period From: 1/5/2022  To: 6/29/2022     I have reviewed the Plan of Care established for skilled therapy services and certify that the services are required and that they will be provided while the patient is under my care.     Physician's Comments/Revisions:                   Physician's Printed Name:                                           Physician's Signature:                                                               Date:         Please review Patient's OT evaluation and if you agree sign/date and fax back to us at our [facility]. [address] fax # [phone].     Ernestina Ca OTR/L [id number]

## 2022-05-20 NOTE — PROGRESS NOTES
Occupational Therapy  Date:2022   Patient Name: Rajendra Mcgill  MRN: [id number]     : 1975    Treating Therapist: Samreen Sanchez OTR/L [id number]     Restrictions/Precautions:  Spasticity, low fall rsik  Diagnosis:  h/o ischemic right MCA stroke (Z86.73), Left spastic hemiplegia (HCC)(G81.14, impaired mobility & ADLs (Z74.09 Z78.9)  Date of Injury:      Insurance/Certification information:   Plan of care signed (Y/N): No  Visit# / total visits: TBD     Referring Practitioner:  Qamar Way MD  Specific Practitioner Orders: Eval and Treat, In-motion Arm     Home Living: Lives with her parents & daughter in 1-story home 2 steps BHR, 1st floor s/u bed/bath; tub. Prior Level of Function: Pt is Mod I using a SC for mobility & transfers & Mod I for toileting. Pt require assist with bathing & UE. Pt is Mod I LE dressing. IADL History  Homemaking Responsibility: Assist  Shopping Responsibility: Assist     Cognition   Alert & oriented x 3     Current ADL Status  Feeding: Independent  Grooming: Independent  Bathing:CGA  UE Dressing: Min A  LE Dressing: Mod I  underwear & pants & Mod I socks, brace & shoes  Toileting: Mod I   Transfers: Mod I   Functional mobility: Mod I using a sc in community & independent in the home     Pain Level- Pt report pain during PROM LUE     UE Assessment:   RUE: AROM & strength WF  LUE- Pt demo PROM: shoulder flexion 0- 1200 degrees, shoulder abduction 0-900 degrees, elbow flexion 0-100 degrees, wrist flexion/extension 0-20, supination/pronation 0-40. Pt has pain @ end range. Pt presents with decreased spasticity & has her hand flexed @ MCP. Pt wwears a resting hand splint on her L hand @ night for positioning purposes.  Pt also takes 10mg baclofen 3 times a day & 300mg gabapentin @ night     Hand Dominance: R handed     Sensation: grossly intact   Tone- spasticity LUE   Vision/Perception WFL     InMotion Robotic Evaluation  Point to Point Assessment:  Smoothness: 0.319  Target Accuracy: 11.30cm  AIM: 5.13  Movement duration: 7.5 sec  Movement Efficiency: 22%    [place] Assessment:  [place] Size: 56.81cm  [place] symmetry: 0.384  Stabilization:  Overall isometrichold Distance: 10.89cm  Resitance Assessment:  Overall displacement: 4.97cm     Assessment of current deficits   []? Functional mobility                         [x]? ADLs          [x]? Strength                 []? Cognition   []? Functional transfers           [x]? IADLs         []? Safety Awareness  [x]? Endurance   [x]? Fine Motor Coordination    [x]? Balance      []? Vision/perception   []? Sensation     [x]? Gross Motor Coordination [x]? ROM          []? Pain                        []? Edema          []? Scar Adhesion/Skin Integrity      OT PLAN OF CARE   OT POC based on physician orders, patient diagnosis and results of clinical assessment.     Frequency/Duration: 2x/wk for 46-61 visits  /  Certification Period From: 5/20/22 to 12/9/2022     Specific OT Treatment to include:   [x]? Instruction in HEP                   Modalities:  [x]?  Therapeutic Exercise                 [x]? Ultrasound               [x]? Electrical Stimulation/Attended  [x]? PROM/Stretching                    [x]? Fluidotherapy          [x]?   Paraffin                   [x]? AAROM  [x]?  AROM                 []? Iontophoresis:   [x]? [place]                                       [x]? Neuromuscular Re-Ed             [x]? ADL/IADL re-training    [x]?  Therapeutic Activity                  []? Pain Management with/without modalities PRN                 [x]?  Manual Therapy                      [x]? Splinting                                   []? Scar Management                   []?Joint Protection/Training  []? Ergonomics                             []? Joint Mobilization                      [x]? Adaptive Equipment Assessment/Training                             [x]?  Manual Edema Mobilization   [x]?  Myofascial Release                 [x]? Energy Conservation/Work Simplification  [x]? GM/FM Coordination                [x]? Safety retraining/education per  individual diagnosis/goals  []? Desensitization        Patient Specific Goal: \"Completely dress myself. Be able to turn light on & off & be able to hold a tooth brush with my left hand. \"                          GOALS (Long term same as Short term):  1.) Patient will demonstrate good understanding of home program(exercises/activities/diagnosis/prognosis/goals) with good accuracy. 2)Patient will demonstrate improved smoothness of 0.319 on InMotion Arm (Norm is 0.533)  which will improve patient ability to complete ADLs. 3)Patient will demonstrate improved movement duration 7.5 sec (Norm is 1.4 seconds) which a lower number that is required for cooking & home management tasks  4)Patient will demonstrate improved AIM 5.13 cm(Norm is 0.55cm) which a lower number that is required for improved ADLs & IADLs  5)Patient will demonstrate improved movement efficiency 22% (Norm is 75%) which a lower number that is required for improved ADLs & IADLs  6) Pt [place] size 0.006 & [place] independence of 0.430 (8cm [place] 0.0201 or >) will improve to a higher number which is required for a functional tasks & ADLs. 7) Pt will demonstrate a lower overall isometric hold distance 10.89cm  (Norm is 2.40cm which will assist a patient open a door & turn lights on & off.  8) Pt will demonstrate a higher overall displacement number 4.97cm (norm is 13.00cm) which will improve a patient ability to perform ADLs & IADLs.    TODAY'S TREATMENT      Pain Level: No c/o pain this date. Pt does report occasion al pain in MCPs in her L hand due to tone     Subjective: Pt reports \"I to be able to  & manipulate objects using my left hand. Do everything. \"     OBJECTIVE: InMotion   Updated POC to be completed by 10th.           RI   DFT  RP  MJ  DFSL  Protocol Comments      # 1/80 15 7 119 117 9 Active Assiste 14cm   # 2/160    16 5 235 127 8 \" Focus on DFT & Melody 98   # 3/240    12 5 184 129 8 \" Focus on RP & MJ    # 4/320    13 6 199 131 8 \" Focus on RI & RP   #5/400 16 7 113 115 9 Random     #6/480 7 3 125 124 8 \" Pt allowed to move her head & truck to facilitate movement      #7/560             #8/640                    Stabilization                      Resistance                                                       Pt assessed on this date. Pt used 14cm [place] & shoulder straps for positioning purposes. Pt demo the following during assessment- [place] size 56.81cm, [place] symmetry 0.384, smoothness 0.319, target accuracy 11.30cm, AIM 5.13cm, movement duration 7.5 sec, movement efficiency 22%, overall isometric hold deviation 11.60cm & overall displacement 4.97cm. Pt does attempt to compensate her movements by using her head & trunk movements to facilitate movement of the LUE. Pt head is held in place & her trunk & R shoulder are held to prevent compensatory movement patterns     Pt completed assessment, pre & post testing & random protocol for a total of 324 total movements using her  LUE. Pt also made the following gains in her pre & post test using active assisted protocol in the following areas: smoothness improved 43%, target accuracy improved 55%, AIM improved 36%, movement duration improved 43% & movement efficiency improved 95%. Pt also made the following gains in her pre & post test using ranodm protocol in the following areas: smoothness improved 37%, target accuracy improved 56%, AIM improved 40%, movement duration improved 24% & movement efficiency improved 10%. Pt enjoys immediate results the Inmotion robotic arm provides. All InMotion exercises are performed to increase function of L UE to assist with ADL's and IADL's at home. Pt appreciates the immediate feedback that is provided using the InMotion arm.  Pt is highly motivated.     Pt will benefit from InMotion robotic arm intervention & OT to improve pt funcitonal use of her LUE.     Assessment/Comments:      Time In: 1000         Time Out: 1100                      Timed Code Treatment Minutes: 60 Minutes     CODE   Minutes  Units   42674 OT Eval Low       26842 OT Eval Medium 15 1   56230 OT Eval High       30115 Fluidotherapy       55398 Manual       95017 Therapeutic Ex       98462 Therapeutic Activity       61045 ADL/COMP Tech Train       [id number] Neuromuscular Re-Ed 45  3   80605 OrthoManagementTraining       78094 Paraffin       80049 Electrical Stim - Attended       85479 Iontophoresis       87890 Ultrasound         Other                    -Response to Treatment: Good     Plan:   X  Continues Plan of care: Will continue to focus on decreasing spasticity, improving function of her LUE, improving GMC and strength to increase ADL and IADL function with robotic arm intervention. []?  Alter Plan of care:   []?  Discharge:     Patient. Education:  X Plans/Goals, Risks/Benefits discussed  ? Home exercise program  Method of Education: ? Verbal  ? Demo  ? Written  Comprehension of Education:  ? Sree Few understanding. ? Demonstrates understanding. ? Needs Review. ? Demonstrates/verbalizes understanding of HEP/Ed previously given.        Patient understands diagnosis/prognosis and consents to treatment, plan and goals: X Yes    ? No      Electronically signed by: XENA Segovia/L, PILAR        Physician's Certification / Comments      Frequency/Duration 1x / week for 18-30 visits.    Certification period From: 5/20/2022  To: 12/9/2022     I have reviewed the Plan of Care established for skilled therapy services and certify that the services are required and that they will be provided while the patient is under my care.     Physician's Comments/Revisions:                   Physician's Printed Name:                                           Physician's Signature:                                                               Date:         Please review Patient's OT evaluation and if you

## 2022-05-25 ENCOUNTER — HOSPITAL ENCOUNTER (OUTPATIENT)
Dept: OCCUPATIONAL THERAPY | Age: 47
Setting detail: THERAPIES SERIES
Discharge: HOME | End: 2022-05-25
Payer: MEDICAID

## 2022-05-25 PROCEDURE — 97112 NEUROMUSCULAR REEDUCATION: CPT

## 2022-05-25 NOTE — PROGRESS NOTES
Occupational Therapy  Date:2022   Patient Name: Ritu Mejia  MRN: [id number]     : 1975    Treating Therapist: Viry Pineda OTR/L [id number]     Restrictions/Precautions:  Spasticity, low fall rsik  Diagnosis:  h/o ischemic right MCA stroke (Z86.73), Left spastic hemiplegia (HCC)(G81.14, impaired mobility & ADLs (Z74.09 Z78.9)  Date of Injury:      Insurance/Certification information:   Plan of care signed (Y/N): No  Visit# / total visits: 1/      Referring Practitioner:  Florinda Narayanan MD  Specific Practitioner Orders: Eval and Treat, In-motion Arm     Assessment of current deficits   [] Functional mobility                         [x] ADLs          [x] Strength                 [] Cognition   [] Functional transfers           [x] IADLs         [] Safety Awareness  [x] Endurance   [x] Fine Motor Coordination    [x] Balance      [] Vision/perception   [] Sensation     [x] Gross Motor Coordination [x] ROM          [] Pain                        [] Edema          [] Scar Adhesion/Skin Integrity      OT PLAN OF CARE   OT POC based on physician orders, patient diagnosis and results of clinical assessment.      Frequency/Duration: 1x/wk for 21-53 visits  /  Certification Period From: 22 to 2022     Specific OT Treatment to include:   [x] Instruction in HEP                   Modalities:  [x] Therapeutic Exercise                 [x] Ultrasound               [x] Electrical Stimulation/Attended  [x] PROM/Stretching                    [x] Fluidotherapy          [x]  Paraffin                   [x] AAROM  [x] AROM                 [] Iontophoresis:   [x] Tendon Glides                                               [x] Neuromuscular Re-Ed             [x] ADL/IADL re-training    [x] Therapeutic Activity                  [] Pain Management with/without modalities PRN                 [x] Manual Therapy                      [x] Splinting                                   [] Scar Management                   []Joint Protection/Training  []Ergonomics                             [] Joint Mobilization                      [x] Adaptive Equipment Assessment/Training                             [x] Manual Edema Mobilization   [x] Myofascial Release                 [x] Energy Conservation/Work Simplification  [x] GM/FM Coordination                [x] Safety retraining/education per  individual diagnosis/goals  [] Desensitization        Patient Specific Goal: \"Completely dress myself. Be able to turn light on & off & be able to hold a tooth brush with my left hand. \"                          GOALS (Long term same as Short term):  1.) Patient will demonstrate good understanding of home program(exercises/activities/diagnosis/prognosis/goals) with good accuracy. 2)Patient will demonstrate improved smoothness of 0.319 on InMotion Arm (Norm is 0.533)  which will improve patient ability to complete ADLs. 3)Patient will demonstrate improved movement duration 7.5 sec (Norm is 1.4 seconds) which a lower number that is required for cooking & home management tasks  4)Patient will demonstrate improved AIM 5.13 cm(Norm is 0.55cm) which a lower number that is required for improved ADLs & IADLs  5)Patient will demonstrate improved movement efficiency 22% (Norm is 75%) which a lower number that is required for improved ADLs & IADLs  6) Pt [place] size 0.006 & [place] independence of 0.430 (8cm [place] 0.0201 or >) will improve to a higher number which is required for a functional tasks & ADLs. 7) Pt will demonstrate a lower overall isometric hold distance 10.89cm  (Norm is 2.40cm which will assist a patient open a door & turn lights on & off.  8) Pt will demonstrate a higher overall displacement number 4.97cm (norm is 13.00cm) which will improve a patient ability to perform ADLs & IADLs. TODAY'S TREATMENT      Pain Level: No c/o pain this date. Subjective: Pt reports \"I can use my arm to hold things. I just have trouble letting go. \" OBJECTIVE: InMotion   Updated POC to be completed by 10th. RI   DFT  RP  MJ  DFSL  Protocol Comments      # 1/80 8 7 117 117 8 Active Assistive 14cm   # 2/160    7 8 138 126 9 Active Assistive Focus on RI & DFT   # 3/240    8 9 178 128 9 Active Assistive Focus on RI & DFT   # 4/320    10 8 184 133 10 \" Focus on RI & DFT   #5/400 7 6 139 123 10 AA Random Topographical     #6/480 10 5 192 133 9 AA Random Topographical Focus on DFT & DFSL   #7/560 12 10 158 120 6 AA Random Topographical Focus on RP & MJ   #8/640 12 7 197 129 9 AA Random Topographical Focus on RI & DFT             Obstacle Training       X Axis  Score: 200             Pt completed 688 total movements with L UE including pre and post testing using 14 cm [place] on AA and AA random topographical. Pt also completed obstacle training activity. Pt required use of ace wrap and shoulder straps for positioning purposes. Pt requires max verbal/tactile cues to decrease compensatory movements. Pt showed improvements throughout treatment as noted above. Pt showed improvements from pre to post testing in smoothness from . 398 to 0.400 (1% increase) and movement duration from 8.1s to 4.7s (9% increase). All InMotion exercises are performed to increase function of L UE to assist with ADL's and IADL's at home. Pt appreciates the immediate feedback that is provided using the InMotion arm. Pt is highly motivated & engages in ADLs & IADLs in the home and incorporates LUE into functional tasks. Pt will benefit from InMotion robotic arm intervention & OT to improve pt funcitonal use of her LUE. Assessment/Comments: Pt is making good progress toward stated plan of care. Pt able to tolerate 50 minutes of treatment this date d/t reports of fatigue. Pt demo'd improved ability to actively reach into upper quadrants by end of treatment session this date. Educated pt to continue HEP and incorporating L UE into functional tasks. Continue with OT POC.      Time In: 1100        Time Out: 1150                      Timed Code Treatment Minutes: 50 Minutes     CODE   Minutes  Units   99476 OT Eval Low       22029 OT Eval Medium     67146 OT Eval High       91627 Fluidotherapy       39818 Manual       91017 Therapeutic Ex       40093 Therapeutic Activity       41077 ADL/COMP Tech Train       11264 Neuromuscular Re-Ed 50 3   12257 OrthoManagementTraining       49240 Paraffin       80136 Electrical Stim - Attended       [id number] Iontophoresis       74759 Ultrasound         Other                    -Response to Treatment: Good     Plan:   X  Continues Plan of care: Will continue to focus on decreasing spasticity, improving function of her LUE, improving GMC and strength to increase ADL and IADL function with robotic arm intervention.    []  [address] of care:   []  Discharge:    Masha Glass GLOVER/L [id number]     I have read & agree with the above status  Gunjan Polanco OTR/L [id number]

## 2022-06-01 ENCOUNTER — HOSPITAL ENCOUNTER (OUTPATIENT)
Dept: OCCUPATIONAL THERAPY | Age: 47
Setting detail: THERAPIES SERIES
Discharge: HOME | End: 2022-06-01
Payer: MEDICAID

## 2022-06-01 PROCEDURE — 97112 NEUROMUSCULAR REEDUCATION: CPT

## 2022-06-01 NOTE — PROGRESS NOTES
Occupational Therapy  Date:2022   Patient Name: Genny Schafer  MRN: [id number]     : 1975    EvaluatingTherapist: Maribel Arita OTR/L [id number]     Restrictions/Precautions:  Spasticity, low fall rsik  Diagnosis:  h/o ischemic right MCA stroke (Z86.73), Left spastic hemiplegia (HCC)(G81.14, impaired mobility & ADLs (Z74.09 Z78.9)  Date of Injury:      Insurance/Certification information:   Plan of care signed (Y/N): No  Visit# / total visits:      Referring Practitioner:  Rayne Glalegos MD  Specific Practitioner Orders: Eval and Treat, In-motion Arm     Assessment of current deficits   [] Functional mobility                         [x] ADLs          [x] Strength                 [] Cognition   [] Functional transfers           [x] IADLs         [] Safety Awareness  [x] Endurance   [x] Fine Motor Coordination    [x] Balance      [] Vision/perception   [] Sensation     [x] Gross Motor Coordination [x] ROM          [] Pain                        [] Edema          [] Scar Adhesion/Skin Integrity      OT PLAN OF CARE   OT POC based on physician orders, patient diagnosis and results of clinical assessment.      Frequency/Duration: 1x/wk for 93-58 visits  /  Certification Period From: 22 to 2022     Specific OT Treatment to include:   [x] Instruction in HEP                   Modalities:  [x] Therapeutic Exercise                 [x] Ultrasound               [x] Electrical Stimulation/Attended  [x] PROM/Stretching                    [x] Fluidotherapy          [x]  Paraffin                   [x] AAROM  [x] AROM                 [] Iontophoresis:   [x] Tendon Glides                                               [x] Neuromuscular Re-Ed             [x] ADL/IADL re-training    [x] Therapeutic Activity                  [] Pain Management with/without modalities PRN                 [x] Manual Therapy                      [x] Splinting                                   [] Scar Management []Joint Protection/Training  []Ergonomics                             [] Joint Mobilization                      [x] Adaptive Equipment Assessment/Training                             [x] Manual Edema Mobilization   [x] Myofascial Release                 [x] Energy Conservation/Work Simplification  [x] GM/FM Coordination                [x] Safety retraining/education per  individual diagnosis/goals  [] Desensitization        Patient Specific Goal: \"Completely dress myself. Be able to turn light on & off & be able to hold a tooth brush with my left hand. \"                          GOALS (Long term same as Short term):  1.) Patient will demonstrate good understanding of home program(exercises/activities/diagnosis/prognosis/goals) with good accuracy. 2)Patient will demonstrate improved smoothness of 0.319 on InMotion Arm (Norm is 0.533)  which will improve patient ability to complete ADLs. 3)Patient will demonstrate improved movement duration 7.5 sec (Norm is 1.4 seconds) which a lower number that is required for cooking & home management tasks  4)Patient will demonstrate improved AIM 5.13 cm(Norm is 0.55cm) which a lower number that is required for improved ADLs & IADLs  5)Patient will demonstrate improved movement efficiency 22% (Norm is 75%) which a lower number that is required for improved ADLs & IADLs  6) Pt [place] size 0.006 & [place] independence of 0.430 (8cm [place] 0.0201 or >) will improve to a higher number which is required for a functional tasks & ADLs. 7) Pt will demonstrate a lower overall isometric hold distance 10.89cm  (Norm is 2.40cm which will assist a patient open a door & turn lights on & off.  8) Pt will demonstrate a higher overall displacement number 4.97cm (norm is 13.00cm) which will improve a patient ability to perform ADLs & IADLs. TODAY'S TREATMENT      Pain Level: No c/o pain this date. Subjective: Pt reports \"I got a really good workout on the robot last week.  It's helping. \"     OBJECTIVE: InMotion   Updated POC to be completed by 10th. RI   DFT  RP  MJ  DFSL  Protocol Comments      # 1/80 7 6 110 130 10 AA Random 14cm   # 2/160    4 5 171 144 8 AA Random Focus on RI & DFT   # 3/240    9 5 196 145 11 AA Random Focus on DFT & MJ   # 4/320    5 9 177 150 11 AA Random Focus on RI    #5/400 5 7 142 136 10 Active Assistive      #6/480 3 5 177 151 10 Active Assistive  Focus on RI & DFT   #7/560 5 6 184 147 9 Active Assistive  Focus on RI & MJ   #8/640 10 5 210 154 10 Active Assistive  Focus on DFT             Maze  (With Forces)       50.44 sec             Pt completed 704 total movements with L UE including pre and post testing using 14 cm [place] on AA and AA random. Pt also completed maze activity with forces required to complete. Pt required use of ace wrap and shoulder straps for positioning purposes. Pt requires mod verbal/tactile cues to decrease compensatory movements. Pt showed improvements throughout treatment as noted above. Pt showed improvements from pre to post testing in movement duration from 6.2s to 4.8s (23% change) and movement efficienct from 33% to 34% (19% change). All InMotion exercises are performed to increase function of L UE to assist with ADL's and IADL's at home. Pt appreciates the immediate feedback that is provided using the InMotion arm. Pt is highly motivated & engages in ADLs & IADLs in the home and incorporates LUE into functional tasks. Pt will benefit from InMotion robotic arm intervention & OT to improve pt funcitonal use of her LUE. Assessment/Comments: Pt is making good progress toward stated plan of care. Pt shows improved AROM in upper and left quadrants this date. Educated pt to continue HEP and incorporating L UE into functional tasks. Continue with OT POC.      Time In: 1115        Time Out: 1200                     Timed Code Treatment Minutes: [address]   Minutes  Units   [address] [facility]       65561 OT Eval Medium     91739 OT Eval High       [id number] Fluidotherapy       [id number] Manual       26236 Therapeutic Ex       6441 [facility] Therapeutic Activity       45184 ADL/COMP Tech Train       [address] [place] Neuromuscular Re-Ed 45 3   63873 OrthoManagementTraining       05081 Paraffin       99533 Electrical Stim - Attended       [id number] Iontophoresis       64883 Ultrasound         Other                    -Response to Treatment: Good     Plan:   X  Continues Plan of care: Will continue to focus on decreasing spasticity, improving function of her LUE, improving GMC and strength to increase ADL and IADL function with robotic arm intervention.    []  [address] of care:   []  Discharge:    Damon Charles GLOVER/L [id number]    I have read & agree with the above status  Little Hui OTR/L [id number]

## 2022-06-08 ENCOUNTER — HOSPITAL ENCOUNTER (OUTPATIENT)
Dept: OCCUPATIONAL THERAPY | Age: 47
Setting detail: THERAPIES SERIES
Discharge: HOME | End: 2022-06-08
Payer: MEDICAID

## 2022-06-08 PROCEDURE — 97112 NEUROMUSCULAR REEDUCATION: CPT

## 2022-06-08 NOTE — PROGRESS NOTES
Occupational Therapy  Date:2022   Patient Name: Favian aMdrid  MRN: [id number]     : 1975    EvaluatingTherapist: Jeffrey Fang OTR/L [id number]     Restrictions/Precautions:  Spasticity, low fall rsik  Diagnosis:  h/o ischemic right MCA stroke (Z86.73), Left spastic hemiplegia (HCC)(G81.14, impaired mobility & ADLs (Z74.09 Z78.9)  Date of Injury:      Insurance/Certification information:   Plan of care signed (Y/N): No  Visit# / total visits: 3/30     Referring Practitioner:  Mayco Merino MD  Specific Practitioner Orders: Eval and Treat, In-motion Arm     Assessment of current deficits   [] Functional mobility                         [x] ADLs          [x] Strength                 [] Cognition   [] Functional transfers           [x] IADLs         [] Safety Awareness  [x] Endurance   [x] Fine Motor Coordination    [x] Balance      [] Vision/perception   [] Sensation     [x] Gross Motor Coordination [x] ROM          [] Pain                        [] Edema          [] Scar Adhesion/Skin Integrity      OT PLAN OF CARE   OT POC based on physician orders, patient diagnosis and results of clinical assessment.      Frequency/Duration: 1x/wk for 23-45 visits  /  Certification Period From: 22 to 2022     Specific OT Treatment to include:   [x] Instruction in HEP                   Modalities:  [x] Therapeutic Exercise                 [x] Ultrasound               [x] Electrical Stimulation/Attended  [x] PROM/Stretching                    [x] Fluidotherapy          [x]  Paraffin                   [x] AAROM  [x] AROM                 [] Iontophoresis:   [x] Tendon Glides                                               [x] Neuromuscular Re-Ed             [x] ADL/IADL re-training    [x] Therapeutic Activity                  [] Pain Management with/without modalities PRN                 [x] Manual Therapy                      [x] Splinting                                   [] Scar Management                   []Joint Protection/Training  []Ergonomics                             [] Joint Mobilization                      [x] Adaptive Equipment Assessment/Training                             [x] Manual Edema Mobilization   [x] Myofascial Release                 [x] Energy Conservation/Work Simplification  [x] GM/FM Coordination                [x] Safety retraining/education per  individual diagnosis/goals  [] Desensitization        Patient Specific Goal: \"Completely dress myself. Be able to turn light on & off & be able to hold a tooth brush with my left hand. \"                          GOALS (Long term same as Short term):  1.) Patient will demonstrate good understanding of home program(exercises/activities/diagnosis/prognosis/goals) with good accuracy. 2)Patient will demonstrate improved smoothness of 0.319 on InMotion Arm (Norm is 0.533)  which will improve patient ability to complete ADLs. 3)Patient will demonstrate improved movement duration 7.5 sec (Norm is 1.4 seconds) which a lower number that is required for cooking & home management tasks  4)Patient will demonstrate improved AIM 5.13 cm(Norm is 0.55cm) which a lower number that is required for improved ADLs & IADLs  5)Patient will demonstrate improved movement efficiency 22% (Norm is 75%) which a lower number that is required for improved ADLs & IADLs  6) Pt [place] size 0.006 & [place] independence of 0.430 (8cm [place] 0.0201 or >) will improve to a higher number which is required for a functional tasks & ADLs. 7) Pt will demonstrate a lower overall isometric hold distance 10.89cm  (Norm is 2.40cm which will assist a patient open a door & turn lights on & off.  8) Pt will demonstrate a higher overall displacement number 4.97cm (norm is 13.00cm) which will improve a patient ability to perform ADLs & IADLs. TODAY'S TREATMENT      Pain Level: Pt c/o pain in R hip this date. Did not rate. Subjective: Pt reports \"I stayed at my house for the first time for 5 days.  I did the dishes and swept the floor, but it didn't go well I had my first fall, the dog pushed me off the chair. \"     OBJECTIVE: InMotion   Updated POC to be completed by 10th. RI   DFT  RP  MJ  DFSL  Protocol Comments      # 1/80 8 11 135 135 13 Active Assistive  14cm   # 2/160    7 10 199 153 11 Active Assistive  Focus on RI & DFT   # 3/240    5 7 217 166 12 Active Assistive  Focus on RI & DFT   # 4/320    5 8 190 178 12 Active Assistive  Focus on RI & RP   #5/400 5 7 179 154 11 AA Random     #6/480 9 8 290 183 15 AA Random Focus on RI & DFT   #7/560 8 8 267 174 13 AA Random Focus on RI & MJ   #8/640 8 7 292 183 14 AA Random Focus on RI & DFT                                 Pt completed 688 total movements with L UE including pre and post testing using 14 cm [place] on AA and AA random. Pt required use of ace wrap and shoulder straps for positioning purposes and to decrease compensatory movements. Pt requires verbal/tactile cues to decrease compensatory movements. Pt showed improvements throughout treatment as noted above. Pt showed improvements from pre to post testing in smoothness from . 374 to . 463 (24% change), target accuracy from 7.44cm to 6.37cm (14% change) and movement duration from 6.0s to 4.5s (25% change). All InMotion exercises are performed to increase function of L UE to assist with ADL's and IADL's at home. Pt appreciates the immediate feedback that is provided using the InMotion arm. Pt is highly motivated & engages in ADLs & IADLs in the home and incorporates LUE into functional tasks. Pt will benefit from InMotion robotic arm intervention & OT to improve pt funcitonal use of her LUE. Assessment/Comments: Pt is making good progress toward stated plan of care. Pt shows improved AROM in upper and left quadrants this date. Pt tolerated 50 minutes of treatment this date before requesting to end tx d/t discomfort in R hip.  Educated pt to continue HEP and incorporating L UE into functional tasks. Continue with OT POC. Time In: 1050        Time Out: 1140                   Timed Code Treatment Minutes: 50 Minutes     CODE   Minutes  Units   06582 OT Eval Low       37115 OT Eval Medium     58657 OT Eval High       50676 Fluidotherapy       17684 Manual       17605 Therapeutic Ex       67329 Therapeutic Activity       84763 ADL/COMP Tech Train       80113 Neuromuscular Re-Ed 50 3   00943 OrthoManagementTraining       17053 Paraffin       30934 Electrical Stim - Attended       [id number] Iontophoresis       05829 Ultrasound         Other                    -Response to Treatment: Good     Plan:   X  Continues Plan of care: Will continue to focus on decreasing spasticity, improving function of her LUE, improving GMC and strength to increase ADL and IADL function with robotic arm intervention.    []  [address] of care:   []  Discharge:    Elmira Hodge GLOVER/L [id number]       I have read & agree with the above status  Argentina Pierson OTR/L [id number]

## 2022-06-15 ENCOUNTER — HOSPITAL ENCOUNTER (OUTPATIENT)
Dept: OCCUPATIONAL THERAPY | Age: 47
Setting detail: THERAPIES SERIES
Discharge: HOME | End: 2022-06-15
Payer: MEDICAID

## 2022-06-15 PROCEDURE — 97112 NEUROMUSCULAR REEDUCATION: CPT

## 2022-06-15 NOTE — PROGRESS NOTES
OCCUPATIONAL THERAPY PROGRESS NOTE  [facility]  [address]  [place]              Phone: [phone]             Fax: [phone]   Date: 06/15/22   Patient Name: Tracy Lombardo  MRN: [id number]     : 1975    EvaluatingTherapist: Little Hui OTR/L [id number]     Restrictions/Precautions:  Spasticity, low fall rsik  Diagnosis:  h/o ischemic right MCA stroke (Z86.73), Left spastic hemiplegia (HCC)(G81.14, impaired mobility & ADLs (Z74.09 Z78.9)  Date of Injury:      Insurance/Certification information:   Plan of care signed (Y/N): No  Visit# / total visits:      Referring Practitioner:  David Zafar MD  Specific Practitioner Orders: Eval and Treat, In-motion Arm     Assessment of current deficits   [] Functional mobility                         [x] ADLs          [x] Strength                 [] Cognition   [] Functional transfers           [x] IADLs         [] Safety Awareness  [x] Endurance   [x] Fine Motor Coordination    [x] Balance      [] Vision/perception   [] Sensation     [x] Gross Motor Coordination [x] ROM          [] Pain                        [] Edema          [] Scar Adhesion/Skin Integrity      OT PLAN OF CARE   OT POC based on physician orders, patient diagnosis and results of clinical assessment.      Frequency/Duration: 1x/wk for 61-21 visits  /  Certification Period From: 22 to 2022     Specific OT Treatment to include:   [x] Instruction in HEP                   Modalities:  [x] Therapeutic Exercise                 [x] Ultrasound               [x] Electrical Stimulation/Attended  [x] PROM/Stretching                    [x] Fluidotherapy          [x]  Paraffin                   [x] AAROM  [x] AROM                 [] Iontophoresis:   [x] Tendon Glides                                               [x] Neuromuscular Re-Ed             [x] ADL/IADL re-training    [x] Therapeutic Activity                  [] Pain Management with/without modalities PRN                 [x] Manual Therapy                      [x] Splinting                                   [] Scar Management                   []Joint Protection/Training  []Ergonomics                             [] Joint Mobilization                      [x] Adaptive Equipment Assessment/Training                             [x] Manual Edema Mobilization   [x] Myofascial Release                 [x] Energy Conservation/Work Simplification  [x] GM/FM Coordination                [x] Safety retraining/education per  individual diagnosis/goals  [] Desensitization        Patient Specific Goal: \"Completely dress myself. Be able to turn light on & off & be able to hold a tooth brush with my left hand. \"                          GOALS (Long term same as Short term):  1.) Patient will demonstrate good understanding of home program(exercises/activities/diagnosis/prognosis/goals) with good accuracy. 2)Patient will demonstrate improved smoothness of 0.319 on InMotion Arm (Norm is 0.533)  which will improve patient ability to complete ADLs. 3)Patient will demonstrate improved movement duration 7.5 sec (Norm is 1.4 seconds) which a lower number that is required for cooking & home management tasks  4)Patient will demonstrate improved AIM 5.13 cm(Norm is 0.55cm) which a lower number that is required for improved ADLs & IADLs  5)Patient will demonstrate improved movement efficiency 22% (Norm is 75%) which a lower number that is required for improved ADLs & IADLs  6) Pt [place] size 0.006 & [place] independence of 0.430 (8cm [place] 0.0201 or >) will improve to a higher number which is required for a functional tasks & ADLs.   7) Pt will demonstrate a lower overall isometric hold distance 10.89cm  (Norm is 2.40cm which will assist a patient open a door & turn lights on & off.  8) Pt will demonstrate a higher overall displacement number 4.97cm (norm is 13.00cm) which will improve a patient ability to perform ADLs & IADLs. TODAY'S TREATMENT      Pain Level: Pt c/o pain in R hip this date. Did not rate. Subjective: Pt reports \"My shoulder is moving more during my exercises and stretches. \"     OBJECTIVE: InMotion   Updated POC to be completed by 10th. RI   DFT  RP  MJ  DFSL  Protocol Comments      # 1/80 7 6 148 156 13 Active Assistive  14cm   # 2/160    5 6 213 173 12 Active Assistive  Focus on RI    # 3/240    3 7 196 178 14 Active Assistive  Focus on RI & RP   # 4/320    4 6 213 179 13 Active Assistive  Focus on DFT & MJ   #5/400 3 8 201 168 14 AA Random     #6/480 4 5 295 191 16 AA Random Focus on RI & DFT   #7/560 4 7 300 196 14 AA Random Focus on RI & DFT   #8/640 4 6 270 181 12 AA Random Focus on RP & DFT   #9/720 7 2  [address]    #10/800 5 3  [address] Focus on [place]   #11/880 8 2 [address] [facility] Focus on DFT & MJ   #12/960 8 3 164 [address] Focus on RP & MJ   Pt completed 1024 total movements with L UE including pre and post testing using 14 cm [place] on AA, AA Random and [employer] protocol. Pt required use of ace wrap and shoulder straps for positioning purposes to decrease compensatory movements. Pt showed improvements throughout treatment as noted above. Pt showed improvements from pre to post testing in smoothness from . 356 to . 367 (3% change), target accuracy from 10.08cm to 8.39cm (17% change), Aim from 7.27cm to 5.95 cm (18% change) and movement duration from 5.2s to 4.4s (15% change). All InMotion exercises are performed to increase function of L UE to assist with ADL's and IADL's at home. Pt appreciates the immediate feedback that is provided using the InMotion arm. Pt is highly motivated & engages in ADLs & IADLs in the home and incorporates LUE into functional tasks. Pt will benefit from InMotion robotic arm intervention & OT to improve pt funcitonal use of her LUE.      Assessment/Comments: Pt is making good progress toward stated plan of care. Pt demonstrated decreased compensatory movements this date and showed improved tolerance for activity tolerating significantly more movements during treatment time. Educated pt to continue HEP and incorporating L UE into functional tasks. Continue with OT POC. Time In: 1100        Time Out: 1200                   Timed Code Treatment Minutes: 60 Minutes     CODE   Minutes  Units   11769 OT Eval Low       56409 OT Eval Medium     83943 OT Eval High       78698 Fluidotherapy       45350 Manual       98807 Therapeutic Ex       13604 Therapeutic Activity       18922 ADL/COMP Tech Train       87991 Neuromuscular Re-Ed 60 4   76300 OrthoManagementTraining       02117 Paraffin       53690 Electrical Stim - Attended       78660 Iontophoresis       58476 Ultrasound         Other                    -Response to Treatment: Good     Plan:   X  Continues Plan of care: Will continue to focus on decreasing spasticity, improving function of her LUE, improving GMC and strength to increase ADL and IADL function with robotic arm intervention.    []  [address] of care:   []  Discharge:    Martita Menendez GLOVER/L [id number]        I have read & agree with the above status  Yasmine Chan OTR/L [id number]

## 2022-06-22 ENCOUNTER — HOSPITAL ENCOUNTER (OUTPATIENT)
Dept: OCCUPATIONAL THERAPY | Age: 47
Setting detail: THERAPIES SERIES
Discharge: HOME | End: 2022-06-22
Payer: MEDICAID

## 2022-06-22 PROCEDURE — 97112 NEUROMUSCULAR REEDUCATION: CPT

## 2022-06-22 NOTE — PROGRESS NOTES
OCCUPATIONAL THERAPY PROGRESS NOTE  [facility]  [address]  [place], [place]              Phone: [phone]             Fax: [phone]   Date: 22   Patient Name: Kendrick Agee  MRN: [id number]     : 1975    EvaluatingTherapist: Horacio Alvarez OTR/L [id number]     Restrictions/Precautions:  Spasticity, low fall rsik  Diagnosis:  h/o ischemic right MCA stroke (Z86.73), Left spastic hemiplegia (HCC)(G81.14, impaired mobility & ADLs (Z74.09 Z78.9)  Date of Injury:      Insurance/Certification information:   Plan of care signed (Y/N): No  Visit# / total visits:      Referring Practitioner:  Savana Flores MD  Specific Practitioner Orders: Eval and Treat, In-motion Arm     Assessment of current deficits   [] Functional mobility                         [x] ADLs          [x] Strength                 [] Cognition   [] Functional transfers           [x] IADLs         [] Safety Awareness  [x] Endurance   [x] Fine Motor Coordination    [x] Balance      [] Vision/perception   [] Sensation     [x] Gross Motor Coordination [x] ROM          [] Pain                        [] Edema          [] Scar Adhesion/Skin Integrity      OT PLAN OF CARE   OT POC based on physician orders, patient diagnosis and results of clinical assessment.      Frequency/Duration: 1x/wk for 79-75 visits  /  Certification Period From: 22 to 2022     Specific OT Treatment to include:   [x] Instruction in HEP                   Modalities:  [x] Therapeutic Exercise                 [x] Ultrasound               [x] Electrical Stimulation/Attended  [x] PROM/Stretching                    [x] Fluidotherapy          [x]  Paraffin                   [x] AAROM  [x] AROM                 [] Iontophoresis:   [x] Tendon Glides                                               [x] Neuromuscular Re-Ed             [x] ADL/IADL re-training    [x] Therapeutic Activity                  [] Pain Management with/without modalities PRN                 [x] Manual Therapy                      [x] Splinting                                   [] Scar Management                   []Joint Protection/Training  []Ergonomics                             [] Joint Mobilization                      [x] Adaptive Equipment Assessment/Training                             [x] Manual Edema Mobilization   [x] Myofascial Release                 [x] Energy Conservation/Work Simplification  [x] GM/FM Coordination                [x] Safety retraining/education per  individual diagnosis/goals  [] Desensitization        Patient Specific Goal: \"Completely dress myself. Be able to turn light on & off & be able to hold a tooth brush with my left hand. \"                          GOALS (Long term same as Short term):  1.) Patient will demonstrate good understanding of home program(exercises/activities/diagnosis/prognosis/goals) with good accuracy. 2)Patient will demonstrate improved smoothness of 0.319 on InMotion Arm (Norm is 0.533)  which will improve patient ability to complete ADLs. 3)Patient will demonstrate improved movement duration 7.5 sec (Norm is 1.4 seconds) which a lower number that is required for cooking & home management tasks  4)Patient will demonstrate improved AIM 5.13 cm(Norm is 0.55cm) which a lower number that is required for improved ADLs & IADLs  5)Patient will demonstrate improved movement efficiency 22% (Norm is 75%) which a lower number that is required for improved ADLs & IADLs  6) Pt [place] size 0.006 & [place] independence of 0.430 (8cm [place] 0.0201 or >) will improve to a higher number which is required for a functional tasks & ADLs.   7) Pt will demonstrate a lower overall isometric hold distance 10.89cm  (Norm is 2.40cm which will assist a patient open a door & turn lights on & off.  8) Pt will demonstrate a higher overall displacement number 4.97cm (norm is 13.00cm) which will improve a patient ability to perform ADLs & IADLs. TODAY'S TREATMENT      Pain Level: Pt c/o pain in R hip this date. Did not rate. Subjective: Pt reports \"My shoulder is moving more during my exercises and stretches. \"     OBJECTIVE: InMotion   Updated POC to be completed by 10th. RI   DFT  RP  MJ  DFSL  Protocol Comments      # 1/80 7 4 174 145 10 AA Random 14cm   # 2/160    3 4 89 134 9 AA Random  Focus on RI & MJ   # 3/240    3 4 106 142 11 AA Random Focus on RI & DFT   # 4/320    3 5 142 162 12 AA Random Focus on RI & DFT   #5/400 1 4 102 155 12 Active Assistive     #6/480 2 4 182 163 10 Active Assistive Focus on RI & DFSL   #7/560 2 4 154 165 10 Active Assistive Focus on RI & RP   #8/640 2 5 195 178 12 Active Assistive Focus on RI   #9/720 2 5 120 138 10 [place]    #10/800 1 6 [address] Focus on RI & DFSL   #11/880 5 5 164 [address] Focus on DFT & MJ   #12/960 2 5 141 161 [address] Focus on RI & RP   [id number]       80 Seconds   Obstacle Training       X Plane Score: 360   Pong (N/S paddles only)       Level 10: Score -240   Pt completed 1104 total movements with L UE including pre and post testing using 14 cm [place] on AA, AA Random and [employer] protocol. Pt also completed stabilization 50 activity, obstacle training and pong activity. Pt required use of ace wrap and shoulder straps for positioning purposes to decrease compensatory movements. Pt showed improvements throughout treatment as noted above. Pt showed improvements from pre to post testing in smoothness from 0.416 to 0.487 (17% change) and movement duration from 61.s to 5.5s (9% change). All InMotion exercises are performed to increase function of L UE to assist with ADL's and IADL's at home. Pt appreciates the immediate feedback that is provided using the InMotion arm. Pt is highly motivated & engages in ADLs & IADLs in the home and incorporates LUE into functional tasks.        Pt will benefit from InMotion robotic arm intervention & OT to improve pt functional use of her LUE. Assessment/Comments: Pt is making good progress toward stated plan of care. Pt demonstrated improved tolerance this date completing more total movements and additional activities this date. Pt demonstrated increased active movement into upper quadrants consistently this date. Educated pt to continue HEP and incorporating L UE into functional tasks. Continue with OT POC. Time In: 1100        Time Out: 1200                   Timed Code Treatment Minutes: 60 Minutes     CODE   Minutes  Units   16305 OT Eval Low       11371 OT Eval Medium     07072 OT Eval High       91436 Fluidotherapy       73812 Manual       77345 Therapeutic Ex       62486 Therapeutic Activity       85691 ADL/COMP Tech Train       92285 Neuromuscular Re-Ed 60 4   57793 OrthoManagementTraining       35004 Paraffin       44432 Electrical Stim - Attended       91061 Iontophoresis       73392 Ultrasound         Other                    -Response to Treatment: Good     Plan:   X  Continues Plan of care: Will continue to focus on decreasing spasticity, improving function of her LUE, improving GMC and strength to increase ADL and IADL function with robotic arm intervention.    []  [address] of care:   []  Discharge:    Selena GLOVER/L 22536        I have read & agree with the above status  Dianneafiagloria Myrick OTRL 10439

## 2022-06-29 ENCOUNTER — HOSPITAL ENCOUNTER (OUTPATIENT)
Dept: OCCUPATIONAL THERAPY | Age: 47
Setting detail: THERAPIES SERIES
Discharge: HOME | End: 2022-06-29
Payer: MEDICAID

## 2022-06-29 PROCEDURE — 97112 NEUROMUSCULAR REEDUCATION: CPT

## 2022-06-29 NOTE — PROGRESS NOTES
OCCUPATIONAL THERAPY PROGRESS NOTE  [facility]  [address]  [place], [place]              Phone: [phone]             Fax: [phone]   Date: 22   Patient Name: Swapnil Fontenot  MRN: [id number]     : 1975    EvaluatingTherapist: Michell Lee OTR/L [id number]     Restrictions/Precautions:  Spasticity, low fall rsik  Diagnosis:  h/o ischemic right MCA stroke (Z86.73), Left spastic hemiplegia (HCC)(G81.14, impaired mobility & ADLs (Z74.09 Z78.9)  Date of Injury:      Insurance/Certification information: [address] [place] [address] signed (Y/N): No  Visit# / total visits:      Referring Practitioner:  Austin Wan MD  Specific Practitioner Orders: Eval and Treat, In-motion Arm     Assessment of current deficits   [] Functional mobility                         [x] ADLs          [x] Strength                 [] Cognition   [] Functional transfers           [x] IADLs         [] Safety Awareness  [x] Endurance   [x] Fine Motor Coordination    [x] Balance      [] Vision/perception   [] Sensation     [x] Gross Motor Coordination [x] ROM          [] Pain                        [] Edema          [] Scar Adhesion/Skin Integrity      OT PLAN OF CARE   OT POC based on physician orders, patient diagnosis and results of clinical assessment.      Frequency/Duration: 1x/wk for 13-47 visits  /  Certification Period From: 22 to 2022     Specific OT Treatment to include:   [x] Instruction in HEP                   Modalities:  [x] Therapeutic Exercise                 [x] Ultrasound               [x] Electrical Stimulation/Attended  [x] PROM/Stretching                    [x] Fluidotherapy          [x]  Paraffin                   [x] AAROM  [x] AROM                 [] Iontophoresis:   [x] Tendon Glides                                               [x] Neuromuscular Re-Ed             [x] ADL/IADL re-training    [x] Therapeutic Activity [] Pain Management with/without modalities PRN                 [x] Manual Therapy                      [x] Splinting                                   [] Scar Management                   []Joint Protection/Training  []Ergonomics                             [] Joint Mobilization                      [x] Adaptive Equipment Assessment/Training                             [x] Manual Edema Mobilization   [x] Myofascial Release                 [x] Energy Conservation/Work Simplification  [x] GM/FM Coordination                [x] Safety retraining/education per  individual diagnosis/goals  [] Desensitization        Patient Specific Goal: \"Completely dress myself. Be able to turn light on & off & be able to hold a tooth brush with my left hand. \"                          GOALS (Long term same as Short term):  1.) Patient will demonstrate good understanding of home program(exercises/activities/diagnosis/prognosis/goals) with good accuracy. 2)Patient will demonstrate improved smoothness of 0.319 on InMotion Arm (Norm is 0.533)  which will improve patient ability to complete ADLs. 3)Patient will demonstrate improved movement duration 7.5 sec (Norm is 1.4 seconds) which a lower number that is required for cooking & home management tasks  4)Patient will demonstrate improved AIM 5.13 cm(Norm is 0.55cm) which a lower number that is required for improved ADLs & IADLs  5)Patient will demonstrate improved movement efficiency 22% (Norm is 75%) which a lower number that is required for improved ADLs & IADLs  6) Pt [place] size 0.006 & [place] independence of 0.430 (8cm [place] 0.0201 or >) will improve to a higher number which is required for a functional tasks & ADLs.   7) Pt will demonstrate a lower overall isometric hold distance 10.89cm  (Norm is 2.40cm which will assist a patient open a door & turn lights on & off.  8) Pt will demonstrate a higher overall displacement number 4.97cm (norm is 13.00cm) which will improve a patient ability to perform ADLs & IADLs. TODAY'S TREATMENT      Pain Level: No c/o pain this date. Subjective: Pt reports \"I'm trying to have more controled movements\". OBJECTIVE: InMotion   Updated POC to be completed by 10th. RI   DFT  RP  MJ  DFSL  Protocol Comments      # 1/80 5 6 177 157 13 AA Random 14cm   # 2/160    4 8 260 197 12 AA Random  Focus on RI & DFSL   # 3/240    7 9 256 174 13 AA Random Focus on RP & MJ   # 4/320    7 7 320 202 13 AA Random Focus on RI & DFT   #5/400 5 5 156 170 13 Active Assistive     #6/480 4 6 195 177 11 Active Assistive Focus on RI & DFSL   #7/560 3 5 197 178 11 Active Assistive Focus on RI & DFT   #8/640 2 7 172 175 11 Active Assistive Focus on RI & RP   #9/720 3 6 121 160 13 [place]    #10/800 0 5 187 185 [address] Focus on [place] & DFT   #11/880 3 6 180 181 12 [place] Focus on RP & MJ   #12/960 2 7 172 175 11 [address] [place] Focus on RP & MJ                                 Pt completed 1024 total movements with L UE including pre and post testing using 14 cm [place] on AA, AA Random and [place] protocol. Pt required use of ace wrap and shoulder straps for positioning purposes to decrease compensatory movements. Pt showed improvements throughout treatment as noted above. Pt showed improvements from pre to post testing in smoothness from 0.425 and 0.451 (6% change), target accuracy from 9.71cm to 5.96cm (39% improvement), Aim from 4.09cm to 2.87cm (30% change), Movement duration from 5.6s to 4.5s (21% change) and movement efficiency from 29% to 34% (16% change. All InMotion exercises are performed to increase function of L UE to assist with ADL's and IADL's at home. Pt appreciates the immediate feedback that is provided using the InMotion arm. Pt is highly motivated & engages in ADLs & IADLs in the home and incorporates LUE into functional tasks. Pt will benefit from InMotion robotic arm intervention & OT to improve pt functional use of her LUE. Assessment/Comments: Pt is making good progress toward stated plan of care. Pt continues to demonstrate increased active movement into upper quadrants consistently this date. Educated pt to continue HEP and incorporating L UE into functional tasks. Continue with OT POC. Time In: 1115        Time Out: 1210                   Timed Code Treatment Minutes: 55 Minutes     CODE   Minutes  Units   43949 OT Eval Low       51497 OT Eval Medium     32699 OT Eval High       14180 Fluidotherapy       38737 Manual       04633 Therapeutic Ex       75205 Therapeutic Activity       32132 ADL/COMP Tech Train       03920 Neuromuscular Re-Ed 55 4   52364 OrthoManagementTraining       50030 Paraffin       65574 Electrical Stim - Attended       [id number] Iontophoresis       79078 Ultrasound         Other                    -Response to Treatment: Good     Plan:   X  Continues Plan of care: Will continue to focus on decreasing spasticity, improving function of her LUE, improving GMC and strength to increase ADL and IADL function with robotic arm intervention.    []  [address] of care:   []  Discharge:    Amie Perez GLOVER/L [id number]      I have read & agree with the above status  Candi Koch OTR/L [id number]

## 2022-07-06 ENCOUNTER — HOSPITAL ENCOUNTER (OUTPATIENT)
Dept: OCCUPATIONAL THERAPY | Age: 47
Setting detail: THERAPIES SERIES
Discharge: HOME | End: 2022-07-06
Payer: MEDICAID

## 2022-07-06 PROBLEM — G81.94 LEFT HEMIPARESIS (HCC): Status: ACTIVE | Noted: 2022-07-06

## 2022-07-06 PROBLEM — K21.9 ESOPHAGEAL REFLUX: Status: ACTIVE | Noted: 2022-03-29

## 2022-07-06 PROBLEM — I65.29 OCCLUSION OF CAROTID ARTERY: Status: ACTIVE | Noted: 2022-07-06

## 2022-07-06 PROBLEM — F41.1 GENERALIZED ANXIETY DISORDER: Status: ACTIVE | Noted: 2022-03-29

## 2022-07-06 PROBLEM — F32.A DEPRESSIVE DISORDER: Status: ACTIVE | Noted: 2022-03-29

## 2022-07-06 PROBLEM — I66.01 OCCLUSION OF RIGHT MIDDLE CEREBRAL ARTERY: Status: ACTIVE | Noted: 2022-07-06

## 2022-07-06 PROBLEM — U07.1 DISEASE DUE TO SEVERE ACUTE RESPIRATORY SYNDROME CORONAVIRUS 2 (SARS-COV-2): Status: ACTIVE | Noted: 2022-07-06

## 2022-07-06 PROCEDURE — 97535 SELF CARE MNGMENT TRAINING: CPT

## 2022-07-06 RX ORDER — BACLOFEN 10 MG/1
10 TABLET ORAL 3 TIMES DAILY
COMMUNITY
Start: 2022-06-20

## 2022-07-06 RX ORDER — LAMOTRIGINE 50 MG/1
50 TABLET, ORALLY DISINTEGRATING ORAL 2 TIMES DAILY
COMMUNITY
Start: 2022-07-03 | End: 2022-08-10 | Stop reason: DRUGHIGH

## 2022-07-06 RX ORDER — ONDANSETRON 4 MG/1
TABLET, FILM COATED ORAL
COMMUNITY
Start: 2022-06-13

## 2022-07-06 RX ORDER — FLUOXETINE HYDROCHLORIDE 20 MG/1
CAPSULE ORAL
COMMUNITY
Start: 2022-06-27

## 2022-07-06 RX ORDER — ACETAMINOPHEN, DIPHENHYDRAMINE HCL, PHENYLEPHRINE HCL 325; 25; 5 MG/1; MG/1; MG/1
TABLET ORAL
COMMUNITY
Start: 2022-06-13

## 2022-07-06 NOTE — PROGRESS NOTES
OCCUPATIONAL THERAPY PROGRESS NOTE  [facility]  [address]  [facility], [address]              Phone: [phone]             Fax: [phone]   Date: 22   Patient Name: Nora Bloom  MRN: [id number]     : 1975    EvaluatingTherapist: Trina Lombardo OTR/L [id number]     Restrictions/Precautions:  Spasticity, low fall rsik  Diagnosis:  h/o ischemic right MCA stroke (Z86.73), Left spastic hemiplegia (HCC)(G81.14, impaired mobility & ADLs (Z74.09 Z78.9)  Date of Injury:      Insurance/Certification information: [address] [place] [address] signed (Y/N): No  Visit# / total visits:      Referring Practitioner:  Orlando Astorga MD  Specific Practitioner Orders: Eval and Treat, In-motion Arm     Assessment of current deficits   [] Functional mobility                         [x] ADLs          [x] Strength                 [] Cognition   [] Functional transfers           [x] IADLs         [] Safety Awareness  [x] Endurance   [x] Fine Motor Coordination    [x] Balance      [] Vision/perception   [] Sensation     [x] Gross Motor Coordination [x] ROM          [] Pain                        [] Edema          [] Scar Adhesion/Skin Integrity      OT PLAN OF CARE   OT POC based on physician orders, patient diagnosis and results of clinical assessment.      Frequency/Duration: 1x/wk for 14-58 visits  /  Certification Period From: 22 to 2022     Specific OT Treatment to include:   [x] Instruction in HEP                   Modalities:  [x] Therapeutic Exercise                 [x] Ultrasound               [x] Electrical Stimulation/Attended  [x] PROM/Stretching                    [x] Fluidotherapy          [x]  Paraffin                   [x] AAROM  [x] AROM                 [] Iontophoresis:   [x] Tendon Glides                                               [x] Neuromuscular Re-Ed             [x] ADL/IADL re-training    [x] Therapeutic Activity [] Pain Management with/without modalities PRN                 [x] Manual Therapy                      [x] Splinting                                   [] Scar Management                   []Joint Protection/Training  []Ergonomics                             [] Joint Mobilization                      [x] Adaptive Equipment Assessment/Training                             [x] Manual Edema Mobilization   [x] Myofascial Release                 [x] Energy Conservation/Work Simplification  [x] GM/FM Coordination                [x] Safety retraining/education per  individual diagnosis/goals  [] Desensitization        Patient Specific Goal: \"Completely dress myself. Be able to turn light on & off & be able to hold a tooth brush with my left hand. \"                          GOALS (Long term same as Short term):  1.) Patient will demonstrate good understanding of home program(exercises/activities/diagnosis/prognosis/goals) with good accuracy. 2)Patient will demonstrate improved smoothness of 0.319 on InMotion Arm (Norm is 0.533)  which will improve patient ability to complete ADLs. 3)Patient will demonstrate improved movement duration 7.5 sec (Norm is 1.4 seconds) which a lower number that is required for cooking & home management tasks  4)Patient will demonstrate improved AIM 5.13 cm(Norm is 0.55cm) which a lower number that is required for improved ADLs & IADLs  5)Patient will demonstrate improved movement efficiency 22% (Norm is 75%) which a lower number that is required for improved ADLs & IADLs  6) Pt [place] size 0.006 & [place] independence of 0.430 (8cm [place] 0.0201 or >) will improve to a higher number which is required for a functional tasks & ADLs.   7) Pt will demonstrate a lower overall isometric hold distance 10.89cm  (Norm is 2.40cm which will assist a patient open a door & turn lights on & off.  8) Pt will demonstrate a higher overall displacement number 4.97cm (norm is 13.00cm) which will improve a patient ability to perform ADLs & IADLs. TODAY'S TREATMENT      Pain Level: No c/o pain this date. Subjective: Pt reports \"I think my shoulder might be a little bit out today. I'm wearing my sling all day. \"      OBJECTIVE: InMotion   Updated POC to be completed by 10th. RI   DFT  RP  MJ  DFSL  Protocol Comments      # 1/80 1 6 162 186 14 Active Assistive  14cm   # 2/160    3 5 248 192 12 Active Assistive  Focus on DFT & DFSL   # 3/240    2 4 216 210 12 Active Assistive  Focus on RI & RP   # 4/320    2 3 174 189 11 Active Assistive  Focus on RP & MJ   #5/400 0 3 180 190 13 AA Random     #6/480 1 2 222 187 13 AA Random Focus on DFT & MJ   #7/560 1 3 250 196 11 AA Random Focus on RI & DFSL   #8/640 0 2 242 204 12 AA Random Focus on RI & RP   #9/720 1 4 111 163 14 [address] [place]    #10/800 0 4 188 204 14 [place] Focus on [place] & DFT   #11/880 0 3 186 188 14 [place] Focus on DFT & MJ   #12/960 0 4 171 197 13 [facility] Focus on RP & MJ                                 Pt completed 1024 total movements with L UE including pre and post testing using 14 cm [place] on AA, AA Random and [place] protocol. Pt requires use of ace wrap and shoulder straps for positioning purposes to decrease compensatory movements. Pt showed improvements throughout treatment as noted above. Pt showed improvements from pre to post testing in smoothness from 0.457 to 0.469 (3% change), target accuracy from 6.84cm to 5.18cm (24% change) and aim from4.22cm to 3.37cm (20% change). All InMotion exercises are performed to increase function of L UE to assist with ADL's and IADL's at home. Pt appreciates the immediate feedback that is provided using the InMotion arm. Pt is highly motivated & engages in ADLs & IADLs in the home and incorporates LUE into functional tasks. Pt will benefit from InMotion robotic arm intervention & OT to improve pt functional use of her LUE. Assessment/Comments: Pt is making good progress toward stated plan of care.  Pt continues to demonstrate increased active movement into upper quadrants on print outs of active movement tests. Educated pt to continue HEP and incorporating L UE into functional tasks. Discussed sling wear to prevent shoulder pain and subluxation, pt agreeable to increasing sling wear. Continue with OT POC. Time In: 1100        Time Out: 1200                Timed Code Treatment Minutes: 60 Minutes     CODE   Minutes  Units   38687 OT Eval Low       77568 OT Eval Medium     41512 OT Eval High       55854 Fluidotherapy       05251 Manual       11212 Therapeutic Ex       14023 Therapeutic Activity       31208 ADL/COMP Tech Train       85246 Neuromuscular Re-Ed 60 4   87798 OrthoManagementTraining       47377 Paraffin       53323 Electrical Stim - Attended       73141 Iontophoresis       30456 Ultrasound         Other                    -Response to Treatment: Good     Plan:   X  Continues Plan of care: Will continue to focus on decreasing spasticity, improving function of her LUE, improving GMC and strength to increase ADL and IADL function with robotic arm intervention.    []  [address] of care:   []  Discharge:    Nicholes Minus GLOVER/L [id number]      I have read & agree with the above status  Darrian Commander OTR/L [id number]

## 2022-07-07 ENCOUNTER — OFFICE VISIT (OUTPATIENT)
Dept: CARDIOLOGY CLINIC | Age: 47
End: 2022-07-07
Payer: MEDICAID

## 2022-07-07 VITALS
RESPIRATION RATE: 18 BRPM | WEIGHT: 173 LBS | DIASTOLIC BLOOD PRESSURE: 62 MMHG | HEIGHT: 66 IN | SYSTOLIC BLOOD PRESSURE: 108 MMHG | BODY MASS INDEX: 27.8 KG/M2 | HEART RATE: 75 BPM

## 2022-07-07 DIAGNOSIS — I63.9 ACUTE CEREBROVASCULAR ACCIDENT (CVA) (HCC): Primary | ICD-10-CM

## 2022-07-07 PROCEDURE — 99214 OFFICE O/P EST MOD 30 MIN: CPT | Performed by: INTERNAL MEDICINE

## 2022-07-07 PROCEDURE — 93000 ELECTROCARDIOGRAM COMPLETE: CPT | Performed by: INTERNAL MEDICINE

## 2022-07-07 NOTE — PROGRESS NOTES
CHIEF COMPLAINT: CVA    HISTORY OF PRESENT ILLNESS: Patient is a 42 y.o. female seen at the request of No primary care provider on file. .      Patient seen for CVA with significant residual deficits. No CP or SOB. Past Medical History:   Diagnosis Date    [facility]erebral artery occlusion [facility] infarction ([facility]     Hx of blood clots        Patient Active Problem List   Diagnosis    Acute cerebrovascular accident (CVA) ([facility]    Status post craniectomy    Left spastic hemiplegia (HCC)    Tobacco abuse    Acute pain    Transient alteration of awareness    Activity, other specified    Allergy status to narcotic agent    History of stroke    History of cranioplasty    Neuropathic pain    Occlusion of right middle cerebral artery    Occlusion of carotid artery    Left hemiparesis (HCC)    Generalized anxiety disorder    Esophageal reflux    Disease due to severe acute respiratory syndrome coronavirus 2 (SARS-CoV-2)    Depressive disorder       Allergies   Allergen Reactions    Vicodin [Hydrocodone-Acetaminophen]      Gets severe hypotension    Other      Weight gain    Acetaminophen     Hydrocodone     Propoxyphene        Current Outpatient Medications   Medication Sig Dispense Refill    baclofen (LIORESAL) 10 MG tablet 10 mg 3 times daily       FLUoxetine (PROZAC) 20 MG capsule take 1 capsule by mouth once daily      lamoTRIgine 50 MG TBDP 50 mg       ondansetron (ZOFRAN) 4 MG tablet take 1 tablet by mouth three times a day if needed for nausea      RA MELATONIN 10 MG TABS take 1 TO 2 tablets by mouth at bedtime if needed      aspirin 81 MG chewable tablet Take 1 tablet by mouth daily 30 tablet 0    gabapentin (NEURONTIN) 300 MG capsule Take 1 capsule by mouth nightly for 30 days.  30 capsule 0    atorvastatin (LIPITOR) 40 MG tablet Take 1 tablet by mouth daily 30 tablet 0    metoprolol tartrate (LOPRESSOR) 25 MG tablet Take 0.5 tablets by mouth 2 times daily 60 tablet 0    nicotine (NICODERM CQ) 7 MG/24HR Place 1 patch onto the skin daily 14 patch 0    Handicap Placard MISC by Does not apply route Reason for disability:  H/O ischemic right MCA stroke  (primary encounter diagnosis)  Left spastic hemiplegia (HCC)  Impaired mobility and ADLs    Duration: 5 years 1 each 0    diclofenac (VOLTAREN) 0.1 % ophthalmic solution 1 drop 4 times daily       vitamin D (ERGOCALCIFEROL) 1.25 MG (56627 UT) CAPS capsule Take 1 capsule by mouth once a week 5 capsule 0    acetaminophen (TYLENOL) 500 MG tablet Take 1,000 mg by mouth every 4 hours as needed for Pain Indications: 2 tablets 4 times per day        No current facility-administered medications for this visit. Social History     Socioeconomic History    Marital status: Single     Spouse name: Not on file    Number of children: 1    Years of education: Not on file    Highest education level: 12th grade   Occupational History    Not on file   Tobacco Use    Smoking status: Former Smoker     Packs/day: 1.00     Years: 20.00     Pack years: 20.00     Types: Cigarettes     Start date: 10/1/2020     Quit date: 10/12/2021     Years since quittin.7    Smokeless tobacco: Never Used   Vaping Use    Vaping Use: Never used   Substance and Sexual Activity    Alcohol use: Not Currently    Drug use: Never    Sexual activity: Not Currently     Partners: Male     Birth control/protection: Pill   Other Topics Concern    Not on file   Social History Narrative    Not on file     Social Determinants of Health     Financial Resource Strain: Low Risk     Difficulty of Paying Living Expenses: Not hard at all   Food Insecurity: No Food Insecurity    Worried About [email or web address] in the Last Year: Never true    [address] in the Last Year: Never true   Transportation Needs: No Transportation Needs    Lack of Transportation (Medical): No    Lack of Transportation (Non-Medical):  No   Physical Activity: Insufficiently Active    Days of Exercise per Week: 2 days    Minutes of Exercise per Session: 30 min   Stress: Stress Concern Present    Feeling of Stress : Rather much   Social Connections: Unknown    Frequency of Communication with Friends and Family: Once a week    Frequency of Social Gatherings with Friends and Family: Twice a week    Attends Christianity Services: Never    Active Member of Clubs or Organizations: No    Attends Club or Organization Meetings: Never    Marital Status: Patient refused   Intimate Partner Violence: Not At Risk    Fear of Current or Ex-Partner: No    Emotionally Abused: No    Physically Abused: No    Sexually Abused: No   Housing Stability: Low Risk     Unable to Pay for Housing in the Last Year: No    Number of Places Lived in the Last Year: 1    Unstable Housing in the Last Year: No       Family History   Problem Relation Age of Onset    No Known Problems Mother     No Known Problems Father      Review of Systems:  Heart: as above   Lungs: as above   Eyes: denies changes in vision or discharge. Ears: denies changes in hearing or pain. Nose: denies epistaxis or masses   Throat: denies sore throat or trouble swallowing. Neuro: denies numbness, tingling, tremors. Skin: denies rashes or itching. : denies hematuria, dysuria   GI: denies vomiting, diarrhea   Psych: denies mood changed, anxiety, depression. All other systems negative. Physical Exam   /62   Pulse 75   Resp 18   Ht 5' 6\" (1.676 m)   Wt 173 lb (78.5 kg)   BMI 27.92 kg/m²   Constitutional: Oriented to person, place, and time. Well-developed and well-nourished. No distress. Head: Normocephalic and atraumatic. Eyes: EOM are normal. Pupils are equal, round, and reactive to light. Neck: Normal range of motion. Neck supple. No hepatojugular reflux and no JVD present. Carotid bruit is not present. No tracheal deviation present. No thyromegaly present.    Cardiovascular: Normal rate, regular rhythm, normal heart sounds and intact distal pulses. Exam reveals no gallop and no friction rub. No murmur heard. Pulmonary/Chest: Effort normal and breath sounds normal. No respiratory distress. No wheezes. No rales. No tenderness. Abdominal: Soft. Bowel sounds are normal. No distension and no mass. No tenderness. No rebound and no guarding. Musculoskeletal: Normal range of motion. No edema and no tenderness. Lymphadenopathy:   No cervical adenopathy. No groin adenopathy. Neurological: Alert and oriented to person, place, and time. Skin: Skin is warm and dry. No rash noted. Not diaphoretic. No erythema. Psychiatric: Normal mood and affect. Behavior is normal.     EKG personally reviewed 07/07/22:  normal EKG, normal sinus rhythm. ASSESSMENT AND PLAN:  Patient Active Problem List   Diagnosis    [facility] ([facility]A) ([facility]    Status post craniectomy    Left spastic hemiplegia (HCC)    Tobacco abuse    Acute pain    Transient alteration of awareness    Activity, other specified    Allergy status to narcotic agent    History of stroke    History of cranioplasty    Neuropathic pain    Occlusion of right middle cerebral artery    Occlusion of carotid artery    Left hemiparesis (HCC)    Generalized anxiety disorder    Esophageal reflux    Disease due to severe acute respiratory syndrome coronavirus 2 (SARS-CoV-2)    Depressive disorder     1. Tachycardia: Stable monitor 10/22/2021 at [facility]. 2. CVA: Monitor and MON[facility] at [facility]. Etiology never defined. Arrange ILR (LINQ). ASA/statin/plavix. 3. Carotid disease: Mild. 4. Lipids: Statin. Taya Harrison D.O.   Cardiologist  Cardiology, [facility]

## 2022-07-08 ENCOUNTER — TELEPHONE (OUTPATIENT)
Dept: NON INVASIVE DIAGNOSTICS | Age: 47
End: 2022-07-08

## 2022-07-08 NOTE — TELEPHONE ENCOUNTER
----- Message from Elva Treviño DO sent at 7/7/2022 10:41 PM EDT -----  Sure.  Elva Treviño DO   ----- Message -----  From: Ita Taylor  Sent: 7/7/2022   3:58 PM EDT  To: Elva Treviño, DO    Please advise if can schedule ILR implt? Thank you   ----- Message -----  From: Tiera Childers DO  Sent: 7/7/2022   3:45 PM EDT  To: Marissa Camara    Can you arrange a linq for CVA for this patient? Tiera Childers D.O.   Cardiologist  Cardiology, [facility]

## 2022-07-20 ENCOUNTER — HOSPITAL ENCOUNTER (OUTPATIENT)
Dept: OCCUPATIONAL THERAPY | Age: 47
Setting detail: THERAPIES SERIES
Discharge: HOME | End: 2022-07-20
Payer: MEDICAID

## 2022-07-20 PROCEDURE — 97112 NEUROMUSCULAR REEDUCATION: CPT

## 2022-07-20 NOTE — PROGRESS NOTES
OCCUPATIONAL THERAPY PROGRESS NOTE  [facility]  [address]  [place]              Phone: [phone]             Fax: [phone]   Date: 22   Patient Name: Miguel Reyna  MRN: [id number]     : 1975    EvaluatingTherapist: Pacheco Anderson OTR/L      Restrictions/Precautions:  Spasticity, low fall rsik  Diagnosis:  h/o ischemic right MCA stroke (Z86.73), Left spastic hemiplegia (HCC)(G81.14, impaired mobility & ADLs (Z74.09 Z78.9)  Date of Injury:      Insurance/Certification information: [address] [place] [address] signed (Y/N): No  Visit# / total visits:      Referring Practitioner:  Madison Harley MD  Specific Practitioner Orders: Eval and Treat, In-motion Arm     Assessment of current deficits   [] Functional mobility                         [x] ADLs          [x] Strength                 [] Cognition   [] Functional transfers           [x] IADLs         [] Safety Awareness  [x] Endurance   [x] Fine Motor Coordination    [x] Balance      [] Vision/perception   [] Sensation     [x] Gross Motor Coordination [x] ROM          [] Pain                        [] Edema          [] Scar Adhesion/Skin Integrity      OT PLAN OF CARE   OT POC based on physician orders, patient diagnosis and results of clinical assessment.      Frequency/Duration: 1x/wk for 03-41 visits  /  Certification Period From: 22 to 2022     Specific OT Treatment to include:   [x] Instruction in HEP                   Modalities:  [x] Therapeutic Exercise                 [x] Ultrasound               [x] Electrical Stimulation/Attended  [x] PROM/Stretching                    [x] Fluidotherapy          [x]  Paraffin                   [x] AAROM  [x] AROM                 [] Iontophoresis:   [x] Tendon Glides                                               [x] Neuromuscular Re-Ed             [x] ADL/IADL re-training    [x] Therapeutic Activity [] Pain Management with/without modalities PRN                 [x] Manual Therapy                      [x] Splinting                                   [] Scar Management                   []Joint Protection/Training  []Ergonomics                             [] Joint Mobilization                      [x] Adaptive Equipment Assessment/Training                             [x] Manual Edema Mobilization   [x] Myofascial Release                 [x] Energy Conservation/Work Simplification  [x] GM/FM Coordination                [x] Safety retraining/education per  individual diagnosis/goals  [] Desensitization        Patient Specific Goal: \"Completely dress myself. Be able to turn light on & off & be able to hold a tooth brush with my left hand. \"                          GOALS (Long term same as Short term):  1.) Patient will demonstrate good understanding of home program(exercises/activities/diagnosis/prognosis/goals) with good accuracy. 2)Patient will demonstrate improved smoothness of 0.319 on InMotion Arm (Norm is 0.533)  which will improve patient ability to complete ADLs. 3)Patient will demonstrate improved movement duration 7.5 sec (Norm is 1.4 seconds) which a lower number that is required for cooking & home management tasks  4)Patient will demonstrate improved AIM 5.13 cm(Norm is 0.55cm) which a lower number that is required for improved ADLs & IADLs  5)Patient will demonstrate improved movement efficiency 22% (Norm is 75%) which a lower number that is required for improved ADLs & IADLs  6) Pt [place] size 0.006 & [place] independence of 0.430 (8cm [place] 0.0201 or >) will improve to a higher number which is required for a functional tasks & ADLs.   7) Pt will demonstrate a lower overall isometric hold distance 10.89cm  (Norm is 2.40cm which will assist a patient open a door & turn lights on & off.  8) Pt will demonstrate a higher overall displacement number 4.97cm (norm is 13.00cm) which will improve a patient ability to perform ADLs & IADLs. TODAY'S TREATMENT      Pain Level: No c/o pain this date. Subjective: Pt reports \"I've been exercising my arm everyday, It's getting easier. \"     OBJECTIVE: InMotion   Updated POC to be completed by 10th. RI   DFT  RP  MJ  DFSL  Protocol Comments      # 1/80 1 4 217 184 15 AA Random 14cm   # 2/160    0 5 124 166 11 AA Random Focus on RP & MJ   # 3/240    3 3 114 175 12 AA Random Focus on RP   # 4/320    1 4 165 170 2 AA Random Focus on RI & MJ   #5/400 1 3 173 205 16 Active Assistive     #6/480 2 4 201 204 13 Active Assistive Focus on MJ & DFSL   #7/560 4 4 247 195 11 Active Assistive Focus on MJ & DFSL   #8/640 0 3 209 218 13 Active Assistive Focus on RI & RP   #9/720 1 4  [address]    #10/800 2 6 [address] on [employer] & DFSL   #11/880 0 3 [address] Focus on RP & DFT   #12/960 2 4 194 178 12 [employer] on RP & MJ             Stabilization 50       80 Seconds   Squeegee Activity       47 seconds   Pt completed 1104 total movements with L UE including pre and post testing using 14 cm [place] on AA, AA Random and [facility] [employer] protocol. Pt also completed stabilization 50 & squeegee activity. Pt requires use of ace wrap and shoulder straps for positioning purposes and to decrease compensatory movements. Pt showed improvements throughout treatment as noted above. Pt showed improvements from pre to post testing in smoothness from 0.490 to 0.508 (4% change), target accuracy from 8.57cm to 6.42cm (25% change), and movement duration from 4.1s to 3.6s (11% change). All InMotion exercises are performed to increase function of L UE to assist with ADL's and IADL's at home. Pt appreciates the immediate feedback that is provided using the InMotion arm. Pt is highly motivated & engages in ADLs & IADLs in the home and incorporates LUE into functional tasks.        Pt will benefit from InMotion robotic arm intervention & OT to improve pt functional use of her LUE. Assessment/Comments: Pt is making good progress toward stated plan of care. Pt continues to demonstrate increased active movement into upper quadrants on print outs of active movement tests. Educated pt to continue HEP and incorporating L UE into functional tasks. Reinforced need for sling wear to prevent shoulder pain and subluxation, pt agreeable and reports wearing her sling more frequently. Continue with OT POC. Time In: 1100        Time Out: 1200                Timed Code Treatment Minutes: 60 Minutes     CODE   Minutes  Units   97937 OT Eval Low       59975 OT Eval Medium     54104 OT Eval High       69240 Fluidotherapy       00861 Manual       32370 Therapeutic Ex       43265 Therapeutic Activity       64117 ADL/COMP Tech Train       46287 Neuromuscular Re-Ed 60 4   84447 OrthoManagementTraining       77932 Paraffin       81156 Electrical Stim - Attended       51635 Iontophoresis       54989 Ultrasound         Other                    -Response to Treatment: Good     Plan:   X  Continues Plan of care: Will continue to focus on decreasing spasticity, improving function of her LUE, improving GMC and strength to increase ADL and IADL function with robotic arm intervention.    []  [facility] of care:   []  Discharge:    Moises Sport GLOVER/L [id number]

## 2022-07-21 NOTE — TELEPHONE ENCOUNTER
Spoke with patient, she is agreeable to go forward with procedure. Patient scheduled on 08/11/2022 at 10:30, she is to arrive at main at 9:30. No restrictions. Patient verbalized understanding.

## 2022-07-27 ENCOUNTER — HOSPITAL ENCOUNTER (OUTPATIENT)
Dept: OCCUPATIONAL THERAPY | Age: 47
Setting detail: THERAPIES SERIES
Discharge: HOME | End: 2022-07-27
Payer: MEDICAID

## 2022-07-27 PROCEDURE — 97112 NEUROMUSCULAR REEDUCATION: CPT

## 2022-07-27 NOTE — PROGRESS NOTES
[] Pain Management with/without modalities PRN                 [x] Manual Therapy                      [x] Splinting                                   [] Scar Management                   []Joint Protection/Training  []Ergonomics                             [] Joint Mobilization                      [x] Adaptive Equipment Assessment/Training                             [x] Manual Edema Mobilization   [x] Myofascial Release                 [x] Energy Conservation/Work Simplification  [x] GM/FM Coordination                [x] Safety retraining/education per  individual diagnosis/goals  [] Desensitization        Patient Specific Goal: \"Completely dress myself. Be able to turn light on & off & be able to hold a tooth brush with my left hand. \"                          GOALS (Long term same as Short term):  1.) Patient will demonstrate good understanding of home program(exercises/activities/diagnosis/prognosis/goals) with good accuracy. 2)Patient will demonstrate improved smoothness of 0.319 on InMotion Arm (Norm is 0.533)  which will improve patient ability to complete ADLs. 3)Patient will demonstrate improved movement duration 7.5 sec (Norm is 1.4 seconds) which a lower number that is required for cooking & home management tasks  4)Patient will demonstrate improved AIM 5.13 cm(Norm is 0.55cm) which a lower number that is required for improved ADLs & IADLs  5)Patient will demonstrate improved movement efficiency 22% (Norm is 75%) which a lower number that is required for improved ADLs & IADLs  6) Pt [place] size 0.006 & [place] independence of 0.430 (8cm [place] 0.0201 or >) will improve to a higher number which is required for a functional tasks & ADLs.   7) Pt will demonstrate a lower overall isometric hold distance 10.89cm  (Norm is 2.40cm which will assist a patient open a door & turn lights on & off.  8) Pt will demonstrate a higher overall displacement number 4.97cm (norm is 13.00cm) which will improve a patient ability to perform ADLs & IADLs. TODAY'S TREATMENT      Pain Level: No c/o pain this date. Subjective: Pt reports \"I do my home exercise program every night. \" Pt c/o increased L hip & L foot pain. OBJECTIVE: InMotion   Updated POC to be completed by 10th. RI   DFT  RP  MJ  DFSL  Protocol Comments      # 1/80 10 18 159 131 15 AA  14cm   # 2/160    17 16 172 143 11 AA  Focus on DFT & DFSL   # 3/240    16 17 179 129 9 AA  Focus on MJ & DFSL   # 4/320    18 16 200 134 10 AA  Focus on DFT & RI   #5/400 28 17 134 112 10 Random     #6/480 31 18 199 119 12 Random Focus on RI & RP   #7/560          #8/640          #9/720          #10/800          #11/880          #12/960                    Stabilization 50       80 Seconds   Squeegee Activity          Pt completed 624 total movements with L UE including pre and post testing using 14 cm [place] on AA, AA Random and Stabilization 50 protocols. Pt requires use of ace wrap and shoulder straps for positioning purposes and to decrease compensatory movements. Pt did also state that her L \"shoulder has been coming out of place. \" Pt L humeral head also supported during treatment to ensure joint integrity. Pt showed improvements during active assisted protocol in between sets with having improved motion jerk, distance from a target & distance from a straight line. Pt hindered on this date by pain in her L hip & foot that interfered with pt ability to engage with robotic arm due to pain in her LLE. All InMotion exercises are performed to increase function of L UE to assist with ADL's and IADL's at home. Pt appreciates the immediate feedback that is provided using the InMotion arm. Pt is highly motivated & engages in ADLs & IADLs in the home and incorporates LUE into functional tasks. Pt will benefit from InMotion robotic arm intervention & OT to improve pt functional use of her LUE.      Assessment/Comments: Pt is making good progress toward stated plan of care. Pt continues to demonstrate increased active movement into upper quadrants on print outs of active movement tests. Educated pt to continue HEP and incorporating L UE into functional tasks. Reinforced need for sling wear to prevent shoulder pain and subluxation, pt agreeable and reports wearing her sling more frequently. Continue with OT POC. Time In: 1100        Time Out: 1200                Timed Code Treatment Minutes: 60 Minutes     CODE   Minutes  Units   81372 OT Eval Low       57401 OT Eval Medium     76880 OT Eval High       90312 Fluidotherapy       33806 Manual       69827 Therapeutic Ex       41907 Therapeutic Activity       89659 ADL/COMP Tech Train       36920 Neuromuscular Re-Ed 60 4   86811 OrthoManagementTraining       95172 Paraffin       75104 Electrical Stim - Attended       27943 Iontophoresis       93669 Ultrasound         Other                    -Response to Treatment: Good     Plan:   X  Continues Plan of care: Will continue to focus on decreasing spasticity, improving function of her LUE, improving GMC and strength to increase ADL and IADL function with robotic arm intervention.    []  Alter Plan of care:   []  Discharge:    [facility] OTR/L [id number]

## 2022-08-03 ENCOUNTER — HOSPITAL ENCOUNTER (OUTPATIENT)
Dept: OCCUPATIONAL THERAPY | Age: 47
Setting detail: THERAPIES SERIES
Discharge: HOME | End: 2022-08-03
Payer: MEDICAID

## 2022-08-03 PROCEDURE — 97112 NEUROMUSCULAR REEDUCATION: CPT

## 2022-08-03 NOTE — PROGRESS NOTES
OCCUPATIONAL THERAPY PROGRESS NOTE  [facility]  [address]  [place]              Phone: [phone]             Fax: [phone]   Date: 22   Patient Name: Nora Bloom  MRN: [id number]     : 1975    EvaluatingTherapist: Darrian Anderson OTR/L [id number]     Restrictions/Precautions:  Spasticity, low fall rsik  Diagnosis:  h/o ischemic right MCA stroke (Z86.73), Left spastic hemiplegia (HCC)(G81.14, impaired mobility & ADLs (Z74.09 Z78.9)  Date of Injury:      Insurance/Certification information: [address] [place] [address] signed (Y/N): No  Visit# / total visits: 10/30     Referring Practitioner:  Orlando Astorga MD  Specific Practitioner Orders: Eval and Treat, In-motion Arm     Assessment of current deficits   [] Functional mobility                         [x] ADLs          [x] Strength                 [] Cognition   [] Functional transfers           [x] IADLs         [] Safety Awareness  [x] Endurance   [x] Fine Motor Coordination    [x] Balance      [] Vision/perception   [] Sensation     [x] Gross Motor Coordination [x] ROM          [] Pain                        [] Edema          [] Scar Adhesion/Skin Integrity      OT PLAN OF CARE   OT POC based on physician orders, patient diagnosis and results of clinical assessment.      Frequency/Duration: 1x/wk for 92-56 visits  /  Certification Period From: 22 to 2022     Specific OT Treatment to include:   [x] Instruction in HEP                   Modalities:  [x] Therapeutic Exercise                 [x] Ultrasound               [x] Electrical Stimulation/Attended  [x] PROM/Stretching                    [x] Fluidotherapy          [x]  Paraffin                   [x] AAROM  [x] AROM                 [] Iontophoresis:   [x] Tendon Glides                                               [x] Neuromuscular Re-Ed             [x] ADL/IADL re-training    [x] Therapeutic Activity [] Pain Management with/without modalities PRN                 [x] Manual Therapy                      [x] Splinting                                   [] Scar Management                   []Joint Protection/Training  []Ergonomics                             [] Joint Mobilization                      [x] Adaptive Equipment Assessment/Training                             [x] Manual Edema Mobilization   [x] Myofascial Release                 [x] Energy Conservation/Work Simplification  [x] GM/FM Coordination                [x] Safety retraining/education per  individual diagnosis/goals  [] Desensitization        Patient Specific Goal: \"Completely dress myself. Be able to turn light on & off & be able to hold a tooth brush with my left hand. \"                          GOALS (Long term same as Short term):  1.) Patient will demonstrate good understanding of home program(exercises/activities/diagnosis/prognosis/goals) with good accuracy. 2)Patient will demonstrate improved smoothness of 0.319 on InMotion Arm (Norm is 0.533)  which will improve patient ability to complete ADLs. 3)Patient will demonstrate improved movement duration 7.5 sec (Norm is 1.4 seconds) which a lower number that is required for cooking & home management tasks  4)Patient will demonstrate improved AIM 5.13 cm(Norm is 0.55cm) which a lower number that is required for improved ADLs & IADLs  5)Patient will demonstrate improved movement efficiency 22% (Norm is 75%) which a lower number that is required for improved ADLs & IADLs  6) Pt [place] size 0.006 & [place] independence of 0.430 (8cm [place] 0.0201 or >) will improve to a higher number which is required for a functional tasks & ADLs.   7) Pt will demonstrate a lower overall isometric hold distance 10.89cm  (Norm is 2.40cm which will assist a patient open a door & turn lights on & off.  8) Pt will demonstrate a higher overall displacement number 4.97cm (norm is 13.00cm) which will improve a patient ability to perform ADLs & IADLs. TODAY'S TREATMENT      Pain Level: No c/o pain this date. Subjective: Pt reports \"My pain is better today in my shoulder. \"     OBJECTIVE: InMotion   Updated POC to be completed by 10th. RI   DFT  RP  MJ  DFSL  Protocol Comments      # 1/80 3 5 111 131 11 AA Random 14cm   # 2/160    7 4 146 142 10 AA Random Focus on DFT & DFSL   # 3/240    6 6 159 142 9 AA Random Focus on RI & MJ   # 4/320    6 4 158 141 9 AA  Random Focus on RI & MJ   #5/400 5 6 122 125 9 Active Assistive     #6/480 7 3 138 139 9 Active Assistive Focus on DFT   #7/560 9 5 134 134 10 Active Assistive Focus on RP & MJ   #8/640 9 4 172 138 9 Active Assistive Focus DFT & MJ   #9/720          #10/800          #11/880          #12/960                                        Pt tolerated Inmotioin robotic assessment using 14cm [place] & sat in a chair without arm rests for positioning purposes. Pt is hindered by LUE deficits & pt did attempt to compensate for lack of movement in LUE by leaning to L&R & using her head & trunk to facilitate movement of the LUE. Pt educated on proper positioning. Pt demo the following improvements from her lase assessment in the following areas- [place] size improved from 56.81cm to 59.02cm, [place] symmetry improved from 0.384 to 0.520, smoothness improved from 0.319 to 0.433, target accuracy improved from 11.30cm to 5.68cm %, AIM improved from 5.13cm to 2.47cm, movement duration improved from 7.5 sec to 5.2 sec & movement efficiency from 22% to 23%. All Inmotion exercises are to improve function of  LUE to assist pt ability completing ADLs & IADLs @ home. Pt appreciate immediate feedback that is provided using inmotion arm & is benefiting from neurological recovery to improve functional use of the LUE. Pt completed 740 total movements with L UE including pre and post testing using 14 cm [place] on AA and AA Random protocols.  Pt requires use of ace wrap and shoulder straps for positioning purposes and to decrease compensatory movements. Pt showed improvements throughout treatment as noted above. Pt demo'd improvement from pre to post testing in target accuracy from 2.74cm to 1.79cm (35% change). All InMotion exercises are performed to increase function of L UE to assist with ADL's and IADL's at home. Pt appreciates the immediate feedback that is provided using the InMotion arm. Pt is motivated & will begin to further engage L UE in functional ADLs & IADLs in the home. Pt will benefit from InMotion robotic arm intervention & OT to improve pt functional use of her LUE. Pt & her mom were educated on home exercise program & incorporating functional tasks @ home by incorporating the LUE to improve functional use of the LUE. Pt & mom agreed to follow through. Assessment/Comments: Pt is making good progress toward stated plan of care. Pt demo'd slight improvement in active movement into upper quadrants on active movement test print outs following treatment. Educated pt to continue HEP and incorporating L UE into functional tasks. Reinforced need for sling wear to prevent shoulder pain and subluxation, pt agreeable and reports wearing her sling more frequently. Continue with OT POC. Time In: 1100        Time Out: 1200                Timed Code Treatment Minutes: 60 Minutes     CODE   Minutes  Units   45197 OT Eval Low       99409 OT Eval Medium     24754 OT Eval High       26459 Fluidotherapy       49791 Manual       26021 Therapeutic Ex       50774 Therapeutic Activity       47832 ADL/COMP Tech Train       92800 Neuromuscular Re-Ed 60 4   92493 OrthoManagementTraining       09802 Paraffin       23194 Electrical Stim - Attended       54851 Iontophoresis       05292 Ultrasound         Other                    -Response to Treatment: Good     Plan:   X  Continues Plan of care: Will continue to focus on decreasing spasticity, improving function of her LUE, improving GMC and strength to increase ADL and IADL function with robotic arm intervention.    []  [address] of care:   []  Discharge:    Tato Renee GLOVER/L [facility] OTR/L [id number]    I have read & agree with the above status  Elaine Spatz OTR/L 98002

## 2022-08-10 ENCOUNTER — APPOINTMENT (OUTPATIENT)
Dept: OCCUPATIONAL THERAPY | Age: 47
End: 2022-08-10
Payer: MEDICAID

## 2022-08-10 ENCOUNTER — TELEPHONE (OUTPATIENT)
Dept: NEUROLOGY | Age: 47
End: 2022-08-10

## 2022-08-10 ENCOUNTER — TELEPHONE (OUTPATIENT)
Dept: CARDIAC CATH/INVASIVE PROCEDURES | Age: 47
End: 2022-08-10

## 2022-08-10 ENCOUNTER — HOSPITAL ENCOUNTER (OUTPATIENT)
Dept: OCCUPATIONAL THERAPY | Age: 47
Setting detail: THERAPIES SERIES
Discharge: HOME | End: 2022-08-10
Payer: MEDICAID

## 2022-08-10 ENCOUNTER — OFFICE VISIT (OUTPATIENT)
Dept: NEUROLOGY | Age: 47
End: 2022-08-10
Payer: MEDICAID

## 2022-08-10 VITALS
OXYGEN SATURATION: 99 % | HEART RATE: 61 BPM | HEIGHT: 66 IN | SYSTOLIC BLOOD PRESSURE: 115 MMHG | WEIGHT: 178 LBS | RESPIRATION RATE: 18 BRPM | DIASTOLIC BLOOD PRESSURE: 72 MMHG | TEMPERATURE: 98.1 F | BODY MASS INDEX: 28.61 KG/M2

## 2022-08-10 DIAGNOSIS — G40.109: ICD-10-CM

## 2022-08-10 DIAGNOSIS — I63.9 CRYPTOGENIC STROKE (HCC): Primary | ICD-10-CM

## 2022-08-10 PROCEDURE — 99214 OFFICE O/P EST MOD 30 MIN: CPT | Performed by: PHYSICIAN ASSISTANT

## 2022-08-10 PROCEDURE — 97112 NEUROMUSCULAR REEDUCATION: CPT

## 2022-08-10 RX ORDER — LAMOTRIGINE 100 MG/1
100 TABLET ORAL 2 TIMES DAILY
Qty: 60 TABLET | Refills: 2 | Status: SHIPPED | OUTPATIENT
Start: 2022-08-10 | End: 2022-11-08

## 2022-08-10 RX ORDER — LAMOTRIGINE 25 MG/1
75 TABLET ORAL 2 TIMES DAILY
Qty: 84 TABLET | Refills: 0 | Status: SHIPPED | OUTPATIENT
Start: 2022-08-10 | End: 2022-08-24

## 2022-08-10 RX ORDER — MULTIVIT-MIN/IRON/FOLIC ACID/K 18-600-40
CAPSULE ORAL DAILY
COMMUNITY

## 2022-08-10 NOTE — TELEPHONE ENCOUNTER
Reminded patient of scheduled procedure on 8/11 . Instructions given and COVID questionnaire completed.

## 2022-08-10 NOTE — PROGRESS NOTES
[facility]. Dai Christopher M.D., F.A.C.P. Darío Gutierrez, MATIAS, APRN, ACNS-BC  John Tejada. Frida Andrews, MSN, APRN-FNP-C  Aurora Shone, MSPAS, PADalyC  Feng Campbell, MSN, APRN-FNP-C  [address], [facility] [place], [address]  Phone: [phone]  Fax: [phone]       Brandon Luis is a 42 y.o. right handed female     Patient presents for hospital follow-up for seizures. She had a prior large right MCA stroke requiring craniectomy and was worked up at [facility]. Her stroke remains cryptogenic at this time and implantable loop recorder was recommended. She has an appointment with cardiology in June. She remains on aspirin and Lipitor for secondary prevention. Prior work-up was unrevealing. She continues to have a left spastic hemiparesis and hemisensory changes. She continues to work with outpatient therapy. Interval history   Last visit patient was complaining of itchiness related to Lamictal so we decided to wean this and placed her on Keppra 500 mg twice daily for her postinfarct seizures. She reports that Christiano Ego made her irritated so she decided to discontinue this and go back on the Lamictal on her own. She did not contact the office to discuss this. She had a breakthrough seizure on June 30 and July 23. Again she did not contact our office. She denies any missed doses but does admit to increased stress levels. In regards to her previous stroke she is scheduled to have a loop recorder placed tomorrow morning. She continues to work with physical therapy. She continues to have a left spastic hemiparetic gait with greater weakness in the arm. She continues to wear left AFO brace for foot drop.     No chest pain or palpitations  No SOB  No vertigo, lightheadedness or loss of consciousness  No falls, tripping or stumbling  No incontinence of bowels or bladder  No itching or bruising appreciated  + L side weakness and numbness     ROS otherwise negative Objective:       /72 (Site: Right Upper Arm)   Pulse 61   Temp 98.1 °F (36.7 °C) (Infrared)   Resp 18   Ht 5' 6\" (1.676 m)   Wt 178 lb (80.7 kg)   LMP 08/08/2022   SpO2 99%   BMI 28.73 kg/m²      General appearance: alert, appears stated age and cooperative  Head: Normocephalic, well healed crani scar   Eyes: conjunctivae/corneas clear.    Neck: no adenopathy, no carotid bruit, no JVD, supple, symmetrical, trachea midline and thyroid not enlarged, symmetric, no tenderness/mass/nodules  Lungs: clear to auscultation bilaterally  Extremities: extremities normal, atraumatic, no cyanosis or edema- L AFO brace   Pulses: 2+ and symmetric  Skin: Skin color, texture, turgor normal. No rashes or lesions     Mental Status: Alert, oriented, thought content appropriate    Appropriate attention/concentration    Speech: No dysarthria     Language: No aphasia       Cranial Nerves:  I: smell    II: visual acuity     II: visual fields Full to confrontation   II: pupils IBETH   III,VII: ptosis None   III,IV,VI: extraocular muscles  Full ROM   V: mastication Normal   V: facial light touch sensation  Normal   V,VII: corneal reflex     VII: facial muscle function - upper  Normal   VII: facial muscle function - lower Normal   VIII: hearing Normal   IX: soft palate elevation  Normal   IX,X: gag reflex    XI: trapezius strength  5/5   XI: sternocleidomastoid strength 5/5   XI: neck extension strength  5/5   XII: tongue strength  Normal       Motor:  L spastic hemiparesis   Normal bulk   No abnormal movements     Sensory:  LT decreased on L     Coordination:   FFM, FNF impaired on L r/t weakness     Gait:   L spastic hemiparetic gait with AFO brace on LLE     DTR:   Increased reflexes on L     No Rodriguez's  No pathological reflexes    Laboratory/Radiology:     CBC with Differential:    Lab Results   Component Value Date/Time    WBC 7.8 04/07/2022 06:53 AM    RBC 3.89 04/07/2022 06:53 AM    HGB 12.0 04/07/2022 06:53 AM    HCT 37.2 04/07/2022 06:53 AM     04/07/2022 06:53 AM    MCV 95.6 04/07/2022 06:53 AM    MCH 30.8 04/07/2022 06:53 AM    MCHC 32.3 04/07/2022 06:53 AM    RDW 14.2 04/07/2022 06:53 AM    LYMPHOPCT 30.3 04/07/2022 06:53 AM    MONOPCT 6.1 04/07/2022 06:53 AM    BASOPCT 1.3 04/07/2022 06:53 AM    MONOSABS 0.47 04/07/2022 06:53 AM    LYMPHSABS 2.35 04/07/2022 06:53 AM    EOSABS 0.20 04/07/2022 06:53 AM    BASOSABS 0.10 04/07/2022 06:53 AM     CMP:    Lab Results   Component Value Date/Time     04/07/2022 06:53 AM    K 4.4 04/07/2022 06:53 AM     04/07/2022 06:53 AM    CO2 22 04/07/2022 06:53 AM    BUN 14 04/07/2022 06:53 AM    CREATININE 0.7 04/07/2022 06:53 AM    GFRAA >60 04/07/2022 06:53 AM    LABGLOM >60 04/07/2022 06:53 AM    GLUCOSE 95 04/07/2022 06:53 AM    PROT 7.3 10/23/2021 06:44 AM    LABALBU 3.7 10/23/2021 06:44 AM    CALCIUM 9.3 04/07/2022 06:53 AM    BILITOT 0.4 10/23/2021 06:44 AM    ALKPHOS 89 10/23/2021 06:44 AM    AST 46 10/23/2021 06:44 AM    ALT 75 10/23/2021 06:44 AM     HgBA1c:  No results found for: LABA1C  FLP:  No results found for: TRIG, HDL, LDLCALC, LDLDIRECT, LABVLDL    CTA head/neck  1. Large subacute to old right MCA infarct. Extensive adjacent craniectomy  changes are identified in the right side. 2. Moderate diffuse smooth narrowing involving the distal M1 segment of the  right MCA. There is diffuse mildly limited perfusion of the right MCA  compared to the contralateral side. 3. Unremarkable CT angiogram of the neck. EEG  This abnormal study showed evidence of:     1. An increased potential for focal seizures from the right frontotemporal region  2. Moderate to severe nonspecific cerebral dysfunction of the right frontotemporal region     Structural abnormalities should be considered for the findings above and appropriate imaging obtained if clinically indicated. No seizures were noted during this study.       [facility] in care everywhere I personally reviewed all labs andimages today   Assessment:     Cryptogenic RMCA stroke    Plan for ILR placement tomorrow  Continues on aspirin and statin for secondary prevention    Post infarct focal seizures    With last seizure being on 7/23/2022   Will increase Lamictal to 75 mg BID x 2 weeks, then 100 mg BID     Plan:     Continue ASA and statin     Follow up with EP as planned for ILR    Increase Lamictal to 75 mg BID x 2 weeks, then 100 mg BID     Seizure safety precautions     RTO 3 months or sooner prn     Call with questions or concerns       Lizeth Winn PA-C  1:12 PM  8/10/2022

## 2022-08-10 NOTE — PROGRESS NOTES
OCCUPATIONAL THERAPY PROGRESS NOTE  [facility]  [address]  [place]              Phone: [phone]             Fax: [phone]   Date: 08/10/22   Patient Name: Robin García  MRN: [id number]     : 1975    EvaluatingTherapist: Juju Page OTR/L [id number]     Restrictions/Precautions:  Spasticity, low fall rsik  Diagnosis:  h/o ischemic right MCA stroke (Z86.73), Left spastic hemiplegia (HCC)(G81.14, impaired mobility & ADLs (Z74.09 Z78.9)  Date of Injury:      Insurance/Certification information: [address] [place] [address] signed (Y/N): No  Visit# / total visits:      Referring Practitioner:  Carolee Greene MD  Specific Practitioner Orders: Eval and Treat, In-motion Arm     Assessment of current deficits   [] Functional mobility                         [x] ADLs          [x] Strength                 [] Cognition   [] Functional transfers           [x] IADLs         [] Safety Awareness  [x] Endurance   [x] Fine Motor Coordination    [x] Balance      [] Vision/perception   [] Sensation     [x] Gross Motor Coordination [x] ROM          [] Pain                        [] Edema          [] Scar Adhesion/Skin Integrity      OT PLAN OF CARE   OT POC based on physician orders, patient diagnosis and results of clinical assessment.      Frequency/Duration: 1x/wk for 83-48 visits  /  Certification Period From: 22 to 2022     Specific OT Treatment to include:   [x] Instruction in HEP                   Modalities:  [x] Therapeutic Exercise                 [x] Ultrasound               [x] Electrical Stimulation/Attended  [x] PROM/Stretching                    [x] Fluidotherapy          [x]  Paraffin                   [x] AAROM  [x] AROM                 [] Iontophoresis:   [x] Tendon Glides                                               [x] Neuromuscular Re-Ed             [x] ADL/IADL re-training    [x] Therapeutic Activity [] Pain Management with/without modalities PRN                 [x] Manual Therapy                      [x] Splinting                                   [] Scar Management                   []Joint Protection/Training  []Ergonomics                             [] Joint Mobilization                      [x] Adaptive Equipment Assessment/Training                             [x] Manual Edema Mobilization   [x] Myofascial Release                 [x] Energy Conservation/Work Simplification  [x] GM/FM Coordination                [x] Safety retraining/education per  individual diagnosis/goals  [] Desensitization        Patient Specific Goal: \"Completely dress myself. Be able to turn light on & off & be able to hold a tooth brush with my left hand. \"                          GOALS (Long term same as Short term):  1.) Patient will demonstrate good understanding of home program(exercises/activities/diagnosis/prognosis/goals) with good accuracy. 2)Patient will demonstrate improved smoothness of 0.319 on InMotion Arm (Norm is 0.533)  which will improve patient ability to complete ADLs. 3)Patient will demonstrate improved movement duration 7.5 sec (Norm is 1.4 seconds) which a lower number that is required for cooking & home management tasks  4)Patient will demonstrate improved AIM 5.13 cm(Norm is 0.55cm) which a lower number that is required for improved ADLs & IADLs  5)Patient will demonstrate improved movement efficiency 22% (Norm is 75%) which a lower number that is required for improved ADLs & IADLs  6) Pt Ho-Chunk size 0.006 & Ho-Chunk independence of 0.430 (8cm Ho-Chunk 0.0201 or >) will improve to a higher number which is required for a functional tasks & ADLs.   7) Pt will demonstrate a lower overall isometric hold distance 10.89cm  (Norm is 2.40cm which will assist a patient open a door & turn lights on & off.  8) Pt will demonstrate a higher overall displacement number 4.97cm (norm is 13.00cm) which will improve a patient ability to perform ADLs & IADLs. TODAY'S TREATMENT      Pain Level: No c/o pain this date. Subjective: Pt reports \"I used my arm to carry some things this week. \"     OBJECTIVE: InMotion   Updated POC to be completed by 20th. RI   DFT  RP  MJ  DFSL  Protocol Comments      # 1/80 8 6 145 140 11 AA  14cm   # 2/160    8 7 230 163 11 AA Focus on RI & DFT   # 3/240    5 7 185 158 11 AA Focus on RI & RP   # 4/320    5 6 183 158 11 AA Focus on RI & RP   #5/400 2 7 138 150 11 AA Random     #6/480 6 6 219 162 13 AA Random Focus on RI & DFT   #7/560 4 5 253 175 13 AA Random Focus on RI &DFT   #8/640 1 6 202 162 11 AA Random Focus on RI & MJ   #9/720 2 8 146 149 12 [facility]    #10/800 1 7  [facility] Focus on [place] & DFT   #11/880 6 8 250 818 71 5443 [employer] Focus on DFT   #12/960                                        Pt tolerated Inmotioin robotic arm treatment using 14cm [place]. Pt completed 944 total movements with L UE including pre and post testing using 14 cm [place] on AA, AA Random and [address] [place] protocols. Pt requires use of ace wrap and shoulder straps for positioning purposes and to decrease compensatory movements. Pt showed improvements throughout treatment as noted above. Pt demo'd improvement from pre to post testing in smoothness from 0.455 to 0.545 (20% change). All InMotion exercises are performed to increase function of L UE to assist with ADL's and IADL's at home. Pt appreciates the immediate feedback that is provided using the InMotion arm. Pt will benefit from InMotion robotic arm intervention & OT to improve pt functional use of her LUE. Pt re-educated on home exercise program & further incorporating L UE into functional tasks @ home. Pt agreeable. Assessment/Comments: Pt is making good progress toward stated plan of care. Pt requested to end treatment a little early this date d/t having another appointment to get to.  Educated pt to continue HEP and incorporating L UE into functional tasks. Reinforced need for sling wear to prevent shoulder pain and subluxation during transfers/mobility, pt agreeable. Continue with OT POC. Time In: 1100        Time Out: 1145               Timed Code Treatment Minutes: 50 Minutes     CODE   Minutes  Units   24893 OT Eval Low       73063 OT Eval Medium     06592 OT Eval High       77477 Fluidotherapy       85818 Manual       79206 Therapeutic Ex       77928 Therapeutic Activity       39043 ADL/COMP Tech Train       78228 Neuromuscular Re-Ed 45 3   61060 OrthoManagementTraining       89226 Paraffin       72470 Electrical Stim - Attended       23364 Iontophoresis       77664 Ultrasound         Other          Total  45  3       -Response to Treatment: Good     Plan:   X  Continues Plan of care: Will continue to focus on decreasing spasticity, improving function of her LUE, improving GMC and strength to increase ADL and IADL function with robotic arm intervention.    []  [address] of care:   []  Discharge:    Aron Melendez GLOVER/L [id number]    I have read & agree with the above status  Jim Chatterjee OTR/L [id number]

## 2022-08-11 ENCOUNTER — HOSPITAL ENCOUNTER (OUTPATIENT)
Dept: CARDIAC CATH/INVASIVE PROCEDURES | Age: 47
Discharge: HOME | End: 2022-08-11
Payer: MEDICAID

## 2022-08-11 VITALS
TEMPERATURE: 97.6 F | DIASTOLIC BLOOD PRESSURE: 52 MMHG | OXYGEN SATURATION: 100 % | RESPIRATION RATE: 16 BRPM | HEART RATE: 54 BPM | HEIGHT: 66 IN | BODY MASS INDEX: 28.61 KG/M2 | WEIGHT: 178 LBS | SYSTOLIC BLOOD PRESSURE: 98 MMHG

## 2022-08-11 PROCEDURE — 2580000003 HC RX 258: Performed by: INTERNAL MEDICINE

## 2022-08-11 PROCEDURE — 33285 INSJ SUBQ CAR RHYTHM MNTR: CPT

## 2022-08-11 PROCEDURE — 33285 INSJ SUBQ CAR RHYTHM MNTR: CPT | Performed by: INTERNAL MEDICINE

## 2022-08-11 PROCEDURE — 6360000002 HC RX W HCPCS: Performed by: INTERNAL MEDICINE

## 2022-08-11 PROCEDURE — C1764 EVENT RECORDER, CARDIAC: HCPCS

## 2022-08-11 RX ORDER — LEVETIRACETAM 500 MG/1
TABLET ORAL
Qty: 60 TABLET | Refills: 2 | OUTPATIENT
Start: 2022-08-11

## 2022-08-11 RX ADMIN — CEFAZOLIN SODIUM 1000 MG: 1 INJECTION, POWDER, FOR SOLUTION INTRAMUSCULAR; INTRAVENOUS at 14:47

## 2022-08-11 NOTE — PROCEDURES
True Fern is a 42 y.o. female patient. Past Medical History:   Diagnosis Date    Cerebral artery occlusion with cerebral infarction [facility])     Cerebral infarction, unspecified ([facility] [address]     Hx of blood clots      Blood pressure (!) 98/52, pulse 54, temperature 97.6 °F (36.4 °C), temperature source Temporal, resp. rate 16, height 5' 6\" (1.676 m), weight 178 lb (80.7 kg), last menstrual period 08/08/2022, SpO2 100 %, not currently breastfeeding. Procedures    [facility]) Physicians- The Heart and [address] Electrophysiology  Procedure Report  PATIENT: Magan Flanagan RECORD NUMBER: [id number]  DATE OF PROCEDURE:  8/11/2022  ATTENDING ELECTROPHYSIOLOGIST:  Jesus Wilde MD    PROCEDURE:   1. Reveal LINQ II Insertable Cardiac Monitor Implantation     INDICATION:   Cryptogenic CVA/Palpitations    PROCEDURE PERFORMED BY: Jesus Wilde MD    ASSISTANT: None     ESTIMATED BLOOD LOSS: Approximately <10 cc     PROCEDURE TIME: 15 minutes    COMPLICATIONS: None immediately apparent    DESCRIPTION OF PROCEDURE: The risks, benefits, and alternatives to the procedure were discussed with the patient, and informed consent was obtained. The patient was brought to the Electrophysiology lab and after postioning the patient and prepping and draping a sterile field, the region lateral to the sternum, was liberally infiltrated with 2% Lidocaine. Using the skin blade, a small nick was made. Using the introducer kit, the device was deployed into the subcutaneous space. Hemostasis was achieved with brief manual pressure. The incision was closed in 1 layer including using 4-0 Vicryl. The wound was dressed with steri-strips and an op site dressing. Excellent P wave and QRS sensing were obtained. The patient was transferred to the recovery room in stable condition. DEVICE INFORMATION: The ICM is a Medtronic Linq, model #: [id number], serial #: [id number]. SUMMARY:   1.  Successful implantation of a  Reveal LINQ II insertable cardiac monitor. RECOMMENDATIONS:   1. Discharge to home. 2. Resume all medications as taking at home   3. Follow-up in the office in 2 weeks for a postoperative incision check.   See discharge instructions for details    Erin Tiwari MD  Cardiac Electrophysiology  [facility]) Physicians  [facility] and [facility] [place]: [facility] Electrophysiology  3:35 PM  8/11/2022

## 2022-08-11 NOTE — H&P
H and P    CHIEF COMPLAINT: CVA    HISTORY OF PRESENT ILLNESS:       Patient seen for CVA with significant residual deficits. No CP or SOB.     8/11/22: Patient referred to electrophysiology for loop recorder insertion. Past Medical History:   Diagnosis Date    Cerebral artery occlusion with cerebral infarction [facility])     Cerebral infarction, unspecified ([facility] [address]     Hx of blood clots        Patient Active Problem List   Diagnosis    Cerebral infarction, unspecified ([facility] [address]    Status post craniectomy    Left spastic hemiplegia (HCC)    Tobacco abuse    Acute pain    Transient alteration of awareness    Activity, other specified    Allergy status to narcotic agent    History of stroke    History of cranioplasty    Neuropathic pain    Occlusion of right middle cerebral artery    Occlusion of carotid artery    Left hemiparesis (HCC)    Generalized anxiety disorder    Esophageal reflux    Disease due to severe acute respiratory syndrome coronavirus 2 (SARS-CoV-2)    Depressive disorder       Allergies   Allergen Reactions    Vicodin [Hydrocodone-Acetaminophen]      Gets severe hypotension    Other      Weight gain    Acetaminophen     Hydrocodone     Propoxyphene        Current Outpatient Medications   Medication Sig Dispense Refill    diclofenac sodium (VOLTAREN) 1 % GEL Apply topically 4 times daily as needed for Pain      Cholecalciferol (VITAMIN D) 50 MCG (2000 UT) CAPS capsule Take by mouth daily      lamoTRIgine (LAMICTAL) 25 MG tablet Take 3 tablets by mouth in the morning and 3 tablets before bedtime. Do all this for 14 days. 84 tablet 0    lamoTRIgine (LAMICTAL) 100 MG tablet Take 1 tablet by mouth in the morning and 1 tablet before bedtime.  (Patient not taking: Reported on 8/11/2022) 60 tablet 2    baclofen (LIORESAL) 10 MG tablet 10 mg 3 times daily       FLUoxetine (PROZAC) 20 MG capsule take 1 capsule by mouth once daily      ondansetron (ZOFRAN) 4 MG tablet take 1 tablet by mouth three times a day if needed for nausea      RA MELATONIN 10 MG TABS take 1 TO 2 tablets by mouth at bedtime if needed      aspirin 81 MG chewable tablet Take 1 tablet by mouth daily 30 tablet 0    gabapentin (NEURONTIN) 300 MG capsule Take 1 capsule by mouth nightly for 30 days. 30 capsule 0    atorvastatin (LIPITOR) 40 MG tablet Take 1 tablet by mouth daily 30 tablet 0    metoprolol tartrate (LOPRESSOR) 25 MG tablet Take 0.5 tablets by mouth 2 times daily 60 tablet 0    nicotine (NICODERM CQ) 7 MG/24HR Place 1 patch onto the skin daily 14 patch 0    Handicap Placard MISC by Does not apply route Reason for disability:  H/O ischemic right MCA stroke  (primary encounter diagnosis)  Left spastic hemiplegia (HCC)  Impaired mobility and ADLs    Duration: 5 years 1 each 0    acetaminophen (TYLENOL) 500 MG tablet Take 1,000 mg by mouth every 4 hours as needed for Pain Indications: 2 tablets 4 times per day        No current facility-administered medications for this encounter.        Social History     Socioeconomic History    Marital status: Single     Spouse name: Not on file    Number of children: 1    Years of education: Not on file    Highest education level: 12th grade   Occupational History    Not on file   Tobacco Use    Smoking status: Former     Packs/day: 1.00     Years: 20.00     Pack years: 20.00     Types: Cigarettes     Start date: 10/1/2020     Quit date: 10/12/2021     Years since quittin.8    Smokeless tobacco: Never   Vaping Use    Vaping Use: Never used   Substance and Sexual Activity    Alcohol use: Not Currently    Drug use: Never    Sexual activity: Not Currently     Partners: Male     Birth control/protection: Pill   Other Topics Concern    Not on file   Social History Narrative    Not on file     Social Determinants of Health     Financial Resource Strain: Low Risk     Difficulty of Paying Living Expenses: Not hard at all   Food Insecurity: No Food Insecurity    Worried About [employer] in the Last Year: Never true    Ran Out of Food in the Last Year: Never true   Transportation Needs: No Transportation Needs    Lack of Transportation (Medical): No    Lack of Transportation (Non-Medical): No   Physical Activity: Insufficiently Active    Days of Exercise per Week: 2 days    Minutes of Exercise per Session: 30 min   Stress: Stress Concern Present    Feeling of Stress : Rather much   Social Connections: Unknown    Frequency of Communication with Friends and Family: Once a week    Frequency of Social Gatherings with Friends and Family: Twice a week    Attends Hindu Services: Never    Active Member of Clubs or Organizations: No    Attends Club or Organization Meetings: Never    Marital Status: Patient refused   Intimate Partner Violence: Not At Risk    Fear of Current or Ex-Partner: No    Emotionally Abused: No    Physically Abused: No    Sexually Abused: No   Housing Stability: Low Risk     Unable to Pay for Housing in the Last Year: No    Number of Places Lived in the Last Year: 1    Unstable Housing in the Last Year: No       Family History   Problem Relation Age of Onset    No Known Problems Mother     No Known Problems Father      Review of Systems:  Heart: as above   Lungs: as above   Eyes: denies changes in vision or discharge. Ears: denies changes in hearing or pain. Nose: denies epistaxis or masses   Throat: denies sore throat or trouble swallowing. Neuro: denies numbness, tingling, tremors. Skin: denies rashes or itching. : denies hematuria, dysuria   GI: denies vomiting, diarrhea   Psych: denies mood changed, anxiety, depression. All other systems negative. Physical Exam   BP (!) 98/52   Pulse 54   Temp 97.6 °F (36.4 °C) (Temporal)   Resp 16   Ht 5' 6\" (1.676 m)   Wt 178 lb (80.7 kg)   LMP 08/08/2022   SpO2 100%   BMI 28.73 kg/m²   Constitutional: Oriented to person, place, and time. Well-developed and well-nourished. No distress. Head: Normocephalic and atraumatic.    Eyes: EOM are normal. Pupils are equal, round, and reactive to light. Neck: Normal range of motion. Neck supple. No hepatojugular reflux and no JVD present. Carotid bruit is not present. No tracheal deviation present. No thyromegaly present. Cardiovascular: Normal rate, regular rhythm, normal heart sounds and intact distal pulses. Exam reveals no gallop and no friction rub. No murmur heard. Pulmonary/Chest: Effort normal and breath sounds normal. No respiratory distress. No wheezes. No rales. No tenderness. Abdominal: Soft. Bowel sounds are normal. No distension and no mass. No tenderness. No rebound and no guarding. Musculoskeletal: Normal range of motion. No edema and no tenderness. Lymphadenopathy:   No cervical adenopathy. No groin adenopathy. Neurological: Alert and oriented to person, place, and time. Skin: Skin is warm and dry. No rash noted. Not diaphoretic. No erythema. Psychiatric: Normal mood and affect. Behavior is normal.     EKG personally reviewed 08/11/22:  normal EKG, normal sinus rhythm. ASSESSMENT AND PLAN:  Patient Active Problem List   Diagnosis    Cerebral infarction, unspecified ([facility] [address]    Status post craniectomy    Left spastic hemiplegia (HCC)    Tobacco abuse    Acute pain    Transient alteration of awareness    Activity, other specified    Allergy status to narcotic agent    History of stroke    History of cranioplasty    Neuropathic pain    Occlusion of right middle cerebral artery    Occlusion of carotid artery    Left hemiparesis (HCC)    Generalized anxiety disorder    Esophageal reflux    Disease due to severe acute respiratory syndrome coronavirus 2 (SARS-CoV-2)    Depressive disorder     1. CVA: [facility]. Etiology never defined. Here for ILR insertion    ASA/statin/plavix. 3. Carotid disease: Mild. 4. Lipids: Statin.      [address]  Cardiac electrophysiology  [facility]) Physicians

## 2022-08-11 NOTE — DISCHARGE INSTRUCTIONS
[facility]  Implantable Cardiac Monitor Discharge Instructions For Patients    Your information: True Mock  Date of Insertion: 8/11/22  Device Information: Medtronic Reveal LINQ    Follow-Up: You will be seen at 10:30 am on 8/29/22 at the [address] [place] [address] for an incision and device check. Please call the office if you need to reschedule this appointment. The number is [phone]     Incision Care: Leave your dressing on for 7 days. Remove the dressing on Thursday 8/18/22. You may shower starting tomorrow, but do not let the water run directly on the incision  for 7 days. Check the area daily. If you find any redness, swelling, drainage, warmth, or have a fever greater than 100 degrees, notify the office immediately at the number listed below. Activity: You may continue regular daily activities, but try to prevent any hard blows to the incision site. Driving: No driving until the day after your procedure. Special Instructions: Let your dentist, doctor, or medical specialist know that you have an implantable cardiac monitor so precautions can be taken to protect the device. There is no need to take antibiotics prior to dental procedures. Your device is considered to be \"MRI conditional,\" meaning that under certain circumstances, MRI exams may be performed immediately post implantation. Your device may set off a metal detector, such as those used in airports and courtrooms. Let security know you have an implantable cardiac monitor and show them your ID card. ID Card - You will have a temporary ID card until a permanent card is sent to you by the device company. The permanent card will look like a s license or credit card and should arrive within 8 weeks. Carry your ID card with you at all times. [facility]:   [address] [place] [address], [facility] Dimitrios Church   [phone]

## 2022-08-17 ENCOUNTER — HOSPITAL ENCOUNTER (OUTPATIENT)
Dept: OCCUPATIONAL THERAPY | Age: 47
Setting detail: THERAPIES SERIES
Discharge: HOME | End: 2022-08-17
Payer: MEDICAID

## 2022-08-17 PROCEDURE — 97112 NEUROMUSCULAR REEDUCATION: CPT

## 2022-08-17 NOTE — PROGRESS NOTES
OCCUPATIONAL THERAPY PROGRESS NOTE  [facility]  [address]  Ivis Wallace              Phone: [phone]             Fax: [phone]   Date: 22   Patient Name: Tracy Austin  MRN: [id number]     : 1975    EvaluatingTherapist: Tereso Chavez OTR/L [id number]     Restrictions/Precautions:  Spasticity, low fall rsik  Diagnosis:  h/o ischemic right MCA stroke (Z86.73), Left spastic hemiplegia (HCC)(G81.14, impaired mobility & ADLs (Z74.09 Z78.9)  Date of Injury:      Insurance/Certification information: [address] [place] [address] signed (Y/N): No  Visit# / total visits:      Referring Practitioner:  Roney Hashimoto, MD  Specific Practitioner Orders: Eval and Treat, In-motion Arm     Assessment of current deficits   [] Functional mobility                         [x] ADLs          [x] Strength                 [] Cognition   [] Functional transfers           [x] IADLs         [] Safety Awareness  [x] Endurance   [x] Fine Motor Coordination    [x] Balance      [] Vision/perception   [] Sensation     [x] Gross Motor Coordination [x] ROM          [] Pain                        [] Edema          [] Scar Adhesion/Skin Integrity      OT PLAN OF CARE   OT POC based on physician orders, patient diagnosis and results of clinical assessment.      Frequency/Duration: 1x/wk for 13-00 visits  /  Certification Period From: 22 to 2022     Specific OT Treatment to include:   [x] Instruction in HEP                   Modalities:  [x] Therapeutic Exercise                 [x] Ultrasound               [x] Electrical Stimulation/Attended  [x] PROM/Stretching                    [x] Fluidotherapy          [x]  Paraffin                   [x] AAROM  [x] AROM                 [] Iontophoresis:   [x] Tendon Glides                                               [x] Neuromuscular Re-Ed             [x] ADL/IADL re-training    [x] Therapeutic Activity [] Pain Management with/without modalities PRN                 [x] Manual Therapy                      [x] Splinting                                   [] Scar Management                   []Joint Protection/Training  []Ergonomics                             [] Joint Mobilization                      [x] Adaptive Equipment Assessment/Training                             [x] Manual Edema Mobilization   [x] Myofascial Release                 [x] Energy Conservation/Work Simplification  [x] GM/FM Coordination                [x] Safety retraining/education per  individual diagnosis/goals  [] Desensitization        Patient Specific Goal: \"Completely dress myself. Be able to turn light on & off & be able to hold a tooth brush with my left hand. \"                          GOALS (Long term same as Short term):  1.) Patient will demonstrate good understanding of home program(exercises/activities/diagnosis/prognosis/goals) with good accuracy. 2)Patient will demonstrate improved smoothness of 0.319 on InMotion Arm (Norm is 0.533)  which will improve patient ability to complete ADLs. 3)Patient will demonstrate improved movement duration 7.5 sec (Norm is 1.4 seconds) which a lower number that is required for cooking & home management tasks  4)Patient will demonstrate improved AIM 5.13 cm(Norm is 0.55cm) which a lower number that is required for improved ADLs & IADLs  5)Patient will demonstrate improved movement efficiency 22% (Norm is 75%) which a lower number that is required for improved ADLs & IADLs  6) Pt [place] size 0.006 & [place] independence of 0.430 (8cm [place] 0.0201 or >) will improve to a higher number which is required for a functional tasks & ADLs.   7) Pt will demonstrate a lower overall isometric hold distance 10.89cm  (Norm is 2.40cm which will assist a patient open a door & turn lights on & off.  8) Pt will demonstrate a higher overall displacement number 4.97cm (norm is 13.00cm) which will improve a patient ability to perform ADLs & IADLs. TODAY'S TREATMENT      Pain Level: Pt c/o pain in R hip. Did not rate. Subjective: Pt reports \"I am still carrying things with my R arm, I can  a little better. \"     OBJECTIVE: InMotion   Updated POC to be completed by 20th. RI   DFT  RP  MJ  DFSL  Protocol Comments      # 1/80 2 5 145 162 12 AA  14cm   # 2/160    1 4 194 176 11 AA Focus on RI & DFT   # 3/240    2 6 174 168 11 AA Focus on RP & MJ   # 4/320    1 4 147 171 11 AA Focus on RP & DFT   #5/400 2 6 145 152 13 AA Random     #6/480 0 6 190 147 9 AA Random Focus on MJ & RI   #7/560 2 4 184 155 10 AA Random Focus on DFT & RP   #8/640 3 5 176 148 11 AA Random Focus on RP & MJ   #9/720 0 3 87 [address] [facility] [address]    #10/800 4 5 145 [address] [facility] [address] on MJ & DFSL   #11/880 0 3 137 1113 [facility] Focus on [place] & RP   #12/960 0 3 159 [address] Focus on [place] & MJ             Maze       With Jacques's  46.58 seconds   Obstacle Training       X Axis  Score: 600   Pt tolerated Inmotioin robotic arm treatment using 14cm [place]. Pt completed 1024 total movements with L UE including pre and post testing using 14 cm [place] on AA, AA Random and [employer] protocols. Pt requires use of ace wrap and shoulder straps for positioning purposes and to decrease compensatory movements. Pt showed improvements throughout treatment as noted above. Pt demo'd improvement from pre to post testing in Target accuracy from 4.38 cm to 3.08 cm (30% change) and Aim from 2.92 cm from 2.74 cm (6% change). All InMotion exercises are performed to increase function of L UE to assist with ADL's and IADL's at home. Pt appreciates the immediate feedback that is provided using the InMotion arm. Pt will benefit from InMotion robotic arm intervention & OT to improve pt functional use of her LUE. Pt re-educated on home exercise program & further incorporating L UE into functional tasks @ home. Pt agreeable.      Assessment/Comments: Pt is making good progress toward stated plan of care. Educated pt to continue HEP and incorporating L UE into functional tasks. Continue with OT POC. Time In: 1055       Time Out: 1155               Timed Code Treatment Minutes: 60 Minutes     CODE   Minutes  Units   61253 OT Eval Low       92670 OT Eval Medium     44118 OT Eval High       28067 Fluidotherapy       50144 Manual       15738 Therapeutic Ex       25567 Therapeutic Activity       06027 ADL/COMP Tech Train       [id number] Neuromuscular Re-Ed 60 4   19026 OrthoManagementTraining       45565 Paraffin       51984 Electrical Stim - Attended       69927 Iontophoresis       95179 Ultrasound         Other          Total  60 4       -Response to Treatment: Good     Plan:   X  Continues Plan of care: Will continue to focus on decreasing spasticity, improving function of her LUE, improving GMC and strength to increase ADL and IADL function with robotic arm intervention.    []  [address] of care:   []  Discharge:    Yvette Camarena GLOVER/L [id number]    I have read & agree with the above status  Veryl Mohamud OTR/L 73036

## 2022-08-18 ENCOUNTER — TELEPHONE (OUTPATIENT)
Dept: CARDIAC CATH/INVASIVE PROCEDURES | Age: 47
End: 2022-08-18

## 2022-08-18 NOTE — TELEPHONE ENCOUNTER
I called Michael Singh to see how she's doing since her Linq insertion on 8/11/22. She states she's doing well. Denies any bleeding, drainage, or swelling from insertion site. Aquacel dsg removed today and she says you can barely tell where it was. ..the steri's came off with the dressing. She's aware of her follow up appt @ the [address] [place] [address] Drive on 8/29/22 at 10:30 am .  She offers no complaints and is thankful for the call.

## 2022-08-24 ENCOUNTER — HOSPITAL ENCOUNTER (OUTPATIENT)
Dept: OCCUPATIONAL THERAPY | Age: 47
Setting detail: THERAPIES SERIES
Discharge: HOME | End: 2022-08-24
Payer: MEDICAID

## 2022-08-24 PROCEDURE — 97112 NEUROMUSCULAR REEDUCATION: CPT

## 2022-08-24 NOTE — PROGRESS NOTES
OCCUPATIONAL THERAPY PROGRESS NOTE  [facility]  [address]  [facility], [address]              Phone: [phone]             Fax: [phone]   Date: 22   Patient Name: Nisha Galdamez  MRN: [id number]     : 1975    EvaluatingTherapist: Kiana Palomo OTR/L 23114     Restrictions/Precautions:  Spasticity, low fall rsik  Diagnosis:  h/o ischemic right MCA stroke (Z86.73), Left spastic hemiplegia (HCC)(G81.14, impaired mobility & ADLs (Z74.09 Z78.9)  Date of Injury:      Insurance/Certification information: [address] [place] [address] signed (Y/N): No  Visit# / total visits:      Referring Practitioner:  David Beasley MD  Specific Practitioner Orders: Eval and Treat, In-motion Arm     Assessment of current deficits   [] Functional mobility                         [x] ADLs          [x] Strength                 [] Cognition   [] Functional transfers           [x] IADLs         [] Safety Awareness  [x] Endurance   [x] Fine Motor Coordination    [x] Balance      [] Vision/perception   [] Sensation     [x] Gross Motor Coordination [x] ROM          [] Pain                        [] Edema          [] Scar Adhesion/Skin Integrity      OT PLAN OF CARE   OT POC based on physician orders, patient diagnosis and results of clinical assessment.      Frequency/Duration: 1x/wk for 36-76 visits  /  Certification Period From: 22 to 2022     Specific OT Treatment to include:   [x] Instruction in HEP                   Modalities:  [x] Therapeutic Exercise                 [x] Ultrasound               [x] Electrical Stimulation/Attended  [x] PROM/Stretching                    [x] Fluidotherapy          [x]  Paraffin                   [x] AAROM  [x] AROM                 [] Iontophoresis:   [x] Tendon Glides                                               [x] Neuromuscular Re-Ed             [x] ADL/IADL re-training    [x] Therapeutic Activity further incorporating L UE into functional tasks @ home. Pt agreeable. Assessment/Comments: Pt is making good progress toward stated plan of care. Noted more frequent L lateral compensatory lean this date requiring cues and physical assist to correct. Educated pt to continue HEP and incorporating L UE into functional tasks. Encouraged pt to increase functional use in functional ADL tasks at home. Continue with OT POC. Time In: 1100       Time Out: 1200               Timed Code Treatment Minutes: 60 Minutes     CODE   Minutes  Units   19177 OT Eval Low       47403 OT Eval Medium     10095 OT Eval High       10402 Fluidotherapy       79097 Manual       45670 Therapeutic Ex       01553 Therapeutic Activity       71856 ADL/COMP Tech Train       45268 Neuromuscular Re-Ed 60 4   74001 OrthoManagementTraining       45768 Paraffin       48843 Electrical Stim - Attended       13905 Iontophoresis       22556 Ultrasound         Other          Total  60 4       -Response to Treatment: Good     Plan:   X  Continues Plan of care: Will continue to focus on decreasing spasticity, improving function of her LUE, improving GMC and strength to increase ADL and IADL function with robotic arm intervention.    []  [address] of care:   []  Discharge:    Adela Howard GLOVER/L 22927    I have read & agree with the above status  Wojciech Agustin OTR/L 24124

## 2022-08-29 ENCOUNTER — NURSE ONLY (OUTPATIENT)
Dept: NON INVASIVE DIAGNOSTICS | Age: 47
End: 2022-08-29

## 2022-08-29 NOTE — PROGRESS NOTES
ILR incision is free of redness, drainage, and swelling. Discussed remote monitoring. She will make sure she opens the alfred at least once a day. She states she cannot leave the alfred open all the time as it drains her cell phone battery. Since patient lives in [facility] she prefers to follow with EP provider in [place].      Tahir Bazan RN, BSN  [facility]

## 2022-08-31 ENCOUNTER — HOSPITAL ENCOUNTER (OUTPATIENT)
Dept: OCCUPATIONAL THERAPY | Age: 47
Setting detail: THERAPIES SERIES
Discharge: HOME | End: 2022-08-31
Payer: MEDICAID

## 2022-08-31 PROCEDURE — 97112 NEUROMUSCULAR REEDUCATION: CPT

## 2022-08-31 NOTE — PROGRESS NOTES
OCCUPATIONAL THERAPY PROGRESS NOTE  [facility]  [address]  Ivis Wallace              Phone: [phone]             Fax: [phone]   Date: 22   Patient Name: Suzette Dyer  MRN: [id number]     : 1975    EvaluatingTherapist: Allison Edgar OTR/L 43701     Restrictions/Precautions:  Spasticity, low fall rsik  Diagnosis:  h/o ischemic right MCA stroke (Z86.73), Left spastic hemiplegia (HCC)(G81.14, impaired mobility & ADLs (Z74.09 Z78.9)  Date of Injury:      Insurance/Certification information: [address] [place] [address] signed (Y/N): No  Visit# / total visits:      Referring Practitioner:  Shamika Gasca MD  Specific Practitioner Orders: Eval and Treat, In-motion Arm     Assessment of current deficits   [] Functional mobility                         [x] ADLs          [x] Strength                 [] Cognition   [] Functional transfers           [x] IADLs         [] Safety Awareness  [x] Endurance   [x] Fine Motor Coordination    [x] Balance      [] Vision/perception   [] Sensation     [x] Gross Motor Coordination [x] ROM          [] Pain                        [] Edema          [] Scar Adhesion/Skin Integrity      OT PLAN OF CARE   OT POC based on physician orders, patient diagnosis and results of clinical assessment.      Frequency/Duration: 1x/wk for 03-11 visits  /  Certification Period From: 22 to 2022     Specific OT Treatment to include:   [x] Instruction in HEP                   Modalities:  [x] Therapeutic Exercise                 [x] Ultrasound               [x] Electrical Stimulation/Attended  [x] PROM/Stretching                    [x] Fluidotherapy          [x]  Paraffin                   [x] AAROM  [x] AROM                 [] Iontophoresis:   [x] Tendon Glides                                               [x] Neuromuscular Re-Ed             [x] ADL/IADL re-training    [x] Therapeutic Activity [] Pain Management with/without modalities PRN                 [x] Manual Therapy                      [x] Splinting                                   [] Scar Management                   []Joint Protection/Training  []Ergonomics                             [] Joint Mobilization                      [x] Adaptive Equipment Assessment/Training                             [x] Manual Edema Mobilization   [x] Myofascial Release                 [x] Energy Conservation/Work Simplification  [x] GM/FM Coordination                [x] Safety retraining/education per  individual diagnosis/goals  [] Desensitization        Patient Specific Goal: \"Completely dress myself. Be able to turn light on & off & be able to hold a tooth brush with my left hand. \"                          GOALS (Long term same as Short term):  1.) Patient will demonstrate good understanding of home program(exercises/activities/diagnosis/prognosis/goals) with good accuracy. 2)Patient will demonstrate improved smoothness of 0.319 on InMotion Arm (Norm is 0.533)  which will improve patient ability to complete ADLs. 3)Patient will demonstrate improved movement duration 7.5 sec (Norm is 1.4 seconds) which a lower number that is required for cooking & home management tasks  4)Patient will demonstrate improved AIM 5.13 cm(Norm is 0.55cm) which a lower number that is required for improved ADLs & IADLs  5)Patient will demonstrate improved movement efficiency 22% (Norm is 75%) which a lower number that is required for improved ADLs & IADLs  6) Pt [place] size 0.006 & [place] independence of 0.430 (8cm [place] 0.0201 or >) will improve to a higher number which is required for a functional tasks & ADLs.   7) Pt will demonstrate a lower overall isometric hold distance 10.89cm  (Norm is 2.40cm which will assist a patient open a door & turn lights on & off.  8) Pt will demonstrate a higher overall displacement number 4.97cm (norm is 13.00cm) which will improve a patient ability to perform ADLs & IADLs. TODAY'S TREATMENT      Pain Level: No c/o pain this date. Subjective: Pt reports \"I carried a thermos (empty) in from the car\". OBJECTIVE: InMotion   Updated POC to be completed by 20th. RI   DFT  RP  MJ  DFSL  Protocol Comments      # 1/80 0 4 123 154 12 AA Random 14cm   # 2/160    3 5 289 187 13 AA Random Focus on MJ & RI   # 3/240    2 3 187 170 12 AA Random Focus on RP & MJ   # 4/320    0 3 169 178 11 AA Random Focus on [place] & RP   #5/400 1 5 94 143 11 AA     #6/480 0 4 150 171 11 AA  Focus on [place] & MJ   #7/560 2 4 159 177 12 AA  Focus on [place] & MJ   #8/640 0 5 176 180 13 AA  Focus on [place] & MJ   #9/720 0 3 98 145 10 [address] [place]    #10/800 3 5 [address] Focus on RI & MJ   #11/880 1 5 154 [place] Focus on RP & MJ   #12/960 1 5 67 [facility] Focus on RP & MJ                       Obstacle Training       X Axis  Score: 600   Pt tolerated Inmotioin robotic arm treatment using 14cm [place]. Pt completed 1024 total movements with L UE including pre and post testing using 14 cm [place] on AA, AA Random and [employer] protocols. Pt requires use of ace wrap and shoulder straps for positioning purposes and to decrease compensatory movements. Pt showed improvements throughout treatment as noted above. Pt demo'd improvement from pre to post testing in Movement efficiency from 26% to 34% (27% change) and Smoothness from 0.534 to 0.538 (1% change). All InMotion exercises are performed to increase function of L UE to assist with ADL's and IADL's at home. Pt appreciates the immediate feedback that is provided using the InMotion arm. Pt will benefit from InMotion robotic arm intervention & OT to improve pt functional use of her LUE. Pt re-educated on home exercise program & further incorporating L UE into functional tasks @ home. Pt agreeable. Assessment/Comments: Pt is making good progress toward stated plan of care.  Pt required cues to decrease

## 2022-09-07 ENCOUNTER — HOSPITAL ENCOUNTER (OUTPATIENT)
Dept: OCCUPATIONAL THERAPY | Age: 47
Setting detail: THERAPIES SERIES
Discharge: HOME | End: 2022-09-07
Payer: MEDICAID

## 2022-09-07 PROCEDURE — 97112 NEUROMUSCULAR REEDUCATION: CPT

## 2022-09-07 NOTE — PROGRESS NOTES
OCCUPATIONAL THERAPY PROGRESS NOTE & DISCHARGE SUMMARY  [facility]  [address]  Ivis Wallace              Phone: [phone]             Fax: [phone]   Date: 22   Patient Name: Kody Henson  MRN: [id number]     : 1975    EvaluatingTherapist: Jennifer Art OTR/L [id number]     Restrictions/Precautions:  Spasticity, low fall rsik  Diagnosis:  h/o ischemic right MCA stroke (Z86.73), Left spastic hemiplegia (HCC)(G81.14, impaired mobility & ADLs (Z74.09 Z78.9)  Date of Injury: 1940     Insurance/Certification information: [address] [place] [address] signed (Y/N): No  Visit# / total visits:      Referring Practitioner:  Taya Roberts MD  Specific Practitioner Orders: Eval and Treat, In-motion Arm     Assessment of current deficits   [] Functional mobility                         [x] ADLs          [x] Strength                 [] Cognition   [] Functional transfers           [x] IADLs         [] Safety Awareness  [x] Endurance   [x] Fine Motor Coordination    [x] Balance      [] Vision/perception   [] Sensation     [x] Gross Motor Coordination [x] ROM          [] Pain                        [] Edema          [] Scar Adhesion/Skin Integrity      OT PLAN OF CARE   OT POC based on physician orders, patient diagnosis and results of clinical assessment.      Frequency/Duration: 1x/wk for 75-81 visits  /  Certification Period From: 22 to 2022     Specific OT Treatment to include:   [x] Instruction in HEP                   Modalities:  [x] Therapeutic Exercise                 [x] Ultrasound               [x] Electrical Stimulation/Attended  [x] PROM/Stretching                    [x] Fluidotherapy          [x]  Paraffin                   [x] AAROM  [x] AROM                 [] Iontophoresis:   [x] Tendon Glides                                               [x] Neuromuscular Re-Ed             [x] ADL/IADL re-training    [x] Therapeutic will improve a patient ability to perform ADLs & IADLs. TODAY'S TREATMENT      Pain Level: No c/o pain this date. Subjective: Pt reports \"I do take my own shower & can dress myself\". OBJECTIVE: InMotion   Updated POC to be completed by 20th. RI   DFT  RP  MJ  DFSL  Protocol Comments      # 1/80 0 2 182 194 15 AA Random 14cm   # 2/160    0 2 280 220 15 AA Random Focus on MJ & RP   # 3/240    0 2 204 214 14 AA Random Focus on RP & DFSL   # 4/320    0 2 227 228 12 AA Random Focus on RP & DFSL   #5/400 0 2 129 190 14 AA     #6/480 0 2 130 184 11 AA  Focus on MJ & DFSL   #7/560 0 3 151 178 11 AA  Focus on RIP& MJ   #8/640 0 3 132 171 10 AA  Focus on RP & MJ   #9/720 0 4 91 160 12 [address]    #10/800 0 3 128 1177 [facility] Focus on DFT & MJ   #11/880 0 4 107 [address] Focus on RP & MJ   #12/960 0 3 112 625 [facility] Focus on [facility] Care & MJ                       Obstacle Training       X Axis  Score: 600   Pt tolerated Inmotioin robotic arm re-assessment & treatment using 14cm [place]. Pt completed 1077 total movements with L UE including assessment, pre and post testing using 14 cm [place] on AA, AA Random and Marjan Camas. Pt require verbal cues to decrease compensatory movements. Pt showed improvements throughout treatment as noted above. Pt unfortunately has demonstrated minimal gains from her last assessment on 8/3/2022 to today. Pt demo the following [place] size 51.24 cm (8/3/22 59.02 cm & norm is 100.53 cm), [place] symmetry 0.402 (8/3/22 0.520 & norm is 0.834), smoothness improved from 0.499 from 0.433 & nor is 0.533, target accuracy 8.02 cm(8/3/22 5.68 cm & norm is 0.85 cm), AIM 3.57 cm(8/2/22 2.47 cm & norm is 0.55 cm, movement duration 5.3 sec(8/3/22 5.2 sec & norm is 1.4 sec, movement efficiency 16% (8/3/22 23% & norm os 75%), overall displacement improved 7.79 cm from 4.02 cm & nor is 13.00 cm& overall isometric hold distance 10.41 (8/3/22 11.13 cm & nor is 2.40 cm).   All InMotion exercises are performed to increase function of L UE to assist with ADL's and IADL's at home. Pt appreciates the immediate feedback that is provided using the InMotion arm. Pt & her dad were educated that the patient has maximized her potential using the InMotion robotic arm for neuro re-education to improve the functional use of her LUE. Pt encouraged to continue with her HEP to improve functional use of her LUE as well as PROM AAROM exercises to maintain her ROM & functional use of her LUE. Patient & her dad understand results & stated she will continue to perform her HEP program & to strive towards using her LUE functionally @ home. Pt is being discharged from OT using InMotion robotic arm intervention. Assessment/Comments: Pt is being discharged from OT & has reached max potential for functional use of her LUE using robotic Inmotion arm.       Time In: 1100       Time Out: 1200               Timed Code Treatment Minutes: 60 Minutes     CODE   Minutes  Units   83517 OT Eval Low       81184 OT Eval Medium     32546 OT Eval High       38591 Fluidotherapy       38643 Manual       29231 Therapeutic Ex       24907 Therapeutic Activity       97281 ADL/COMP Tech Train       58811 Neuromuscular Re-Ed 60 4   73472 OrthoManagementTraining       16776 Paraffin       87203 Electrical Stim - Attended       58228 Iontophoresis       47301 Ultrasound         Other          Total  60 4       -Response to Treatment: Good     Plan:       []  Alter Plan of care:   [x]  Discharge:    Dyana Salinas OTR/L [id number]

## 2022-11-07 ENCOUNTER — OFFICE VISIT (OUTPATIENT)
Dept: PHYSICAL MEDICINE AND REHAB | Age: 47
End: 2022-11-07
Payer: MEDICAID

## 2022-11-07 VITALS
HEART RATE: 63 BPM | BODY MASS INDEX: 28.61 KG/M2 | HEIGHT: 66 IN | DIASTOLIC BLOOD PRESSURE: 58 MMHG | WEIGHT: 178 LBS | OXYGEN SATURATION: 100 % | SYSTOLIC BLOOD PRESSURE: 100 MMHG

## 2022-11-07 DIAGNOSIS — G81.14 LEFT SPASTIC HEMIPLEGIA (HCC): ICD-10-CM

## 2022-11-07 DIAGNOSIS — Z86.73 H/O ISCHEMIC RIGHT MCA STROKE: Primary | ICD-10-CM

## 2022-11-07 DIAGNOSIS — Z78.9 IMPAIRED MOBILITY AND ADLS: ICD-10-CM

## 2022-11-07 DIAGNOSIS — Z74.09 IMPAIRED MOBILITY AND ADLS: ICD-10-CM

## 2022-11-07 PROCEDURE — 99214 OFFICE O/P EST MOD 30 MIN: CPT | Performed by: PHYSICAL MEDICINE & REHABILITATION

## 2022-11-07 RX ORDER — BACLOFEN 10 MG/1
15 TABLET ORAL 3 TIMES DAILY
Qty: 135 TABLET | Refills: 2 | Status: SHIPPED | OUTPATIENT
Start: 2022-11-07

## 2022-11-07 NOTE — PROGRESS NOTES
Clarissa Beltran M.D. [address] [place] [address] PHYSICAL MEDICINE AND REHABILITAION  [address] [place] [address]  Dept: [phone]  Dept Fax: [phone]    PCP: No primary care provider on file. Date of visit: 11/7/22    Chief Complaint   Patient presents with    Follow-up     Pt. States Mobility is about the same. Rose Bartlett is a 42 y. o. woman who presents for follow up of rehab needs. Patient sustained a right MCA ischemic infarct 10/12/2021 and was admitted to [facility]. She had worsening of her Neurologic exam on 10/13 and repeat imaging indicated worsening edema, 1 cm of midline shift, effacement of the right lateral ventricle, and increased left lateral ventricle caliber concerning for entrapment. She underwent emergent R hemicraniectomy by Dr. [facility]) on 10/13. After being deemed medically appropriate, she was transferred to [place] [address] on 10/22/2021. She completed the Acute Rehab program from 10/22/2021-12/7/2021 and was discharged home. Patient was admitted back to [facility] and underwent right cranioplasty on 3/30/2022. She returned to Acute Rehab from 4/6/2022-4/22/2022 and was again discharged home. At time of Acute rehab discharge she was at Supervision - SBA level for functional mobility. She was ambulating 400 ft SPC SBA (L toe off AFO). She was at [address] for ADLs. Since last visit patient reports no functional changes. She completed OT for the In Motion Arm. She states she changed insurance plans and needs new orders for outpatient PT, OT. She saw Neurology for follow up regarding seizures. She was switched to Loreta Baseman, but unfortunately had seizures on this medication. She is now back on Lamictal and denies further seizures. Her left AFO is fitting well and helps with gait. No falls. She is taking the baclofen 10mg TID with improvement in spasticity.  She is still having some lower extremity spasticity contributing to pain in the left leg when ambulating and at night. The left shoulder is subluxed, she uses a sling for support only when ambulating longer distances. She takes gabapentin 300mg QHS for neuropathic pain. No other changes. Functional status:  Bed mobility: Mod I  Transfers: Supervision   Ambulation: 400 ft right SPC SBA (L AFO).    Feeding: Setup  Grooming: Distant Supervision   UB dressing: Min A  LB dressing: SBA  Bathing: Min A        Past Medical History:   Diagnosis Date    Cerebral artery occlusion with cerebral infarction ([address]     Cerebral infarction, unspecified ([address]     Hx of blood clots        Past Surgical History:   Procedure Laterality Date     SECTION      CHOLECYSTECTOMY      INSERTABLE CARDIAC MONITOR  2022    [employer] Reveal LINQ Insertion ([place])    TYMPANOPLASTY      Ear drum reconstruction left ear        Social History     Socioeconomic History    Marital status: Single     Spouse name: Not on file    Number of children: 1    Years of education: Not on file    Highest education level: 12th grade   Occupational History    Not on file   Tobacco Use    Smoking status: Former     Packs/day: 1.00     Years: 20.00     Pack years: 20.00     Types: Cigarettes     Start date: 10/1/2020     Quit date: 10/12/2021     Years since quittin.0    Smokeless tobacco: Never   Vaping Use    Vaping Use: Never used   Substance and Sexual Activity    Alcohol use: Not Currently    Drug use: Never    Sexual activity: Not Currently     Partners: Male     Birth control/protection: Pill   Other Topics Concern    Not on file   Social History Narrative    Not on file     Social Determinants of Health     Financial Resource Strain: Not on file   Food Insecurity: Not on file   Transportation Needs: Not on file   Physical Activity: Not on file   Stress: Not on file   Social Connections: Not on file   Intimate Partner Violence: Not on file   Housing Stability: Not on file       Family history: no pertinent history reported. Allergies   Allergen Reactions    Vicodin [Hydrocodone-Acetaminophen]      Gets severe hypotension    Other      Weight gain    Acetaminophen     Hydrocodone     Propoxyphene        Current Outpatient Medications   Medication Sig Dispense Refill    diclofenac sodium (VOLTAREN) 1 % GEL Apply topically 4 times daily as needed for Pain      Cholecalciferol (VITAMIN D) 50 MCG (2000 UT) CAPS capsule Take by mouth daily      lamoTRIgine (LAMICTAL) 25 MG tablet Take 3 tablets by mouth in the morning and 3 tablets before bedtime. Do all this for 14 days. 84 tablet 0    lamoTRIgine (LAMICTAL) 100 MG tablet Take 1 tablet by mouth in the morning and 1 tablet before bedtime. (Patient not taking: Reported on 8/11/2022) 60 tablet 2    baclofen (LIORESAL) 10 MG tablet 10 mg 3 times daily       FLUoxetine (PROZAC) 20 MG capsule take 1 capsule by mouth once daily      ondansetron (ZOFRAN) 4 MG tablet take 1 tablet by mouth three times a day if needed for nausea      RA MELATONIN 10 MG TABS take 1 TO 2 tablets by mouth at bedtime if needed      aspirin 81 MG chewable tablet Take 1 tablet by mouth daily 30 tablet 0    gabapentin (NEURONTIN) 300 MG capsule Take 1 capsule by mouth nightly for 30 days.  30 capsule 0    atorvastatin (LIPITOR) 40 MG tablet Take 1 tablet by mouth daily 30 tablet 0    metoprolol tartrate (LOPRESSOR) 25 MG tablet Take 0.5 tablets by mouth 2 times daily 60 tablet 0    nicotine (NICODERM CQ) 7 MG/24HR Place 1 patch onto the skin daily 14 patch 0    Handicap Placard MISC by Does not apply route Reason for disability:  H/O ischemic right MCA stroke  (primary encounter diagnosis)  Left spastic hemiplegia (HCC)  Impaired mobility and ADLs    Duration: 5 years 1 each 0    acetaminophen (TYLENOL) 500 MG tablet Take 1,000 mg by mouth every 4 hours as needed for Pain Indications: 2 tablets 4 times per day        No current facility-administered round.  Extraocular muscles are intact. Shoulder shrug symmetric. Strength:   R  L  Deltoid   5  0  Biceps   5  0  Triceps  5  0  Wrist Ext  5  0  Finger Abd  5  0  Hip Flex  5  2  Knee Ext  5  0  Ankle dorsi  5  0  EHL   5 0  Ankle Plantar  5  0    LUE spasticity MAS 2 -- MAS 3 for finger flexors   LLE spasticity MAS 2    Sensory:  Intact for light touch in all upper and lower extremity dermatomes. No pathological reflexes     Gait: spastic hemiparetic gait, ambulates with L AFO, no LOB      Impression:   -Right MCA CVA, complicated by cerebral edema requiring right hemicraniectomy, and subsequent cranioplasty  -Left spastic hemiplegia   -Impaired mobility and ADLs        Plan:   Recommend outpatient PT, OT. New orders placed. Increase Baclofen to 15mg TID   Continue left AFO brace  Patient is following with Neurology for seizures. Seizures are controlled on Lamictal   The patient was educated about the diagnosis, prognosis, indications, risks and benefits of treatment. An opportunity to ask questions was given to the patient and questions were answered. The patient agreed to proceed with the recommended treatment as described above. Follow up 6 months or sooner if needed         Erika Pablo M.D.   Physical Medicine and Rehabilitation

## 2022-11-08 RX ORDER — LAMOTRIGINE 100 MG/1
100 TABLET ORAL 2 TIMES DAILY
Qty: 60 TABLET | Refills: 2 | Status: SHIPPED | OUTPATIENT
Start: 2022-11-08 | End: 2023-02-06

## 2023-02-23 RX ORDER — LAMOTRIGINE 100 MG/1
100 TABLET ORAL 2 TIMES DAILY
Qty: 60 TABLET | Refills: 11 | Status: SHIPPED | OUTPATIENT
Start: 2023-02-23

## 2023-03-14 ENCOUNTER — TELEPHONE (OUTPATIENT)
Dept: NON INVASIVE DIAGNOSTICS | Age: 48
End: 2023-03-14

## 2023-03-23 DIAGNOSIS — I45.5 SINUS PAUSE: Primary | ICD-10-CM

## 2023-03-23 NOTE — PROGRESS NOTES
Outside referral requested due to location of patient's residence.     Electronically signed by Nancy Parrish MA on 3/23/2023 at 11:13 AM

## 2023-05-12 ENCOUNTER — TELEPHONE (OUTPATIENT)
Dept: CARDIOLOGY | Age: 48
End: 2023-05-12

## 2023-05-12 NOTE — TELEPHONE ENCOUNTER
Urgent phone call made to me by \"Bamj\"-- monitoring of the patient's loop recorder placed in August 2022. She was seen by Dr. Darlene Buck and a loop recorder insertion was requested for cryptogenic stroke. Her device has been followed remotely but the patient has not been seen by any cardiologist or electrophysiologist since implant. On review of her monitor strips received this week, it appears that the patient has had 2 sinus pauses at night lasting 5 to 7 seconds. The patient was completely unaware of these episodes or did not have any symptoms of syncope or near syncope    I made a telephone call and spoke to the patient and the following was recommended    Sleep study as soon as possible. The patient was already scheduled but she canceled the appointment and therefore will arrange to reschedule it. I offered to schedule it with [facility]) system but the patient wishes to proceed with her pulmonologist in [address] [place] [address] the p.m. dose of metoprolol. She is on metoprolol tartrate 12.5 mg twice a day  She was also asked to follow-up with cardiology, Dr. Festus Washington or electrophysiology. I did give her my office number today. I shall also arrange for my office to follow-up with her to ensure that she has made all of the above arrangements including a sleep study and an appointment with cardiology or electrophysiology.

## 2023-05-15 ENCOUNTER — TELEPHONE (OUTPATIENT)
Dept: CARDIOLOGY CLINIC | Age: 48
End: 2023-05-15

## 2023-05-15 NOTE — TELEPHONE ENCOUNTER
----- Message from Brenda Zarate sent at 5/15/2023  8:15 AM EDT -----    ----- Message -----  From: Rom Argueta MD  Sent: 5/12/2023   5:27 PM EDT  To: Brenda Zarate    I was called by Kaleigh Chapman the [facility] nurse regarding this patient and her monitor showing a 7-second pause at night. I did call the patient and ask her to stop her evening dose of metoprolol. She will also get her sleep study rescheduled. But the patient has had no follow-up with cardiology or electrophysiology since last July. She was seen by Dr. Yolie Hancock and needs to follow-up with him or Dr. Ezio Lin in EP since it appears that he has been receiving notification of all her remote transmissions. I received the latest one since I was on call this week. Nevertheless, I spoke to the patient and gave her instructions as noted above.   Please follow-up with Dr. Perla Ellington, Dr. Ezio Lin offices and arrange for follow-up ASAP please    MR

## 2023-05-15 NOTE — TELEPHONE ENCOUNTER
Note was forwarded to Maryana Altman to schedule patient at [address] [place] [address] and to [facility] to schedule with .

## 2023-05-16 ENCOUNTER — TELEPHONE (OUTPATIENT)
Dept: NON INVASIVE DIAGNOSTICS | Age: 48
End: 2023-05-16

## 2023-05-19 ENCOUNTER — TELEPHONE (OUTPATIENT)
Dept: NON INVASIVE DIAGNOSTICS | Age: 48
End: 2023-05-19

## 2023-05-24 NOTE — TELEPHONE ENCOUNTER
Spoke to the patient and told her that you felt that this was possibly sleep apnea, because the pauses are happening when she is sleeping. The patient advised me that she was not sleeping. She was awake in the chair in the living room. She became lightheaded, sweating and felt like she was going to throw up during this episode. The patient is scheduled to see you on 06/01/2023 @ 10:30 AM to discuss.

## 2023-06-01 ENCOUNTER — OFFICE VISIT (OUTPATIENT)
Dept: NON INVASIVE DIAGNOSTICS | Age: 48
End: 2023-06-01

## 2023-06-01 VITALS
DIASTOLIC BLOOD PRESSURE: 60 MMHG | BODY MASS INDEX: 33.91 KG/M2 | HEART RATE: 78 BPM | HEIGHT: 66 IN | WEIGHT: 211 LBS | SYSTOLIC BLOOD PRESSURE: 98 MMHG | RESPIRATION RATE: 18 BRPM

## 2023-06-01 DIAGNOSIS — I63.9 CEREBROVASCULAR ACCIDENT (CVA), UNSPECIFIED MECHANISM (HCC): Primary | ICD-10-CM

## 2023-06-01 DIAGNOSIS — Z95.818 STATUS POST PLACEMENT OF IMPLANTABLE LOOP RECORDER: ICD-10-CM

## 2023-06-01 RX ORDER — GABAPENTIN 100 MG/1
200 CAPSULE ORAL 2 TIMES DAILY
COMMUNITY

## 2023-06-01 RX ORDER — MULTIVIT WITH MINERALS/HERBS
1 TABLET ORAL DAILY
COMMUNITY
Start: 2023-05-04

## 2023-06-01 RX ORDER — DULOXETIN HYDROCHLORIDE 60 MG/1
60 CAPSULE, DELAYED RELEASE ORAL DAILY
COMMUNITY
Start: 2023-05-18

## 2023-06-01 RX ORDER — ERGOCALCIFEROL 1.25 MG/1
50000 CAPSULE ORAL WEEKLY
COMMUNITY
Start: 2023-05-19

## 2023-06-01 RX ORDER — BUSPIRONE HYDROCHLORIDE 10 MG/1
10 TABLET ORAL 2 TIMES DAILY
COMMUNITY
Start: 2023-05-14

## 2023-06-01 NOTE — PROGRESS NOTES
[address] [facility]  CARDIAC ELECTROPHYSIOLOGY DEPARTMENT/DIVISION OF CARDIOLOGY  Outpatient consultation Report  PATIENT: Mirna Chapin  MEDICAL RECORD NUMBER: [id number]  DATE OF SERVICE:  6/1/2023  ATTENDING ELECTROPHYSIOLOGIST:  Ba Vargas DO   REFERRING PHYSICIAN: No ref. provider found and No primary care provider on file. CHIEF COMPLAINT: Cryptogenic stroke, suspected MARY    HPI: Mirna Chapin  is a 41 y.o. female with a history of cryptogenic CVA sp MDT ILR (DOI: 8/11/2022-Dr. Thai Rick), DVT, GERD, and anxiety/depression. She is managed by Dr. Jennifer Quick with aspirin 81 mg daily, atorvastatin 40 mg daily, and metoprolol 12.5 mg twice daily. In 10/2021, patient had right MCA stroke and presented beyond the thrombolytic window. This was complicated by edema, which required right decompressive hemicraniotomy followed by right autologous cranioplasty 5 months later. Since that time, she continues to struggle with left sided weakness. In 8/2022, she was evaluated by Dr. Thai Rikc, who implanted a [employer] ILR for cryptogenic stroke. In 2/2023, she was noted to have episode of vagally mediated sinus pauses (progressive P-P slowing followed by sinus pause). She was contacted and was asymptomatic as she was reportedly taking a nap at that time. She was referred to sleep medicine for sleep study/further evaluation. In 5/2023, patient was noted to have additional episodes of vagally mediated sinus pauses during typical sleep hours, but patient believes she may have had GI upset at that time due to drinking coffee on an empty stomach. She reportedly drinks 12 cups of coffee per day. She presents today, 6/1/2023; to transfer EP care to my office. She denies any other complaints this time. Prior cardiac testing:  - 30-day event monitor (10/22/2021 at [facility]): Sinus at  bpm (mean: 80 bpm), VE burden <1%. - MONALISA (10/18/2021 at [facility]): LVEF = 60-65%, no intracardiac thrombus.   - TTE

## 2023-06-01 NOTE — PATIENT INSTRUCTIONS
STOP metoprolol. REDUCE coffee intake from 12 cups/day to 1-2 cups/day. INCREASE water intake to 60 fluid ounces daily. Continue remote monitoring cardiac monitor every 31 days. Schedule appointment with Sleep Medicine. Follow-up with Dr Marina Meeks office in 3 months.

## 2023-08-07 NOTE — TELEPHONE ENCOUNTER
Brianna, I looked at [facility] schedule here and it is way overbooked for next week and his July schedule doesn't look much better I can squeeze here in the July schedule but that's  earliest I can do. He is here next week then he is not here again till 7/24 week. Do you have anything before that in [place] ? Partial Purse String (Simple) Text: Given the location of the defect and the characteristics of the surrounding skin a simple purse string closure was deemed most appropriate.  Undermining was performed circumfirentially around the surgical defect.  A purse string suture was then placed and tightened. Wound tension only allowed a partial closure of the circular defect.

## 2023-09-26 ENCOUNTER — OFFICE VISIT (OUTPATIENT)
Dept: PHYSICAL MEDICINE AND REHAB | Age: 48
End: 2023-09-26
Payer: COMMERCIAL

## 2023-09-26 VITALS
HEART RATE: 80 BPM | BODY MASS INDEX: 31.82 KG/M2 | WEIGHT: 198 LBS | SYSTOLIC BLOOD PRESSURE: 92 MMHG | HEIGHT: 66 IN | DIASTOLIC BLOOD PRESSURE: 60 MMHG | OXYGEN SATURATION: 98 %

## 2023-09-26 DIAGNOSIS — Z74.09 IMPAIRED MOBILITY AND ADLS: ICD-10-CM

## 2023-09-26 DIAGNOSIS — M62.838 MUSCLE SPASTICITY: ICD-10-CM

## 2023-09-26 DIAGNOSIS — G81.14 LEFT SPASTIC HEMIPLEGIA (HCC): ICD-10-CM

## 2023-09-26 DIAGNOSIS — Z78.9 IMPAIRED MOBILITY AND ADLS: ICD-10-CM

## 2023-09-26 DIAGNOSIS — Z86.73 H/O ISCHEMIC RIGHT MCA STROKE: Primary | ICD-10-CM

## 2023-09-26 PROCEDURE — G8427 DOCREV CUR MEDS BY ELIG CLIN: HCPCS | Performed by: PHYSICAL MEDICINE & REHABILITATION

## 2023-09-26 PROCEDURE — G8417 CALC BMI ABV UP PARAM F/U: HCPCS | Performed by: PHYSICAL MEDICINE & REHABILITATION

## 2023-09-26 PROCEDURE — 99215 OFFICE O/P EST HI 40 MIN: CPT | Performed by: PHYSICAL MEDICINE & REHABILITATION

## 2023-09-26 PROCEDURE — 4004F PT TOBACCO SCREEN RCVD TLK: CPT | Performed by: PHYSICAL MEDICINE & REHABILITATION

## 2023-09-26 RX ORDER — BACLOFEN 20 MG/1
20 TABLET ORAL 3 TIMES DAILY
Qty: 90 TABLET | Refills: 2 | Status: SHIPPED | OUTPATIENT
Start: 2023-09-26

## 2023-09-26 NOTE — PROGRESS NOTES
Eva Burgess M.D. [address] [place] [address] PHYSICAL MEDICINE AND REHABILITAION  [facility] [address]  [address] [address]  Dept: [phone]  Dept Fax: [phone]: [phone]    PCP: No primary care provider on file. Date of visit: 9/26/23    Chief Complaint   Patient presents with    Follow-up     Patient is here for follow up. Currently PT, no OT. Patient would like to discuss Botox due to spacticity. Sinan Abdi is a 41 y.o. woman who presents for follow up of rehab needs. Patient sustained a right MCA ischemic infarct 10/12/2021 and was admitted to [facility]. She had worsening of her Neurologic exam on 10/13 and repeat imaging indicated worsening edema, 1 cm of midline shift, effacement of the right lateral ventricle, and increased left lateral ventricle caliber concerning for entrapment. She underwent emergent R hemicraniectomy by Dr. John Castro [facility]) on 10/13. After being deemed medically appropriate, she was transferred to [address] [place] [address] on 10/22/2021. She completed the Acute Rehab program from 10/22/2021-12/7/2021 and was discharged home. Patient was admitted back to [facility] and underwent right cranioplasty on 3/30/2022. She returned to Acute Rehab from 4/6/2022-4/22/2022 and was again discharged home. At time of Acute rehab discharge she was at [facility] level for functional mobility. She was ambulating 400 ft SPC SBA (L toe off AFO). She was at [address] for ADLs. Since last visit patient reports no functional changes. She previously completed OT for the In Motion Arm. She is currently in PT. She follows with Neurology regarding seizures and reports that these have been controlled. Her left AFO is fitting well and helps with gait. No falls. She is taking the baclofen 15mg TID with some improvement in spasticity.  She is still having spasticity interfering with function in the left upper

## 2023-09-30 PROCEDURE — G2066 INTER DEVC REMOTE 30D: HCPCS | Performed by: STUDENT IN AN ORGANIZED HEALTH CARE EDUCATION/TRAINING PROGRAM

## 2023-09-30 PROCEDURE — 93298 REM INTERROG DEV EVAL SCRMS: CPT | Performed by: STUDENT IN AN ORGANIZED HEALTH CARE EDUCATION/TRAINING PROGRAM

## 2023-10-31 PROCEDURE — 93298 REM INTERROG DEV EVAL SCRMS: CPT | Performed by: STUDENT IN AN ORGANIZED HEALTH CARE EDUCATION/TRAINING PROGRAM

## 2023-10-31 PROCEDURE — G2066 INTER DEVC REMOTE 30D: HCPCS | Performed by: STUDENT IN AN ORGANIZED HEALTH CARE EDUCATION/TRAINING PROGRAM

## 2023-11-29 ENCOUNTER — HOSPITAL ENCOUNTER (OUTPATIENT)
Age: 48
Discharge: HOME | End: 2023-12-01

## 2023-11-29 PROCEDURE — 88302 TISSUE EXAM BY PATHOLOGIST: CPT

## 2023-12-01 LAB — SURGICAL PATHOLOGY REPORT: NORMAL

## 2023-12-01 PROCEDURE — G2066 INTER DEVC REMOTE 30D: HCPCS | Performed by: STUDENT IN AN ORGANIZED HEALTH CARE EDUCATION/TRAINING PROGRAM

## 2023-12-01 PROCEDURE — 93298 REM INTERROG DEV EVAL SCRMS: CPT | Performed by: STUDENT IN AN ORGANIZED HEALTH CARE EDUCATION/TRAINING PROGRAM

## 2024-01-01 PROCEDURE — 93298 REM INTERROG DEV EVAL SCRMS: CPT | Performed by: STUDENT IN AN ORGANIZED HEALTH CARE EDUCATION/TRAINING PROGRAM

## 2024-01-09 RX ORDER — BACLOFEN 20 MG/1
20 TABLET ORAL 3 TIMES DAILY
Qty: 90 TABLET | Refills: 2 | Status: SHIPPED | OUTPATIENT
Start: 2024-01-09

## 2024-01-30 ENCOUNTER — PROCEDURE VISIT (OUTPATIENT)
Dept: PHYSICAL MEDICINE AND REHAB | Age: 49
End: 2024-01-30
Payer: MEDICAID

## 2024-01-30 VITALS
BODY MASS INDEX: 33.75 KG/M2 | HEIGHT: 66 IN | OXYGEN SATURATION: 99 % | HEART RATE: 71 BPM | TEMPERATURE: 98.6 F | SYSTOLIC BLOOD PRESSURE: 124 MMHG | DIASTOLIC BLOOD PRESSURE: 87 MMHG | WEIGHT: 210 LBS

## 2024-01-30 DIAGNOSIS — Z78.9 IMPAIRED MOBILITY AND ADLS: ICD-10-CM

## 2024-01-30 DIAGNOSIS — G81.14 LEFT SPASTIC HEMIPLEGIA (HCC): ICD-10-CM

## 2024-01-30 DIAGNOSIS — M62.838 MUSCLE SPASTICITY: ICD-10-CM

## 2024-01-30 DIAGNOSIS — Z74.09 IMPAIRED MOBILITY AND ADLS: ICD-10-CM

## 2024-01-30 DIAGNOSIS — Z86.73 H/O ISCHEMIC RIGHT MCA STROKE: Primary | ICD-10-CM

## 2024-01-30 PROCEDURE — 95874 GUIDE NERV DESTR NEEDLE EMG: CPT | Performed by: PHYSICAL MEDICINE & REHABILITATION

## 2024-01-30 PROCEDURE — G8417 CALC BMI ABV UP PARAM F/U: HCPCS | Performed by: PHYSICAL MEDICINE & REHABILITATION

## 2024-01-30 PROCEDURE — 64644 CHEMODENERV 1 EXTREM 5/> MUS: CPT | Performed by: PHYSICAL MEDICINE & REHABILITATION

## 2024-01-30 PROCEDURE — G8427 DOCREV CUR MEDS BY ELIG CLIN: HCPCS | Performed by: PHYSICAL MEDICINE & REHABILITATION

## 2024-01-30 PROCEDURE — G8484 FLU IMMUNIZE NO ADMIN: HCPCS | Performed by: PHYSICAL MEDICINE & REHABILITATION

## 2024-01-30 PROCEDURE — 99214 OFFICE O/P EST MOD 30 MIN: CPT | Performed by: PHYSICAL MEDICINE & REHABILITATION

## 2024-01-30 PROCEDURE — 4004F PT TOBACCO SCREEN RCVD TLK: CPT | Performed by: PHYSICAL MEDICINE & REHABILITATION

## 2024-01-30 NOTE — PROGRESS NOTES
is subluxed. AROM otherwise intact on the right side.   Neurologic: Awake, alert and oriented in three planes.  Speech is fluent.  Mild L facial droop.  Tongue is midline.  Hearing is intact for conversation.  Pupils are equal and round.  Extraocular muscles are intact.  Shoulder shrug symmetric.     Strength:   R  L  Deltoid   5  2  Biceps   5  1  Triceps  5  1  Wrist Ext  5  0     5 2  Finger Abd  5  1  Hip Flex  5  4  Knee Ext  5  4  Ankle dorsi  5  1  EHL   5 0  Ankle Plantar  5  0    LUE spasticity: Pectoralis MAS 1+; Triceps MAS 3; WF MAS 3; FF MAS 3  No significant spasticity appreciated in biceps or FPL; minimal spasticity pronators (MAS 1)  LLE spasticity MAS 1    Sensory:  LT sensation decreased left face, LUE, LLE -- left hemisensory impairment.     No pathological reflexes     Gait: spastic hemiparetic gait, ambulates with L AFO, no LOB        Botox Injection Procedure    1/30/2024     Informed Consent:  The indications, risks, benefits, and  alternatives of onabotulinum toxin A were discussed with the patient. I explained to the patient the potential side effects including but not limited to: distant spread of toxin effect causing droopy eyelids, facial drooping, neck pain, headache, double vision, muscle pain/spasm/weakness/stiffness,  bronchitis,  injection site pain, increased blood pressure, hypersensitivity, anaphylaxis, difficulty swallowing, difficulty speaking, urinary incontinence and breathing difficulty.  The patient is aware that swallowing and breathing difficulties can be life threatening and there have been reports of death in some cases where onabotulinum toxin was injected.   The patient was advised of the expected benefit to include decreased spasticity in the injected muscles, and the anticipated duration of effect of 3 months. A permit was signed and scanned into the media.    Last injection: N/A  Taking anticoagulants/antiplatelets: Yes, Aspirin 81mg daily  Diabetic:

## 2024-01-31 PROCEDURE — 96372 THER/PROPH/DIAG INJ SC/IM: CPT | Performed by: PHYSICAL MEDICINE & REHABILITATION

## 2024-02-01 PROCEDURE — 93298 REM INTERROG DEV EVAL SCRMS: CPT | Performed by: STUDENT IN AN ORGANIZED HEALTH CARE EDUCATION/TRAINING PROGRAM

## 2024-02-07 ENCOUNTER — TELEPHONE (OUTPATIENT)
Dept: NON INVASIVE DIAGNOSTICS | Age: 49
End: 2024-02-07

## 2024-02-07 NOTE — TELEPHONE ENCOUNTER
Called patient to schedule overdue f/u.  Patient will give a call back after her appt that she is on her way to.  Patient given extension to call back.

## 2024-03-05 ENCOUNTER — OFFICE VISIT (OUTPATIENT)
Dept: PHYSICAL MEDICINE AND REHAB | Age: 49
End: 2024-03-05
Payer: MEDICAID

## 2024-03-05 VITALS
SYSTOLIC BLOOD PRESSURE: 112 MMHG | HEIGHT: 66 IN | DIASTOLIC BLOOD PRESSURE: 72 MMHG | BODY MASS INDEX: 33.75 KG/M2 | WEIGHT: 210 LBS | HEART RATE: 70 BPM | OXYGEN SATURATION: 100 % | TEMPERATURE: 98.1 F

## 2024-03-05 DIAGNOSIS — Z74.09 IMPAIRED MOBILITY AND ADLS: ICD-10-CM

## 2024-03-05 DIAGNOSIS — Z78.9 IMPAIRED MOBILITY AND ADLS: ICD-10-CM

## 2024-03-05 DIAGNOSIS — Z86.73 H/O ISCHEMIC RIGHT MCA STROKE: Primary | ICD-10-CM

## 2024-03-05 DIAGNOSIS — G81.14 LEFT SPASTIC HEMIPLEGIA (HCC): ICD-10-CM

## 2024-03-05 DIAGNOSIS — M62.838 MUSCLE SPASTICITY: ICD-10-CM

## 2024-03-05 PROCEDURE — G8427 DOCREV CUR MEDS BY ELIG CLIN: HCPCS | Performed by: PHYSICAL MEDICINE & REHABILITATION

## 2024-03-05 PROCEDURE — G8417 CALC BMI ABV UP PARAM F/U: HCPCS | Performed by: PHYSICAL MEDICINE & REHABILITATION

## 2024-03-05 PROCEDURE — G8484 FLU IMMUNIZE NO ADMIN: HCPCS | Performed by: PHYSICAL MEDICINE & REHABILITATION

## 2024-03-05 PROCEDURE — 4004F PT TOBACCO SCREEN RCVD TLK: CPT | Performed by: PHYSICAL MEDICINE & REHABILITATION

## 2024-03-05 PROCEDURE — 99214 OFFICE O/P EST MOD 30 MIN: CPT | Performed by: PHYSICAL MEDICINE & REHABILITATION

## 2024-03-05 NOTE — PROGRESS NOTES
for Botox 220 units as follow: Left triceps 60u; FCR 40u; FCU 40u; FDS 40u; FDP 40u.   Patient is following with Neurology for seizures. Seizures are controlled on Lamictal   The patient was educated about the diagnosis, prognosis, indications, risks and benefits of treatment. An opportunity to ask questions was given to the patient and questions were answered.  The patient agreed to proceed with the recommended treatment as described above.   Follow up for Botox as scheduled        Mary Walter M.D.  Physical Medicine and Rehabilitation

## 2024-03-07 ENCOUNTER — HOSPITAL ENCOUNTER (EMERGENCY)
Age: 49
Discharge: HOME | End: 2024-03-07
Attending: STUDENT IN AN ORGANIZED HEALTH CARE EDUCATION/TRAINING PROGRAM
Payer: MEDICAID

## 2024-03-07 ENCOUNTER — APPOINTMENT (OUTPATIENT)
Dept: GENERAL RADIOLOGY | Age: 49
End: 2024-03-07
Payer: MEDICAID

## 2024-03-07 VITALS
RESPIRATION RATE: 18 BRPM | SYSTOLIC BLOOD PRESSURE: 113 MMHG | BODY MASS INDEX: 35.04 KG/M2 | HEART RATE: 82 BPM | TEMPERATURE: 98.1 F | DIASTOLIC BLOOD PRESSURE: 67 MMHG | OXYGEN SATURATION: 96 % | WEIGHT: 217 LBS

## 2024-03-07 DIAGNOSIS — R11.2 NAUSEA AND VOMITING, UNSPECIFIED VOMITING TYPE: Primary | ICD-10-CM

## 2024-03-07 DIAGNOSIS — N39.0 URINARY TRACT INFECTION WITHOUT HEMATURIA, SITE UNSPECIFIED: ICD-10-CM

## 2024-03-07 LAB
ALBUMIN SERPL-MCNC: 4.4 G/DL (ref 3.5–5.2)
ALP SERPL-CCNC: 76 U/L (ref 35–104)
ALT SERPL-CCNC: 12 U/L (ref 0–32)
ANION GAP SERPL CALCULATED.3IONS-SCNC: 11 MMOL/L (ref 7–16)
AST SERPL-CCNC: 17 U/L (ref 0–31)
BACTERIA URNS QL MICRO: ABNORMAL
BASOPHILS # BLD: 0.11 K/UL (ref 0–0.2)
BASOPHILS NFR BLD: 1 % (ref 0–2)
BILIRUB DIRECT SERPL-MCNC: <0.2 MG/DL (ref 0–0.3)
BILIRUB INDIRECT SERPL-MCNC: NORMAL MG/DL (ref 0–1)
BILIRUB UR QL STRIP: ABNORMAL
BUN SERPL-MCNC: 7 MG/DL (ref 6–20)
CALCIUM SERPL-MCNC: 9.8 MG/DL (ref 8.6–10.2)
CLARITY UR: CLEAR
COLOR UR: YELLOW
CREAT SERPL-MCNC: 0.7 MG/DL (ref 0.5–1)
EOSINOPHIL # BLD: 0.27 K/UL (ref 0.05–0.5)
EOSINOPHILS RELATIVE PERCENT: 2 % (ref 0–6)
EPI CELLS #/AREA URNS HPF: ABNORMAL /HPF
ERYTHROCYTE [DISTWIDTH] IN BLOOD BY AUTOMATED COUNT: 12.5 % (ref 11.5–15)
FLUBV RNA RESP QL NAA+PROBE: NOT DETECTED
GFR SERPL CREATININE-BSD FRML MDRD: >60 ML/MIN/1.73M2
GLUCOSE SERPL-MCNC: 122 MG/DL (ref 74–99)
GLUCOSE UR STRIP-MCNC: NEGATIVE MG/DL
HCG, URINE, POC: NEGATIVE
HCT VFR BLD AUTO: 45 % (ref 34–48)
HGB BLD-MCNC: 15 G/DL (ref 11.5–15.5)
HGB UR QL STRIP.AUTO: NEGATIVE
IMM GRANULOCYTES # BLD AUTO: 0.04 K/UL (ref 0–0.58)
KETONES UR STRIP-MCNC: 15 MG/DL
LACTATE BLDV-SCNC: 1.1 MMOL/L (ref 0.5–2.2)
LEUKOCYTE ESTERASE UR QL STRIP: ABNORMAL
LIPASE SERPL-CCNC: 17 U/L (ref 13–60)
LYMPHOCYTES RELATIVE PERCENT: 16 % (ref 20–42)
MAGNESIUM SERPL-MCNC: 1.7 MG/DL (ref 1.6–2.6)
MCH RBC QN AUTO: 31.4 PG (ref 26–35)
MCHC RBC AUTO-ENTMCNC: 33.3 G/DL (ref 32–34.5)
MCV RBC AUTO: 94.3 FL (ref 80–99.9)
MONOCYTES NFR BLD: 4 % (ref 2–12)
NEGATIVE QC PASS/FAIL: NORMAL
NEUTROPHILS NFR BLD: 77 % (ref 43–80)
NEUTS SEG NFR BLD: 9.16 K/UL (ref 1.8–7.3)
NITRITE UR QL STRIP: NEGATIVE
PH UR STRIP: 5.5 [PH] (ref 5–9)
POSITIVE QC PASS/FAIL: NORMAL
POTASSIUM SERPL-SCNC: 3.9 MMOL/L (ref 3.5–5)
PROT SERPL-MCNC: 7.5 G/DL (ref 6.4–8.3)
PROT UR STRIP-MCNC: ABNORMAL MG/DL
RBC # BLD AUTO: 4.77 M/UL (ref 3.5–5.5)
RBC #/AREA URNS HPF: ABNORMAL /HPF
SARS-COV-2 RNA RESP QL NAA+PROBE: NOT DETECTED
SODIUM SERPL-SCNC: 131 MMOL/L (ref 132–146)
SOURCE: NORMAL
SPECIMEN DESCRIPTION: NORMAL
TROPONIN I SERPL HS-MCNC: <6 NG/L (ref 0–9)
UROBILINOGEN UR STRIP-ACNC: 1 EU/DL (ref 0–1)
WBC #/AREA URNS HPF: ABNORMAL /HPF
WBC OTHER # BLD: 12 K/UL (ref 4.5–11.5)

## 2024-03-07 PROCEDURE — 80053 COMPREHEN METABOLIC PANEL: CPT

## 2024-03-07 PROCEDURE — 2580000003 HC RX 258: Performed by: EMERGENCY MEDICINE

## 2024-03-07 PROCEDURE — 84484 ASSAY OF TROPONIN QUANT: CPT

## 2024-03-07 PROCEDURE — 87086 URINE CULTURE/COLONY COUNT: CPT

## 2024-03-07 PROCEDURE — 87636 SARSCOV2 & INF A&B AMP PRB: CPT

## 2024-03-07 PROCEDURE — 93005 ELECTROCARDIOGRAM TRACING: CPT | Performed by: EMERGENCY MEDICINE

## 2024-03-07 PROCEDURE — 2500000003 HC RX 250 WO HCPCS: Performed by: EMERGENCY MEDICINE

## 2024-03-07 PROCEDURE — 85025 COMPLETE CBC W/AUTO DIFF WBC: CPT

## 2024-03-07 PROCEDURE — 83735 ASSAY OF MAGNESIUM: CPT

## 2024-03-07 PROCEDURE — 83690 ASSAY OF LIPASE: CPT

## 2024-03-07 PROCEDURE — 71045 X-RAY EXAM CHEST 1 VIEW: CPT

## 2024-03-07 PROCEDURE — 99285 EMERGENCY DEPT VISIT HI MDM: CPT

## 2024-03-07 PROCEDURE — 6360000002 HC RX W HCPCS: Performed by: EMERGENCY MEDICINE

## 2024-03-07 PROCEDURE — 83605 ASSAY OF LACTIC ACID: CPT

## 2024-03-07 PROCEDURE — 82248 BILIRUBIN DIRECT: CPT

## 2024-03-07 PROCEDURE — A4216 STERILE WATER/SALINE, 10 ML: HCPCS | Performed by: EMERGENCY MEDICINE

## 2024-03-07 PROCEDURE — 81001 URINALYSIS AUTO W/SCOPE: CPT

## 2024-03-07 PROCEDURE — 96374 THER/PROPH/DIAG INJ IV PUSH: CPT

## 2024-03-07 PROCEDURE — 96375 TX/PRO/DX INJ NEW DRUG ADDON: CPT

## 2024-03-07 RX ORDER — 0.9 % SODIUM CHLORIDE 0.9 %
1000 INTRAVENOUS SOLUTION INTRAVENOUS ONCE
Status: COMPLETED | OUTPATIENT
Start: 2024-03-07 | End: 2024-03-07

## 2024-03-07 RX ORDER — CEFDINIR 300 MG/1
300 CAPSULE ORAL 2 TIMES DAILY
Qty: 10 CAPSULE | Refills: 0 | Status: SHIPPED | OUTPATIENT
Start: 2024-03-07 | End: 2024-03-12

## 2024-03-07 RX ORDER — ONDANSETRON 2 MG/ML
4 INJECTION INTRAMUSCULAR; INTRAVENOUS ONCE
Status: COMPLETED | OUTPATIENT
Start: 2024-03-07 | End: 2024-03-07

## 2024-03-07 RX ORDER — ONDANSETRON 4 MG/1
4 TABLET, ORALLY DISINTEGRATING ORAL 3 TIMES DAILY PRN
Qty: 21 TABLET | Refills: 0 | Status: SHIPPED | OUTPATIENT
Start: 2024-03-07

## 2024-03-07 RX ADMIN — SODIUM CHLORIDE 1000 ML: 9 INJECTION, SOLUTION INTRAVENOUS at 19:19

## 2024-03-07 RX ADMIN — ONDANSETRON 4 MG: 2 INJECTION INTRAMUSCULAR; INTRAVENOUS at 19:18

## 2024-03-07 RX ADMIN — FAMOTIDINE 20 MG: 10 INJECTION, SOLUTION INTRAVENOUS at 19:19

## 2024-03-07 ASSESSMENT — LIFESTYLE VARIABLES
HOW OFTEN DO YOU HAVE A DRINK CONTAINING ALCOHOL: NEVER
HOW MANY STANDARD DRINKS CONTAINING ALCOHOL DO YOU HAVE ON A TYPICAL DAY: PATIENT DOES NOT DRINK

## 2024-03-07 ASSESSMENT — PAIN - FUNCTIONAL ASSESSMENT: PAIN_FUNCTIONAL_ASSESSMENT: NONE - DENIES PAIN

## 2024-03-08 LAB
EKG ATRIAL RATE: 63 BPM
EKG P AXIS: 24 DEGREES
EKG P-R INTERVAL: 150 MS
EKG QRS DURATION: 82 MS
EKG QTC CALCULATION (BAZETT): 421 MS
EKG T AXIS: 24 DEGREES
EKG VENTRICULAR RATE: 63 BPM

## 2024-03-08 PROCEDURE — 93010 ELECTROCARDIOGRAM REPORT: CPT | Performed by: INTERNAL MEDICINE

## 2024-03-08 NOTE — ED PROVIDER NOTES
Start date: 10/1/2020     Last attempt to quit: 10/12/2021     Years since quittin.4    Smokeless tobacco: Never   Vaping Use    Vaping Use: Never used   Substance Use Topics    Alcohol use: Not Currently    Drug use: Never       SCREENINGS                         CIWA Assessment  BP: 113/67  Pulse: 82           PHYSICAL EXAM  1 or more Elements     ED Triage Vitals   BP Temp Temp Source Pulse Respirations SpO2 Height Weight - Scale   24 1821 24 18124 18124 18124 18224 181 -- 24 182   113 98.1 °F (36.7 °C) Temporal 82 18 96 %  98.4 kg (217 lb)       Constitutional/General: Alert and oriented x3  Head: Normocephalic and atraumatic  Eyes: PERRL, EOMI, conjunctiva normal, sclera non icteric  ENT:  Oropharynx clear, handling secretions  Neck: Supple, full ROM, no stridor  Respiratory: Lungs clear to auscultation bilaterally, no wheezes, rales, or rhonchi. Not in respiratory distress  Cardiovascular:  Regular rate. Regular rhythm. 2+ distal pulses. Equal extremity pulses.   Chest: No chest wall tenderness  GI:  Abdomen Soft, mild midepigastric tenderness to palpation, non distended.  No rebound, guarding, or rigidity.   Musculoskeletal: Moves all extremities x 4. Warm and well perfused, no cyanosis, no edema. Capillary refill <3 seconds  Integument: skin warm and dry. No rashes.   Neurologic: GCS 15, chronic deficits, sensation intact, cranial nerves appropriate  Psychiatric: Normal Affect      DIAGNOSTIC RESULTS   LABS:    Labs Reviewed   CBC WITH AUTO DIFFERENTIAL - Abnormal; Notable for the following components:       Result Value    WBC 12.0 (*)     Platelets 460 (*)     Lymphocytes % 16 (*)     Neutrophils Absolute 9.16 (*)     All other components within normal limits   BASIC METABOLIC PANEL - Abnormal; Notable for the following components:    Sodium 131 (*)     Chloride 93 (*)     Glucose 122 (*)     All other components within normal limits   URINALYSIS

## 2024-03-08 NOTE — DISCHARGE INSTRUCTIONS
Stop taking Macrobid and begin take Omnicef for urinary tract infection.  Return if anything acutely worsens

## 2024-03-09 LAB
MICROORGANISM SPEC CULT: ABNORMAL
MICROORGANISM SPEC CULT: ABNORMAL
SPECIMEN DESCRIPTION: ABNORMAL

## 2024-03-12 ENCOUNTER — OFFICE VISIT (OUTPATIENT)
Dept: NON INVASIVE DIAGNOSTICS | Age: 49
End: 2024-03-12
Payer: MEDICAID

## 2024-03-12 VITALS
HEIGHT: 66 IN | OXYGEN SATURATION: 96 % | DIASTOLIC BLOOD PRESSURE: 64 MMHG | SYSTOLIC BLOOD PRESSURE: 112 MMHG | RESPIRATION RATE: 18 BRPM | WEIGHT: 224 LBS | HEART RATE: 75 BPM | BODY MASS INDEX: 36 KG/M2

## 2024-03-12 DIAGNOSIS — I66.01 OCCLUSION OF RIGHT MIDDLE CEREBRAL ARTERY: ICD-10-CM

## 2024-03-12 DIAGNOSIS — I63.9 CEREBROVASCULAR ACCIDENT (CVA), UNSPECIFIED MECHANISM (HCC): Primary | ICD-10-CM

## 2024-03-12 DIAGNOSIS — Z95.818 STATUS POST PLACEMENT OF IMPLANTABLE LOOP RECORDER: ICD-10-CM

## 2024-03-12 PROCEDURE — G8417 CALC BMI ABV UP PARAM F/U: HCPCS

## 2024-03-12 PROCEDURE — G8427 DOCREV CUR MEDS BY ELIG CLIN: HCPCS

## 2024-03-12 PROCEDURE — 99214 OFFICE O/P EST MOD 30 MIN: CPT

## 2024-03-12 PROCEDURE — 93000 ELECTROCARDIOGRAM COMPLETE: CPT | Performed by: STUDENT IN AN ORGANIZED HEALTH CARE EDUCATION/TRAINING PROGRAM

## 2024-03-12 PROCEDURE — 4004F PT TOBACCO SCREEN RCVD TLK: CPT

## 2024-03-12 PROCEDURE — G8484 FLU IMMUNIZE NO ADMIN: HCPCS

## 2024-03-12 ASSESSMENT — ENCOUNTER SYMPTOMS
RESPIRATORY NEGATIVE: 1
GASTROINTESTINAL NEGATIVE: 1

## 2024-03-12 NOTE — PROGRESS NOTES
presyncope.  She does report dizziness associated with her UTI, she states that she has cut back on her caffeine intake to 6 cups/day she is trying to drink more water    Review of Systems   Constitutional: Negative.    Respiratory: Negative.     Cardiovascular: Negative.    Gastrointestinal: Negative.    Genitourinary: Negative.    Neurological: Negative.    All other systems reviewed and are negative.      Objective:   Physical Exam  Vitals and nursing note reviewed.   Constitutional:       General: She is not in acute distress.     Appearance: Normal appearance. She is not toxic-appearing.   Neck:      Vascular: No carotid bruit or JVD.   Cardiovascular:      Rate and Rhythm: Normal rate and regular rhythm.      Heart sounds: S1 normal and S2 normal. No murmur heard.     No gallop.   Pulmonary:      Effort: Pulmonary effort is normal.      Breath sounds: Normal breath sounds and air entry.   Abdominal:      Palpations: Abdomen is soft.      Tenderness: There is no abdominal tenderness.   Musculoskeletal:      Right lower leg: No edema.      Left lower leg: No edema.   Neurological:      Mental Status: She is alert.         Wt Readings from Last 5 Encounters:   24 101.6 kg (224 lb)   24 98.4 kg (217 lb)   24 95.3 kg (210 lb)   24 95.3 kg (210 lb)   23 95.3 kg (210 lb)       Social History:    Social History     Socioeconomic History    Marital status: Single     Spouse name: Not on file    Number of children: 1    Years of education: Not on file    Highest education level: 12th grade   Occupational History    Not on file   Tobacco Use    Smoking status: Every Day     Current packs/day: 0.00     Average packs/day: 1 pack/day for 20.0 years (20.0 ttl pk-yrs)     Types: Cigarettes     Start date: 10/1/2020     Last attempt to quit: 10/12/2021     Years since quittin.4    Smokeless tobacco: Never   Vaping Use    Vaping Use: Never used   Substance and Sexual Activity    Alcohol use:

## 2024-04-01 RX ORDER — BACLOFEN 20 MG/1
20 TABLET ORAL 3 TIMES DAILY
Qty: 90 TABLET | Refills: 2 | OUTPATIENT
Start: 2024-04-01

## 2024-04-01 RX ORDER — BACLOFEN 20 MG/1
20 TABLET ORAL 3 TIMES DAILY
Qty: 90 TABLET | Refills: 2 | Status: SHIPPED | OUTPATIENT
Start: 2024-04-01

## 2024-04-15 ENCOUNTER — TELEPHONE (OUTPATIENT)
Dept: PHYSICAL MEDICINE AND REHAB | Age: 49
End: 2024-04-15

## 2024-04-15 NOTE — TELEPHONE ENCOUNTER
Called patient several times needing more information concerning her new ins in order to prior auth  Botox.    Frances Pham MA

## 2024-05-09 NOTE — PROGRESS NOTES
Received notification from verification dept. that patient has traditional [place] Medicaid for the month of April  Patient notified. [FreeTextEntry1] : 49 yo female feel down her steps at home and passed out 3 weeks ago. It was 6 AM and she had just woken up and was going downstairs to make coffee and she woke up at the bottom of the stairs in pain. She was evaluate in trauma ER at [facility], had a right rib fracture. She was told her pulse was in the 30s sometimes and feels her pulse is very erratic now on her watch.  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  Non smoker.  Social alcohol. Feels her lips are numb sometimes.  Had labs with PMD and was told her cholesterol is high. Not started on meds.  Had echo and Holter.

## 2024-06-17 ENCOUNTER — TELEPHONE (OUTPATIENT)
Dept: PHYSICAL MEDICINE AND REHAB | Age: 49
End: 2024-06-17

## 2024-07-01 RX ORDER — BACLOFEN 20 MG/1
20 TABLET ORAL 3 TIMES DAILY
Qty: 90 TABLET | Refills: 0 | Status: SHIPPED | OUTPATIENT
Start: 2024-07-01

## 2024-07-05 PROCEDURE — 93298 REM INTERROG DEV EVAL SCRMS: CPT | Performed by: STUDENT IN AN ORGANIZED HEALTH CARE EDUCATION/TRAINING PROGRAM

## 2024-07-29 RX ORDER — BACLOFEN 20 MG/1
TABLET ORAL
Refills: 0 | OUTPATIENT
Start: 2024-07-29

## 2024-08-05 PROCEDURE — 93298 REM INTERROG DEV EVAL SCRMS: CPT | Performed by: STUDENT IN AN ORGANIZED HEALTH CARE EDUCATION/TRAINING PROGRAM

## 2024-08-10 RX ORDER — BACLOFEN 20 MG/1
20 TABLET ORAL 3 TIMES DAILY
Qty: 90 TABLET | Refills: 2 | Status: SHIPPED | OUTPATIENT
Start: 2024-08-10

## 2024-08-12 RX ORDER — BACLOFEN 20 MG/1
20 TABLET ORAL 3 TIMES DAILY
Qty: 90 TABLET | Refills: 2 | Status: CANCELLED | OUTPATIENT
Start: 2024-08-12

## 2024-08-12 RX ORDER — BACLOFEN 20 MG/1
20 TABLET ORAL 3 TIMES DAILY
Qty: 90 TABLET | Refills: 0 | OUTPATIENT
Start: 2024-08-12

## 2024-08-13 RX ORDER — BACLOFEN 20 MG/1
20 TABLET ORAL 3 TIMES DAILY
Qty: 90 TABLET | Refills: 2 | Status: SHIPPED | OUTPATIENT
Start: 2024-08-13

## 2024-08-14 ENCOUNTER — TELEPHONE (OUTPATIENT)
Dept: PHYSICAL MEDICINE AND REHAB | Age: 49
End: 2024-08-14

## 2024-08-15 ENCOUNTER — TELEPHONE (OUTPATIENT)
Dept: PHYSICAL MEDICINE AND REHAB | Age: 49
End: 2024-08-15

## 2024-08-15 NOTE — TELEPHONE ENCOUNTER
Patient called stating she has changed pharmacies from [facility] To [facility]. Today she informed that her recent refill will need to go to [facility].  [facility] said that [facility].  I mentioned that any issues please call our office

## 2024-08-16 NOTE — TELEPHONE ENCOUNTER
Patient has switched pharmacy x3 without notifying the office.  Patient states the recent refills have been canceled by pharmacy.  She will need a 3 month supply from [facility]. The new pharmacy has been updated in her chart.    Frances Pham MA

## 2024-08-22 RX ORDER — BACLOFEN 20 MG/1
20 TABLET ORAL 3 TIMES DAILY
Qty: 90 TABLET | Refills: 2 | OUTPATIENT
Start: 2024-08-22

## 2024-09-03 ENCOUNTER — TELEPHONE (OUTPATIENT)
Dept: PHYSICAL MEDICINE AND REHAB | Age: 49
End: 2024-09-03

## 2024-09-03 NOTE — TELEPHONE ENCOUNTER
Patient will like an order for night splint and foot drop. She would like this mailed to her.    Please advise  Frances Pham MA

## 2024-09-05 PROCEDURE — 93298 REM INTERROG DEV EVAL SCRMS: CPT | Performed by: STUDENT IN AN ORGANIZED HEALTH CARE EDUCATION/TRAINING PROGRAM

## 2024-09-24 ENCOUNTER — OFFICE VISIT (OUTPATIENT)
Dept: NON INVASIVE DIAGNOSTICS | Age: 49
End: 2024-09-24
Payer: MEDICARE

## 2024-09-24 VITALS
BODY MASS INDEX: 34.2 KG/M2 | DIASTOLIC BLOOD PRESSURE: 70 MMHG | WEIGHT: 212.8 LBS | HEIGHT: 66 IN | RESPIRATION RATE: 16 BRPM | SYSTOLIC BLOOD PRESSURE: 122 MMHG | HEART RATE: 80 BPM

## 2024-09-24 DIAGNOSIS — G81.94 LEFT HEMIPARESIS (HCC): ICD-10-CM

## 2024-09-24 DIAGNOSIS — I63.9 CEREBROVASCULAR ACCIDENT (CVA), UNSPECIFIED MECHANISM (HCC): Primary | ICD-10-CM

## 2024-09-24 DIAGNOSIS — Z95.818 STATUS POST PLACEMENT OF IMPLANTABLE LOOP RECORDER: ICD-10-CM

## 2024-09-24 PROCEDURE — G8417 CALC BMI ABV UP PARAM F/U: HCPCS | Performed by: NURSE PRACTITIONER

## 2024-09-24 PROCEDURE — 99213 OFFICE O/P EST LOW 20 MIN: CPT | Performed by: NURSE PRACTITIONER

## 2024-09-24 PROCEDURE — 93000 ELECTROCARDIOGRAM COMPLETE: CPT | Performed by: NURSE PRACTITIONER

## 2024-09-24 PROCEDURE — 4004F PT TOBACCO SCREEN RCVD TLK: CPT | Performed by: NURSE PRACTITIONER

## 2024-09-24 PROCEDURE — G8427 DOCREV CUR MEDS BY ELIG CLIN: HCPCS | Performed by: NURSE PRACTITIONER

## 2024-09-24 RX ORDER — BUSPIRONE HYDROCHLORIDE 10 MG/1
10 TABLET ORAL 3 TIMES DAILY
COMMUNITY
Start: 2023-05-14

## 2024-10-06 PROCEDURE — 93298 REM INTERROG DEV EVAL SCRMS: CPT | Performed by: STUDENT IN AN ORGANIZED HEALTH CARE EDUCATION/TRAINING PROGRAM

## 2024-10-15 ENCOUNTER — OFFICE VISIT (OUTPATIENT)
Dept: PHYSICAL MEDICINE AND REHAB | Age: 49
End: 2024-10-15
Payer: MEDICARE

## 2024-10-15 VITALS
DIASTOLIC BLOOD PRESSURE: 68 MMHG | BODY MASS INDEX: 34.07 KG/M2 | HEART RATE: 70 BPM | WEIGHT: 212 LBS | SYSTOLIC BLOOD PRESSURE: 120 MMHG | HEIGHT: 66 IN

## 2024-10-15 DIAGNOSIS — M21.372 LEFT FOOT DROP: ICD-10-CM

## 2024-10-15 DIAGNOSIS — Z78.9 IMPAIRED MOBILITY AND ADLS: ICD-10-CM

## 2024-10-15 DIAGNOSIS — Z74.09 IMPAIRED MOBILITY AND ADLS: ICD-10-CM

## 2024-10-15 DIAGNOSIS — M62.838 MUSCLE SPASTICITY: ICD-10-CM

## 2024-10-15 DIAGNOSIS — G81.14 LEFT SPASTIC HEMIPLEGIA (HCC): ICD-10-CM

## 2024-10-15 DIAGNOSIS — Z86.73 H/O ISCHEMIC RIGHT MCA STROKE: Primary | ICD-10-CM

## 2024-10-15 PROCEDURE — G8484 FLU IMMUNIZE NO ADMIN: HCPCS | Performed by: PHYSICAL MEDICINE & REHABILITATION

## 2024-10-15 PROCEDURE — G8427 DOCREV CUR MEDS BY ELIG CLIN: HCPCS | Performed by: PHYSICAL MEDICINE & REHABILITATION

## 2024-10-15 PROCEDURE — 99214 OFFICE O/P EST MOD 30 MIN: CPT | Performed by: PHYSICAL MEDICINE & REHABILITATION

## 2024-10-15 PROCEDURE — G8417 CALC BMI ABV UP PARAM F/U: HCPCS | Performed by: PHYSICAL MEDICINE & REHABILITATION

## 2024-10-15 PROCEDURE — 4004F PT TOBACCO SCREEN RCVD TLK: CPT | Performed by: PHYSICAL MEDICINE & REHABILITATION

## 2024-10-15 NOTE — PROGRESS NOTES
Mary Walter M.D.  [facility] PHYSICIANS [place] SPECIALTY CARE [facility] PHYSICAL MEDICINE AND REHABILITAION  [address]  [address]  [place] [address]  Dept: [phone]  Dept Fax: [phone]  Loc: [phone]    PCP: Kaitlin De MD  Date of visit: 10/15/24    Chief Complaint   Patient presents with    Follow up and evaluate for Durable Medical Equipment     Face to face eval for DME ,AFO and Arm splint.          Ladan Damon is a 48 y.o. woman who presents for follow up of rehab needs     Patient sustained a right MCA ischemic infarct 10/12/2021 and was admitted to [facility]. She had worsening of her Neurologic exam on 10/13 and repeat imaging indicated worsening edema, 1 cm of midline shift, effacement of the right lateral ventricle, and increased left lateral ventricle caliber concerning for entrapment.  She underwent emergent R hemicraniectomy by Dr. Costa ([facility]) on 10/13. After being deemed medically appropriate, she was transferred to [facility] on 10/22/2021. She completed the Acute Rehab program from 10/22/2021-12/7/2021 and was discharged home.  Patient was admitted back to [facility] and underwent right cranioplasty on 3/30/2022. She returned to Acute Rehab from 4/6/2022-4/22/2022 and was again discharged home. At time of Acute rehab discharge she was at Supervision - SBA level for functional mobility. She was ambulating 400 ft SPC SBA (L toe off AFO). She was at Supervision - Min A for ADLs.     Since last visit patient never scheduled to get her left resting hand splint from [place]. She is requesting the Orthotics referral be printed so that she can take it closer to home. She also states that she has had some weight fluctuation and lower extremity swelling and her left AFO is not fitting as well and has become uncomfortable at times. She states she saw Cardiology regarding the edema and evaluation was completed with no significant

## 2024-11-05 ENCOUNTER — TELEPHONE (OUTPATIENT)
Dept: NON INVASIVE DIAGNOSTICS | Age: 49
End: 2024-11-05

## 2024-11-05 DIAGNOSIS — G47.33 OSA (OBSTRUCTIVE SLEEP APNEA): Primary | ICD-10-CM

## 2024-11-05 NOTE — TELEPHONE ENCOUNTER
Called patient in regards to ILR transmission for a pause alert. She stated she was sitting at her kitchen table getting ready to take her night time medication. Patient stated she was asymptomatic at that time. Discussed with Dr. Quiroz and he stated he will call the patient.    Vera Nolan CMA

## 2024-11-07 ENCOUNTER — TELEPHONE (OUTPATIENT)
Dept: PHYSICAL MEDICINE AND REHAB | Age: 49
End: 2024-11-07

## 2024-11-10 PROCEDURE — 93298 REM INTERROG DEV EVAL SCRMS: CPT | Performed by: STUDENT IN AN ORGANIZED HEALTH CARE EDUCATION/TRAINING PROGRAM

## 2024-12-11 PROCEDURE — 93298 REM INTERROG DEV EVAL SCRMS: CPT | Performed by: STUDENT IN AN ORGANIZED HEALTH CARE EDUCATION/TRAINING PROGRAM

## 2025-01-11 PROCEDURE — 93298 REM INTERROG DEV EVAL SCRMS: CPT | Performed by: STUDENT IN AN ORGANIZED HEALTH CARE EDUCATION/TRAINING PROGRAM

## 2025-01-16 DIAGNOSIS — G47.30 SLEEP DISORDER BREATHING: Primary | ICD-10-CM

## 2025-02-18 ENCOUNTER — PROCEDURE VISIT (OUTPATIENT)
Dept: PHYSICAL MEDICINE AND REHAB | Age: 50
End: 2025-02-18
Payer: MEDICARE

## 2025-02-18 VITALS
TEMPERATURE: 98.1 F | BODY MASS INDEX: 34.22 KG/M2 | OXYGEN SATURATION: 100 % | WEIGHT: 212 LBS | DIASTOLIC BLOOD PRESSURE: 70 MMHG | SYSTOLIC BLOOD PRESSURE: 117 MMHG | HEART RATE: 74 BPM

## 2025-02-18 DIAGNOSIS — M62.838 MUSCLE SPASTICITY: ICD-10-CM

## 2025-02-18 DIAGNOSIS — Z78.9 IMPAIRED MOBILITY AND ADLS: ICD-10-CM

## 2025-02-18 DIAGNOSIS — Z86.73 H/O ISCHEMIC RIGHT MCA STROKE: Primary | ICD-10-CM

## 2025-02-18 DIAGNOSIS — Z74.09 IMPAIRED MOBILITY AND ADLS: ICD-10-CM

## 2025-02-18 DIAGNOSIS — M21.372 LEFT FOOT DROP: ICD-10-CM

## 2025-02-18 DIAGNOSIS — G81.14 LEFT SPASTIC HEMIPLEGIA (HCC): ICD-10-CM

## 2025-02-18 PROCEDURE — 99213 OFFICE O/P EST LOW 20 MIN: CPT | Performed by: PHYSICAL MEDICINE & REHABILITATION

## 2025-02-18 PROCEDURE — 95874 GUIDE NERV DESTR NEEDLE EMG: CPT | Performed by: PHYSICAL MEDICINE & REHABILITATION

## 2025-02-18 PROCEDURE — 64644 CHEMODENERV 1 EXTREM 5/> MUS: CPT | Performed by: PHYSICAL MEDICINE & REHABILITATION

## 2025-02-18 PROCEDURE — G8427 DOCREV CUR MEDS BY ELIG CLIN: HCPCS | Performed by: PHYSICAL MEDICINE & REHABILITATION

## 2025-02-18 PROCEDURE — 4004F PT TOBACCO SCREEN RCVD TLK: CPT | Performed by: PHYSICAL MEDICINE & REHABILITATION

## 2025-02-18 PROCEDURE — G8417 CALC BMI ABV UP PARAM F/U: HCPCS | Performed by: PHYSICAL MEDICINE & REHABILITATION

## 2025-02-18 NOTE — PROGRESS NOTES
Mary Walter M.D.  [facility] PHYSICIANS [place] SPECIALTY CARE [facility] PHYSICAL MEDICINE AND REHABILITAION  [address]  [address]  [place] [id number]  Dept: [phone]  Dept Fax: [phone]  Loc: [phone]    PCP: Kaitlin De MD  Date of visit: 2/18/25    Chief Complaint   Patient presents with    Follow up     Patient is here for follow up and Botox injections         Ladan Damon is a 49 y.o. woman who presents for follow up of rehab needs     Patient sustained a right MCA ischemic infarct 10/12/2021 and was admitted to [facility]. She had worsening of her Neurologic exam on 10/13 and repeat imaging indicated worsening edema, 1 cm of midline shift, effacement of the right lateral ventricle, and increased left lateral ventricle caliber concerning for entrapment.  She underwent emergent R hemicraniectomy by Dr. Costa ([facility]) on 10/13. After being deemed medically appropriate, [facility]. She completed the Acute Rehab program from 10/22/2021-12/7/2021 and was discharged home.  Patient was admitted back to [facility] and underwent right cranioplasty on 3/30/2022. She returned to Acute Rehab from 4/6/2022-4/22/2022 and was again discharged home. At time of Acute rehab discharge she was at Supervision - SBA level for functional mobility. She was ambulating 400 ft SPC SBA (L toe off AFO). She was at Supervision - Min A for ADLs.     Since last visit patient reports no significant changes. She is overdue for Botox thus upper extremity spasticity has been more significant. She was previously ordered a new left resting hand splint and left AFO due to fit issues with her old braces.  Patient reports great improvement in spasticity and pain in the left upper extremity with prior Botox. She is overdue for her Botox now and the left upper extremity has gotten very tight again. No negative effects related to the Botox. No

## 2025-04-07 ENCOUNTER — OFFICE VISIT (OUTPATIENT)
Dept: PHYSICAL MEDICINE AND REHAB | Age: 50
End: 2025-04-07
Payer: MEDICARE

## 2025-04-07 VITALS
BODY MASS INDEX: 34.07 KG/M2 | SYSTOLIC BLOOD PRESSURE: 124 MMHG | HEIGHT: 66 IN | WEIGHT: 212 LBS | OXYGEN SATURATION: 97 % | TEMPERATURE: 98.2 F | HEART RATE: 63 BPM | DIASTOLIC BLOOD PRESSURE: 84 MMHG

## 2025-04-07 DIAGNOSIS — Z78.9 IMPAIRED MOBILITY AND ADLS: ICD-10-CM

## 2025-04-07 DIAGNOSIS — Z86.73 H/O ISCHEMIC RIGHT MCA STROKE: Primary | ICD-10-CM

## 2025-04-07 DIAGNOSIS — G81.14 LEFT SPASTIC HEMIPLEGIA (HCC): ICD-10-CM

## 2025-04-07 DIAGNOSIS — M62.838 MUSCLE SPASTICITY: ICD-10-CM

## 2025-04-07 DIAGNOSIS — M21.372 LEFT FOOT DROP: ICD-10-CM

## 2025-04-07 DIAGNOSIS — Z74.09 IMPAIRED MOBILITY AND ADLS: ICD-10-CM

## 2025-04-07 PROCEDURE — G8417 CALC BMI ABV UP PARAM F/U: HCPCS | Performed by: PHYSICAL MEDICINE & REHABILITATION

## 2025-04-07 PROCEDURE — G8427 DOCREV CUR MEDS BY ELIG CLIN: HCPCS | Performed by: PHYSICAL MEDICINE & REHABILITATION

## 2025-04-07 PROCEDURE — 4004F PT TOBACCO SCREEN RCVD TLK: CPT | Performed by: PHYSICAL MEDICINE & REHABILITATION

## 2025-04-07 PROCEDURE — 99214 OFFICE O/P EST MOD 30 MIN: CPT | Performed by: PHYSICAL MEDICINE & REHABILITATION

## 2025-04-07 NOTE — PROGRESS NOTES
Mary Walter M.D.  [facility] PHYSICIANS [place] SPECIALTY CARE [facility] PHYSICAL MEDICINE AND REHABILITAION  [address]  [address]  [facility] [address]  Dept: [phone]  Dept Fax: [phone]  Loc: [phone]    PCP: Kaitlin De MD  Date of visit: 4/7/25    Chief Complaint   Patient presents with    Follow up     Patient is here for follow up and Botox injections. Improvement in spasticity with Botox, but still tight.         Ladan Damon is a 49 y.o. woman who presents for follow up of rehab needs     Patient sustained a right MCA ischemic infarct 10/12/2021 and was admitted to [facility]. She had worsening of her Neurologic exam on 10/13 and repeat imaging indicated worsening edema, 1 cm of midline shift, effacement of the right lateral ventricle, and increased left lateral ventricle caliber concerning for entrapment.  She underwent emergent R hemicraniectomy by Dr. Costa ([facility]) on 10/13. After being deemed medically appropriate, [facility]. She completed the Acute Rehab program from 10/22/2021-12/7/2021 and was discharged home.  Patient was admitted back to [facility] and underwent right cranioplasty on 3/30/2022. She returned to Acute Rehab from 4/6/2022-4/22/2022 and was again discharged home. At time of Acute rehab discharge she was at Supervision - SBA level for functional mobility. She was ambulating 400 ft SPC SBA (L toe off AFO). She was at Supervision - Min A for ADLs.     Since last visit patient reports no significant functional changes. She states that she did notice improvement in left upper extremity spasticity after Botox injections performed 2/18/2025 and states that she has had better range of motion and performance in PT since her injections. She does reports that spasticity still continues to be quite significant, however. She denies any negative effects related to Botox. She is utilizing a left

## 2025-05-20 ENCOUNTER — PROCEDURE VISIT (OUTPATIENT)
Dept: PHYSICAL MEDICINE AND REHAB | Age: 50
End: 2025-05-20
Payer: MEDICARE

## 2025-05-20 VITALS
DIASTOLIC BLOOD PRESSURE: 75 MMHG | WEIGHT: 217 LBS | HEIGHT: 67 IN | BODY MASS INDEX: 34.06 KG/M2 | SYSTOLIC BLOOD PRESSURE: 113 MMHG | HEART RATE: 79 BPM

## 2025-05-20 DIAGNOSIS — M62.838 MUSCLE SPASTICITY: ICD-10-CM

## 2025-05-20 DIAGNOSIS — M21.372 LEFT FOOT DROP: ICD-10-CM

## 2025-05-20 DIAGNOSIS — Z86.73 H/O ISCHEMIC RIGHT MCA STROKE: Primary | ICD-10-CM

## 2025-05-20 DIAGNOSIS — Z74.09 IMPAIRED MOBILITY AND ADLS: ICD-10-CM

## 2025-05-20 DIAGNOSIS — G81.14 LEFT SPASTIC HEMIPLEGIA (HCC): ICD-10-CM

## 2025-05-20 DIAGNOSIS — Z78.9 IMPAIRED MOBILITY AND ADLS: ICD-10-CM

## 2025-05-20 PROCEDURE — 95874 GUIDE NERV DESTR NEEDLE EMG: CPT | Performed by: PHYSICAL MEDICINE & REHABILITATION

## 2025-05-20 PROCEDURE — 64644 CHEMODENERV 1 EXTREM 5/> MUS: CPT | Performed by: PHYSICAL MEDICINE & REHABILITATION

## 2025-05-20 PROCEDURE — 4004F PT TOBACCO SCREEN RCVD TLK: CPT | Performed by: PHYSICAL MEDICINE & REHABILITATION

## 2025-05-20 PROCEDURE — 99213 OFFICE O/P EST LOW 20 MIN: CPT | Performed by: PHYSICAL MEDICINE & REHABILITATION

## 2025-05-20 PROCEDURE — G8417 CALC BMI ABV UP PARAM F/U: HCPCS | Performed by: PHYSICAL MEDICINE & REHABILITATION

## 2025-05-20 PROCEDURE — G8427 DOCREV CUR MEDS BY ELIG CLIN: HCPCS | Performed by: PHYSICAL MEDICINE & REHABILITATION

## 2025-05-20 RX ORDER — TRAZODONE HYDROCHLORIDE 50 MG/1
50 TABLET ORAL 2 TIMES DAILY
COMMUNITY
Start: 2025-05-09

## 2025-05-20 RX ORDER — TRAMADOL HYDROCHLORIDE 50 MG/1
50 TABLET ORAL 2 TIMES DAILY PRN
COMMUNITY
Start: 2025-04-29

## 2025-05-20 NOTE — PROGRESS NOTES
Mary Walter M.D.  [facility] PHYSICIANS [place] SPECIALTY CARE [facility] PHYSICAL MEDICINE AND REHABILITAION  [facility] [address]  [facility] [address]  Dept: [phone]  Dept Fax: [phone]  Loc: [phone]    PCP: Susan Berger APRN - CNP  Date of visit: 5/20/25    Chief Complaint   Patient presents with    Follow up and Trinityox      Ladan is here for follow up and for her botox injection.          Ladan Damon is a 49 y.o. woman who presents for follow up of rehab needs     Patient sustained a right MCA ischemic infarct 10/12/2021 and was admitted to [facility]. She had worsening of her Neurologic exam on 10/13 and repeat imaging indicated worsening edema, 1 cm of midline shift, effacement of the right lateral ventricle, and increased left lateral ventricle caliber concerning for entrapment.  She underwent emergent R hemicraniectomy by Dr. Costa ([facility]) on 10/13. After being deemed medically appropriate, [facility]. She completed the Acute Rehab program from 10/22/2021-12/7/2021 and was discharged home.  Patient was admitted back to [facility] and underwent right cranioplasty on 3/30/2022. She returned to Acute Rehab from 4/6/2022-4/22/2022 and was again discharged home. At time of Acute rehab discharge she was at Supervision - SBA level for functional mobility. She was ambulating 400 ft SPC SBA (L toe off AFO). She was at Supervision - Min A for ADLs.     Since last visit patient reports no significant functional changes. She states that she did notice improvement in left upper extremity spasticity after Botox injections performed 2/18/2025 and states that she had better range of motion and performance in PT after her injections. She does report that spasticity continued to be quite significant, however. Improvement from her last Botox injections is now diminished as the effects of the Botox have worn off. She denies any negative

## 2025-06-15 PROCEDURE — 93298 REM INTERROG DEV EVAL SCRMS: CPT | Performed by: STUDENT IN AN ORGANIZED HEALTH CARE EDUCATION/TRAINING PROGRAM

## 2025-06-24 ENCOUNTER — OFFICE VISIT (OUTPATIENT)
Dept: PHYSICAL MEDICINE AND REHAB | Age: 50
End: 2025-06-24
Payer: MEDICARE

## 2025-06-24 VITALS — WEIGHT: 217 LBS | HEIGHT: 67 IN | BODY MASS INDEX: 34.06 KG/M2

## 2025-06-24 DIAGNOSIS — G81.14 LEFT SPASTIC HEMIPLEGIA (HCC): ICD-10-CM

## 2025-06-24 DIAGNOSIS — Z78.9 IMPAIRED MOBILITY AND ADLS: ICD-10-CM

## 2025-06-24 DIAGNOSIS — Z74.09 IMPAIRED MOBILITY AND ADLS: ICD-10-CM

## 2025-06-24 DIAGNOSIS — Z86.73 H/O ISCHEMIC RIGHT MCA STROKE: Primary | ICD-10-CM

## 2025-06-24 DIAGNOSIS — M21.372 LEFT FOOT DROP: ICD-10-CM

## 2025-06-24 DIAGNOSIS — M62.838 MUSCLE SPASTICITY: ICD-10-CM

## 2025-06-24 PROCEDURE — G8427 DOCREV CUR MEDS BY ELIG CLIN: HCPCS | Performed by: PHYSICAL MEDICINE & REHABILITATION

## 2025-06-24 PROCEDURE — G8417 CALC BMI ABV UP PARAM F/U: HCPCS | Performed by: PHYSICAL MEDICINE & REHABILITATION

## 2025-06-24 PROCEDURE — 4004F PT TOBACCO SCREEN RCVD TLK: CPT | Performed by: PHYSICAL MEDICINE & REHABILITATION

## 2025-06-24 PROCEDURE — 99214 OFFICE O/P EST MOD 30 MIN: CPT | Performed by: PHYSICAL MEDICINE & REHABILITATION

## 2025-06-24 NOTE — PROGRESS NOTES
Mary Walter M.D.  [facility] PHYSICIANS [place] SPECIALTY CARE [facility] PHYSICAL MEDICINE AND REHABILITAION  [address]  [facility] [address]  Dept: [phone]  Dept Fax: [phone]  Loc: [phone]    PCP: Susan Berger APRN - CNP  Date of visit: 6/24/25    Chief Complaint   Patient presents with    Follow-up     Patient presents for follow up from botox, reports that this round of botox seems to be better then the last. States that some days seem to be better then others.          Ladan Damon is a 49 y.o. woman who presents for follow up of rehab needs     Patient sustained a right MCA ischemic infarct 10/12/2021 and was admitted to [facility]. She had worsening of her Neurologic exam on 10/13 and repeat imaging indicated worsening edema, 1 cm of midline shift, effacement of the right lateral ventricle, and increased left lateral ventricle caliber concerning for entrapment.  She underwent emergent R hemicraniectomy by Dr. Costa ([facility]) on 10/13. After being deemed medically appropriate, [facility]. She completed the Acute Rehab program from 10/22/2021-12/7/2021 and was discharged home.  Patient was admitted back to [facility] and underwent right cranioplasty on 3/30/2022. She returned to Acute Rehab from 4/6/2022-4/22/2022 and was again discharged home. At time of Acute rehab discharge she was at Supervision - SBA level for functional mobility. She was ambulating 400 ft SPC SBA (L toe off AFO). She was at Supervision - Min A for ADLs.     Since last visit patient reports no significant functional changes. She states that she did notice good improvement in left upper extremity spasticity after Botox injections performed 5/20/2025 and states that she had better range of motion and performance in PT after her injections. She also noticed some mild increased in functional use of the left upper extremity after Botox. She does

## 2025-08-12 ENCOUNTER — PROCEDURE VISIT (OUTPATIENT)
Dept: PHYSICAL MEDICINE AND REHAB | Age: 50
End: 2025-08-12
Payer: MEDICARE

## 2025-08-12 VITALS
WEIGHT: 217 LBS | HEIGHT: 67 IN | SYSTOLIC BLOOD PRESSURE: 111 MMHG | DIASTOLIC BLOOD PRESSURE: 72 MMHG | BODY MASS INDEX: 34.06 KG/M2 | HEART RATE: 71 BPM

## 2025-08-12 DIAGNOSIS — Z78.9 IMPAIRED MOBILITY AND ADLS: ICD-10-CM

## 2025-08-12 DIAGNOSIS — Z74.09 IMPAIRED MOBILITY AND ADLS: ICD-10-CM

## 2025-08-12 DIAGNOSIS — Z86.73 H/O ISCHEMIC RIGHT MCA STROKE: Primary | ICD-10-CM

## 2025-08-12 DIAGNOSIS — G81.14 LEFT SPASTIC HEMIPLEGIA (HCC): ICD-10-CM

## 2025-08-12 DIAGNOSIS — M21.372 LEFT FOOT DROP: ICD-10-CM

## 2025-08-12 DIAGNOSIS — M62.838 MUSCLE SPASTICITY: ICD-10-CM

## 2025-08-12 PROCEDURE — 95874 GUIDE NERV DESTR NEEDLE EMG: CPT | Performed by: PHYSICAL MEDICINE & REHABILITATION

## 2025-08-12 PROCEDURE — 99213 OFFICE O/P EST LOW 20 MIN: CPT | Performed by: PHYSICAL MEDICINE & REHABILITATION

## 2025-08-12 PROCEDURE — G8417 CALC BMI ABV UP PARAM F/U: HCPCS | Performed by: PHYSICAL MEDICINE & REHABILITATION

## 2025-08-12 PROCEDURE — 4004F PT TOBACCO SCREEN RCVD TLK: CPT | Performed by: PHYSICAL MEDICINE & REHABILITATION

## 2025-08-12 PROCEDURE — 64644 CHEMODENERV 1 EXTREM 5/> MUS: CPT | Performed by: PHYSICAL MEDICINE & REHABILITATION

## 2025-08-12 PROCEDURE — G8427 DOCREV CUR MEDS BY ELIG CLIN: HCPCS | Performed by: PHYSICAL MEDICINE & REHABILITATION

## 2025-08-12 RX ORDER — BACLOFEN 20 MG/1
20 TABLET ORAL 3 TIMES DAILY
Qty: 90 TABLET | Refills: 2 | Status: SHIPPED | OUTPATIENT
Start: 2025-08-12